# Patient Record
Sex: FEMALE | Race: WHITE | NOT HISPANIC OR LATINO | Employment: OTHER | URBAN - METROPOLITAN AREA
[De-identification: names, ages, dates, MRNs, and addresses within clinical notes are randomized per-mention and may not be internally consistent; named-entity substitution may affect disease eponyms.]

---

## 2017-01-18 ENCOUNTER — TELEPHONE (OUTPATIENT)
Dept: CARDIOLOGY | Facility: MEDICAL CENTER | Age: 82
End: 2017-01-18

## 2017-01-18 NOTE — TELEPHONE ENCOUNTER
----- Message from Kori Cabrales sent at 1/18/2017 11:40 AM PST -----  Regarding: patient wants a echo ordered  LA/Soo        Patient said Dr. Hoyos wanted her to have an echo but she ran into problems with her insurance. She said she no longer has that insurance and would like Dr. Hoyos to arrange for another echo. She can be reached at [phone]

## 2017-01-18 NOTE — TELEPHONE ENCOUNTER
Called patient. Her insurance has changed and she doesn't know where she should have her echocardiogram done. Advised patient to contact her insurance company to find out which facilities are in-network for her.    FE TAY

## 2017-01-23 RX ORDER — LORAZEPAM 1 MG/1
TABLET ORAL
Qty: 60 TAB | Refills: 0 | Status: SHIPPED | OUTPATIENT
Start: 2017-01-23 | End: 2017-03-20

## 2017-01-23 RX ORDER — ATORVASTATIN CALCIUM 40 MG/1
TABLET, FILM COATED ORAL
Qty: 90 TAB | Refills: 1 | Status: SHIPPED | OUTPATIENT
Start: 2017-01-23 | End: 2017-01-30 | Stop reason: SDUPTHER

## 2017-01-23 NOTE — TELEPHONE ENCOUNTER
Was the patient seen in the last year in this department? Yes     Does patient have an active prescription for medications requested? Yes     Received Request Via: Patient    Last Visit: 11/14/16  Last Labs: 9/12/16

## 2017-01-24 NOTE — TELEPHONE ENCOUNTER
Was the patient seen in the last year in this department? Yes     Does patient have an active prescription for medications requested? Yes     Received Request Via: Pharmacy    Last Visit: 11/14/16  Last Labs: 9/12/16

## 2017-01-30 ENCOUNTER — OFFICE VISIT (OUTPATIENT)
Dept: URGENT CARE | Facility: PHYSICIAN GROUP | Age: 82
End: 2017-01-30
Payer: MEDICARE

## 2017-01-30 ENCOUNTER — TELEPHONE (OUTPATIENT)
Dept: CARDIOLOGY | Facility: MEDICAL CENTER | Age: 82
End: 2017-01-30

## 2017-01-30 VITALS
RESPIRATION RATE: 12 BRPM | HEIGHT: 62 IN | DIASTOLIC BLOOD PRESSURE: 70 MMHG | HEART RATE: 70 BPM | TEMPERATURE: 99.4 F | SYSTOLIC BLOOD PRESSURE: 130 MMHG | OXYGEN SATURATION: 97 % | WEIGHT: 115 LBS | BODY MASS INDEX: 21.16 KG/M2

## 2017-01-30 DIAGNOSIS — I10 ESSENTIAL HYPERTENSION: ICD-10-CM

## 2017-01-30 DIAGNOSIS — M79.18 MUSCULOSKELETAL PAIN: ICD-10-CM

## 2017-01-30 DIAGNOSIS — I25.119 CORONARY ARTERY DISEASE INVOLVING NATIVE CORONARY ARTERY OF NATIVE HEART WITH ANGINA PECTORIS (HCC): ICD-10-CM

## 2017-01-30 DIAGNOSIS — R07.9 CHEST PAIN, UNSPECIFIED TYPE: ICD-10-CM

## 2017-01-30 LAB — EKG 4674: NORMAL

## 2017-01-30 PROCEDURE — G8598 ASA/ANTIPLAT THER USED: HCPCS | Performed by: FAMILY MEDICINE

## 2017-01-30 PROCEDURE — G8482 FLU IMMUNIZE ORDER/ADMIN: HCPCS | Performed by: FAMILY MEDICINE

## 2017-01-30 PROCEDURE — 1036F TOBACCO NON-USER: CPT | Performed by: FAMILY MEDICINE

## 2017-01-30 PROCEDURE — 99213 OFFICE O/P EST LOW 20 MIN: CPT | Mod: 25 | Performed by: FAMILY MEDICINE

## 2017-01-30 PROCEDURE — G8432 DEP SCR NOT DOC, RNG: HCPCS | Performed by: FAMILY MEDICINE

## 2017-01-30 PROCEDURE — G8419 CALC BMI OUT NRM PARAM NOF/U: HCPCS | Performed by: FAMILY MEDICINE

## 2017-01-30 PROCEDURE — 93000 ELECTROCARDIOGRAM COMPLETE: CPT | Performed by: FAMILY MEDICINE

## 2017-01-30 PROCEDURE — 4040F PNEUMOC VAC/ADMIN/RCVD: CPT | Performed by: FAMILY MEDICINE

## 2017-01-30 PROCEDURE — 1101F PT FALLS ASSESS-DOCD LE1/YR: CPT | Mod: 8P | Performed by: FAMILY MEDICINE

## 2017-01-30 RX ORDER — DIPHENHYDRAMINE HCL 25 MG
25 TABLET ORAL EVERY 6 HOURS PRN
COMMUNITY
End: 2019-12-19

## 2017-01-30 ASSESSMENT — ENCOUNTER SYMPTOMS
VOMITING: 0
COUGH: 0
DIAPHORESIS: 0
SHORTNESS OF BREATH: 0
FEVER: 0
NAUSEA: 0
EXERTIONAL CHEST PRESSURE: 0

## 2017-01-30 NOTE — TELEPHONE ENCOUNTER
Received call from patient. She states that for the last 3-4 days, she has been having intermittent left arm pain and pain under her left breast. She states that the left arm pain is more severe than the pain under her left breast, and the pain sometimes radiates to her right arm.     Advised patient to go to ER for evaluation. Patient verbalized understanding.    FE TAY

## 2017-01-30 NOTE — MR AVS SNAPSHOT
"        Dayana Lechuga   2017 4:20 PM   Office Visit   MRN: [id number]    Department:  [facility]   Dept Phone:  [phone]    Description:  Female : 1929   Provider:  Bob Carrasco M.D.           Reason for Visit     Chest Pain 3-4 days CP,        Allergies as of 2017     Allergen Noted Reactions    Pcn [Penicillins] 2016   Rash    About 70 years    Codeine 2016   Vomiting      You were diagnosed with     Musculoskeletal pain   [281199]       Chest pain, unspecified type   [8769216]         Vital Signs     Blood Pressure Pulse Temperature Respirations Height Weight    130/70 mmHg 70 37.4 °C (99.4 °F) 12 1.575 m (5' 2.01\") 52.164 kg (115 lb)    Body Mass Index Oxygen Saturation Smoking Status             21.03 kg/m2 97% Never Smoker          Basic Information     Date Of Birth Sex Race Ethnicity Preferred Language    1929 Female White Non- English      Your appointments     2017  2:00 PM   Established Patient with Boy Stanford M.D.   [facility] (--)    [address] [place] [id number] [phone]           You will be receiving a confirmation call a few days before your appointment from our automated call confirmation system.            May 18, 2017  2:00 PM   FOLLOW UP with Chanelle Hoyos M.D.   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 (CMS-HCC) I25.119 Medium  8/10/2016 - Present    Hypertension I10 Medium  9/10/2016 - Present    Chest pain R07.9   9/10/2016 - Present    Back pain M54.9 High  9/10/2016 - Present      Health Maintenance        Date Due Completion Dates    IMM ZOSTER VACCINE 2/18/1989 ---    MAMMOGRAM 10/23/2015 10/23/2014 (Prv Comp), 8/20/2014 (Prv Comp)    Override on 10/23/2014: Previously completed    Override on 8/20/2014: Previously completed    IMM PNEUMOCOCCAL 65+ (ADULT) LOW/MEDIUM RISK SERIES (2 of 2 - PPSV23) 6/1/2017 6/1/2016    COLONOSCOPY 2/23/2018 2/23/2008 (Prv Comp), 11/20/2007 (Prv Comp)    Override on 2/23/2008: Previously completed    Override on 11/20/2007: Previously completed    BONE DENSITY 11/20/2019 11/20/2014 (Postponed)    Override on 11/20/2014: Postponed    IMM DTaP/Tdap/Td Vaccine (2 - Td) 6/26/2026 6/26/2016            Results     POCT EKG      Component    EKG                        Current Immunizations     13-VALENT PCV PREVNAR 6/1/2016    Dtap Vaccine 6/26/2016    Influenza TIV (IM) 9/19/2016      Below and/or attached are the medications your provider expects you to take. Review all of your home medications and newly ordered medications with your provider and/or pharmacist. Follow medication instructions as directed by your provider and/or pharmacist. Please keep your medication list with you and share with your provider. Update the information when medications are discontinued, doses are changed, or new medications (including over-the-counter products) are added; and carry medication information at all times in the event of emergency situations     Allergies:  PCN - Rash     CODEINE - Vomiting               Medications  Valid as of: January 30, 2017 -  6:01 PM    Generic Name Brand Name Tablet Size Instructions for use    Albuterol Sulfate (Aero Soln) albuterol 108 (90 BASE) MCG/ACT Inhale 2 Puffs by mouth every 6 hours as  needed for Shortness of Breath.        Alendronate Sodium (Tab) FOSAMAX 35 MG Take 1 tablet by mouth  every 7 days        AmLODIPine Besylate (Tab) NORVASC 5 MG Take 1 Tab by mouth every day.        Aspirin (Chew Tab) ASA 81 MG Take 1 Tab by mouth every day.        Atorvastatin Calcium (Tab) LIPITOR 40 MG Take 1 tablet by mouth at  bedtime        Azithromycin (Tab) ZITHROMAX 250 MG As dir        Beclomethasone Dipropionate (Aero Soln) QVAR 80 MCG/ACT Use 1 puff twice daily        Biotin (Tab) Biotin 10 MG Take 10 mg by mouth every day.        Calcium Carbonate   Take 600 mg by mouth every day.        Carvedilol (Tab) COREG 12.5 MG Take 1 Tab by mouth 2 times a day, with meals.        Cholecalciferol (Tab) vitamin D 2000 UNIT Take 2,000 Units by mouth every day.        Clopidogrel Bisulfate (Tab) PLAVIX 75 MG Take 1 Tab by mouth every day.        DiphenhydrAMINE HCl (Tab) BENADRYL 25 MG Take 25 mg by mouth every 6 hours as needed for Sleep.        Fluticasone Propionate (Suspension) FLONASE 50 MCG/ACT Use 1 spray in each nostril every day        Lisinopril (Tab) PRINIVIL 20 MG Take 1 tablet by mouth  every day        LORazepam (Tab) ATIVAN 1 MG TAKE 2 TABLETS BY MOUTH AT BEDTIME.         Multiple Vitamins-Minerals (Tab) CENTRUM SILVER ADULT 50+  Take 1 Tab by mouth every day.        Nitroglycerin (SL Tab) NITROSTAT 0.4 MG Place 0.4 mg under tongue every 5 minutes as needed for Chest Pain.        Omega-3 Fatty Acids (Cap) Fish Oil 1200 MG Take 1,200 mg by mouth every day.        Omeprazole (CAPSULE DELAYED RELEASE) PRILOSEC 20 MG Take 20 mg by mouth every day.        Potassium Chloride Alicia CR (Tab CR) Kdur 20 MEQ Take 1 tablet by mouth two  times daily        Vitamin E   Take  by mouth.        Zinc (Cap) Zinc 50 MG Take 50 mg by mouth every day.        .                 Medicines prescribed today were sent to:     [id number] - [place], [place] - [address]    [address] [place] [address]     Phone: [phone] Fax: [phone]    Open 24 Hours?: No    [facility] - [place], [place] - [address]    [address] [facility] [address]    Phone: [phone] Fax: [phone]    Open 24 Hours?: No    [id number] - [place], [place] - [address]    [address] [place] [address]    Phone: [phone] Fax: [phone]    Open 24 Hours?: No    [facility] [place] [facility] - [place], [place] - [address]    [address] [facility] [address]    Phone: [phone] Fax: [phone]    Open 24 Hours?: No      Medication refill instructions:       If your prescription bottle indicates you have medication refills left, it is not necessary to call your provider’s office. Please contact your pharmacy and they will refill your medication.    If your prescription bottle indicates you do not have any refills left, you may request refills at any time through one of the following ways: The online [employer] system (except Urgent Care), by calling your provider’s office, or by asking your pharmacy to contact your provider’s office with a refill request. Medication refills are processed only during regular business hours and may not be available until the next business day. Your provider may request additional information or to have a follow-up visit with you prior to refilling your medication.   *Please Note: Medication refills are assigned a new Rx number when refilled electronically. Your pharmacy may indicate that no refills were authorized even though a new prescription for the same medication is available at the pharmacy. Please request the medicine by name with the pharmacy before contacting your provider for a refill.        Instructions    Chest Pain, Nonspecific  It is often hard to give a specific diagnosis for the cause of chest pain. There is always a chance that your pain could be related to something  serious, like a heart attack or a blood clot in the lungs. You need to follow up with your caregiver for further evaluation. More lab tests or other studies such as X-rays, electrocardiography, stress testing, or cardiac imaging may be needed to find the cause of your pain.  Most of the time, nonspecific chest pain improves within 2 to 3 days with rest and mild pain medicine. For the next few days, avoid physical exertion or activities that bring on pain. Do not smoke. Avoid drinking alcohol. Call your caregiver for routine follow-up as advised.   SEEK IMMEDIATE MEDICAL CARE IF:  · You develop increased chest pain or pain that radiates to the arm, neck, jaw, back, or abdomen.   · You develop shortness of breath, increased coughing, or you start coughing up blood.   · You have severe back or abdominal pain, nausea, or vomiting.   · You develop severe weakness, fainting, fever, or chills.   Document Released: 12/18/2006 Document Revised: 03/11/2013 Document Reviewed: 06/06/2008  [employer]® Patient Information ©2013 [employer].          [email or web address] Access Code: Activation code not generated  Current [email or web address] Status: Active

## 2017-01-31 RX ORDER — FLUTICASONE PROPIONATE 50 MCG
1 SPRAY, SUSPENSION (ML) NASAL
Qty: 32 G | Refills: 2 | Status: SHIPPED | OUTPATIENT
Start: 2017-01-31 | End: 2017-11-13 | Stop reason: SDUPTHER

## 2017-01-31 RX ORDER — CLOPIDOGREL BISULFATE 75 MG/1
75 TABLET ORAL DAILY
Qty: 90 TAB | Refills: 1 | Status: SHIPPED | OUTPATIENT
Start: 2017-01-31 | End: 2017-08-01

## 2017-01-31 RX ORDER — ATORVASTATIN CALCIUM 40 MG/1
TABLET, FILM COATED ORAL
Qty: 90 TAB | Refills: 1 | Status: SHIPPED | OUTPATIENT
Start: 2017-01-31 | End: 2017-07-12 | Stop reason: SDUPTHER

## 2017-01-31 RX ORDER — ALENDRONATE SODIUM 35 MG/1
TABLET ORAL
Qty: 12 TAB | Refills: 3 | Status: SHIPPED | OUTPATIENT
Start: 2017-01-31 | End: 2017-11-13 | Stop reason: SDUPTHER

## 2017-01-31 RX ORDER — AMLODIPINE BESYLATE 5 MG/1
5 TABLET ORAL DAILY
Qty: 90 TAB | Refills: 1 | Status: SHIPPED | OUTPATIENT
Start: 2017-01-31 | End: 2017-07-12 | Stop reason: SDUPTHER

## 2017-01-31 RX ORDER — ALBUTEROL SULFATE 90 UG/1
2 AEROSOL, METERED RESPIRATORY (INHALATION) EVERY 6 HOURS PRN
Qty: 8.5 G | Refills: 5 | Status: SHIPPED | OUTPATIENT
Start: 2017-01-31 | End: 2017-02-02 | Stop reason: SDUPTHER

## 2017-01-31 RX ORDER — NITROGLYCERIN 0.4 MG/1
TABLET SUBLINGUAL
Qty: 25 TAB | Refills: 2 | Status: SHIPPED | OUTPATIENT
Start: 2017-01-31 | End: 2017-02-02 | Stop reason: SDUPTHER

## 2017-01-31 RX ORDER — POTASSIUM CHLORIDE 20 MEQ/1
20 TABLET, EXTENDED RELEASE ORAL 2 TIMES DAILY
Qty: 180 TAB | Refills: 1 | Status: SHIPPED | OUTPATIENT
Start: 2017-01-31 | End: 2017-07-14 | Stop reason: SDUPTHER

## 2017-01-31 RX ORDER — CARVEDILOL 12.5 MG/1
12.5 TABLET ORAL 2 TIMES DAILY WITH MEALS
Qty: 180 TAB | Refills: 1 | Status: SHIPPED | OUTPATIENT
Start: 2017-01-31 | End: 2017-07-14 | Stop reason: SDUPTHER

## 2017-01-31 RX ORDER — LISINOPRIL 20 MG/1
TABLET ORAL
Qty: 90 TAB | Refills: 1 | Status: SHIPPED | OUTPATIENT
Start: 2017-01-31 | End: 2017-07-12 | Stop reason: SDUPTHER

## 2017-01-31 NOTE — TELEPHONE ENCOUNTER
Was the patient seen in the last year in this department? Yes     Does patient have an active prescription for medications requested? No     Received Request Via: Patient   Last seen 11/14/16  Last labs  9/12/16

## 2017-01-31 NOTE — PROGRESS NOTES
"Subjective:      Dayana Lechuga is a 87 y.o. female who presents with Chest Pain            Chest Pain   This is a new problem. The current episode started in the past 7 days. The onset quality is gradual. The problem occurs intermittently. The problem has been waxing and waning. The pain is present in the lateral region. The pain is moderate. The quality of the pain is described as dull. The pain does not radiate. Pertinent negatives include no cough, diaphoresis, exertional chest pressure, fever, nausea, shortness of breath or vomiting.       Review of Systems   Constitutional: Negative for fever and diaphoresis.   Respiratory: Negative for cough and shortness of breath.    Cardiovascular: Positive for chest pain.   Gastrointestinal: Negative for nausea and vomiting.     Allergies   Allergen Reactions   • Pcn [Penicillins] Rash     About 70 years   • Codeine Vomiting         Objective:     /70 mmHg  Pulse 70  Temp(Src) 37.4 °C (99.4 °F)  Resp 12  Ht 1.575 m (5' 2.01\")  Wt 52.164 kg (115 lb)  BMI 21.03 kg/m2  SpO2 97%     Physical Exam   Constitutional: She is oriented to person, place, and time. She appears well-developed and well-nourished. No distress.   HENT:   Head: Normocephalic and atraumatic.   Eyes: Conjunctivae and EOM are normal. Pupils are equal, round, and reactive to light.   Cardiovascular: Normal rate and regular rhythm.    No murmur heard.  Pulmonary/Chest: Effort normal and breath sounds normal. No respiratory distress.   Abdominal: Soft. She exhibits no distension. There is no tenderness.   Musculoskeletal:        Left shoulder: She exhibits tenderness and spasm. She exhibits normal range of motion, no bony tenderness and normal strength.        Thoracic back: She exhibits pain and spasm.        Left upper arm: She exhibits tenderness. She exhibits no bony tenderness.   Neurological: She is alert and oriented to person, place, and time. She has normal reflexes. No sensory " deficit.   Skin: Skin is warm and dry.   Psychiatric: She has a normal mood and affect.               Assessment/Plan:     1. Musculoskeletal pain  Use over-the-counter pain reliever, such as acetaminophen (Tylenol), ibuprofen (Advil, Motrin) or naproxen (Aleve) as needed; follow package directions for dosing. EKG with no worsening from previous on my read. Differential diagnosis, natural history, supportive care, and indications for immediate follow-up discussed.   - POCT EKG    2. Chest pain, unspecified type  AS above. Differential diagnosis, natural history, supportive care, and indications for immediate follow-up discussed.

## 2017-01-31 NOTE — TELEPHONE ENCOUNTER
Was the patient seen in the last year in this department? Yes     Does patient have an active prescription for medications requested? No     Received Request Via: Patient     Last seen  11/14/16  Last labs 9/11/16    Patient switching pharmacy

## 2017-01-31 NOTE — PATIENT INSTRUCTIONS
Chest Pain, Nonspecific  It is often hard to give a specific diagnosis for the cause of chest pain. There is always a chance that your pain could be related to something serious, like a heart attack or a blood clot in the lungs. You need to follow up with your caregiver for further evaluation. More lab tests or other studies such as X-rays, electrocardiography, stress testing, or cardiac imaging may be needed to find the cause of your pain.  Most of the time, nonspecific chest pain improves within 2 to 3 days with rest and mild pain medicine. For the next few days, avoid physical exertion or activities that bring on pain. Do not smoke. Avoid drinking alcohol. Call your caregiver for routine follow-up as advised.   SEEK IMMEDIATE MEDICAL CARE IF:  · You develop increased chest pain or pain that radiates to the arm, neck, jaw, back, or abdomen.   · You develop shortness of breath, increased coughing, or you start coughing up blood.   · You have severe back or abdominal pain, nausea, or vomiting.   · You develop severe weakness, fainting, fever, or chills.   Document Released: 12/18/2006 Document Revised: 03/11/2013 Document Reviewed: 06/06/2008  [employer]® Patient Information ©2013 [employer].

## 2017-02-02 RX ORDER — NITROGLYCERIN 0.4 MG/1
TABLET SUBLINGUAL
Qty: 25 TAB | Refills: 2 | Status: SHIPPED | OUTPATIENT
Start: 2017-02-02 | End: 2017-11-13 | Stop reason: SDUPTHER

## 2017-02-02 NOTE — TELEPHONE ENCOUNTER
Was the patient seen in the last year in this department? Yes     Does patient have an active prescription for medications requested? No     Received Request Via: Pharmacy    Went to the wrong pharmacy, unable to transfer it need a new script for her mail order, Thanks

## 2017-02-03 RX ORDER — ALBUTEROL SULFATE 90 UG/1
2 AEROSOL, METERED RESPIRATORY (INHALATION) EVERY 6 HOURS PRN
Qty: 20.1 G | Refills: 0 | Status: SHIPPED | OUTPATIENT
Start: 2017-02-03 | End: 2017-11-13 | Stop reason: SDUPTHER

## 2017-02-07 ENCOUNTER — TELEPHONE (OUTPATIENT)
Dept: CARDIOLOGY | Facility: MEDICAL CENTER | Age: 82
End: 2017-02-07

## 2017-02-07 NOTE — TELEPHONE ENCOUNTER
Called patient and advised her of Dr. Hoyos's echocardiogram interpretation.     Patient did want to mention that she started doing arm exercises recently, and that has helped relieve her arm pain.    FE TAY      ==================================================================================  RE: Request for echocardiogram results  Received: Today       KAVON Velazquez R.N.       Phone Number: [phone]                     Normal & reassuring echo- great news   F/u as planned            Previous Messages       ----- Message -----      From: Soo Duke R.N.      Sent: 2/7/2017  11:36 AM        To: Chanelle Hoyos M.D.   Subject: Request for echocardiogram results               Hi Dr. Hoyos,     Could you review Dayana Lechuga's echocardiogram from 1/31/2017 (in media)? Thank you!     FE TAY     ----- Message -----      From: Laila Henao      Sent: 2/7/2017   9:53 AM        To: Soo Duke R.N.   Subject: results of 1/31 echo                             LA/soo Guan calling for results of Echo done 1/31 at [facility] in [place].  Please call pt to willem, [phone].

## 2017-02-13 ENCOUNTER — OFFICE VISIT (OUTPATIENT)
Dept: MEDICAL GROUP | Facility: CLINIC | Age: 82
End: 2017-02-13
Payer: MEDICARE

## 2017-02-13 VITALS
BODY MASS INDEX: 21.35 KG/M2 | WEIGHT: 116 LBS | OXYGEN SATURATION: 93 % | RESPIRATION RATE: 14 BRPM | SYSTOLIC BLOOD PRESSURE: 160 MMHG | HEART RATE: 74 BPM | TEMPERATURE: 98.7 F | DIASTOLIC BLOOD PRESSURE: 82 MMHG | HEIGHT: 62 IN

## 2017-02-13 DIAGNOSIS — I25.119 CORONARY ARTERY DISEASE INVOLVING NATIVE CORONARY ARTERY OF NATIVE HEART WITH ANGINA PECTORIS (HCC): ICD-10-CM

## 2017-02-13 DIAGNOSIS — F41.9 ANXIETY: ICD-10-CM

## 2017-02-13 DIAGNOSIS — I10 ESSENTIAL HYPERTENSION: ICD-10-CM

## 2017-02-13 PROCEDURE — G8482 FLU IMMUNIZE ORDER/ADMIN: HCPCS | Performed by: FAMILY MEDICINE

## 2017-02-13 PROCEDURE — G8419 CALC BMI OUT NRM PARAM NOF/U: HCPCS | Performed by: FAMILY MEDICINE

## 2017-02-13 PROCEDURE — G8598 ASA/ANTIPLAT THER USED: HCPCS | Performed by: FAMILY MEDICINE

## 2017-02-13 PROCEDURE — 4040F PNEUMOC VAC/ADMIN/RCVD: CPT | Performed by: FAMILY MEDICINE

## 2017-02-13 PROCEDURE — 1101F PT FALLS ASSESS-DOCD LE1/YR: CPT | Performed by: FAMILY MEDICINE

## 2017-02-13 PROCEDURE — 1036F TOBACCO NON-USER: CPT | Performed by: FAMILY MEDICINE

## 2017-02-13 PROCEDURE — G8432 DEP SCR NOT DOC, RNG: HCPCS | Performed by: FAMILY MEDICINE

## 2017-02-13 PROCEDURE — 99214 OFFICE O/P EST MOD 30 MIN: CPT | Performed by: FAMILY MEDICINE

## 2017-02-13 RX ORDER — LORAZEPAM 1 MG/1
2 TABLET ORAL
Qty: 60 TAB | Refills: 0 | Status: SHIPPED | OUTPATIENT
Start: 2017-02-13 | End: 2017-03-20

## 2017-02-13 ASSESSMENT — ENCOUNTER SYMPTOMS
CARDIOVASCULAR NEGATIVE: 1
NECK PAIN: 0
MYALGIAS: 0
NERVOUS/ANXIOUS: 1
EYES NEGATIVE: 1
CONSTITUTIONAL NEGATIVE: 1
RESPIRATORY NEGATIVE: 1
FEVER: 0
CHILLS: 0
MUSCULOSKELETAL NEGATIVE: 1
HEADACHES: 0
CONSTIPATION: 0
HEMOPTYSIS: 0
DIZZINESS: 0
GASTROINTESTINAL NEGATIVE: 1
COUGH: 0
PALPITATIONS: 0
NEUROLOGICAL NEGATIVE: 1

## 2017-02-13 NOTE — PROGRESS NOTES
Subjective:      Dayana Lechuga is a 87 y.o. female who presents with Medication Refill            HPI Comments: 1. Coronary artery disease involving native coronary artery of native heart with angina pectoris (CMS-HCC)  Stable with no recent cp  Had echo and was normal last month  - lorazepam (ATIVAN) 1 MG Tab; Take 2 Tabs by mouth at bedtime as needed for Anxiety.  Dispense: 60 Tab; Refill: 0  - COMP METABOLIC PANEL; Future  - VITAMIN D,25 HYDROXY; Future  - LIPID PROFILE; Future  - CBC WITHOUT DIFFERENTIAL; Future    2. Essential hypertension  Currently treated for HTN, taking meds with no CP or sob, monitors bp at home periodically. controlled      - lorazepam (ATIVAN) 1 MG Tab; Take 2 Tabs by mouth at bedtime as needed for Anxiety.  Dispense: 60 Tab; Refill: 0  - COMP METABOLIC PANEL; Future  - VITAMIN D,25 HYDROXY; Future  - LIPID PROFILE; Future  - CBC WITHOUT DIFFERENTIAL; Future    3. Anxiety  Stable    - lorazepam (ATIVAN) 1 MG Tab; Take 2 Tabs by mouth at bedtime as needed for Anxiety.  Dispense: 60 Tab; Refill: 0  - COMP METABOLIC PANEL; Future  - VITAMIN D,25 HYDROXY; Future  - LIPID PROFILE; Future  - CBC WITHOUT DIFFERENTIAL; Future    Past Medical History:    Allergy                                                       Asthma                                                        Hypertension                                                  Anxiety                                                       Osteoporosis                                                  CAD (coronary artery disease)                                 Hyperlipidemia                                              Past Surgical History:    APPENDECTOMY                                                   ABDOMINAL HYSTERECTOMY TOTAL                                   HERNIA REPAIR                                                  TONSILLECTOMY                                                  CARDIAC CATH                                                    Smoking Status: Never Smoker                      Smokeless Status: Never Used                        Alcohol Use: No              History reviewed.  No pertinent family history.      Current outpatient prescriptions: •  lorazepam (ATIVAN) 1 MG Tab, Take 2 Tabs by mouth at bedtime as needed for Anxiety., Disp: 60 Tab, Rfl: 0•  albuterol 108 (90 BASE) MCG/ACT Aero Soln inhalation aerosol, Inhale 2 Puffs by mouth every 6 hours as needed for Shortness of Breath., Disp: 20.1 g, Rfl: 0•  nitroglycerin (NITROSTAT) 0.4 MG SL Tab, Place 1 tab under tongue every 5 min as needed for chest pain max 3 doses, Disp: 25 Tab, Rfl: 2•  amlodipine (NORVASC) 5 MG Tab, Take 1 Tab by mouth every day., Disp: 90 Tab, Rfl: 1•  carvedilol (COREG) 12.5 MG Tab, Take 1 Tab by mouth 2 times a day, with meals., Disp: 180 Tab, Rfl: 1•  fluticasone (FLONASE) 50 MCG/ACT nasal spray, Spray 1 Spray in nose every day. EACH NOSTRIL, Disp: 32 g, Rfl: 2•  alendronate (FOSAMAX) 35 MG tablet, Take 1 tablet by mouth  every 7 days, Disp: 12 Tab, Rfl: 3•  potassium chloride SA (KDUR) 20 MEQ Tab CR, Take 1 Tab by mouth 2 times a day., Disp: 180 Tab, Rfl: 1•  beclomethasone (QVAR) 80 MCG/ACT inhaler, Use 1 puff twice daily, Disp: 17.4 g, Rfl: 3•  atorvastatin (LIPITOR) 40 MG Tab, 1 tab by mouth ever bed time, Disp: 90 Tab, Rfl: 1•  lisinopril (PRINIVIL) 20 MG Tab, Take 1 tablet by mouth  every day, Disp: 90 Tab, Rfl: 1•  clopidogrel (PLAVIX) 75 MG Tab, Take 1 Tab by mouth every day., Disp: 90 Tab, Rfl: 1•  diphenhydrAMINE (BENADRYL) 25 MG Tab, Take 25 mg by mouth every 6 hours as needed for Sleep., Disp: , Rfl: •  lorazepam (ATIVAN) 1 MG Tab, TAKE 2 TABLETS BY MOUTH AT BEDTIME. , Disp: 60 Tab, Rfl: 0•  VITAMIN E PO, Take  by mouth., Disp: , Rfl: •  aspirin (ASA) 81 MG Chew Tab chewable tablet, Take 1 Tab by mouth every day., Disp: 100 Tab, Rfl: 11•  Cholecalciferol (VITAMIN D) 2000 UNIT Tab, Take 2,000 Units by mouth every day., Disp: ,  Rfl: •  Biotin 10 MG Tab, Take 10 mg by mouth every day., Disp: , Rfl: •  Zinc 50 MG Cap, Take 50 mg by mouth every day., Disp: , Rfl: •  Multiple Vitamins-Minerals (CENTRUM SILVER ADULT 50+) Tab, Take 1 Tab by mouth every day., Disp: , Rfl: •  Omega-3 Fatty Acids (FISH OIL) 1200 MG Cap, Take 1,200 mg by mouth every day., Disp: , Rfl: •  Calcium Carbonate (CALCIUM 600 PO), Take 600 mg by mouth every day., Disp: , Rfl: •  omeprazole (PRILOSEC) 20 MG delayed-release capsule, Take 20 mg by mouth every day., Disp: , Rfl: •  azithromycin (ZITHROMAX) 250 MG Tab, As dir (Patient not taking: Reported on 2/13/2017), Disp: 6 Tab, Rfl: 0    Assessment/plan: diagnoses listed as above in problem list. Patient will continue with current medications/diet/lifestyle modifications    Patient will continue with current medication regimen, advised on taking meds every day, f/u in 3 mo.            Review of Systems   Constitutional: Negative.  Negative for fever and chills.        Past Medical History:    Allergy                                                       Asthma                                                        Hypertension                                                  Anxiety                                                       Osteoporosis                                                  CAD (coronary artery disease)                                 Hyperlipidemia                                              Past Surgical History:    APPENDECTOMY                                                   ABDOMINAL HYSTERECTOMY TOTAL                                   HERNIA REPAIR                                                  TONSILLECTOMY                                                  CARDIAC CATH                                                   Smoking Status: Never Smoker                      Smokeless Status: Never Used                        Alcohol Use: No              History reviewed.  No pertinent family  "history.     HENT: Negative.    Eyes: Negative.    Respiratory: Negative.  Negative for cough and hemoptysis.    Cardiovascular: Negative.  Negative for chest pain and palpitations.   Gastrointestinal: Negative.  Negative for constipation.   Genitourinary: Negative.  Negative for dysuria and urgency.   Musculoskeletal: Negative.  Negative for myalgias and neck pain.   Skin: Negative.  Negative for rash.   Neurological: Negative.  Negative for dizziness and headaches.   Endo/Heme/Allergies: Negative.    Psychiatric/Behavioral: Negative for suicidal ideas. The patient is nervous/anxious.           Objective:     /82 mmHg  Pulse 74  Temp(Src) 37.1 °C (98.7 °F)  Resp 14  Ht 1.562 m (5' 1.5\")  Wt 52.617 kg (116 lb)  BMI 21.57 kg/m2  SpO2 93%     Physical Exam   Constitutional: She is oriented to person, place, and time. No distress.   HENT:   Head: Normocephalic and atraumatic.   Right Ear: External ear normal.   Left Ear: External ear normal.   Nose: Nose normal.   Mouth/Throat: Oropharynx is clear and moist. No oropharyngeal exudate.   Eyes: Pupils are equal, round, and reactive to light. Right eye exhibits no discharge. Left eye exhibits no discharge. No scleral icterus.   Neck: Normal range of motion. Neck supple. No JVD present. No tracheal deviation present. No thyromegaly present.   Cardiovascular: Normal rate, regular rhythm, normal heart sounds and intact distal pulses.  Exam reveals no gallop and no friction rub.    No murmur heard.  Pulmonary/Chest: Effort normal and breath sounds normal. No stridor. No respiratory distress. She has no wheezes. She has no rales. She exhibits no tenderness.   Abdominal: Soft. She exhibits no distension. There is no tenderness.   Lymphadenopathy:     She has no cervical adenopathy.   Neurological: She is alert and oriented to person, place, and time.   Skin: Skin is warm and dry. She is not diaphoretic.   Psychiatric: Judgment normal.   Nursing note and vitals " reviewed.              Assessment/Plan:     1. Coronary artery disease involving native coronary artery of native heart with angina pectoris (CMS-HCC)    - lorazepam (ATIVAN) 1 MG Tab; Take 2 Tabs by mouth at bedtime as needed for Anxiety.  Dispense: 60 Tab; Refill: 0  - COMP METABOLIC PANEL; Future  - VITAMIN D,25 HYDROXY; Future  - LIPID PROFILE; Future  - CBC WITHOUT DIFFERENTIAL; Future    2. Essential hypertension    - lorazepam (ATIVAN) 1 MG Tab; Take 2 Tabs by mouth at bedtime as needed for Anxiety.  Dispense: 60 Tab; Refill: 0  - COMP METABOLIC PANEL; Future  - VITAMIN D,25 HYDROXY; Future  - LIPID PROFILE; Future  - CBC WITHOUT DIFFERENTIAL; Future    3. Anxiety    - lorazepam (ATIVAN) 1 MG Tab; Take 2 Tabs by mouth at bedtime as needed for Anxiety.  Dispense: 60 Tab; Refill: 0  - COMP METABOLIC PANEL; Future  - VITAMIN D,25 HYDROXY; Future  - LIPID PROFILE; Future  - CBC WITHOUT DIFFERENTIAL; Future

## 2017-02-13 NOTE — MR AVS SNAPSHOT
"        Dayana Lechuga   2017 2:00 PM   Office Visit   MRN: [id number]    [facility] Phone[facility]  [phone]    Description:  Female : 1929   Provider:  Boy Stanford M.D.           Reason for Visit     Medication Refill lorazepam       Allergies as of 2017     Allergen Noted Reactions    Pcn [Penicillins] 2016   Rash    About 70 years    Codeine 2016   Vomiting      You were diagnosed with     Coronary artery disease involving native coronary artery of native heart with angina pectoris (CMS-HCC)   [3673243]       Essential hypertension   [5695228]       Anxiety   [615544]         Vital Signs     Blood Pressure Pulse Temperature Respirations Height Weight    160/82 mmHg 74 37.1 °C (98.7 °F) 14 1.562 m (5' 1.5\") 52.617 kg (116 lb)    Body Mass Index Oxygen Saturation Smoking Status             21.57 kg/m2 93% Never Smoker          Basic Information     Date Of Birth Sex Race Ethnicity Preferred Language    1929 Female White Non- English      Your appointments     May 18, 2017  2:00 PM   FOLLOW UP with Chanelle Hoyos M.D.   [facility]    [address] [place] [id number] [phone]              Problem List              ICD-10-CM Priority Class Noted - Resolved    ASTHMA  Low  10/2/2009 - Present    HTN (hypertension) I10 Medium  10/2/2009 - Present    GERD (gastroesophageal reflux disease) (Chronic) K21.9 Low  10/31/2009 - Present    Dyslipidemia (Chronic) E78.5 Medium  10/31/2009 - Present    Osteopenia (Chronic) M85.80 Low  10/31/2009 - Present    Anxiety F41.9 Low  2016 - Present    Hypokalemia E87.6 Medium  2016 - Present    Coronary artery disease involving native coronary artery of native heart with angina pectoris (CMS-HCC) I25.119 Medium  8/10/2016 - Present    Hypertension I10 Medium  9/10/2016 - Present    Chest pain R07.9   9/10/2016 - Present    Back pain " M54.9 High  9/10/2016 - Present      Health Maintenance        Date Due Completion Dates    IMM ZOSTER VACCINE 2/18/1989 ---    MAMMOGRAM 10/23/2015 10/23/2014 (Prv Comp), 8/20/2014 (Prv Comp)    Override on 10/23/2014: Previously completed    Override on 8/20/2014: Previously completed    IMM PNEUMOCOCCAL 65+ (ADULT) LOW/MEDIUM RISK SERIES (2 of 2 - PPSV23) 6/1/2017 6/1/2016    COLONOSCOPY 2/23/2018 2/23/2008 (Prv Comp), 11/20/2007 (Prv Comp)    Override on 2/23/2008: Previously completed    Override on 11/20/2007: Previously completed    BONE DENSITY 11/20/2019 11/20/2014 (Postponed)    Override on 11/20/2014: Postponed    IMM DTaP/Tdap/Td Vaccine (2 - Td) 6/26/2026 6/26/2016            Current Immunizations     13-VALENT PCV PREVNAR 6/1/2016    Dtap Vaccine 6/26/2016    Influenza TIV (IM) 9/19/2016      Below and/or attached are the medications your provider expects you to take. Review all of your home medications and newly ordered medications with your provider and/or pharmacist. Follow medication instructions as directed by your provider and/or pharmacist. Please keep your medication list with you and share with your provider. Update the information when medications are discontinued, doses are changed, or new medications (including over-the-counter products) are added; and carry medication information at all times in the event of emergency situations     Allergies:  PCN - Rash     CODEINE - Vomiting               Medications  Valid as of: February 13, 2017 -  2:44 PM    Generic Name Brand Name Tablet Size Instructions for use    Albuterol Sulfate (Aero Soln) albuterol 108 (90 BASE) MCG/ACT Inhale 2 Puffs by mouth every 6 hours as needed for Shortness of Breath.        Alendronate Sodium (Tab) FOSAMAX 35 MG Take 1 tablet by mouth  every 7 days        AmLODIPine Besylate (Tab) NORVASC 5 MG Take 1 Tab by mouth every day.        Aspirin (Chew Tab) ASA 81 MG Take 1 Tab by mouth every day.        Atorvastatin Calcium  (Tab) LIPITOR 40 MG 1 tab by mouth ever bed time        Azithromycin (Tab) ZITHROMAX 250 MG As dir        Beclomethasone Dipropionate (Aero Soln) QVAR 80 MCG/ACT Use 1 puff twice daily        Biotin (Tab) Biotin 10 MG Take 10 mg by mouth every day.        Calcium Carbonate   Take 600 mg by mouth every day.        Carvedilol (Tab) COREG 12.5 MG Take 1 Tab by mouth 2 times a day, with meals.        Cholecalciferol (Tab) vitamin D 2000 UNIT Take 2,000 Units by mouth every day.        Clopidogrel Bisulfate (Tab) PLAVIX 75 MG Take 1 Tab by mouth every day.        DiphenhydrAMINE HCl (Tab) BENADRYL 25 MG Take 25 mg by mouth every 6 hours as needed for Sleep.        Fluticasone Propionate (Suspension) FLONASE 50 MCG/ACT Spray 1 Spray in nose every day. EACH NOSTRIL        Lisinopril (Tab) PRINIVIL 20 MG Take 1 tablet by mouth  every day        LORazepam (Tab) ATIVAN 1 MG TAKE 2 TABLETS BY MOUTH AT BEDTIME.         LORazepam (Tab) ATIVAN 1 MG Take 2 Tabs by mouth at bedtime as needed for Anxiety.        Multiple Vitamins-Minerals (Tab) CENTRUM SILVER ADULT 50+  Take 1 Tab by mouth every day.        Nitroglycerin (SL Tab) NITROSTAT 0.4 MG Place 1 tab under tongue every 5 min as needed for chest pain max 3 doses        Omega-3 Fatty Acids (Cap) Fish Oil 1200 MG Take 1,200 mg by mouth every day.        Omeprazole (CAPSULE DELAYED RELEASE) PRILOSEC 20 MG Take 20 mg by mouth every day.        Potassium Chloride Alicia CR (Tab CR) Kdur 20 MEQ Take 1 Tab by mouth 2 times a day.        Vitamin E   Take  by mouth.        Zinc (Cap) Zinc 50 MG Take 50 mg by mouth every day.        .                 Medicines prescribed today were sent to:     [id number] - [place] - [address]    [address] [address]    Phone: [phone] Fax: [phone]    Open 24 Hours?: No    [id number] - [place], [place] - [address] [place]    [address] [facility]  [place] [address]    Phone: [phone] Fax: [phone]    Open 24 Hours?: No      Medication refill instructions:       If your prescription bottle indicates you have medication refills left, it is not necessary to call your provider’s office. Please contact your pharmacy and they will refill your medication.    If your prescription bottle indicates you do not have any refills left, you may request refills at any time through one of the following ways: The online [employer] system (except Urgent Care), by calling your provider’s office, or by asking your pharmacy to contact your provider’s office with a refill request. Medication refills are processed only during regular business hours and may not be available until the next business day. Your provider may request additional information or to have a follow-up visit with you prior to refilling your medication.   *Please Note: Medication refills are assigned a new Rx number when refilled electronically. Your pharmacy may indicate that no refills were authorized even though a new prescription for the same medication is available at the pharmacy. Please request the medicine by name with the pharmacy before contacting your provider for a refill.        Your To Do List     Future Labs/Procedures Complete By Expires    CBC WITHOUT DIFFERENTIAL  As directed 8/16/2017    COMP METABOLIC PANEL  As directed 2/13/2018    LIPID PROFILE  As directed 2/13/2018    VITAMIN D,25 HYDROXY  As directed 2/13/2018         [employer] Access Code: Activation code not generated  Current [employer] Status: Active

## 2017-02-28 RX ORDER — LORAZEPAM 1 MG/1
TABLET ORAL
Qty: 60 TAB | Refills: 0 | Status: SHIPPED | OUTPATIENT
Start: 2017-02-28 | End: 2017-05-08 | Stop reason: SDUPTHER

## 2017-02-28 NOTE — TELEPHONE ENCOUNTER
Was the patient seen in the last year in this department? Yes     Does patient have an active prescription for medications requested? No     Received Request Via: Pharmacy   Last seen 2/13/17  Last labs  9/12/16

## 2017-03-20 ENCOUNTER — OFFICE VISIT (OUTPATIENT)
Dept: URGENT CARE | Facility: PHYSICIAN GROUP | Age: 82
End: 2017-03-20
Payer: MEDICARE

## 2017-03-20 ENCOUNTER — APPOINTMENT (OUTPATIENT)
Dept: RADIOLOGY | Facility: IMAGING CENTER | Age: 82
End: 2017-03-20
Attending: FAMILY MEDICINE
Payer: MEDICARE

## 2017-03-20 VITALS
SYSTOLIC BLOOD PRESSURE: 158 MMHG | BODY MASS INDEX: 21.75 KG/M2 | OXYGEN SATURATION: 96 % | TEMPERATURE: 98.4 F | DIASTOLIC BLOOD PRESSURE: 76 MMHG | HEART RATE: 67 BPM | WEIGHT: 117 LBS | RESPIRATION RATE: 20 BRPM

## 2017-03-20 DIAGNOSIS — M79.672 LEFT FOOT PAIN: ICD-10-CM

## 2017-03-20 DIAGNOSIS — J04.0 ACUTE LARYNGITIS: ICD-10-CM

## 2017-03-20 PROCEDURE — 73630 X-RAY EXAM OF FOOT: CPT | Mod: TC,LT | Performed by: FAMILY MEDICINE

## 2017-03-20 PROCEDURE — 1101F PT FALLS ASSESS-DOCD LE1/YR: CPT | Performed by: FAMILY MEDICINE

## 2017-03-20 PROCEDURE — G8432 DEP SCR NOT DOC, RNG: HCPCS | Performed by: FAMILY MEDICINE

## 2017-03-20 PROCEDURE — G8598 ASA/ANTIPLAT THER USED: HCPCS | Performed by: FAMILY MEDICINE

## 2017-03-20 PROCEDURE — G8419 CALC BMI OUT NRM PARAM NOF/U: HCPCS | Performed by: FAMILY MEDICINE

## 2017-03-20 PROCEDURE — 99214 OFFICE O/P EST MOD 30 MIN: CPT | Performed by: FAMILY MEDICINE

## 2017-03-20 PROCEDURE — G8482 FLU IMMUNIZE ORDER/ADMIN: HCPCS | Performed by: FAMILY MEDICINE

## 2017-03-20 PROCEDURE — 1036F TOBACCO NON-USER: CPT | Performed by: FAMILY MEDICINE

## 2017-03-20 PROCEDURE — 4040F PNEUMOC VAC/ADMIN/RCVD: CPT | Performed by: FAMILY MEDICINE

## 2017-03-20 RX ORDER — PREDNISONE 20 MG/1
TABLET ORAL
Qty: 5 TAB | Refills: 0 | Status: SHIPPED | OUTPATIENT
Start: 2017-03-20 | End: 2017-11-13

## 2017-03-20 NOTE — PROGRESS NOTES
Chief Complaint:    Chief Complaint   Patient presents with   • Foot Swelling     L foot, sore throat, x2 weeks       History of Present Illness:    Here for 2 problems.    1. Has pain in left mid-foot region x 2 weeks. One day went to walk her dog and this may have caused it. No injury or trauma during the walk. It will occl swell in the area. Uses Ibuprofen prn with questionable help.    2. For 2 weeks, has laryngitis. No fever. Always has some nasal symptoms, uses allergy medication for this which helps. No change in nasal symptoms recently. Some cough.      Review of Systems:    Constitutional: Negative for fever, chills, and diaphoresis.   Eyes: Negative for change in vision, photophobia, pain, redness, and discharge.  ENT: See HPI.  Respiratory: See HPI.  Cardiovascular: Negative for chest pain, palpitations, orthopnea, claudication, leg swelling, and PND.   Gastrointestinal: Negative for abdominal pain, nausea, vomiting, diarrhea, constipation, blood in stool, and melena.   Genitourinary: Negative for dysuria, urinary urgency, urinary frequency, hematuria, and flank pain.   Musculoskeletal: See HPI.  Skin: Negative for rash and itching.   Neurological: Negative for dizziness, tingling, tremors, sensory change, speech change, focal weakness, seizures, loss of consciousness, and headaches.   Endo: Negative for polydipsia.   Heme: Does not bruise/bleed easily.   Psychiatric/Behavioral: Anxiety, uses Lorazepam prn.    Past Medical History:    Past Medical History   Diagnosis Date   • Allergy    • ASTHMA    • Hypertension    • Anxiety    • OSTEOPOROSIS    • CAD (coronary artery disease)    • Hyperlipidemia        Past Surgical History:    Past Surgical History   Procedure Laterality Date   • Appendectomy     • Abdominal hysterectomy total     • Hernia repair     • Tonsillectomy     • Cardiac cath         Social History:    Social History     Social History   • Marital Status:      Spouse Name: N/A   •  Number of Children: N/A   • Years of Education: N/A     Occupational History   • Not on file.     Social History Main Topics   • Smoking status: Never Smoker    • Smokeless tobacco: Never Used   • Alcohol Use: No   • Drug Use: No   • Sexual Activity: Not on file     Other Topics Concern   • Not on file     Social History Narrative       Family History:    History reviewed. No pertinent family history.    Medications:    Current Outpatient Prescriptions on File Prior to Visit   Medication Sig Dispense Refill   • lorazepam (ATIVAN) 1 MG Tab TAKE 2 TABLETS BY MOUTH AT BEDTIME.  60 Tab 0   • albuterol 108 (90 BASE) MCG/ACT Aero Soln inhalation aerosol Inhale 2 Puffs by mouth every 6 hours as needed for Shortness of Breath. 20.1 g 0   • nitroglycerin (NITROSTAT) 0.4 MG SL Tab Place 1 tab under tongue every 5 min as needed for chest pain max 3 doses 25 Tab 2   • amlodipine (NORVASC) 5 MG Tab Take 1 Tab by mouth every day. 90 Tab 1   • carvedilol (COREG) 12.5 MG Tab Take 1 Tab by mouth 2 times a day, with meals. 180 Tab 1   • fluticasone (FLONASE) 50 MCG/ACT nasal spray Spray 1 Spray in nose every day. EACH NOSTRIL 32 g 2   • alendronate (FOSAMAX) 35 MG tablet Take 1 tablet by mouth  every 7 days 12 Tab 3   • potassium chloride SA (KDUR) 20 MEQ Tab CR Take 1 Tab by mouth 2 times a day. 180 Tab 1   • beclomethasone (QVAR) 80 MCG/ACT inhaler Use 1 puff twice daily 17.4 g 3   • atorvastatin (LIPITOR) 40 MG Tab 1 tab by mouth ever bed time 90 Tab 1   • lisinopril (PRINIVIL) 20 MG Tab Take 1 tablet by mouth  every day 90 Tab 1   • clopidogrel (PLAVIX) 75 MG Tab Take 1 Tab by mouth every day. 90 Tab 1   • diphenhydrAMINE (BENADRYL) 25 MG Tab Take 25 mg by mouth every 6 hours as needed for Sleep.     • VITAMIN E PO Take  by mouth.     • aspirin (ASA) 81 MG Chew Tab chewable tablet Take 1 Tab by mouth every day. 100 Tab 11   • Cholecalciferol (VITAMIN D) 2000 UNIT Tab Take 2,000 Units by mouth every day.     • Biotin 10 MG Tab  Take 10 mg by mouth every day.     • Zinc 50 MG Cap Take 50 mg by mouth every day.     • Multiple Vitamins-Minerals (CENTRUM SILVER ADULT 50+) Tab Take 1 Tab by mouth every day.     • Omega-3 Fatty Acids (FISH OIL) 1200 MG Cap Take 1,200 mg by mouth every day.     • Calcium Carbonate (CALCIUM 600 PO) Take 600 mg by mouth every day.     • omeprazole (PRILOSEC) 20 MG delayed-release capsule Take 20 mg by mouth every day.       No current facility-administered medications on file prior to visit.       Allergies:    Allergies   Allergen Reactions   • Pcn [Penicillins] Rash     About 70 years   • Codeine Vomiting         Vitals:    Filed Vitals:    03/20/17 1351   BP: 158/76   Pulse: 67   Temp: 36.9 °C (98.4 °F)   Resp: 20   Weight: 53.071 kg (117 lb)   SpO2: 96%       Physical Exam:    Constitutional: Vital signs reviewed. Appears well-developed and well-nourished. No acute distress.   Eyes: Sclera white, conjunctivae clear.   ENT: External ears normal. External auditory canals normal without discharge. TMs translucent and non-bulging. Hearing normal. Nasal mucosa pink. Lips/teeth are normal. Oral mucosa pink and moist. Posterior pharynx: WNL.  Neck: Neck supple.   Cardiovascular: Regular rate and rhythm. No murmur. Peripheral pulses 2+.   Pulmonary/Chest: Respirations non-labored. Clear to auscultation bilaterally.  Lymph: Cervical nodes without tenderness or enlargement.  Musculoskeletal: Left foot: minimal soft tissue swelling mid-foot dorsum without tenderness to palpation. Normal gait. Normal range of motion. No muscular atrophy or weakness.  Neurological: Alert and oriented to person, place, and time. Muscle tone normal. Coordination normal. Light touch and sensation normal.   Skin: No rashes or lesions. Warm, dry, normal turgor.  Psychiatric: Normal mood and affect. Behavior is normal. Judgment and thought content normal.     Diagnostics:     DX-FOOT-COMPLETE 3+ (Order #[id number]) on 3/20/17       Narrative         3/20/2017 2:05 PM    HISTORY/REASON FOR EXAM:  Atraumatic Pain/Swelling/Deformity  Left foot pain    TECHNIQUE/EXAM DESCRIPTION AND NUMBER OF VIEWS:  3 views of the LEFT foot.    COMPARISON:  October 31, 2009    FINDINGS:  The alignment is grossly normal.  There is no evidence of displaced fracture or dislocation.  There is no focal soft tissue swelling. There is mild degenerative change.    Previously noted fracture through the base of the fifth metatarsal has healed.         Impression        Mild degenerative change.     Rad report reviewed with her and copy of report to her.      Assessment / Plan:    1. Left foot pain  - DX-FOOT-COMPLETE 3+ LEFT; Future  - predniSONE (DELTASONE) 20 MG Tab; 1 TAB ONCE A DAY X 5 DAYS. TAKE WITH FOOD.  Dispense: 5 Tab; Refill: 0    2. Acute laryngitis  - predniSONE (DELTASONE) 20 MG Tab; 1 TAB ONCE A DAY X 5 DAYS. TAKE WITH FOOD.  Dispense: 5 Tab; Refill: 0      Discussed with her DDX and management options.    Rec'd relative rest.    Likely MSK inflammation in left foot causing symptoms and inflammation of vocal cords causing her symptoms.    Agreeable to medication prescribed for anti-inflammatory effect. Has been rx'd Prednisone 20 mg x 5 days twice in past without problem.    Follow-up with PCP or urgent care if getting worse or not better with above.

## 2017-03-20 NOTE — MR AVS SNAPSHOT
Dayana Lechuga   3/20/2017 1:45 PM   Office Visit   MRN: [id number]    Department:  [facility]   Dept Phone:  [phone]    Description:  Female : 1929   Provider:  Trey Begum M.D.           Reason for Visit     Foot Swelling L foot, sore throat, x2 weeks      Allergies as of 3/20/2017     Allergen Noted Reactions    Pcn [Penicillins] 2016   Rash    About 70 years    Codeine 2016   Vomiting      You were diagnosed with     Left foot pain   [048093]       Acute laryngitis   [2484110]         Vital Signs     Blood Pressure Pulse Temperature Respirations Weight Oxygen Saturation    158/76 mmHg 67 36.9 °C (98.4 °F) 20 53.071 kg (117 lb) 96%    Smoking Status                   Never Smoker            Basic Information     Date Of Birth Sex Race Ethnicity Preferred Language    1929 Female White Non- English      Your appointments     May 08, 2017  2:00 PM   Established Patient with Boy Stanford M.D.   [facility] (--)    [address] [place] [id number] [phone]           You will be receiving a confirmation call a few days before your appointment from our automated call confirmation system.            May 18, 2017  2:00 PM   FOLLOW UP with Chanelle Hoyos M.D.   [facility] (--)    [address] [address] [phone]              Problem List              ICD-10-CM Priority Class Noted - Resolved    ASTHMA  Low  10/2/2009 - Present    HTN (hypertension) I10 Medium  10/2/2009 - Present    GERD (gastroesophageal reflux disease) (Chronic) K21.9 Low  10/31/2009 - Present    Dyslipidemia (Chronic) E78.5 Medium  10/31/2009 - Present    Osteopenia (Chronic) M85.80 Low  10/31/2009 - Present    Anxiety F41.9 Low  2016 - Present    Hypokalemia E87.6 Medium  2016 - Present    Coronary artery disease involving native coronary artery of native heart  with angina pectoris (CMS-[facility]) I25.119 Medium  8/10/2016 - Present    Hypertension I10 Medium  9/10/2016 - Present    Chest pain R07.9   9/10/2016 - Present    Back pain M54.9 High  9/10/2016 - Present      Health Maintenance        Date Due Completion Dates    IMM ZOSTER VACCINE 2/18/1989 ---    MAMMOGRAM 10/23/2015 10/23/2014 (Prv Comp), 8/20/2014 (Prv Comp)    Override on 10/23/2014: Previously completed    Override on 8/20/2014: Previously completed    IMM PNEUMOCOCCAL 65+ (ADULT) LOW/MEDIUM RISK SERIES (2 of 2 - PPSV23) 6/1/2017 6/1/2016    COLONOSCOPY 2/23/2018 2/23/2008 (Prv Comp), 11/20/2007 (Prv Comp)    Override on 2/23/2008: Previously completed    Override on 11/20/2007: Previously completed    BONE DENSITY 11/20/2019 11/20/2014 (Postponed)    Override on 11/20/2014: Postponed    IMM DTaP/Tdap/Td Vaccine (2 - Td) 6/26/2026 6/26/2016            Current Immunizations     13-VALENT PCV PREVNAR 6/1/2016    Dtap Vaccine 6/26/2016    Influenza TIV (IM) 9/19/2016      Below and/or attached are the medications your provider expects you to take. Review all of your home medications and newly ordered medications with your provider and/or pharmacist. Follow medication instructions as directed by your provider and/or pharmacist. Please keep your medication list with you and share with your provider. Update the information when medications are discontinued, doses are changed, or new medications (including over-the-counter products) are added; and carry medication information at all times in the event of emergency situations     Allergies:  PCN - Rash     CODEINE - Vomiting               Medications  Valid as of: March 20, 2017 -  3:39 PM    Generic Name Brand Name Tablet Size Instructions for use    Albuterol Sulfate (Aero Soln) albuterol 108 (90 BASE) MCG/ACT Inhale 2 Puffs by mouth every 6 hours as needed for Shortness of Breath.        Alendronate Sodium (Tab) FOSAMAX 35 MG Take 1 tablet by mouth  every 7 days       AmLODIPine Besylate (Tab) NORVASC 5 MG Take 1 Tab by mouth every day.        Aspirin (Chew Tab) ASA 81 MG Take 1 Tab by mouth every day.        Atorvastatin Calcium (Tab) LIPITOR 40 MG 1 tab by mouth ever bed time        Beclomethasone Dipropionate (Aero Soln) QVAR 80 MCG/ACT Use 1 puff twice daily        Biotin (Tab) Biotin 10 MG Take 10 mg by mouth every day.        Calcium Carbonate   Take 600 mg by mouth every day.        Carvedilol (Tab) COREG 12.5 MG Take 1 Tab by mouth 2 times a day, with meals.        Cholecalciferol (Tab) vitamin D 2000 UNIT Take 2,000 Units by mouth every day.        Clopidogrel Bisulfate (Tab) PLAVIX 75 MG Take 1 Tab by mouth every day.        DiphenhydrAMINE HCl (Tab) BENADRYL 25 MG Take 25 mg by mouth every 6 hours as needed for Sleep.        Fluticasone Propionate (Suspension) FLONASE 50 MCG/ACT Spray 1 Spray in nose every day. EACH NOSTRIL        Lisinopril (Tab) PRINIVIL 20 MG Take 1 tablet by mouth  every day        LORazepam (Tab) ATIVAN 1 MG TAKE 2 TABLETS BY MOUTH AT BEDTIME.         Multiple Vitamins-Minerals (Tab) CENTRUM SILVER ADULT 50+  Take 1 Tab by mouth every day.        Nitroglycerin (SL Tab) NITROSTAT 0.4 MG Place 1 tab under tongue every 5 min as needed for chest pain max 3 doses        Omega-3 Fatty Acids (Cap) Fish Oil 1200 MG Take 1,200 mg by mouth every day.        Omeprazole (CAPSULE DELAYED RELEASE) PRILOSEC 20 MG Take 20 mg by mouth every day.        Potassium Chloride Alicia CR (Tab CR) Kdur 20 MEQ Take 1 Tab by mouth 2 times a day.        PredniSONE (Tab) DELTASONE 20 MG 1 TAB ONCE A DAY X 5 DAYS. TAKE WITH FOOD.        Vitamin E   Take  by mouth.        Zinc (Cap) Zinc 50 MG Take 50 mg by mouth every day.        .                 Medicines prescribed today were sent to:     [id number] - [place] - [address] [address]    Phone: [phone] Fax: [phone]    Open 24 Hours?: No    [id number] - [place], [place] - [address]    [address] [facility] [place] [id number]    Phone: [phone] Fax: [phone]    Open 24 Hours?: No      Medication refill instructions:       If your prescription bottle indicates you have medication refills left, it is not necessary to call your provider’s office. Please contact your pharmacy and they will refill your medication.    If your prescription bottle indicates you do not have any refills left, you may request refills at any time through one of the following ways: The online Simplify system (except Urgent Care), by calling your provider’s office, or by asking your pharmacy to contact your provider’s office with a refill request. Medication refills are processed only during regular business hours and may not be available until the next business day. Your provider may request additional information or to have a follow-up visit with you prior to refilling your medication.   *Please Note: Medication refills are assigned a new Rx number when refilled electronically. Your pharmacy may indicate that no refills were authorized even though a new prescription for the same medication is available at the pharmacy. Please request the medicine by name with the pharmacy before contacting your provider for a refill.        Your To Do List     Future Labs/Procedures Complete By Expires    DX-FOOT-COMPLETE 3+ LEFT  As directed 3/27/2017         Simplify Access Code: Activation code not generated  Current Simplify Status: Active

## 2017-03-28 DIAGNOSIS — M79.672 LEFT FOOT PAIN: ICD-10-CM

## 2017-03-29 NOTE — PROGRESS NOTES
Kinza Begum M.D.                     PT called says foot not better.  Can you please put referral into [facility] on [address]. Thank you

## 2017-04-24 ENCOUNTER — PATIENT OUTREACH (OUTPATIENT)
Dept: HEALTH INFORMATION MANAGEMENT | Facility: OTHER | Age: 82
End: 2017-04-24

## 2017-04-24 NOTE — PROGRESS NOTES
Outbound call to patient for medication reconciliation.  Updated allergy and medication lists.      Patient demonstrates adherence to medication schedule and understanding of indications for medications.  She uses a weekly pill box to remember her medications daily.  She does have trouble remembering to take alendronate once weekly unless she includes it in the weekly pill box. She does not take it alone or on an empty stomach. Advised patient not to lie down for 30 minutes after taking this medication.    Meds that meet Beer's criteria for potentially inappropriate use in patients over 65 include:  Lorazepam, diphenhydramine, and omeprazole.  Patient uses diphenhydramine daily for allergies, but is willing to change to allegra.  Patient understands this is a more efficient medication for seasonal allergies and causes less potential for side effects, however jhe denies any side effects from any of her medications.  Patient has tried to d/c omeprazole in the past but experienced s/sx of GERD.  Recommend to try to cut back to 1 capsule daily and treat breakthrough symptoms with Pepcid or Zantac.  Patient has tried to d/c lorazepam at HS, but cannot sleep.  She is amenable to try to slowly taper off.  States she will discuss with Dr. Stanford at next office visit.        Has appropriate medication regimen for current disease states.  She does not monitor BP at home regularly.  Patient was encouraged to begin regularly monitoring BP and to keep a log to bring to PCP office visits.    Patient feels satisfied with medication regimen.      Patient can afford medications.    Patient is current with vaccines.    Provided patient with [facility] contact info for any future questions or concerns regarding medications.    Plan:  Patient to discuss cutting back on omeprazole and lorazepam at next PCP office visit.

## 2017-04-26 ENCOUNTER — HOSPITAL ENCOUNTER (OUTPATIENT)
Dept: LAB | Facility: MEDICAL CENTER | Age: 82
End: 2017-04-26
Attending: FAMILY MEDICINE
Payer: MEDICARE

## 2017-04-26 DIAGNOSIS — I10 ESSENTIAL HYPERTENSION: ICD-10-CM

## 2017-04-26 DIAGNOSIS — F41.9 ANXIETY: ICD-10-CM

## 2017-04-26 DIAGNOSIS — I25.119 CORONARY ARTERY DISEASE INVOLVING NATIVE CORONARY ARTERY OF NATIVE HEART WITH ANGINA PECTORIS (HCC): ICD-10-CM

## 2017-04-26 LAB
25(OH)D3 SERPL-MCNC: 36 NG/ML (ref 30–100)
ALBUMIN SERPL BCP-MCNC: 4.1 G/DL (ref 3.2–4.9)
ALBUMIN/GLOB SERPL: 1.5 G/DL
ALP SERPL-CCNC: 49 U/L (ref 30–99)
ALT SERPL-CCNC: 13 U/L (ref 2–50)
ANION GAP SERPL CALC-SCNC: 10 MMOL/L (ref 0–11.9)
AST SERPL-CCNC: 18 U/L (ref 12–45)
BILIRUB SERPL-MCNC: 0.5 MG/DL (ref 0.1–1.5)
BUN SERPL-MCNC: 16 MG/DL (ref 8–22)
CALCIUM SERPL-MCNC: 10.1 MG/DL (ref 8.5–10.5)
CHLORIDE SERPL-SCNC: 107 MMOL/L (ref 96–112)
CHOLEST SERPL-MCNC: 148 MG/DL (ref 100–199)
CO2 SERPL-SCNC: 22 MMOL/L (ref 20–33)
CREAT SERPL-MCNC: 0.69 MG/DL (ref 0.5–1.4)
ERYTHROCYTE [DISTWIDTH] IN BLOOD BY AUTOMATED COUNT: 45.1 FL (ref 35.9–50)
GFR SERPL CREATININE-BSD FRML MDRD: >60 ML/MIN/1.73 M 2
GLOBULIN SER CALC-MCNC: 2.8 G/DL (ref 1.9–3.5)
GLUCOSE SERPL-MCNC: 106 MG/DL (ref 65–99)
HCT VFR BLD AUTO: 41.1 % (ref 37–47)
HDLC SERPL-MCNC: 62 MG/DL
HGB BLD-MCNC: 13.7 G/DL (ref 12–16)
LDLC SERPL CALC-MCNC: 69 MG/DL
MCH RBC QN AUTO: 32.2 PG (ref 27–33)
MCHC RBC AUTO-ENTMCNC: 33.3 G/DL (ref 33.6–35)
MCV RBC AUTO: 96.5 FL (ref 81.4–97.8)
PLATELET # BLD AUTO: 229 K/UL (ref 164–446)
PMV BLD AUTO: 10.4 FL (ref 9–12.9)
POTASSIUM SERPL-SCNC: 3.9 MMOL/L (ref 3.6–5.5)
PROT SERPL-MCNC: 6.9 G/DL (ref 6–8.2)
RBC # BLD AUTO: 4.26 M/UL (ref 4.2–5.4)
SODIUM SERPL-SCNC: 139 MMOL/L (ref 135–145)
TRIGL SERPL-MCNC: 86 MG/DL (ref 0–149)
WBC # BLD AUTO: 5.8 K/UL (ref 4.8–10.8)

## 2017-04-26 PROCEDURE — 82306 VITAMIN D 25 HYDROXY: CPT

## 2017-04-26 PROCEDURE — 80053 COMPREHEN METABOLIC PANEL: CPT

## 2017-04-26 PROCEDURE — 36415 COLL VENOUS BLD VENIPUNCTURE: CPT

## 2017-04-26 PROCEDURE — 80061 LIPID PANEL: CPT

## 2017-04-26 PROCEDURE — 85027 COMPLETE CBC AUTOMATED: CPT

## 2017-04-27 ENCOUNTER — TELEPHONE (OUTPATIENT)
Dept: MEDICAL GROUP | Facility: PHYSICIAN GROUP | Age: 82
End: 2017-04-27

## 2017-05-08 ENCOUNTER — OFFICE VISIT (OUTPATIENT)
Dept: MEDICAL GROUP | Facility: CLINIC | Age: 82
End: 2017-05-08
Payer: MEDICARE

## 2017-05-08 VITALS
TEMPERATURE: 99.3 F | HEART RATE: 64 BPM | OXYGEN SATURATION: 95 % | DIASTOLIC BLOOD PRESSURE: 84 MMHG | RESPIRATION RATE: 12 BRPM | WEIGHT: 117 LBS | HEIGHT: 61 IN | SYSTOLIC BLOOD PRESSURE: 138 MMHG | BODY MASS INDEX: 22.09 KG/M2

## 2017-05-08 DIAGNOSIS — I25.119 CORONARY ARTERY DISEASE INVOLVING NATIVE CORONARY ARTERY OF NATIVE HEART WITH ANGINA PECTORIS (HCC): ICD-10-CM

## 2017-05-08 DIAGNOSIS — I10 ESSENTIAL HYPERTENSION: ICD-10-CM

## 2017-05-08 DIAGNOSIS — F41.9 ANXIETY: ICD-10-CM

## 2017-05-08 PROCEDURE — 99214 OFFICE O/P EST MOD 30 MIN: CPT | Performed by: FAMILY MEDICINE

## 2017-05-08 PROCEDURE — 1036F TOBACCO NON-USER: CPT | Performed by: FAMILY MEDICINE

## 2017-05-08 PROCEDURE — G8420 CALC BMI NORM PARAMETERS: HCPCS | Performed by: FAMILY MEDICINE

## 2017-05-08 PROCEDURE — 1101F PT FALLS ASSESS-DOCD LE1/YR: CPT | Performed by: FAMILY MEDICINE

## 2017-05-08 PROCEDURE — G8432 DEP SCR NOT DOC, RNG: HCPCS | Performed by: FAMILY MEDICINE

## 2017-05-08 PROCEDURE — G8598 ASA/ANTIPLAT THER USED: HCPCS | Performed by: FAMILY MEDICINE

## 2017-05-08 PROCEDURE — 4040F PNEUMOC VAC/ADMIN/RCVD: CPT | Performed by: FAMILY MEDICINE

## 2017-05-08 RX ORDER — LORAZEPAM 1 MG/1
1 TABLET ORAL 2 TIMES DAILY PRN
Qty: 60 TAB | Refills: 0 | Status: SHIPPED | OUTPATIENT
Start: 2017-05-08 | End: 2017-06-10 | Stop reason: SDUPTHER

## 2017-05-08 ASSESSMENT — ENCOUNTER SYMPTOMS
CHILLS: 0
COUGH: 0
FEVER: 0
CONSTIPATION: 0
CONSTITUTIONAL NEGATIVE: 1
NEUROLOGICAL NEGATIVE: 1
EYES NEGATIVE: 1
DIZZINESS: 0
MYALGIAS: 0
CARDIOVASCULAR NEGATIVE: 1
HEMOPTYSIS: 0
RESPIRATORY NEGATIVE: 1
GASTROINTESTINAL NEGATIVE: 1
PALPITATIONS: 0
MUSCULOSKELETAL NEGATIVE: 1
NERVOUS/ANXIOUS: 1
NECK PAIN: 0
HEADACHES: 0

## 2017-05-08 ASSESSMENT — PAIN SCALES - GENERAL: PAINLEVEL: NO PAIN

## 2017-05-08 NOTE — PROGRESS NOTES
Subjective:      Dayana Lechuga is a 88 y.o. female who presents with Medication Refill            HPI Comments: 1. Anxiety  Needs new uds  - lorazepam (ATIVAN) 1 MG Tab; Take 1 Tab by mouth 2 times a day as needed for Anxiety.  Dispense: 60 Tab; Refill: 0    2. Essential hypertension  Currently treated for HTN, taking meds with no CP or sob, monitors bp at home periodically. controlled        3. Coronary artery disease involving native coronary artery of native heart with angina pectoris (CMS-HCC)  Stable on meds    Past Medical History:    Allergy                                                       Asthma                                                        Hypertension                                                  Anxiety                                                       Osteoporosis                                                  CAD (coronary artery disease)                                 Hyperlipidemia                                              Past Surgical History:    APPENDECTOMY                                                   ABDOMINAL HYSTERECTOMY TOTAL                                   HERNIA REPAIR                                                  TONSILLECTOMY                                                  CARDIAC CATH                                                   Smoking Status: Never Smoker                      Smokeless Status: Never Used                        Alcohol Use: No              History reviewed.  No pertinent family history.      Current outpatient prescriptions: •  lorazepam (ATIVAN) 1 MG Tab, Take 1 Tab by mouth 2 times a day as needed for Anxiety., Disp: 60 Tab, Rfl: 0•  albuterol 108 (90 BASE) MCG/ACT Aero Soln inhalation aerosol, Inhale 2 Puffs by mouth every 6 hours as needed for Shortness of Breath., Disp: 20.1 g, Rfl: 0•  nitroglycerin (NITROSTAT) 0.4 MG SL Tab, Place 1 tab under tongue every 5 min as needed for chest pain max 3 doses, Disp: 25 Tab, Rfl:  2•  amlodipine (NORVASC) 5 MG Tab, Take 1 Tab by mouth every day., Disp: 90 Tab, Rfl: 1•  carvedilol (COREG) 12.5 MG Tab, Take 1 Tab by mouth 2 times a day, with meals., Disp: 180 Tab, Rfl: 1•  fluticasone (FLONASE) 50 MCG/ACT nasal spray, Spray 1 Spray in nose every day. EACH NOSTRIL, Disp: 32 g, Rfl: 2•  alendronate (FOSAMAX) 35 MG tablet, Take 1 tablet by mouth  every 7 days, Disp: 12 Tab, Rfl: 3•  potassium chloride SA (KDUR) 20 MEQ Tab CR, Take 1 Tab by mouth 2 times a day., Disp: 180 Tab, Rfl: 1•  beclomethasone (QVAR) 80 MCG/ACT inhaler, Use 1 puff twice daily, Disp: 17.4 g, Rfl: 3•  atorvastatin (LIPITOR) 40 MG Tab, 1 tab by mouth ever bed time, Disp: 90 Tab, Rfl: 1•  lisinopril (PRINIVIL) 20 MG Tab, Take 1 tablet by mouth  every day, Disp: 90 Tab, Rfl: 1•  clopidogrel (PLAVIX) 75 MG Tab, Take 1 Tab by mouth every day., Disp: 90 Tab, Rfl: 1•  diphenhydrAMINE (BENADRYL) 25 MG Tab, Take 25 mg by mouth every 6 hours as needed for Sleep., Disp: , Rfl: •  VITAMIN E PO, Take  by mouth., Disp: , Rfl: •  aspirin (ASA) 81 MG Chew Tab chewable tablet, Take 1 Tab by mouth every day., Disp: 100 Tab, Rfl: 11•  Cholecalciferol (VITAMIN D) 2000 UNIT Tab, Take 2,000 Units by mouth every day., Disp: , Rfl: •  Biotin 10 MG Tab, Take 10 mg by mouth every day., Disp: , Rfl: •  Zinc 50 MG Cap, Take 50 mg by mouth every day., Disp: , Rfl: •  Multiple Vitamins-Minerals (CENTRUM SILVER ADULT 50+) Tab, Take 1 Tab by mouth every day., Disp: , Rfl: •  Omega-3 Fatty Acids (FISH OIL) 1200 MG Cap, Take 1,200 mg by mouth every day., Disp: , Rfl: •  Calcium Carbonate (CALCIUM 600 PO), Take 600 mg by mouth every day., Disp: , Rfl: •  omeprazole (PRILOSEC) 20 MG delayed-release capsule, Take 20 mg by mouth every day., Disp: , Rfl: •  predniSONE (DELTASONE) 20 MG Tab, 1 TAB ONCE A DAY X 5 DAYS. TAKE WITH FOOD. (Patient not taking: Reported on 5/8/2017), Disp: 5 Tab, Rfl: 0          Review of Systems   Constitutional: Negative.  Negative for  "fever and chills.        Past Medical History:    Allergy                                                       Asthma                                                        Hypertension                                                  Anxiety                                                       Osteoporosis                                                  CAD (coronary artery disease)                                 Hyperlipidemia                                              Past Surgical History:    APPENDECTOMY                                                   ABDOMINAL HYSTERECTOMY TOTAL                                   HERNIA REPAIR                                                  TONSILLECTOMY                                                  CARDIAC CATH                                                   Smoking Status: Never Smoker                      Smokeless Status: Never Used                        Alcohol Use: No              History reviewed.  No pertinent family history.     HENT: Negative.    Eyes: Negative.    Respiratory: Negative.  Negative for cough and hemoptysis.    Cardiovascular: Negative.  Negative for chest pain and palpitations.   Gastrointestinal: Negative.  Negative for constipation.   Genitourinary: Negative.  Negative for dysuria and urgency.   Musculoskeletal: Negative.  Negative for myalgias and neck pain.   Skin: Negative.  Negative for rash.   Neurological: Negative.  Negative for dizziness and headaches.   Endo/Heme/Allergies: Negative.    Psychiatric/Behavioral: Negative for suicidal ideas. The patient is nervous/anxious.           Objective:     /84 mmHg  Pulse 64  Temp(Src) 37.4 °C (99.3 °F)  Resp 12  Ht 1.549 m (5' 0.98\")  Wt 53.071 kg (117 lb)  BMI 22.12 kg/m2  SpO2 95%     Physical Exam   Constitutional: She is oriented to person, place, and time. No distress.   HENT:   Head: Normocephalic and atraumatic.   Right Ear: External ear normal.   Left Ear: External " ear normal.   Nose: Nose normal.   Mouth/Throat: Oropharynx is clear and moist. No oropharyngeal exudate.   Eyes: Pupils are equal, round, and reactive to light. Right eye exhibits no discharge. Left eye exhibits no discharge. No scleral icterus.   Neck: Normal range of motion. Neck supple. No JVD present. No tracheal deviation present. No thyromegaly present.   Cardiovascular: Normal rate, regular rhythm, normal heart sounds and intact distal pulses.  Exam reveals no gallop and no friction rub.    No murmur heard.  Pulmonary/Chest: Effort normal and breath sounds normal. No stridor. No respiratory distress. She has no wheezes. She has no rales. She exhibits no tenderness.   Abdominal: Soft. She exhibits no distension. There is no tenderness.   Lymphadenopathy:     She has no cervical adenopathy.   Neurological: She is alert and oriented to person, place, and time.   Skin: Skin is warm and dry. She is not diaphoretic.   Psychiatric: Judgment normal.   Nursing note and vitals reviewed.              Assessment/Plan:     1. Anxiety    - lorazepam (ATIVAN) 1 MG Tab; Take 1 Tab by mouth 2 times a day as needed for Anxiety.  Dispense: 60 Tab; Refill: 0    2. Essential hypertension      3. Coronary artery disease involving native coronary artery of native heart with angina pectoris (CMS-HCC)

## 2017-05-08 NOTE — MR AVS SNAPSHOT
"        Dayana Lechuga   2017 2:00 PM   Office Visit   MRN: [id number]    [facility] Phone:  [phone]    Description:  Female : 1929   Provider:  Boy Stanford M.D.           Reason for Visit     Medication Refill lorazepam      Allergies as of 2017     Allergen Noted Reactions    Pcn [Penicillins] 2016   Rash    About 70 years    Codeine 2016   Vomiting      You were diagnosed with     Anxiety   [984686]       Essential hypertension   [4148544]       Coronary artery disease involving native coronary artery of native heart with angina pectoris (CMS-[facility])   [7694005]         Vital Signs     Blood Pressure Pulse Temperature Respirations Height Weight    138/84 mmHg 64 37.4 °C (99.3 °F) 12 1.549 m (5' 0.98\") 53.071 kg (117 lb)    Body Mass Index Oxygen Saturation Smoking Status             22.12 kg/m2 95% Never Smoker          Basic Information     Date Of Birth Sex Race Ethnicity Preferred Language    1929 Female White Non- English      Your appointments     May 18, 2017  2:00 PM   FOLLOW UP with Chanelle Hoyos M.D.   [facility] (--)    [address] [address] [phone]            Aug 07, 2017  2:00 PM   Established Patient with Boy Stanford M.D.   [facility] (--)    [address] [id number] [phone]           You will be receiving a confirmation call a few days before your appointment from our automated call confirmation system.              Problem List              ICD-10-CM Priority Class Noted - Resolved    ASTHMA  Low  10/2/2009 - Present    HTN (hypertension) I10 Medium  10/2/2009 - Present    GERD (gastroesophageal reflux disease) (Chronic) K21.9 Low  10/31/2009 - Present    Dyslipidemia (Chronic) E78.5 Medium  10/31/2009 - Present    Osteopenia (Chronic) M85.80 Low  10/31/2009 - Present    Anxiety F41.9 " Low  6/22/2016 - Present    Hypokalemia E87.6 Medium  7/31/2016 - Present    Coronary artery disease involving native coronary artery of native heart with angina pectoris (CMS-HCC) I25.119 Medium  8/10/2016 - Present    Hypertension I10 Medium  9/10/2016 - Present    Chest pain R07.9   9/10/2016 - Present    Back pain M54.9 High  9/10/2016 - Present      Health Maintenance        Date Due Completion Dates    IMM ZOSTER VACCINE 2/18/1989 ---    MAMMOGRAM 10/23/2015 10/23/2014 (Prv Comp), 8/20/2014 (Prv Comp)    Override on 10/23/2014: Previously completed    Override on 8/20/2014: Previously completed    IMM PNEUMOCOCCAL 65+ (ADULT) LOW/MEDIUM RISK SERIES (2 of 2 - PPSV23) 6/1/2017 6/1/2016    COLONOSCOPY 2/23/2018 2/23/2008 (Prv Comp), 11/20/2007 (Prv Comp)    Override on 2/23/2008: Previously completed    Override on 11/20/2007: Previously completed    BONE DENSITY 11/20/2019 11/20/2014 (Postponed)    Override on 11/20/2014: Postponed    IMM DTaP/Tdap/Td Vaccine (2 - Td) 6/26/2026 6/26/2016            Current Immunizations     13-VALENT PCV PREVNAR 6/1/2016    Dtap Vaccine 6/26/2016    Influenza TIV (IM) 9/19/2016      Below and/or attached are the medications your provider expects you to take. Review all of your home medications and newly ordered medications with your provider and/or pharmacist. Follow medication instructions as directed by your provider and/or pharmacist. Please keep your medication list with you and share with your provider. Update the information when medications are discontinued, doses are changed, or new medications (including over-the-counter products) are added; and carry medication information at all times in the event of emergency situations     Allergies:  PCN - Rash     CODEINE - Vomiting               Medications  Valid as of: May 08, 2017 -  2:32 PM    Generic Name Brand Name Tablet Size Instructions for use    Albuterol Sulfate (Aero Soln) albuterol 108 (90 BASE) MCG/ACT Inhale 2  Puffs by mouth every 6 hours as needed for Shortness of Breath.        Alendronate Sodium (Tab) FOSAMAX 35 MG Take 1 tablet by mouth  every 7 days        AmLODIPine Besylate (Tab) NORVASC 5 MG Take 1 Tab by mouth every day.        Aspirin (Chew Tab) ASA 81 MG Take 1 Tab by mouth every day.        Atorvastatin Calcium (Tab) LIPITOR 40 MG 1 tab by mouth ever bed time        Beclomethasone Dipropionate (Aero Soln) QVAR 80 MCG/ACT Use 1 puff twice daily        Biotin (Tab) Biotin 10 MG Take 10 mg by mouth every day.        Calcium Carbonate   Take 600 mg by mouth every day.        Carvedilol (Tab) COREG 12.5 MG Take 1 Tab by mouth 2 times a day, with meals.        Cholecalciferol (Tab) vitamin D 2000 UNIT Take 2,000 Units by mouth every day.        Clopidogrel Bisulfate (Tab) PLAVIX 75 MG Take 1 Tab by mouth every day.        DiphenhydrAMINE HCl (Tab) BENADRYL 25 MG Take 25 mg by mouth every 6 hours as needed for Sleep.        Fluticasone Propionate (Suspension) FLONASE 50 MCG/ACT Spray 1 Spray in nose every day. EACH NOSTRIL        Lisinopril (Tab) PRINIVIL 20 MG Take 1 tablet by mouth  every day        LORazepam (Tab) ATIVAN 1 MG Take 1 Tab by mouth 2 times a day as needed for Anxiety.        Multiple Vitamins-Minerals (Tab) CENTRUM SILVER ADULT 50+  Take 1 Tab by mouth every day.        Nitroglycerin (SL Tab) NITROSTAT 0.4 MG Place 1 tab under tongue every 5 min as needed for chest pain max 3 doses        Omega-3 Fatty Acids (Cap) Fish Oil 1200 MG Take 1,200 mg by mouth every day.        Omeprazole (CAPSULE DELAYED RELEASE) PRILOSEC 20 MG Take 20 mg by mouth every day.        Potassium Chloride Alicia CR (Tab CR) Kdur 20 MEQ Take 1 Tab by mouth 2 times a day.        PredniSONE (Tab) DELTASONE 20 MG 1 TAB ONCE A DAY X 5 DAYS. TAKE WITH FOOD.        Vitamin E   Take  by mouth.        Zinc (Cap) Zinc 50 MG Take 50 mg by mouth every day.        .                 Medicines prescribed today were sent to:     [id number] -  [place], [place] - [address] [address]    [address] [place] [address]    Phone: [phone] Fax: [phone]    Open 24 Hours?: No    [facility] [place] [facility] - [place], [place] - [address] [facility]    [address] [facility] [place] [id number]    Phone: [phone] Fax: [phone]    Open 24 Hours?: No      Medication refill instructions:       If your prescription bottle indicates you have medication refills left, it is not necessary to call your provider’s office. Please contact your pharmacy and they will refill your medication.    If your prescription bottle indicates you do not have any refills left, you may request refills at any time through one of the following ways: The online [employer] system (except Urgent Care), by calling your provider’s office, or by asking your pharmacy to contact your provider’s office with a refill request. Medication refills are processed only during regular business hours and may not be available until the next business day. Your provider may request additional information or to have a follow-up visit with you prior to refilling your medication.   *Please Note: Medication refills are assigned a new Rx number when refilled electronically. Your pharmacy may indicate that no refills were authorized even though a new prescription for the same medication is available at the pharmacy. Please request the medicine by name with the pharmacy before contacting your provider for a refill.           [employer] Access Code: Activation code not generated  Current [employer] Status: Active

## 2017-05-18 ENCOUNTER — OFFICE VISIT (OUTPATIENT)
Dept: CARDIOLOGY | Facility: PHYSICIAN GROUP | Age: 82
End: 2017-05-18
Payer: MEDICARE

## 2017-05-18 VITALS
SYSTOLIC BLOOD PRESSURE: 124 MMHG | WEIGHT: 114 LBS | OXYGEN SATURATION: 91 % | HEIGHT: 63 IN | HEART RATE: 81 BPM | BODY MASS INDEX: 20.2 KG/M2 | DIASTOLIC BLOOD PRESSURE: 78 MMHG

## 2017-05-18 DIAGNOSIS — I25.119 CORONARY ARTERY DISEASE INVOLVING NATIVE CORONARY ARTERY OF NATIVE HEART WITH ANGINA PECTORIS (HCC): ICD-10-CM

## 2017-05-18 DIAGNOSIS — I10 ESSENTIAL HYPERTENSION: ICD-10-CM

## 2017-05-18 PROCEDURE — 99214 OFFICE O/P EST MOD 30 MIN: CPT | Performed by: INTERNAL MEDICINE

## 2017-05-18 PROCEDURE — G8598 ASA/ANTIPLAT THER USED: HCPCS | Performed by: INTERNAL MEDICINE

## 2017-05-18 PROCEDURE — G8420 CALC BMI NORM PARAMETERS: HCPCS | Performed by: INTERNAL MEDICINE

## 2017-05-18 PROCEDURE — G8432 DEP SCR NOT DOC, RNG: HCPCS | Performed by: INTERNAL MEDICINE

## 2017-05-18 PROCEDURE — 4040F PNEUMOC VAC/ADMIN/RCVD: CPT | Performed by: INTERNAL MEDICINE

## 2017-05-18 PROCEDURE — 1036F TOBACCO NON-USER: CPT | Performed by: INTERNAL MEDICINE

## 2017-05-18 PROCEDURE — 1101F PT FALLS ASSESS-DOCD LE1/YR: CPT | Performed by: INTERNAL MEDICINE

## 2017-05-18 ASSESSMENT — ENCOUNTER SYMPTOMS
DIZZINESS: 0
WEIGHT LOSS: 0
PALPITATIONS: 0
NERVOUS/ANXIOUS: 1
INSOMNIA: 0
SHORTNESS OF BREATH: 0
EYES NEGATIVE: 1
DEPRESSION: 0
COUGH: 0
LOSS OF CONSCIOUSNESS: 0

## 2017-05-18 NOTE — PROGRESS NOTES
Subjective:   Dayana Lechuga is a 88 y.o. female who presents today in follow-up in regards to her coronary disease with stenting of the distal right coronary artery 2016    In with her friend  Denies problems, compliant with medications. Much less anxious    Past Medical History   Diagnosis Date   • Allergy    • ASTHMA    • Hypertension    • Anxiety    • OSTEOPOROSIS    • CAD (coronary artery disease)    • Hyperlipidemia      Past Surgical History   Procedure Laterality Date   • Appendectomy     • Abdominal hysterectomy total     • Hernia repair     • Tonsillectomy     • Cardiac cath       History reviewed. No pertinent family history.  History   Smoking status   • Never Smoker    Smokeless tobacco   • Never Used     Allergies   Allergen Reactions   • Pcn [Penicillins] Rash     About 70 years   • Codeine Vomiting     Outpatient Encounter Prescriptions as of 5/18/2017   Medication Sig Dispense Refill   • lorazepam (ATIVAN) 1 MG Tab Take 1 Tab by mouth 2 times a day as needed for Anxiety. 60 Tab 0   • amlodipine (NORVASC) 5 MG Tab Take 1 Tab by mouth every day. 90 Tab 1   • carvedilol (COREG) 12.5 MG Tab Take 1 Tab by mouth 2 times a day, with meals. 180 Tab 1   • fluticasone (FLONASE) 50 MCG/ACT nasal spray Spray 1 Spray in nose every day. EACH NOSTRIL 32 g 2   • alendronate (FOSAMAX) 35 MG tablet Take 1 tablet by mouth  every 7 days 12 Tab 3   • potassium chloride SA (KDUR) 20 MEQ Tab CR Take 1 Tab by mouth 2 times a day. 180 Tab 1   • beclomethasone (QVAR) 80 MCG/ACT inhaler Use 1 puff twice daily 17.4 g 3   • atorvastatin (LIPITOR) 40 MG Tab 1 tab by mouth ever bed time 90 Tab 1   • lisinopril (PRINIVIL) 20 MG Tab Take 1 tablet by mouth  every day 90 Tab 1   • clopidogrel (PLAVIX) 75 MG Tab Take 1 Tab by mouth every day. 90 Tab 1   • diphenhydrAMINE (BENADRYL) 25 MG Tab Take 25 mg by mouth every 6 hours as needed for Sleep.     • VITAMIN E PO Take  by mouth.     • aspirin (ASA) 81 MG Chew Tab chewable tablet  "Take 1 Tab by mouth every day. 100 Tab 11   • Cholecalciferol (VITAMIN D) 2000 UNIT Tab Take 2,000 Units by mouth every day.     • Biotin 10 MG Tab Take 10 mg by mouth every day.     • Zinc 50 MG Cap Take 50 mg by mouth every day.     • Multiple Vitamins-Minerals (CENTRUM SILVER ADULT 50+) Tab Take 1 Tab by mouth every day.     • Omega-3 Fatty Acids (FISH OIL) 1200 MG Cap Take 1,200 mg by mouth every day.     • Calcium Carbonate (CALCIUM 600 PO) Take 600 mg by mouth every day.     • omeprazole (PRILOSEC) 20 MG delayed-release capsule Take 20 mg by mouth every day.     • predniSONE (DELTASONE) 20 MG Tab 1 TAB ONCE A DAY X 5 DAYS. TAKE WITH FOOD. (Patient not taking: Reported on 5/8/2017) 5 Tab 0   • albuterol 108 (90 BASE) MCG/ACT Aero Soln inhalation aerosol Inhale 2 Puffs by mouth every 6 hours as needed for Shortness of Breath. 20.1 g 0   • nitroglycerin (NITROSTAT) 0.4 MG SL Tab Place 1 tab under tongue every 5 min as needed for chest pain max 3 doses 25 Tab 2     No facility-administered encounter medications on file as of 5/18/2017.     Review of Systems   Constitutional: Negative for weight loss and malaise/fatigue.   Eyes: Negative.    Respiratory: Negative for cough and shortness of breath.    Cardiovascular: Negative for chest pain, palpitations and leg swelling.   Neurological: Negative for dizziness and loss of consciousness.   Psychiatric/Behavioral: Negative for depression. The patient is nervous/anxious. The patient does not have insomnia.    All other systems reviewed and are negative.       Objective:   /78 mmHg  Pulse 81  Ht 1.6 m (5' 3\")  Wt 51.71 kg (114 lb)  BMI 20.20 kg/m2  SpO2 91%    Physical Exam   Constitutional: She is oriented to person, place, and time.   Much younger than stated age, healthy appearing, slender, wearing a wig   Eyes: EOM are normal. Pupils are equal, round, and reactive to light. No scleral icterus.   Neck: No JVD present. No thyromegaly present. "   Cardiovascular: Normal rate, regular rhythm and intact distal pulses.    No murmur heard.  Pulmonary/Chest: Breath sounds normal. She exhibits no tenderness.   Musculoskeletal: She exhibits no edema.   Neurological: She is alert and oriented to person, place, and time.   Skin: Skin is warm and dry. No rash noted.   Bruising on her left arm right finger, normal movement, no crepitus or tenderness   Psychiatric: She has a normal mood and affect. Her behavior is normal.       Assessment:     1. Coronary artery disease involving native coronary artery of native heart with angina pectoris (CMS-HCC)     2. Essential hypertension         Medical Decision Making:  Today's Assessment / Status / Plan:     Coronary disease. We went over pathophysiology. I reviewed her recent blood work including a normal complete metabolic panel without renal dysfunction and a normal complete blood count without anemia. We reviewed her echocardiogram from this winter with an ejection fraction of 65% and out without valvular heart disease. Her blood pressure was very high that day    She will stop her Plavix in August.  Her blood pressure we discussed. It was much better today. She will continue to take her medications    We will see her back in the year, I will make a phone call in to her in August to make sure she is doing well

## 2017-05-18 NOTE — MR AVS SNAPSHOT
"        Dayana Lechuga   2017 2:00 PM   Office Visit   MRN: [id number]    Department:  [facility]   Dept Phone:  [phone]    Description:  Female : 1929   Provider:  Chanelle Hoyos M.D.           Reason for Visit     Follow-Up           Allergies as of 2017     Allergen Noted Reactions    Pcn [Penicillins] 2016   Rash    About 70 years    Codeine 2016   Vomiting      You were diagnosed with     Coronary artery disease involving native coronary artery of native heart with angina pectoris (CMS-HCC)   [7780171]       Essential hypertension   [6715992]         Vital Signs     Blood Pressure Pulse Height Weight Body Mass Index Oxygen Saturation    124/78 mmHg 81 1.6 m (5' 3\") 51.71 kg (114 lb) 20.20 kg/m2 91%    Smoking Status                   Never Smoker            Basic Information     Date Of Birth Sex Race Ethnicity Preferred Language    1929 Female White Non- English      Your appointments     Aug 07, 2017  2:00 PM   Established Patient with Boy Stanford M.D.   [facility] (--)    [address] [place] [id number] [phone]           You will be receiving a confirmation call a few days before your appointment from our automated call confirmation system.              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 pectoris (CMS-HCC) I25.119 Medium  8/10/2016 - Present    Hypertension I10 Medium  9/10/2016 - Present    Back pain M54.9 High  9/10/2016 - Present      Health " Maintenance        Date Due Completion Dates    IMM ZOSTER VACCINE 2/18/1989 ---    MAMMOGRAM 10/23/2015 10/23/2014 (Prv Comp), 8/20/2014 (Prv Comp)    Override on 10/23/2014: Previously completed    Override on 8/20/2014: Previously completed    IMM PNEUMOCOCCAL 65+ (ADULT) LOW/MEDIUM RISK SERIES (2 of 2 - PPSV23) 6/1/2017 6/1/2016    COLONOSCOPY 2/23/2018 2/23/2008 (Prv Comp), 11/20/2007 (Prv Comp)    Override on 2/23/2008: Previously completed    Override on 11/20/2007: Previously completed    BONE DENSITY 11/20/2019 11/20/2014 (Postponed)    Override on 11/20/2014: Postponed    IMM DTaP/Tdap/Td Vaccine (2 - Td) 6/26/2026 6/26/2016            Current Immunizations     13-VALENT PCV PREVNAR 6/1/2016    Dtap Vaccine 6/26/2016    Influenza TIV (IM) 9/19/2016      Below and/or attached are the medications your provider expects you to take. Review all of your home medications and newly ordered medications with your provider and/or pharmacist. Follow medication instructions as directed by your provider and/or pharmacist. Please keep your medication list with you and share with your provider. Update the information when medications are discontinued, doses are changed, or new medications (including over-the-counter products) are added; and carry medication information at all times in the event of emergency situations     Allergies:  PCN - Rash     CODEINE - Vomiting               Medications  Valid as of: May 19, 2017 -  9:00 AM    Generic Name Brand Name Tablet Size Instructions for use    Albuterol Sulfate (Aero Soln) albuterol 108 (90 BASE) MCG/ACT Inhale 2 Puffs by mouth every 6 hours as needed for Shortness of Breath.        Alendronate Sodium (Tab) FOSAMAX 35 MG Take 1 tablet by mouth  every 7 days        AmLODIPine Besylate (Tab) NORVASC 5 MG Take 1 Tab by mouth every day.        Aspirin (Chew Tab) ASA 81 MG Take 1 Tab by mouth every day.        Atorvastatin Calcium (Tab) LIPITOR 40 MG 1 tab by mouth ever bed time         Beclomethasone Dipropionate (Aero Soln) QVAR 80 MCG/ACT Use 1 puff twice daily        Biotin (Tab) Biotin 10 MG Take 10 mg by mouth every day.        Calcium Carbonate   Take 600 mg by mouth every day.        Carvedilol (Tab) COREG 12.5 MG Take 1 Tab by mouth 2 times a day, with meals.        Cholecalciferol (Tab) vitamin D 2000 UNIT Take 2,000 Units by mouth every day.        Clopidogrel Bisulfate (Tab) PLAVIX 75 MG Take 1 Tab by mouth every day.        DiphenhydrAMINE HCl (Tab) BENADRYL 25 MG Take 25 mg by mouth every 6 hours as needed for Sleep.        Fluticasone Propionate (Suspension) FLONASE 50 MCG/ACT Spray 1 Spray in nose every day. EACH NOSTRIL        Lisinopril (Tab) PRINIVIL 20 MG Take 1 tablet by mouth  every day        LORazepam (Tab) ATIVAN 1 MG Take 1 Tab by mouth 2 times a day as needed for Anxiety.        Multiple Vitamins-Minerals (Tab) CENTRUM SILVER ADULT 50+  Take 1 Tab by mouth every day.        Nitroglycerin (SL Tab) NITROSTAT 0.4 MG Place 1 tab under tongue every 5 min as needed for chest pain max 3 doses        Omega-3 Fatty Acids (Cap) Fish Oil 1200 MG Take 1,200 mg by mouth every day.        Omeprazole (CAPSULE DELAYED RELEASE) PRILOSEC 20 MG Take 20 mg by mouth every day.        Potassium Chloride Alicia CR (Tab CR) Kdur 20 MEQ Take 1 Tab by mouth 2 times a day.        PredniSONE (Tab) DELTASONE 20 MG 1 TAB ONCE A DAY X 5 DAYS. TAKE WITH FOOD.        Vitamin E   Take  by mouth.        Zinc (Cap) Zinc 50 MG Take 50 mg by mouth every day.        .                 Medicines prescribed today were sent to:     [id number] - [place] - [address]    [address] [address]    Phone: [phone] Fax: [phone]    Open 24 Hours?: No    [facility] [place] [facility] - [place], [place] - [address] [facility] [address] [facility]    [address] [facility] [place] [id number]    Phone: [phone] Fax: [phone]    Open 24 Hours?: No         Medication refill instructions:       If your prescription bottle indicates you have medication refills left, it is not necessary to call your provider’s office. Please contact your pharmacy and they will refill your medication.    If your prescription bottle indicates you do not have any refills left, you may request refills at any time through one of the following ways: The online [employer] system (except Urgent Care), by calling your provider’s office, or by asking your pharmacy to contact your provider’s office with a refill request. Medication refills are processed only during regular business hours and may not be available until the next business day. Your provider may request additional information or to have a follow-up visit with you prior to refilling your medication.   *Please Note: Medication refills are assigned a new Rx number when refilled electronically. Your pharmacy may indicate that no refills were authorized even though a new prescription for the same medication is available at the pharmacy. Please request the medicine by name with the pharmacy before contacting your provider for a refill.           [employer] Access Code: Activation code not generated  Current [employer] Status: Active

## 2017-06-12 RX ORDER — LORAZEPAM 1 MG/1
TABLET ORAL
Qty: 60 TAB | Refills: 0 | Status: SHIPPED | OUTPATIENT
Start: 2017-06-12 | End: 2017-07-12 | Stop reason: SDUPTHER

## 2017-07-12 RX ORDER — LORAZEPAM 1 MG/1
TABLET ORAL
Qty: 60 TAB | Refills: 0 | Status: SHIPPED | OUTPATIENT
Start: 2017-07-12 | End: 2017-08-07 | Stop reason: SDUPTHER

## 2017-07-12 RX ORDER — ATORVASTATIN CALCIUM 40 MG/1
TABLET, FILM COATED ORAL
Qty: 90 TAB | Refills: 1 | Status: SHIPPED | OUTPATIENT
Start: 2017-07-12 | End: 2018-01-05 | Stop reason: SDUPTHER

## 2017-07-12 RX ORDER — AMLODIPINE BESYLATE 5 MG/1
TABLET ORAL
Qty: 90 TAB | Refills: 1 | Status: SHIPPED | OUTPATIENT
Start: 2017-07-12 | End: 2018-01-05 | Stop reason: SDUPTHER

## 2017-07-13 RX ORDER — LISINOPRIL 20 MG/1
TABLET ORAL
Qty: 90 TAB | Refills: 1 | Status: SHIPPED | OUTPATIENT
Start: 2017-07-13 | End: 2018-01-05 | Stop reason: SDUPTHER

## 2017-07-14 DIAGNOSIS — I10 ESSENTIAL HYPERTENSION: ICD-10-CM

## 2017-07-14 DIAGNOSIS — I25.119 CORONARY ARTERY DISEASE INVOLVING NATIVE CORONARY ARTERY OF NATIVE HEART WITH ANGINA PECTORIS (HCC): ICD-10-CM

## 2017-07-14 NOTE — TELEPHONE ENCOUNTER
Was the patient seen in the last year in this department? Yes     Does patient have an active prescription for medications requested? No     Received Request Via: Pharmacy   Last seen 5/8/17  Last labs 4/26/17

## 2017-07-16 RX ORDER — POTASSIUM CHLORIDE 20 MEQ/1
TABLET, EXTENDED RELEASE ORAL
Qty: 180 TAB | Refills: 1 | Status: SHIPPED | OUTPATIENT
Start: 2017-07-16 | End: 2018-04-27 | Stop reason: SDUPTHER

## 2017-07-16 RX ORDER — CARVEDILOL 12.5 MG/1
12.5 TABLET ORAL 2 TIMES DAILY WITH MEALS
Qty: 180 TAB | Refills: 1 | Status: SHIPPED | OUTPATIENT
Start: 2017-07-16 | End: 2018-01-05 | Stop reason: SDUPTHER

## 2017-08-01 ENCOUNTER — TELEPHONE (OUTPATIENT)
Dept: CARDIOLOGY | Facility: MEDICAL CENTER | Age: 82
End: 2017-08-01

## 2017-08-01 NOTE — TELEPHONE ENCOUNTER
Called patient and advised her to stop Plavix per Dr. Chanelle Hoyos. Patient verbalized understanding and will discontinue Plavix.    FE TAY

## 2017-08-01 NOTE — TELEPHONE ENCOUNTER
----- Message from Chanelle Hoyos M.D. sent at 5/18/2017  3:21 PM PDT -----  Regarding: ft  Continue call the patient and reminded her to stop her Plavix

## 2017-08-07 ENCOUNTER — OFFICE VISIT (OUTPATIENT)
Dept: MEDICAL GROUP | Facility: CLINIC | Age: 82
End: 2017-08-07
Payer: MEDICARE

## 2017-08-07 VITALS
SYSTOLIC BLOOD PRESSURE: 130 MMHG | HEART RATE: 76 BPM | TEMPERATURE: 99 F | DIASTOLIC BLOOD PRESSURE: 50 MMHG | OXYGEN SATURATION: 93 % | HEIGHT: 63 IN | RESPIRATION RATE: 16 BRPM | BODY MASS INDEX: 20.41 KG/M2 | WEIGHT: 115.2 LBS

## 2017-08-07 DIAGNOSIS — I25.119 CORONARY ARTERY DISEASE INVOLVING NATIVE CORONARY ARTERY OF NATIVE HEART WITH ANGINA PECTORIS (HCC): ICD-10-CM

## 2017-08-07 DIAGNOSIS — F41.9 ANXIETY: ICD-10-CM

## 2017-08-07 DIAGNOSIS — I10 ESSENTIAL HYPERTENSION: ICD-10-CM

## 2017-08-07 PROCEDURE — 93000 ELECTROCARDIOGRAM COMPLETE: CPT | Performed by: FAMILY MEDICINE

## 2017-08-07 PROCEDURE — 99214 OFFICE O/P EST MOD 30 MIN: CPT | Mod: 25 | Performed by: FAMILY MEDICINE

## 2017-08-07 RX ORDER — CITALOPRAM HYDROBROMIDE 10 MG/1
10 TABLET ORAL DAILY
Qty: 30 TAB | Refills: 3 | Status: SHIPPED | OUTPATIENT
Start: 2017-08-07 | End: 2017-11-13

## 2017-08-07 RX ORDER — CLOPIDOGREL BISULFATE 75 MG/1
75 TABLET ORAL DAILY
Qty: 30 TAB | Refills: 3 | Status: SHIPPED | OUTPATIENT
Start: 2017-08-07 | End: 2017-09-21 | Stop reason: SDUPTHER

## 2017-08-07 RX ORDER — LORAZEPAM 1 MG/1
TABLET ORAL
Qty: 60 TAB | Refills: 0 | Status: CANCELLED | OUTPATIENT
Start: 2017-08-07

## 2017-08-07 RX ORDER — LORAZEPAM 1 MG/1
TABLET ORAL
Qty: 60 TAB | Refills: 0 | Status: SHIPPED | OUTPATIENT
Start: 2017-08-07 | End: 2017-09-13 | Stop reason: SDUPTHER

## 2017-08-07 ASSESSMENT — ENCOUNTER SYMPTOMS
CONSTIPATION: 0
MYALGIAS: 0
BACK PAIN: 0
CLAUDICATION: 0
MUSCULOSKELETAL NEGATIVE: 1
FEVER: 0
HEMOPTYSIS: 0
NECK PAIN: 0
PALPITATIONS: 0
GASTROINTESTINAL NEGATIVE: 1
RESPIRATORY NEGATIVE: 1
DIZZINESS: 0
NERVOUS/ANXIOUS: 1
COUGH: 0
CONSTITUTIONAL NEGATIVE: 1
EYES NEGATIVE: 1
ABDOMINAL PAIN: 0
NEUROLOGICAL NEGATIVE: 1
HEADACHES: 0
CHILLS: 0

## 2017-08-07 NOTE — PROGRESS NOTES
Subjective:      Dayana Lechuga is a 88 y.o. female who presents with Follow-Up            HPI Comments: Patient has history of cad with stent last year  Since stent she has had to take ntg 3 times  Most recent was last month  One episode of burning cp with no radiation that resolved with ntg and no sweating resolved in 3 minutes  No cp on exertion    1. Anxiety    - lorazepam (ATIVAN) 1 MG Tab; TAKE 1 TABLET BY MOUTH TWICE DAILY AS NEEDED FOR ANXIETY.  Dispense: 60 Tab; Refill: 0    2. Coronary artery disease involving native coronary artery of native heart with angina pectoris (CMS-HCC)    - EKG - Clinic Performed    3. Essential hypertension    - EKG - Clinic Performed    Past Medical History:    Allergy                                                       Asthma                                                        Hypertension                                                  Anxiety                                                       Osteoporosis                                                  CAD (coronary artery disease)                                 Hyperlipidemia                                              Past Surgical History:    APPENDECTOMY                                                   ABDOMINAL HYSTERECTOMY TOTAL                                   HERNIA REPAIR                                                  TONSILLECTOMY                                                  CARDIAC CATH                                                   Smoking Status: Never Smoker                      Smokeless Status: Never Used                        Alcohol Use: No              No family history on file.      Current outpatient prescriptions: •  lorazepam (ATIVAN) 1 MG Tab, TAKE 1 TABLET BY MOUTH TWICE DAILY AS NEEDED FOR ANXIETY., Disp: 60 Tab, Rfl: 0•  potassium chloride SA (KDUR) 20 MEQ Tab CR, TAKE 1 TABLET BY MOUTH TWICE DAILY, Disp: 180 Tab, Rfl: 1•  carvedilol (COREG) 12.5 MG Tab, Take 1 Tab by  mouth 2 times a day, with meals., Disp: 180 Tab, Rfl: 1•  lisinopril (PRINIVIL) 20 MG Tab, TAKE 1 TABLET BY MOUTH ONE TIME A DAY, Disp: 90 Tab, Rfl: 1•  amlodipine (NORVASC) 5 MG Tab, TAKE 1 TABLET BY MOUTH ONE TIME A DAY, Disp: 90 Tab, Rfl: 1•  atorvastatin (LIPITOR) 40 MG Tab, TAKE 1 TABLET BY MOUTH ATBEDTIME, Disp: 90 Tab, Rfl: 1•  predniSONE (DELTASONE) 20 MG Tab, 1 TAB ONCE A DAY X 5 DAYS. TAKE WITH FOOD. (Patient not taking: Reported on 5/8/2017), Disp: 5 Tab, Rfl: 0•  albuterol 108 (90 BASE) MCG/ACT Aero Soln inhalation aerosol, Inhale 2 Puffs by mouth every 6 hours as needed for Shortness of Breath., Disp: 20.1 g, Rfl: 0•  nitroglycerin (NITROSTAT) 0.4 MG SL Tab, Place 1 tab under tongue every 5 min as needed for chest pain max 3 doses, Disp: 25 Tab, Rfl: 2•  fluticasone (FLONASE) 50 MCG/ACT nasal spray, Spray 1 Spray in nose every day. EACH NOSTRIL, Disp: 32 g, Rfl: 2•  alendronate (FOSAMAX) 35 MG tablet, Take 1 tablet by mouth  every 7 days, Disp: 12 Tab, Rfl: 3•  beclomethasone (QVAR) 80 MCG/ACT inhaler, Use 1 puff twice daily, Disp: 17.4 g, Rfl: 3•  diphenhydrAMINE (BENADRYL) 25 MG Tab, Take 25 mg by mouth every 6 hours as needed for Sleep., Disp: , Rfl: •  VITAMIN E PO, Take  by mouth., Disp: , Rfl: •  aspirin (ASA) 81 MG Chew Tab chewable tablet, Take 1 Tab by mouth every day., Disp: 100 Tab, Rfl: 11•  Cholecalciferol (VITAMIN D) 2000 UNIT Tab, Take 2,000 Units by mouth every day., Disp: , Rfl: •  Biotin 10 MG Tab, Take 10 mg by mouth every day., Disp: , Rfl: •  Zinc 50 MG Cap, Take 50 mg by mouth every day., Disp: , Rfl: •  Multiple Vitamins-Minerals (CENTRUM SILVER ADULT 50+) Tab, Take 1 Tab by mouth every day., Disp: , Rfl: •  Omega-3 Fatty Acids (FISH OIL) 1200 MG Cap, Take 1,200 mg by mouth every day., Disp: , Rfl: •  Calcium Carbonate (CALCIUM 600 PO), Take 600 mg by mouth every day., Disp: , Rfl: •  omeprazole (PRILOSEC) 20 MG delayed-release capsule, Take 20 mg by mouth every day., Disp: , Rfl:          Chest Pain   This is a new problem. The current episode started more than 1 month ago. The onset quality is sudden. The problem occurs rarely. The problem has been resolved. The pain is present in the substernal region. The pain is mild. The quality of the pain is described as burning. The pain does not radiate. Pertinent negatives include no abdominal pain, back pain, claudication, cough, dizziness, fever, headaches, hemoptysis or palpitations. The pain is aggravated by nothing. She has tried nitroglycerin for the symptoms. The treatment provided significant relief.       Review of Systems   Constitutional: Negative.  Negative for fever and chills.        Past Medical History:    Allergy                                                       Asthma                                                        Hypertension                                                  Anxiety                                                       Osteoporosis                                                  CAD (coronary artery disease)                                 Hyperlipidemia                                              Past Surgical History:    APPENDECTOMY                                                   ABDOMINAL HYSTERECTOMY TOTAL                                   HERNIA REPAIR                                                  TONSILLECTOMY                                                  CARDIAC CATH                                                   Smoking Status: Never Smoker                      Smokeless Status: Never Used                        Alcohol Use: No              No family history on file.     HENT: Negative.    Eyes: Negative.    Respiratory: Negative.  Negative for cough and hemoptysis.    Cardiovascular: Positive for chest pain. Negative for palpitations and claudication.   Gastrointestinal: Negative.  Negative for abdominal pain and constipation.   Genitourinary: Negative.  Negative for dysuria and  "urgency.   Musculoskeletal: Negative.  Negative for myalgias, back pain and neck pain.   Skin: Negative.  Negative for rash.   Neurological: Negative.  Negative for dizziness and headaches.   Endo/Heme/Allergies: Negative.    Psychiatric/Behavioral: Negative for suicidal ideas. The patient is nervous/anxious.           Objective:     /50 mmHg  Pulse 76  Temp(Src) 37.2 °C (99 °F)  Resp 16  Ht 1.6 m (5' 2.99\")  Wt 52.254 kg (115 lb 3.2 oz)  BMI 20.41 kg/m2  SpO2 93%     Physical Exam   Constitutional: She is oriented to person, place, and time. No distress.   HENT:   Head: Normocephalic and atraumatic.   Right Ear: External ear normal.   Left Ear: External ear normal.   Nose: Nose normal.   Mouth/Throat: Oropharynx is clear and moist. No oropharyngeal exudate.   Eyes: Pupils are equal, round, and reactive to light. Right eye exhibits no discharge. Left eye exhibits no discharge. No scleral icterus.   Neck: Normal range of motion. Neck supple. No JVD present. No tracheal deviation present. No thyromegaly present.   Cardiovascular: Normal rate, regular rhythm, normal heart sounds and intact distal pulses.  Exam reveals no gallop and no friction rub.    No murmur heard.  Pulmonary/Chest: Effort normal and breath sounds normal. No stridor. No respiratory distress. She has no wheezes. She has no rales. She exhibits no tenderness.   Abdominal: Soft. She exhibits no distension. There is no tenderness.   Lymphadenopathy:     She has no cervical adenopathy.   Neurological: She is alert and oriented to person, place, and time.   Skin: Skin is warm and dry. She is not diaphoretic.   Psychiatric: Judgment normal.   Nursing note and vitals reviewed.              Assessment/Plan:     1. Anxiety    - lorazepam (ATIVAN) 1 MG Tab; TAKE 1 TABLET BY MOUTH TWICE DAILY AS NEEDED FOR ANXIETY.  Dispense: 60 Tab; Refill: 0    2. Coronary artery disease involving native coronary artery of native heart with angina pectoris " (CMS-[facility])    - EKG - Clinic Performed    3. Essential hypertension    - EKG - Clinic Performed      ekg with no new findings atypical cp may be from anxiety or cad, will have her see cardiology and also try a new dose of celexa

## 2017-08-07 NOTE — MR AVS SNAPSHOT
"        Dayana Lechuga   2017 2:00 PM   Office Visit   MRN: [id number]    [facility] Phone:  [phone]    Description:  Female : 1929   Provider:  Boy Stanford M.D.           Reason for Visit     Follow-Up           Allergies as of 2017     Allergen Noted Reactions    Pcn [Penicillins] 2016   Rash    About 70 years    Codeine 2016   Vomiting      You were diagnosed with     Anxiety   [638526]       Coronary artery disease involving native coronary artery of native heart with angina pectoris (CMS-HCC)   [3227792]       Essential hypertension   [7407388]         Vital Signs     Blood Pressure Pulse Temperature Respirations Height Weight    130/50 mmHg 76 37.2 °C (99 °F) 16 1.6 m (5' 2.99\") 52.254 kg (115 lb 3.2 oz)    Body Mass Index Oxygen Saturation Smoking Status             20.41 kg/m2 93% Never Smoker          Basic Information     Date Of Birth Sex Race Ethnicity Preferred Language    1929 Female White Non- English      Your appointments     2017  1:15 PM   Established Patient with Boy Stanford M.D.   [facility] (--)    [address] [place] [address] [id number] [phone]           You will be receiving a confirmation call a few days before your appointment from our automated call confirmation system.              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 pectoris (CMS-HCC) I25.119 Medium  8/10/2016 - Present    Hypertension I10 Medium  9/10/2016 - " Present    Back pain M54.9 High  9/10/2016 - Present      Health Maintenance        Date Due Completion Dates    IMM ZOSTER VACCINE 2/18/1989 ---    MAMMOGRAM 10/23/2015 10/23/2014 (Prv Comp), 8/20/2014 (Prv Comp)    Override on 10/23/2014: Previously completed    Override on 8/20/2014: Previously completed    IMM PNEUMOCOCCAL 65+ (ADULT) LOW/MEDIUM RISK SERIES (2 of 2 - PPSV23) 6/21/2017 6/21/2016, 6/1/2016    IMM INFLUENZA (1) 9/1/2017 9/19/2016    COLONOSCOPY 2/23/2018 2/23/2008 (Prv Comp), 11/20/2007 (Prv Comp)    Override on 2/23/2008: Previously completed    Override on 11/20/2007: Previously completed    BONE DENSITY 11/20/2019 11/20/2014 (Postponed)    Override on 11/20/2014: Postponed    IMM DTaP/Tdap/Td Vaccine (2 - Td) 6/26/2026 6/26/2016            Current Immunizations     13-VALENT PCV PREVNAR 6/21/2016, 6/1/2016    Dtap Vaccine 6/26/2016    Influenza TIV (IM) 9/19/2016      Below and/or attached are the medications your provider expects you to take. Review all of your home medications and newly ordered medications with your provider and/or pharmacist. Follow medication instructions as directed by your provider and/or pharmacist. Please keep your medication list with you and share with your provider. Update the information when medications are discontinued, doses are changed, or new medications (including over-the-counter products) are added; and carry medication information at all times in the event of emergency situations     Allergies:  PCN - Rash     CODEINE - Vomiting               Medications  Valid as of: August 07, 2017 -  3:09 PM    Generic Name Brand Name Tablet Size Instructions for use    Albuterol Sulfate (Aero Soln) albuterol 108 (90 BASE) MCG/ACT Inhale 2 Puffs by mouth every 6 hours as needed for Shortness of Breath.        Alendronate Sodium (Tab) FOSAMAX 35 MG Take 1 tablet by mouth  every 7 days        AmLODIPine Besylate (Tab) NORVASC 5 MG TAKE 1 TABLET BY MOUTH ONE TIME A DAY           Aspirin (Chew Tab) ASA 81 MG Take 1 Tab by mouth every day.        Atorvastatin Calcium (Tab) LIPITOR 40 MG TAKE 1 TABLET BY MOUTH ATBEDTIME        Beclomethasone Dipropionate (Aero Soln) QVAR 80 MCG/ACT Use 1 puff twice daily        Biotin (Tab) Biotin 10 MG Take 10 mg by mouth every day.        Calcium Carbonate   Take 600 mg by mouth every day.        Carvedilol (Tab) COREG 12.5 MG Take 1 Tab by mouth 2 times a day, with meals.        Cholecalciferol (Tab) vitamin D 2000 UNIT Take 2,000 Units by mouth every day.        Citalopram Hydrobromide (Tab) CELEXA 10 MG Take 1 Tab by mouth every day.        Clopidogrel Bisulfate (Tab) PLAVIX 75 MG Take 1 Tab by mouth every day.        DiphenhydrAMINE HCl (Tab) BENADRYL 25 MG Take 25 mg by mouth every 6 hours as needed for Sleep.        Fluticasone Propionate (Suspension) FLONASE 50 MCG/ACT Spray 1 Spray in nose every day. EACH NOSTRIL        Lisinopril (Tab) PRINIVIL 20 MG TAKE 1 TABLET BY MOUTH ONE TIME A DAY        LORazepam (Tab) ATIVAN 1 MG TAKE 1 TABLET BY MOUTH TWICE DAILY AS NEEDED FOR ANXIETY.        Multiple Vitamins-Minerals (Tab) CENTRUM SILVER ADULT 50+  Take 1 Tab by mouth every day.        Nitroglycerin (SL Tab) NITROSTAT 0.4 MG Place 1 tab under tongue every 5 min as needed for chest pain max 3 doses        Omega-3 Fatty Acids (Cap) Fish Oil 1200 MG Take 1,200 mg by mouth every day.        Omeprazole (CAPSULE DELAYED RELEASE) PRILOSEC 20 MG Take 20 mg by mouth every day.        Potassium Chloride Alicia CR (Tab CR) Kdur 20 MEQ TAKE 1 TABLET BY MOUTH TWICE DAILY        PredniSONE (Tab) DELTASONE 20 MG 1 TAB ONCE A DAY X 5 DAYS. TAKE WITH FOOD.        Vitamin E   Take  by mouth.        Zinc (Cap) Zinc 50 MG Take 50 mg by mouth every day.        .                 Medicines prescribed today were sent to:     [id number] - [place], [place] - [address] [place] [address]    Phone: [phone] Fax: [phone]    [facility]  Hours?: No    [facility] [place] [id number] - [place], [place] - [address] [facility]    [address] [facility] [place] [id number]    Phone: [phone] Fax: [phone]    Open 24 Hours?: No      Medication refill instructions:       If your prescription bottle indicates you have medication refills left, it is not necessary to call your provider’s office. Please contact your pharmacy and they will refill your medication.    If your prescription bottle indicates you do not have any refills left, you may request refills at any time through one of the following ways: The online [employer] system (except Urgent Care), by calling your provider’s office, or by asking your pharmacy to contact your provider’s office with a refill request. Medication refills are processed only during regular business hours and may not be available until the next business day. Your provider may request additional information or to have a follow-up visit with you prior to refilling your medication.   *Please Note: Medication refills are assigned a new Rx number when refilled electronically. Your pharmacy may indicate that no refills were authorized even though a new prescription for the same medication is available at the pharmacy. Please request the medicine by name with the pharmacy before contacting your provider for a refill.        Referral     A referral request has been sent to our patient care coordination department. Please allow 3-5 business days for us to process this request and contact you either by phone or mail. If you do not hear from us by the 5th business day, please call us at [phone].           [employer] Access Code: Activation code not generated  Current [employer] Status: Active

## 2017-08-14 ENCOUNTER — TELEPHONE (OUTPATIENT)
Dept: MEDICAL GROUP | Facility: CLINIC | Age: 82
End: 2017-08-14

## 2017-08-14 NOTE — TELEPHONE ENCOUNTER
It could be from the medicine or from something else. It is up to her if she wishes to continue on the medicine

## 2017-08-14 NOTE — TELEPHONE ENCOUNTER
1. Caller Name: belgica                                         Call Back Number: [phone] (home)         Patient approves a detailed voicemail message: N\A    pt states she started taking the new anxiety medication you prescribed and states on the 4 th day she was doing ok, but in day 5 she almost passed out, she stopped taking the medication. she wants to know what she should do?

## 2017-08-15 NOTE — TELEPHONE ENCOUNTER
Patient notified - she is unsure whether she wants to stop it.  She will call back and make an appt if she decides to discuss this with you.

## 2017-09-13 DIAGNOSIS — F41.9 ANXIETY: ICD-10-CM

## 2017-09-13 NOTE — TELEPHONE ENCOUNTER
Was the patient seen in the last year in this department? Yes     Does patient have an active prescription for medications requested? No     Received Request Via: Patient   .   Pt needs med fax to [email or web address]

## 2017-09-14 RX ORDER — LORAZEPAM 1 MG/1
TABLET ORAL
Qty: 60 TAB | Refills: 0 | Status: SHIPPED
Start: 2017-09-14 | End: 2017-10-09 | Stop reason: SDUPTHER

## 2017-09-21 DIAGNOSIS — I10 ESSENTIAL HYPERTENSION: ICD-10-CM

## 2017-09-21 DIAGNOSIS — I25.119 CORONARY ARTERY DISEASE INVOLVING NATIVE CORONARY ARTERY OF NATIVE HEART WITH ANGINA PECTORIS (HCC): ICD-10-CM

## 2017-09-21 RX ORDER — CLOPIDOGREL BISULFATE 75 MG/1
TABLET ORAL
Qty: 90 TAB | Refills: 1 | Status: SHIPPED | OUTPATIENT
Start: 2017-09-21 | End: 2017-09-22

## 2017-09-22 ENCOUNTER — OFFICE VISIT (OUTPATIENT)
Dept: CARDIOLOGY | Facility: PHYSICIAN GROUP | Age: 82
End: 2017-09-22
Payer: MEDICARE

## 2017-09-22 VITALS
BODY MASS INDEX: 20.38 KG/M2 | HEIGHT: 63 IN | WEIGHT: 115 LBS | SYSTOLIC BLOOD PRESSURE: 114 MMHG | HEART RATE: 67 BPM | DIASTOLIC BLOOD PRESSURE: 60 MMHG | OXYGEN SATURATION: 96 %

## 2017-09-22 DIAGNOSIS — I10 ESSENTIAL HYPERTENSION: ICD-10-CM

## 2017-09-22 DIAGNOSIS — I25.119 CORONARY ARTERY DISEASE INVOLVING NATIVE CORONARY ARTERY OF NATIVE HEART WITH ANGINA PECTORIS (HCC): ICD-10-CM

## 2017-09-22 DIAGNOSIS — F41.9 ANXIETY: ICD-10-CM

## 2017-09-22 PROCEDURE — 99213 OFFICE O/P EST LOW 20 MIN: CPT | Performed by: INTERNAL MEDICINE

## 2017-09-22 ASSESSMENT — ENCOUNTER SYMPTOMS
EYES NEGATIVE: 1
DIZZINESS: 0
NERVOUS/ANXIOUS: 1
SHORTNESS OF BREATH: 0
COUGH: 0
INSOMNIA: 0
WEIGHT LOSS: 0
DEPRESSION: 0
LOSS OF CONSCIOUSNESS: 0
PALPITATIONS: 0

## 2017-09-22 NOTE — PROGRESS NOTES
Subjective:   Dayana Lechuga is a 88 y.o. female who presents today in follow-up in regards to her coronary disease with stenting of the distal right coronary artery 2016    In with her friend as usual  Very worried  Women die of heart attacks she says  Wont go off plavix    Past Medical History:   Diagnosis Date   • Allergy    • Anxiety    • ASTHMA    • CAD (coronary artery disease)    • Hyperlipidemia    • Hypertension    • OSTEOPOROSIS      Past Surgical History:   Procedure Laterality Date   • ABDOMINAL HYSTERECTOMY TOTAL     • APPENDECTOMY     • CARDIAC CATH     • HERNIA REPAIR     • TONSILLECTOMY       Family History   Problem Relation Age of Onset   • Heart Disease Neg Hx    • Heart Failure Neg Hx    • Hyperlipidemia Neg Hx      History   Smoking Status   • Never Smoker   Smokeless Tobacco   • Never Used     Allergies   Allergen Reactions   • Pcn [Penicillins] Rash     About 70 years   • Codeine Vomiting     Outpatient Encounter Prescriptions as of 9/22/2017   Medication Sig Dispense Refill   • clopidogrel (PLAVIX) 75 MG Tab TAKE 1 TABLET BY MOUTH ONE TIME A DAY 90 Tab 1   • lorazepam (ATIVAN) 1 MG Tab TAKE 1 TABLET BY MOUTH TWICE DAILY AS NEEDED FOR ANXIETY. 60 Tab 0   • citalopram (CELEXA) 10 MG tablet Take 1 Tab by mouth every day. 30 Tab 3   • potassium chloride SA (KDUR) 20 MEQ Tab CR TAKE 1 TABLET BY MOUTH TWICE DAILY 180 Tab 1   • carvedilol (COREG) 12.5 MG Tab Take 1 Tab by mouth 2 times a day, with meals. 180 Tab 1   • lisinopril (PRINIVIL) 20 MG Tab TAKE 1 TABLET BY MOUTH ONE TIME A DAY 90 Tab 1   • amlodipine (NORVASC) 5 MG Tab TAKE 1 TABLET BY MOUTH ONE TIME A DAY 90 Tab 1   • atorvastatin (LIPITOR) 40 MG Tab TAKE 1 TABLET BY MOUTH ATBEDTIME 90 Tab 1   • fluticasone (FLONASE) 50 MCG/ACT nasal spray Spray 1 Spray in nose every day. EACH NOSTRIL 32 g 2   • alendronate (FOSAMAX) 35 MG tablet Take 1 tablet by mouth  every 7 days 12 Tab 3   • beclomethasone (QVAR) 80 MCG/ACT inhaler Use 1 puff  "twice daily 17.4 g 3   • diphenhydrAMINE (BENADRYL) 25 MG Tab Take 25 mg by mouth every 6 hours as needed for Sleep.     • VITAMIN E PO Take  by mouth.     • aspirin (ASA) 81 MG Chew Tab chewable tablet Take 1 Tab by mouth every day. 100 Tab 11   • Cholecalciferol (VITAMIN D) 2000 UNIT Tab Take 2,000 Units by mouth every day.     • Biotin 10 MG Tab Take 10 mg by mouth every day.     • Zinc 50 MG Cap Take 50 mg by mouth every day.     • Multiple Vitamins-Minerals (CENTRUM SILVER ADULT 50+) Tab Take 1 Tab by mouth every day.     • Omega-3 Fatty Acids (FISH OIL) 1200 MG Cap Take 1,200 mg by mouth every day.     • Calcium Carbonate (CALCIUM 600 PO) Take 600 mg by mouth every day.     • omeprazole (PRILOSEC) 20 MG delayed-release capsule Take 20 mg by mouth every day.     • predniSONE (DELTASONE) 20 MG Tab 1 TAB ONCE A DAY X 5 DAYS. TAKE WITH FOOD. (Patient not taking: Reported on 5/8/2017) 5 Tab 0   • albuterol 108 (90 BASE) MCG/ACT Aero Soln inhalation aerosol Inhale 2 Puffs by mouth every 6 hours as needed for Shortness of Breath. 20.1 g 0   • nitroglycerin (NITROSTAT) 0.4 MG SL Tab Place 1 tab under tongue every 5 min as needed for chest pain max 3 doses 25 Tab 2     No facility-administered encounter medications on file as of 9/22/2017.      Review of Systems   Constitutional: Negative for malaise/fatigue and weight loss.   Eyes: Negative.    Respiratory: Negative for cough and shortness of breath.    Cardiovascular: Negative for chest pain, palpitations and leg swelling.   Neurological: Negative for dizziness and loss of consciousness.   Psychiatric/Behavioral: Negative for depression. The patient is nervous/anxious. The patient does not have insomnia.    All other systems reviewed and are negative.       Objective:   /60   Pulse 67   Ht 1.6 m (5' 3\")   Wt 52.2 kg (115 lb)   SpO2 96%   BMI 20.37 kg/m²     Physical Exam   Constitutional: She is oriented to person, place, and time.   Much younger than " stated age, healthy appearing, slender, wearing a wig   Eyes: EOM are normal. Pupils are equal, round, and reactive to light. No scleral icterus.   Neck: No JVD present. No thyromegaly present.   Cardiovascular: Normal rate, regular rhythm and intact distal pulses.    No murmur heard.  Pulmonary/Chest: Breath sounds normal. She exhibits no tenderness.   Musculoskeletal: She exhibits no edema.   Neurological: She is alert and oriented to person, place, and time.   Skin: Skin is warm and dry. No rash noted.   Bruising on her left arm right finger, normal movement, no crepitus or tenderness   Psychiatric:   Pressured speech       Assessment:     1. Coronary artery disease involving native coronary artery of native heart with angina pectoris (CMS-HCC)     2. Anxiety     3. Essential hypertension         Medical Decision Making:  Today's Assessment / Status / Plan:     Coronary disease. We again went over pathophysiology.  We reviewed her echocardiogram from this past winter with an ejection fraction of 65% and out without valvular heart disease. Her blood pressure was very high that day    Spoke about dual antiplatelets  Spoke about women & heart disease  Suggested she see another heart doctor for a second opinion    HTN, per PCP    We will see her back in a year

## 2017-10-09 DIAGNOSIS — F41.9 ANXIETY: ICD-10-CM

## 2017-10-09 RX ORDER — LORAZEPAM 1 MG/1
TABLET ORAL
Qty: 60 TAB | Refills: 0 | Status: SHIPPED | OUTPATIENT
Start: 2017-10-09 | End: 2017-11-13 | Stop reason: SDUPTHER

## 2017-10-09 NOTE — TELEPHONE ENCOUNTER
Was the patient seen in the last year in this department? Yes     Does patient have an active prescription for medications requested? Yes     Received Request Via: Patient     Last Visit: 8/7/17  Last Labs: UDS 5/8/17

## 2017-10-13 ENCOUNTER — TELEPHONE (OUTPATIENT)
Dept: CARDIOLOGY | Facility: MEDICAL CENTER | Age: 82
End: 2017-10-13

## 2017-10-13 NOTE — TELEPHONE ENCOUNTER
1517 Pt reports that she already got the flu shot. She says that is feeling under the weather and she thinks she might have the flu b/c she has body aches and some lightheadedness. Pt  was encouraged to check with urgent care or PCP to make sure she is OK. Pt is agreeable with plan.   Juan Daniel GLORIA RN     Pt called. No answer, left VM for call back.  Juan Daniel GLORIA RN     ----- Message from Laila Henao sent at 10/13/2017 12:52 PM PDT -----  Regarding: low b/p   Contact: [phone]  LA/juan daniel    Pt calling to report low b/p, last evening 122/43.   Pt is also asking if she can get a flu shot based on the low b/p.    Pt will be available after 3pm .

## 2017-10-16 ENCOUNTER — OFFICE VISIT (OUTPATIENT)
Dept: URGENT CARE | Facility: PHYSICIAN GROUP | Age: 82
End: 2017-10-16
Payer: MEDICARE

## 2017-10-16 VITALS
OXYGEN SATURATION: 96 % | SYSTOLIC BLOOD PRESSURE: 118 MMHG | WEIGHT: 118 LBS | BODY MASS INDEX: 20.91 KG/M2 | HEIGHT: 63 IN | DIASTOLIC BLOOD PRESSURE: 78 MMHG | TEMPERATURE: 98.3 F | HEART RATE: 67 BPM | RESPIRATION RATE: 16 BRPM

## 2017-10-16 DIAGNOSIS — R51.9 NONINTRACTABLE HEADACHE, UNSPECIFIED CHRONICITY PATTERN, UNSPECIFIED HEADACHE TYPE: ICD-10-CM

## 2017-10-16 DIAGNOSIS — R68.89 DOES NOT FEEL RIGHT: ICD-10-CM

## 2017-10-16 DIAGNOSIS — R42 LIGHT HEADED: ICD-10-CM

## 2017-10-16 LAB
APPEARANCE UR: CLEAR
BILIRUB UR STRIP-MCNC: NORMAL MG/DL
COLOR UR AUTO: YELLOW
GLUCOSE UR STRIP.AUTO-MCNC: NORMAL MG/DL
KETONES UR STRIP.AUTO-MCNC: NORMAL MG/DL
LEUKOCYTE ESTERASE UR QL STRIP.AUTO: NORMAL
NITRITE UR QL STRIP.AUTO: NORMAL
PH UR STRIP.AUTO: 8 [PH] (ref 5–8)
PROT UR QL STRIP: NORMAL MG/DL
RBC UR QL AUTO: NORMAL
SP GR UR STRIP.AUTO: 1
UROBILINOGEN UR STRIP-MCNC: NORMAL MG/DL

## 2017-10-16 PROCEDURE — 81002 URINALYSIS NONAUTO W/O SCOPE: CPT | Performed by: PHYSICIAN ASSISTANT

## 2017-10-16 PROCEDURE — 99214 OFFICE O/P EST MOD 30 MIN: CPT | Performed by: PHYSICIAN ASSISTANT

## 2017-10-16 ASSESSMENT — PAIN SCALES - GENERAL: PAINLEVEL: 6=MODERATE PAIN

## 2017-10-16 NOTE — PROGRESS NOTES
Chief Complaint   Patient presents with   • Headache     just does not feel good       HISTORY OF PRESENT ILLNESS: Patient is a 88 y.o. female who presents today for the following:    Patient comes in for evaluation of not feeling right for the last couple weeks. She complained of a very mild headache, stating not really having any pain just discomfort, and feeling lightheaded intermittently. She complains of diffuse muscle aches without overt fever. She states that she just does not feel right. Patient checked her blood pressure the other night when she did not feel very well and it was 120/43. Patient is concerned that low blood pressure may be causing all of her symptoms. Patient sees cardiology and has an appointment with her primary care provider in approximately one month. Patient denies chest pain, soreness of breath, orthopnea, PND, leg swelling, nausea, vomiting, diarrhea, syncopal episodes, and urinary symptoms.    Patient Active Problem List    Diagnosis Date Noted   • Back pain 09/10/2016     Priority: High   • Hypertension 09/10/2016     Priority: Medium   • Coronary artery disease involving native coronary artery of native heart with angina pectoris (CMS-[facility]) 08/10/2016     Priority: Medium   • Dyslipidemia 10/31/2009     Priority: Medium   • HTN (hypertension) 10/02/2009     Priority: Medium   • Anxiety 06/22/2016     Priority: Low   • GERD (gastroesophageal reflux disease) 10/31/2009     Priority: Low   • Osteopenia 10/31/2009     Priority: Low   • ASTHMA 10/02/2009     Priority: Low       Allergies:Pcn [penicillins] and Codeine    Current Outpatient Prescriptions Ordered in [facility]   Medication Sig Dispense Refill   • lorazepam (ATIVAN) 1 MG Tab TAKE 1 TABLET BY MOUTH TWICE DAILY AS NEEDED FOR ANXIETY. 60 Tab 0   • potassium chloride SA (KDUR) 20 MEQ Tab CR TAKE 1 TABLET BY MOUTH TWICE DAILY 180 Tab 1   • carvedilol (COREG) 12.5 MG Tab Take 1 Tab by mouth 2 times a day, with meals. 180 Tab 1   •  lisinopril (PRINIVIL) 20 MG Tab TAKE 1 TABLET BY MOUTH ONE TIME A DAY 90 Tab 1   • amlodipine (NORVASC) 5 MG Tab TAKE 1 TABLET BY MOUTH ONE TIME A DAY 90 Tab 1   • atorvastatin (LIPITOR) 40 MG Tab TAKE 1 TABLET BY MOUTH ATBEDTIME 90 Tab 1   • fluticasone (FLONASE) 50 MCG/ACT nasal spray Spray 1 Spray in nose every day. EACH NOSTRIL 32 g 2   • alendronate (FOSAMAX) 35 MG tablet Take 1 tablet by mouth  every 7 days 12 Tab 3   • beclomethasone (QVAR) 80 MCG/ACT inhaler Use 1 puff twice daily 17.4 g 3   • diphenhydrAMINE (BENADRYL) 25 MG Tab Take 25 mg by mouth every 6 hours as needed for Sleep.     • VITAMIN E PO Take  by mouth.     • aspirin (ASA) 81 MG Chew Tab chewable tablet Take 1 Tab by mouth every day. 100 Tab 11   • Cholecalciferol (VITAMIN D) 2000 UNIT Tab Take 2,000 Units by mouth every day.     • Biotin 10 MG Tab Take 10 mg by mouth every day.     • Zinc 50 MG Cap Take 50 mg by mouth every day.     • Multiple Vitamins-Minerals (CENTRUM SILVER ADULT 50+) Tab Take 1 Tab by mouth every day.     • Omega-3 Fatty Acids (FISH OIL) 1200 MG Cap Take 1,200 mg by mouth every day.     • Calcium Carbonate (CALCIUM 600 PO) Take 600 mg by mouth every day.     • omeprazole (PRILOSEC) 20 MG delayed-release capsule Take 20 mg by mouth every day.     • citalopram (CELEXA) 10 MG tablet Take 1 Tab by mouth every day. 30 Tab 3   • predniSONE (DELTASONE) 20 MG Tab 1 TAB ONCE A DAY X 5 DAYS. TAKE WITH FOOD. (Patient not taking: Reported on 5/8/2017) 5 Tab 0   • albuterol 108 (90 BASE) MCG/ACT Aero Soln inhalation aerosol Inhale 2 Puffs by mouth every 6 hours as needed for Shortness of Breath. 20.1 g 0   • nitroglycerin (NITROSTAT) 0.4 MG SL Tab Place 1 tab under tongue every 5 min as needed for chest pain max 3 doses 25 Tab 2     No current Epic-ordered facility-administered medications on file.        Past Medical History:   Diagnosis Date   • Allergy    • Anxiety    • ASTHMA    • CAD (coronary artery disease)    • Hyperlipidemia   "  • Hypertension    • OSTEOPOROSIS        Social History   Substance Use Topics   • Smoking status: Never Smoker   • Smokeless tobacco: Never Used   • Alcohol use No       Family Status   Relation Status   • Neg Hx      Family History   Problem Relation Age of Onset   • Heart Disease Neg Hx    • Heart Failure Neg Hx    • Hyperlipidemia Neg Hx        ROS:    Review of Systems   Constitutional: Negative for fever, chills, weight loss and malaise/fatigue.   HENT: Negative for ear pain, nosebleeds, congestion, sore throat and neck pain.    Eyes: Negative for blurred vision.   Respiratory: Negative for cough, sputum production, shortness of breath and wheezing.    Cardiovascular: Negative for chest pain, palpitations, orthopnea and leg swelling.   Gastrointestinal: Negative for heartburn, nausea, vomiting and abdominal pain.   Genitourinary: Negative for dysuria, urgency and frequency.       Exam:  Blood pressure 118/78, pulse 67, temperature 36.8 °C (98.3 °F), resp. rate 16, height 1.6 m (5' 3\"), weight 53.5 kg (118 lb), SpO2 96 %.  General: Well developed, well nourished female who appears to be much younger than her stated age. No distress.  HEENT: Conjunctiva clear, lids without ptosis, PERRL/EOMI. Ears normal shape and contour, canals are clear bilaterally, tympanic membranes are benign. Nasal mucosa benign. Oropharynx is without erythema, edema or exudates. Reasonable dentition.  Pulmonary: Clear to ausculation and percussion.  Normal effort. No rales, ronchi, or wheezing.   Cardiovascular: Regular rate and rhythm without murmur. No edema.   Neurologic: Grossly nonfocal.  Lymph: No cervical lymphadenopathy noted.  Skin: Warm, dry, good turgor. No rashes in visible areas.   Psych: Normal mood. Alert and oriented x3. Judgment and insight is normal.    UA: Trace leukocytes, otherwise negative    Patient's chart was reviewed for past cardiac history, labs, medications.    Assessment/Plan:  Discussed differential " diagnosis with the patient including but not limited to viral infection, medication reaction/low blood pressure, versus other pathology. Advised increasing fluid intake. May need to consider changing blood pressure medications. Follow-up with primary care provider 11/13 as scheduled. Discussed ER precautions and past cardiac history.  1. Nonintractable headache, unspecified chronicity pattern, unspecified headache type     2. Light headed     3. Does not feel right

## 2017-11-13 ENCOUNTER — OFFICE VISIT (OUTPATIENT)
Dept: MEDICAL GROUP | Facility: CLINIC | Age: 82
End: 2017-11-13
Payer: MEDICARE

## 2017-11-13 VITALS
HEART RATE: 64 BPM | HEIGHT: 63 IN | SYSTOLIC BLOOD PRESSURE: 140 MMHG | OXYGEN SATURATION: 94 % | WEIGHT: 118 LBS | BODY MASS INDEX: 20.91 KG/M2 | DIASTOLIC BLOOD PRESSURE: 68 MMHG | TEMPERATURE: 99 F | RESPIRATION RATE: 16 BRPM

## 2017-11-13 DIAGNOSIS — M81.0 OSTEOPOROSIS WITHOUT CURRENT PATHOLOGICAL FRACTURE, UNSPECIFIED OSTEOPOROSIS TYPE: ICD-10-CM

## 2017-11-13 DIAGNOSIS — F41.9 ANXIETY: ICD-10-CM

## 2017-11-13 DIAGNOSIS — F13.20 BENZODIAZEPINE DEPENDENCE (HCC): ICD-10-CM

## 2017-11-13 PROCEDURE — 99214 OFFICE O/P EST MOD 30 MIN: CPT | Performed by: INTERNAL MEDICINE

## 2017-11-13 RX ORDER — NITROGLYCERIN 0.4 MG/1
TABLET SUBLINGUAL
Qty: 25 TAB | Refills: 1 | Status: SHIPPED | OUTPATIENT
Start: 2017-11-13 | End: 2017-11-13 | Stop reason: SDUPTHER

## 2017-11-13 RX ORDER — FLUTICASONE PROPIONATE 50 MCG
1 SPRAY, SUSPENSION (ML) NASAL
Qty: 32 G | Refills: 1 | Status: SHIPPED | OUTPATIENT
Start: 2017-11-13 | End: 2017-11-13 | Stop reason: SDUPTHER

## 2017-11-13 RX ORDER — ALENDRONATE SODIUM 35 MG/1
TABLET ORAL
Qty: 12 TAB | Refills: 0 | Status: SHIPPED | OUTPATIENT
Start: 2017-11-13 | End: 2018-01-09 | Stop reason: SDUPTHER

## 2017-11-13 RX ORDER — ALENDRONATE SODIUM 35 MG/1
TABLET ORAL
Qty: 12 TAB | Refills: 0 | Status: SHIPPED | OUTPATIENT
Start: 2017-11-13 | End: 2017-11-13 | Stop reason: SDUPTHER

## 2017-11-13 RX ORDER — ALBUTEROL SULFATE 90 UG/1
2 AEROSOL, METERED RESPIRATORY (INHALATION) EVERY 6 HOURS PRN
Qty: 20.1 G | Refills: 0 | Status: SHIPPED | OUTPATIENT
Start: 2017-11-13 | End: 2020-06-30 | Stop reason: SDUPTHER

## 2017-11-13 RX ORDER — FLUTICASONE PROPIONATE 50 MCG
1 SPRAY, SUSPENSION (ML) NASAL
Qty: 32 G | Refills: 1 | Status: SHIPPED | OUTPATIENT
Start: 2017-11-13 | End: 2018-11-01 | Stop reason: SDUPTHER

## 2017-11-13 RX ORDER — ALBUTEROL SULFATE 90 UG/1
2 AEROSOL, METERED RESPIRATORY (INHALATION) EVERY 6 HOURS PRN
Qty: 20.1 G | Refills: 0 | Status: SHIPPED | OUTPATIENT
Start: 2017-11-13 | End: 2017-11-13 | Stop reason: SDUPTHER

## 2017-11-13 RX ORDER — NITROGLYCERIN 0.4 MG/1
TABLET SUBLINGUAL
Qty: 25 TAB | Refills: 1 | Status: SHIPPED | OUTPATIENT
Start: 2017-11-13 | End: 2019-04-10 | Stop reason: SDUPTHER

## 2017-11-13 ASSESSMENT — PAIN SCALES - GENERAL: PAINLEVEL: 6=MODERATE PAIN

## 2017-11-13 NOTE — ASSESSMENT & PLAN NOTE
"Patient states she was started on citalopram at her last visit.  States she took it at the 10 mg dose for 4 days then felt very dizzy.  She called the office to ask for advice and was told it may or may not be the medication and she could choose whether to take it or not.  States she took 1/2 tablet for several weeks and felt fine, but then stopped taking it.  States she \"does not need any medications like that at this point.\"  "

## 2017-11-13 NOTE — TELEPHONE ENCOUNTER
Was the patient seen in the last year in this department? No     Does patient have an active prescription for medications requested? No     Received Request Via: Pharmacy        Pt was seen by dr crawford  On 11/13/17

## 2017-11-13 NOTE — ASSESSMENT & PLAN NOTE
"Currently takes lorazepam 1 mg BID.  Patient is very insistent on getting this prescription filled today.  States that she usually gets three prescriptions at a time and is in for her 3 month fill.  She tells me she is going to see a new provider in [place] in 2 days to establish care, but she wants to be sure before her visit that this provider will give her lorazepam.  She asks me to assure her of this, but I explained every provider is different and there is no way to know this.  States \"I am not addicted,\" but notes that when she does not take the medication she cannot sleep.  States she only uses it for sleep despite being prescribed it twice daily.  States that a previous provider tried to take her off of the medication \"cold turkey\" and she \"almost .\"   States she called her son at that time and he gave her some of his lorazepam to take.  When I let her know that I can only provide her a maximum of one month supply of lorazepam given I will not be able to see her in follow up, she became very upset and refused the script, saying there was no way she would be able to get back to the office in one month due to her ride situation.  She requested that we forward the request on to her PCP, Dr. Stanford, so that he would prescribe her the full 3 months, despite her plans to change PCP on Wednesday of this week.  "

## 2017-11-13 NOTE — PROGRESS NOTES
"Subjective:   Dayana Lechuga is a 88 y.o. female here today for lorazepam refill    Benzodiazepine dependence (CMS-[facility])  Currently takes lorazepam 1 mg BID.  Patient is very insistent on getting this prescription filled today.  States that she usually gets three prescriptions at a time and is in for her 3 month fill.  She tells me she is going to see a new provider in [place] in 2 days to establish care, but she wants to be sure before her visit that this provider will give her lorazepam.  She asks me to assure her of this, but I explained every provider is different and there is no way to know this.  States \"I am not addicted,\" but notes that when she does not take the medication she cannot sleep.  States she only uses it for sleep despite being prescribed it twice daily.  States that a previous provider tried to take her off of the medication \"cold turkey\" and she \"almost .\"   States she called her son at that time and he gave her some of his lorazepam to take.  When I let her know that I can only provide her a maximum of one month supply of lorazepam given I will not be able to see her in follow up, she became very upset and refused the script, saying there was no way she would be able to get back to the office in one month due to her ride situation.  She requested that we forward the request on to her PCP, Dr. Stanford, so that he would prescribe her the full 3 months, despite her plans to change PCP on Wednesday of this week.    Anxiety  Patient states she was started on citalopram at her last visit.  States she took it at the 10 mg dose for 4 days then felt very dizzy.  She called the office to ask for advice and was told it may or may not be the medication and she could choose whether to take it or not.  States she took 1/2 tablet for several weeks and felt fine, but then stopped taking it.  States she \"does not need any medications like that at this point.\"       Current medicines (including changes " today)  Current Outpatient Prescriptions   Medication Sig Dispense Refill   • fluticasone (FLONASE) 50 MCG/ACT nasal spray Spray 1 Spray in nose every day. EACH NOSTRIL 32 g 1   • albuterol 108 (90 Base) MCG/ACT Aero Soln inhalation aerosol Inhale 2 Puffs by mouth every 6 hours as needed for Shortness of Breath. 20.1 g 0   • alendronate (FOSAMAX) 35 MG tablet Take 1 tablet by mouth  every 7 days 12 Tab 0   • beclomethasone (QVAR) 80 MCG/ACT inhaler Use 1 puff twice daily 17.4 g 3   • nitroglycerin (NITROSTAT) 0.4 MG SL Tab Place 1 tab under tongue every 5 min as needed for chest pain max 3 doses 25 Tab 1   • lorazepam (ATIVAN) 1 MG Tab TAKE 1 TABLET BY MOUTH TWICE DAILY AS NEEDED FOR ANXIETY. 60 Tab 0   • potassium chloride SA (KDUR) 20 MEQ Tab CR TAKE 1 TABLET BY MOUTH TWICE DAILY 180 Tab 1   • carvedilol (COREG) 12.5 MG Tab Take 1 Tab by mouth 2 times a day, with meals. 180 Tab 1   • lisinopril (PRINIVIL) 20 MG Tab TAKE 1 TABLET BY MOUTH ONE TIME A DAY 90 Tab 1   • amlodipine (NORVASC) 5 MG Tab TAKE 1 TABLET BY MOUTH ONE TIME A DAY 90 Tab 1   • atorvastatin (LIPITOR) 40 MG Tab TAKE 1 TABLET BY MOUTH ATBEDTIME 90 Tab 1   • VITAMIN E PO Take  by mouth.     • aspirin (ASA) 81 MG Chew Tab chewable tablet Take 1 Tab by mouth every day. 100 Tab 11   • Cholecalciferol (VITAMIN D) 2000 UNIT Tab Take 2,000 Units by mouth every day.     • Biotin 10 MG Tab Take 10 mg by mouth every day.     • Zinc 50 MG Cap Take 50 mg by mouth every day.     • Multiple Vitamins-Minerals (CENTRUM SILVER ADULT 50+) Tab Take 1 Tab by mouth every day.     • Omega-3 Fatty Acids (FISH OIL) 1200 MG Cap Take 1,200 mg by mouth every day.     • Calcium Carbonate (CALCIUM 600 PO) Take 600 mg by mouth every day.     • omeprazole (PRILOSEC) 20 MG delayed-release capsule Take 20 mg by mouth every day.     • diphenhydrAMINE (BENADRYL) 25 MG Tab Take 25 mg by mouth every 6 hours as needed for Sleep.       No current facility-administered medications for  "this visit.      She  has a past medical history of Allergy; Anxiety; ASTHMA; CAD (coronary artery disease); Hyperlipidemia; Hypertension; and OSTEOPOROSIS.    ROS   As above in HPI     Objective:     Blood pressure 140/68, pulse 64, temperature 37.2 °C (99 °F), resp. rate 16, height 1.6 m (5' 2.99\"), weight 53.5 kg (118 lb), SpO2 94 %, not currently breastfeeding. Body mass index is 20.91 kg/m².   Physical Exam:  Constitutional: Alert, no distress.  Skin: Warm, dry, good turgor, no rashes in visible areas.  Eye: Equal, round and reactive, conjunctiva clear, lids normal.  Psych: Alert and oriented x3, normal affect and mood, becomes impatient and irritated when discussing lorazepam.      Assessment and Plan:   The following treatment plan was discussed    1. Anxiety  Discussed alternative treatments for anxiety that are safer in elderly patients, non-habit forming, without the side effects of lorazepam.  Patient is not interested in alternatives at this time.  Discussed that I can only provide her with a one month supply of lorazepam because I will be unable to see her for follow up.  She refused this, would like the 3 months from Dr. Stanford.  Request forwarded.      3. Benzodiazepine dependence (CMS-[facility])  At this point, patient exhibits dependent behaviors.  We had a long discussion about the adverse side effects of long term benzodiazepine use including increased risk of dementia, falls, respiratory depression.  We discussed the process of weaning off of this medication and not stopping all at once.  We discussed that many safe alternatives exist.  She is not interested in this currently.  She is planning to re-establish with a new PCP in 2 days.  No prescriptions for benzos were given today from me.      Followup: Return if symptoms worsen or fail to improve.         "

## 2017-11-14 RX ORDER — LORAZEPAM 1 MG/1
TABLET ORAL
Qty: 60 TAB | Refills: 0 | Status: SHIPPED | OUTPATIENT
Start: 2017-11-14 | End: 2017-12-12 | Stop reason: SDUPTHER

## 2017-11-15 ENCOUNTER — OFFICE VISIT (OUTPATIENT)
Dept: MEDICAL GROUP | Facility: PHYSICIAN GROUP | Age: 82
End: 2017-11-15
Payer: MEDICARE

## 2017-11-15 VITALS
HEART RATE: 80 BPM | OXYGEN SATURATION: 98 % | BODY MASS INDEX: 20.73 KG/M2 | RESPIRATION RATE: 16 BRPM | WEIGHT: 117 LBS | TEMPERATURE: 97.7 F | HEIGHT: 63 IN | SYSTOLIC BLOOD PRESSURE: 118 MMHG | DIASTOLIC BLOOD PRESSURE: 70 MMHG

## 2017-11-15 DIAGNOSIS — I10 ESSENTIAL HYPERTENSION: ICD-10-CM

## 2017-11-15 DIAGNOSIS — I25.119 CORONARY ARTERY DISEASE INVOLVING NATIVE CORONARY ARTERY OF NATIVE HEART WITH ANGINA PECTORIS (HCC): ICD-10-CM

## 2017-11-15 DIAGNOSIS — E78.5 DYSLIPIDEMIA: Chronic | ICD-10-CM

## 2017-11-15 DIAGNOSIS — Z12.31 VISIT FOR SCREENING MAMMOGRAM: ICD-10-CM

## 2017-11-15 DIAGNOSIS — M79.604 RIGHT LEG PAIN: ICD-10-CM

## 2017-11-15 DIAGNOSIS — F41.9 ANXIETY: ICD-10-CM

## 2017-11-15 PROCEDURE — 99214 OFFICE O/P EST MOD 30 MIN: CPT | Performed by: NURSE PRACTITIONER

## 2017-11-15 RX ORDER — GUAIFENESIN 600 MG/1
600 TABLET, EXTENDED RELEASE ORAL EVERY MORNING
COMMUNITY

## 2017-11-15 RX ORDER — MULTIVIT WITH MINERALS/LUTEIN
1000 TABLET ORAL EVERY MORNING
COMMUNITY

## 2017-11-15 ASSESSMENT — PATIENT HEALTH QUESTIONNAIRE - PHQ9: CLINICAL INTERPRETATION OF PHQ2 SCORE: 0

## 2017-11-15 NOTE — ASSESSMENT & PLAN NOTE
Patient reports chronic right leg pain that she has had off and on for several months. She did undergo right knee surgery several years ago for a torn ligament. Pain is not in her groin but more so on the lateral aspect of her right leg from the greater trochanter down to the IT band. She would like to try physical therapy, this has been ordered. If symptoms do not improve with physical therapy and over-the-counter analgesics, she is to follow up, she may need imaging and referral to orthopedics.

## 2017-11-15 NOTE — ASSESSMENT & PLAN NOTE
This is a chronic condition, controlled with atorvastatin 40 mg daily. Her last lipid profile is as follows:  Component      Latest Ref Rng & Units 4/26/2017           8:52 AM   Cholesterol,Tot      100 - 199 mg/dL 148   Triglycerides      0 - 149 mg/dL 86   HDL      >=40 mg/dL 62   LDL      <100 mg/dL 69   She does not need refills on this medication at this time, but will call when needed. She tolerates it well with no significant bothersome side effects. She understands that she is to continue with healthy diet, regular physical activity and continued efforts towards maintaining her healthy weight. She is due for labs in April, these have been ordered.

## 2017-11-15 NOTE — PROGRESS NOTES
Chief Complaint   Patient presents with   • Anxiety     est care         This is a 88 y.o.female patient that presents today with the following:Establish care with a PCP, discuss acute and chronic conditions    Anxiety  This is a chronic condition, stable at this time. She has been on lorazepam, 1 mg twice a day for several years. She states she takes this mostly for insomnia but continues to take it during the day as well. Her last PCP did have dalton discussion on the continued use of benzodiazepines, had the same dalton discussion with her today that benzodiazepines are not intended to be used on a daily basis, only as needed. She was tried on citalopram, she did not tolerate this medication due to side effects and stopped taking. Patient is willing to slowly wean down on this medication to where she only takes it sporadically as needed for breakthrough anxiety. She states she would like to do this after she has upcoming cataract surgery. I was agreeable to this, she does not need refills at this time. I did discuss with her the risks of ongoing benzodiazepine use and that she will need to continue Coumadin at least every 3 months for controlled substance management. Patient was agreeable to this plan.    Coronary artery disease involving native coronary artery of native heart with angina pectoris  Patient has history of coronary artery disease, she suffered MI in July 2016 and has been on Plavix which was recently discontinued by her cardiologist. She reports using her nitroglycerin tablets a total of 5 times since then. She does continue to follow with cardiology, and fact recently saw provider. Today she denies any chest pain, shortness of breath, headache, visual changes, peripheral edema and near syncope.    Dyslipidemia  This is a chronic condition, controlled with atorvastatin 40 mg daily. Her last lipid profile is as follows:  Component      Latest Ref Rng & Units 4/26/2017           8:52 AM    Cholesterol,Tot      100 - 199 mg/dL 148   Triglycerides      0 - 149 mg/dL 86   HDL      >=40 mg/dL 62   LDL      <100 mg/dL 69   She does not need refills on this medication at this time, but will call when needed. She tolerates it well with no significant bothersome side effects. She understands that she is to continue with healthy diet, regular physical activity and continued efforts towards maintaining her healthy weight. She is due for labs in April, these have been ordered.    HTN (hypertension)  This is a chronic condition, stable and well-controlled on current medications including amlodipine and lisinopril. Her blood pressure today is 118/70 and she denies symptoms of hypertension. She will be due for annual fasting labs in April, these have been ordered. She does not need refills on her meds at this time, but can call as needed.    Right leg pain  Patient reports chronic right leg pain that she has had off and on for several months. She did undergo right knee surgery several years ago for a torn ligament. Pain is not in her groin but more so on the lateral aspect of her right leg from the greater trochanter down to the IT band. She would like to try physical therapy, this has been ordered. If symptoms do not improve with physical therapy and over-the-counter analgesics, she is to follow up, she may need imaging and referral to orthopedics.      Office Visit on 10/16/2017   Component Date Value   • POC Color 10/16/2017 yellow    • POC Appearance 10/16/2017 clear    • POC Leukocyte Esterase 10/16/2017 trace    • POC Nitrites 10/16/2017 neg    • POC Urobiligen 10/16/2017 neg    • POC Protein 10/16/2017 neg    • POC Urine PH 10/16/2017 8.0    • POC Blood 10/16/2017 neg    • POC Specific Gravity 10/16/2017 1.005    • POC Ketones 10/16/2017 neg    • POC Biliruben 10/16/2017 neg    • POC Glucose 10/16/2017 neg          clinical course has been stable    Past Medical History:   Diagnosis Date   • Allergy    •  Anxiety    • ASTHMA    • CAD (coronary artery disease)    • Hyperlipidemia    • Hypertension    • OSTEOPOROSIS        Past Surgical History:   Procedure Laterality Date   • ABDOMINAL HYSTERECTOMY TOTAL     • APPENDECTOMY     • CARDIAC CATH     • HERNIA REPAIR     • TONSILLECTOMY         Family History   Problem Relation Age of Onset   • Heart Disease Neg Hx    • Heart Failure Neg Hx    • Hyperlipidemia Neg Hx        Pcn [penicillins] and Codeine    Current Outpatient Prescriptions Ordered in [facility]   Medication Sig Dispense Refill   • guaifenesin LA (MUCINEX) 600 MG TABLET SR 12 HR Take 600 mg by mouth every 12 hours.     • ascorbic acid (ASCORBIC ACID) 500 MG Tab Take 500 mg by mouth every day.     • lorazepam (ATIVAN) 1 MG Tab TAKE 1 TABLET BY MOUTH TWICE DAILY AS NEEDED FOR ANXIETY. 60 Tab 0   • fluticasone (FLONASE) 50 MCG/ACT nasal spray Spray 1 Spray in nose every day. EACH NOSTRIL 32 g 1   • albuterol 108 (90 Base) MCG/ACT Aero Soln inhalation aerosol Inhale 2 Puffs by mouth every 6 hours as needed for Shortness of Breath. 20.1 g 0   • alendronate (FOSAMAX) 35 MG tablet Take 1 tablet by mouth  every 7 days 12 Tab 0   • beclomethasone (QVAR) 80 MCG/ACT inhaler Use 1 puff twice daily 17.4 g 3   • nitroglycerin (NITROSTAT) 0.4 MG SL Tab Place 1 tab under tongue every 5 min as needed for chest pain max 3 doses 25 Tab 1   • potassium chloride SA (KDUR) 20 MEQ Tab CR TAKE 1 TABLET BY MOUTH TWICE DAILY 180 Tab 1   • carvedilol (COREG) 12.5 MG Tab Take 1 Tab by mouth 2 times a day, with meals. 180 Tab 1   • lisinopril (PRINIVIL) 20 MG Tab TAKE 1 TABLET BY MOUTH ONE TIME A DAY 90 Tab 1   • amlodipine (NORVASC) 5 MG Tab TAKE 1 TABLET BY MOUTH ONE TIME A DAY 90 Tab 1   • atorvastatin (LIPITOR) 40 MG Tab TAKE 1 TABLET BY MOUTH ATBEDTIME 90 Tab 1   • VITAMIN E PO Take  by mouth.     • aspirin (ASA) 81 MG Chew Tab chewable tablet Take 1 Tab by mouth every day. 100 Tab 11   • Cholecalciferol (VITAMIN D) 2000 UNIT Tab Take  "2,000 Units by mouth every day.     • Biotin 10 MG Tab Take 10 mg by mouth every day.     • Zinc 50 MG Cap Take 50 mg by mouth every day.     • Multiple Vitamins-Minerals (CENTRUM SILVER ADULT 50+) Tab Take 1 Tab by mouth every day.     • Omega-3 Fatty Acids (FISH OIL) 1200 MG Cap Take 1,200 mg by mouth every day.     • Calcium Carbonate (CALCIUM 600 PO) Take 600 mg by mouth every day.     • omeprazole (PRILOSEC) 20 MG delayed-release capsule Take 20 mg by mouth every day.     • diphenhydrAMINE (BENADRYL) 25 MG Tab Take 25 mg by mouth every 6 hours as needed for Sleep.       No current Epic-ordered facility-administered medications on file.        Constitutional ROS: No unexpected change in weight, No weakness, No unexplained fevers, sweats, or chills  Pulmonary ROS: No chronic cough, sputum, or hemoptysis, No shortness of breath, No recent change in breathing  Cardiovascular ROS: Positive per history of present illness  Gastrointestinal ROS: No abdominal pain, No nausea, vomiting, diarrhea, or constipation, no blood in stool  Musculoskeletal/Extremities ROS: Positive for right leg pain, per history of present illness  Neurologic ROS: Normal development, No seizures, No weakness  Psychiatric ROS: Positive for anxiety and insomnia, per history of present illness    Physical exam:  /70   Pulse 80   Temp 36.5 °C (97.7 °F)   Resp 16   Ht 1.6 m (5' 3\")   Wt 53.1 kg (117 lb)   SpO2 98%   BMI 20.73 kg/m²   General Appearance: Elderly female appearing much younger than stated age, alert, no distress, well-nourished, well-groomed  Skin: Skin color, texture, turgor normal. No rashes or lesions.  Lungs: negative findings: normal respiratory rate and rhythm, lungs clear to auscultation  Heart: negative. RRR without murmur, gallop, or rubs.  No ectopy.  Abdomen: Abdomen soft, non-tender. BS normal. No masses,  No organomegaly  Extremities: positive findings: Tenderness with palpation to right IT band just distal to " greater trochanter  Musculoskeletal: negative findings: ROM of all joints is normal, no evidence of joint instability, strength normal, no deformities present  Neurologic: intact, oriented, mood appropriate, judgment intact. Cranial nerves II through XII grossly intact    Medical decision making/discussion: Patient follow-up with me in 3 months for ongoing control substance management. We had long dalton discussion and patient is agreeable to slowly weaning down on the lorazepam. She will be due for routine fasting labs sometime in April, these have been ordered. She has been referred to physical therapy for chronic right leg pain. She has already had flu shot this season, this has been documented. She was encouraged to continue care per her specialist.      .Dayana was seen today for anxiety.    Diagnoses and all orders for this visit:    Right leg pain  -     REFERRAL TO PHYSICAL THERAPY Reason for Therapy: Eval/Treat/Report    Essential hypertension  -     COMP METABOLIC PANEL; Future  -     LIPID PROFILE; Future    Dyslipidemia  -     COMP METABOLIC PANEL; Future  -     LIPID PROFILE; Future    Coronary artery disease involving native coronary artery of native heart with angina pectoris (CMS-HCC)    Anxiety    Visit for screening mammogram  -     MA-SCREEN MAMMO W/CAD-BILAT; Future          Please note that this dictation was created using voice recognition software. I have made every reasonable attempt to correct obvious errors, but I expect that there are errors of grammar and possibly content that I did not discover before finalizing the note.

## 2017-11-15 NOTE — ASSESSMENT & PLAN NOTE
This is a chronic condition, stable and well-controlled on current medications including amlodipine and lisinopril. Her blood pressure today is 118/70 and she denies symptoms of hypertension. She will be due for annual fasting labs in April, these have been ordered. She does not need refills on her meds at this time, but can call as needed.

## 2017-11-15 NOTE — ASSESSMENT & PLAN NOTE
Patient has history of coronary artery disease, she suffered MI in July 2016 and has been on Plavix which was recently discontinued by her cardiologist. She reports using her nitroglycerin tablets a total of 5 times since then. She does continue to follow with cardiology, and fact recently saw provider. Today she denies any chest pain, shortness of breath, headache, visual changes, peripheral edema and near syncope.

## 2017-11-15 NOTE — ASSESSMENT & PLAN NOTE
This is a chronic condition, stable at this time. She has been on lorazepam, 1 mg twice a day for several years. She states she takes this mostly for insomnia but continues to take it during the day as well. Her last PCP did have dalton discussion on the continued use of benzodiazepines, had the same dalton discussion with her today that benzodiazepines are not intended to be used on a daily basis, only as needed. She was tried on citalopram, she did not tolerate this medication due to side effects and stopped taking. Patient is willing to slowly wean down on this medication to where she only takes it sporadically as needed for breakthrough anxiety. She states she would like to do this after she has upcoming cataract surgery. I was agreeable to this, she does not need refills at this time. I did discuss with her the risks of ongoing benzodiazepine use and that she will need to continue Coumadin at least every 3 months for controlled substance management. Patient was agreeable to this plan.

## 2017-11-29 ENCOUNTER — TELEPHONE (OUTPATIENT)
Dept: CARDIOLOGY | Facility: MEDICAL CENTER | Age: 82
End: 2017-11-29

## 2017-11-29 NOTE — TELEPHONE ENCOUNTER
S/w pt, pt reports that she was upset last night because of the show she was watching and started to have sharp pains on her head that she gets once in a while then she took her BP and the reading, 175/108, pt reports she took two doses of her Lorazepam which she normally does at night and take extra dose of Aspirin and she felt better, this morning her reported BP is 156/70, Advise pt that most likely that her BP spike up last night could be because she was upset, advise pt to keep a log of her BP next few days, instructed pt to take it 2hrs after waking up and after taking her morning meds and report it back to us to see if we need to adjust her meds, pt verbalizes understanding.

## 2017-11-29 NOTE — TELEPHONE ENCOUNTER
----- Message from Emilee Parisi sent at 11/29/2017  2:18 PM PST -----  Regarding: Episode of high blood pressure  LA/Rin    Patient had an episode of high blood pressure last night and wants a call back at [phone].

## 2017-12-06 ENCOUNTER — HOSPITAL ENCOUNTER (OUTPATIENT)
Dept: RADIOLOGY | Facility: MEDICAL CENTER | Age: 82
End: 2017-12-06

## 2017-12-21 ENCOUNTER — PATIENT OUTREACH (OUTPATIENT)
Dept: HEALTH INFORMATION MANAGEMENT | Facility: OTHER | Age: 82
End: 2017-12-21

## 2017-12-21 NOTE — LETTER
December 21, 2017        Dayana Lechuga  [address]  [place] [address]        Dear Dayana:    Your Care Coordinator feels you would benefit from a medication review with a pharmacist. I am sorry I have been unable to reach you via phone. As a clinical pharmacist with [facility], I am working with your health care providers to ensure optimal medication therapy. This service is available to you at no cost.  By participating in this review, I will:     • Review your medications, vitamins and other over-the-counter items.    • Assure there are no harmful interactions with your medications.  • Lower your out-of-pocket prescription costs, if possible.   • Communicate medication concerns between your healthcare specialists.  • Provide you with wellness, healthy lifestyle, and disease prevention strategies.  • Refer you to a nurse or  if needed.  • Consider remote health monitoring if you have high blood pressure.  • Answer any questions that you may have about your medications.    Please contact me at [phone] to discuss your medications. I am available Monday through Friday from 8am to 5pm. I look forward to hearing from you.     Sincerely,        Diana Corona, PharmD    Electronically Signed

## 2017-12-22 NOTE — PROGRESS NOTES
Outbound call to patient for med rec. Left  for patient to call [phone].  1st attempt to reach patient.  Letter sent to patient via [email or web address].

## 2018-01-05 DIAGNOSIS — I25.119 CORONARY ARTERY DISEASE INVOLVING NATIVE CORONARY ARTERY OF NATIVE HEART WITH ANGINA PECTORIS (HCC): ICD-10-CM

## 2018-01-05 DIAGNOSIS — I10 ESSENTIAL HYPERTENSION: ICD-10-CM

## 2018-01-05 RX ORDER — ATORVASTATIN CALCIUM 40 MG/1
TABLET, FILM COATED ORAL
Qty: 90 TAB | Refills: 1 | Status: SHIPPED | OUTPATIENT
Start: 2018-01-05 | End: 2018-07-06 | Stop reason: SDUPTHER

## 2018-01-05 RX ORDER — AMLODIPINE BESYLATE 5 MG/1
TABLET ORAL
Qty: 90 TAB | Refills: 1 | Status: SHIPPED | OUTPATIENT
Start: 2018-01-05 | End: 2018-07-06 | Stop reason: SDUPTHER

## 2018-01-05 RX ORDER — CARVEDILOL 12.5 MG/1
12.5 TABLET ORAL 2 TIMES DAILY WITH MEALS
Qty: 180 TAB | Refills: 0 | Status: SHIPPED | OUTPATIENT
Start: 2018-01-05 | End: 2018-01-08 | Stop reason: SDUPTHER

## 2018-01-05 RX ORDER — LISINOPRIL 20 MG/1
TABLET ORAL
Qty: 90 TAB | Refills: 0 | Status: SHIPPED | OUTPATIENT
Start: 2018-01-05 | End: 2018-01-18 | Stop reason: SDUPTHER

## 2018-01-06 NOTE — TELEPHONE ENCOUNTER
Was the patient seen in the last year in this department? Yes     Does patient have an active prescription for medications requested? No     Received Request Via: Pharmacy      Pt met protocol?: Yes    OV  11/17    BP Readings from Last 1 Encounters:   11/15/17 118/70

## 2018-01-06 NOTE — TELEPHONE ENCOUNTER
Pt has had OV within the 12 month protocol and lipid panel is current. 6 month supply sent to pharmacy.   Lab Results   Component Value Date/Time    CHOLSTRLTOT 148 04/26/2017 08:52 AM    LDL 69 04/26/2017 08:52 AM    HDL 62 04/26/2017 08:52 AM    TRIGLYCERIDE 86 04/26/2017 08:52 AM       Lab Results   Component Value Date/Time    SODIUM 139 04/26/2017 08:52 AM    POTASSIUM 3.9 04/26/2017 08:52 AM    CHLORIDE 107 04/26/2017 08:52 AM    CO2 22 04/26/2017 08:52 AM    GLUCOSE 106 (H) 04/26/2017 08:52 AM    BUN 16 04/26/2017 08:52 AM    CREATININE 0.69 04/26/2017 08:52 AM     Lab Results   Component Value Date/Time    ALKPHOSPHAT 49 04/26/2017 08:52 AM    ASTSGOT 18 04/26/2017 08:52 AM    ALTSGPT 13 04/26/2017 08:52 AM    TBILIRUBIN 0.5 04/26/2017 08:52 AM

## 2018-01-08 DIAGNOSIS — I10 ESSENTIAL HYPERTENSION: ICD-10-CM

## 2018-01-08 DIAGNOSIS — I25.119 CORONARY ARTERY DISEASE INVOLVING NATIVE CORONARY ARTERY OF NATIVE HEART WITH ANGINA PECTORIS (HCC): ICD-10-CM

## 2018-01-08 NOTE — TELEPHONE ENCOUNTER
*CHANGE IN PHARMACY*  Was the patient seen in the last year in this department? Yes     Does patient have an active prescription for medications requested? Yes     Received Request Via: Pharmacy      Pt met protocol?: Yes    LAST OV 11/15/2017    BP Readings from Last 1 Encounters:   11/15/17 118/70     Lab Results   Component Value Date/Time    CHOLSTRLTOT 148 04/26/2017 08:52 AM    LDL 69 04/26/2017 08:52 AM    HDL 62 04/26/2017 08:52 AM    TRIGLYCERIDE 86 04/26/2017 08:52 AM       Lab Results   Component Value Date/Time    SODIUM 139 04/26/2017 08:52 AM    POTASSIUM 3.9 04/26/2017 08:52 AM    CHLORIDE 107 04/26/2017 08:52 AM    CO2 22 04/26/2017 08:52 AM    GLUCOSE 106 (H) 04/26/2017 08:52 AM    BUN 16 04/26/2017 08:52 AM    CREATININE 0.69 04/26/2017 08:52 AM     Lab Results   Component Value Date/Time    ALKPHOSPHAT 49 04/26/2017 08:52 AM    ASTSGOT 18 04/26/2017 08:52 AM    ALTSGPT 13 04/26/2017 08:52 AM    TBILIRUBIN 0.5 04/26/2017 08:52 AM

## 2018-01-09 RX ORDER — CARVEDILOL 12.5 MG/1
12.5 TABLET ORAL 2 TIMES DAILY WITH MEALS
Qty: 180 TAB | Refills: 0 | Status: SHIPPED | OUTPATIENT
Start: 2018-01-09 | End: 2018-07-06 | Stop reason: SDUPTHER

## 2018-01-11 ENCOUNTER — TELEPHONE (OUTPATIENT)
Dept: MEDICAL GROUP | Facility: PHYSICIAN GROUP | Age: 83
End: 2018-01-11

## 2018-01-12 NOTE — TELEPHONE ENCOUNTER
Received a PA request for Proventil HFA inhaler.  She has not tried anything else for her asthma/ SOB.  Insurance will not improve until she tries and fails another inhaler.

## 2018-01-18 DIAGNOSIS — F41.9 ANXIETY: ICD-10-CM

## 2018-01-18 RX ORDER — LORAZEPAM 1 MG/1
1 TABLET ORAL EVERY 12 HOURS PRN
Qty: 30 TAB | Refills: 0 | Status: SHIPPED
Start: 2018-01-18 | End: 2018-02-17

## 2018-01-18 RX ORDER — LISINOPRIL 20 MG/1
TABLET ORAL
Qty: 90 TAB | Refills: 0 | Status: SHIPPED | OUTPATIENT
Start: 2018-01-18 | End: 2018-07-06 | Stop reason: SDUPTHER

## 2018-01-18 NOTE — TELEPHONE ENCOUNTER
Ashleigh- Controlled med attached top request. Please refill as you see fit.  -Has upcoming appt w/ PCP. Will send 3 months of fills of lisinopril to pharmacy.

## 2018-01-18 NOTE — TELEPHONE ENCOUNTER
Was the patient seen in the last year in this department? Yes     Does patient have an active prescription for medications requested? No     Received Request Via: Pharmacy      Pt met protocol?: Yes, last ov 11/15/17. Last labs 4/17  Spoke with pharmacy, needs all RX's sent to mail order pharmacy,  Ativan is only RX filled at [facility].  They stated they have been requesting Ativan refill since 1/12/17

## 2018-02-06 ENCOUNTER — HOSPITAL ENCOUNTER (OUTPATIENT)
Dept: RADIOLOGY | Facility: MEDICAL CENTER | Age: 83
End: 2018-02-06
Attending: NURSE PRACTITIONER
Payer: MEDICARE

## 2018-02-06 DIAGNOSIS — Z12.31 ENCOUNTER FOR SCREENING MAMMOGRAM FOR MALIGNANT NEOPLASM OF BREAST: ICD-10-CM

## 2018-02-06 PROCEDURE — 77067 SCR MAMMO BI INCL CAD: CPT

## 2018-02-13 ENCOUNTER — APPOINTMENT (OUTPATIENT)
Dept: RADIOLOGY | Facility: IMAGING CENTER | Age: 83
End: 2018-02-13
Attending: PHYSICIAN ASSISTANT
Payer: MEDICARE

## 2018-02-13 ENCOUNTER — OFFICE VISIT (OUTPATIENT)
Dept: URGENT CARE | Facility: PHYSICIAN GROUP | Age: 83
End: 2018-02-13
Payer: MEDICARE

## 2018-02-13 VITALS
RESPIRATION RATE: 16 BRPM | HEIGHT: 63 IN | OXYGEN SATURATION: 90 % | TEMPERATURE: 99 F | WEIGHT: 113.6 LBS | DIASTOLIC BLOOD PRESSURE: 72 MMHG | HEART RATE: 76 BPM | SYSTOLIC BLOOD PRESSURE: 120 MMHG | BODY MASS INDEX: 20.13 KG/M2

## 2018-02-13 DIAGNOSIS — M54.50 THORACOLUMBAR BACK PAIN: Primary | ICD-10-CM

## 2018-02-13 DIAGNOSIS — R06.02 SOB (SHORTNESS OF BREATH): ICD-10-CM

## 2018-02-13 DIAGNOSIS — M54.6 THORACOLUMBAR BACK PAIN: Primary | ICD-10-CM

## 2018-02-13 DIAGNOSIS — J45.21 MILD INTERMITTENT ASTHMA WITH EXACERBATION: ICD-10-CM

## 2018-02-13 PROCEDURE — 71046 X-RAY EXAM CHEST 2 VIEWS: CPT | Mod: TC,FY | Performed by: PHYSICIAN ASSISTANT

## 2018-02-13 RX ORDER — CYCLOBENZAPRINE HCL 5 MG
5-10 TABLET ORAL 3 TIMES DAILY PRN
Qty: 30 TAB | Refills: 0 | Status: SHIPPED | OUTPATIENT
Start: 2018-02-13 | End: 2018-12-12

## 2018-02-13 RX ORDER — IPRATROPIUM BROMIDE AND ALBUTEROL SULFATE 2.5; .5 MG/3ML; MG/3ML
3 SOLUTION RESPIRATORY (INHALATION) ONCE
Status: COMPLETED | OUTPATIENT
Start: 2018-02-13 | End: 2018-02-13

## 2018-02-13 RX ADMIN — IPRATROPIUM BROMIDE AND ALBUTEROL SULFATE 3 ML: 2.5; .5 SOLUTION RESPIRATORY (INHALATION) at 16:48

## 2018-02-13 ASSESSMENT — PAIN SCALES - GENERAL: PAINLEVEL: 4=SLIGHT-MODERATE PAIN

## 2018-02-14 ENCOUNTER — TELEPHONE (OUTPATIENT)
Dept: MEDICAL GROUP | Facility: PHYSICIAN GROUP | Age: 83
End: 2018-02-14

## 2018-02-14 NOTE — TELEPHONE ENCOUNTER
----- Message from Dayana Lechuga sent at 2/14/2018  5:00 AM PST -----  Regarding: Mammogram results  Dayana  I have reviewed the results of you recent mammogram. It is normal, there is no evidence of malignancy. And while there is no evidence of malignancy, your breasts were found to be dense. When the breasts are dense, sometimes small masses can be missed. There is a test called a Sonocine, which can be done in such cases. However, it is often not covered by insurances. I believe out of pocket cost is around $125. If you would like to have this done, I would encourage you to call your insurance company to see if it would be covered. Otherwise, we will repeat your mammogram in one year.    Sincerely,  DINO Reyes

## 2018-02-14 NOTE — TELEPHONE ENCOUNTER
I sent the below info to patient via a [employer] staff message a week ago--pt still has not read message. Please call pt with message. Thank you.

## 2018-02-14 NOTE — PROGRESS NOTES
Subjective:      PT is a 88 y.o. female who presents with Back Pain (upper back on an off for several weeks)            HPI  PT notes upper thoracic back pain, on and off, x 3-4 weeks. She notes she has been sitting at her computer a lot recently and thinks her chair is causing poor posture. She also notes mild SOB dues to asthma exacerbation and is concerned for her lungs. Pt has not taken any Rx medications for this condition. Pt states the pain is a 3-4/10, aching in nature and worse at night. Pt denies CP,  NVD, paresthesias, headaches, dizziness, change in vision, hives, or other joint pain. The pt's medication list, problem list, and allergies have been evaluated and reviewed during today's visit.    PMH:  Past Medical History:   Diagnosis Date   • Allergy    • Anxiety    • ASTHMA    • CAD (coronary artery disease)    • Hyperlipidemia    • Hypertension    • OSTEOPOROSIS        PSH:  Past Surgical History:   Procedure Laterality Date   • ABDOMINAL HYSTERECTOMY TOTAL     • APPENDECTOMY     • CARDIAC CATH     • HERNIA REPAIR     • TONSILLECTOMY         Fam Hx:  the patient's family history is not pertinent to their current complaint    Family Status   Relation Status   • Neg Hx        Soc HX:  Social History     Social History   • Marital status:      Spouse name: N/A   • Number of children: N/A   • Years of education: N/A     Occupational History   • Not on file.     Social History Main Topics   • Smoking status: Never Smoker   • Smokeless tobacco: Never Used   • Alcohol use No   • Drug use: No   • Sexual activity: Not on file     Other Topics Concern   • Not on file     Social History Narrative   • No narrative on file         Medications:    Current Outpatient Prescriptions:   •  [START ON 2/18/2018] LORazepam (ATIVAN) 1 MG Tab, Take 1 Tab by mouth every 12 hours as needed for Anxiety for up to 30 days. TAKE 1 TABLET BY MOUTH 2 TIMES A DAY AS NEEDED FOR ANXIETY., Disp: 60 Tab, Rfl: 0  •  lisinopril  (PRINIVIL) 20 MG Tab, TAKE 1 TABLET BY MOUTH ONE TIME A DAY, Disp: 90 Tab, Rfl: 0  •  carvedilol (COREG) 12.5 MG Tab, Take 1 Tab by mouth 2 times a day, with meals., Disp: 180 Tab, Rfl: 0  •  amlodipine (NORVASC) 5 MG Tab, TAKE 1 TABLET BY MOUTH ONE TIME A DAY, Disp: 90 Tab, Rfl: 1  •  atorvastatin (LIPITOR) 40 MG Tab, TAKE 1 TABLET BY MOUTH ATBEDTIME, Disp: 90 Tab, Rfl: 1  •  ascorbic acid (ASCORBIC ACID) 500 MG Tab, Take 500 mg by mouth every day., Disp: , Rfl:   •  fluticasone (FLONASE) 50 MCG/ACT nasal spray, Spray 1 Spray in nose every day. EACH NOSTRIL, Disp: 32 g, Rfl: 1  •  beclomethasone (QVAR) 80 MCG/ACT inhaler, Use 1 puff twice daily, Disp: 17.4 g, Rfl: 3  •  potassium chloride SA (KDUR) 20 MEQ Tab CR, TAKE 1 TABLET BY MOUTH TWICE DAILY, Disp: 180 Tab, Rfl: 1  •  VITAMIN E PO, Take  by mouth., Disp: , Rfl:   •  aspirin (ASA) 81 MG Chew Tab chewable tablet, Take 1 Tab by mouth every day., Disp: 100 Tab, Rfl: 11  •  Cholecalciferol (VITAMIN D) 2000 UNIT Tab, Take 2,000 Units by mouth every day., Disp: , Rfl:   •  Biotin 10 MG Tab, Take 10 mg by mouth every day., Disp: , Rfl:   •  Zinc 50 MG Cap, Take 50 mg by mouth every day., Disp: , Rfl:   •  Multiple Vitamins-Minerals (CENTRUM SILVER ADULT 50+) Tab, Take 1 Tab by mouth every day., Disp: , Rfl:   •  Omega-3 Fatty Acids (FISH OIL) 1200 MG Cap, Take 1,200 mg by mouth every day., Disp: , Rfl:   •  Calcium Carbonate (CALCIUM 600 PO), Take 600 mg by mouth every day., Disp: , Rfl:   •  omeprazole (PRILOSEC) 20 MG delayed-release capsule, Take 20 mg by mouth every day., Disp: , Rfl:   •  lorazepam (ATIVAN) 1 MG Tab, Take 1 Tab by mouth every 12 hours as needed for Anxiety for up to 30 days. TAKE 1 TABLET BY MOUTH 2 TIMES A DAY AS NEEDED FOR ANXIETY., Disp: 30 Tab, Rfl: 0  •  alendronate (FOSAMAX) 35 MG tablet, TAKE 1 TABLET BY MOUTH EVERY 7 DAYS, Disp: 12 Tab, Rfl: 3  •  guaifenesin LA (MUCINEX) 600 MG TABLET SR 12 HR, Take 600 mg by mouth every 12 hours.,  "Disp: , Rfl:   •  albuterol 108 (90 Base) MCG/ACT Aero Soln inhalation aerosol, Inhale 2 Puffs by mouth every 6 hours as needed for Shortness of Breath., Disp: 20.1 g, Rfl: 0  •  nitroglycerin (NITROSTAT) 0.4 MG SL Tab, Place 1 tab under tongue every 5 min as needed for chest pain max 3 doses, Disp: 25 Tab, Rfl: 1  •  diphenhydrAMINE (BENADRYL) 25 MG Tab, Take 25 mg by mouth every 6 hours as needed for Sleep., Disp: , Rfl:     Current Facility-Administered Medications:   •  ipratropium-albuterol (DUONEB) nebulizer solution 3 mL, 3 mL, Nebulization, Once, Stephen Koo P.A.-C.      Allergies:  Pcn [penicillins] and Codeine    ROS  Constitutional: Negative for fever, chills and malaise/fatigue.   HENT: Negative for congestion and sore throat.    Eyes: Negative for blurred vision, double vision and photophobia.   Respiratory: Negative for cough and +shortness of breath.    Cardiovascular: Negative for chest pain and palpitations.   Gastrointestinal: Negative for heartburn, nausea, vomiting, abdominal pain, diarrhea and constipation.   Genitourinary: Negative for dysuria and flank pain.   Musculoskeletal: POS for thoracic back pain and myalgias.   Skin: Negative for itching and rash.   Neurological: Negative for dizziness, tingling and headaches.   Endo/Heme/Allergies: Does not bruise/bleed easily.   Psychiatric/Behavioral: Negative for depression. The patient is not nervous/anxious.           Objective:     /72   Pulse 76   Temp 37.2 °C (99 °F)   Resp 16   Ht 1.6 m (5' 3\")   Wt 51.5 kg (113 lb 9.6 oz)   SpO2 90%   BMI 20.12 kg/m²      Physical Exam   Musculoskeletal:        Thoracic back: She exhibits decreased range of motion, tenderness, pain and spasm. She exhibits no bony tenderness, no swelling, no edema, no deformity, no laceration and normal pulse.        Back:          Constitutional: PT is oriented to person, place, and time. PT appears well-developed and well-nourished. No distress.   HENT: "   Head: Normocephalic and atraumatic.   Mouth/Throat: Oropharynx is clear and moist. No oropharyngeal exudate.   Eyes: Conjunctivae normal and EOM are normal. Pupils are equal, round, and reactive to light.   Neck: Normal range of motion. Neck supple. No thyromegaly present.   Cardiovascular: Normal rate, regular rhythm, normal heart sounds and intact distal pulses.  Exam reveals no gallop and no friction rub.    No murmur heard.  Pulmonary/Chest: Effort normal and breath sounds normal. No respiratory distress. PT has no wheezes. PT has no rales. Pt exhibits no tenderness.   Abdominal: Soft. Bowel sounds are normal. PT exhibits no distension and no mass. There is no tenderness. There is no rebound and no guarding.   Neurological: PT is alert and oriented to person, place, and time. PT has normal reflexes. No cranial nerve deficit.   Skin: Skin is warm and dry. No rash noted. PT is not diaphoretic. No erythema.       Psychiatric: PT has a normal mood and affect. PT behavior is normal. Judgment and thought content normal.     RADS:  Narrative       2/13/2018 4:25 PM    HISTORY/REASON FOR EXAM:  Shortness of Breath      TECHNIQUE/EXAM DESCRIPTION AND NUMBER OF VIEWS:  Two views of the chest.    COMPARISON:  9/10/2016    FINDINGS:  Cardiac mediastinal contour is unchanged.  Lungs show hyperinflation.  No focal consolidation.  No pleural fluid collection or pneumothorax.  Atherosclerotic change of thoracic aorta.  Degenerative change of thoracic spine.   Impression       1.  Hyperinflation suggesting COPD.  2.  No pneumonia or pneumothorax.   Reading Provider Reading Date   Rk Hancock M.D. Feb 13, 2018   Signing Provider Signing Date Signing Time   Rk Hancock M.D. Feb 13, 2018  5:05 PM       Assessment/Plan:     1. Thoracolumbar back pain      2. SOB (shortness of breath)    - DX-CHEST-2 VIEWS; Future  - ipratropium-albuterol (DUONEB) nebulizer solution 3 mL; 3 mL by Nebulization route Once.    3. Mild  intermittent asthma with exacerbation    - ipratropium-albuterol (DUONEB) nebulizer solution 3 mL; 3 mL by Nebulization route Once.    OTC tylenol or ibuprofen for pain   Flexeril called into pharmacy  Rest, fluids encouraged.  AVS with medical info given.  Pt was in full understanding and agreement with the plan.  Follow-up as needed if symptoms worsen or fail to improve.

## 2018-02-14 NOTE — PATIENT INSTRUCTIONS
RICE for Routine Care of Injuries  The routine care of many injuries includes rest, ice, compression, and elevation (RICE). The RICE strategy is often recommended for injuries to soft tissues, such as a muscle strain, ligament injuries, bruises, and overuse injuries. It can also be used for some bony injuries. Using the RICE strategy can help to relieve pain, lessen swelling, and enable your body to heal.  Rest  Rest is required to allow your body to heal. This usually involves reducing your normal activities and avoiding use of the injured part of your body. Generally, you can return to your normal activities when you are comfortable and have been given permission by your health care provider.  Ice  Icing your injury helps to keep the swelling down, and it lessens pain. Do not apply ice directly to your skin.  · Put ice in a plastic bag.  · Place a towel between your skin and the bag.  · Leave the ice on for 20 minutes, 2-3 times a day.  Do this for as long as you are directed by your health care provider.  Compression  Compression means putting pressure on the injured area. Compression helps to keep swelling down, gives support, and helps with discomfort. Compression may be done with an elastic bandage. If an elastic bandage has been applied, follow these general tips:  · Remove and reapply the bandage every 3-4 hours or as directed by your health care provider.  · Make sure the bandage is not wrapped too tightly, because this can cut off circulation. If part of your body beyond the bandage becomes blue, numb, cold, swollen, or more painful, your bandage is most likely too tight. If this occurs, remove your bandage and reapply it more loosely.  · See your health care provider if the bandage seems to be making your problems worse rather than better.  Elevation  Elevation means keeping the injured area raised. This helps to lessen swelling and decrease pain. If possible, your injured area should be elevated at or  above the level of your heart or the center of your chest.  WHEN SHOULD I SEEK MEDICAL CARE?  You should seek medical care if:  · Your pain and swelling continue.  · Your symptoms are getting worse rather than improving.  These symptoms may indicate that further evaluation or further X-rays are needed. Sometimes, X-rays may not show a small broken bone (fracture) until a number of days later. Make a follow-up appointment with your health care provider.  WHEN SHOULD I SEEK IMMEDIATE MEDICAL CARE?  You should seek immediate medical care if:  · You have sudden severe pain at or below the area of your injury.  · You have redness or increased swelling around your injury.  · You have tingling or numbness at or below the area of your injury that does not improve after you remove the elastic bandage.     This information is not intended to replace advice given to you by your health care provider. Make sure you discuss any questions you have with your health care provider.     Document Released: 04/01/2002 Document Revised: 12/23/2014 Document Reviewed: 11/25/2015  [employer] Interactive Patient Education ©2016 [employer]

## 2018-02-15 NOTE — TELEPHONE ENCOUNTER
Called [phone] Spoke with Dayana and she states that she would like to discuss this with PCP in person during her next appointment which is scheduled for 2/27/18. Thank you.

## 2018-02-27 ENCOUNTER — OFFICE VISIT (OUTPATIENT)
Dept: MEDICAL GROUP | Facility: PHYSICIAN GROUP | Age: 83
End: 2018-02-27
Payer: MEDICARE

## 2018-02-27 VITALS
WEIGHT: 116 LBS | HEART RATE: 70 BPM | SYSTOLIC BLOOD PRESSURE: 118 MMHG | DIASTOLIC BLOOD PRESSURE: 62 MMHG | HEIGHT: 63 IN | OXYGEN SATURATION: 95 % | RESPIRATION RATE: 16 BRPM | TEMPERATURE: 97.7 F | BODY MASS INDEX: 20.55 KG/M2

## 2018-02-27 DIAGNOSIS — F41.9 ANXIETY: ICD-10-CM

## 2018-02-27 DIAGNOSIS — F13.20 BENZODIAZEPINE DEPENDENCE (HCC): ICD-10-CM

## 2018-02-27 DIAGNOSIS — Z98.42 S/P BILATERAL CATARACT EXTRACTION: ICD-10-CM

## 2018-02-27 DIAGNOSIS — Z98.41 S/P BILATERAL CATARACT EXTRACTION: ICD-10-CM

## 2018-02-27 DIAGNOSIS — I25.119 CORONARY ARTERY DISEASE INVOLVING NATIVE CORONARY ARTERY OF NATIVE HEART WITH ANGINA PECTORIS (HCC): ICD-10-CM

## 2018-02-27 PROBLEM — H25.093 AGE-RELATED INCIPIENT CATARACT OF BOTH EYES: Status: ACTIVE | Noted: 2018-02-27

## 2018-02-27 PROCEDURE — 99214 OFFICE O/P EST MOD 30 MIN: CPT | Performed by: NURSE PRACTITIONER

## 2018-02-27 RX ORDER — LORAZEPAM 1 MG/1
1 TABLET ORAL EVERY 12 HOURS PRN
Qty: 60 TAB | Refills: 2 | Status: SHIPPED
Start: 2018-03-07 | End: 2018-05-29 | Stop reason: SDUPTHER

## 2018-02-27 NOTE — ASSESSMENT & PLAN NOTE
This is a chronic condition, stable. Patient does suffer MI in July 2016 and has been on Plavix however this was discontinued by her cardiologist. She does deny any recent chest pain, shortness of breath, headache, visual changes, peripheral edema or near-syncope.

## 2018-02-27 NOTE — PROGRESS NOTES
Chief Complaint   Patient presents with   • Anxiety     refill lorazepam         This is a 89 y.o.female patient that presents today with the following: Follow-up visit, medication refills    Anxiety  This is a chronic condition, stable and controlled with twice daily lorazepam. She also uses medications for insomnia. She is on controlled substances agreement for ongoing daily benzodiazepine use. She does understand the risks associated with daily use of benzodiazepines. She reports that she has used this for several years and this seems to be the only thing that works for her. She has been tried on citalopram and other SSRIs but did not tolerate the side effects and stopped taking medication. She does understand that she must follow-up at least every 3 months for ongoing refills and submit urine for drug testing annually.    S/P bilateral cataract extraction  Patient is status post extraction of bilateral cataracts and is doing very well. She states that her vision has significantly improved as she does have upcoming appointment with optometrist to have her vision rechecked. She tolerated procedures very well.    Coronary artery disease involving native coronary artery of native heart with angina pectoris  This is a chronic condition, stable. Patient does suffer MI in July 2016 and has been on Plavix however this was discontinued by her cardiologist. She does deny any recent chest pain, shortness of breath, headache, visual changes, peripheral edema or near-syncope.      No visits with results within 1 Month(s) from this visit.   Latest known visit with results is:   Office Visit on 10/16/2017   Component Date Value   • POC Color 10/16/2017 yellow    • POC Appearance 10/16/2017 clear    • POC Leukocyte Esterase 10/16/2017 trace    • POC Nitrites 10/16/2017 neg    • POC Urobiligen 10/16/2017 neg    • POC Protein 10/16/2017 neg    • POC Urine PH 10/16/2017 8.0    • POC Blood 10/16/2017 neg    • POC Specific Gravity  10/16/2017 1.005    • POC Ketones 10/16/2017 neg    • POC Biliruben 10/16/2017 neg    • POC Glucose 10/16/2017 neg          clinical course has been stable    Past Medical History:   Diagnosis Date   • Allergy    • Anxiety    • ASTHMA    • CAD (coronary artery disease)    • Hyperlipidemia    • Hypertension    • OSTEOPOROSIS        Past Surgical History:   Procedure Laterality Date   • ABDOMINAL HYSTERECTOMY TOTAL     • APPENDECTOMY     • CARDIAC CATH     • HERNIA REPAIR     • TONSILLECTOMY         Family History   Problem Relation Age of Onset   • Heart Disease Neg Hx    • Heart Failure Neg Hx    • Hyperlipidemia Neg Hx        Pcn [penicillins] and Codeine    Current Outpatient Prescriptions Ordered in [facility]   Medication Sig Dispense Refill   • [START ON 3/7/2018] LORazepam (ATIVAN) 1 MG Tab Take 1 Tab by mouth every 12 hours as needed for Anxiety for up to 90 days. TAKE 1 TABLET BY MOUTH 2 TIMES A DAY AS NEEDED FOR ANXIETY. 60 Tab 2   • cyclobenzaprine (FLEXERIL) 5 MG tablet Take 1-2 Tabs by mouth 3 times a day as needed. 30 Tab 0   • lisinopril (PRINIVIL) 20 MG Tab TAKE 1 TABLET BY MOUTH ONE TIME A DAY 90 Tab 0   • alendronate (FOSAMAX) 35 MG tablet TAKE 1 TABLET BY MOUTH EVERY 7 DAYS 12 Tab 3   • carvedilol (COREG) 12.5 MG Tab Take 1 Tab by mouth 2 times a day, with meals. 180 Tab 0   • amlodipine (NORVASC) 5 MG Tab TAKE 1 TABLET BY MOUTH ONE TIME A DAY 90 Tab 1   • atorvastatin (LIPITOR) 40 MG Tab TAKE 1 TABLET BY MOUTH ATBEDTIME 90 Tab 1   • guaifenesin LA (MUCINEX) 600 MG TABLET SR 12 HR Take 600 mg by mouth every 12 hours.     • ascorbic acid (ASCORBIC ACID) 500 MG Tab Take 500 mg by mouth every day.     • fluticasone (FLONASE) 50 MCG/ACT nasal spray Spray 1 Spray in nose every day. EACH NOSTRIL 32 g 1   • albuterol 108 (90 Base) MCG/ACT Aero Soln inhalation aerosol Inhale 2 Puffs by mouth every 6 hours as needed for Shortness of Breath. 20.1 g 0   • beclomethasone (QVAR) 80 MCG/ACT inhaler Use 1 puff twice  "daily 17.4 g 3   • nitroglycerin (NITROSTAT) 0.4 MG SL Tab Place 1 tab under tongue every 5 min as needed for chest pain max 3 doses 25 Tab 1   • potassium chloride SA (KDUR) 20 MEQ Tab CR TAKE 1 TABLET BY MOUTH TWICE DAILY 180 Tab 1   • diphenhydrAMINE (BENADRYL) 25 MG Tab Take 25 mg by mouth every 6 hours as needed for Sleep.     • VITAMIN E PO Take  by mouth.     • aspirin (ASA) 81 MG Chew Tab chewable tablet Take 1 Tab by mouth every day. 100 Tab 11   • Cholecalciferol (VITAMIN D) 2000 UNIT Tab Take 2,000 Units by mouth every day.     • Biotin 10 MG Tab Take 10 mg by mouth every day.     • Multiple Vitamins-Minerals (CENTRUM SILVER ADULT 50+) Tab Take 1 Tab by mouth every day.     • Omega-3 Fatty Acids (FISH OIL) 1200 MG Cap Take 1,200 mg by mouth every day.     • Calcium Carbonate (CALCIUM 600 PO) Take 600 mg by mouth every day.     • omeprazole (PRILOSEC) 20 MG delayed-release capsule Take 20 mg by mouth every day.     • Zinc 50 MG Cap Take 50 mg by mouth every day.       No current [facility]-ordered facility-administered medications on file.        Constitutional ROS: No unexpected change in weight, No weakness, No unexplained fevers, sweats, or chills. Positive for insomnia, per history of present illness  Eyes ROS: Positive per history of present illness  Pulmonary ROS: No chronic cough, sputum, or hemoptysis, No shortness of breath, No recent change in breathing  Cardiovascular ROS: Positive for coronary artery disease, per history of present illness  Gastrointestinal ROS: No abdominal pain, No nausea, vomiting, diarrhea, or constipation  Musculoskeletal/Extremities ROS: No clubbing, No peripheral edema, No pain, redness or swelling on the joints  Neurologic ROS: Normal development, No seizures, No weakness  Psychiatric ROS: Positive for anxiety, per history of present illness    Physical exam:  /62   Pulse 70   Temp 36.5 °C (97.7 °F)   Resp 16   Ht 1.6 m (5' 3\")   Wt 52.6 kg (116 lb)   SpO2 95%   " BMI 20.55 kg/m²   General Appearance: Elderly female appearing younger than stated age, alert, no distress, well-nourished, well-groomed  Skin: Skin color, texture, turgor normal. No rashes or lesions.  Eyes: conjunctivae/corneas clear. PERRL, EOM's intact. Fundi benign  Lungs: negative findings: normal respiratory rate and rhythm, lungs clear to auscultation  Heart: negative. RRR without murmur, gallop, or rubs.  No ectopy.  Abdomen: Abdomen soft, non-tender. BS normal. No masses,  No organomegaly  Musculoskeletal: negative findings: no evidence of joint instability, strength normal, no deformities present  Neurologic: intact, oriented, mood appropriate, judgment intact. Cranial nerves II-12 grossly intact    Medical decision making/discussion: Patient is to follow-up with me in early June for ongoing medication refills. She is to take the lorazepam exactly as prescribed. She is to have labs before she follows up with me in 3 months. She is to continue care per her specialists including cardiology.    Dayana was seen today for anxiety.    Diagnoses and all orders for this visit:    Anxiety  -     LORazepam (ATIVAN) 1 MG Tab; Take 1 Tab by mouth every 12 hours as needed for Anxiety for up to 90 days. TAKE 1 TABLET BY MOUTH 2 TIMES A DAY AS NEEDED FOR ANXIETY.    Benzodiazepine dependence (CMS-[facility] [place])    S/P bilateral cataract extraction    Coronary artery disease involving native coronary artery of native heart with angina pectoris (CMS-[facility] [place])          Please note that this dictation was created using voice recognition software. I have made every reasonable attempt to correct obvious errors, but I expect that there are errors of grammar and possibly content that I did not discover before finalizing the note.

## 2018-02-27 NOTE — ASSESSMENT & PLAN NOTE
This is a chronic condition, stable and controlled with twice daily lorazepam. She also uses medications for insomnia. She is on controlled substances agreement for ongoing daily benzodiazepine use. She does understand the risks associated with daily use of benzodiazepines. She reports that she has used this for several years and this seems to be the only thing that works for her. She has been tried on citalopram and other SSRIs but did not tolerate the side effects and stopped taking medication. She does understand that she must follow-up at least every 3 months for ongoing refills and submit urine for drug testing annually.

## 2018-02-27 NOTE — ASSESSMENT & PLAN NOTE
Patient is status post extraction of bilateral cataracts and is doing very well. She states that her vision has significantly improved as she does have upcoming appointment with optometrist to have her vision rechecked. She tolerated procedures very well.

## 2018-03-13 ENCOUNTER — HOSPITAL ENCOUNTER (OUTPATIENT)
Dept: LAB | Facility: MEDICAL CENTER | Age: 83
End: 2018-03-13
Attending: NURSE PRACTITIONER
Payer: MEDICARE

## 2018-03-13 ENCOUNTER — OFFICE VISIT (OUTPATIENT)
Dept: URGENT CARE | Facility: PHYSICIAN GROUP | Age: 83
End: 2018-03-13
Payer: MEDICARE

## 2018-03-13 VITALS
SYSTOLIC BLOOD PRESSURE: 142 MMHG | HEART RATE: 64 BPM | RESPIRATION RATE: 16 BRPM | HEIGHT: 63 IN | TEMPERATURE: 98.7 F | DIASTOLIC BLOOD PRESSURE: 60 MMHG | OXYGEN SATURATION: 95 % | BODY MASS INDEX: 20.55 KG/M2 | WEIGHT: 116 LBS

## 2018-03-13 DIAGNOSIS — E78.5 DYSLIPIDEMIA: Chronic | ICD-10-CM

## 2018-03-13 DIAGNOSIS — I10 ESSENTIAL HYPERTENSION: ICD-10-CM

## 2018-03-13 DIAGNOSIS — L82.1 SEBORRHEIC KERATOSIS: ICD-10-CM

## 2018-03-13 PROCEDURE — 99213 OFFICE O/P EST LOW 20 MIN: CPT | Performed by: EMERGENCY MEDICINE

## 2018-03-13 ASSESSMENT — ENCOUNTER SYMPTOMS
FEVER: 0
BACK PAIN: 0

## 2018-03-14 NOTE — PROGRESS NOTES
Subjective:      Dayana Lechuga is a 89 y.o. female who presents with Nevus (Pt states she has a suspicious mole on her back)            Rash   The problem is unchanged. The affected locations include the back. Pertinent negatives include no fever.   Patient states that she now just has a dark irregular mole on her right scapular area; no change in size, no itching or pain.    Review of Systems   Constitutional: Negative for fever.   Cardiovascular: Negative for chest pain.   Musculoskeletal: Negative for back pain.   Skin: Negative for itching.     PMH:  has a past medical history of Allergy; Anxiety; ASTHMA; CAD (coronary artery disease); Hyperlipidemia; Hypertension; and OSTEOPOROSIS.  MEDS:   Current Outpatient Prescriptions:   •  cyclobenzaprine (FLEXERIL) 5 MG tablet, Take 1-2 Tabs by mouth 3 times a day as needed., Disp: 30 Tab, Rfl: 0  •  lisinopril (PRINIVIL) 20 MG Tab, TAKE 1 TABLET BY MOUTH ONE TIME A DAY, Disp: 90 Tab, Rfl: 0  •  alendronate (FOSAMAX) 35 MG tablet, TAKE 1 TABLET BY MOUTH EVERY 7 DAYS, Disp: 12 Tab, Rfl: 3  •  carvedilol (COREG) 12.5 MG Tab, Take 1 Tab by mouth 2 times a day, with meals., Disp: 180 Tab, Rfl: 0  •  amlodipine (NORVASC) 5 MG Tab, TAKE 1 TABLET BY MOUTH ONE TIME A DAY, Disp: 90 Tab, Rfl: 1  •  guaifenesin LA (MUCINEX) 600 MG TABLET SR 12 HR, Take 600 mg by mouth every 12 hours., Disp: , Rfl:   •  ascorbic acid (ASCORBIC ACID) 500 MG Tab, Take 500 mg by mouth every day., Disp: , Rfl:   •  fluticasone (FLONASE) 50 MCG/ACT nasal spray, Spray 1 Spray in nose every day. EACH NOSTRIL, Disp: 32 g, Rfl: 1  •  albuterol 108 (90 Base) MCG/ACT Aero Soln inhalation aerosol, Inhale 2 Puffs by mouth every 6 hours as needed for Shortness of Breath., Disp: 20.1 g, Rfl: 0  •  beclomethasone (QVAR) 80 MCG/ACT inhaler, Use 1 puff twice daily, Disp: 17.4 g, Rfl: 3  •  nitroglycerin (NITROSTAT) 0.4 MG SL Tab, Place 1 tab under tongue every 5 min as needed for chest pain max 3 doses, Disp: 25  "Tab, Rfl: 1  •  potassium chloride SA (KDUR) 20 MEQ Tab CR, TAKE 1 TABLET BY MOUTH TWICE DAILY, Disp: 180 Tab, Rfl: 1  •  diphenhydrAMINE (BENADRYL) 25 MG Tab, Take 25 mg by mouth every 6 hours as needed for Sleep., Disp: , Rfl:   •  VITAMIN E PO, Take  by mouth., Disp: , Rfl:   •  aspirin (ASA) 81 MG Chew Tab chewable tablet, Take 1 Tab by mouth every day., Disp: 100 Tab, Rfl: 11  •  Cholecalciferol (VITAMIN D) 2000 UNIT Tab, Take 2,000 Units by mouth every day., Disp: , Rfl:   •  Biotin 10 MG Tab, Take 10 mg by mouth every day., Disp: , Rfl:   •  Zinc 50 MG Cap, Take 50 mg by mouth every day., Disp: , Rfl:   •  Multiple Vitamins-Minerals (CENTRUM SILVER ADULT 50+) Tab, Take 1 Tab by mouth every day., Disp: , Rfl:   •  Omega-3 Fatty Acids (FISH OIL) 1200 MG Cap, Take 1,200 mg by mouth every day., Disp: , Rfl:   •  Calcium Carbonate (CALCIUM 600 PO), Take 600 mg by mouth every day., Disp: , Rfl:   •  omeprazole (PRILOSEC) 20 MG delayed-release capsule, Take 20 mg by mouth every day., Disp: , Rfl:   •  LORazepam (ATIVAN) 1 MG Tab, Take 1 Tab by mouth every 12 hours as needed for Anxiety for up to 90 days. TAKE 1 TABLET BY MOUTH 2 TIMES A DAY AS NEEDED FOR ANXIETY., Disp: 60 Tab, Rfl: 2  •  atorvastatin (LIPITOR) 40 MG Tab, TAKE 1 TABLET BY MOUTH ATBEDTIME, Disp: 90 Tab, Rfl: 1  ALLERGIES:   Allergies   Allergen Reactions   • Pcn [Penicillins] Rash     About 70 years   • Codeine Vomiting     SURGHX:   Past Surgical History:   Procedure Laterality Date   • ABDOMINAL HYSTERECTOMY TOTAL     • APPENDECTOMY     • CARDIAC CATH     • HERNIA REPAIR     • TONSILLECTOMY       SOCHX:  reports that she has never smoked. She has never used smokeless tobacco. She reports that she does not drink alcohol or use drugs.  FH: family history is not on file.       Objective:     /60   Pulse 64   Temp 37.1 °C (98.7 °F)   Resp 16   Ht 1.6 m (5' 3\")   Wt 52.6 kg (116 lb)   SpO2 95%   BMI 20.55 kg/m²      Physical Exam "   Constitutional: She is oriented to person, place, and time. She appears well-developed and well-nourished. She is cooperative. She does not have a sickly appearance.   Pulmonary/Chest: Effort normal. She exhibits no tenderness.   Musculoskeletal:        Right shoulder: She exhibits normal range of motion and no tenderness.        Thoracic back: She exhibits no tenderness and no bony tenderness.   Neurological: She is alert and oriented to person, place, and time.   Skin: Skin is warm and dry. Lesion noted.        Psychiatric: She has a normal mood and affect.               Assessment/Plan:     1. Seborrheic keratosis  Advised PCP follow up.  - REFERRAL TO DERMATOLOGY

## 2018-03-20 ENCOUNTER — HOSPITAL ENCOUNTER (OUTPATIENT)
Dept: LAB | Facility: MEDICAL CENTER | Age: 83
End: 2018-03-20
Attending: NURSE PRACTITIONER
Payer: MEDICARE

## 2018-03-20 DIAGNOSIS — I10 ESSENTIAL HYPERTENSION: ICD-10-CM

## 2018-03-20 DIAGNOSIS — E78.5 DYSLIPIDEMIA: Chronic | ICD-10-CM

## 2018-03-20 LAB
ALBUMIN SERPL BCP-MCNC: 4.2 G/DL (ref 3.2–4.9)
ALBUMIN/GLOB SERPL: 1.6 G/DL
ALP SERPL-CCNC: 48 U/L (ref 30–99)
ALT SERPL-CCNC: 15 U/L (ref 2–50)
ANION GAP SERPL CALC-SCNC: 6 MMOL/L (ref 0–11.9)
AST SERPL-CCNC: 19 U/L (ref 12–45)
BILIRUB SERPL-MCNC: 0.6 MG/DL (ref 0.1–1.5)
BUN SERPL-MCNC: 15 MG/DL (ref 8–22)
CALCIUM SERPL-MCNC: 11 MG/DL (ref 8.5–10.5)
CHLORIDE SERPL-SCNC: 107 MMOL/L (ref 96–112)
CHOLEST SERPL-MCNC: 142 MG/DL (ref 100–199)
CO2 SERPL-SCNC: 28 MMOL/L (ref 20–33)
CREAT SERPL-MCNC: 0.73 MG/DL (ref 0.5–1.4)
GLOBULIN SER CALC-MCNC: 2.7 G/DL (ref 1.9–3.5)
GLUCOSE SERPL-MCNC: 110 MG/DL (ref 65–99)
HDLC SERPL-MCNC: 61 MG/DL
LDLC SERPL CALC-MCNC: 61 MG/DL
POTASSIUM SERPL-SCNC: 3.8 MMOL/L (ref 3.6–5.5)
PROT SERPL-MCNC: 6.9 G/DL (ref 6–8.2)
SODIUM SERPL-SCNC: 141 MMOL/L (ref 135–145)
TRIGL SERPL-MCNC: 101 MG/DL (ref 0–149)

## 2018-03-20 PROCEDURE — 36415 COLL VENOUS BLD VENIPUNCTURE: CPT

## 2018-03-20 PROCEDURE — 80053 COMPREHEN METABOLIC PANEL: CPT

## 2018-03-20 PROCEDURE — 80061 LIPID PANEL: CPT

## 2018-03-26 DIAGNOSIS — E83.52 HYPERCALCEMIA: ICD-10-CM

## 2018-03-27 ENCOUNTER — TELEPHONE (OUTPATIENT)
Dept: MEDICAL GROUP | Facility: PHYSICIAN GROUP | Age: 83
End: 2018-03-27

## 2018-03-27 NOTE — TELEPHONE ENCOUNTER
Informed patient of provider notes. Patient is currently taking 600 mg of calcium-how much would you like her to decrease to..Please advise..

## 2018-03-27 NOTE — TELEPHONE ENCOUNTER
----- Message from MARIANNE Emanuel sent at 3/26/2018 10:49 AM PDT -----  Please call pt today and let her know that I would like her to repeat calcium level, it was elevated. How much calcium is she taking? Will likely need to decrease the amount she takes. I have ordered this lab for her.

## 2018-04-05 ENCOUNTER — HOSPITAL ENCOUNTER (OUTPATIENT)
Dept: LAB | Facility: MEDICAL CENTER | Age: 83
End: 2018-04-05
Attending: NURSE PRACTITIONER
Payer: MEDICARE

## 2018-04-05 DIAGNOSIS — E83.52 HYPERCALCEMIA: ICD-10-CM

## 2018-04-05 LAB — CALCIUM SERPL-MCNC: 9.9 MG/DL (ref 8.5–10.5)

## 2018-04-05 PROCEDURE — 82310 ASSAY OF CALCIUM: CPT

## 2018-04-05 PROCEDURE — 36415 COLL VENOUS BLD VENIPUNCTURE: CPT

## 2018-04-06 ENCOUNTER — TELEPHONE (OUTPATIENT)
Dept: MEDICAL GROUP | Facility: PHYSICIAN GROUP | Age: 83
End: 2018-04-06

## 2018-04-06 NOTE — TELEPHONE ENCOUNTER
----- Message from MARIANNE Emanuel sent at 4/6/2018  8:26 AM PDT -----  Please let pt know that her calcium level is now within normal limits.

## 2018-04-27 RX ORDER — POTASSIUM CHLORIDE 20 MEQ/1
TABLET, EXTENDED RELEASE ORAL
Qty: 180 TAB | Refills: 1 | Status: SHIPPED | OUTPATIENT
Start: 2018-04-27 | End: 2018-09-07 | Stop reason: SDUPTHER

## 2018-04-27 NOTE — TELEPHONE ENCOUNTER
Refill X 6 months, sent to pharmacy.Pt. Seen in the last 6 months per protocol.   Lab Results   Component Value Date/Time    SODIUM 141 03/20/2018 09:36 AM    POTASSIUM 3.8 03/20/2018 09:36 AM    CHLORIDE 107 03/20/2018 09:36 AM    CO2 28 03/20/2018 09:36 AM    GLUCOSE 110 (H) 03/20/2018 09:36 AM    BUN 15 03/20/2018 09:36 AM    CREATININE 0.73 03/20/2018 09:36 AM

## 2018-05-29 ENCOUNTER — OFFICE VISIT (OUTPATIENT)
Dept: MEDICAL GROUP | Facility: PHYSICIAN GROUP | Age: 83
End: 2018-05-29
Payer: MEDICARE

## 2018-05-29 VITALS
SYSTOLIC BLOOD PRESSURE: 128 MMHG | HEART RATE: 68 BPM | TEMPERATURE: 98.9 F | DIASTOLIC BLOOD PRESSURE: 78 MMHG | BODY MASS INDEX: 20.91 KG/M2 | RESPIRATION RATE: 16 BRPM | HEIGHT: 63 IN | WEIGHT: 118 LBS | OXYGEN SATURATION: 98 %

## 2018-05-29 DIAGNOSIS — M85.80 OSTEOPENIA, UNSPECIFIED LOCATION: Chronic | ICD-10-CM

## 2018-05-29 DIAGNOSIS — M79.604 RIGHT LEG PAIN: ICD-10-CM

## 2018-05-29 DIAGNOSIS — Z78.0 POSTMENOPAUSAL ESTROGEN DEFICIENCY: ICD-10-CM

## 2018-05-29 DIAGNOSIS — I25.119 CORONARY ARTERY DISEASE INVOLVING NATIVE CORONARY ARTERY OF NATIVE HEART WITH ANGINA PECTORIS (HCC): ICD-10-CM

## 2018-05-29 DIAGNOSIS — F41.9 ANXIETY: ICD-10-CM

## 2018-05-29 DIAGNOSIS — F13.20 BENZODIAZEPINE DEPENDENCE (HCC): ICD-10-CM

## 2018-05-29 PROCEDURE — 99214 OFFICE O/P EST MOD 30 MIN: CPT | Performed by: NURSE PRACTITIONER

## 2018-05-29 RX ORDER — LORAZEPAM 1 MG/1
1 TABLET ORAL EVERY 12 HOURS PRN
Qty: 60 TAB | Refills: 2 | Status: SHIPPED
Start: 2018-06-04 | End: 2018-08-29 | Stop reason: SDUPTHER

## 2018-05-29 NOTE — PROGRESS NOTES
Chief Complaint   Patient presents with   • Anxiety     refill lorazepam         This is a 89 y.o.female patient that presents today with the following: Medication refills, other acute and chronic conditions    Anxiety  This is a chronic condition, stable and fairly well controlled with lorazepam, 1 mg twice daily.  She is on a controlled substance agreement her second dose is used for insomnia, this is up-to-date as well as her UDS.  She has been on SSRI in the past, but does not tolerate them and she states that she will not go back on this medications, thus the controlled substance agreement and she is seen every 3 months for refills.  The [facility] has been reviewed and she is taking medications appropriately.  These medications were refilled today.    Benzodiazepine dependence (CMS-HCC) (HCC)  Patient currently on Lorazepam 1 mg twice daily, one for anxiety and 1 for insomnia.  She is on a controlled substance agreement.  She states that the only medication she tolerates, she is not willing to go back on SSRI as she does not tolerate them.    Osteopenia  Patient has been on alendronate for osteopenia, she does not remember ever having a DEXA scan.  She forgot to take her pill a couple weeks ago and was looking through the package insert to see when she can make up the dose that she missed.  She started reading about the side effects and decided that she does not want to take this, but is concerned that she still needs to take it due to her condition.  I discussed with her the risks, benefits and side effects of medication.  I discussed with her that the benefit of the medication is going to far outweigh the risks of side effects.  However we do need a DEXA scan as we do not have one on file so we can see the level of bone loss.  This has been ordered.    Right leg pain  This is a chronic condition, she has had pain in her right leg off and on for several months to a year.  She has undergone right knee surgery  for torn ligament.  She  has now had pain in her left leg a couple of days ago after a long walk.  She has not had pain in the left leg since.  There is no edema to either leg, there is no swelling in either knee.  I discussed with her that we will continue to monitor this, she is to follow-up with me if the pain continues or if it worsens.    Coronary artery disease involving native coronary artery of native heart with angina pectoris  This is a chronic condition, stable.  She is followed by cardiology and is due to have follow-up appointment sometime in September.  She does have nitroglycerin on hand for angina and states that she has had to use this a couple times over the past couple of months.  I did encourage her to try and move her appointment up with cardiology for more frequent episodes of angina, patient agrees and will make appointment.  I did give her very strict ER precautions.      No visits with results within 1 Month(s) from this visit.   Latest known visit with results is:   Hospital Outpatient Visit on 04/05/2018   Component Date Value   • Calcium 04/05/2018 9.9          clinical course has been stable    Past Medical History:   Diagnosis Date   • Allergy    • Anxiety    • ASTHMA    • CAD (coronary artery disease)    • Hyperlipidemia    • Hypertension    • OSTEOPOROSIS        Past Surgical History:   Procedure Laterality Date   • ABDOMINAL HYSTERECTOMY TOTAL     • APPENDECTOMY     • CARDIAC CATH     • HERNIA REPAIR     • TONSILLECTOMY         Family History   Problem Relation Age of Onset   • Heart Disease Neg Hx    • Heart Failure Neg Hx    • Hyperlipidemia Neg Hx        Pcn [penicillins] and Codeine    Current Outpatient Prescriptions Ordered in [facility]   Medication Sig Dispense Refill   • Psyllium (METAMUCIL) 28.3 % Powder Take  by mouth.     • [START ON 6/4/2018] LORazepam (ATIVAN) 1 MG Tab Take 1 Tab by mouth every 12 hours as needed for Anxiety for up to 90 days. TAKE 1 TABLET BY MOUTH 2 TIMES A  DAY AS NEEDED FOR ANXIETY. 60 Tab 2   • potassium chloride SA (KDUR) 20 MEQ Tab CR TAKE 1 TABLET BY MOUTH TWICE DAILY 180 Tab 1   • lisinopril (PRINIVIL) 20 MG Tab TAKE 1 TABLET BY MOUTH ONE TIME A DAY 90 Tab 0   • carvedilol (COREG) 12.5 MG Tab Take 1 Tab by mouth 2 times a day, with meals. 180 Tab 0   • amlodipine (NORVASC) 5 MG Tab TAKE 1 TABLET BY MOUTH ONE TIME A DAY 90 Tab 1   • atorvastatin (LIPITOR) 40 MG Tab TAKE 1 TABLET BY MOUTH ATBEDTIME 90 Tab 1   • guaifenesin LA (MUCINEX) 600 MG TABLET SR 12 HR Take 600 mg by mouth every 12 hours.     • ascorbic acid (ASCORBIC ACID) 500 MG Tab Take 500 mg by mouth every day.     • fluticasone (FLONASE) 50 MCG/ACT nasal spray Spray 1 Spray in nose every day. EACH NOSTRIL 32 g 1   • albuterol 108 (90 Base) MCG/ACT Aero Soln inhalation aerosol Inhale 2 Puffs by mouth every 6 hours as needed for Shortness of Breath. 20.1 g 0   • beclomethasone (QVAR) 80 MCG/ACT inhaler Use 1 puff twice daily 17.4 g 3   • nitroglycerin (NITROSTAT) 0.4 MG SL Tab Place 1 tab under tongue every 5 min as needed for chest pain max 3 doses 25 Tab 1   • diphenhydrAMINE (BENADRYL) 25 MG Tab Take 25 mg by mouth every 6 hours as needed for Sleep.     • VITAMIN E PO Take  by mouth.     • aspirin (ASA) 81 MG Chew Tab chewable tablet Take 1 Tab by mouth every day. 100 Tab 11   • Cholecalciferol (VITAMIN D) 2000 UNIT Tab Take 2,000 Units by mouth every day.     • Biotin 10 MG Tab Take 10 mg by mouth every day.     • Zinc 50 MG Cap Take 50 mg by mouth every day.     • Multiple Vitamins-Minerals (CENTRUM SILVER ADULT 50+) Tab Take 1 Tab by mouth every day.     • Omega-3 Fatty Acids (FISH OIL) 1200 MG Cap Take 1,200 mg by mouth every day.     • omeprazole (PRILOSEC) 20 MG delayed-release capsule Take 20 mg by mouth every day.     • cyclobenzaprine (FLEXERIL) 5 MG tablet Take 1-2 Tabs by mouth 3 times a day as needed. 30 Tab 0   • alendronate (FOSAMAX) 35 MG tablet TAKE 1 TABLET BY MOUTH EVERY 7 DAYS 12 Tab  "3   • Calcium Carbonate (CALCIUM 600 PO) Take 600 mg by mouth every day.       No current [facility]-ordered facility-administered medications on file.        Constitutional ROS: No unexpected change in weight, No weakness, No unexplained fevers, sweats, or chills.  Positive for insomnia  Pulmonary ROS: No chronic cough, sputum, or hemoptysis, No shortness of breath, No recent change in breathing  Cardiovascular ROS: Positive per HPI  Gastrointestinal ROS: No abdominal pain, No nausea, vomiting, diarrhea, or constipation  Musculoskeletal/Extremities ROS: Positive per HPI  Neurologic ROS: Normal development, No seizures, No weakness  Psych ROS: Positive for anxiety    Physical exam:  /78   Pulse 68   Temp 37.2 °C (98.9 °F)   Resp 16   Ht 1.6 m (5' 3\")   Wt 53.5 kg (118 lb)   SpO2 98%   BMI 20.90 kg/m²   General Appearance: Elderly female, alert, no distress, well-nourished, well-groomed  Skin: Skin color, texture, turgor normal. No rashes or lesions.  Lungs: negative findings: normal respiratory rate and rhythm, lungs clear to auscultation  Heart: negative. RRR without murmur, gallop, or rubs.  No ectopy.  Abdomen: Abdomen soft, non-tender. BS normal. No masses,  No organomegaly  Musculoskeletal: positive findings: Limited range of motion to right knee  Neurologic: intact    Medical decision making/discussion: Patient is to make an appointment with her cardiologist for more frequent episodes of angina.  DEXA scan has been ordered.  We will continue to monitor pain in her left leg and she is to follow-up with me if she notices more frequent episodes of pain or if the pain worsens.  I would like her to follow-up with me in 3 months, sooner if needed.    Dayana was seen today for anxiety.    Diagnoses and all orders for this visit:    Coronary artery disease involving native coronary artery of native heart with angina pectoris (HCC)    Anxiety  -     LORazepam (ATIVAN) 1 MG Tab; Take 1 Tab by mouth every 12 " hours as needed for Anxiety for up to 90 days. TAKE 1 TABLET BY MOUTH 2 TIMES A DAY AS NEEDED FOR ANXIETY.    Benzodiazepine dependence (HCC)    Right leg pain    Osteopenia, unspecified location    Postmenopausal estrogen deficiency  -     DS-BONE DENSITY STUDY (DEXA); Future          Please note that this dictation was created using voice recognition software. I have made every reasonable attempt to correct obvious errors, but I expect that there are errors of grammar and possibly content that I did not discover before finalizing the note.

## 2018-05-29 NOTE — PATIENT INSTRUCTIONS
Make an appt with cardiologist for more frequent episodes of chest pain     DEXA scan    Will continue to monitor pain in L leg    Follow up with me in 3 months sooner if needed

## 2018-05-30 NOTE — ASSESSMENT & PLAN NOTE
This is a chronic condition, stable.  She is followed by cardiology and is due to have follow-up appointment sometime in September.  She does have nitroglycerin on hand for angina and states that she has had to use this a couple times over the past couple of months.  I did encourage her to try and move her appointment up with cardiology for more frequent episodes of angina, patient agrees and will make appointment.  I did give her very strict ER precautions.

## 2018-05-30 NOTE — ASSESSMENT & PLAN NOTE
This is a chronic condition, she has had pain in her right leg off and on for several months to a year.  She has undergone right knee surgery for torn ligament.  She  has now had pain in her left leg a couple of days ago after a long walk.  She has not had pain in the left leg since.  There is no edema to either leg, there is no swelling in either knee.  I discussed with her that we will continue to monitor this, she is to follow-up with me if the pain continues or if it worsens.

## 2018-05-30 NOTE — ASSESSMENT & PLAN NOTE
Patient has been on alendronate for osteopenia, she does not remember ever having a DEXA scan.  She forgot to take her pill a couple weeks ago and was looking through the package insert to see when she can make up the dose that she missed.  She started reading about the side effects and decided that she does not want to take this, but is concerned that she still needs to take it due to her condition.  I discussed with her the risks, benefits and side effects of medication.  I discussed with her that the benefit of the medication is going to far outweigh the risks of side effects.  However we do need a DEXA scan as we do not have one on file so we can see the level of bone loss.  This has been ordered.

## 2018-05-30 NOTE — ASSESSMENT & PLAN NOTE
This is a chronic condition, stable and fairly well controlled with lorazepam, 1 mg twice daily.  She is on a controlled substance agreement her second dose is used for insomnia, this is up-to-date as well as her UDS.  She has been on SSRI in the past, but does not tolerate them and she states that she will not go back on this medications, thus the controlled substance agreement and she is seen every 3 months for refills.  The [facility] has been reviewed and she is taking medications appropriately.  These medications were refilled today.

## 2018-05-30 NOTE — ASSESSMENT & PLAN NOTE
Patient currently on Lorazepam 1 mg twice daily, one for anxiety and 1 for insomnia.  She is on a controlled substance agreement.  She states that the only medication she tolerates, she is not willing to go back on SSRI as she does not tolerate them.

## 2018-06-13 ENCOUNTER — HOSPITAL ENCOUNTER (OUTPATIENT)
Dept: RADIOLOGY | Facility: MEDICAL CENTER | Age: 83
End: 2018-06-13
Attending: NURSE PRACTITIONER
Payer: MEDICARE

## 2018-06-13 DIAGNOSIS — Z78.0 POSTMENOPAUSAL ESTROGEN DEFICIENCY: ICD-10-CM

## 2018-06-13 PROCEDURE — 77080 DXA BONE DENSITY AXIAL: CPT

## 2018-06-21 ENCOUNTER — TELEPHONE (OUTPATIENT)
Dept: MEDICAL GROUP | Facility: PHYSICIAN GROUP | Age: 83
End: 2018-06-21

## 2018-06-21 NOTE — TELEPHONE ENCOUNTER
Patient notified and agrees. She stated that she was previously taking alendronate. She would like to know if she should start taking it again. Ok to leave a message.

## 2018-06-21 NOTE — TELEPHONE ENCOUNTER
----- Message from Dayana Lechuga sent at 6/21/2018  5:00 AM PDT -----  Regarding: Results  Dayana,  Bone scan show osteopenia, which is an early decrease in bone density (thinning of bones). Does not show osteoporosis. Treatment for this is weight bearing exercise (walking) and calcium and vitamin D supplements. You can probably take a low dose calcium daily along with your regular vitamin D supplement.   Ashleigh

## 2018-06-21 NOTE — TELEPHONE ENCOUNTER
I sent the below info to patient via a [employer] message a week ago--pt still has not read message. Please call pt with message. Thank you.

## 2018-06-25 NOTE — TELEPHONE ENCOUNTER
I would not hurt to start taking the alendronate again--if she wants to, but taking vitamin D and calcium would be ok too. How long ago did she take this and for how long?

## 2018-06-26 ENCOUNTER — HOSPITAL ENCOUNTER (OUTPATIENT)
Facility: MEDICAL CENTER | Age: 83
End: 2018-06-26
Attending: DERMATOLOGY
Payer: MEDICARE

## 2018-06-26 ENCOUNTER — OFFICE VISIT (OUTPATIENT)
Dept: DERMATOLOGY | Facility: IMAGING CENTER | Age: 83
End: 2018-06-26
Payer: MEDICARE

## 2018-06-26 VITALS — HEIGHT: 63 IN | BODY MASS INDEX: 20.38 KG/M2 | WEIGHT: 115 LBS | TEMPERATURE: 99 F

## 2018-06-26 DIAGNOSIS — L82.1 SEBORRHEIC KERATOSES: ICD-10-CM

## 2018-06-26 DIAGNOSIS — D48.5 NEOPLASM OF UNCERTAIN BEHAVIOR OF SKIN: ICD-10-CM

## 2018-06-26 DIAGNOSIS — L82.0 SEBORRHEIC KERATOSES, INFLAMED: ICD-10-CM

## 2018-06-26 DIAGNOSIS — L57.0 ACTINIC KERATOSES: ICD-10-CM

## 2018-06-26 DIAGNOSIS — R20.8 SKIN PAIN: ICD-10-CM

## 2018-06-26 PROCEDURE — 11100 PR BIOPSY OF SKIN LESION: CPT | Mod: 59 | Performed by: DERMATOLOGY

## 2018-06-26 PROCEDURE — 17110 DESTRUCTION B9 LES UP TO 14: CPT | Performed by: DERMATOLOGY

## 2018-06-26 PROCEDURE — 99203 OFFICE O/P NEW LOW 30 MIN: CPT | Mod: 25 | Performed by: DERMATOLOGY

## 2018-06-26 PROCEDURE — 88305 TISSUE EXAM BY PATHOLOGIST: CPT

## 2018-06-26 PROCEDURE — 17000 DESTRUCT PREMALG LESION: CPT | Mod: 59 | Performed by: DERMATOLOGY

## 2018-06-26 ASSESSMENT — ENCOUNTER SYMPTOMS
FEVER: 0
CHILLS: 0

## 2018-06-26 NOTE — PROGRESS NOTES
Dermatology New Patient Visit    Chief Complaint   Patient presents with   • Establish Care       Subjective:     HPI:   Dayana Lechuga is a 89 y.o. female presenting for    Patient here to have lesion on back checked.  Time present: one year  Painful lesion: No  Itching lesion: No  Enlarging lesion: No  Anything make it better or worse? No  Unable to see her back, would like everywhere examined    Dark, itchy spots on the central chest and left leg  Are painful, picks at them often  Have been present for >1 year, growing  No treatments    Rough spot on the forehead  Present for 4+ months  Slightly itchy  No treatments    History of skin cancer: No  History of precancers/actinic keratoses: No  History of biopsies:No  History of blistering/severe sunburns:Yes, Details: in 20's  Family history of skin cancer:Yes, Details: Son, unknown type on leg.  Family history of atypical moles:No        Past Medical History:   Diagnosis Date   • Allergy    • Anxiety    • ASTHMA    • CAD (coronary artery disease)    • Hyperlipidemia    • Hypertension    • OSTEOPOROSIS        Current Outpatient Prescriptions on File Prior to Visit   Medication Sig Dispense Refill   • Psyllium (METAMUCIL) 28.3 % Powder Take  by mouth.     • LORazepam (ATIVAN) 1 MG Tab Take 1 Tab by mouth every 12 hours as needed for Anxiety for up to 90 days. TAKE 1 TABLET BY MOUTH 2 TIMES A DAY AS NEEDED FOR ANXIETY. 60 Tab 2   • potassium chloride SA (KDUR) 20 MEQ Tab CR TAKE 1 TABLET BY MOUTH TWICE DAILY 180 Tab 1   • cyclobenzaprine (FLEXERIL) 5 MG tablet Take 1-2 Tabs by mouth 3 times a day as needed. 30 Tab 0   • lisinopril (PRINIVIL) 20 MG Tab TAKE 1 TABLET BY MOUTH ONE TIME A DAY 90 Tab 0   • alendronate (FOSAMAX) 35 MG tablet TAKE 1 TABLET BY MOUTH EVERY 7 DAYS 12 Tab 3   • carvedilol (COREG) 12.5 MG Tab Take 1 Tab by mouth 2 times a day, with meals. 180 Tab 0   • amlodipine (NORVASC) 5 MG Tab TAKE 1 TABLET BY MOUTH ONE TIME A DAY 90 Tab 1   •  atorvastatin (LIPITOR) 40 MG Tab TAKE 1 TABLET BY MOUTH ATBEDTIME 90 Tab 1   • guaifenesin LA (MUCINEX) 600 MG TABLET SR 12 HR Take 600 mg by mouth every 12 hours.     • ascorbic acid (ASCORBIC ACID) 500 MG Tab Take 500 mg by mouth every day.     • fluticasone (FLONASE) 50 MCG/ACT nasal spray Spray 1 Spray in nose every day. EACH NOSTRIL 32 g 1   • albuterol 108 (90 Base) MCG/ACT Aero Soln inhalation aerosol Inhale 2 Puffs by mouth every 6 hours as needed for Shortness of Breath. 20.1 g 0   • beclomethasone (QVAR) 80 MCG/ACT inhaler Use 1 puff twice daily 17.4 g 3   • nitroglycerin (NITROSTAT) 0.4 MG SL Tab Place 1 tab under tongue every 5 min as needed for chest pain max 3 doses 25 Tab 1   • diphenhydrAMINE (BENADRYL) 25 MG Tab Take 25 mg by mouth every 6 hours as needed for Sleep.     • VITAMIN E PO Take  by mouth.     • aspirin (ASA) 81 MG Chew Tab chewable tablet Take 1 Tab by mouth every day. 100 Tab 11   • Cholecalciferol (VITAMIN D) 2000 UNIT Tab Take 2,000 Units by mouth every day.     • Biotin 10 MG Tab Take 10 mg by mouth every day.     • Zinc 50 MG Cap Take 50 mg by mouth every day.     • Multiple Vitamins-Minerals (CENTRUM SILVER ADULT 50+) Tab Take 1 Tab by mouth every day.     • Omega-3 Fatty Acids (FISH OIL) 1200 MG Cap Take 1,200 mg by mouth every day.     • Calcium Carbonate (CALCIUM 600 PO) Take 600 mg by mouth every day.     • omeprazole (PRILOSEC) 20 MG delayed-release capsule Take 20 mg by mouth every day.       No current facility-administered medications on file prior to visit.        Allergies   Allergen Reactions   • Pcn [Penicillins] Rash     About 70 years   • Codeine Vomiting       Family History   Problem Relation Age of Onset   • Heart Disease Neg Hx    • Heart Failure Neg Hx    • Hyperlipidemia Neg Hx        Social History     Social History   • Marital status:      Spouse name: N/A   • Number of children: N/A   • Years of education: N/A     Occupational History   • Not on  "file.     Social History Main Topics   • Smoking status: Never Smoker   • Smokeless tobacco: Never Used   • Alcohol use No   • Drug use: No   • Sexual activity: Not on file     Other Topics Concern   • Not on file     Social History Narrative   • No narrative on file       Review of Systems   Constitutional: Negative for chills and fever.   Skin: Positive for itching. Negative for rash.   All other systems reviewed and are negative.       Objective:     A full mucocutaneous exam was completed including: scalp, hair, ears, face, eyelids, conjunctiva, lips, gums/tongue/oropharynx, neck, chest breasts, abdomen, back, left and right upper extremities (including hands/digits and fingernails), left and right lower extremities (including feet/toes, toenails), buttocks, excluding external genitalia (patient refusal) with the following pertinent findings listed below. Remaining above-listed examined areas within normal limits / negative for rashes or lesions.    Temperature 37.2 °C (99 °F), height 1.6 m (5' 3\"), weight 52.2 kg (115 lb).    Physical Exam   Constitutional: She is oriented to person, place, and time and well-developed, well-nourished, and in no distress.   HENT:   Head: Normocephalic and atraumatic.       Right Ear: External ear normal.   Left Ear: External ear normal.   Nose: Nose normal.   Mouth/Throat: Oropharynx is clear and moist.   Eyes: Conjunctivae and lids are normal.   Neck: Normal range of motion. Neck supple.   Cardiovascular: Intact distal pulses.    Pulmonary/Chest: Effort normal.   Neurological: She is alert and oriented to person, place, and time.   Skin: Skin is warm and dry.        Psychiatric: Mood and affect normal.   Vitals reviewed.      DATA: none applicable to review    Assessment and Plan:     1. Neoplasm of uncertain behavior of skin  Procedure Note   Procedure: Biopsy by shave technique  Location: as noted above  Size: as noted in exam  Preoperative diagnosis:thrombosed angioma vs " pigmented lesion, r/o atypia  Risks, benefits and alternatives of procedure discussed and written informed consent obtained. Time out completed. Area of biopsy prepped with alcohol. Anesthesia with 1% lidocaine with epinephrine administered with 30 gauge needle. Shave biopsy of the site performed. Hemostasis achieved with pressure and aluminum chloride. Vaseline applied to wound with bandage. Patient tolerated procedure well and there were no complications. The specimen was sent to the pathology lab by the staff. Wound care was discussed.  - PATHOLOGY SPECIMEN; Future    2. Actinic keratosis  CRYOTHERAPY:  Risks (including, but not limited to: hypo or hyperpigmentation, redness, blister, blood blister, recurrence, need for further treatment, infection, scar) and benefits of cryotherapy discussed. Patient verbally agreed to proceed with treatment. 2 cryotherapy freeze thaw cycles of 10 seconds were applied to 1 lesion on the forehead with cryac. Patient tolerated procedure well. Aftercare instructions given.    3. Seborrheic keratoses  - Benign-appearing nature of lesions discussed. Advised to return to clinic for any new or concerning changes.    4. Seborrheic keratoses, inflamed, +Skin pain  CRYOTHERAPY:  Risks (including, but not limited to: hypo or hyperpigmentation, redness, blister, blood blister, recurrence, need for further treatment, infection, scar) and benefits of cryotherapy discussed. Patient verbally agreed to proceed with treatment. 2 cryotherapy freeze thaw cycles of 10 seconds were applied to 4 lesions on the leg, abdomen with cryac. Patient tolerated procedure well. Aftercare instructions given.    Followup: Return in about 1 year (around 6/26/2019).    Jael Beckford M.D.

## 2018-06-27 NOTE — TELEPHONE ENCOUNTER
Called [phone] (home)  Spoke with patient and she stopped the alendronate about 5 to 6 weeks ago. Patient thinks she started the alendronate after her heart attack around July 2016. Patient states that she is taking 600 mg of calcium-is that dose ok. Please advise.

## 2018-06-28 NOTE — TELEPHONE ENCOUNTER
Calcium dose is ok. Yes, would recommend restarting it, as we usually have a person stay on if for about 5-6 years. Will plan on rechecking the bone scan in a couple of years.

## 2018-07-02 ENCOUNTER — TELEPHONE (OUTPATIENT)
Dept: DERMATOLOGY | Facility: IMAGING CENTER | Age: 83
End: 2018-07-02

## 2018-07-02 NOTE — LETTER
"July 2, 2018         Dayana Oakley  [address] [facility] [address]        Dear Dayana:      Below are the results from your recent visit: Your pathology results are benign     Resulted Orders   PATHOLOGY SPECIMEN    Narrative    [facility] PATHOLOGY [facility], [facility].              [address] [place] [address] [address] [place] [address]              Phone [phone]          Fax [phone]  Lebron Harmon M.D.     Sabina Mullen M.D.     Vidhya Santiago M.D.              KAVON Young M.D.  Patient:    DAYANA OAKLEY          Specimen    [id number]                                           #:      Copies to:                        SURGICAL PATHOLOGY CONSULTATION                      FINAL DIAGNOSIS:    A. Left upper arm shave:         Intradermal nevus, transected at base.         Negative for atypia and malignancy.                                          Diagnosis performed by:                                      SABINA ROJAS MD  ------------------------------------------------------------------------  --  CODES:  2003x1      PREOPERATIVE DIAGNOSIS:  Neoplasm of uncertain behavior of skin (D48.5). Differential diagnosis:  Thrombosed cherry angioma versus pigmented lesion/rule out atypia.    SPECIMEN(S)  A. Left upper arm shave:    GROSS DESCRIPTION:  A.  Received in formalin labeled with two patient identifiers and  designated \"Dayana Oakley, left upper arm (per requisition)\" is a  0.3 x 0.3 x 0.1 cm shave biopsy with a 0.2 x 0.2 cm macule that abuts  the nearest skin edge. Bisected. TS 1/[id number] /    MICROSCOPIC DESCRIPTION:  Microscopic examination was performed.  Please see diagnosis.        Report electronically signed by:  SABINA ROJAS MD  Diagnosis performed at: [facility]  Pathology  Department, [address] [place] [address]  [id number]    Printed 00/00/0000 [id number] MELVIN OAKLEYTH   Page 1 of 1 MRN#:[id number]         If you " have any questions or concerns, please don't hesitate to call.        Sincerely,      Jael Beckford M.D.    Electronically Signed

## 2018-07-02 NOTE — TELEPHONE ENCOUNTER
----- Message from Jael Beckford M.D. sent at 7/2/2018  7:02 AM PDT -----  [employer]man [employer],     Can one of you notify this patient via letter of results please?     Thank you!

## 2018-07-06 DIAGNOSIS — I10 ESSENTIAL HYPERTENSION: ICD-10-CM

## 2018-07-06 DIAGNOSIS — I25.119 CORONARY ARTERY DISEASE INVOLVING NATIVE CORONARY ARTERY OF NATIVE HEART WITH ANGINA PECTORIS (HCC): ICD-10-CM

## 2018-07-09 RX ORDER — LISINOPRIL 20 MG/1
TABLET ORAL
Qty: 90 TAB | Refills: 1 | Status: SHIPPED | OUTPATIENT
Start: 2018-07-09 | End: 2019-01-03 | Stop reason: SDUPTHER

## 2018-07-09 RX ORDER — CARVEDILOL 12.5 MG/1
TABLET ORAL
Qty: 180 TAB | Refills: 1 | Status: SHIPPED | OUTPATIENT
Start: 2018-07-09 | End: 2019-01-03 | Stop reason: SDUPTHER

## 2018-07-09 RX ORDER — ATORVASTATIN CALCIUM 40 MG/1
TABLET, FILM COATED ORAL
Qty: 90 TAB | Refills: 1 | Status: SHIPPED | OUTPATIENT
Start: 2018-07-09 | End: 2018-12-12 | Stop reason: SDUPTHER

## 2018-07-09 RX ORDER — AMLODIPINE BESYLATE 5 MG/1
TABLET ORAL
Qty: 90 TAB | Refills: 1 | Status: SHIPPED | OUTPATIENT
Start: 2018-07-09 | End: 2019-01-03 | Stop reason: SDUPTHER

## 2018-07-09 NOTE — TELEPHONE ENCOUNTER
Pt has had OV within the 12 month protocol and lipid panel is current. 6 month supply sent to pharmacy.   Lab Results   Component Value Date/Time    CHOLSTRLTOT 142 03/20/2018 09:36 AM    LDL 61 03/20/2018 09:36 AM    HDL 61 03/20/2018 09:36 AM    TRIGLYCERIDE 101 03/20/2018 09:36 AM       Lab Results   Component Value Date/Time    SODIUM 141 03/20/2018 09:36 AM    POTASSIUM 3.8 03/20/2018 09:36 AM    CHLORIDE 107 03/20/2018 09:36 AM    CO2 28 03/20/2018 09:36 AM    GLUCOSE 110 (H) 03/20/2018 09:36 AM    BUN 15 03/20/2018 09:36 AM    CREATININE 0.73 03/20/2018 09:36 AM     Lab Results   Component Value Date/Time    ALKPHOSPHAT 48 03/20/2018 09:36 AM    ASTSGOT 19 03/20/2018 09:36 AM    ALTSGPT 15 03/20/2018 09:36 AM    TBILIRUBIN 0.6 03/20/2018 09:36 AM

## 2018-07-10 ENCOUNTER — TELEPHONE (OUTPATIENT)
Dept: MEDICAL GROUP | Facility: MEDICAL CENTER | Age: 83
End: 2018-07-10

## 2018-07-11 NOTE — TELEPHONE ENCOUNTER
Was the patient seen in the last year in this department? Yes     Does patient have an active prescription for medications requested? No     Received Request Via: Pharmacy      Pt met protocol?: Yes pt last ov 5/18

## 2018-07-12 ENCOUNTER — TELEPHONE (OUTPATIENT)
Dept: MEDICAL GROUP | Facility: MEDICAL CENTER | Age: 83
End: 2018-07-12

## 2018-07-13 NOTE — TELEPHONE ENCOUNTER
[facility] mail order pharmacy is asking to get an approval from the Pt primary provider regarding qvar 80. This medication is no longer available and has been switched to Qvar 80 redihaler. There phone number is [phone]

## 2018-07-18 NOTE — TELEPHONE ENCOUNTER
Was the patient seen in the last year in this department? Yes     Does patient have an active prescription for medications requested? Yes     Received Request Via: Pharmacy      Pt met protocol?: Yes, ov 5/18  Rx just sent 7/18  Pharmacy requesting QVAR Redihaler 80

## 2018-07-26 ENCOUNTER — TELEPHONE (OUTPATIENT)
Dept: CARDIOLOGY | Facility: MEDICAL CENTER | Age: 83
End: 2018-07-26

## 2018-07-26 NOTE — TELEPHONE ENCOUNTER
----- Message from Kori Cabrales sent at 7/26/2018  2:47 PM PDT -----  Regarding: high blood pressure  LA/Rin      Patient said her wrist bp cuff was showing an error, when she tried the upper arm cuff it's in the 150s/160s. Please call to advise. Ph. #[phone].    =========================================================    Called pt, pt reports she had problems getting a BP reading from her wrist BP machine so she tried her arm cuff and the reading from this 163/92, I've advise her to go to our clinic over at [facility] and have them check her BP and to make sure her BP machine is accurate, she refused to do this, she continued to take Lisinopril, Amlodipine and Carvedilol.     To Dr Hoyos

## 2018-07-27 ENCOUNTER — HOSPITAL ENCOUNTER (EMERGENCY)
Facility: MEDICAL CENTER | Age: 83
End: 2018-07-27
Attending: EMERGENCY MEDICINE
Payer: MEDICARE

## 2018-07-27 VITALS
HEART RATE: 67 BPM | TEMPERATURE: 97.7 F | WEIGHT: 120 LBS | BODY MASS INDEX: 21.26 KG/M2 | RESPIRATION RATE: 18 BRPM | OXYGEN SATURATION: 93 %

## 2018-07-27 DIAGNOSIS — F41.9 ANXIETY: ICD-10-CM

## 2018-07-27 DIAGNOSIS — I10 ESSENTIAL HYPERTENSION: ICD-10-CM

## 2018-07-27 PROCEDURE — 99284 EMERGENCY DEPT VISIT MOD MDM: CPT

## 2018-07-27 ASSESSMENT — LIFESTYLE VARIABLES: DO YOU DRINK ALCOHOL: NO

## 2018-07-27 NOTE — TELEPHONE ENCOUNTER
Called pt, pt reports her BP is back to normal now, with reported BP-137/80, she will continue to monitor her BP and will call our office back if it is consistently SBP >140.

## 2018-07-28 NOTE — DISCHARGE INSTRUCTIONS
Repeatedly checking her blood pressure will have a tendency to cause it to increase because of worrying about it.  We would recommend that you check your blood pressure once daily, at the same time every day.  If you are noticing a consistent increase, please talk to her primary care doctor.  It would also be a good idea to Dr. primary care doctor about whether he can minimize the number of medications or taking, and whether there are safer and more appropriate medication for sleep and anxiety than the Ativan you are taking.  Ativan can sometimes wear off in terms of its effects, and instead lead to increasing anxiety and increasing blood pressure in between doses.      hypertension  Hypertension is another name for high blood pressure. High blood pressure forces your heart to work harder to pump blood. A blood pressure reading has two numbers, which includes a higher number over a lower number (example: 110/72).  Follow these instructions at home:  · Have your blood pressure rechecked by your doctor.  · Only take medicine as told by your doctor. Follow the directions carefully. The medicine does not work as well if you skip doses. Skipping doses also puts you at risk for problems.  · Do not smoke.  · Monitor your blood pressure at home as told by your doctor.  Contact a doctor if:  · You think you are having a reaction to the medicine you are taking.  · You have repeat headaches or feel dizzy.  · You have puffiness (swelling) in your ankles.  · You have trouble with your vision.  Get help right away if:  · You get a very bad headache and are confused.  · You feel weak, numb, or faint.  · You get chest or belly (abdominal) pain.  · You throw up (vomit).  · You cannot breathe very well.  This information is not intended to replace advice given to you by your health care provider. Make sure you discuss any questions you have with your health care provider.  Document Released: 06/05/2009 Document Revised: 05/25/2017  Document Reviewed: 10/10/2014  charity: [employer] Interactive Patient Education © 2017 [employer]

## 2018-07-28 NOTE — ED TRIAGE NOTES
Pt brought in from home by EMS for HTN.  Pt states she took her BP earlier and it was high, she then took NTG to lower her blood pressure, which worked but then her pressure got high again.  Pt denies symptoms.

## 2018-07-28 NOTE — ED PROVIDER NOTES
"ED Provider Note    Scribed for Logan Valdovinos M.D. by Logan Valdovinos. 7/27/2018  10:27 PM    CHIEF COMPLAINT  Chief Complaint   Patient presents with   • Hypertension     Noticed high BP at home, took NTG to lower BP and it worked but then went back up       HPI  Dayana Lechuga is an Extremely anxious 89 y.o. female  With a well-documented history of anxiety and hypertensionwho presents to the Emergency Room  Because her blood pressure was running as high as 170s-180s systolic earlier the day.  She said that she checks her blood pressure multiple times per day, but is not sure why she does this.  She is obviously anxious, and seems somewhat lonely.  She tells me along part of her story about how her children live in [place], and she tries to call them once a day, but often has trouble getting a hold of them.  She says that she does have friends, and goes out for coffee and things like that with.  She says that today, the friend called to ask her if she wanted to go out for coffee, and she told them \"let me check my blood pressure first.\"  She is not sure why that was a priority.  She says was 137 systolic at the time.  She continued to check it throughout the late afternoon and evening after she got home, and it continued to increase, despite reported compliance with her medications, though she suspects she may have skipped a couple of doses yesterday.  As her blood pressure continued to climb, she became continually more anxious, and so presented to the emergency department.  She has never had chest pain or shortness of breath, she has had no recent illness including fevers or chills or nausea or vomiting.   She says she took a nitroglycerin at home around 5 PM, and her blood pressure went down, and then continued to climb again.  At the bedside, her blood pressure is in the 150s systolic with no further interventions beyond her usual medications.    REVIEW OF SYSTEMS  See HPI for further " details.    PAST MEDICAL HISTORY   has a past medical history of Allergy; Anxiety; ASTHMA; CAD (coronary artery disease); Hyperlipidemia; Hypertension; and OSTEOPOROSIS.    SOCIAL HISTORY  Social History     Social History Main Topics   • Smoking status: Never Smoker   • Smokeless tobacco: Never Used   • Alcohol use No   • Drug use: No   • Sexual activity: Not on file       SURGICAL HISTORY   has a past surgical history that includes appendectomy; abdominal hysterectomy total; hernia repair; tonsillectomy; and cardiac cath.    CURRENT MEDICATIONS  Home Medications     Reviewed by Nile Mahmood R.N. (Registered Nurse) on 07/27/18 at 2219  Med List Status: Partial   Medication Last Dose Status   albuterol 108 (90 Base) MCG/ACT Aero Soln inhalation aerosol 5/29/2018 Active   alendronate (FOSAMAX) 35 MG tablet Not Taking Active   amLODIPine (NORVASC) 5 MG Tab  Active   ascorbic acid (ASCORBIC ACID) 500 MG Tab 5/29/2018 Active   aspirin (ASA) 81 MG Chew Tab chewable tablet 5/29/2018 Active   atorvastatin (LIPITOR) 40 MG Tab  Active   beclomethasone (QVAR) 80 MCG/ACT inhaler  Active   beclomethasone HFA (QVAR REDIHALER) 80 MCG/ACT inhaler  Active   Biotin 10 MG Tab 5/29/2018 Active   Calcium Carbonate (CALCIUM 600 PO) 3/13/2018 Active   carvedilol (COREG) 12.5 MG Tab  Active   Cholecalciferol (VITAMIN D) 2000 UNIT Tab 5/29/2018 Active   cyclobenzaprine (FLEXERIL) 5 MG tablet 3/13/2018 Active   diphenhydrAMINE (BENADRYL) 25 MG Tab 5/29/2018 Active   fluticasone (FLONASE) 50 MCG/ACT nasal spray 5/29/2018 Active   guaifenesin LA (MUCINEX) 600 MG TABLET SR 12 HR 5/29/2018 Active   lisinopril (PRINIVIL) 20 MG Tab  Active   LORazepam (ATIVAN) 1 MG Tab  Active   Multiple Vitamins-Minerals (CENTRUM SILVER ADULT 50+) Tab 5/29/2018 Active   nitroglycerin (NITROSTAT) 0.4 MG SL Tab 5/29/2018 Active   Omega-3 Fatty Acids (FISH OIL) 1200 MG Cap 5/29/2018 Active   omeprazole (PRILOSEC) 20 MG delayed-release capsule 5/29/2018 Active    potassium chloride SA (KDUR) 20 MEQ Tab CR 5/29/2018 Active   Psyllium (METAMUCIL) 28.3 % Powder 5/29/2018 Active   VITAMIN E PO 5/29/2018 Active   Zinc 50 MG Cap 5/29/2018 Active                ALLERGIES  Allergies   Allergen Reactions   • Pcn [Penicillins] Rash     About 70 years   • Codeine Vomiting       PHYSICAL EXAM  VITAL SIGNS: Pulse 67   Temp 36.5 °C (97.7 °F)   Resp 18   Wt 54.4 kg (120 lb)   SpO2 93%   BMI 21.26 kg/m²   Pulse ox interpretation: I interpret this pulse ox as normal.  Constitutional: Alert in no apparent distress.   Very anxious.  HENT: No signs of trauma, Bilateral external ears normal, Nose normal.   Eyes: Pupils are equal and reactive, Conjunctiva normal, Non-icteric.   Neck: Normal range of motion, No tenderness, Supple, No stridor.    Cardiovascular: Normal peripheral perfusion  Thorax & Lungs: Unlabored respirations, equal chest expansion, no accessory muscle use  Abdomen: Non-distended  Skin:  No erythema, No rash.   Back: Normal alignment and ROM  Extremities: No gross deformity  Musculoskeletal: Good range of motion in all major joints.   Neurologic: Alert, Normal motor function, No focal deficits noted.   Psychiatric: Affect   Very anxious, Judgment normal, Mood normal.      COURSE & MEDICAL DECISION MAKING  The patient's VS, Nurses notes reviewed. (See chart for details)    10:27 PM Patient seen and examined at bedside.   She has asymptomatic and very mild hypertension.  We had at least a 20 minute conversation, in the presence of her son, regarding how checking her blood pressure for no particular reason, multiple times per day, even hourly, according to her son, can actually cause hypertension.  Anxiety.  Her son believes that this happens to her frequently.  He is somewhat frustrated by her difficult to treat anxiety, and asks whether there are appropriate interventions.  This patient is sick and chronic benzodiazepines, and I explained that it can also stop working, and  in the rebound effect mechanism can also contribute to hypertension and worsening anxiety between doses.  The son also expresses concern about the many medications the patient takes.  I agreed that this is likely to lead to medication under dosing or overdosing times.   I explained that anxiety may be better treated with SSRIs or similar medications.  The son has excellent insight into the patient's anxiety as a major contributor to her hypertension and her presentation.  He says that this is not the first time that her anxiety has triggered worsening hypertension and went to Hospital evaluation.  I do not think this patient needs evaluation beyond history and physical.  I recommended that they follow up with the patient's primary care doctor to discuss minimizing her medications, and finding alternative treatments for her anxiety but do not involve benzodiazepines.       The patient will return for new or worsening symptoms and is stable at the time of discharge.    The patient is referred to a primary physician for blood pressure management, diabetic screening, and for all other preventative health concerns.      DISPOSITION:  Patient will be discharged home in stable condition.    FOLLOW UP:  No follow-up provider specified.    OUTPATIENT MEDICATIONS:  Discharge Medication List as of 7/27/2018 10:49 PM            FINAL IMPRESSION  1. Essential hypertension    2. Anxiety

## 2018-08-04 ENCOUNTER — OFFICE VISIT (OUTPATIENT)
Dept: URGENT CARE | Facility: PHYSICIAN GROUP | Age: 83
End: 2018-08-04
Payer: MEDICARE

## 2018-08-04 VITALS
DIASTOLIC BLOOD PRESSURE: 80 MMHG | TEMPERATURE: 98.6 F | RESPIRATION RATE: 16 BRPM | BODY MASS INDEX: 20.34 KG/M2 | OXYGEN SATURATION: 98 % | SYSTOLIC BLOOD PRESSURE: 140 MMHG | HEART RATE: 72 BPM | HEIGHT: 63 IN | WEIGHT: 114.8 LBS

## 2018-08-04 DIAGNOSIS — I10 ESSENTIAL HYPERTENSION: ICD-10-CM

## 2018-08-04 PROCEDURE — 99213 OFFICE O/P EST LOW 20 MIN: CPT | Performed by: FAMILY MEDICINE

## 2018-08-04 ASSESSMENT — ENCOUNTER SYMPTOMS
CHILLS: 0
WEIGHT LOSS: 0
EYE DISCHARGE: 0
SHORTNESS OF BREATH: 0
SENSORY CHANGE: 0
COUGH: 0
EYE REDNESS: 0
PALPITATIONS: 0
HEADACHES: 0
DIARRHEA: 0
FEVER: 0
FOCAL WEAKNESS: 0
NAUSEA: 0
SPUTUM PRODUCTION: 0
SORE THROAT: 0
ABDOMINAL PAIN: 0
NECK PAIN: 0
ORTHOPNEA: 0

## 2018-08-04 NOTE — PROGRESS NOTES
Subjective:      Dayana Lechuga is a 89 y.o. female who presents with Hypertension (has appt with her primary next week) and Headache            Subjective:    Dayana Lechuga is a 89 y.o. female who presents for evaluation of elevated blood pressures. She has hx of HTN and due to labile HTN she was in hospital last weak. She is on carvedilol, lisinpril and amlodapine. She Cardiac symptoms: chest pain, chest pressure/discomfort, dyspnea, palpitations, irregular heart beats, near-syncope, syncope, fatigue. Cardiovascular risk factors: dyslipidemia, hypertension and anxiety.      Past Medical History:  No date: Allergy  No date: Anxiety  No date: ASTHMA  No date: CAD (coronary artery disease)  No date: Hyperlipidemia  No date: Hypertension  No date: OSTEOPOROSIS  Patient Active Problem List    Back pain         Priority: High (1)         Date Noted: 09/10/2016      Hypertension         Priority: Medium (2)         Date Noted: 09/10/2016      Coronary artery disease involving native coronary artery of native heart with angina pectoris (HCC)         Priority: Medium (2)         Date Noted: 08/10/2016      Dyslipidemia         Priority: Medium (2)         Date Noted: 10/31/2009      HTN (hypertension)         Priority: Medium (2)         Date Noted: 10/02/2009      Anxiety         Priority: Low (3)         Date Noted: 06/22/2016      GERD (gastroesophageal reflux disease)         Priority: Low (3)         Date Noted: 10/31/2009      Osteopenia         Priority: Low (3)         Date Noted: 10/31/2009      ASTHMA         Priority: Low (3)         Date Noted: 10/02/2009      S/P bilateral cataract extraction         Date Noted: 02/27/2018      Right leg pain         Date Noted: 11/15/2017      Benzodiazepine dependence (HCC)         Date Noted: 11/13/2017      Past Surgical History:  No date: ABDOMINAL HYSTERECTOMY TOTAL  No date: APPENDECTOMY  No date: CARDIAC CATH  No date: HERNIA REPAIR  No date:  TONSILLECTOMY  Review of patient's family history indicates:  Problem: Heart Disease      Relation: Neg Hx       Age of Onset: (Not Specified)   Problem: Heart Failure      Relation: Neg Hx       Age of Onset: (Not Specified)   Problem: Hyperlipidemia      Relation: Neg Hx       Age of Onset: (Not Specified)     Social History    Marital status:             Spouse name:                       Years of education:                 Number of children:               Social History Main Topics    Smoking status: Never Smoker                                                                Smokeless tobacco: Never Used                        Alcohol use: No              Drug use: No              Current Outpatient Prescriptions:  beclomethasone (QVAR) 80 MCG/ACT inhaler, Use 1 puff twice daily, Disp: 3 Inhaler, Rfl: 0  carvedilol (COREG) 12.5 MG Tab, TAKE 1 TABLET BY MOUTH TWICE A DAY WITH MEALS, Disp: 180 Tab, Rfl: 1  lisinopril (PRINIVIL) 20 MG Tab, TAKE 1 TABLET BY MOUTH ONE TIME A DAY, Disp: 90 Tab, Rfl: 1  amLODIPine (NORVASC) 5 MG Tab, TAKE 1 TABLET BY MOUTH ONE TIME A DAY, Disp: 90 Tab, Rfl: 1  atorvastatin (LIPITOR) 40 MG Tab, TAKE 1 TABLET BY MOUTH ATBEDTIME, Disp: 90 Tab, Rfl: 1  LORazepam (ATIVAN) 1 MG Tab, Take 1 Tab by mouth every 12 hours as needed for Anxiety for up to 90 days. TAKE 1 TABLET BY MOUTH 2 TIMES A DAY AS NEEDED FOR ANXIETY., Disp: 60 Tab, Rfl: 2  potassium chloride SA (KDUR) 20 MEQ Tab CR, TAKE 1 TABLET BY MOUTH TWICE DAILY, Disp: 180 Tab, Rfl: 1  cyclobenzaprine (FLEXERIL) 5 MG tablet, Take 1-2 Tabs by mouth 3 times a day as needed., Disp: 30 Tab, Rfl: 0  ascorbic acid (ASCORBIC ACID) 500 MG Tab, Take 500 mg by mouth every day., Disp: , Rfl:   fluticasone (FLONASE) 50 MCG/ACT nasal spray, Spray 1 Spray in nose every day. EACH NOSTRIL, Disp: 32 g, Rfl: 1  nitroglycerin (NITROSTAT) 0.4 MG SL Tab, Place 1 tab under tongue every 5 min as needed for chest pain max 3 doses, Disp: 25 Tab, Rfl:  1  aspirin (ASA) 81 MG Chew Tab chewable tablet, Take 1 Tab by mouth every day., Disp: 100 Tab, Rfl: 11  Cholecalciferol (VITAMIN D) 2000 UNIT Tab, Take 2,000 Units by mouth every day., Disp: , Rfl:   Biotin 10 MG Tab, Take 10 mg by mouth every day., Disp: , Rfl:   Omega-3 Fatty Acids (FISH OIL) 1200 MG Cap, Take 1,200 mg by mouth every day., Disp: , Rfl:   omeprazole (PRILOSEC) 20 MG delayed-release capsule, Take 20 mg by mouth every day., Disp: , Rfl:   beclomethasone HFA (QVAR REDIHALER) 80 MCG/ACT inhaler, Inhale 1 Puff by mouth 2 times a day., Disp: 1 Inhaler, Rfl: 3  Psyllium (METAMUCIL) 28.3 % Powder, Take  by mouth., Disp: , Rfl:   alendronate (FOSAMAX) 35 MG tablet, TAKE 1 TABLET BY MOUTH EVERY 7 DAYS, Disp: 12 Tab, Rfl: 3  guaifenesin LA (MUCINEX) 600 MG TABLET SR 12 HR, Take 600 mg by mouth every 12 hours., Disp: , Rfl:   albuterol 108 (90 Base) MCG/ACT Aero Soln inhalation aerosol, Inhale 2 Puffs by mouth every 6 hours as needed for Shortness of Breath., Disp: 20.1 g, Rfl: 0  diphenhydrAMINE (BENADRYL) 25 MG Tab, Take 25 mg by mouth every 6 hours as needed for Sleep., Disp: , Rfl:   VITAMIN E PO, Take  by mouth., Disp: , Rfl:   Zinc 50 MG Cap, Take 50 mg by mouth every day., Disp: , Rfl:   Multiple Vitamins-Minerals (CENTRUM SILVER ADULT 50+) Tab, Take 1 Tab by mouth every day., Disp: , Rfl:   Calcium Carbonate (CALCIUM 600 PO), Take 600 mg by mouth every day., Disp: , Rfl:     No current facility-administered medications for this visit.      -- Pcn (Penicillins) -- Rash    --  About 70 years   -- Codeine -- Vomiting                  Review of Systems   Constitutional: Negative for chills, fever and weight loss.   HENT: Negative for congestion, ear pain and sore throat.    Eyes: Negative for discharge and redness.   Respiratory: Negative for cough, sputum production and shortness of breath.    Cardiovascular: Negative for chest pain, palpitations and orthopnea.   Gastrointestinal: Negative for abdominal  "pain, diarrhea and nausea.   Musculoskeletal: Negative for neck pain.   Skin: Negative for itching and rash.   Neurological: Negative for sensory change, focal weakness and headaches.          Objective:     /80   Pulse 72   Temp 37 °C (98.6 °F)   Resp 16   Ht 1.6 m (5' 3\")   Wt 52.1 kg (114 lb 12.8 oz)   SpO2 98%   BMI 20.34 kg/m²      Physical Exam   Constitutional: She is oriented to person, place, and time. She appears well-developed and well-nourished.   HENT:   Head: Normocephalic and atraumatic.   Cardiovascular: Normal rate, regular rhythm and normal heart sounds.    No murmur heard.  Pulmonary/Chest: Effort normal and breath sounds normal. No respiratory distress. She has no wheezes.   Neurological: She is alert and oriented to person, place, and time. She displays normal reflexes.   Skin: Skin is warm and dry.   Psychiatric: She has a normal mood and affect.               Assessment/Plan:     Assessment:    Hypertension       Plan:    Medication: no change  Dietary sodium restriction  Regular aerobic exercise  F/u with PCP     "

## 2018-08-07 ENCOUNTER — OFFICE VISIT (OUTPATIENT)
Dept: MEDICAL GROUP | Facility: PHYSICIAN GROUP | Age: 83
End: 2018-08-07
Payer: MEDICARE

## 2018-08-07 VITALS
BODY MASS INDEX: 20.91 KG/M2 | HEART RATE: 62 BPM | TEMPERATURE: 98.4 F | DIASTOLIC BLOOD PRESSURE: 64 MMHG | WEIGHT: 118 LBS | OXYGEN SATURATION: 98 % | SYSTOLIC BLOOD PRESSURE: 110 MMHG | RESPIRATION RATE: 16 BRPM | HEIGHT: 63 IN

## 2018-08-07 DIAGNOSIS — I10 ESSENTIAL HYPERTENSION: ICD-10-CM

## 2018-08-07 DIAGNOSIS — F13.20 BENZODIAZEPINE DEPENDENCE (HCC): ICD-10-CM

## 2018-08-07 DIAGNOSIS — F41.9 ANXIETY: ICD-10-CM

## 2018-08-07 DIAGNOSIS — M85.80 OSTEOPENIA, UNSPECIFIED LOCATION: Chronic | ICD-10-CM

## 2018-08-07 PROCEDURE — 99214 OFFICE O/P EST MOD 30 MIN: CPT | Performed by: NURSE PRACTITIONER

## 2018-08-07 NOTE — PROGRESS NOTES
Chief Complaint   Patient presents with   • Hypertension     [facility] ER, referral to Cardiology         This is a 89 y.o.female patient that presents today with the following: Follow-up ER visit, discuss other acute and chronic conditions    Osteopenia  This is a chronic condition, stable.  She has been alendronate in the past for which she believes was osteoporosis.  Her most recent DEXA scan does show osteopenia and she was advised to restart the alendronate.  However patient has great anxiety surrounding all of her medications as of late as she has been reading the drug information inserts.  Patient was reassured discussing that the benefits far outweigh the risks but it was ultimately up to her whether or not she takes the medication.  She was also very concerned about whether or not she should be on calcium as recent labs did show a mild increase of her calcium level, but upon repeating it it was well within normal limits.  I did discuss with her that for osteopenia is important that she has adequate calcium and vitamin D supplementation.  She will take a very low dose calcium supplement and we will recheck this lab before she follows up with me.    HTN (hypertension)  This is a chronic condition, stable and very well controlled on current medications including carvedilol, lisinopril and amlodipine.  Blood pressure today is 110/64.  However, she has had great anxiety surrounding her blood pressure as she often takes it multiple times per day and states that it continues to go up every time she takes it.  She recently presented to the emergency room because her blood pressure is running in the 170s-180s over the 90s-100s.  I had a long discussion with her about anxiety surrounding her hypertension and that she only needs to take her blood pressure no more than once per day and needs to decrease this down to maybe 3 times per week only Monday, Wednesday and Saturday.  Although hesitant, she is  is agreeable to  decreasing the amount of times she checks her blood pressure.  She will be due for labs before she follows up with me in 3 months.  However, she does have an appointment with me at the end of the month she would like to keep for follow-up.    Benzodiazepine dependence (CMS-HCC) (HCC)  This is a chronic condition, stable.  She takes lorazepam 1 mg twice daily, 1 for anxiety and one for insomnia.  She is on a controlled substance agreement and states that she tries to decrease the use of this, but she is quite adamant and not willing to go back on any SSRI at this time for better control of her anxiety because she states she does not tolerate them.    Anxiety  Positive this is a chronic condition suboptimally controlled, Currently with lorazepam 1 mg twice daily as needed.  She is on a controlled substance agreement for this as she adamantly declines any SSRIs to control her anxiety stating she does not tolerate them and states only Lorazepam controls her anxiety.  We have had multiple discussions regarding this, discussed with her that her anxiety at this time is not controlled well.      No visits with results within 1 Month(s) from this visit.   Latest known visit with results is:   Hospital Outpatient Visit on 04/05/2018   Component Date Value   • Calcium 04/05/2018 9.9          clinical course has been stable    Past Medical History:   Diagnosis Date   • Allergy    • Anxiety    • ASTHMA    • CAD (coronary artery disease)    • Hyperlipidemia    • Hypertension    • OSTEOPOROSIS        Past Surgical History:   Procedure Laterality Date   • ABDOMINAL HYSTERECTOMY TOTAL     • APPENDECTOMY     • CARDIAC CATH     • HERNIA REPAIR     • TONSILLECTOMY         Family History   Problem Relation Age of Onset   • Heart Disease Neg Hx    • Heart Failure Neg Hx    • Hyperlipidemia Neg Hx        Pcn [penicillins] and Codeine    Current Outpatient Prescriptions Ordered in [facility]   Medication Sig Dispense Refill   • beclomethasone  HFA (QVAR REDIHALER) 80 MCG/ACT inhaler Inhale 1 Puff by mouth 2 times a day. 1 Inhaler 3   • carvedilol (COREG) 12.5 MG Tab TAKE 1 TABLET BY MOUTH TWICE A DAY WITH MEALS 180 Tab 1   • lisinopril (PRINIVIL) 20 MG Tab TAKE 1 TABLET BY MOUTH ONE TIME A DAY 90 Tab 1   • amLODIPine (NORVASC) 5 MG Tab TAKE 1 TABLET BY MOUTH ONE TIME A DAY 90 Tab 1   • atorvastatin (LIPITOR) 40 MG Tab TAKE 1 TABLET BY MOUTH ATBEDTIME 90 Tab 1   • Psyllium (METAMUCIL) 28.3 % Powder Take  by mouth.     • LORazepam (ATIVAN) 1 MG Tab Take 1 Tab by mouth every 12 hours as needed for Anxiety for up to 90 days. TAKE 1 TABLET BY MOUTH 2 TIMES A DAY AS NEEDED FOR ANXIETY. 60 Tab 2   • potassium chloride SA (KDUR) 20 MEQ Tab CR TAKE 1 TABLET BY MOUTH TWICE DAILY 180 Tab 1   • cyclobenzaprine (FLEXERIL) 5 MG tablet Take 1-2 Tabs by mouth 3 times a day as needed. 30 Tab 0   • guaifenesin LA (MUCINEX) 600 MG TABLET SR 12 HR Take 600 mg by mouth every 12 hours.     • ascorbic acid (ASCORBIC ACID) 500 MG Tab Take 500 mg by mouth every day.     • fluticasone (FLONASE) 50 MCG/ACT nasal spray Spray 1 Spray in nose every day. EACH NOSTRIL 32 g 1   • nitroglycerin (NITROSTAT) 0.4 MG SL Tab Place 1 tab under tongue every 5 min as needed for chest pain max 3 doses 25 Tab 1   • VITAMIN E PO Take  by mouth.     • aspirin (ASA) 81 MG Chew Tab chewable tablet Take 1 Tab by mouth every day. 100 Tab 11   • Cholecalciferol (VITAMIN D) 2000 UNIT Tab Take 2,000 Units by mouth every day.     • Biotin 10 MG Tab Take 10 mg by mouth every day.     • Zinc 50 MG Cap Take 50 mg by mouth every day.     • Multiple Vitamins-Minerals (CENTRUM SILVER ADULT 50+) Tab Take 1 Tab by mouth every day.     • Omega-3 Fatty Acids (FISH OIL) 1200 MG Cap Take 1,200 mg by mouth every day.     • omeprazole (PRILOSEC) 20 MG delayed-release capsule Take 20 mg by mouth every day.     • beclomethasone (QVAR) 80 MCG/ACT inhaler Use 1 puff twice daily 3 Inhaler 0   • alendronate (FOSAMAX) 35 MG  "tablet TAKE 1 TABLET BY MOUTH EVERY 7 DAYS 12 Tab 3   • albuterol 108 (90 Base) MCG/ACT Aero Soln inhalation aerosol Inhale 2 Puffs by mouth every 6 hours as needed for Shortness of Breath. 20.1 g 0   • diphenhydrAMINE (BENADRYL) 25 MG Tab Take 25 mg by mouth every 6 hours as needed for Sleep.     • Calcium Carbonate (CALCIUM 600 PO) Take 600 mg by mouth every day.       No current [facility]-ordered facility-administered medications on file.        Constitutional ROS: No unexpected change in weight, No weakness, No unexplained fevers, sweats, or chills  Pulmonary ROS: No chronic cough, sputum, or hemoptysis, No shortness of breath, No recent change in breathing  Cardiovascular ROS: No chest pain, No edema, No palpitations, Positive for hypertension   Gastrointestinal ROS: No abdominal pain, No nausea, vomiting, diarrhea, or constipation  Musculoskeletal/Extremities ROS: No clubbing, No peripheral edema, No pain, redness or swelling on the joints, Positive for osteopenia  Neurologic ROS: Normal development, No seizures, No weakness  Psychiatric ROS: Positive for anxiety    Physical exam:  /64   Pulse 62   Temp 36.9 °C (98.4 °F)   Resp 16   Ht 1.6 m (5' 3\")   Wt 53.5 kg (118 lb)   SpO2 98%   BMI 20.90 kg/m²   General Appearance: alert, no distress, elderly female, well-nourished, well-groomed  Skin: Skin color, texture, turgor normal. No rashes or lesions.  Lungs: negative findings: normal respiratory rate and rhythm, lungs clear to auscultation  Heart: negative. RRR without murmur, gallop, or rubs.  No ectopy.  Abdomen: Abdomen soft, non-tender. BS normal. No masses,  No organomegaly  Musculoskeletal: negative findings: no evidence of joint instability, no evidence of muscle atrophy, no deformities present  Neurologic: intact, CN II through XII grossly intact    Medical decision making/discussion: Patient was advised not to take her blood pressure multiple times per day but only once per day, only 3 days per " week including Monday, Wednesday and Saturday.  She was advised to eat healthy including adequate amounts of fiber from fruits and vegetables.  Did discuss with her it is okay to take MiraLAX on a daily basis to keep herself regular.  With regards to concerns on calcium supplementation, advised her to just be on the lowest over-the-counter dose possible, this can even come from her multivitamin.  She has an appointment with me at the end of the month, she is to keep that for follow-up.    Dayana was seen today for hypertension.    Diagnoses and all orders for this visit:    Essential hypertension    Benzodiazepine dependence (HCC)    Osteopenia, unspecified location    Anxiety          Please note that this dictation was created using voice recognition software. I have made every reasonable attempt to correct obvious errors, but I expect that there are errors of grammar and possibly content that I did not discover before finalizing the note.

## 2018-08-07 NOTE — PATIENT INSTRUCTIONS
Take BP maybe 3 times daily, Monday, Wednesday and Saturday    Was advised to stay well hydrated, eat adequate amounts of fiber--fruits AND veggies    Ok to take Miralax, 1 capful daily to keep regular    Take calcium just the multiple vitamin or a lowest dose by itself

## 2018-08-08 NOTE — ASSESSMENT & PLAN NOTE
Positive this is a chronic condition suboptimally controlled, Currently with lorazepam 1 mg twice daily as needed.  She is on a controlled substance agreement for this as she adamantly declines any SSRIs to control her anxiety stating she does not tolerate them and states only Lorazepam controls her anxiety.  We have had multiple discussions regarding this, discussed with her that her anxiety at this time is not controlled well.

## 2018-08-08 NOTE — ASSESSMENT & PLAN NOTE
This is a chronic condition, stable.  She takes lorazepam 1 mg twice daily, 1 for anxiety and one for insomnia.  She is on a controlled substance agreement and states that she tries to decrease the use of this, but she is quite adamant and not willing to go back on any SSRI at this time for better control of her anxiety because she states she does not tolerate them.

## 2018-08-08 NOTE — ASSESSMENT & PLAN NOTE
This is a chronic condition, stable.  She has been alendronate in the past for which she believes was osteoporosis.  Her most recent DEXA scan does show osteopenia and she was advised to restart the alendronate.  However patient has great anxiety surrounding all of her medications as of late as she has been reading the drug information inserts.  Patient was reassured discussing that the benefits far outweigh the risks but it was ultimately up to her whether or not she takes the medication.  She was also very concerned about whether or not she should be on calcium as recent labs did show a mild increase of her calcium level, but upon repeating it it was well within normal limits.  I did discuss with her that for osteopenia is important that she has adequate calcium and vitamin D supplementation.  She will take a very low dose calcium supplement and we will recheck this lab before she follows up with me.

## 2018-08-08 NOTE — ASSESSMENT & PLAN NOTE
This is a chronic condition, stable and very well controlled on current medications including carvedilol, lisinopril and amlodipine.  Blood pressure today is 110/64.  However, she has had great anxiety surrounding her blood pressure as she often takes it multiple times per day and states that it continues to go up every time she takes it.  She recently presented to the emergency room because her blood pressure is running in the 170s-180s over the 90s-100s.  I had a long discussion with her about anxiety surrounding her hypertension and that she only needs to take her blood pressure no more than once per day and needs to decrease this down to maybe 3 times per week only Monday, Wednesday and Saturday.  Although hesitant, she is  is agreeable to decreasing the amount of times she checks her blood pressure.  She will be due for labs before she follows up with me in 3 months.  However, she does have an appointment with me at the end of the month she would like to keep for follow-up.

## 2018-08-29 ENCOUNTER — OFFICE VISIT (OUTPATIENT)
Dept: MEDICAL GROUP | Facility: PHYSICIAN GROUP | Age: 83
End: 2018-08-29
Payer: MEDICARE

## 2018-08-29 VITALS
OXYGEN SATURATION: 96 % | HEART RATE: 62 BPM | WEIGHT: 116 LBS | BODY MASS INDEX: 20.55 KG/M2 | SYSTOLIC BLOOD PRESSURE: 120 MMHG | RESPIRATION RATE: 16 BRPM | DIASTOLIC BLOOD PRESSURE: 70 MMHG | HEIGHT: 63 IN | TEMPERATURE: 98.4 F

## 2018-08-29 DIAGNOSIS — I25.119 CORONARY ARTERY DISEASE INVOLVING NATIVE CORONARY ARTERY OF NATIVE HEART WITH ANGINA PECTORIS (HCC): ICD-10-CM

## 2018-08-29 DIAGNOSIS — K59.01 SLOW TRANSIT CONSTIPATION: ICD-10-CM

## 2018-08-29 DIAGNOSIS — F41.9 ANXIETY: ICD-10-CM

## 2018-08-29 DIAGNOSIS — I10 ESSENTIAL HYPERTENSION: ICD-10-CM

## 2018-08-29 PROCEDURE — 99214 OFFICE O/P EST MOD 30 MIN: CPT | Performed by: NURSE PRACTITIONER

## 2018-08-29 RX ORDER — LORAZEPAM 1 MG/1
1 TABLET ORAL EVERY 12 HOURS PRN
Qty: 60 TAB | Refills: 2 | Status: SHIPPED
Start: 2018-09-04 | End: 2018-12-06 | Stop reason: SDUPTHER

## 2018-08-29 NOTE — PROGRESS NOTES
Chief Complaint   Patient presents with   • Anxiety     med refill         This is a 89 y.o.female patient that presents today with the following: follow up    Anxiety  This is chronic, stable and controlled with Lorazepam 1 mg twice daily.  She is on a controlled substance agreement as she states that this is the only thing that works for her and adamantly declines any other SSRIs to control her symptoms.  She is due for refill, this was refilled for her.  The [facility] was reviewed and she is taking it appropriately.    Coronary artery disease involving native coronary artery of native heart with angina pectoris  This is a chronic condition, stable.  She is followed by cardiology and is due to have follow-up appointment sometime in September.  She does have nitroglycerin on hand for angina, but has not had to use it recently.  She was encouraged to make an appointment with her cardiologist sometime in September    HTN (hypertension)  This is a chronic condition, stable and well controlled on current medications including carvedilol, lisinopril and amlodipine.  Blood pressure today is well within normal limits and she denies symptoms of hypertension.  She was recently seen in early August for concerns regarding her blood pressure.  She has been taking her blood pressure 3-4 times per week and has been within normal limits.  I advised her to take this only once to twice per week now.    Slow transit constipation  Patient continues to struggle with constipation.  She was advised to use MiraLAX at her last visit, she states that if she uses it every day it causes diarrhea.  Advised to take only every other day and to make sure she is getting adequate amounts of fluids.      No visits with results within 1 Month(s) from this visit.   Latest known visit with results is:   Hospital Outpatient Visit on 04/05/2018   Component Date Value   • Calcium 04/05/2018 9.9          clinical course has been stable    Past Medical  History:   Diagnosis Date   • Allergy    • Anxiety    • ASTHMA    • CAD (coronary artery disease)    • Hyperlipidemia    • Hypertension    • OSTEOPOROSIS        Past Surgical History:   Procedure Laterality Date   • ABDOMINAL HYSTERECTOMY TOTAL     • APPENDECTOMY     • CARDIAC CATH     • HERNIA REPAIR     • TONSILLECTOMY         Family History   Problem Relation Age of Onset   • Heart Disease Neg Hx    • Heart Failure Neg Hx    • Hyperlipidemia Neg Hx        Pcn [penicillins] and Codeine    Current Outpatient Prescriptions Ordered in [facility]   Medication Sig Dispense Refill   • [START ON 9/4/2018] LORazepam (ATIVAN) 1 MG Tab Take 1 Tab by mouth every 12 hours as needed for Anxiety for up to 90 days. TAKE 1 TABLET BY MOUTH 2 TIMES A DAY AS NEEDED FOR ANXIETY. 60 Tab 2   • beclomethasone HFA (QVAR REDIHALER) 80 MCG/ACT inhaler Inhale 1 Puff by mouth 2 times a day. 1 Inhaler 3   • carvedilol (COREG) 12.5 MG Tab TAKE 1 TABLET BY MOUTH TWICE A DAY WITH MEALS 180 Tab 1   • lisinopril (PRINIVIL) 20 MG Tab TAKE 1 TABLET BY MOUTH ONE TIME A DAY 90 Tab 1   • amLODIPine (NORVASC) 5 MG Tab TAKE 1 TABLET BY MOUTH ONE TIME A DAY 90 Tab 1   • atorvastatin (LIPITOR) 40 MG Tab TAKE 1 TABLET BY MOUTH ATBEDTIME 90 Tab 1   • Psyllium (METAMUCIL) 28.3 % Powder Take  by mouth.     • potassium chloride SA (KDUR) 20 MEQ Tab CR TAKE 1 TABLET BY MOUTH TWICE DAILY 180 Tab 1   • alendronate (FOSAMAX) 35 MG tablet TAKE 1 TABLET BY MOUTH EVERY 7 DAYS 12 Tab 3   • guaifenesin LA (MUCINEX) 600 MG TABLET SR 12 HR Take 600 mg by mouth every 12 hours.     • ascorbic acid (ASCORBIC ACID) 500 MG Tab Take 500 mg by mouth every day.     • fluticasone (FLONASE) 50 MCG/ACT nasal spray Spray 1 Spray in nose every day. EACH NOSTRIL 32 g 1   • albuterol 108 (90 Base) MCG/ACT Aero Soln inhalation aerosol Inhale 2 Puffs by mouth every 6 hours as needed for Shortness of Breath. 20.1 g 0   • nitroglycerin (NITROSTAT) 0.4 MG SL Tab Place 1 tab under tongue every 5  "min as needed for chest pain max 3 doses 25 Tab 1   • diphenhydrAMINE (BENADRYL) 25 MG Tab Take 25 mg by mouth every 6 hours as needed for Sleep.     • VITAMIN E PO Take  by mouth.     • aspirin (ASA) 81 MG Chew Tab chewable tablet Take 1 Tab by mouth every day. 100 Tab 11   • Cholecalciferol (VITAMIN D) 2000 UNIT Tab Take 2,000 Units by mouth every day.     • Biotin 10 MG Tab Take 10 mg by mouth every day.     • Zinc 50 MG Cap Take 50 mg by mouth every day.     • Multiple Vitamins-Minerals (CENTRUM SILVER ADULT 50+) Tab Take 1 Tab by mouth every day.     • Omega-3 Fatty Acids (FISH OIL) 1200 MG Cap Take 1,200 mg by mouth every day.     • Calcium Carbonate (CALCIUM 600 PO) Take 600 mg by mouth every day.     • omeprazole (PRILOSEC) 20 MG delayed-release capsule Take 20 mg by mouth every day.     • beclomethasone (QVAR) 80 MCG/ACT inhaler Use 1 puff twice daily 3 Inhaler 0   • cyclobenzaprine (FLEXERIL) 5 MG tablet Take 1-2 Tabs by mouth 3 times a day as needed. 30 Tab 0     No current Epic-ordered facility-administered medications on file.        Constitutional ROS: No unexpected change in weight, No weakness, No unexplained fevers, sweats, or chills  Pulmonary ROS: No chronic cough, sputum, or hemoptysis, No shortness of breath, No recent change in breathing  Cardiovascular ROS: No chest pain, No edema, No palpitations, Positive for hypertension   Gastrointestinal ROS: No abdominal pain, No nausea, vomiting, diarrhea. Positive for constipation   Musculoskeletal/Extremities ROS: No clubbing, No peripheral edema, No pain, redness or swelling on the joints  Neurologic ROS: Normal development, No seizures, No weakness  Psychiatric ROS: positive per HPI    Physical exam:  /70   Pulse 62   Temp 36.9 °C (98.4 °F)   Resp 16   Ht 1.6 m (5' 3\")   Wt 52.6 kg (116 lb)   SpO2 96%   BMI 20.55 kg/m²   General Appearance: elderly female, alert, no distress, well nourished, well groomed  Skin: Skin color, texture, " turgor normal. No rashes or lesions.  Lungs: negative findings: normal respiratory rate and rhythm, lungs clear to auscultation  Heart: negative. RRR without murmur, gallop, or rubs.  No ectopy.  Abdomen: Abdomen soft, non-tender. BS normal. No masses,  No organomegaly  Musculoskeletal: negative findings: no evidence of joint instability, strength normal, no deformities present  Neurologic: intact    Medical decision making/discussion: lorazepam refilled. She is to have annual cardiology exam. She is to aim for 48-64 ounces of fluids per day. She can take the Miralax as needed. Will have her follow up in 3 months, sooner if needed.    Dayana was seen today for anxiety.    Diagnoses and all orders for this visit:    Essential hypertension    Slow transit constipation    Anxiety  -     LORazepam (ATIVAN) 1 MG Tab; Take 1 Tab by mouth every 12 hours as needed for Anxiety for up to 90 days. TAKE 1 TABLET BY MOUTH 2 TIMES A DAY AS NEEDED FOR ANXIETY.    Coronary artery disease involving native coronary artery of native heart with angina pectoris (HCC)          Please note that this dictation was created using voice recognition software. I have made every reasonable attempt to correct obvious errors, but I expect that there are errors of grammar and possibly content that I did not discover before finalizing the note.

## 2018-08-30 NOTE — ASSESSMENT & PLAN NOTE
This is a chronic condition, stable.  She is followed by cardiology and is due to have follow-up appointment sometime in September.  She does have nitroglycerin on hand for angina, but has not had to use it recently.  She was encouraged to make an appointment with her cardiologist sometime in September

## 2018-08-30 NOTE — ASSESSMENT & PLAN NOTE
Patient continues to struggle with constipation.  She was advised to use MiraLAX at her last visit, she states that if she uses it every day it causes diarrhea.  Advised to take only every other day and to make sure she is getting adequate amounts of fluids.

## 2018-08-30 NOTE — ASSESSMENT & PLAN NOTE
This is chronic, stable and controlled with Lorazepam 1 mg twice daily.  She is on a controlled substance agreement as she states that this is the only thing that works for her and adamantly declines any other SSRIs to control her symptoms.  She is due for refill, this was refilled for her.  The [facility] was reviewed and she is taking it appropriately.

## 2018-08-30 NOTE — ASSESSMENT & PLAN NOTE
This is a chronic condition, stable and well controlled on current medications including carvedilol, lisinopril and amlodipine.  Blood pressure today is well within normal limits and she denies symptoms of hypertension.  She was recently seen in early August for concerns regarding her blood pressure.  She has been taking her blood pressure 3-4 times per week and has been within normal limits.  I advised her to take this only once to twice per week now.

## 2018-09-10 RX ORDER — POTASSIUM CHLORIDE 20 MEQ/1
TABLET, EXTENDED RELEASE ORAL
Qty: 180 TAB | Refills: 1 | Status: SHIPPED | OUTPATIENT
Start: 2018-09-10 | End: 2019-01-08 | Stop reason: SDUPTHER

## 2018-09-10 NOTE — TELEPHONE ENCOUNTER
Was the patient seen in the last year in this department? Yes    Does patient have an active prescription for medications requested? No     Received Request Via: Pharmacy      Pt met protocol?: Yes    OV 8/18

## 2018-10-05 ENCOUNTER — TELEPHONE (OUTPATIENT)
Dept: HEALTH INFORMATION MANAGEMENT | Facility: OTHER | Age: 83
End: 2018-10-05

## 2018-10-05 NOTE — TELEPHONE ENCOUNTER
Dear Ashleigh,    Patient called to see if she is able to wear compression stockings. She was concerned by package label that said not to use if she has PAD. She reports pain in her right leg, but I do not see PAD in her chart and wanted to confirm with you.    Thank you,  Alan Morales, PharmD [phone]

## 2018-10-11 NOTE — TELEPHONE ENCOUNTER
If she sees a vascular specialist, would be good idea for her to call him/her to find out if ok to wear the stockings.

## 2018-10-22 ENCOUNTER — PATIENT OUTREACH (OUTPATIENT)
Dept: HEALTH INFORMATION MANAGEMENT | Facility: OTHER | Age: 83
End: 2018-10-22

## 2018-10-22 NOTE — PROGRESS NOTES
Received VM from patient stating she has relocated to [facility] in [place], [place]. She would like to reschedule upcoming cardiology and PCP visits to [place] offices. Outbound call to scheduling. Soonest available appt with Dr. Jones is 1/30 @ 8am with Dr. Jones at [address] [place] [address] [address]. Next available with PCP at [facility] is with Ned Lee on 11/7/18 at 11:20am. Patient was given all pertinent info regarding appointments. She also has an active MyChart with access to all appointment info. Patient expressed understanding and appreciates the coordination of care.

## 2018-11-01 RX ORDER — FLUTICASONE PROPIONATE 50 MCG
SPRAY, SUSPENSION (ML) NASAL
Qty: 32 G | Refills: 0 | Status: SHIPPED | OUTPATIENT
Start: 2018-11-01 | End: 2019-01-08 | Stop reason: SDUPTHER

## 2018-11-06 ENCOUNTER — TELEPHONE (OUTPATIENT)
Dept: MEDICAL GROUP | Facility: PHYSICIAN GROUP | Age: 83
End: 2018-11-06

## 2018-11-07 ENCOUNTER — OFFICE VISIT (OUTPATIENT)
Dept: MEDICAL GROUP | Facility: PHYSICIAN GROUP | Age: 83
End: 2018-11-07
Payer: MEDICARE

## 2018-11-07 VITALS
RESPIRATION RATE: 16 BRPM | SYSTOLIC BLOOD PRESSURE: 112 MMHG | WEIGHT: 115 LBS | BODY MASS INDEX: 20.38 KG/M2 | TEMPERATURE: 98.1 F | HEART RATE: 70 BPM | OXYGEN SATURATION: 95 % | DIASTOLIC BLOOD PRESSURE: 66 MMHG | HEIGHT: 63 IN

## 2018-11-07 DIAGNOSIS — I73.9 CLAUDICATION OF RIGHT LOWER EXTREMITY (HCC): ICD-10-CM

## 2018-11-07 DIAGNOSIS — K21.9 GASTROESOPHAGEAL REFLUX DISEASE, ESOPHAGITIS PRESENCE NOT SPECIFIED: Chronic | ICD-10-CM

## 2018-11-07 DIAGNOSIS — F13.20 BENZODIAZEPINE DEPENDENCE (HCC): ICD-10-CM

## 2018-11-07 DIAGNOSIS — M79.604 RIGHT LEG PAIN: ICD-10-CM

## 2018-11-07 PROCEDURE — 99214 OFFICE O/P EST MOD 30 MIN: CPT | Performed by: NURSE PRACTITIONER

## 2018-11-07 ASSESSMENT — PATIENT HEALTH QUESTIONNAIRE - PHQ9: CLINICAL INTERPRETATION OF PHQ2 SCORE: 0

## 2018-11-07 NOTE — ASSESSMENT & PLAN NOTE
Chronic in nature.  This started 10 months to 1 year ago. Noticed aching in her leg, lots of pressure. States that it starts in the calf and then moves up the leg into the hip. Pain more on the top of the arch. States that she has numbness and feels like her feet are lways cold. Numbness does go away.  States that symptoms worsen with exercise states that it is at first a sharp pain and then becomes a dull ache.  Patient does report tenderness along her calf states that she has not noticed swelling or redness. States that she does take ibuprofen for back pain. States that this does improve the back pain.  Patient states that she has not noticed any change in the color of her calf with exercise but states that symptoms always start when she is walking.

## 2018-11-08 NOTE — ASSESSMENT & PLAN NOTE
Chronic in nature.  Patient states that she was started on Ativan after a single episode of panic.  Patient states that she has been on this medication twice daily times 6 years states that she currently takes it mostly to help her sleep as she has difficulty falling asleep without the medication.  Patient is tearful during office visit today.  Discussed with patient possibility of switching to a daily medication, weaning down on her current dose of Ativan patient is amenable to attempting this.

## 2018-11-08 NOTE — ASSESSMENT & PLAN NOTE
Chronic in nature.  Stable per patient.  She is currently taking omeprazole 20mg. No early satiety, unintentional weight loss, choking, persistent burning pain in chest or upper abdomen.

## 2018-11-08 NOTE — PROGRESS NOTES
Chief Complaint   Patient presents with   • Leg Pain     x 10 Monhs; R        HISTORY OF THE PRESENT ILLNESS: This is a 89 y.o. female new patient to our practice. This pleasant patient is here today to discuss right leg pain.    Right leg pain  Chronic in nature.  This started 10 months to 1 year ago. Noticed aching in her leg, lots of pressure. States that it starts in the calf and then moves up the leg into the hip. Pain more on the top of the arch. States that she has numbness and feels like her feet are lways cold. Numbness does go away.  States that symptoms worsen with exercise states that it is at first a sharp pain and then becomes a dull ache.  Patient does report tenderness along her calf states that she has not noticed swelling or redness. States that she does take ibuprofen for back pain. States that this does improve the back pain.  Patient states that she has not noticed any change in the color of her calf with exercise but states that symptoms always start when she is walking.    GERD (gastroesophageal reflux disease)  Chronic in nature.  Stable per patient.  She is currently taking omeprazole 20mg. No early satiety, unintentional weight loss, choking, persistent burning pain in chest or upper abdomen.      Benzodiazepine dependence (CMS-HCC) (HCC)  Chronic in nature.  Patient states that she was started on Ativan after a single episode of panic.  Patient states that she has been on this medication twice daily times 6 years states that she currently takes it mostly to help her sleep as she has difficulty falling asleep without the medication.  Patient is tearful during office visit today.  Discussed with patient possibility of switching to a daily medication, weaning down on her current dose of Ativan patient is amenable to attempting this.      Past Medical History:   Diagnosis Date   • Allergy    • Anxiety    • ASTHMA    • CAD (coronary artery disease)    • Hyperlipidemia    • Hypertension    •  OSTEOPOROSIS        Past Surgical History:   Procedure Laterality Date   • ABDOMINAL HYSTERECTOMY TOTAL     • APPENDECTOMY     • CARDIAC CATH     • HERNIA REPAIR     • TONSILLECTOMY         Family Status   Relation Status   • Neg Hx (Not Specified)     Family History   Problem Relation Age of Onset   • Heart Disease Neg Hx    • Heart Failure Neg Hx    • Hyperlipidemia Neg Hx        Social History   Substance Use Topics   • Smoking status: Never Smoker   • Smokeless tobacco: Never Used   • Alcohol use No       Allergies: Pcn [penicillins] and Codeine    Current Outpatient Prescriptions Ordered in [facility]   Medication Sig Dispense Refill   • fluticasone (FLONASE) 50 MCG/ACT nasal spray USE 1 SPRAY IN EACH NOSTRIL DAILY 32 g 0   • potassium chloride SA (KDUR) 20 MEQ Tab CR TAKE 1 TABLET BY MOUTH TWICE DAILY 180 Tab 1   • LORazepam (ATIVAN) 1 MG Tab Take 1 Tab by mouth every 12 hours as needed for Anxiety for up to 90 days. TAKE 1 TABLET BY MOUTH 2 TIMES A DAY AS NEEDED FOR ANXIETY. 60 Tab 2   • beclomethasone (QVAR) 80 MCG/ACT inhaler Use 1 puff twice daily 3 Inhaler 0   • carvedilol (COREG) 12.5 MG Tab TAKE 1 TABLET BY MOUTH TWICE A DAY WITH MEALS 180 Tab 1   • lisinopril (PRINIVIL) 20 MG Tab TAKE 1 TABLET BY MOUTH ONE TIME A DAY 90 Tab 1   • amLODIPine (NORVASC) 5 MG Tab TAKE 1 TABLET BY MOUTH ONE TIME A DAY 90 Tab 1   • atorvastatin (LIPITOR) 40 MG Tab TAKE 1 TABLET BY MOUTH ATBEDTIME 90 Tab 1   • Psyllium (METAMUCIL) 28.3 % Powder Take  by mouth.     • guaifenesin LA (MUCINEX) 600 MG TABLET SR 12 HR Take 600 mg by mouth every 12 hours.     • ascorbic acid (ASCORBIC ACID) 500 MG Tab Take 500 mg by mouth every day.     • nitroglycerin (NITROSTAT) 0.4 MG SL Tab Place 1 tab under tongue every 5 min as needed for chest pain max 3 doses 25 Tab 1   • VITAMIN E PO Take  by mouth.     • aspirin (ASA) 81 MG Chew Tab chewable tablet Take 1 Tab by mouth every day. 100 Tab 11   • Cholecalciferol (VITAMIN D) 2000 UNIT Tab Take  2,000 Units by mouth every day.     • Biotin 10 MG Tab Take 10 mg by mouth every day.     • Zinc 50 MG Cap Take 50 mg by mouth every day.     • Multiple Vitamins-Minerals (CENTRUM SILVER ADULT 50+) Tab Take 1 Tab by mouth every day.     • Omega-3 Fatty Acids (FISH OIL) 1200 MG Cap Take 1,200 mg by mouth every day.     • omeprazole (PRILOSEC) 20 MG delayed-release capsule Take 20 mg by mouth every day.     • beclomethasone HFA (QVAR REDIHALER) 80 MCG/ACT inhaler Inhale 1 Puff by mouth 2 times a day. 1 Inhaler 3   • cyclobenzaprine (FLEXERIL) 5 MG tablet Take 1-2 Tabs by mouth 3 times a day as needed. (Patient not taking: Reported on 11/7/2018) 30 Tab 0   • alendronate (FOSAMAX) 35 MG tablet TAKE 1 TABLET BY MOUTH EVERY 7 DAYS (Patient not taking: Reported on 11/7/2018) 12 Tab 3   • albuterol 108 (90 Base) MCG/ACT Aero Soln inhalation aerosol Inhale 2 Puffs by mouth every 6 hours as needed for Shortness of Breath. 20.1 g 0   • diphenhydrAMINE (BENADRYL) 25 MG Tab Take 25 mg by mouth every 6 hours as needed for Sleep.     • Calcium Carbonate (CALCIUM 600 PO) Take 600 mg by mouth every day.       No current [facility]-ordered facility-administered medications on file.        Review of Systems   Constitutional:  Negative for fever, chills, weight loss and malaise/fatigue.   HENT:  Negative for ear pain, nosebleeds, congestion, sore throat and neck pain.    Eyes:  Negative for blurred vision.   Respiratory:  Negative for cough, sputum production, shortness of breath and wheezing.    Cardiovascular:  Negative for chest pain, palpitations, orthopnea and leg swelling.   Gastrointestinal:  Negative for heartburn, nausea, vomiting and abdominal pain.   Genitourinary:  Negative for dysuria, urgency and frequency.   Musculoskeletal:  Positive for myalgias, back pain and joint pain.   Skin:  Negative for rash and itching.   Neurological:  Negative for dizziness, tingling, tremors, sensory change, focal weakness and headaches.  "  Endo/Heme/Allergies:  Does not bruise/bleed easily.   Psychiatric/Behavioral:  Negative for depression, anxiety, or memory loss.     All other systems reviewed and are negative except as in HPI.    Exam: Blood pressure 112/66, pulse 70, temperature 36.7 °C (98.1 °F), temperature source Temporal, resp. rate 16, height 1.6 m (5' 3\"), weight 52.2 kg (115 lb), SpO2 95 %, not currently breastfeeding.  General:  Normal appearing. No distress.  Pulmonary:  Clear to ausculation.  Normal effort. No rales, ronchi, or wheezing.  Cardiovascular:  Regular rate and rhythm without murmur. Carotid and radial pulses are intact and equal bilaterally.  Neurologic:  Grossly nonfocal  Skin:  Warm and dry.  No obvious lesions.  Musculoskeletal:  Normal gait. No extremity cyanosis, clubbing, or edema.  Tenderness to palpation of right calf no redness, swelling, sensation is intact.  Psych:  Normal mood and affect. Alert and oriented x3. Judgment and insight is normal.    PLAN:    1. Right leg pain  2. Claudication of right lower extremity (HCC)  Concern for blood clot versus arterial insufficiency versus musculoskeletal pain  - US-ARTERY LOWER UNILAT W/LAURENT (COMBO) EXTREMITY RIGHT; Future  - US-EXTREMITY VENOUS LOWER UNILAT RIGHT; Future    3. Gastroesophageal reflux disease, esophagitis presence not specified  Continue current medication    4. Benzodiazepine dependence (HCC)  She will follow-up in 1 month to discuss weaning, refills.    Follow-up in 1 month for long appointment. Patient is encouraged to be seen in the emergency room for chest pain, palpitations, shortness of breath, dizziness, severe abdominal pain or other concerning symptoms.    Please note that this dictation was created using voice recognition software. I have made every reasonable attempt to correct obvious errors, but I expect that there are errors of grammar and possibly content that I did not discover before finalizing the note.      Assessment/Plan  1. Right " leg pain  US-ARTERY LOWER UNILAT W/LAURENT (COMBO) EXTREMITY RIGHT    US-EXTREMITY VENOUS LOWER UNILAT RIGHT   2. Claudication of right lower extremity (HCC)  US-ARTERY LOWER UNILAT W/LAURENT (COMBO) EXTREMITY RIGHT    US-EXTREMITY VENOUS LOWER UNILAT RIGHT   3. Gastroesophageal reflux disease, esophagitis presence not specified     4. Benzodiazepine dependence (HCC)           I have placed the below orders and discussed them with an approved delegating provider. The MA is performing the below orders under the direction of Dr. Collier.

## 2018-11-28 ENCOUNTER — HOSPITAL ENCOUNTER (OUTPATIENT)
Dept: RADIOLOGY | Facility: MEDICAL CENTER | Age: 83
End: 2018-11-28
Attending: NURSE PRACTITIONER
Payer: MEDICARE

## 2018-11-28 DIAGNOSIS — M79.604 RIGHT LEG PAIN: ICD-10-CM

## 2018-11-28 DIAGNOSIS — I73.9 CLAUDICATION OF RIGHT LOWER EXTREMITY (HCC): ICD-10-CM

## 2018-11-28 DIAGNOSIS — I70.201 POPLITEAL ARTERY STENOSIS, RIGHT (HCC): ICD-10-CM

## 2018-11-28 PROCEDURE — 93971 EXTREMITY STUDY: CPT | Mod: RT

## 2018-11-28 PROCEDURE — 93922 UPR/L XTREMITY ART 2 LEVELS: CPT | Mod: RT

## 2018-12-03 ENCOUNTER — TELEPHONE (OUTPATIENT)
Dept: MEDICAL GROUP | Facility: PHYSICIAN GROUP | Age: 83
End: 2018-12-03

## 2018-12-03 NOTE — TELEPHONE ENCOUNTER
ESTABLISHED PATIENT PRE-VISIT PLANNING     Patient was NOT contacted to complete PVP.     Note: Patient will not be contacted if there is no indication to call.     1.  Reviewed notes from the last few office visits within the medical group: Yes    2.  If any orders were placed at last visit or intended to be done for this visit (i.e. 6 mos follow-up), do we have Results/Consult Notes?        •  Labs - Labs were not ordered at last office visit.   Note: If patient appointment is for lab review and patient did not complete labs, check with provider if OK to reschedule patient until labs completed.       •  Imaging - Imaging ordered, completed and results are in chart.       •  Referrals - Referral ordered, patient has NOT been seen.    3. Is this appointment scheduled as a Hospital Follow-Up? No    4.  Immunizations were updated in [employer] using Web[employer]?: Yes       •  Web Iz Recommendations: HEPATITIS B    5.  Patient is due for the following Health Maintenance Topics:   Health Maintenance Due   Topic Date Due   • IMM HEP B VACCINE (1 of 3 - Risk 3-dose series) 02/18/1948   • Annual Wellness Visit  06/21/2017         6.  MDX printed for Provider? NO    7.  Patient was NOT informed to arrive 15 min prior to their scheduled appointment and bring in their medication bottles.

## 2018-12-06 ENCOUNTER — OFFICE VISIT (OUTPATIENT)
Dept: MEDICAL GROUP | Facility: PHYSICIAN GROUP | Age: 83
End: 2018-12-06
Payer: MEDICARE

## 2018-12-06 VITALS
WEIGHT: 115 LBS | BODY MASS INDEX: 20.38 KG/M2 | OXYGEN SATURATION: 95 % | HEIGHT: 63 IN | RESPIRATION RATE: 16 BRPM | DIASTOLIC BLOOD PRESSURE: 72 MMHG | TEMPERATURE: 98.2 F | SYSTOLIC BLOOD PRESSURE: 148 MMHG | HEART RATE: 70 BPM

## 2018-12-06 DIAGNOSIS — R07.89 OTHER CHEST PAIN: ICD-10-CM

## 2018-12-06 DIAGNOSIS — F41.9 ANXIETY: ICD-10-CM

## 2018-12-06 DIAGNOSIS — R42 VERTIGO: ICD-10-CM

## 2018-12-06 PROCEDURE — 99214 OFFICE O/P EST MOD 30 MIN: CPT | Performed by: NURSE PRACTITIONER

## 2018-12-06 RX ORDER — LORAZEPAM 1 MG/1
1 TABLET ORAL EVERY 12 HOURS PRN
Qty: 60 TAB | Refills: 2 | Status: SHIPPED | OUTPATIENT
Start: 2018-12-06 | End: 2019-02-28

## 2018-12-06 RX ORDER — INFLUENZA A VIRUS A/MICHIGAN/45/2015 X-275 (H1N1) ANTIGEN (FORMALDEHYDE INACTIVATED), INFLUENZA A VIRUS A/SINGAPORE/INFIMH-16-0019/2016 IVR-186 (H3N2) ANTIGEN (FORMALDEHYDE INACTIVATED), AND INFLUENZA B VIRUS B/MARYLAND/15/2016 BX-69A (A B/COLORADO/6/2017-LIKE VIRUS) ANTIGEN (FORMALDEHYDE INACTIVATED) 60; 60; 60 UG/.5ML; UG/.5ML; UG/.5ML
0.5 INJECTION, SUSPENSION INTRAMUSCULAR ONCE
COMMUNITY
Start: 2018-09-28 | End: 2019-08-13

## 2018-12-06 NOTE — Clinical Note
I see that Dayana has an appointment with you in January. When I saw her today she reported chest discomfort with exercise, EKG was overall stable. I wanted to see if she could be seen sooner? Or if you could provide some recommendations. She was hesitant to agree to further testing without cardiology input.  Thank you,  Ned Lee, SOHEILA

## 2018-12-06 NOTE — PROGRESS NOTES
Chief Complaint   Patient presents with   • Results     US    • Medication Refill     Lorazepam        HISTORY OF THE PRESENT ILLNESS: This is a 89 y.o. female established patient who presents today for follow up on ultrasound results and discuss anxiety.    Patient is here to follow up on vascular ultrasound results. Patient has a history of right leg pain that is chronic in nature. Patient states she did not receive a call yet to go over results with vascular. However, she did receive a call to schedule an appointment with vascular surgery. Venous ultrasound showed no signs of blood clots. Arterial ultrasound showed 70% stenosis in the right popliteal. On exam, patient reports having persisting right leg pain particularly with exercise. She also reports her bilateral feet getting cold which is not a new symptom.  States that since having her ultrasound she has been walking more frequently.    She also reports having intermittent chest pain lately which occurs with exertion such as when walking. She has been walking more frequently, as recommended by the ultrasound technician and her family. She reports having some shortness of breath but mentions having history of asthma. She denies particularly having any asthma-like symptoms with the chest pain. Her chest pain symptoms start after ~5 minutes of walking. She usually walks quickly. She uses her albuterol as needed. She has not tried using the albuterol during the chest pain episodes yet. Patient states the chest pain resolves on its own after about 5-10 minutes. She denies any radiation of pain down her arms or to her jaw. She currently does not have any chest pain on exam.    She reports having intermittent brief episodes of vertigo.  Describes these episodes as spinning.  States that they happen when she goes from lying to sitting or sitting to standing quickly.  States that they will sometimes happen when she turns her head.  States that they should last less  than a minute. she denies blacking out during these episodes. She does not report any other symptoms at this time. No complaints of recent fevers or chills. Patient notes having recent stresses lately including recently moving here from [place].  Denies ringing in her ears.    Patient has a history of MI. She has been evaluated by Dr. Hoyos (cardiology) in the past but has not had a cardiology evaluation in last ~ 17 months.     Anxiety is chronic in nature.  States that it has become more severe recently.  She currently takes Lorazepam.  Twice daily states she takes it mainly for sleep. she would like to wait before weaning off of this medication.  She is tearful during her exam.  Denies side effects on medication.  She is feeling stable on this medication at this time.        Past Medical History:   Diagnosis Date   • Allergy    • Anxiety    • ASTHMA    • CAD (coronary artery disease)    • Hyperlipidemia    • Hypertension    • OSTEOPOROSIS        Past Surgical History:   Procedure Laterality Date   • ABDOMINAL HYSTERECTOMY TOTAL     • APPENDECTOMY     • CARDIAC CATH     • HERNIA REPAIR     • TONSILLECTOMY         Family Status   Relation Status   • Neg Hx (Not Specified)     Family History   Problem Relation Age of Onset   • Heart Disease Neg Hx    • Heart Failure Neg Hx    • Hyperlipidemia Neg Hx        Social History   Substance Use Topics   • Smoking status: Never Smoker   • Smokeless tobacco: Never Used   • Alcohol use No       Allergies: Pcn [penicillins] and Codeine    Current Outpatient Prescriptions Ordered in [facility]   Medication Sig Dispense Refill   • LORazepam (ATIVAN) 1 MG Tab Take 1 Tab by mouth every 12 hours as needed for Anxiety for up to 90 days. TAKE 1 TABLET BY MOUTH 2 TIMES A DAY AS NEEDED FOR ANXIETY. 60 Tab 2   • FLUZONE HIGH-DOSE 0.5 ML Suspension Prefilled Syringe injection 0.5 mL by Injection route Once.     • fluticasone (FLONASE) 50 MCG/ACT nasal spray USE 1 SPRAY IN EACH NOSTRIL DAILY  32 g 0   • potassium chloride SA (KDUR) 20 MEQ Tab CR TAKE 1 TABLET BY MOUTH TWICE DAILY 180 Tab 1   • beclomethasone HFA (QVAR REDIHALER) 80 MCG/ACT inhaler Inhale 1 Puff by mouth 2 times a day. 1 Inhaler 3   • beclomethasone (QVAR) 80 MCG/ACT inhaler Use 1 puff twice daily 3 Inhaler 0   • carvedilol (COREG) 12.5 MG Tab TAKE 1 TABLET BY MOUTH TWICE A DAY WITH MEALS 180 Tab 1   • lisinopril (PRINIVIL) 20 MG Tab TAKE 1 TABLET BY MOUTH ONE TIME A DAY 90 Tab 1   • amLODIPine (NORVASC) 5 MG Tab TAKE 1 TABLET BY MOUTH ONE TIME A DAY 90 Tab 1   • atorvastatin (LIPITOR) 40 MG Tab TAKE 1 TABLET BY MOUTH ATBEDTIME 90 Tab 1   • Psyllium (METAMUCIL) 28.3 % Powder Take  by mouth.     • cyclobenzaprine (FLEXERIL) 5 MG tablet Take 1-2 Tabs by mouth 3 times a day as needed. (Patient not taking: Reported on 11/7/2018) 30 Tab 0   • alendronate (FOSAMAX) 35 MG tablet TAKE 1 TABLET BY MOUTH EVERY 7 DAYS (Patient not taking: Reported on 11/7/2018) 12 Tab 3   • guaifenesin LA (MUCINEX) 600 MG TABLET SR 12 HR Take 600 mg by mouth every 12 hours.     • ascorbic acid (ASCORBIC ACID) 500 MG Tab Take 500 mg by mouth every day.     • albuterol 108 (90 Base) MCG/ACT Aero Soln inhalation aerosol Inhale 2 Puffs by mouth every 6 hours as needed for Shortness of Breath. 20.1 g 0   • nitroglycerin (NITROSTAT) 0.4 MG SL Tab Place 1 tab under tongue every 5 min as needed for chest pain max 3 doses 25 Tab 1   • diphenhydrAMINE (BENADRYL) 25 MG Tab Take 25 mg by mouth every 6 hours as needed for Sleep.     • VITAMIN E PO Take  by mouth.     • aspirin (ASA) 81 MG Chew Tab chewable tablet Take 1 Tab by mouth every day. 100 Tab 11   • Cholecalciferol (VITAMIN D) 2000 UNIT Tab Take 2,000 Units by mouth every day.     • Biotin 10 MG Tab Take 10 mg by mouth every day.     • Zinc 50 MG Cap Take 50 mg by mouth every day.     • Multiple Vitamins-Minerals (CENTRUM SILVER ADULT 50+) Tab Take 1 Tab by mouth every day.     • Omega-3 Fatty Acids (FISH OIL) 1200 MG  "Cap Take 1,200 mg by mouth every day.     • Calcium Carbonate (CALCIUM 600 PO) Take 600 mg by mouth every day.     • omeprazole (PRILOSEC) 20 MG delayed-release capsule Take 20 mg by mouth every day.       No current [facility]-ordered facility-administered medications on file.        Review of Systems   Constitutional: Negative for fever, chills, weight loss and malaise/fatigue.   HENT: Negative for jaw pain, ear pain, nosebleeds, congestion, sore throat and neck pain.    Respiratory: Shortness of breath. Negative for cough, sputum production, and wheezing.    Cardiovascular: Chest pain (with exertion). Negative for palpitations, orthopnea and leg swelling.   Gastrointestinal: Negative for nausea, vomiting and abdominal pain.   Musculoskeletal: Right leg pain (chronic), coldness to bilateral feet (chronic). Negative for arm pain, myalgias, back pain and joint pain.     Neurologic: Vertigo. Denies blacking out during vertigo episodes.  Psychiatric/Behavioral: Anxiety. Negative for memory loss.     All other systems reviewed and are negative except as in HPI.    Exam: Blood pressure 148/72, pulse 70, temperature 36.8 °C (98.2 °F), temperature source Temporal, resp. rate 16, height 1.6 m (5' 3\"), weight 52.2 kg (115 lb), SpO2 95 %, not currently breastfeeding.  General:  Normal appearing. No distress.  Pulmonary:  Clear to ausculation.  Normal effort. No rales, ronchi, or wheezing.  Cardiovascular:  Regular rate and rhythm without murmur. Carotid and radial pulses are intact and equal bilaterally.  Neurologic:  Grossly nonfocal  Lymph: No cervical, supraclavicular or axillary lymph nodes are palpable  Skin:  Warm and dry.  No obvious lesions.  Musculoskeletal:  Normal gait. No extremity cyanosis, clubbing, or edema.  Psych:  Normal mood and affect. Alert and oriented x3. Judgment and insight is normal.    EKG Interpretation   Interpreted by me   Rhythm: normal sinus   Rate: normal   Conduction: normal   ST Segments: no " "acute change   T Waves: no acute change   Q Waves: Positive, consistent with previous EKG in 2017  Clinical Impression: Left axis deviation is noted, no acute changes, but abnormal EKG    PLAN:    1. Anxiety  - Refilling lorazepam. Patient would like to wait on weaning off of lorazepam at this time.  Patient understands this prescription is a controlled substance which is potentially habit-forming and its use is regulated by the [employer]. We also discussed the new \"black box\" warning regarding the lethal combination of opioids and benzodiazepines. Refills are subject to terms of a controlled substance agreement and patient has an updated one on file. Any refill requires an office visit. This medicine can cause nausea, sedation, confusion and accidental overdose.  - LORazepam (ATIVAN) 1 MG Tab; Take 1 Tab by mouth every 12 hours as needed for Anxiety for up to 90 days. TAKE 1 TABLET BY MOUTH 2 TIMES A DAY AS NEEDED FOR ANXIETY.  Dispense: 60 Tab; Refill: 2    2. Other chest pain  - Last EKG in September 2017 was stable. Getting repeat EKG today to check for any changes.  - EKG in clinic today showed no acute changes compared to prior EKG.   - Advised follow up with Dr. Jones (cardiology).  - Patient states she usually walks quickly and experiences the chest pain symptoms after ~ 5 minutes of walking. Recommended walking more slowly and bringing her inhaler for her to use if symptoms arise.  Strict ER precautions given regarding chest pain.   - EKG - Clinic Performed     3. Vertigo  Symptoms are consistent with BPPV.  Counseled patient regarding physical therapy to resolve vertigo  - REFERRAL TO PHYSICAL THERAPY Reason for Therapy: Eval/Treat/Report     Follow up in 3 months. Patient is encouraged to be seen in the emergency room for chest pain, palpitations, shortness of breath, dizziness, severe abdominal pain or other concerning symptoms.     Please note that this dictation was created using voice recognition software. " I have made every reasonable attempt to correct obvious errors, but I expect that there are errors of grammar and possibly content that I did not discover before finalizing the note.      Assessment/Plan  1. Anxiety  LORazepam (ATIVAN) 1 MG Tab   2. Other chest pain  EKG - Clinic Performed   3. Vertigo  REFERRAL TO PHYSICAL THERAPY Reason for Therapy: Eval/Treat/Report         I have placed the below orders and discussed them with an approved delegating provider. The MA is performing the below orders under the direction of Dr. Collier.       Bob PETTIT (Scribe), am scribing for, and in the presence of, SOHEILA Mills    Electronically signed by: Bob Iniguez ([place]), 12/6/2018    Ned PETTIT APRN personally performed the services described in this documentation, as scribed by Bob Iniguez in my presence, and it is both accurate and complete.

## 2018-12-11 ENCOUNTER — TELEPHONE (OUTPATIENT)
Dept: MEDICAL GROUP | Facility: PHYSICIAN GROUP | Age: 83
End: 2018-12-11

## 2018-12-11 NOTE — TELEPHONE ENCOUNTER
VOICEMAIL  1. Caller Name: Naila Lechuga                      Call Back Number: [phone]    2. Message: Naila called in regards to PT. Pt had visit with Dr. Tian, Dr. Tian reccommended  Pt see Cardiologist ASAP    Please advise     3. Patient approves office to leave a detailed voicemail/MyChart message: N\A

## 2018-12-12 ENCOUNTER — OFFICE VISIT (OUTPATIENT)
Dept: CARDIOLOGY | Facility: MEDICAL CENTER | Age: 83
End: 2018-12-12
Payer: MEDICARE

## 2018-12-12 VITALS
HEIGHT: 63 IN | OXYGEN SATURATION: 93 % | HEART RATE: 71 BPM | DIASTOLIC BLOOD PRESSURE: 78 MMHG | BODY MASS INDEX: 20.62 KG/M2 | WEIGHT: 116.4 LBS | SYSTOLIC BLOOD PRESSURE: 136 MMHG

## 2018-12-12 DIAGNOSIS — I10 ESSENTIAL HYPERTENSION: ICD-10-CM

## 2018-12-12 DIAGNOSIS — I73.9 CLAUDICATION OF RIGHT LOWER EXTREMITY (HCC): ICD-10-CM

## 2018-12-12 DIAGNOSIS — I25.119 CORONARY ARTERY DISEASE INVOLVING NATIVE CORONARY ARTERY OF NATIVE HEART WITH ANGINA PECTORIS (HCC): ICD-10-CM

## 2018-12-12 DIAGNOSIS — E78.5 DYSLIPIDEMIA: Chronic | ICD-10-CM

## 2018-12-12 DIAGNOSIS — I70.201 POPLITEAL ARTERY STENOSIS, RIGHT (HCC): ICD-10-CM

## 2018-12-12 PROBLEM — M79.604 RIGHT LEG PAIN: Status: RESOLVED | Noted: 2017-11-15 | Resolved: 2018-12-12

## 2018-12-12 PROCEDURE — 99214 OFFICE O/P EST MOD 30 MIN: CPT | Performed by: NURSE PRACTITIONER

## 2018-12-12 RX ORDER — ATORVASTATIN CALCIUM 80 MG/1
80 TABLET, FILM COATED ORAL EVERY EVENING
Qty: 90 TAB | Refills: 3 | Status: SHIPPED | OUTPATIENT
Start: 2018-12-12 | End: 2019-05-24 | Stop reason: SDUPTHER

## 2018-12-12 ASSESSMENT — ENCOUNTER SYMPTOMS
CLAUDICATION: 1
PALPITATIONS: 0
PND: 0
DIZZINESS: 0
ORTHOPNEA: 0
FEVER: 0
NERVOUS/ANXIOUS: 0
COUGH: 0
ABDOMINAL PAIN: 0
MYALGIAS: 0
SHORTNESS OF BREATH: 1

## 2018-12-12 NOTE — TELEPHONE ENCOUNTER
Please call Dr. Jones's office and see if there is any way to have patient seen sooner, let them know that vascular recommended patient follow-up with cardiology urgently. You can mention that I had routed her chart to Dr. Jones.

## 2018-12-12 NOTE — PROGRESS NOTES
Subjective:   Dayana Lechuga is a 87 y.o. female who presents today for annual follow up and recurrence of chest tightness with exertion.    She is a going to be a patient of Dr. Jones in our office.     Hx of HTN, CAD with JT to RCA (residual LAD 70%), HLD, PAD (managed by Lary AMBROCIO), and anxiety.    She is now living in a senior living center in [place]. She moved from [place], [place]. Her daughter is present and helps bring her to [facility] and keep her medications up to date.    Her BP is very controlled at home.    She is now having exertional chest tightness that radiates across her chest. It can happen at rest as well. No radiation to her arms or neck. She had no diaphoresis or sweating with these events.    She is now getting breathless with exertion as well. Her legs are painful now with walking but improve when she walks longer.    She has had no episodes of palpitations, dizziness/lightheadedness, orthopnea, or peripheral edema.    Past Medical History:   Diagnosis Date   • Allergy    • Anxiety    • ASTHMA    • CAD (coronary artery disease)     JT to RCA; 70% stenosis in LAD   • Hyperlipidemia    • Hypertension    • OSTEOPOROSIS    • PVD (peripheral vascular disease) (HCC)     70% PAD-followed by Lary AMBROCIO     Past Surgical History:   Procedure Laterality Date   • ABDOMINAL HYSTERECTOMY TOTAL     • APPENDECTOMY     • CARDIAC CATH     • HERNIA REPAIR     • TONSILLECTOMY       Family History   Problem Relation Age of Onset   • Heart Disease Neg Hx    • Heart Failure Neg Hx    • Hyperlipidemia Neg Hx      History   Smoking Status   • Never Smoker   Smokeless Tobacco   • Never Used     Allergies   Allergen Reactions   • Pcn [Penicillins] Rash     About 70 years   • Codeine Vomiting     Outpatient Encounter Prescriptions as of 12/12/2018   Medication Sig Dispense Refill   • atorvastatin (LIPITOR) 80 MG tablet Take 1 Tab by mouth every evening. 90 Tab 3   • LORazepam (ATIVAN) 1 MG Tab Take 1 Tab by mouth every 12  hours as needed for Anxiety for up to 90 days. TAKE 1 TABLET BY MOUTH 2 TIMES A DAY AS NEEDED FOR ANXIETY. 60 Tab 2   • fluticasone (FLONASE) 50 MCG/ACT nasal spray USE 1 SPRAY IN EACH NOSTRIL DAILY 32 g 0   • potassium chloride SA (KDUR) 20 MEQ Tab CR TAKE 1 TABLET BY MOUTH TWICE DAILY 180 Tab 1   • beclomethasone (QVAR) 80 MCG/ACT inhaler Use 1 puff twice daily 3 Inhaler 0   • carvedilol (COREG) 12.5 MG Tab TAKE 1 TABLET BY MOUTH TWICE A DAY WITH MEALS 180 Tab 1   • lisinopril (PRINIVIL) 20 MG Tab TAKE 1 TABLET BY MOUTH ONE TIME A DAY 90 Tab 1   • amLODIPine (NORVASC) 5 MG Tab TAKE 1 TABLET BY MOUTH ONE TIME A DAY 90 Tab 1   • guaifenesin LA (MUCINEX) 600 MG TABLET SR 12 HR Take 600 mg by mouth every 12 hours.     • ascorbic acid (ASCORBIC ACID) 500 MG Tab Take 500 mg by mouth every day.     • albuterol 108 (90 Base) MCG/ACT Aero Soln inhalation aerosol Inhale 2 Puffs by mouth every 6 hours as needed for Shortness of Breath. 20.1 g 0   • nitroglycerin (NITROSTAT) 0.4 MG SL Tab Place 1 tab under tongue every 5 min as needed for chest pain max 3 doses 25 Tab 1   • diphenhydrAMINE (BENADRYL) 25 MG Tab Take 25 mg by mouth every 6 hours as needed for Sleep.     • VITAMIN E PO Take  by mouth.     • aspirin (ASA) 81 MG Chew Tab chewable tablet Take 1 Tab by mouth every day. 100 Tab 11   • Cholecalciferol (VITAMIN D) 2000 UNIT Tab Take 2,000 Units by mouth every day.     • Biotin 10 MG Tab Take 10 mg by mouth every day.     • Multiple Vitamins-Minerals (CENTRUM SILVER ADULT 50+) Tab Take 1 Tab by mouth every day.     • Omega-3 Fatty Acids (FISH OIL) 1200 MG Cap Take 1,200 mg by mouth every day.     • omeprazole (PRILOSEC) 20 MG delayed-release capsule Take 20 mg by mouth every day.     • FLUZONE HIGH-DOSE 0.5 ML Suspension Prefilled Syringe injection 0.5 mL by Injection route Once.     • [DISCONTINUED] beclomethasone HFA (QVAR REDIHALER) 80 MCG/ACT inhaler Inhale 1 Puff by mouth 2 times a day. 1 Inhaler 3   •  "[DISCONTINUED] atorvastatin (LIPITOR) 40 MG Tab TAKE 1 TABLET BY MOUTH ATBEDTIME 90 Tab 1   • [DISCONTINUED] Psyllium (METAMUCIL) 28.3 % Powder Take  by mouth.     • [DISCONTINUED] cyclobenzaprine (FLEXERIL) 5 MG tablet Take 1-2 Tabs by mouth 3 times a day as needed. (Patient not taking: Reported on 11/7/2018) 30 Tab 0   • [DISCONTINUED] alendronate (FOSAMAX) 35 MG tablet TAKE 1 TABLET BY MOUTH EVERY 7 DAYS (Patient not taking: Reported on 11/7/2018) 12 Tab 3   • [DISCONTINUED] Zinc 50 MG Cap Take 50 mg by mouth every day.     • [DISCONTINUED] Calcium Carbonate (CALCIUM 600 PO) Take 600 mg by mouth every day.       No facility-administered encounter medications on file as of 12/12/2018.      Review of Systems   Constitutional: Negative for fever and malaise/fatigue.   Respiratory: Positive for shortness of breath. Negative for cough.    Cardiovascular: Positive for chest pain and claudication. Negative for palpitations, orthopnea, leg swelling and PND.   Gastrointestinal: Negative for abdominal pain.   Musculoskeletal: Negative for myalgias.   Neurological: Negative for dizziness.   Psychiatric/Behavioral: The patient is not nervous/anxious.    All other systems reviewed and are negative.       Objective:   /78 (BP Location: Right arm, Patient Position: Sitting, BP Cuff Size: Adult)   Pulse 71   Ht 1.6 m (5' 3\")   Wt 52.8 kg (116 lb 6.5 oz)   SpO2 93%   BMI 20.62 kg/m²     Physical Exam   Constitutional: She is oriented to person, place, and time. She appears well-developed and well-nourished. No distress.   HENT:   Head: Normocephalic and atraumatic.   Eyes: EOM are normal.   Neck: Normal range of motion. No JVD present.   Cardiovascular: Normal rate, regular rhythm, normal heart sounds and intact distal pulses.    No murmur heard.  Pulses:       Dorsalis pedis pulses are 1+ on the right side.   Pulmonary/Chest: Effort normal and breath sounds normal. No respiratory distress.   Abdominal: Soft. Bowel " sounds are normal.   Musculoskeletal: Normal range of motion. She exhibits no edema.   Neurological: She is alert and oriented to person, place, and time.   Skin: Skin is warm and dry.   Psychiatric: Her mood appears anxious.   Nursing note and vitals reviewed.    Lab Results   Component Value Date/Time    CHOLSTRLTOT 142 03/20/2018 09:36 AM    LDL 61 03/20/2018 09:36 AM    HDL 61 03/20/2018 09:36 AM    TRIGLYCERIDE 101 03/20/2018 09:36 AM       Lab Results   Component Value Date/Time    SODIUM 141 03/20/2018 09:36 AM    POTASSIUM 3.8 03/20/2018 09:36 AM    CHLORIDE 107 03/20/2018 09:36 AM    CO2 28 03/20/2018 09:36 AM    GLUCOSE 110 (H) 03/20/2018 09:36 AM    BUN 15 03/20/2018 09:36 AM    CREATININE 0.73 03/20/2018 09:36 AM     Lab Results   Component Value Date/Time    ALKPHOSPHAT 48 03/20/2018 09:36 AM    ASTSGOT 19 03/20/2018 09:36 AM    ALTSGPT 15 03/20/2018 09:36 AM    TBILIRUBIN 0.6 03/20/2018 09:36 AM       Lab Results   Component Value Date/Time    WBC 5.8 04/26/2017 08:52 AM    RBC 4.26 04/26/2017 08:52 AM    HEMOGLOBIN 13.7 04/26/2017 08:52 AM    HEMATOCRIT 41.1 04/26/2017 08:52 AM    MCV 96.5 04/26/2017 08:52 AM    MCH 32.2 04/26/2017 08:52 AM    MCHC 33.3 (L) 04/26/2017 08:52 AM    MPV 10.4 04/26/2017 08:52 AM      Lab Results   Component Value Date/Time    BNPBTYPENAT 193 (H) 09/10/2016 04:00 PM      8/2016 ANGIOGRAM POSTOPERATIVE DIAGNOSES:  1.  A 95% distal right coronary artery focal stenosis.  2.  70% mid LAD stenosis and a markedly tortuous vessel.  3.  LVEF 65-70%.  4.  Successful PCI with drug-eluting stent placed in the distal right coronary   artery.    Assessment:     1. Coronary artery disease involving native coronary artery of native heart with angina pectoris (HCC)  NM-CARDIAC PET    CBC WITHOUT DIFFERENTIAL   2. Dyslipidemia  NM-CARDIAC PET    LIPID PANEL    COMP METABOLIC PANEL    CBC WITHOUT DIFFERENTIAL   3. Essential hypertension  NM-CARDIAC PET    CBC WITHOUT DIFFERENTIAL   4.  Claudication of right lower extremity (HCC)     5. Popliteal artery stenosis, right (HCC)         Medical Decision Making:  Today's Assessment / Status / Plan:     1. CAD with JT to RCA in '16  -now with angina and MONZON  -recommend cardiac PET for review with residual LAD disease  -she wants an angiogram if warranted if stress test +  -discussed nitro education, she has taken a few of these already in the last few months  -cont asa, statin, bb    2. HTN  -great control at home  -cont coreg, lisinopril, and amlodipine    3. HLD  -LDL goal <70 with CAD  -statin, increase lipitor to 80 mg QPM  -repeat lipid and cmp in 3 months    FU in clinic in 3 months with RO or SC with labs, cardiac PET soon-call with results    Patient verbalizes understanding and agrees with the plan of care.     Collaborating MD: Sameer AMBROCIO    Physical Exam   Constitutional: She is oriented to person, place, and time. She appears well-developed and well-nourished. No distress.   HENT:   Head: Normocephalic and atraumatic.   Eyes: EOM are normal.   Neck: Normal range of motion. No JVD present.   Cardiovascular: Normal rate, regular rhythm, normal heart sounds and intact distal pulses.    No murmur heard.  Pulses:       Dorsalis pedis pulses are 1+ on the right side.   Pulmonary/Chest: Effort normal and breath sounds normal. No respiratory distress.   Abdominal: Soft. Bowel sounds are normal.   Musculoskeletal: Normal range of motion. She exhibits no edema.   Neurological: She is alert and oriented to person, place, and time.   Skin: Skin is warm and dry.   Psychiatric: Her mood appears anxious.   Nursing note and vitals reviewed.

## 2018-12-12 NOTE — TELEPHONE ENCOUNTER
Phone Number Called: [phone]    Message: Called and spoke to Renae. They were able to help me and get patient into appt 12/12/18. Patient will be seeing Benita Melvin @12:45. Called and spoke to patient. Let her know as well. We will be keeping the 01/30/19 appt. LM     Left Message for patient to call back: no

## 2018-12-19 ENCOUNTER — HOSPITAL ENCOUNTER (OUTPATIENT)
Dept: RADIOLOGY | Facility: MEDICAL CENTER | Age: 83
End: 2018-12-19
Attending: NURSE PRACTITIONER
Payer: MEDICARE

## 2018-12-19 DIAGNOSIS — I25.119 CORONARY ARTERY DISEASE INVOLVING NATIVE CORONARY ARTERY OF NATIVE HEART WITH ANGINA PECTORIS (HCC): ICD-10-CM

## 2018-12-19 DIAGNOSIS — E78.5 DYSLIPIDEMIA: Chronic | ICD-10-CM

## 2018-12-19 DIAGNOSIS — I10 ESSENTIAL HYPERTENSION: ICD-10-CM

## 2018-12-19 PROCEDURE — 78492 MYOCRD IMG PET MLT RST&STRS: CPT | Mod: 26 | Performed by: INTERNAL MEDICINE

## 2018-12-19 PROCEDURE — 700111 HCHG RX REV CODE 636 W/ 250 OVERRIDE (IP)

## 2018-12-19 PROCEDURE — 93018 CV STRESS TEST I&R ONLY: CPT | Performed by: INTERNAL MEDICINE

## 2018-12-20 NOTE — PROCEDURES
REFERRING PHYSICIAN:      AGE:  89     GENDER:  Female    HEIGHT:  63 inches    WEIGHT:  160 pounds     BMI:      INDICATIONS:  CAD and hypertension.    MEDICATIONS:      PROCEDURE:  The patient reviewed and signed the acknowledgement for testing   form.  The patient was in a fasting state and was properly prepared for   testing.  An intravenous line was inserted and a flush of normal saline   followed to insure line patency.    A transmission scan was acquired for attenuation correction using the internal   Germanium sources.  The patient was then administered 20.1 mCi of   Rubidium-82.  Approximately 90 seconds after the infusion, resting imagine   were obtained with ECG-gating.  Following the resting series, the patient   administered 29.8 mg of dipyridamole over four minutes.  The blood pressure,   heart rate and ECG were monitored and recorded.  After the dipyridamole   infusion was completed, another transmission scan for attenuation correction   was obtained.  The patient was then administered 20.1 mCi of Rubidium-82.    Approximately 90 seconds after the infusion, Peak stress images were obtained   with ECG-gating.    CLINICAL RESPONSE:  Resting blood pressure was 139/62 with a heart rate of 70.    Immediately post-dipyridamole infusion the blood pressure was 113/93 with a   heart rate of 76.  After a recovery period the blood pressure was 108/69 with   a heart rate of 80.    The patient experienced chest tightness and leg heaviness during testing.    Aminophylline 100 mg was administered following the scan.    ELECTROCARDIOGRAPHIC FINDINGS:  Show normal sinus rhythm with nonspecific ST   segment changes and no ischemic changes at peak stress.    SCINTOGRAPHIC FINDINGS:  Show normal homogeneous tracer uptake at rest and   stress.    GATED WALL MOTION FINDINGS:  Show a normal LV systolic function of 71% with a   stress EF of 77%.    CONCLUSIONS AND IMPRESSIONS:  Normal stress test.        ____________________________________     MD RILEY ANDUJAR / TERRANCE    DD:  12/19/2018 15:39:06  DT:  12/19/2018 16:17:43    D#:  [id number]  Job#:  [id number]

## 2018-12-21 ENCOUNTER — TELEPHONE (OUTPATIENT)
Dept: CARDIOLOGY | Facility: MEDICAL CENTER | Age: 83
End: 2018-12-21

## 2018-12-21 NOTE — TELEPHONE ENCOUNTER
Status:  Final result   Visible to patient:  No (Not Released)   Notes recorded by MARIANNE Wiclox on 12/19/2018 at 4:28 PM PST  Normal PET scan. Reassure patient that her chest tightness is most likely non-cardiac in nature and to follow clinically. We can start her on a daily nitrate to see if this helps. If wanted, start imdur 30 mg QPM for chest tightness and follow up with BP daily with this change. SC     Attempted to call pt regarding above. No answer, LVM to call back.

## 2018-12-21 NOTE — TELEPHONE ENCOUNTER
Pt called back, spoke w/pt regarding PET scan results and SC recommendations. Per pt she has not had chest tightness in a few weeks and it seems to have resolved. Pt is declining starting any new medications as she has not had the chest tightness. Educated and advised pt to call back if the chest tightness does come back and we would re-evaluate her treatment plan. Pt verbalized understand.

## 2018-12-24 RX ORDER — ISOSORBIDE MONONITRATE 30 MG/1
30 TABLET, EXTENDED RELEASE ORAL EVERY EVENING
Qty: 30 TAB | Refills: 5 | Status: SHIPPED | OUTPATIENT
Start: 2018-12-24 | End: 2018-12-27 | Stop reason: SDUPTHER

## 2018-12-24 NOTE — TELEPHONE ENCOUNTER
Pt called back and after thinking about it over the weekend she would like to try Imdur as recommended by APRN. Discussed medication in detail including action, possible side effects, and monitoring blood pressure for any changes. Pt agrees with plan and will call if any concerns.     imdur 30mg QPM sent to preferred pharmacy

## 2018-12-27 DIAGNOSIS — I10 ESSENTIAL HYPERTENSION: Primary | ICD-10-CM

## 2018-12-27 RX ORDER — ISOSORBIDE MONONITRATE 30 MG/1
30 TABLET, EXTENDED RELEASE ORAL EVERY EVENING
Qty: 90 TAB | Refills: 1 | Status: SHIPPED | OUTPATIENT
Start: 2018-12-27 | End: 2019-05-23 | Stop reason: SDUPTHER

## 2018-12-28 ENCOUNTER — HOSPITAL ENCOUNTER (OUTPATIENT)
Dept: RADIOLOGY | Facility: MEDICAL CENTER | Age: 83
End: 2018-12-28
Attending: SURGERY
Payer: MEDICARE

## 2018-12-28 DIAGNOSIS — I65.23 BILATERAL CAROTID ARTERY STENOSIS: ICD-10-CM

## 2018-12-28 PROCEDURE — 93880 EXTRACRANIAL BILAT STUDY: CPT

## 2018-12-28 PROCEDURE — 93880 EXTRACRANIAL BILAT STUDY: CPT | Mod: 26 | Performed by: SURGERY

## 2018-12-31 ENCOUNTER — HOSPITAL ENCOUNTER (OUTPATIENT)
Dept: LAB | Facility: MEDICAL CENTER | Age: 83
End: 2018-12-31
Attending: NURSE PRACTITIONER
Payer: MEDICARE

## 2018-12-31 DIAGNOSIS — I10 ESSENTIAL HYPERTENSION: ICD-10-CM

## 2018-12-31 DIAGNOSIS — E78.5 DYSLIPIDEMIA: Chronic | ICD-10-CM

## 2018-12-31 DIAGNOSIS — I25.119 CORONARY ARTERY DISEASE INVOLVING NATIVE CORONARY ARTERY OF NATIVE HEART WITH ANGINA PECTORIS (HCC): ICD-10-CM

## 2018-12-31 LAB
ALBUMIN SERPL BCP-MCNC: 4.4 G/DL (ref 3.2–4.9)
ALBUMIN/GLOB SERPL: 1.6 G/DL
ALP SERPL-CCNC: 48 U/L (ref 30–99)
ALT SERPL-CCNC: 18 U/L (ref 2–50)
ANION GAP SERPL CALC-SCNC: 7 MMOL/L (ref 0–11.9)
AST SERPL-CCNC: 21 U/L (ref 12–45)
BILIRUB SERPL-MCNC: 0.6 MG/DL (ref 0.1–1.5)
BUN SERPL-MCNC: 13 MG/DL (ref 8–22)
CALCIUM SERPL-MCNC: 10.4 MG/DL (ref 8.5–10.5)
CHLORIDE SERPL-SCNC: 106 MMOL/L (ref 96–112)
CHOLEST SERPL-MCNC: 142 MG/DL (ref 100–199)
CO2 SERPL-SCNC: 25 MMOL/L (ref 20–33)
CREAT SERPL-MCNC: 0.72 MG/DL (ref 0.5–1.4)
ERYTHROCYTE [DISTWIDTH] IN BLOOD BY AUTOMATED COUNT: 46.5 FL (ref 35.9–50)
FASTING STATUS PATIENT QL REPORTED: NORMAL
GLOBULIN SER CALC-MCNC: 2.7 G/DL (ref 1.9–3.5)
GLUCOSE SERPL-MCNC: 105 MG/DL (ref 65–99)
HCT VFR BLD AUTO: 44.1 % (ref 37–47)
HDLC SERPL-MCNC: 63 MG/DL
HGB BLD-MCNC: 14.1 G/DL (ref 12–16)
LDLC SERPL CALC-MCNC: 62 MG/DL
MCH RBC QN AUTO: 32.4 PG (ref 27–33)
MCHC RBC AUTO-ENTMCNC: 32 G/DL (ref 33.6–35)
MCV RBC AUTO: 101.4 FL (ref 81.4–97.8)
PLATELET # BLD AUTO: 196 K/UL (ref 164–446)
PMV BLD AUTO: 10.7 FL (ref 9–12.9)
POTASSIUM SERPL-SCNC: 4.5 MMOL/L (ref 3.6–5.5)
PROT SERPL-MCNC: 7.1 G/DL (ref 6–8.2)
RBC # BLD AUTO: 4.35 M/UL (ref 4.2–5.4)
SODIUM SERPL-SCNC: 138 MMOL/L (ref 135–145)
TRIGL SERPL-MCNC: 87 MG/DL (ref 0–149)
WBC # BLD AUTO: 7 K/UL (ref 4.8–10.8)

## 2018-12-31 PROCEDURE — 85027 COMPLETE CBC AUTOMATED: CPT

## 2018-12-31 PROCEDURE — 36415 COLL VENOUS BLD VENIPUNCTURE: CPT

## 2018-12-31 PROCEDURE — 80053 COMPREHEN METABOLIC PANEL: CPT

## 2018-12-31 PROCEDURE — 80061 LIPID PANEL: CPT

## 2019-01-03 DIAGNOSIS — I10 ESSENTIAL HYPERTENSION: ICD-10-CM

## 2019-01-03 DIAGNOSIS — I25.119 CORONARY ARTERY DISEASE INVOLVING NATIVE CORONARY ARTERY OF NATIVE HEART WITH ANGINA PECTORIS (HCC): ICD-10-CM

## 2019-01-07 RX ORDER — LISINOPRIL 20 MG/1
TABLET ORAL
Qty: 90 TAB | Refills: 0 | Status: SHIPPED | OUTPATIENT
Start: 2019-01-07 | End: 2019-01-08 | Stop reason: SDUPTHER

## 2019-01-07 RX ORDER — AMLODIPINE BESYLATE 5 MG/1
TABLET ORAL
Qty: 90 TAB | Refills: 0 | Status: SHIPPED | OUTPATIENT
Start: 2019-01-07 | End: 2019-04-09 | Stop reason: SDUPTHER

## 2019-01-07 RX ORDER — AMLODIPINE BESYLATE 5 MG/1
TABLET ORAL
Qty: 90 TAB | Refills: 0 | Status: SHIPPED | OUTPATIENT
Start: 2019-01-07 | End: 2019-01-08 | Stop reason: SDUPTHER

## 2019-01-07 RX ORDER — CARVEDILOL 12.5 MG/1
TABLET ORAL
Qty: 1890 TAB | Refills: 0 | Status: SHIPPED | OUTPATIENT
Start: 2019-01-07 | End: 2019-01-08 | Stop reason: SDUPTHER

## 2019-01-08 DIAGNOSIS — I25.119 CORONARY ARTERY DISEASE INVOLVING NATIVE CORONARY ARTERY OF NATIVE HEART WITH ANGINA PECTORIS (HCC): ICD-10-CM

## 2019-01-08 DIAGNOSIS — I10 ESSENTIAL HYPERTENSION: ICD-10-CM

## 2019-01-08 RX ORDER — POTASSIUM CHLORIDE 20 MEQ/1
TABLET, EXTENDED RELEASE ORAL
Qty: 180 TAB | Refills: 0 | Status: SHIPPED | OUTPATIENT
Start: 2019-01-08 | End: 2019-08-05 | Stop reason: SDUPTHER

## 2019-01-08 RX ORDER — AMLODIPINE BESYLATE 5 MG/1
TABLET ORAL
Qty: 90 TAB | Refills: 0 | Status: SHIPPED | OUTPATIENT
Start: 2019-01-08 | End: 2019-02-28

## 2019-01-08 RX ORDER — LISINOPRIL 20 MG/1
TABLET ORAL
Qty: 90 TAB | Refills: 0 | Status: SHIPPED | OUTPATIENT
Start: 2019-01-08 | End: 2019-04-09 | Stop reason: SDUPTHER

## 2019-01-08 RX ORDER — FLUTICASONE PROPIONATE 50 MCG
SPRAY, SUSPENSION (ML) NASAL
Qty: 32 G | Refills: 0 | Status: SHIPPED | OUTPATIENT
Start: 2019-01-08 | End: 2019-04-08 | Stop reason: SDUPTHER

## 2019-01-08 RX ORDER — CARVEDILOL 12.5 MG/1
TABLET ORAL
Qty: 90 TAB | Refills: 0 | Status: SHIPPED | OUTPATIENT
Start: 2019-01-08 | End: 2019-04-09 | Stop reason: SDUPTHER

## 2019-01-08 NOTE — TELEPHONE ENCOUNTER
VOICEMAIL  1. Caller Name: Dayana Lechuga                        Call Back Number: [phone] (home)       2. Message: Called and left patient a message. Let her know her rx have been refilled. LM     3. Patient approves office to leave a detailed voicemail/MyChart message: N\A

## 2019-01-08 NOTE — TELEPHONE ENCOUNTER
Was the patient seen in the last year in this department? Yes    Does patient have an active prescription for medications requested? No     Received Request Via: Patient       Patient was upset saying [facility] denied Rx. Let her know we have not received a refill but I would send one now. LM

## 2019-01-19 ENCOUNTER — OFFICE VISIT (OUTPATIENT)
Dept: URGENT CARE | Facility: CLINIC | Age: 84
End: 2019-01-19
Payer: MEDICARE

## 2019-01-19 VITALS
HEIGHT: 63 IN | TEMPERATURE: 98.5 F | OXYGEN SATURATION: 96 % | HEART RATE: 68 BPM | WEIGHT: 114 LBS | DIASTOLIC BLOOD PRESSURE: 82 MMHG | RESPIRATION RATE: 16 BRPM | SYSTOLIC BLOOD PRESSURE: 148 MMHG | BODY MASS INDEX: 20.2 KG/M2
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
## 2019-01-19 PROCEDURE — 99213 OFFICE O/P EST LOW 20 MIN: CPT | Performed by: PHYSICIAN ASSISTANT

## 2019-01-19 ASSESSMENT — ENCOUNTER SYMPTOMS
FOCAL WEAKNESS: 1
FALLS: 1
SENSORY CHANGE: 0
TINGLING: 0
CONSTITUTIONAL NEGATIVE: 1
NUMBNESS: 0

## 2019-01-19 NOTE — PROGRESS NOTES
"Subjective:      Dayana Lechuga is a 89 y.o. female who presents with Fall (x this am, fell this am flat on chest, brusing on Rt. lower arm)            Fall   The accident occurred 1 to 3 hours ago (r lower arm bruising; no head inj/LOC: no chest/rib pn or sob; +r arm pain/bruising). The fall occurred while walking. She fell from a height of 1 to 2 ft. The point of impact was the right wrist. The pain is present in the right lower arm. The pain is moderate. The symptoms are aggravated by flexion, pressure on injury and use of injured limb. Pertinent negatives include no numbness or tingling. She has tried nothing for the symptoms. The treatment provided no relief.       Review of Systems   Constitutional: Negative.    Musculoskeletal: Positive for falls and joint pain.   Skin: Negative.    Neurological: Positive for focal weakness. Negative for tingling, sensory change and numbness.          Objective:     /82 (BP Location: Left arm, Patient Position: Sitting, BP Cuff Size: Large adult)   Pulse 68   Temp 36.9 °C (98.5 °F) (Temporal)   Resp 16   Ht 1.6 m (5' 3\")   Wt 51.7 kg (114 lb)   SpO2 96%   BMI 20.19 kg/m²      Physical Exam   Constitutional: She is oriented to person, place, and time. She appears well-developed and well-nourished. No distress.   Genitourinary: Guaiac stool:     Musculoskeletal: She exhibits tenderness (skin area only r forearm, no joint pn/bony tend; few superfic ecchym; ; mild L patellar tend but nl rom/nv). She exhibits no edema.   Neurological: She is alert and oriented to person, place, and time. No sensory deficit. She exhibits normal muscle tone. Coordination normal.   Skin: Skin is warm and dry. Capillary refill takes less than 2 seconds. No erythema.   Psychiatric: She has a normal mood and affect. Her behavior is normal.   Nursing note and vitals reviewed.    Active Ambulatory Problems     Diagnosis Date Noted   • ASTHMA 10/02/2009   • HTN (hypertension) 10/02/2009 "   • GERD (gastroesophageal reflux disease) 10/31/2009   • Dyslipidemia 10/31/2009   • Osteopenia 10/31/2009   • Anxiety 06/22/2016   • Coronary artery disease involving native coronary artery of native heart with angina pectoris ([facility]) 08/10/2016   • Back pain 09/10/2016   • Benzodiazepine dependence ([facility]) 11/13/2017   • S/P bilateral cataract extraction 02/27/2018   • Slow transit constipation 08/29/2018   • Claudication of right lower extremity ([facility]) 11/07/2018   • Popliteal artery stenosis, right ([facility]) 11/28/2018     Resolved Ambulatory Problems     Diagnosis Date Noted   • Chest pain, rule out acute myocardial infarction 07/30/2016   • Elevated troponin 07/31/2016   • NSTEMI (non-ST elevated myocardial infarction) ([facility]) 07/31/2016   • Hypokalemia 07/31/2016   • Hypertension 09/10/2016   • Chest pain 09/10/2016   • Right leg pain 11/15/2017     Past Medical History:   Diagnosis Date   • Allergy    • Anxiety    • ASTHMA    • CAD (coronary artery disease)    • Hyperlipidemia    • Hypertension    • OSTEOPOROSIS    • PVD (peripheral vascular disease) ([facility])      Current Outpatient Prescriptions on File Prior to Visit   Medication Sig Dispense Refill   • amLODIPine (NORVASC) 5 MG Tab TAKE 1 TABLET BY MOUTH ONE TIME A DAY 90 Tab 0   • lisinopril (PRINIVIL) 20 MG Tab TAKE 1 TABLET BY MOUTH ONE TIME A DAY 90 Tab 0   • carvedilol (COREG) 12.5 MG Tab TAKE 1 TABLET BY MOUTH TWICE A DAY WITH MEALS 90 Tab 0   • potassium chloride SA (KDUR) 20 MEQ Tab CR TAKE 1 TABLET BY MOUTH TWICE DAILY 180 Tab 0   • fluticasone (FLONASE) 50 MCG/ACT nasal spray USE 1 SPRAY IN EACH NOSTRIL DAILY 32 g 0   • amLODIPine (NORVASC) 5 MG Tab TAKE 1 TABLET BY MOUTH ONE TIME A DAY 90 Tab 0   • isosorbide mononitrate SR (IMDUR) 30 MG TABLET SR 24 HR Take 1 Tab by mouth every evening. 90 Tab 1   • atorvastatin (LIPITOR) 80 MG tablet Take 1 Tab by mouth every evening. 90 Tab 3   • FLUZONE HIGH-DOSE 0.5 ML Suspension Prefilled Syringe injection 0.5 mL by  Injection route Once.     • LORazepam (ATIVAN) 1 MG Tab Take 1 Tab by mouth every 12 hours as needed for Anxiety for up to 90 days. TAKE 1 TABLET BY MOUTH 2 TIMES A DAY AS NEEDED FOR ANXIETY. 60 Tab 2   • beclomethasone (QVAR) 80 MCG/ACT inhaler Use 1 puff twice daily 3 Inhaler 0   • guaifenesin LA (MUCINEX) 600 MG TABLET SR 12 HR Take 600 mg by mouth every 12 hours.     • ascorbic acid (ASCORBIC ACID) 500 MG Tab Take 500 mg by mouth every day.     • albuterol 108 (90 Base) MCG/ACT Aero Soln inhalation aerosol Inhale 2 Puffs by mouth every 6 hours as needed for Shortness of Breath. 20.1 g 0   • nitroglycerin (NITROSTAT) 0.4 MG SL Tab Place 1 tab under tongue every 5 min as needed for chest pain max 3 doses 25 Tab 1   • diphenhydrAMINE (BENADRYL) 25 MG Tab Take 25 mg by mouth every 6 hours as needed for Sleep.     • VITAMIN E PO Take  by mouth.     • aspirin (ASA) 81 MG Chew Tab chewable tablet Take 1 Tab by mouth every day. 100 Tab 11   • Cholecalciferol (VITAMIN D) 2000 UNIT Tab Take 2,000 Units by mouth every day.     • Biotin 10 MG Tab Take 10 mg by mouth every day.     • Multiple Vitamins-Minerals (CENTRUM SILVER ADULT 50+) Tab Take 1 Tab by mouth every day.     • Omega-3 Fatty Acids (FISH OIL) 1200 MG Cap Take 1,200 mg by mouth every day.     • omeprazole (PRILOSEC) 20 MG delayed-release capsule Take 20 mg by mouth every day.       No current facility-administered medications on file prior to visit.      Social History     Social History   • Marital status:      Spouse name: N/A   • Number of children: N/A   • Years of education: N/A     Occupational History   • Not on file.     Social History Main Topics   • Smoking status: Never Smoker   • Smokeless tobacco: Never Used   • Alcohol use No   • Drug use: No   • Sexual activity: Not Currently     Other Topics Concern   • Not on file     Social History Narrative   • No narrative on file     Family History   Problem Relation Age of Onset   • Heart Disease  Neg Hx    • Heart Failure Neg Hx    • Hyperlipidemia Neg Hx      Pcn [penicillins] and Codeine         Xr=  (read/interpret. By me. Rw)      Assessment/Plan:     ·  fall; r arm inj/contus;       · Reassured no 'blood clot' risk; no bony inj; rest

## 2019-02-19 ENCOUNTER — OFFICE VISIT (OUTPATIENT)
Dept: CARDIOLOGY | Facility: MEDICAL CENTER | Age: 84
End: 2019-02-19
Payer: MEDICARE

## 2019-02-19 VITALS
WEIGHT: 116.2 LBS | HEIGHT: 63 IN | SYSTOLIC BLOOD PRESSURE: 140 MMHG | HEART RATE: 69 BPM | OXYGEN SATURATION: 94 % | BODY MASS INDEX: 20.59 KG/M2 | DIASTOLIC BLOOD PRESSURE: 80 MMHG

## 2019-02-19 DIAGNOSIS — I25.119 CORONARY ARTERY DISEASE INVOLVING NATIVE CORONARY ARTERY OF NATIVE HEART WITH ANGINA PECTORIS (HCC): ICD-10-CM

## 2019-02-19 DIAGNOSIS — Z98.42 S/P BILATERAL CATARACT EXTRACTION: ICD-10-CM

## 2019-02-19 DIAGNOSIS — I10 ESSENTIAL HYPERTENSION: ICD-10-CM

## 2019-02-19 DIAGNOSIS — I70.201 POPLITEAL ARTERY STENOSIS, RIGHT (HCC): ICD-10-CM

## 2019-02-19 DIAGNOSIS — Z98.41 S/P BILATERAL CATARACT EXTRACTION: ICD-10-CM

## 2019-02-19 DIAGNOSIS — E78.5 DYSLIPIDEMIA: Chronic | ICD-10-CM

## 2019-02-19 PROCEDURE — 99214 OFFICE O/P EST MOD 30 MIN: CPT | Performed by: INTERNAL MEDICINE

## 2019-02-19 ASSESSMENT — ENCOUNTER SYMPTOMS
FEVER: 0
NEUROLOGICAL NEGATIVE: 1
RESPIRATORY NEGATIVE: 1
CLAUDICATION: 0
COUGH: 0
EYES NEGATIVE: 1
LOSS OF CONSCIOUSNESS: 0
SPUTUM PRODUCTION: 0
DIZZINESS: 0
CONSTITUTIONAL NEGATIVE: 1
HEMOPTYSIS: 0
MUSCULOSKELETAL NEGATIVE: 1
CHILLS: 0
PALPITATIONS: 0
SORE THROAT: 0
WHEEZING: 0
BRUISES/BLEEDS EASILY: 0
WEAKNESS: 0
ORTHOPNEA: 0
SHORTNESS OF BREATH: 0
GASTROINTESTINAL NEGATIVE: 1
PND: 0
STRIDOR: 0
CARDIOVASCULAR NEGATIVE: 1

## 2019-02-19 NOTE — LETTER
[facility]   [address], [address]  [place] [address]  Phone: [phone]  Fax: [phone]              Dayana Lechuga  2/18/1929    Encounter Date: 2/19/2019    Jeffery Jones M.D.          PROGRESS NOTE:  Chief Complaint   Patient presents with   • Coronary Artery Disease       Subjective:   Dayana Lechuga is a 89 y.o. female who presents today as a follow-up for her CAD status post stent in 2016 as well as her hypertension and hyperlipidemia.  She was seen by vascular who diagnosed her with a occluded popliteal artery.  She gets cramps in her legs occasionally both at night and with exertion.  It does not sound like claudication but more leg cramping.  She takes aspirin on a daily basis.  She was recently started on Imdur for chest pressure with exertion but is now gone.  Her blood pressure is better controlled but she does not follow-up.  Her most recent LDL was 62 on 80 of atorvastatin.  She was recently also sent for a cardiac PET study which showed no inducible ischemia however it showed an ejection fraction of 71%.    Past Medical History:   Diagnosis Date   • Allergy    • Anxiety    • ASTHMA    • CAD (coronary artery disease)     JT to RCA; 70% stenosis in LAD   • Hyperlipidemia    • Hypertension    • OSTEOPOROSIS    • PVD (peripheral vascular disease) ([facility])     70% PAD-followed by [facility]OCIO     Past Surgical History:   Procedure Laterality Date   • ABDOMINAL HYSTERECTOMY TOTAL     • APPENDECTOMY     • CARDIAC CATH     • HERNIA REPAIR     • TONSILLECTOMY       Family History   Problem Relation Age of Onset   • Heart Disease Neg Hx    • Heart Failure Neg Hx    • Hyperlipidemia Neg Hx      Social History     Social History   • Marital status:      Spouse name: N/A   • Number of children: N/A   • Years of education: N/A     Occupational History   • Not on file.     Social History Main Topics   • Smoking status: Never Smoker   •  Smokeless tobacco: Never Used   • Alcohol use No   • Drug use: No   • Sexual activity: Not Currently     Other Topics Concern   • Not on file     Social History Narrative   • No narrative on file     Allergies   Allergen Reactions   • Pcn [Penicillins] Rash     About 70 years   • Codeine Vomiting     Outpatient Encounter Prescriptions as of 2/19/2019   Medication Sig Dispense Refill   • amLODIPine (NORVASC) 5 MG Tab TAKE 1 TABLET BY MOUTH ONE TIME A DAY 90 Tab 0   • lisinopril (PRINIVIL) 20 MG Tab TAKE 1 TABLET BY MOUTH ONE TIME A DAY 90 Tab 0   • carvedilol (COREG) 12.5 MG Tab TAKE 1 TABLET BY MOUTH TWICE A DAY WITH MEALS 90 Tab 0   • potassium chloride SA (KDUR) 20 MEQ Tab CR TAKE 1 TABLET BY MOUTH TWICE DAILY 180 Tab 0   • fluticasone (FLONASE) 50 MCG/ACT nasal spray USE 1 SPRAY IN EACH NOSTRIL DAILY 32 g 0   • amLODIPine (NORVASC) 5 MG Tab TAKE 1 TABLET BY MOUTH ONE TIME A DAY 90 Tab 0   • isosorbide mononitrate SR (IMDUR) 30 MG TABLET SR 24 HR Take 1 Tab by mouth every evening. 90 Tab 1   • atorvastatin (LIPITOR) 80 MG tablet Take 1 Tab by mouth every evening. 90 Tab 3   • LORazepam (ATIVAN) 1 MG Tab Take 1 Tab by mouth every 12 hours as needed for Anxiety for up to 90 days. TAKE 1 TABLET BY MOUTH 2 TIMES A DAY AS NEEDED FOR ANXIETY. 60 Tab 2   • beclomethasone (QVAR) 80 MCG/ACT inhaler Use 1 puff twice daily 3 Inhaler 0   • guaifenesin LA (MUCINEX) 600 MG TABLET SR 12 HR Take 600 mg by mouth every 12 hours.     • ascorbic acid (ASCORBIC ACID) 500 MG Tab Take 500 mg by mouth every day.     • diphenhydrAMINE (BENADRYL) 25 MG Tab Take 25 mg by mouth every 6 hours as needed for Sleep.     • VITAMIN E PO Take  by mouth.     • aspirin (ASA) 81 MG Chew Tab chewable tablet Take 1 Tab by mouth every day. 100 Tab 11   • Cholecalciferol (VITAMIN D) 2000 UNIT Tab Take 2,000 Units by mouth every day.     • Biotin 10 MG Tab Take 10 mg by mouth every day.     • Multiple Vitamins-Minerals (CENTRUM SILVER ADULT 50+) Tab Take 1  "Tab by mouth every day.     • Omega-3 Fatty Acids (FISH OIL) 1200 MG Cap Take 1,200 mg by mouth every day.     • omeprazole (PRILOSEC) 20 MG delayed-release capsule Take 20 mg by mouth every day.     • FLUZONE HIGH-DOSE 0.5 ML Suspension Prefilled Syringe injection 0.5 mL by Injection route Once.     • albuterol 108 (90 Base) MCG/ACT Aero Soln inhalation aerosol Inhale 2 Puffs by mouth every 6 hours as needed for Shortness of Breath. 20.1 g 0   • nitroglycerin (NITROSTAT) 0.4 MG SL Tab Place 1 tab under tongue every 5 min as needed for chest pain max 3 doses 25 Tab 1     No facility-administered encounter medications on file as of 2/19/2019.      Review of Systems   Constitutional: Negative.  Negative for chills, fever and malaise/fatigue.   HENT: Negative.  Negative for sore throat.    Eyes: Negative.    Respiratory: Negative.  Negative for cough, hemoptysis, sputum production, shortness of breath, wheezing and stridor.    Cardiovascular: Negative.  Negative for chest pain, palpitations, orthopnea, claudication, leg swelling and PND.   Gastrointestinal: Negative.    Genitourinary: Negative.    Musculoskeletal: Negative.    Skin: Negative.    Neurological: Negative.  Negative for dizziness, loss of consciousness and weakness.   Endo/Heme/Allergies: Negative.  Does not bruise/bleed easily.   All other systems reviewed and are negative.       Objective:   /80 (BP Location: Left arm, Patient Position: Sitting, BP Cuff Size: Adult)   Pulse 69   Ht 1.6 m (5' 3\")   Wt 52.7 kg (116 lb 3.2 oz)   SpO2 94%   BMI 20.58 kg/m²      Physical Exam   Constitutional: She appears well-developed and well-nourished. No distress.   HENT:   Head: Normocephalic and atraumatic.   Right Ear: External ear normal.   Left Ear: External ear normal.   Nose: Nose normal.   Mouth/Throat: No oropharyngeal exudate.   Eyes: Pupils are equal, round, and reactive to light. Conjunctivae and EOM are normal. Right eye exhibits no discharge. " Left eye exhibits no discharge. No scleral icterus.   Neck: Neck supple. No JVD present.   Cardiovascular: Normal rate, regular rhythm and intact distal pulses.  Exam reveals no gallop and no friction rub.    No murmur heard.  Pulmonary/Chest: Effort normal. No stridor. No respiratory distress. She has no wheezes. She has no rales. She exhibits no tenderness.   Abdominal: Soft. She exhibits no distension. There is no guarding.   Musculoskeletal: Normal range of motion. She exhibits no edema, tenderness or deformity.   Neurological: She is alert. She has normal reflexes. She displays normal reflexes. No cranial nerve deficit. She exhibits normal muscle tone. Coordination normal.   Skin: Skin is warm and dry. No rash noted. She is not diaphoretic. No erythema. No pallor.   Psychiatric: She has a normal mood and affect. Her behavior is normal. Judgment and thought content normal.   Nursing note and vitals reviewed.    Cardiac PET: 2/19/2018  ELECTROCARDIOGRAPHIC FINDINGS:  Show normal sinus rhythm with nonspecific ST   segment changes and no ischemic changes at peak stress.   SCINTOGRAPHIC FINDINGS:  Show normal homogeneous tracer uptake at rest and   stress.   GATED WALL MOTION FINDINGS:  Show a normal LV systolic function of 71% with a   stress EF of 77%.   CONCLUSIONS AND IMPRESSIONS:  Normal stress test.    8/2016 ANGIOGRAM POSTOPERATIVE DIAGNOSES:  1.  A 95% distal right coronary artery focal stenosis.  2.  70% mid LAD stenosis and a markedly tortuous vessel.  3.  LVEF 65-70%.  4.  Successful PCI with drug-eluting stent placed in the distal right coronary   artery.    Lab Results   Component Value Date/Time    CHOLSTRLTOT 142 12/31/2018 10:08 AM    LDL 62 12/31/2018 10:08 AM    HDL 63 12/31/2018 10:08 AM    TRIGLYCERIDE 87 12/31/2018 10:08 AM       Lab Results   Component Value Date/Time    SODIUM 138 12/31/2018 10:08 AM    POTASSIUM 4.5 12/31/2018 10:08 AM    CHLORIDE 106 12/31/2018 10:08 AM    CO2 25 12/31/2018  10:08 AM    GLUCOSE 105 (H) 12/31/2018 10:08 AM    BUN 13 12/31/2018 10:08 AM    CREATININE 0.72 12/31/2018 10:08 AM     Lab Results   Component Value Date/Time    ALKPHOSPHAT 48 12/31/2018 10:08 AM    ASTSGOT 21 12/31/2018 10:08 AM    ALTSGPT 18 12/31/2018 10:08 AM    TBILIRUBIN 0.6 12/31/2018 10:08 AM      Assessment:     1. Coronary artery disease involving native coronary artery of native heart with angina pectoris (HCC)     2. Dyslipidemia     3. Essential hypertension     4. Popliteal artery stenosis, right (HCC)     5. S/P bilateral cataract extraction         Medical Decision Making:  Today's Assessment / Status / Plan:     89-year-old female with hypertension hyperlipidemia CAD and claudication with peripheral vascular disease.  At this point I think with her M door her chest pain is gone.  I am not concerned about heart failure given her normal ejection fraction noted on both her angiogram as well as the repeat PET scan.  I think her shortness of breath and bronchitis is probably residual from her most recent cold.  She will stay on atorvastatin for hyperlipidemia.  I have asked her to start magnesium for her cramping.  We can see her back in 6 months.    Thank for you allowing me to take part in your patient's care, please call should you have any questions or would like to discuss this patient.      Ned Lee, A.P.R.N.  [address]  [place] [id number]  VIA In Basket

## 2019-02-20 NOTE — PROGRESS NOTES
Chief Complaint   Patient presents with   • Coronary Artery Disease       Subjective:   Dayana Lechuga is a 89 y.o. female who presents today as a follow-up for her CAD status post stent in 2016 as well as her hypertension and hyperlipidemia.  She was seen by vascular who diagnosed her with a occluded popliteal artery.  She gets cramps in her legs occasionally both at night and with exertion.  It does not sound like claudication but more leg cramping.  She takes aspirin on a daily basis.  She was recently started on Imdur for chest pressure with exertion but is now gone.  Her blood pressure is better controlled but she does not follow-up.  Her most recent LDL was 62 on 80 of atorvastatin.  She was recently also sent for a cardiac PET study which showed no inducible ischemia however it showed an ejection fraction of 71%.    Past Medical History:   Diagnosis Date   • Allergy    • Anxiety    • ASTHMA    • CAD (coronary artery disease)     JT to RCA; 70% stenosis in LAD   • Hyperlipidemia    • Hypertension    • OSTEOPOROSIS    • PVD (peripheral vascular disease) ([facility])     70% PAD-followed by Lary AMBROCIO     Past Surgical History:   Procedure Laterality Date   • ABDOMINAL HYSTERECTOMY TOTAL     • APPENDECTOMY     • CARDIAC CATH     • HERNIA REPAIR     • TONSILLECTOMY       Family History   Problem Relation Age of Onset   • Heart Disease Neg Hx    • Heart Failure Neg Hx    • Hyperlipidemia Neg Hx      Social History     Social History   • Marital status:      Spouse name: N/A   • Number of children: N/A   • Years of education: N/A     Occupational History   • Not on file.     Social History Main Topics   • Smoking status: Never Smoker   • Smokeless tobacco: Never Used   • Alcohol use No   • Drug use: No   • Sexual activity: Not Currently     Other Topics Concern   • Not on file     Social History Narrative   • No narrative on file     Allergies   Allergen Reactions   • Pcn [Penicillins] Rash     About 70 years    • Codeine Vomiting     Outpatient Encounter Prescriptions as of 2/19/2019   Medication Sig Dispense Refill   • amLODIPine (NORVASC) 5 MG Tab TAKE 1 TABLET BY MOUTH ONE TIME A DAY 90 Tab 0   • lisinopril (PRINIVIL) 20 MG Tab TAKE 1 TABLET BY MOUTH ONE TIME A DAY 90 Tab 0   • carvedilol (COREG) 12.5 MG Tab TAKE 1 TABLET BY MOUTH TWICE A DAY WITH MEALS 90 Tab 0   • potassium chloride SA (KDUR) 20 MEQ Tab CR TAKE 1 TABLET BY MOUTH TWICE DAILY 180 Tab 0   • fluticasone (FLONASE) 50 MCG/ACT nasal spray USE 1 SPRAY IN EACH NOSTRIL DAILY 32 g 0   • amLODIPine (NORVASC) 5 MG Tab TAKE 1 TABLET BY MOUTH ONE TIME A DAY 90 Tab 0   • isosorbide mononitrate SR (IMDUR) 30 MG TABLET SR 24 HR Take 1 Tab by mouth every evening. 90 Tab 1   • atorvastatin (LIPITOR) 80 MG tablet Take 1 Tab by mouth every evening. 90 Tab 3   • LORazepam (ATIVAN) 1 MG Tab Take 1 Tab by mouth every 12 hours as needed for Anxiety for up to 90 days. TAKE 1 TABLET BY MOUTH 2 TIMES A DAY AS NEEDED FOR ANXIETY. 60 Tab 2   • beclomethasone (QVAR) 80 MCG/ACT inhaler Use 1 puff twice daily 3 Inhaler 0   • guaifenesin LA (MUCINEX) 600 MG TABLET SR 12 HR Take 600 mg by mouth every 12 hours.     • ascorbic acid (ASCORBIC ACID) 500 MG Tab Take 500 mg by mouth every day.     • diphenhydrAMINE (BENADRYL) 25 MG Tab Take 25 mg by mouth every 6 hours as needed for Sleep.     • VITAMIN E PO Take  by mouth.     • aspirin (ASA) 81 MG Chew Tab chewable tablet Take 1 Tab by mouth every day. 100 Tab 11   • Cholecalciferol (VITAMIN D) 2000 UNIT Tab Take 2,000 Units by mouth every day.     • Biotin 10 MG Tab Take 10 mg by mouth every day.     • Multiple Vitamins-Minerals (CENTRUM SILVER ADULT 50+) Tab Take 1 Tab by mouth every day.     • Omega-3 Fatty Acids (FISH OIL) 1200 MG Cap Take 1,200 mg by mouth every day.     • omeprazole (PRILOSEC) 20 MG delayed-release capsule Take 20 mg by mouth every day.     • FLUZONE HIGH-DOSE 0.5 ML Suspension Prefilled Syringe injection 0.5 mL by  "Injection route Once.     • albuterol 108 (90 Base) MCG/ACT Aero Soln inhalation aerosol Inhale 2 Puffs by mouth every 6 hours as needed for Shortness of Breath. 20.1 g 0   • nitroglycerin (NITROSTAT) 0.4 MG SL Tab Place 1 tab under tongue every 5 min as needed for chest pain max 3 doses 25 Tab 1     No facility-administered encounter medications on file as of 2/19/2019.      Review of Systems   Constitutional: Negative.  Negative for chills, fever and malaise/fatigue.   HENT: Negative.  Negative for sore throat.    Eyes: Negative.    Respiratory: Negative.  Negative for cough, hemoptysis, sputum production, shortness of breath, wheezing and stridor.    Cardiovascular: Negative.  Negative for chest pain, palpitations, orthopnea, claudication, leg swelling and PND.   Gastrointestinal: Negative.    Genitourinary: Negative.    Musculoskeletal: Negative.    Skin: Negative.    Neurological: Negative.  Negative for dizziness, loss of consciousness and weakness.   Endo/Heme/Allergies: Negative.  Does not bruise/bleed easily.   All other systems reviewed and are negative.       Objective:   /80 (BP Location: Left arm, Patient Position: Sitting, BP Cuff Size: Adult)   Pulse 69   Ht 1.6 m (5' 3\")   Wt 52.7 kg (116 lb 3.2 oz)   SpO2 94%   BMI 20.58 kg/m²     Physical Exam   Constitutional: She appears well-developed and well-nourished. No distress.   HENT:   Head: Normocephalic and atraumatic.   Right Ear: External ear normal.   Left Ear: External ear normal.   Nose: Nose normal.   Mouth/Throat: No oropharyngeal exudate.   Eyes: Pupils are equal, round, and reactive to light. Conjunctivae and EOM are normal. Right eye exhibits no discharge. Left eye exhibits no discharge. No scleral icterus.   Neck: Neck supple. No JVD present.   Cardiovascular: Normal rate, regular rhythm and intact distal pulses.  Exam reveals no gallop and no friction rub.    No murmur heard.  Pulmonary/Chest: Effort normal. No stridor. No " respiratory distress. She has no wheezes. She has no rales. She exhibits no tenderness.   Abdominal: Soft. She exhibits no distension. There is no guarding.   Musculoskeletal: Normal range of motion. She exhibits no edema, tenderness or deformity.   Neurological: She is alert. She has normal reflexes. She displays normal reflexes. No cranial nerve deficit. She exhibits normal muscle tone. Coordination normal.   Skin: Skin is warm and dry. No rash noted. She is not diaphoretic. No erythema. No pallor.   Psychiatric: She has a normal mood and affect. Her behavior is normal. Judgment and thought content normal.   Nursing note and vitals reviewed.    Cardiac PET: 2/19/2018  ELECTROCARDIOGRAPHIC FINDINGS:  Show normal sinus rhythm with nonspecific ST   segment changes and no ischemic changes at peak stress.   SCINTOGRAPHIC FINDINGS:  Show normal homogeneous tracer uptake at rest and   stress.   GATED WALL MOTION FINDINGS:  Show a normal LV systolic function of 71% with a   stress EF of 77%.   CONCLUSIONS AND IMPRESSIONS:  Normal stress test.    8/2016 ANGIOGRAM POSTOPERATIVE DIAGNOSES:  1.  A 95% distal right coronary artery focal stenosis.  2.  70% mid LAD stenosis and a markedly tortuous vessel.  3.  LVEF 65-70%.  4.  Successful PCI with drug-eluting stent placed in the distal right coronary   artery.    Lab Results   Component Value Date/Time    CHOLSTRLTOT 142 12/31/2018 10:08 AM    LDL 62 12/31/2018 10:08 AM    HDL 63 12/31/2018 10:08 AM    TRIGLYCERIDE 87 12/31/2018 10:08 AM       Lab Results   Component Value Date/Time    SODIUM 138 12/31/2018 10:08 AM    POTASSIUM 4.5 12/31/2018 10:08 AM    CHLORIDE 106 12/31/2018 10:08 AM    CO2 25 12/31/2018 10:08 AM    GLUCOSE 105 (H) 12/31/2018 10:08 AM    BUN 13 12/31/2018 10:08 AM    CREATININE 0.72 12/31/2018 10:08 AM     Lab Results   Component Value Date/Time    ALKPHOSPHAT 48 12/31/2018 10:08 AM    ASTSGOT 21 12/31/2018 10:08 AM    ALTSGPT 18 12/31/2018 10:08 AM     TBILIRUBIN 0.6 12/31/2018 10:08 AM      Assessment:     1. Coronary artery disease involving native coronary artery of native heart with angina pectoris (HCC)     2. Dyslipidemia     3. Essential hypertension     4. Popliteal artery stenosis, right (HCC)     5. S/P bilateral cataract extraction         Medical Decision Making:  Today's Assessment / Status / Plan:     89-year-old female with hypertension hyperlipidemia CAD and claudication with peripheral vascular disease.  At this point I think with her M door her chest pain is gone.  I am not concerned about heart failure given her normal ejection fraction noted on both her angiogram as well as the repeat PET scan.  I think her shortness of breath and bronchitis is probably residual from her most recent cold.  She will stay on atorvastatin for hyperlipidemia.  I have asked her to start magnesium for her cramping.  We can see her back in 6 months.    Thank for you allowing me to take part in your patient's care, please call should you have any questions or would like to discuss this patient.

## 2019-02-28 ENCOUNTER — OFFICE VISIT (OUTPATIENT)
Dept: MEDICAL GROUP | Facility: PHYSICIAN GROUP | Age: 84
End: 2019-02-28
Payer: MEDICARE

## 2019-02-28 VITALS
HEART RATE: 71 BPM | HEIGHT: 63 IN | OXYGEN SATURATION: 96 % | DIASTOLIC BLOOD PRESSURE: 76 MMHG | SYSTOLIC BLOOD PRESSURE: 116 MMHG | BODY MASS INDEX: 20.73 KG/M2 | RESPIRATION RATE: 16 BRPM | WEIGHT: 117 LBS | TEMPERATURE: 99.3 F

## 2019-02-28 DIAGNOSIS — K21.9 GASTROESOPHAGEAL REFLUX DISEASE, ESOPHAGITIS PRESENCE NOT SPECIFIED: Chronic | ICD-10-CM

## 2019-02-28 DIAGNOSIS — I73.9 CLAUDICATION OF RIGHT LOWER EXTREMITY (HCC): ICD-10-CM

## 2019-02-28 DIAGNOSIS — G89.29 CHRONIC THORACIC BACK PAIN, UNSPECIFIED BACK PAIN LATERALITY: ICD-10-CM

## 2019-02-28 DIAGNOSIS — M54.6 CHRONIC THORACIC BACK PAIN, UNSPECIFIED BACK PAIN LATERALITY: ICD-10-CM

## 2019-02-28 DIAGNOSIS — F13.20 BENZODIAZEPINE DEPENDENCE (HCC): ICD-10-CM

## 2019-02-28 DIAGNOSIS — R42 VERTIGO: ICD-10-CM

## 2019-02-28 DIAGNOSIS — I25.119 CORONARY ARTERY DISEASE INVOLVING NATIVE CORONARY ARTERY OF NATIVE HEART WITH ANGINA PECTORIS (HCC): ICD-10-CM

## 2019-02-28 DIAGNOSIS — F41.9 ANXIETY: ICD-10-CM

## 2019-02-28 DIAGNOSIS — I10 ESSENTIAL HYPERTENSION: ICD-10-CM

## 2019-02-28 PROCEDURE — 99214 OFFICE O/P EST MOD 30 MIN: CPT | Performed by: NURSE PRACTITIONER

## 2019-02-28 RX ORDER — LORAZEPAM 0.5 MG/1
TABLET ORAL
Qty: 90 TAB | Refills: 2 | Status: SHIPPED | OUTPATIENT
Start: 2019-03-05 | End: 2019-05-30 | Stop reason: SDUPTHER

## 2019-02-28 ASSESSMENT — PATIENT HEALTH QUESTIONNAIRE - PHQ9: CLINICAL INTERPRETATION OF PHQ2 SCORE: 0

## 2019-02-28 NOTE — PROGRESS NOTES
"Chief Complaint   Patient presents with   • Anxiety     medication    • Referral Needed     PT on [address]        HISTORY OF THE PRESENT ILLNESS: This is a 89 y.o. female established patient who presents today for follow up.    Patient was previously referred to PT for vascular issues and chronic right leg pain but did not go due to not liking the location. She would like referral to PT on [address]. She reports having bilateral ankle swelling, left greater than right.    Patient has a history of anxiety which is chronic in nature. She is currently taking Lorazepam 1 mg BID. She takes one dose at 4 PM. She takes the other dose before bed at 9 PM which also helps her sleep. She also takes Carvedilol at the same time as the Lorazepam. She is willing to start weaning off of lorazepam as long as she knows what to do if she has any problems.    She recently went to a podiatrist to get an ingrown toenail removed. No issues regarding this. She mentions having an intermittent cough that feels like a \"tickle\" over the last several weeks. She has had watery eyes and runny nose often. They are wondering about medication she can take for this. No recent fevers or chills. At last visit, she reported having intermittent vertigo. States the vertigo has resolved. She thinks it was due to the elevation change when coming here from [place].    Patient states since last visit, her atorvastatin dose was doubled and Indur was added to her medications by her cardiologist.    HTN (hypertension)  Chronic in nature.  Stable.  Managed by cardiology.    Coronary artery disease involving native coronary artery of native heart with angina pectoris  Chronic in nature.  Stable.  Managed by cardiology.      Past Medical History:   Diagnosis Date   • Allergy    • Anxiety    • ASTHMA    • CAD (coronary artery disease)     JT to RCA; 70% stenosis in LAD   • Hyperlipidemia    • Hypertension    • OSTEOPOROSIS    • PVD (peripheral vascular disease) (HCC)  "    70% PAD-followed by Lary AMBROCIO       Past Surgical History:   Procedure Laterality Date   • ABDOMINAL HYSTERECTOMY TOTAL     • APPENDECTOMY     • CARDIAC CATH     • HERNIA REPAIR     • TONSILLECTOMY         Family Status   Relation Status   • Neg Hx (Not Specified)     Family History   Problem Relation Age of Onset   • Heart Disease Neg Hx    • Heart Failure Neg Hx    • Hyperlipidemia Neg Hx        Social History   Substance Use Topics   • Smoking status: Never Smoker   • Smokeless tobacco: Never Used   • Alcohol use No       Allergies: Pcn [penicillins] and Codeine    Current Outpatient Prescriptions Ordered in [facility]   Medication Sig Dispense Refill   • [START ON 3/5/2019] LORazepam (ATIVAN) 0.5 MG Tab Take 0.5 mg orally in the afternoon. Take 1 mg orally every night. 90 Tab 2   • lisinopril (PRINIVIL) 20 MG Tab TAKE 1 TABLET BY MOUTH ONE TIME A DAY 90 Tab 0   • carvedilol (COREG) 12.5 MG Tab TAKE 1 TABLET BY MOUTH TWICE A DAY WITH MEALS 90 Tab 0   • potassium chloride SA (KDUR) 20 MEQ Tab CR TAKE 1 TABLET BY MOUTH TWICE DAILY 180 Tab 0   • fluticasone (FLONASE) 50 MCG/ACT nasal spray USE 1 SPRAY IN EACH NOSTRIL DAILY 32 g 0   • amLODIPine (NORVASC) 5 MG Tab TAKE 1 TABLET BY MOUTH ONE TIME A DAY 90 Tab 0   • isosorbide mononitrate SR (IMDUR) 30 MG TABLET SR 24 HR Take 1 Tab by mouth every evening. 90 Tab 1   • atorvastatin (LIPITOR) 80 MG tablet Take 1 Tab by mouth every evening. 90 Tab 3   • FLUZONE HIGH-DOSE 0.5 ML Suspension Prefilled Syringe injection 0.5 mL by Injection route Once.     • beclomethasone (QVAR) 80 MCG/ACT inhaler Use 1 puff twice daily 3 Inhaler 0   • guaifenesin LA (MUCINEX) 600 MG TABLET SR 12 HR Take 600 mg by mouth every 12 hours.     • ascorbic acid (ASCORBIC ACID) 500 MG Tab Take 500 mg by mouth every day.     • diphenhydrAMINE (BENADRYL) 25 MG Tab Take 25 mg by mouth every 6 hours as needed for Sleep.     • VITAMIN E PO Take  by mouth.     • aspirin (ASA) 81 MG Chew Tab chewable tablet  "Take 1 Tab by mouth every day. 100 Tab 11   • Cholecalciferol (VITAMIN D) 2000 UNIT Tab Take 2,000 Units by mouth every day.     • Biotin 10 MG Tab Take 10 mg by mouth every day.     • Multiple Vitamins-Minerals (CENTRUM SILVER ADULT 50+) Tab Take 1 Tab by mouth every day.     • Omega-3 Fatty Acids (FISH OIL) 1200 MG Cap Take 1,200 mg by mouth every day.     • omeprazole (PRILOSEC) 20 MG delayed-release capsule Take 20 mg by mouth every day.     • albuterol 108 (90 Base) MCG/ACT Aero Soln inhalation aerosol Inhale 2 Puffs by mouth every 6 hours as needed for Shortness of Breath. (Patient not taking: Reported on 2/28/2019) 20.1 g 0   • nitroglycerin (NITROSTAT) 0.4 MG SL Tab Place 1 tab under tongue every 5 min as needed for chest pain max 3 doses 25 Tab 1     No current Epic-ordered facility-administered medications on file.        Review of Systems   Constitutional: Negative for fever, chills, weight loss and malaise/fatigue.   HENT: Runny nose. Negative for ear pain, nosebleeds, congestion, sore throat and neck pain.    Eyes: Watery eyes. Negative for blurred vision.   Respiratory: Cough. Negative for sputum production, shortness of breath and wheezing.   Cardiovascular: Negative for chest pain, palpitations, orthopnea and leg swelling.   Musculoskeletal: Bilateral ankle swelling (left greater than right), chronic right leg pain, chronic back pain.  Psychiatric/Behavioral: Chronic anxiety. Negative for depression or memory loss.     All other systems reviewed and are negative except as in HPI.    Exam: Blood pressure 116/76, pulse 71, temperature 37.4 °C (99.3 °F), temperature source Temporal, resp. rate 16, height 1.6 m (5' 3\"), weight 53.1 kg (117 lb), SpO2 96 %, not currently breastfeeding.  General:  Normal appearing. No distress.  Pulmonary:  Clear to ausculation.  Normal effort. No rales, ronchi, or wheezing.  Cardiovascular:  Regular rate and rhythm without murmur. Carotid and radial pulses are intact and " equal bilaterally.  Neurologic:  Grossly nonfocal  Skin:  Warm and dry.  No obvious lesions.  Musculoskeletal:  Normal gait. No extremity cyanosis, clubbing, or edema.  Psych:  Normal mood and affect. Alert and oriented x3. Judgment and insight is normal.    PLAN:    1. Claudication of right lower extremity (HCC)  - Patient requests for new PT referral to [facility].  - REFERRAL TO PHYSICAL THERAPY Reason for Therapy: Eval/Treat/Report    2. Essential hypertension  - Continue current plan of care and medication.    3. Gastroesophageal reflux disease, esophagitis presence not specified  - Continue current plan of care and medication.    4. Benzodiazepine dependence (HCC)  Continue plan to wean medication.    5. Chronic thoracic back pain, unspecified back pain laterality  - See 1 above.  - REFERRAL TO PHYSICAL THERAPY Reason for Therapy: Eval/Treat/Report    6. Vertigo  - Patient states the vertigo has completely resolved    7. Anxiety  - Patient agrees to start weaning off of Ativan. Will prescribe 0.5 mg Ativan. Instructed patient to take 0.5 mg Ativan at the earlier time, 4 PM, and to take her 1 mg tab at night, 9 PM  - Advised her to contact us if she has any problems or concerns regarding this.  - LORazepam (ATIVAN) 0.5 MG Tab; Take 0.5 mg orally in the afternoon. Take 1 mg orally every night.  Dispense: 90 Tab; Refill: 2      Allergic Rhinitis  - Patient reported having allergy symptoms including watery eyes and runny nose. Advised avoiding Benadryl due to its anticholinergic effects. Recommended Claritin, Zyrtec, or Allegra.  Discussed that these have less likelihood of anticholinergic effects also discussed possibility of over-the-counter nasal spray.    Patient will follow up in 3 months. Patient is encouraged to be seen in the emergency room for chest pain, palpitations, shortness of breath, dizziness, severe abdominal pain or other concerning symptoms.     Please note that this dictation was created  using voice recognition software. I have made every reasonable attempt to correct obvious errors, but I expect that there are errors of grammar and possibly content that I did not discover before finalizing the note.      Assessment/Plan  1. Claudication of right lower extremity (HCC)  REFERRAL TO PHYSICAL THERAPY Reason for Therapy: Eval/Treat/Report   2. Essential hypertension     3. Gastroesophageal reflux disease, esophagitis presence not specified     4. Benzodiazepine dependence (HCC)     5. Chronic thoracic back pain, unspecified back pain laterality  REFERRAL TO PHYSICAL THERAPY Reason for Therapy: Eval/Treat/Report   6. Vertigo     7. Anxiety  LORazepam (ATIVAN) 0.5 MG Tab         I have placed the below orders and discussed them with an approved delegating provider. The MA is performing the below orders under the direction of Dr. Collier.       Bob PETTIT (Scribe), am scribing for, and in the presence of, SOHEILA Mills    Electronically signed by: Bob Iniguez ([place]), 2/28/2019    Ned PETTIT APRN personally performed the services described in this documentation, as scribed by Bob Iniguez in my presence, and it is both accurate and complete.

## 2019-03-11 ENCOUNTER — PHYSICAL THERAPY (OUTPATIENT)
Dept: PHYSICAL THERAPY | Facility: REHABILITATION | Age: 84
End: 2019-03-11
Attending: NURSE PRACTITIONER
Payer: MEDICARE

## 2019-03-11 DIAGNOSIS — I73.9 CLAUDICATION OF RIGHT LOWER EXTREMITY (HCC): ICD-10-CM

## 2019-03-11 DIAGNOSIS — G89.29 CHRONIC THORACIC BACK PAIN, UNSPECIFIED BACK PAIN LATERALITY: ICD-10-CM

## 2019-03-11 DIAGNOSIS — M54.6 CHRONIC THORACIC BACK PAIN, UNSPECIFIED BACK PAIN LATERALITY: ICD-10-CM

## 2019-03-11 PROCEDURE — 97535 SELF CARE MNGMENT TRAINING: CPT

## 2019-03-11 PROCEDURE — 97162 PT EVAL MOD COMPLEX 30 MIN: CPT

## 2019-03-11 PROCEDURE — 97110 THERAPEUTIC EXERCISES: CPT

## 2019-03-11 ASSESSMENT — ACTIVITIES OF DAILY LIVING (ADL): AMBULATION_WITHOUT_ASSISTIVE_DEVICE: INDEPENDENT

## 2019-03-11 NOTE — OP THERAPY EVALUATION
Outpatient Physical Therapy  INITIAL EVALUATION    [facility] [place] [address]  [place] [address]  Phone:  [phone]    Date of Evaluation: 03/11/2019    Patient: Dayana Lechuga  YOB: 1929  MRN: [id number]     Referring Provider: MARIANNE Vallejo  [address]  [place] [address]   Referring Diagnosis Chronic thoracic back pain, unspecified back pain laterality [M54.6, G89.29];Claudication of right lower extremity (HCC) [I73.9]     Time Calculation  Start time: 1000  Stop time: 1100 Time Calculation (min): 60 minutes     Physical Therapy Occurrence Codes    Date of onset of impairment:  2/28/19   Date physical therapy care plan established or reviewed:  3/11/19   Date physical therapy treatment started:  3/11/19          Chief Complaint: No chief complaint on file.    Visit Diagnoses     ICD-10-CM   1. Chronic thoracic back pain, unspecified back pain laterality M54.6    G89.29   2. Claudication of right lower extremity (HCC) I73.9         Subjective:   History of Present Illness:     Mechanism of injury:  Chief complaint of swelling, pain, and tightness in bilateral legs that limits walking endurance.  Also has complaint of lumbar pain that is made worse after standing or sitting too long. Has history cardiac surgery on July/Aug. 2016, stent. Today leg symptoms are low.        Past Medical History:   Diagnosis Date   • Allergy    • Anxiety    • ASTHMA    • CAD (coronary artery disease)     JT to RCA; 70% stenosis in LAD   • Hyperlipidemia    • Hypertension    • OSTEOPOROSIS    • PVD (peripheral vascular disease) ([facility])     70% PAD-followed by Lary AMBROCIO     Past Surgical History:   Procedure Laterality Date   • ABDOMINAL HYSTERECTOMY TOTAL     • APPENDECTOMY     • CARDIAC CATH     • HERNIA REPAIR     • TONSILLECTOMY       Social History   Substance Use Topics   • Smoking status: Never Smoker   • Smokeless tobacco: Never Used   •  Alcohol use No     Family and Occupational History     Social History   • Marital status:      Spouse name: N/A   • Number of children: N/A   • Years of education: N/A       Objective     Tenderness     Right Ankle/Foot   Tenderness in the dorsum foot.     Active Range of Motion   Left Ankle/Foot   Normal active range of motion    Right Ankle/Foot   Normal active range of motion    Passive Range of Motion   Left Ankle/Foot  Normal passive range of motion    Right Ankle/Foot  Normal passive range of motion    Strength:      Left Ankle/Foot   Dorsiflexion: 4-  Plantar flexion: 4  Inversion: 4  Eversion: 3+  Great toe flexion: 4-    Right Ankle/Foot   Dorsiflexion: 4-  Plantar flexion: 4  Inversion: 4  Eversion: 3+  Great toe flexion: 4-  Ambulation     Ambulation: Level Surfaces   Ambulation without assistive device: independent    Additional Level Surfaces Ambulation Details  Normal gait over a short distance, 50 ft x 2         Therapeutic Exercises (CPT 10282):     1. Plantarflexion , green    2. Inversion, orange    3. Eversion , orange      Time-based treatments/modalities:  Therapeutic exercise minutes (CPT 86521): 20 minutes  Functional training, self care minutes (CPT 96807): 10 minutes       Assessment, Response and Plan:   Impairments: impaired physical strength    Other Impairments:  Decreased endurance for walking   Assessment details:  Patient is referred to PT for limitation in gait endurance due to pain, swelling, and tightness in both legs. She has referring diagnosis of vascular claudication. In today's session she had normal color and temperature in both feet and legs, and no swelling was present. She had normal foot/ankle mobility but had diminished strength in all planes of motion, the strength deficit was present in both feet.   Barriers to therapy:  Age  Prognosis: fair    Goals:   Short Term Goals:   Patient will be independent in home exercise program and self care strategies   Able to  walk for 5 minutes with 0-2/10 pain in legs  Perform lumbar eval and set goals accordingly   Short term goal time span:  1-2 weeks      Long Term Goals:    Patient will be independent for long term home exercise program, along with any progressions or regressions from previous, and all self care strategies   Able to walk 15 minutes with 0-2/10 pain in legs  Long term goal time span:  2-4 weeks    Plan:   Planned therapy interventions:  E Stim Unattended (CPT 74388), Functional Training, Self Care (CPT 08488), Hot or Cold Pack Therapy (CPT 71909), Manual Therapy (CPT 32335), Neuromuscular Re-education (CPT 15122), Self Care ADL Training (CPT 30922), Therapeutic Activities (CPT 97283) and Therapeutic Exercise (CPT 84535)  Frequency:  2x week  Duration in weeks:  6  Discussed with:  Patient      Functional Limitations and Severity Modifiers  Jeff Brandt Low Back Pain and Disability Score: 12.5   Current:     Goal:       Referring provider co-signature:  I have reviewed this plan of care and my co-signature certifies the need for services.  Certification Dates:   From 3/11/19     To 4/11/18    Physician Signature: ________________________________ Date: ______________

## 2019-03-14 ENCOUNTER — PHYSICAL THERAPY (OUTPATIENT)
Dept: PHYSICAL THERAPY | Facility: REHABILITATION | Age: 84
End: 2019-03-14
Attending: NURSE PRACTITIONER
Payer: MEDICARE

## 2019-03-14 DIAGNOSIS — G89.29 CHRONIC THORACIC BACK PAIN, UNSPECIFIED BACK PAIN LATERALITY: ICD-10-CM

## 2019-03-14 DIAGNOSIS — M54.6 CHRONIC THORACIC BACK PAIN, UNSPECIFIED BACK PAIN LATERALITY: ICD-10-CM

## 2019-03-14 DIAGNOSIS — I73.9 CLAUDICATION OF RIGHT LOWER EXTREMITY (HCC): ICD-10-CM

## 2019-03-14 PROCEDURE — 97535 SELF CARE MNGMENT TRAINING: CPT

## 2019-03-14 PROCEDURE — 97110 THERAPEUTIC EXERCISES: CPT

## 2019-03-14 NOTE — OP THERAPY DAILY TREATMENT
Outpatient Physical Therapy  DAILY TREATMENT     [facility] Outpatient Physical Therapy [address], [address]  [place] [address]  Phone:  [phone]    Date: 03/14/2019    Patient: Dayana Lechuga  YOB: 1929  MRN: [id number]     Time Calculation  Start time: 1000  Stop time: 1030 Time Calculation (min): 30 minutes     Chief Complaint: No chief complaint on file.    Visit #: 2    SUBJECTIVE:  I am not having very much swelling in my legs or tightness/cramping. Intermittent throbbing front of right leg. AM pain at dorsum of foot.     OBJECTIVE:  Current objective measures:           Therapeutic Exercises (CPT 67346):     1. Plantarflexion     2. Eversion     3. Inversion     4. Knee sway on ball    5. Knee to chest stretch    6. Sciatic floss     7. Ball knee to chest      Time-based treatments/modalities:  Therapeutic exercise minutes (CPT 97364): 20 minutes  Functional training, self care minutes (CPT 45865): 10 minutes         ASSESSMENT:   Response to treatment: No swelling of legs was present today. patient was performing ankle eversion incorrectly in HEP, was doing hip ext rotation. She was educated in new exercises today for lumbar mobility. Tolerated tx well.     PLAN/RECOMMENDATIONS:   Plan for treatment: therapy treatment to continue next visit.  Planned interventions for next visit: continue with current treatment.

## 2019-03-21 ENCOUNTER — PHYSICAL THERAPY (OUTPATIENT)
Dept: PHYSICAL THERAPY | Facility: REHABILITATION | Age: 84
End: 2019-03-21
Attending: NURSE PRACTITIONER
Payer: MEDICARE

## 2019-03-21 DIAGNOSIS — M54.6 CHRONIC THORACIC BACK PAIN, UNSPECIFIED BACK PAIN LATERALITY: ICD-10-CM

## 2019-03-21 DIAGNOSIS — I73.9 CLAUDICATION OF RIGHT LOWER EXTREMITY (HCC): ICD-10-CM

## 2019-03-21 DIAGNOSIS — G89.29 CHRONIC THORACIC BACK PAIN, UNSPECIFIED BACK PAIN LATERALITY: ICD-10-CM

## 2019-03-21 PROCEDURE — 97110 THERAPEUTIC EXERCISES: CPT

## 2019-03-26 ENCOUNTER — PHYSICAL THERAPY (OUTPATIENT)
Dept: PHYSICAL THERAPY | Facility: REHABILITATION | Age: 84
End: 2019-03-26
Attending: NURSE PRACTITIONER
Payer: MEDICARE

## 2019-03-26 DIAGNOSIS — M54.6 CHRONIC THORACIC BACK PAIN, UNSPECIFIED BACK PAIN LATERALITY: ICD-10-CM

## 2019-03-26 DIAGNOSIS — G89.29 CHRONIC THORACIC BACK PAIN, UNSPECIFIED BACK PAIN LATERALITY: ICD-10-CM

## 2019-03-26 DIAGNOSIS — I73.9 CLAUDICATION OF RIGHT LOWER EXTREMITY (HCC): ICD-10-CM

## 2019-03-26 PROCEDURE — 97110 THERAPEUTIC EXERCISES: CPT

## 2019-03-26 PROCEDURE — 97535 SELF CARE MNGMENT TRAINING: CPT

## 2019-03-26 NOTE — OP THERAPY DAILY TREATMENT
Outpatient Physical Therapy  DAILY TREATMENT     [facility] Outpatient Physical Therapy [address] [facility], [address]  [place] [address]  Phone:  [phone]    Date: 03/26/2019    Patient: Dayana Lechuga  YOB: 1929  MRN: [id number]     Time Calculation  Start time: 0200  Stop time: 0230 Time Calculation (min): 30 minutes     Chief Complaint: No chief complaint on file.    Visit #: 4    SUBJECTIVE:  Having some R. Sided low back pain, made worse with sitting. Having some R. Sided dorsal foot pain. Observing decrease in ankle swelling at end of day since starting PT.    OBJECTIVE:  Current objective measures:           Therapeutic Exercises (CPT 09130):     1. Swiss ball rolls (ball knee to chest), 1x20    2. Swiss ball knee sways, 1x8 ea    3. Knee to chest stretch, 2x30 sec ea    4. Hk lying fig 4 stretch (buttock stretch), 2x30 sec, // R tighter than L.    6. Sciatic floss , 1x10 ea    8. Plantarflexion, ankle circles, 1x20    9. Eversion , 1x20    10. Inversion , 1x20, orange band      Time-based treatments/modalities:  Therapeutic exercise minutes (CPT 74303): 20 minutes  Functional training, self care minutes (CPT 76191): 10 minutes         ASSESSMENT:   Response to treatment: Patient had not been doing lumbar stretches at home correctly, the review was helpful for her. In future session we can assess if they are effective in reducing R. Lumbar pain.     PLAN/RECOMMENDATIONS:   Plan for treatment: therapy treatment to continue next visit.  Planned interventions for next visit: continue with current treatment.

## 2019-03-28 ENCOUNTER — PHYSICAL THERAPY (OUTPATIENT)
Dept: PHYSICAL THERAPY | Facility: REHABILITATION | Age: 84
End: 2019-03-28
Attending: NURSE PRACTITIONER
Payer: MEDICARE

## 2019-03-28 DIAGNOSIS — M54.6 CHRONIC THORACIC BACK PAIN, UNSPECIFIED BACK PAIN LATERALITY: ICD-10-CM

## 2019-03-28 DIAGNOSIS — I73.9 CLAUDICATION OF RIGHT LOWER EXTREMITY (HCC): ICD-10-CM

## 2019-03-28 DIAGNOSIS — G89.29 CHRONIC THORACIC BACK PAIN, UNSPECIFIED BACK PAIN LATERALITY: ICD-10-CM

## 2019-03-28 PROCEDURE — 97140 MANUAL THERAPY 1/> REGIONS: CPT

## 2019-03-28 PROCEDURE — 97110 THERAPEUTIC EXERCISES: CPT

## 2019-03-28 NOTE — OP THERAPY DAILY TREATMENT
Outpatient Physical Therapy  DAILY TREATMENT     [facility] [address], [address]  [place] [address]  Phone:  [phone]    Date: 03/28/2019    Patient: Dayana Lechuga  YOB: 1929  MRN: [id number]     Time Calculation  Start time: 1000  Stop time: 1030 Time Calculation (min): 30 minutes     Chief Complaint: Back Problem    Visit #: 5    SUBJECTIVE:  Both L and R low back hurting, but L>R today. Has pain in legs at night/early morning.    OBJECTIVE:  Current objective measures:           Therapeutic Exercises (CPT 48453):     1. Neutral spine, relaxing LS paraspinals, Practice ag wall    2. * Standing eric abd band pull, Pink, 2x10, 3 sec hold, add'l practice reps    3. Knee to chest stretch, 2x30 sec ea    4. Hk lying fig 4 stretch (buttock stretch), 2x30 sec, // R tighter than L.    6. Sciatic floss , 1x10 ea, Not done 3/28/19    8. Plantarflexion, ankle circles, 1x20    9. Eversion , 1x20    10. Inversion , 1x20, orange band    Therapeutic Treatments and Modalities:     1. Manual Therapy (CPT 04837), 1. STM L adductors, anterior hip., // Abolition of pain with PROM hip flx., 2. Gentle LLE hip IR/ER in neutral., // Less pain walking.    Time-based treatments/modalities:  Manual therapy minutes (CPT 77889): 10 minutes  Therapeutic exercise minutes (CPT 69731): 20 minutes         ASSESSMENT:   Response to treatment: Responded well to hip mobility interventions and abdominal stability training with decreased back pain. Ankle mobility deficits noted on RLE.     PLAN/RECOMMENDATIONS:   Plan for treatment: therapy treatment to continue next visit.  Planned interventions for next visit: continue with current treatment.

## 2019-04-02 ENCOUNTER — PHYSICAL THERAPY (OUTPATIENT)
Dept: PHYSICAL THERAPY | Facility: REHABILITATION | Age: 84
End: 2019-04-02
Attending: NURSE PRACTITIONER
Payer: MEDICARE

## 2019-04-02 DIAGNOSIS — I73.9 CLAUDICATION OF RIGHT LOWER EXTREMITY (HCC): ICD-10-CM

## 2019-04-02 DIAGNOSIS — M54.6 CHRONIC THORACIC BACK PAIN, UNSPECIFIED BACK PAIN LATERALITY: ICD-10-CM

## 2019-04-02 DIAGNOSIS — G89.29 CHRONIC THORACIC BACK PAIN, UNSPECIFIED BACK PAIN LATERALITY: ICD-10-CM

## 2019-04-02 PROCEDURE — 97110 THERAPEUTIC EXERCISES: CPT

## 2019-04-02 NOTE — OP THERAPY DAILY TREATMENT
Outpatient Physical Therapy  DAILY TREATMENT     [facility] Outpatient Physical Therapy [address], [address]  [place] [address]  Phone:  [phone]    Date: 04/02/2019    Patient: Dayana Lechuga  YOB: 1929  MRN: [id number]     Time Calculation  Start time: 1030  Stop time: 1100 Time Calculation (min): 30 minutes     Chief Complaint: No chief complaint on file.    Visit #: 6    SUBJECTIVE:  Usually has increased soreness in her low back and upper buttocks the 2nd day after doing exercises.     OBJECTIVE:  Current objective measures:           Therapeutic Exercises (CPT 74656):     1. Neutral spine, relaxing LS paraspinals, Practice ag wall    2. Surinamese ball rolls, sagittal, 1x15, HEP    3. Swiss ball rolls, side-side, 1x10, HEP    4. Hk lying fig 4 stretch (buttock stretch), 2x30 sec, // Cues to adjust foot positon and pressure with hand.    6. Sciatic floss , 1x10 ea, Not done 3/28/19    8. Plantarflexion, ankle circles, OKC, 1x20    9. Eversion, supie OKC and seated, Orange, 1x20    10. Inversion , 1x20, orange band    12. * Standing eric abd band pull, Pink, 2x10, 3 sec hold, add'l practice reps      Time-based treatments/modalities:  Therapeutic exercise minutes (CPT 19893): 30 minutes         ASSESSMENT:   Response to treatment: Focused on review of HEP today. Required cuing and practice, but demonstrated good technique by end of session.    PLAN/RECOMMENDATIONS:   Plan for treatment: therapy treatment to continue next visit.  Planned interventions for next visit: continue with current treatment.

## 2019-04-04 ENCOUNTER — PHYSICAL THERAPY (OUTPATIENT)
Dept: PHYSICAL THERAPY | Facility: REHABILITATION | Age: 84
End: 2019-04-04
Attending: NURSE PRACTITIONER
Payer: MEDICARE

## 2019-04-04 DIAGNOSIS — M54.6 CHRONIC THORACIC BACK PAIN, UNSPECIFIED BACK PAIN LATERALITY: ICD-10-CM

## 2019-04-04 DIAGNOSIS — I73.9 CLAUDICATION OF RIGHT LOWER EXTREMITY (HCC): ICD-10-CM

## 2019-04-04 DIAGNOSIS — G89.29 CHRONIC THORACIC BACK PAIN, UNSPECIFIED BACK PAIN LATERALITY: ICD-10-CM

## 2019-04-04 PROCEDURE — 97535 SELF CARE MNGMENT TRAINING: CPT

## 2019-04-04 PROCEDURE — 97110 THERAPEUTIC EXERCISES: CPT

## 2019-04-04 NOTE — OP THERAPY DISCHARGE SUMMARY
Outpatient Physical Therapy  DISCHARGE SUMMARY NOTE      [facility]  [address], [address]  [place] [address]  Phone:  [phone]    Date of Visit: 04/04/2019    Patient: Dayana Lechuga  YOB: 1929  MRN: [id number]     Referring Provider: MARIANNE Vallejo  [address]  [place] [address]   Referring Diagnosis Pain in thoracic spine [M54.6];Other chronic pain [G89.29];Peripheral vascular disease, unspecified [I73.9]     Physical Therapy Occurrence Codes    Date of onset of impairment:  2/28/19   Date physical therapy care plan established or reviewed:  3/11/19   Date physical therapy treatment started:  3/11/19          Functional Limitations and Severity Modifiers      Goal:     Discharge:         Your patient is being discharged from Physical Therapy with the following comments:   · Goals met    Comments:  Referred to PT for lumbar pain and cramping/edema of lower extremity. She has history of vascular claudication.      Leg swelling has been very low and cramping has reduced but still present. Low back pain fit best with lower lumbar OA, stretches have helped reduce low back pain.     Limitations Remaining:      Recommendations:  D/C to home program and return to referring provider if symptoms return/worsen.     Luc Wren, PT    Date: 4/4/2019

## 2019-04-04 NOTE — OP THERAPY DAILY TREATMENT
Outpatient Physical Therapy  DAILY TREATMENT     [facility] Outpatient Physical Therapy [address] [facility] [place] [address]  [place] [address]  Phone:  [phone]    Date: 04/04/2019    Patient: Dayana Lechuga  YOB: 1929  MRN: [id number]     Time Calculation  Start time: 1000  Stop time: 1030 Time Calculation (min): 30 minutes     Chief Complaint: No chief complaint on file.    Visit #: 7    SUBJECTIVE:  I awoke with pain in foot R. Foot that made it difficult to put weight through leg. The pain was at big toe and top of foot. It improved as day went on and moved more.     OBJECTIVE:  Current objective measures:           Therapeutic Exercises (CPT 63042):     1. Swiss ball rolls (ball knee to chest), 1x20    2. Swiss ball knee sways, 1x8 ea    3. Knee to chest stretch, 2x30 sec ea    4. Hk lying fig 4 stretch (buttock stretch), 2x30 sec, // R tighter than L.    6. Sciatic floss , 1x10 ea    8. Plantarflexion, ankle circles, 1x20    9. Eversion , 1x20    10. Inversion , 1x20, orange band    Therapeutic Treatments and Modalities:     1. Functional Training, Self Care (CPT 58869), Foot, mobility exercises to be done at home    2. Functional Training, Self Care (CPT 11786), lumbar, stretches for lower lumbar facet OA    Time-based treatments/modalities:  Therapeutic exercise minutes (CPT 01498): 20 minutes  Functional training, self care minutes (CPT 40273): 10 minutes         ASSESSMENT:   Response to treatment: Patient had complaints of dorsal foot pain and first MTP pain. She has moderate bunion and hallux valgus deformity, and a rigid mid, medium arched mid foot. Assigned her stretches for improving foot mobility.   Overall her primary referring complaint of leg tension and swelling seems to be reducing. She never came to the clinic with any significant leg edema.     PLAN/RECOMMENDATIONS:   D/C from PT

## 2019-04-08 RX ORDER — FLUTICASONE PROPIONATE 50 MCG
SPRAY, SUSPENSION (ML) NASAL
Qty: 32 G | Refills: 0 | Status: SHIPPED
Start: 2019-04-08 | End: 2019-12-19

## 2019-04-09 DIAGNOSIS — I10 ESSENTIAL HYPERTENSION: ICD-10-CM

## 2019-04-09 DIAGNOSIS — I25.119 CORONARY ARTERY DISEASE INVOLVING NATIVE CORONARY ARTERY OF NATIVE HEART WITH ANGINA PECTORIS (HCC): ICD-10-CM

## 2019-04-09 RX ORDER — AMLODIPINE BESYLATE 5 MG/1
TABLET ORAL
Qty: 90 TAB | Refills: 0 | Status: SHIPPED | OUTPATIENT
Start: 2019-04-09 | End: 2019-06-27 | Stop reason: SDUPTHER

## 2019-04-09 RX ORDER — CARVEDILOL 12.5 MG/1
TABLET ORAL
Qty: 90 TAB | Refills: 0 | Status: SHIPPED | OUTPATIENT
Start: 2019-04-09 | End: 2019-04-11 | Stop reason: SDUPTHER

## 2019-04-09 RX ORDER — LISINOPRIL 20 MG/1
TABLET ORAL
Qty: 90 TAB | Refills: 0 | Status: SHIPPED | OUTPATIENT
Start: 2019-04-09 | End: 2019-06-14 | Stop reason: SDUPTHER

## 2019-04-10 RX ORDER — NITROGLYCERIN 0.4 MG/1
TABLET SUBLINGUAL
Qty: 100 TAB | Refills: 0 | Status: SHIPPED | OUTPATIENT
Start: 2019-04-10 | End: 2020-03-04 | Stop reason: SDUPTHER

## 2019-04-11 DIAGNOSIS — I25.119 CORONARY ARTERY DISEASE INVOLVING NATIVE CORONARY ARTERY OF NATIVE HEART WITH ANGINA PECTORIS (HCC): ICD-10-CM

## 2019-04-11 DIAGNOSIS — I10 ESSENTIAL HYPERTENSION: ICD-10-CM

## 2019-04-11 RX ORDER — CARVEDILOL 12.5 MG/1
TABLET ORAL
Qty: 180 TAB | Refills: 0 | Status: SHIPPED | OUTPATIENT
Start: 2019-04-11 | End: 2019-06-27 | Stop reason: SDUPTHER

## 2019-04-11 NOTE — TELEPHONE ENCOUNTER
Was the patient seen in the last year in this department? Yes    Does patient have an active prescription for medications requested? No     Received Request Via: Patient     Patient needs 180 for insurance. 3 months.

## 2019-05-15 ENCOUNTER — OFFICE VISIT (OUTPATIENT)
Dept: URGENT CARE | Facility: CLINIC | Age: 84
End: 2019-05-15
Payer: MEDICARE

## 2019-05-15 VITALS
DIASTOLIC BLOOD PRESSURE: 68 MMHG | BODY MASS INDEX: 19.46 KG/M2 | TEMPERATURE: 98.5 F | HEART RATE: 80 BPM | RESPIRATION RATE: 18 BRPM | SYSTOLIC BLOOD PRESSURE: 142 MMHG | HEIGHT: 64 IN | WEIGHT: 114 LBS | OXYGEN SATURATION: 96 %

## 2019-05-15 DIAGNOSIS — J06.9 UPPER RESPIRATORY TRACT INFECTION, UNSPECIFIED TYPE: ICD-10-CM

## 2019-05-15 DIAGNOSIS — J45.909 MILD ASTHMA WITHOUT COMPLICATION, UNSPECIFIED WHETHER PERSISTENT: ICD-10-CM

## 2019-05-15 PROCEDURE — 99214 OFFICE O/P EST MOD 30 MIN: CPT | Performed by: PHYSICIAN ASSISTANT

## 2019-05-15 RX ORDER — BENZONATATE 100 MG/1
200 CAPSULE ORAL 3 TIMES DAILY PRN
Qty: 30 CAP | Refills: 0 | Status: SHIPPED | OUTPATIENT
Start: 2019-05-15 | End: 2019-05-15 | Stop reason: SDUPTHER

## 2019-05-15 RX ORDER — BENZONATATE 100 MG/1
200 CAPSULE ORAL 3 TIMES DAILY PRN
Qty: 30 CAP | Refills: 0 | Status: SHIPPED | OUTPATIENT
Start: 2019-05-15 | End: 2019-09-05

## 2019-05-15 ASSESSMENT — ENCOUNTER SYMPTOMS
HEADACHES: 0
MYALGIAS: 0
SHORTNESS OF BREATH: 0
TINGLING: 0
EYE REDNESS: 0
EYE DISCHARGE: 0
FEVER: 0
COUGH: 1
DIARRHEA: 0
WHEEZING: 1
DIZZINESS: 0
VOMITING: 0
CHILLS: 0
SORE THROAT: 1
RHINORRHEA: 1

## 2019-05-15 NOTE — PROGRESS NOTES
Subjective:      Dayana Lechuga is a 89 y.o. female who presents with Cough (x6days, cough, sore throat)            Patient is a 89-year-old female who presents to urgent care with dry cough with minimal mucus production for the last 6 days.  During this time she is developed a scratchy throat with slight voice hoarseness.  She does report a history of asthma however she continues with Qvar and has really needed to utilize her rescue inhaler.  She is mainly concerned as the cough appears to be keeping her up at nighttime.  Patient does take a daily allergy medication.  She denies any fevers, chills or body aches.      Cough   This is a new problem. The current episode started in the past 7 days. The problem has been waxing and waning. The problem occurs every few minutes. The cough is non-productive (Rare sputum production. ). Associated symptoms include nasal congestion, postnasal drip, rhinorrhea, a sore throat and wheezing. Pertinent negatives include no chest pain, chills, ear pain, eye redness, fever, headaches, myalgias, rash or shortness of breath. Nothing aggravates the symptoms. She has tried OTC cough suppressant for the symptoms. The treatment provided mild relief. Her past medical history is significant for asthma and bronchitis. There is no history of pneumonia.       Review of Systems   Constitutional: Positive for malaise/fatigue. Negative for chills and fever.   HENT: Positive for congestion, postnasal drip, rhinorrhea and sore throat. Negative for ear discharge and ear pain.    Eyes: Negative for discharge and redness.   Respiratory: Positive for cough and wheezing. Negative for shortness of breath.    Cardiovascular: Negative for chest pain and leg swelling.   Gastrointestinal: Negative for diarrhea and vomiting.   Genitourinary: Negative for dysuria.   Musculoskeletal: Negative for myalgias.   Skin: Negative for itching and rash.   Neurological: Negative for dizziness, tingling and headaches.  "         Objective:     /68 (BP Location: Left arm, Patient Position: Sitting, BP Cuff Size: Small adult)   Pulse 80   Temp 36.9 °C (98.5 °F) (Temporal)   Resp 18   Ht 1.613 m (5' 3.5\")   Wt 51.7 kg (114 lb)   LMP  (LMP Unknown)   SpO2 96%   BMI 19.88 kg/m²    PMH:  has a past medical history of Allergy; Anxiety; ASTHMA; CAD (coronary artery disease); Hyperlipidemia; Hypertension; OSTEOPOROSIS; and PVD (peripheral vascular disease) ([facility]).  MEDS:Reveiwed.      ALLERGIES:   Allergies   Allergen Reactions   • Pcn [Penicillins] Rash     About 70 years   • Codeine Vomiting     SURGHX:   Past Surgical History:   Procedure Laterality Date   • ABDOMINAL HYSTERECTOMY TOTAL     • APPENDECTOMY     • HERNIA REPAIR     • TONSILLECTOMY     • ZZZ CARDIAC CATH       SOCHX:  reports that she has never smoked. She has never used smokeless tobacco. She reports that she does not drink alcohol or use drugs.  FH: Family history was reviewed, no pertinent findings to report    Physical Exam   Constitutional: She is oriented to person, place, and time. She appears well-developed and well-nourished.   HENT:   Head: Normocephalic and atraumatic.   Mouth/Throat: No oropharyngeal exudate.   Ears- Canals clear- TM- with clear fluid effusions bilaterally.   Pos. PND, with slight erythema- without tonsillar edema or exudate.   Mild discharge noted bilaterally- to nares.      Eyes: Pupils are equal, round, and reactive to light. EOM are normal.   Neck: Normal range of motion. Neck supple.   Cardiovascular: Normal rate and regular rhythm.    No murmur heard.  Pulmonary/Chest: Effort normal and breath sounds normal. No respiratory distress. She has no wheezes. She exhibits no tenderness.   Musculoskeletal: Normal range of motion. She exhibits no tenderness.   Lymphadenopathy:     She has no cervical adenopathy.   Neurological: She is alert and oriented to person, place, and time.   Skin: Skin is warm. No rash noted.   Psychiatric: She " has a normal mood and affect. Her behavior is normal.   Vitals reviewed.              Assessment/Plan:     1. Upper respiratory tract infection, unspecified type  - benzonatate (TESSALON) 100 MG Cap; Take 2 Caps by mouth 3 times a day as needed for Cough.  Dispense: 30 Cap; Refill: 0    2. Mild asthma without complication, unspecified whether persistent    Patient is to continue with her Qvar at this time and utilize rescue inhaler if needed for chest tightness or wheezing.  It was explained to the pt. Today that due to the viral nature of the pt's illness, we will treat symptomatically today. Encouraged OTC supportive meds PRN. Humidification, increase fluids, avoid night time dairy.   Patient given precautionary s/sx that mandate immediate follow up and evaluation in the ED. Advised of risks of not doing so.    DDX, Supportive care, and indications for immediate follow-up discussed with patient.    Instructed to return to clinic or nearest emergency department if we are not available for any change in condition, further concerns, or worsening of symptoms.    The patient demonstrated a good understanding and agreed with the treatment plan.  Please note that this dictation was created using voice recognition software. I have made every reasonable attempt to correct obvious errors, but I expect that there are errors of grammar and possibly content that I did not discover before finalizing the note.

## 2019-05-20 ENCOUNTER — OFFICE VISIT (OUTPATIENT)
Dept: URGENT CARE | Facility: CLINIC | Age: 84
End: 2019-05-20
Payer: MEDICARE

## 2019-05-20 VITALS
SYSTOLIC BLOOD PRESSURE: 138 MMHG | HEART RATE: 77 BPM | DIASTOLIC BLOOD PRESSURE: 72 MMHG | TEMPERATURE: 99.1 F | WEIGHT: 114 LBS | BODY MASS INDEX: 19.46 KG/M2 | OXYGEN SATURATION: 94 % | RESPIRATION RATE: 15 BRPM | HEIGHT: 64 IN

## 2019-05-20 DIAGNOSIS — J40 BRONCHITIS: ICD-10-CM

## 2019-05-20 DIAGNOSIS — J45.901 MILD ASTHMA WITH ACUTE EXACERBATION, UNSPECIFIED WHETHER PERSISTENT: ICD-10-CM

## 2019-05-20 DIAGNOSIS — H00.015 HORDEOLUM EXTERNUM OF LEFT LOWER EYELID: ICD-10-CM

## 2019-05-20 PROCEDURE — 94640 AIRWAY INHALATION TREATMENT: CPT | Performed by: PHYSICIAN ASSISTANT

## 2019-05-20 PROCEDURE — 99214 OFFICE O/P EST MOD 30 MIN: CPT | Mod: 25 | Performed by: PHYSICIAN ASSISTANT

## 2019-05-20 RX ORDER — DOXYCYCLINE HYCLATE 100 MG
100 TABLET ORAL 2 TIMES DAILY
Qty: 10 TAB | Refills: 0 | Status: SHIPPED | OUTPATIENT
Start: 2019-05-20 | End: 2019-05-25

## 2019-05-20 RX ORDER — ERYTHROMYCIN 5 MG/G
OINTMENT OPHTHALMIC
Qty: 1 TUBE | Refills: 0 | Status: SHIPPED | OUTPATIENT
Start: 2019-05-20 | End: 2019-09-11

## 2019-05-20 RX ORDER — METHYLPREDNISOLONE 4 MG/1
TABLET ORAL
Qty: 1 KIT | Refills: 0 | Status: SHIPPED | OUTPATIENT
Start: 2019-05-20 | End: 2019-08-13

## 2019-05-20 RX ORDER — ALBUTEROL SULFATE 2.5 MG/3ML
2.5 SOLUTION RESPIRATORY (INHALATION) ONCE
Status: COMPLETED | OUTPATIENT
Start: 2019-05-20 | End: 2019-05-20

## 2019-05-20 RX ADMIN — ALBUTEROL SULFATE 2.5 MG: 2.5 SOLUTION RESPIRATORY (INHALATION) at 09:10

## 2019-05-20 ASSESSMENT — ENCOUNTER SYMPTOMS
MYALGIAS: 0
COUGH: 1
VOMITING: 0
RHINORRHEA: 1
SPUTUM PRODUCTION: 1
EYE DISCHARGE: 0
HEADACHES: 1
DIZZINESS: 0
EYE REDNESS: 0
DIARRHEA: 0
SHORTNESS OF BREATH: 0
SORE THROAT: 1
FEVER: 0
WHEEZING: 1
CHILLS: 0

## 2019-05-20 NOTE — PROGRESS NOTES
Subjective:      Dayana Lechuga is a 89 y.o. female who presents with Cough (watery eyes, now feeling lower in chest, seen last week not feeling better, sinus pain and pressure on face and ears)            Patient is a 89-year-old female who presents to urgent care with continued cough, sinus pain and pressure and intermittent wheezing for almost 2 weeks.  Patient was previously evaluated by myself 5 days ago of which during that time patient had mainly congestion with a dry cough.  At that time she was really using her inhaler however does report she is needed to use it more the last few days.  She is with her son today who also provides history.  Furthermore patient denies any significant fatigue, fevers or body aches.      Cough   This is a new problem. The current episode started 1 to 4 weeks ago. The problem has been gradually worsening. The cough is non-productive (Rare sputum production). Associated symptoms include headaches, nasal congestion, postnasal drip, rhinorrhea, a sore throat and wheezing. Pertinent negatives include no chest pain, chills, ear pain, eye redness, fever, myalgias, rash or shortness of breath. The symptoms are aggravated by lying down. She has tried OTC cough suppressant and a beta-agonist inhaler for the symptoms. The treatment provided mild relief. Her past medical history is significant for asthma, bronchitis and environmental allergies.       Review of Systems   Constitutional: Negative for chills, fever and malaise/fatigue.   HENT: Positive for congestion, postnasal drip, rhinorrhea and sore throat. Negative for ear discharge and ear pain.    Eyes: Negative for discharge and redness.   Respiratory: Positive for cough, sputum production and wheezing. Negative for shortness of breath.    Cardiovascular: Negative for chest pain and leg swelling.   Gastrointestinal: Negative for diarrhea and vomiting.   Genitourinary: Negative for dysuria.   Musculoskeletal: Negative for myalgias.  "  Skin: Negative for itching and rash.   Neurological: Positive for headaches. Negative for dizziness.   Endo/Heme/Allergies: Positive for environmental allergies.          Objective:     /72 (BP Location: Left arm, Patient Position: Sitting, BP Cuff Size: Adult)   Pulse 77   Temp 37.3 °C (99.1 °F) (Temporal)   Resp 15   Ht 1.613 m (5' 3.5\")   Wt 51.7 kg (114 lb)   LMP  (LMP Unknown)   SpO2 94%   BMI 19.88 kg/m²    PMH:  has a past medical history of Allergy; Anxiety; ASTHMA; CAD (coronary artery disease); Hyperlipidemia; Hypertension; OSTEOPOROSIS; and PVD (peripheral vascular disease) (formerly [facility]).  MEDS:   Current Outpatient Prescriptions:   •  erythromycin 5 MG/GM Ointment, Apply 1/2 inch strip to left lower eyelid QID for 7 days., Disp: 1 Tube, Rfl: 0  •  MethylPREDNISolone (MEDROL DOSEPAK) 4 MG Tablet Therapy Pack, UAD, Disp: 1 Kit, Rfl: 0  •  doxycycline (VIBRAMYCIN) 100 MG Tab, Take 1 Tab by mouth 2 times a day for 5 days., Disp: 10 Tab, Rfl: 0  •  benzonatate (TESSALON) 100 MG Cap, Take 2 Caps by mouth 3 times a day as needed for Cough., Disp: 30 Cap, Rfl: 0  •  carvedilol (COREG) 12.5 MG Tab, TAKE 1 TABLET BY MOUTH TWICE A DAY WITH MEALS, Disp: 180 Tab, Rfl: 0  •  nitroglycerin (NITROSTAT) 0.4 MG SL Tab, Place 1 tab under tongue every 5 min as needed for chest pain max 3 doses, Disp: 100 Tab, Rfl: 0  •  amLODIPine (NORVASC) 5 MG Tab, TAKE 1 TABLET BY MOUTH ONE TIME A DAY, Disp: 90 Tab, Rfl: 0  •  lisinopril (PRINIVIL) 20 MG Tab, TAKE 1 TABLET BY MOUTH ONE TIME A DAY, Disp: 90 Tab, Rfl: 0  •  potassium chloride SA (KDUR) 20 MEQ Tab CR, TAKE 1 TABLET BY MOUTH TWICE DAILY, Disp: 180 Tab, Rfl: 0  •  isosorbide mononitrate SR (IMDUR) 30 MG TABLET SR 24 HR, Take 1 Tab by mouth every evening., Disp: 90 Tab, Rfl: 1  •  atorvastatin (LIPITOR) 80 MG tablet, Take 1 Tab by mouth every evening., Disp: 90 Tab, Rfl: 3  •  FLUZONE HIGH-DOSE 0.5 ML Suspension Prefilled Syringe injection, 0.5 mL by Injection route " Once., Disp: , Rfl:   •  beclomethasone (QVAR) 80 MCG/ACT inhaler, Use 1 puff twice daily, Disp: 3 Inhaler, Rfl: 0  •  guaifenesin LA (MUCINEX) 600 MG TABLET SR 12 HR, Take 600 mg by mouth every 12 hours., Disp: , Rfl:   •  ascorbic acid (ASCORBIC ACID) 500 MG Tab, Take 500 mg by mouth every day., Disp: , Rfl:   •  diphenhydrAMINE (BENADRYL) 25 MG Tab, Take 25 mg by mouth every 6 hours as needed for Sleep., Disp: , Rfl:   •  VITAMIN E PO, Take  by mouth., Disp: , Rfl:   •  aspirin (ASA) 81 MG Chew Tab chewable tablet, Take 1 Tab by mouth every day., Disp: 100 Tab, Rfl: 11  •  Cholecalciferol (VITAMIN D) 2000 UNIT Tab, Take 2,000 Units by mouth every day., Disp: , Rfl:   •  Biotin 10 MG Tab, Take 10 mg by mouth every day., Disp: , Rfl:   •  Multiple Vitamins-Minerals (CENTRUM SILVER ADULT 50+) Tab, Take 1 Tab by mouth every day., Disp: , Rfl:   •  Omega-3 Fatty Acids (FISH OIL) 1200 MG Cap, Take 1,200 mg by mouth every day., Disp: , Rfl:   •  omeprazole (PRILOSEC) 20 MG delayed-release capsule, Take 20 mg by mouth every day., Disp: , Rfl:   •  fluticasone (FLONASE) 50 MCG/ACT nasal spray, USE 1 SPRAY IN EACH NOSTRIL DAILY (Patient not taking: Reported on 5/20/2019), Disp: 32 g, Rfl: 0  •  albuterol 108 (90 Base) MCG/ACT Aero Soln inhalation aerosol, Inhale 2 Puffs by mouth every 6 hours as needed for Shortness of Breath. (Patient not taking: Reported on 2/28/2019), Disp: 20.1 g, Rfl: 0  ALLERGIES:   Allergies   Allergen Reactions   • Pcn [Penicillins] Rash     About 70 years   • Codeine Vomiting     SURGHX:   Past Surgical History:   Procedure Laterality Date   • ABDOMINAL HYSTERECTOMY TOTAL     • APPENDECTOMY     • HERNIA REPAIR     • TONSILLECTOMY     • ZZZ CARDIAC CATH       SOCHX:  reports that she has never smoked. She has never used smokeless tobacco. She reports that she does not drink alcohol or use drugs.  FH: Family history was reviewed, no pertinent findings to report    Physical Exam   Constitutional: She  is oriented to person, place, and time. She appears well-developed and well-nourished.   HENT:   Head: Normocephalic and atraumatic.   Mouth/Throat: No oropharyngeal exudate.   Ears- Canals clear- TM- with clear fluid effusions bilaterally.   Pos. PND, with slight erythema- without tonsillar edema or exudate.   Mild discharge noted bilaterally- to nares. Bilateral maxillary sinus tenderness with percussion.      Eyes: Pupils are equal, round, and reactive to light. EOM are normal. Left eye exhibits hordeolum.       Neck: Normal range of motion. Neck supple.   Cardiovascular: Normal rate and regular rhythm.    No murmur heard.  Pulmonary/Chest: Effort normal. No respiratory distress. She has wheezes.   Without egophony.   Musculoskeletal: Normal range of motion. She exhibits no tenderness.   Lymphadenopathy:     She has no cervical adenopathy.   Neurological: She is alert and oriented to person, place, and time.   Skin: Skin is warm. No rash noted.   Psychiatric: She has a normal mood and affect. Her behavior is normal.   Vitals reviewed.              Assessment/Plan:     1. Bronchitis  - albuterol (PROVENTIL) 2.5mg/3ml nebulizer solution 2.5 mg; 3 mL by Nebulization route Once.  - MethylPREDNISolone (MEDROL DOSEPAK) 4 MG Tablet Therapy Pack; UAD  Dispense: 1 Kit; Refill: 0  - doxycycline (VIBRAMYCIN) 100 MG Tab; Take 1 Tab by mouth 2 times a day for 5 days.  Dispense: 10 Tab; Refill: 0    2. Mild asthma with acute exacerbation, unspecified whether persistent  - albuterol (PROVENTIL) 2.5mg/3ml nebulizer solution 2.5 mg; 3 mL by Nebulization route Once.  - MethylPREDNISolone (MEDROL DOSEPAK) 4 MG Tablet Therapy Pack; UAD  Dispense: 1 Kit; Refill: 0    3. Hordeolum externum of left lower eyelid  - erythromycin 5 MG/GM Ointment; Apply 1/2 inch strip to left lower eyelid QID for 7 days.  Dispense: 1 Tube; Refill: 0    POX- rechecked after breathing TX- 96% on RA.   Improved breathing per patient.   Improved air movement.    Continued use of warm hot compresses and will write for erythromycin ointment.  Due to duration of symptoms, sinus tenderness, and failure of OTC therapies- ABX was written to tx. For bacterial etiology for sinusitis.  Also will start the patient on a steroid secondary to exacerbation of asthma.  Continue to encourage patient to utilize rescue inhaler if needed.  Continue OTC supportive therapies- Flonase, OTC allergy meds, avoid night time dairy. Increase fluids. Humidification.   Patient given precautionary s/sx that mandate immediate follow up and evaluation in the ED. Advised of risks of not doing so.    DDX, Supportive care, and indications for immediate follow-up discussed with patient.    Instructed to return to clinic or nearest emergency department if we are not available for any change in condition, further concerns, or worsening of symptoms.    The patient demonstrated a good understanding and agreed with the treatment plan    Please note that this dictation was created using voice recognition software. I have made every reasonable attempt to correct obvious errors, but I expect that there are errors of grammar and possibly content that I did not discover before finalizing the note.

## 2019-05-23 DIAGNOSIS — I10 ESSENTIAL HYPERTENSION: ICD-10-CM

## 2019-05-24 DIAGNOSIS — E78.5 HYPERLIPIDEMIA, UNSPECIFIED HYPERLIPIDEMIA TYPE: Primary | ICD-10-CM

## 2019-05-24 RX ORDER — ISOSORBIDE MONONITRATE 30 MG/1
30 TABLET, EXTENDED RELEASE ORAL EVERY EVENING
Qty: 90 TAB | Refills: 3 | Status: SHIPPED
Start: 2019-05-24 | End: 2019-12-19

## 2019-05-28 RX ORDER — ATORVASTATIN CALCIUM 80 MG/1
80 TABLET, FILM COATED ORAL EVERY EVENING
Qty: 100 TAB | Refills: 2 | Status: SHIPPED
Start: 2019-05-28 | End: 2019-12-19

## 2019-05-30 ENCOUNTER — OFFICE VISIT (OUTPATIENT)
Dept: MEDICAL GROUP | Facility: PHYSICIAN GROUP | Age: 84
End: 2019-05-30
Payer: MEDICARE

## 2019-05-30 VITALS
BODY MASS INDEX: 19.97 KG/M2 | WEIGHT: 117 LBS | HEART RATE: 65 BPM | HEIGHT: 64 IN | SYSTOLIC BLOOD PRESSURE: 126 MMHG | TEMPERATURE: 98.4 F | DIASTOLIC BLOOD PRESSURE: 66 MMHG | RESPIRATION RATE: 16 BRPM | OXYGEN SATURATION: 96 %

## 2019-05-30 DIAGNOSIS — F13.20 BENZODIAZEPINE DEPENDENCE (HCC): ICD-10-CM

## 2019-05-30 DIAGNOSIS — I25.119 CORONARY ARTERY DISEASE INVOLVING NATIVE CORONARY ARTERY OF NATIVE HEART WITH ANGINA PECTORIS (HCC): ICD-10-CM

## 2019-05-30 DIAGNOSIS — F41.9 ANXIETY: ICD-10-CM

## 2019-05-30 DIAGNOSIS — I10 ESSENTIAL HYPERTENSION: ICD-10-CM

## 2019-05-30 DIAGNOSIS — I70.201 POPLITEAL ARTERY STENOSIS, RIGHT (HCC): ICD-10-CM

## 2019-05-30 DIAGNOSIS — I73.9 CLAUDICATION OF RIGHT LOWER EXTREMITY (HCC): ICD-10-CM

## 2019-05-30 DIAGNOSIS — J45.21 MILD INTERMITTENT ASTHMA WITH ACUTE EXACERBATION: ICD-10-CM

## 2019-05-30 DIAGNOSIS — E78.5 DYSLIPIDEMIA: Chronic | ICD-10-CM

## 2019-05-30 PROCEDURE — 8041 PR SCP AHA: Performed by: NURSE PRACTITIONER

## 2019-05-30 PROCEDURE — 99214 OFFICE O/P EST MOD 30 MIN: CPT | Mod: 25 | Performed by: NURSE PRACTITIONER

## 2019-05-30 PROCEDURE — 69210 REMOVE IMPACTED EAR WAX UNI: CPT | Performed by: NURSE PRACTITIONER

## 2019-05-30 RX ORDER — MONTELUKAST SODIUM 10 MG/1
10 TABLET ORAL DAILY
Qty: 90 TAB | Refills: 0 | Status: SHIPPED | OUTPATIENT
Start: 2019-05-30 | End: 2019-05-30 | Stop reason: CLARIF

## 2019-05-30 RX ORDER — LORAZEPAM 0.5 MG/1
TABLET ORAL
Qty: 90 TAB | Refills: 2 | Status: SHIPPED | OUTPATIENT
Start: 2019-05-30 | End: 2019-08-29 | Stop reason: SDUPTHER

## 2019-05-30 RX ORDER — LORAZEPAM 0.5 MG/1
0.5 TABLET ORAL DAILY
Refills: 1 | COMMUNITY
Start: 2019-05-09 | End: 2019-09-05

## 2019-05-30 RX ORDER — MONTELUKAST SODIUM 10 MG/1
10 TABLET ORAL DAILY
Qty: 90 TAB | Refills: 0 | Status: SHIPPED | OUTPATIENT
Start: 2019-05-30 | End: 2019-05-31 | Stop reason: SDUPTHER

## 2019-05-30 NOTE — PROGRESS NOTES
Subjective:     Dayana Lechuga is a 89 y.o. female here today for a cough, medication refill, and for Annual Health Assessment.    Anxiety  Benzodiazepine dependence (HCC)  Chronic. Patient has anxiety which is chronic in nature. She would like refill of her Ativan. She is currently in the process of slowly weaning off of the Ativan and would like to stay on her current dose for a little longer. No reports of medication side effects or new associated symptoms regarding anxiety.     Mild intermittent asthma with acute exacerbation  This is a new problem to me. Patient states she has been having bronchitis symptoms. States she was seen at urgent care on 5/15 for a dry cough. She was treated symptomatically and was prescribed Tessalon and advised to continue using her Qvar inhaler BID. States the tessalon did not help. She went back to urgent care on 5/20 and was prescribed doxycycline and medrol dosepak which did not seem to help. States she was wheezing that day and was treated with an albuterol nebulizer which helped with her breathing. She reports possible decreased appetite. No reports of any active fevers. Patient mentions she has been taking Mucinex, generic of Claritin, and fluticasone with minimal improvement. She uses her rescue inhaler as needed.    Essential hypertension  Coronary artery disease involving native coronary artery of native heart with angina pectoris (HCC)  Chronic. Patient states her blood pressure has been low. She recently got a home blood pressure reading of 88/41. States her blood pressure was 109/55 after she replaced the batteries. She is currently on lisinopril, coreg, amlopdine, and Imdur. These conditions are being managed by cardiology. She states she has not had any associated symptoms including no lightheadedness. She mentions she thinks she should be drinking water more.     Dyslipidemia  Chronic. Patient is currently on atorvastatin. No reports of medication side effects or  active associated symptoms regarding dyslipidemia.     Popliteal artery stenosis, right (HCC)  Claudication of right lower extremity (HCC)  Chronic and stable at this time. Not worsening. No reports of active associated symptoms regarding this condition. She is followed by vascular.    Health Maintenance Summary                IMM HEP B VACCINE Overdue 2/18/1948     Annual Wellness Visit Overdue 6/21/2017      Done 6/20/2016 Visit Dx: Medicare annual wellness visit, subsequent     Patient has more history with this topic...    MAMMOGRAM Overdue 2/6/2019      Done 2/6/2018 MA-MAMMO SCREENING BILAT W/TOMOSYNTHESIS W/CAD     Patient has more history with this topic...    IMM ZOSTER VACCINES Postponed 12/19/2019 Originally 2/18/1979. Patient Refused    BONE DENSITY Next Due 6/13/2023      Done 6/13/2018 DS-BONE DENSITY STUDY (DEXA)     Patient has more history with this topic...    IMM DTaP/Tdap/Td Vaccine Next Due 6/26/2026      Done 6/26/2016 Imm Admin: Dtap Vaccine     Patient has more history with this topic...           Annual Health Assessment Questions:    1.  Are you currently engaging in any exercise or physical activity? No    2.  How would you describe your mood or emotional well-being today? good    3.  Have you had any falls in the last year? Yes    4.  Have you noticed any problems with your balance or had difficulty walking? No    5.  In the last six months have you experienced any leakage of urine? Yes    6. DPA/Advanced Directive: Patient does not have an Advanced Directive.  A packet and workshop information was given on Advanced Directives.    Current medicines (including changes today)  Current Outpatient Prescriptions   Medication Sig Dispense Refill   • LORazepam (ATIVAN) 0.5 MG Tab Take 0.5 mg orally in the afternoon. Take 1 mg orally every night. 90 Tab 2   • montelukast (SINGULAIR) 10 MG Tab Take 1 Tab by mouth every day. 90 Tab 0   • atorvastatin (LIPITOR) 80 MG tablet Take 1 Tab by mouth every  evening. 100 Tab 2   • isosorbide mononitrate SR (IMDUR) 30 MG TABLET SR 24 HR Take 1 Tab by mouth every evening. 90 Tab 3   • erythromycin 5 MG/GM Ointment Apply 1/2 inch strip to left lower eyelid QID for 7 days. 1 Tube 0   • benzonatate (TESSALON) 100 MG Cap Take 2 Caps by mouth 3 times a day as needed for Cough. 30 Cap 0   • carvedilol (COREG) 12.5 MG Tab TAKE 1 TABLET BY MOUTH TWICE A DAY WITH MEALS 180 Tab 0   • amLODIPine (NORVASC) 5 MG Tab TAKE 1 TABLET BY MOUTH ONE TIME A DAY 90 Tab 0   • lisinopril (PRINIVIL) 20 MG Tab TAKE 1 TABLET BY MOUTH ONE TIME A DAY 90 Tab 0   • fluticasone (FLONASE) 50 MCG/ACT nasal spray USE 1 SPRAY IN EACH NOSTRIL DAILY 32 g 0   • potassium chloride SA (KDUR) 20 MEQ Tab CR TAKE 1 TABLET BY MOUTH TWICE DAILY 180 Tab 0   • FLUZONE HIGH-DOSE 0.5 ML Suspension Prefilled Syringe injection 0.5 mL by Injection route Once.     • beclomethasone (QVAR) 80 MCG/ACT inhaler Use 1 puff twice daily 3 Inhaler 0   • guaifenesin LA (MUCINEX) 600 MG TABLET SR 12 HR Take 600 mg by mouth every 12 hours.     • ascorbic acid (ASCORBIC ACID) 500 MG Tab Take 500 mg by mouth every day.     • albuterol 108 (90 Base) MCG/ACT Aero Soln inhalation aerosol Inhale 2 Puffs by mouth every 6 hours as needed for Shortness of Breath. 20.1 g 0   • diphenhydrAMINE (BENADRYL) 25 MG Tab Take 25 mg by mouth every 6 hours as needed for Sleep.     • VITAMIN E PO Take  by mouth.     • aspirin (ASA) 81 MG Chew Tab chewable tablet Take 1 Tab by mouth every day. 100 Tab 11   • Cholecalciferol (VITAMIN D) 2000 UNIT Tab Take 2,000 Units by mouth every day.     • Biotin 10 MG Tab Take 10 mg by mouth every day.     • Multiple Vitamins-Minerals (CENTRUM SILVER ADULT 50+) Tab Take 1 Tab by mouth every day.     • Omega-3 Fatty Acids (FISH OIL) 1200 MG Cap Take 1,200 mg by mouth every day.     • omeprazole (PRILOSEC) 20 MG delayed-release capsule Take 20 mg by mouth every day.     • LORazepam (ATIVAN) 0.5 MG Tab Take 0.5 mg by mouth  "every day.  1   • MethylPREDNISolone (MEDROL DOSEPAK) 4 MG Tablet Therapy Pack UAD (Patient not taking: Reported on 5/30/2019) 1 Kit 0   • nitroglycerin (NITROSTAT) 0.4 MG SL Tab Place 1 tab under tongue every 5 min as needed for chest pain max 3 doses 100 Tab 0     No current facility-administered medications for this visit.        She  has a past medical history of Allergy; Anxiety; ASTHMA; CAD (coronary artery disease); Hyperlipidemia; Hypertension; OSTEOPOROSIS; and PVD (peripheral vascular disease) (formerly [facility]).    Pcn [penicillins] and Codeine    She  reports that she has never smoked. She has never used smokeless tobacco. She reports that she does not drink alcohol or use drugs.  Counseling given: Not Answered      ROS  Positive for: chronic anxiety, cough, sneezing, bronchitis symptoms, possible decreased appetite.  Negative for: chest pain, shortness of breath, abdominal pain, lightheadedness, active fevers.  As above, all other systems negative.      Objective:     Physical Exam:  /66 (BP Location: Left arm, Patient Position: Sitting, BP Cuff Size: Adult)   Pulse 65   Temp 36.9 °C (98.4 °F) (Temporal)   Resp 16   Ht 1.613 m (5' 3.5\")   Wt 53.1 kg (117 lb)   SpO2 96%  Body mass index is 20.4 kg/m².   Constitutional: Alert, no distress.  HENT: Normocephalic. Bilateral TMs are clear post cerumen removal. Posterior oropharynx is erythematous.  Neck: Mildly swollen lymph node mid-throat with some tenderness to palpation  Skin: Warm, dry, good turgor, no rashes in visible areas.  Eye: Equal, round and reactive, conjunctiva clear, lids normal.  ENMT: Lips without lesions, good dentition, oropharynx clear.  Neck: Trachea midline, no masses, no thyromegaly. No cervical or supraclavicular lymphadenopathy.  Respiratory: Unlabored respiratory effort, lungs clear to auscultation, no wheezes, no rhonchi.  Cardiovascular: Normal S1, S2, no murmur, no edema.  Abdomen: Soft, non-tender, no masses, no " hepatosplenomegaly.  Psych: Alert and oriented x3, normal affect and mood.    Procedure: Cerumen Removal  Risks and benefits of procedure discussed with patient.  Cerumen removed with  lavage   Patient tolerated the procedure well  Pt educated about proper care of ear canal. Q-tip cleaning discouraged, use of debrox and warm water lavage discussed.  Post lavage curette performed by provider, Post procedure exam with clear canal and normal TM.        Assessment and Plan:     1. Anxiety  5. Benzodiazepine dependence (HCC)  - Patient is currently in the process of weaning off of ativan. She would like to stay on her current dose for a little longer. Refilling ativan.  was checked today, last fill 5/2. Reviewed the risks and benefits as well as potential side effects of ativan with patient.   - LORazepam (ATIVAN) 0.5 MG Tab; Take 0.5 mg orally in the afternoon. Take 1 mg orally every night.  Dispense: 90 Tab; Refill: 2    2. Mild intermittent asthma with acute exacerbation  - Patient states the medications she was prescribed at urgent care, including doxycycline and tessalon have not helped. She continues to have frequent cough and sneezing. Informed her she possibly has allergies. Advised trying another allergy medication such as Zyrtec or Allegra. Also recommended trying over-the-counter robitussin. Advised that she can use her rescue inhaler when she gets the coughing episodes. Prescribing singulair. Reviewed the risks and benefits as well as potential side effects of medications with patient.   - montelukast (SINGULAIR) 10 MG Tab; Take 1 Tab by mouth every day.  Dispense: 90 Tab; Refill: 0    3. Essential hypertension  6. Coronary artery disease involving native coronary artery of native heart with angina pectoris (HCC)  - Patient states she has been getting low blood pressure readings at home. Her blood pressure in clinic today was 126/66. Will continue to monitor. Continue current plan of care and medications.  Her medications for this are being managed by cardiology.    4. Dyslipidemia  - Stable. Continue current plan of care and medications.     7. Popliteal artery stenosis, right (HCC)  8. Claudication of right lower extremity (HCC)  - Stable. Continue current plan of care and medications. Continue following up with vascular.     Cerumen removal procedure was performed:  Procedure: Cerumen Removal  Risks and benefits of procedure discussed with patient.  Cerumen removed with  lavage   Patient tolerated the procedure well  Pt educated about proper care of ear canal. Q-tip cleaning discouraged, use of debrox and warm water lavage discussed.  Post lavage curette performed by provider, Post procedure exam with clear canal and normal TM.    Discussion today about general wellness and lifestyle habits:    · Engage in regular physical activity and social activities.  · Prevent falls and reduce trip hazards; using ambulatory aides, hearing and vision testing if appropriate.  · Steps to improve urinary incontinence.  · Advanced care planning.    Patient will follow up in 3 months. Patient is encouraged to be seen in the emergency room for chest pain, palpitations, shortness of breath, dizziness, severe abdominal pain or other concerning symptoms.     Follow-Up: Return in about 3 months (around 8/30/2019).         PLEASE NOTE: This dictation was created using voice recognition software. I have made every reasonable attempt to correct obvious errors, but I expect that there are errors of grammar and possibly content that I did not discover before finalizing the note.      IBob (Macarena), am scribing for, and in the presence of, SOHEILA Mills    Electronically signed by: Bob Iniguez (Macarena), 5/30/2019    INed APRN personally performed the services described in this documentation, as scribed by Bob Iniguez in my presence, and it is both accurate and  complete.

## 2019-05-31 DIAGNOSIS — J45.21 MILD INTERMITTENT ASTHMA WITH ACUTE EXACERBATION: ICD-10-CM

## 2019-05-31 NOTE — TELEPHONE ENCOUNTER
Was the patient seen in the last year in this department? Yes    Does patient have an active prescription for medications requested? No     Received Request Via: Pharmacy         [facility] wants 100. LM

## 2019-06-03 RX ORDER — MONTELUKAST SODIUM 10 MG/1
10 TABLET ORAL DAILY
Qty: 90 TAB | Refills: 0 | Status: SHIPPED | OUTPATIENT
Start: 2019-06-03 | End: 2019-08-12 | Stop reason: SDUPTHER

## 2019-06-10 DIAGNOSIS — J45.21 MILD INTERMITTENT ASTHMA WITH ACUTE EXACERBATION: ICD-10-CM

## 2019-06-10 RX ORDER — MONTELUKAST SODIUM 10 MG/1
10 TABLET ORAL DAILY
Qty: 90 TAB | Refills: 0 | OUTPATIENT
Start: 2019-06-10

## 2019-06-10 NOTE — TELEPHONE ENCOUNTER
Was the patient seen in the last year in this department? Yes    Does patient have an active prescription for medications requested? No     Received Request Via: Pharmacy     [facility] WANTS 100 DAY SUPPLY. Vinh

## 2019-06-13 ENCOUNTER — TELEPHONE (OUTPATIENT)
Dept: MEDICAL GROUP | Facility: PHYSICIAN GROUP | Age: 84
End: 2019-06-13

## 2019-06-13 NOTE — TELEPHONE ENCOUNTER
Received a call from , regarding this patient prescription order for Montelukast, [facility] was calling about changing the amount from 90 to 100. If you could please give them a call back at [phone].     Thank you

## 2019-06-13 NOTE — TELEPHONE ENCOUNTER
Contact patient and find out if medication is effective.  If medication has been effective we can refill her 100-day supply.

## 2019-06-13 NOTE — TELEPHONE ENCOUNTER
VOICEMAIL  1. Caller Name: Dayana Lechuga                        Call Back Number: [phone] (home)       2. Message: Called and left patient a message if rx is helping/ LM     3. Patient approves office to leave a detailed voicemail/MyChart message: N\A

## 2019-06-13 NOTE — TELEPHONE ENCOUNTER
Phone Number Called: [phone] (home)       Call outcome: spoke to patient regarding message below    Message: She is still coughing and not any better. Ned advised patient stop rx for a week and then follow up. Patient agreed and will call back.

## 2019-06-17 RX ORDER — LISINOPRIL 20 MG/1
TABLET ORAL
Qty: 90 TAB | Refills: 0 | Status: SHIPPED | OUTPATIENT
Start: 2019-06-17 | End: 2019-07-01 | Stop reason: SDUPTHER

## 2019-06-20 ENCOUNTER — OFFICE VISIT (OUTPATIENT)
Dept: URGENT CARE | Facility: CLINIC | Age: 84
End: 2019-06-20
Payer: MEDICARE

## 2019-06-20 ENCOUNTER — HOSPITAL ENCOUNTER (OUTPATIENT)
Facility: MEDICAL CENTER | Age: 84
End: 2019-06-20
Attending: PHYSICIAN ASSISTANT
Payer: MEDICARE

## 2019-06-20 VITALS
WEIGHT: 116.6 LBS | DIASTOLIC BLOOD PRESSURE: 84 MMHG | SYSTOLIC BLOOD PRESSURE: 136 MMHG | HEIGHT: 63 IN | RESPIRATION RATE: 14 BRPM | OXYGEN SATURATION: 93 % | TEMPERATURE: 99.4 F | HEART RATE: 70 BPM | BODY MASS INDEX: 20.66 KG/M2

## 2019-06-20 DIAGNOSIS — N39.0 URINARY TRACT INFECTION WITHOUT HEMATURIA, SITE UNSPECIFIED: ICD-10-CM

## 2019-06-20 PROCEDURE — 87186 SC STD MICRODIL/AGAR DIL: CPT

## 2019-06-20 PROCEDURE — 87077 CULTURE AEROBIC IDENTIFY: CPT

## 2019-06-20 PROCEDURE — 87086 URINE CULTURE/COLONY COUNT: CPT

## 2019-06-20 PROCEDURE — 99214 OFFICE O/P EST MOD 30 MIN: CPT | Performed by: PHYSICIAN ASSISTANT

## 2019-06-20 RX ORDER — CIPROFLOXACIN 250 MG/1
250 TABLET, FILM COATED ORAL 2 TIMES DAILY
Qty: 10 TAB | Refills: 0 | Status: SHIPPED | OUTPATIENT
Start: 2019-06-20 | End: 2019-06-25

## 2019-06-20 ASSESSMENT — ENCOUNTER SYMPTOMS
EYE REDNESS: 0
DIARRHEA: 0
VOMITING: 0
EYE DISCHARGE: 0
FLANK PAIN: 0
FEVER: 0
FALLS: 0

## 2019-06-20 NOTE — PROGRESS NOTES
"Subjective:      Dayana Lechuga is a 89 y.o. female who presents with UTI (possible uti, x 1 week )            Dysuria    This is a new problem. The current episode started in the past 7 days. The problem occurs intermittently. The problem has been gradually worsening. The quality of the pain is described as burning. The pain is at a severity of 1/10. The patient is experiencing no pain. There has been no fever. She is not sexually active. There is no history of pyelonephritis. Associated symptoms include frequency, hesitancy and urgency. Pertinent negatives include no discharge, flank pain, hematuria or vomiting. Associated symptoms comments: Notable suprapubic pressure. . She has tried increased fluids for the symptoms. The treatment provided mild relief.       Review of Systems   Constitutional: Positive for malaise/fatigue. Negative for fever.   Eyes: Negative for discharge and redness.   Gastrointestinal: Negative for diarrhea and vomiting.   Genitourinary: Positive for dysuria, frequency, hesitancy and urgency. Negative for flank pain and hematuria.   Musculoskeletal: Negative for falls.   All other systems reviewed and are negative.         Objective:     /84 (BP Location: Left arm, Patient Position: Sitting, BP Cuff Size: Small adult)   Pulse 70   Temp 37.4 °C (99.4 °F) (Temporal)   Resp 14   Ht 1.6 m (5' 3\")   Wt 52.9 kg (116 lb 9.6 oz)   LMP  (LMP Unknown)   SpO2 93%   BMI 20.65 kg/m²    PMH:  has a past medical history of Allergy; Anxiety; ASTHMA; CAD (coronary artery disease); Hyperlipidemia; Hypertension; OSTEOPOROSIS; and PVD (peripheral vascular disease) ([facility]).  MEDS:   Current Outpatient Prescriptions:   •  Biotin w/ Vitamins C & E (HAIR/SKIN/NAILS PO), Take  by mouth., Disp: , Rfl:   •  ciprofloxacin (CIPRO) 250 MG Tab, Take 1 Tab by mouth 2 times a day for 5 days., Disp: 10 Tab, Rfl: 0  •  beclomethasone (QVAR) 80 MCG/ACT inhaler, Use 1 puff twice daily, Disp: 3 Inhaler, Rfl: 0  • "  lisinopril (PRINIVIL) 20 MG Tab, TAKE ONE TABLET BY MOUTH ONE TIME DAILY , Disp: 90 Tab, Rfl: 0  •  montelukast (SINGULAIR) 10 MG Tab, Take 1 Tab by mouth every day., Disp: 90 Tab, Rfl: 0  •  LORazepam (ATIVAN) 0.5 MG Tab, Take 0.5 mg orally in the afternoon. Take 1 mg orally every night., Disp: 90 Tab, Rfl: 2  •  atorvastatin (LIPITOR) 80 MG tablet, Take 1 Tab by mouth every evening., Disp: 100 Tab, Rfl: 2  •  isosorbide mononitrate SR (IMDUR) 30 MG TABLET SR 24 HR, Take 1 Tab by mouth every evening., Disp: 90 Tab, Rfl: 3  •  benzonatate (TESSALON) 100 MG Cap, Take 2 Caps by mouth 3 times a day as needed for Cough., Disp: 30 Cap, Rfl: 0  •  carvedilol (COREG) 12.5 MG Tab, TAKE 1 TABLET BY MOUTH TWICE A DAY WITH MEALS, Disp: 180 Tab, Rfl: 0  •  amLODIPine (NORVASC) 5 MG Tab, TAKE 1 TABLET BY MOUTH ONE TIME A DAY, Disp: 90 Tab, Rfl: 0  •  fluticasone (FLONASE) 50 MCG/ACT nasal spray, USE 1 SPRAY IN EACH NOSTRIL DAILY, Disp: 32 g, Rfl: 0  •  potassium chloride SA (KDUR) 20 MEQ Tab CR, TAKE 1 TABLET BY MOUTH TWICE DAILY, Disp: 180 Tab, Rfl: 0  •  guaifenesin LA (MUCINEX) 600 MG TABLET SR 12 HR, Take 600 mg by mouth every 12 hours., Disp: , Rfl:   •  ascorbic acid (ASCORBIC ACID) 500 MG Tab, Take 500 mg by mouth every day., Disp: , Rfl:   •  albuterol 108 (90 Base) MCG/ACT Aero Soln inhalation aerosol, Inhale 2 Puffs by mouth every 6 hours as needed for Shortness of Breath., Disp: 20.1 g, Rfl: 0  •  VITAMIN E PO, Take  by mouth., Disp: , Rfl:   •  aspirin (ASA) 81 MG Chew Tab chewable tablet, Take 1 Tab by mouth every day., Disp: 100 Tab, Rfl: 11  •  Cholecalciferol (VITAMIN D) 2000 UNIT Tab, Take 2,000 Units by mouth every day., Disp: , Rfl:   •  Biotin 10 MG Tab, Take 10 mg by mouth every day., Disp: , Rfl:   •  Multiple Vitamins-Minerals (CENTRUM SILVER ADULT 50+) Tab, Take 1 Tab by mouth every day., Disp: , Rfl:   •  omeprazole (PRILOSEC) 20 MG delayed-release capsule, Take 20 mg by mouth every day., Disp: , Rfl:    •  LORazepam (ATIVAN) 0.5 MG Tab, Take 0.5 mg by mouth every day., Disp: , Rfl: 1  •  erythromycin 5 MG/GM Ointment, Apply 1/2 inch strip to left lower eyelid QID for 7 days. (Patient not taking: Reported on 6/20/2019), Disp: 1 Tube, Rfl: 0  •  MethylPREDNISolone (MEDROL DOSEPAK) 4 MG Tablet Therapy Pack, UAD (Patient not taking: Reported on 5/30/2019), Disp: 1 Kit, Rfl: 0  •  nitroglycerin (NITROSTAT) 0.4 MG SL Tab, Place 1 tab under tongue every 5 min as needed for chest pain max 3 doses, Disp: 100 Tab, Rfl: 0  •  FLUZONE HIGH-DOSE 0.5 ML Suspension Prefilled Syringe injection, 0.5 mL by Injection route Once., Disp: , Rfl:   •  diphenhydrAMINE (BENADRYL) 25 MG Tab, Take 25 mg by mouth every 6 hours as needed for Sleep., Disp: , Rfl:   •  Omega-3 Fatty Acids (FISH OIL) 1200 MG Cap, Take 1,200 mg by mouth every day., Disp: , Rfl:   ALLERGIES:   Allergies   Allergen Reactions   • Bactrim [Sulfamethoxazole W-Trimethoprim]      Rash   • Pcn [Penicillins] Rash     About 70 years   • Codeine Vomiting     SURGHX:   Past Surgical History:   Procedure Laterality Date   • ABDOMINAL HYSTERECTOMY TOTAL     • APPENDECTOMY     • HERNIA REPAIR     • TONSILLECTOMY     • ZZZ CARDIAC CATH       SOCHX:  reports that she has never smoked. She has never used smokeless tobacco. She reports that she does not drink alcohol or use drugs.  FH: Family history was reviewed, no pertinent findings to report    Physical Exam   Constitutional: She is oriented to person, place, and time. She appears well-developed.   HENT:   Head: Normocephalic and atraumatic.   Eyes: Pupils are equal, round, and reactive to light. EOM are normal.   Neck: Normal range of motion. Neck supple.   Cardiovascular: Normal rate and regular rhythm.    No murmur heard.  Pulmonary/Chest: Effort normal and breath sounds normal. No respiratory distress.   Abdominal: Soft. Bowel sounds are normal. She exhibits no distension.   Minimal suprapubic tenderness. Negative CVAT.     Musculoskeletal: Normal range of motion. She exhibits no edema.   Neurological: She is alert and oriented to person, place, and time.   Skin: Skin is warm and dry.   Psychiatric: She has a normal mood and affect. Her behavior is normal.   Vitals reviewed.            Moderate LE, moderate blood.   Assessment/Plan:     1. Urinary tract infection without hematuria, site unspecified  - ciprofloxacin (CIPRO) 250 MG Tab; Take 1 Tab by mouth 2 times a day for 5 days.  Dispense: 10 Tab; Refill: 0  - Urine Culture; Future    Pt. Was given ABX therapy today and will change therapy if culture indicates this is necessary. ER precautions given- worsening symptoms, back pain, abd. Pain, or fevers.  Discussed options of antibiotics of which Macrobid was also offered of which the patient declined.  We will place patient on Cipro of which blackbox warning was further discussed with the patient and will follow up with this patient if antibiotic changes needed.  Pt. Is to increase fluids, and take the complete duration of the therapy.   Pt. Understands and agrees with the plan.   F/U with PCP in 3-4 days as needed.

## 2019-06-21 ENCOUNTER — TELEPHONE (OUTPATIENT)
Dept: URGENT CARE | Facility: CLINIC | Age: 84
End: 2019-06-21

## 2019-06-21 DIAGNOSIS — N30.00 ACUTE CYSTITIS WITHOUT HEMATURIA: ICD-10-CM

## 2019-06-21 RX ORDER — NITROFURANTOIN 25; 75 MG/1; MG/1
100 CAPSULE ORAL 2 TIMES DAILY
Qty: 10 CAP | Refills: 0 | Status: SHIPPED | OUTPATIENT
Start: 2019-06-21 | End: 2019-06-26

## 2019-06-21 NOTE — TELEPHONE ENCOUNTER
Patient called back and has been notified of Enzo's message. Verbalized understanding and she did  the Macrobid from pharmacy

## 2019-06-21 NOTE — TELEPHONE ENCOUNTER
Patient called stating that after taking the cipro she is feeling queezy and has a headache. She is wondering if that is normal, please advise

## 2019-06-23 LAB
BACTERIA UR CULT: ABNORMAL
BACTERIA UR CULT: ABNORMAL
SIGNIFICANT IND 70042: ABNORMAL
SITE SITE: ABNORMAL
SOURCE SOURCE: ABNORMAL

## 2019-06-25 ENCOUNTER — TELEPHONE (OUTPATIENT)
Dept: MEDICAL GROUP | Facility: PHYSICIAN GROUP | Age: 84
End: 2019-06-25

## 2019-06-25 NOTE — TELEPHONE ENCOUNTER
VOICEMAIL  1. Caller Name: Dayana Lechuga                        Call Back Number: [phone] (home)       2. Message: Patient said she was seen in  06/21/2019. Patient is still having pain and frequency. Patient didn't know to do more antibiotics or come in for an apt.Called and left patient a message to call back to make an apt. LM     3. Patient approves office to leave a detailed voicemail/MyChart message: N\A

## 2019-06-27 ENCOUNTER — APPOINTMENT (OUTPATIENT)
Dept: MEDICAL GROUP | Facility: PHYSICIAN GROUP | Age: 84
End: 2019-06-27
Payer: MEDICARE

## 2019-06-27 ENCOUNTER — OFFICE VISIT (OUTPATIENT)
Dept: MEDICAL GROUP | Facility: PHYSICIAN GROUP | Age: 84
End: 2019-06-27
Payer: MEDICARE

## 2019-06-27 ENCOUNTER — HOSPITAL ENCOUNTER (OUTPATIENT)
Facility: MEDICAL CENTER | Age: 84
End: 2019-06-27
Attending: NURSE PRACTITIONER
Payer: MEDICARE

## 2019-06-27 VITALS
SYSTOLIC BLOOD PRESSURE: 130 MMHG | BODY MASS INDEX: 20.2 KG/M2 | OXYGEN SATURATION: 92 % | HEART RATE: 78 BPM | DIASTOLIC BLOOD PRESSURE: 70 MMHG | HEIGHT: 63 IN | WEIGHT: 114 LBS | TEMPERATURE: 99.5 F | RESPIRATION RATE: 14 BRPM

## 2019-06-27 DIAGNOSIS — I25.119 CORONARY ARTERY DISEASE INVOLVING NATIVE CORONARY ARTERY OF NATIVE HEART WITH ANGINA PECTORIS (HCC): ICD-10-CM

## 2019-06-27 DIAGNOSIS — R19.7 DIARRHEA, UNSPECIFIED TYPE: ICD-10-CM

## 2019-06-27 DIAGNOSIS — M54.9 ACUTE UPPER BACK PAIN: ICD-10-CM

## 2019-06-27 DIAGNOSIS — M62.830 BACK MUSCLE SPASM: ICD-10-CM

## 2019-06-27 DIAGNOSIS — I10 ESSENTIAL HYPERTENSION: ICD-10-CM

## 2019-06-27 DIAGNOSIS — R30.0 DYSURIA: ICD-10-CM

## 2019-06-27 LAB
APPEARANCE UR: CLEAR
BILIRUB UR STRIP-MCNC: NORMAL MG/DL
COLOR UR AUTO: YELLOW
GLUCOSE UR STRIP.AUTO-MCNC: NORMAL MG/DL
KETONES UR STRIP.AUTO-MCNC: NORMAL MG/DL
LEUKOCYTE ESTERASE UR QL STRIP.AUTO: NORMAL
NITRITE UR QL STRIP.AUTO: NORMAL
PH UR STRIP.AUTO: 6 [PH] (ref 5–8)
PROT UR QL STRIP: NORMAL MG/DL
RBC UR QL AUTO: NORMAL
SP GR UR STRIP.AUTO: 1.02
UROBILINOGEN UR STRIP-MCNC: 0.2 MG/DL

## 2019-06-27 PROCEDURE — 99214 OFFICE O/P EST MOD 30 MIN: CPT | Performed by: NURSE PRACTITIONER

## 2019-06-27 PROCEDURE — 99000 SPECIMEN HANDLING OFFICE-LAB: CPT | Performed by: NURSE PRACTITIONER

## 2019-06-27 PROCEDURE — 87086 URINE CULTURE/COLONY COUNT: CPT

## 2019-06-27 PROCEDURE — 81002 URINALYSIS NONAUTO W/O SCOPE: CPT | Performed by: NURSE PRACTITIONER

## 2019-06-27 RX ORDER — BECLOMETHASONE DIPROPIONATE HFA 80 UG/1
AEROSOL, METERED RESPIRATORY (INHALATION)
COMMUNITY
Start: 2019-06-19 | End: 2019-08-13

## 2019-06-27 RX ORDER — CARVEDILOL 12.5 MG/1
TABLET ORAL
Qty: 180 TAB | Refills: 0 | Status: SHIPPED | OUTPATIENT
Start: 2019-06-27 | End: 2019-09-17 | Stop reason: SDUPTHER

## 2019-06-27 RX ORDER — AMLODIPINE BESYLATE 5 MG/1
TABLET ORAL
Qty: 90 TAB | Refills: 0 | Status: SHIPPED | OUTPATIENT
Start: 2019-06-27 | End: 2019-09-17 | Stop reason: SDUPTHER

## 2019-06-28 NOTE — ASSESSMENT & PLAN NOTE
New issue.  She reports the diarrhea started today.  She denies any mucus appearance, blood, or foul smell.  She reports some abdominal cramping, pain.  She reports that she has taken two doses of imodium today and it has helped to stop her diarrhea.  She reports she is staying hydrated.

## 2019-06-28 NOTE — ASSESSMENT & PLAN NOTE
New issue.  She reports left lower back pain that starts around her left shoulder blade.  She reports that she has applied icy hot to the area which has helped with the pain.  She reports she took 2-500mg tylenol, which did not help with her pain.  She is inquiring about what other OTC pain relief she can take.

## 2019-06-28 NOTE — ASSESSMENT & PLAN NOTE
"Acute.  She reports she went to urgent care on 6/20/2019 for frequency, burning with urination.  She reports that she was started on ciprofloxacin, but it \"didn't work\" so she was given macrobid.  She reports that she completed the ciprofloxacin dose before she started on the macrobid, and she has five more pills of the macrobid left.  She reports she has started to have diarrhea today.  She reports that she no longer has any urinary burning, but she reports that she is still urinating frequently.  She denies any hematuria or foul odor.  "

## 2019-06-28 NOTE — PROGRESS NOTES
"Chief Complaint   Patient presents with   • Diarrhea     x 1 day    • Urinary Frequency       HISTORY OF PRESENT ILLNESS: Patient is a 89 y.o. female established patient who presents today to follow up on UTI, diarrhea.    Dysuria  Acute.  She reports she went to urgent care on 6/20/2019 for frequency, burning with urination.  She reports that she was started on ciprofloxacin, but it \"didn't work\" so she was given macrobid.  She reports that she completed the ciprofloxacin dose before she started on the macrobid, and she has five more pills of the macrobid left.  She reports she has started to have diarrhea today.  She reports that she no longer has any urinary burning, but she reports that she is still urinating frequently.  She denies any hematuria or foul odor.    Diarrhea  New issue.  She reports the diarrhea started today.  She denies any mucus appearance, blood, or foul smell.  She reports some abdominal cramping, pain.  She reports that she has taken two doses of imodium today and it has helped to stop her diarrhea.  She reports she is staying hydrated.    Back pain  New issue.  She reports left lower back pain that starts around her left shoulder blade.  She reports that she has applied icy hot to the area which has helped with the pain.  She reports she took 2-500mg tylenol, which did not help with her pain.  She is inquiring about what other OTC pain relief she can take.       Patient Active Problem List    Diagnosis Date Noted   • Popliteal artery stenosis, right ([facility]) 11/28/2018     Priority: Medium   • Claudication of right lower extremity ([facility]) 11/07/2018     Priority: Medium   • Coronary artery disease involving native coronary artery of native heart with angina pectoris ([facility]) 08/10/2016     Priority: Medium   • Dyslipidemia 10/31/2009     Priority: Medium   • HTN (hypertension) 10/02/2009     Priority: Medium   • Slow transit constipation 08/29/2018     Priority: Low   • S/P bilateral cataract " extraction 02/27/2018     Priority: Low   • Benzodiazepine dependence ([facility]) 11/13/2017     Priority: Low   • Back pain 09/10/2016     Priority: Low   • Anxiety 06/22/2016     Priority: Low   • GERD (gastroesophageal reflux disease) 10/31/2009     Priority: Low   • Osteopenia 10/31/2009     Priority: Low   • Mild intermittent asthma with acute exacerbation 10/02/2009     Priority: Low   • Dysuria 06/27/2019   • Diarrhea 06/27/2019       Allergies:Bactrim [sulfamethoxazole w-trimethoprim]; Pcn [penicillins]; and Codeine    Current Outpatient Prescriptions   Medication Sig Dispense Refill   • amLODIPine (NORVASC) 5 MG Tab TAKE ONE TABLET BY MOUTH ONE TIME DAILY  90 Tab 0   • beclomethasone (QVAR) 80 MCG/ACT inhaler Use 1 puff twice daily 3 Inhaler 0   • LORazepam (ATIVAN) 0.5 MG Tab Take 0.5 mg by mouth every day.  1   • atorvastatin (LIPITOR) 80 MG tablet Take 1 Tab by mouth every evening. 100 Tab 2   • carvedilol (COREG) 12.5 MG Tab TAKE 1 TABLET BY MOUTH TWICE A DAY WITH MEALS 180 Tab 0   • QVAR REDIHALER 80 MCG/ACT inhaler      • Biotin w/ Vitamins C & E (HAIR/SKIN/NAILS PO) Take  by mouth.     • lisinopril (PRINIVIL) 20 MG Tab TAKE ONE TABLET BY MOUTH ONE TIME DAILY  90 Tab 0   • montelukast (SINGULAIR) 10 MG Tab Take 1 Tab by mouth every day. (Patient not taking: Reported on 6/27/2019) 90 Tab 0   • LORazepam (ATIVAN) 0.5 MG Tab Take 0.5 mg orally in the afternoon. Take 1 mg orally every night. 90 Tab 2   • isosorbide mononitrate SR (IMDUR) 30 MG TABLET SR 24 HR Take 1 Tab by mouth every evening. 90 Tab 3   • erythromycin 5 MG/GM Ointment Apply 1/2 inch strip to left lower eyelid QID for 7 days. (Patient not taking: Reported on 6/20/2019) 1 Tube 0   • MethylPREDNISolone (MEDROL DOSEPAK) 4 MG Tablet Therapy Pack UAD (Patient not taking: Reported on 5/30/2019) 1 Kit 0   • benzonatate (TESSALON) 100 MG Cap Take 2 Caps by mouth 3 times a day as needed for Cough. (Patient not taking: Reported on 6/27/2019) 30 Cap 0   •  nitroglycerin (NITROSTAT) 0.4 MG SL Tab Place 1 tab under tongue every 5 min as needed for chest pain max 3 doses 100 Tab 0   • fluticasone (FLONASE) 50 MCG/ACT nasal spray USE 1 SPRAY IN EACH NOSTRIL DAILY 32 g 0   • potassium chloride SA (KDUR) 20 MEQ Tab CR TAKE 1 TABLET BY MOUTH TWICE DAILY 180 Tab 0   • FLUZONE HIGH-DOSE 0.5 ML Suspension Prefilled Syringe injection 0.5 mL by Injection route Once.     • guaifenesin LA (MUCINEX) 600 MG TABLET SR 12 HR Take 600 mg by mouth every 12 hours.     • ascorbic acid (ASCORBIC ACID) 500 MG Tab Take 500 mg by mouth every day.     • albuterol 108 (90 Base) MCG/ACT Aero Soln inhalation aerosol Inhale 2 Puffs by mouth every 6 hours as needed for Shortness of Breath. 20.1 g 0   • diphenhydrAMINE (BENADRYL) 25 MG Tab Take 25 mg by mouth every 6 hours as needed for Sleep.     • VITAMIN E PO Take  by mouth.     • aspirin (ASA) 81 MG Chew Tab chewable tablet Take 1 Tab by mouth every day. 100 Tab 11   • Cholecalciferol (VITAMIN D) 2000 UNIT Tab Take 2,000 Units by mouth every day.     • Biotin 10 MG Tab Take 10 mg by mouth every day.     • Multiple Vitamins-Minerals (CENTRUM SILVER ADULT 50+) Tab Take 1 Tab by mouth every day.     • Omega-3 Fatty Acids (FISH OIL) 1200 MG Cap Take 1,200 mg by mouth every day.     • omeprazole (PRILOSEC) 20 MG delayed-release capsule Take 20 mg by mouth every day.       No current facility-administered medications for this visit.        Social History   Substance Use Topics   • Smoking status: Never Smoker   • Smokeless tobacco: Never Used   • Alcohol use No       Family Status   Relation Status   • Neg Hx (Not Specified)     Family History   Problem Relation Age of Onset   • Heart Disease Neg Hx    • Heart Failure Neg Hx    • Hyperlipidemia Neg Hx        Review of Systems:   Constitutional:  Negative for fever, chills, weight loss and malaise/fatigue.   Respiratory:  Negative for cough, sputum production, shortness of breath and wheezing.   "  Cardiovascular:  Negative for chest pain, palpitations, orthopnea and leg swelling.   Gastrointestinal:  Positive for abdominal cramping, pain, diarrhea.   Negative for heartburn, nausea, vomiting, black tarry stools.  Genitourinary:  Positive for frequency.  Negative for dysuria, urgency and hematuria.   Musculoskeletal:  Positive for left shoulder and left lower back pain.  Negative for myalgias and joint pain.   Skin:  Negative for rash and itching.   Neurological:  Negative for dizziness, tingling, tremors, sensory change, focal weakness and headaches.   Psychiatric/Behavioral:  Negative for depression, suicidal ideas and memory loss.  The patient is not nervous/anxious and does not have insomnia.    All other systems reviewed and are negative except as in HPI.    Exam:  /70 (BP Location: Left arm, Patient Position: Sitting, BP Cuff Size: Adult)   Pulse 78   Temp 37.5 °C (99.5 °F) (Temporal)   Resp 14   Ht 1.6 m (5' 3\")   Wt 51.7 kg (114 lb)   SpO2 92%   General:  Normal appearing. No distress.  Pulmonary:  Clear to ausculation.  Normal effort. No rales, ronchi, or wheezing.  Cardiovascular:  Regular rate and rhythm without murmur. Carotid and radial pulses are intact and equal bilaterally.  Abdomen:  Soft, nontender, nondistended. Normal bowel sounds. Liver and spleen are not palpable  Neurologic:  Grossly nonfocal  Lymph:  No cervical, supraclavicular or axillary lymph nodes are palpable  Skin:  Warm and dry.  No obvious lesions.  Musculoskeletal:  Tenderness to mid lower back near spinal process.  Normal gait. No extremity cyanosis, clubbing, or edema.  Psych:  Normal mood and affect. Alert and oriented x3. Judgment and insight is normal.      PLAN:    1. Dysuria  Continue with macrobid until dose is completed  Will call her with urine culture results  Continue with conservative treatment, including staying hydrated, using the restroom as needed, and washing hands  - URINE CULTURE  - POCT " Urinalysis    2. Acute upper back pain  Muscle spasm of upper back  Continue with icy hot application as needed  Discussed to use OTC ibuprofen 200-400mg every 6 hours as needed for pain, inflammation    3. Diarrhea, unspecified type  Continue with imodium as needed  Does not appear to be infectious    Patient to follow up as needed.  Patient is encouraged to be seen in the emergency room for chest pain, palpitations, shortness of breath, dizziness, severe abdominal pain or other concerning symptoms.    Please note that this dictation was created using voice recognition software. I have made every reasonable attempt to correct obvious errors, but I expect that there are errors of grammar and possibly content that I did not discover before finalizing the note.    Assessment/Plan:  1. Dysuria  URINE CULTURE    POCT Urinalysis   2. Acute low back pain without sciatica, unspecified back pain laterality     3. Diarrhea, unspecified type     4. Back muscle spasm            I have placed the below orders and discussed them with an approved delegating provider. The MA is performing the below orders under the direction of Dr. Eddy.

## 2019-06-30 LAB
BACTERIA UR CULT: NORMAL
SIGNIFICANT IND 70042: NORMAL
SITE SITE: NORMAL
SOURCE SOURCE: NORMAL

## 2019-07-01 RX ORDER — LISINOPRIL 20 MG/1
TABLET ORAL
Qty: 100 TAB | Refills: 0 | Status: SHIPPED | OUTPATIENT
Start: 2019-07-01 | End: 2019-09-17 | Stop reason: SDUPTHER

## 2019-07-01 NOTE — TELEPHONE ENCOUNTER
Was the patient seen in the last year in this department? Yes    Does patient have an active prescription for medications requested? No     Received Request Via: Pharmacy     [facility] wants 100 day supply . WALDEMAR

## 2019-07-05 ENCOUNTER — TELEPHONE (OUTPATIENT)
Dept: MEDICAL GROUP | Facility: PHYSICIAN GROUP | Age: 84
End: 2019-07-05

## 2019-07-05 NOTE — TELEPHONE ENCOUNTER
Ned is out today and Monday. I can see that the urine culture didn't grow any bacteria, so there is no evidence of an infection at this time. As to the twitches, I don't have a good answer. She has an appointment with Ned on 8/29/19. If she can't wait till then she can make an earlier appointment.

## 2019-07-05 NOTE — TELEPHONE ENCOUNTER
Phone Number Called: [phone] (home)     Call outcome: spoke to patient regarding message below    Message: Spoke with patient. She states she can wait till then to talk about the twitching. No other questions at this time.

## 2019-07-05 NOTE — TELEPHONE ENCOUNTER
VOICEMAIL  1. Caller Name: Dayana Lechuga                        Call Back Number: [phone] (home)       2. Message: Patient left an upset VM.Patient said she is still having twitches in her pelvic area. Patient has not heard about her urine culture. Please Advise. LM     3. Patient approves office to leave a detailed voicemail/MyChart message: N\A

## 2019-07-09 ENCOUNTER — TELEPHONE (OUTPATIENT)
Dept: MEDICAL GROUP | Facility: PHYSICIAN GROUP | Age: 84
End: 2019-07-09

## 2019-07-09 NOTE — TELEPHONE ENCOUNTER
Phone Number Called: [phone] (home)       Call outcome: left message for patient to call back regarding message below    Message: Does patient use mychart?   Ned released results to her and patient called asking about results. If patient is not active we should de active her account. WALDEMAR

## 2019-08-05 RX ORDER — POTASSIUM CHLORIDE 20 MEQ/1
TABLET, EXTENDED RELEASE ORAL
Qty: 90 TAB | Refills: 0 | Status: SHIPPED | OUTPATIENT
Start: 2019-08-05 | End: 2019-08-06 | Stop reason: SDUPTHER

## 2019-08-06 NOTE — TELEPHONE ENCOUNTER
Was the patient seen in the last year in this department? Yes    Does patient have an active prescription for medications requested? No     Received Request Via: Pharmacy     [facility] WANTS 100 DAY SUPPLY

## 2019-08-07 RX ORDER — POTASSIUM CHLORIDE 20 MEQ/1
TABLET, EXTENDED RELEASE ORAL
Qty: 200 TAB | Refills: 0 | Status: SHIPPED | OUTPATIENT
Start: 2019-08-07 | End: 2019-11-14 | Stop reason: SDUPTHER

## 2019-08-09 ENCOUNTER — HOSPITAL ENCOUNTER (EMERGENCY)
Facility: MEDICAL CENTER | Age: 84
End: 2019-08-09
Attending: EMERGENCY MEDICINE
Payer: MEDICARE

## 2019-08-09 ENCOUNTER — APPOINTMENT (OUTPATIENT)
Dept: RADIOLOGY | Facility: MEDICAL CENTER | Age: 84
End: 2019-08-09
Attending: EMERGENCY MEDICINE
Payer: MEDICARE

## 2019-08-09 VITALS
DIASTOLIC BLOOD PRESSURE: 87 MMHG | SYSTOLIC BLOOD PRESSURE: 161 MMHG | WEIGHT: 117.5 LBS | TEMPERATURE: 98.6 F | OXYGEN SATURATION: 95 % | HEART RATE: 62 BPM | HEIGHT: 62 IN | RESPIRATION RATE: 18 BRPM | BODY MASS INDEX: 21.62 KG/M2

## 2019-08-09 DIAGNOSIS — M79.605 PAIN OF LEFT LOWER EXTREMITY: ICD-10-CM

## 2019-08-09 LAB
ALBUMIN SERPL BCP-MCNC: 4.2 G/DL (ref 3.2–4.9)
ALBUMIN/GLOB SERPL: 1.8 G/DL
ALP SERPL-CCNC: 49 U/L (ref 30–99)
ALT SERPL-CCNC: 18 U/L (ref 2–50)
ANION GAP SERPL CALC-SCNC: 7 MMOL/L (ref 0–11.9)
AST SERPL-CCNC: 18 U/L (ref 12–45)
BASOPHILS # BLD AUTO: 1.4 % (ref 0–1.8)
BASOPHILS # BLD: 0.08 K/UL (ref 0–0.12)
BILIRUB SERPL-MCNC: 0.7 MG/DL (ref 0.1–1.5)
BUN SERPL-MCNC: 10 MG/DL (ref 8–22)
CALCIUM SERPL-MCNC: 9.6 MG/DL (ref 8.5–10.5)
CHLORIDE SERPL-SCNC: 105 MMOL/L (ref 96–112)
CO2 SERPL-SCNC: 23 MMOL/L (ref 20–33)
CREAT SERPL-MCNC: 0.57 MG/DL (ref 0.5–1.4)
EOSINOPHIL # BLD AUTO: 0.14 K/UL (ref 0–0.51)
EOSINOPHIL NFR BLD: 2.4 % (ref 0–6.9)
ERYTHROCYTE [DISTWIDTH] IN BLOOD BY AUTOMATED COUNT: 45.1 FL (ref 35.9–50)
GLOBULIN SER CALC-MCNC: 2.4 G/DL (ref 1.9–3.5)
GLUCOSE SERPL-MCNC: 102 MG/DL (ref 65–99)
HCT VFR BLD AUTO: 35.8 % (ref 37–47)
HGB BLD-MCNC: 12.2 G/DL (ref 12–16)
IMM GRANULOCYTES # BLD AUTO: 0.01 K/UL (ref 0–0.11)
IMM GRANULOCYTES NFR BLD AUTO: 0.2 % (ref 0–0.9)
LYMPHOCYTES # BLD AUTO: 1.66 K/UL (ref 1–4.8)
LYMPHOCYTES NFR BLD: 28.5 % (ref 22–41)
MCH RBC QN AUTO: 32.6 PG (ref 27–33)
MCHC RBC AUTO-ENTMCNC: 34.1 G/DL (ref 33.6–35)
MCV RBC AUTO: 95.7 FL (ref 81.4–97.8)
MONOCYTES # BLD AUTO: 0.69 K/UL (ref 0–0.85)
MONOCYTES NFR BLD AUTO: 11.8 % (ref 0–13.4)
NEUTROPHILS # BLD AUTO: 3.25 K/UL (ref 2–7.15)
NEUTROPHILS NFR BLD: 55.7 % (ref 44–72)
NRBC # BLD AUTO: 0 K/UL
NRBC BLD-RTO: 0 /100 WBC
PLATELET # BLD AUTO: 206 K/UL (ref 164–446)
PMV BLD AUTO: 9.5 FL (ref 9–12.9)
POTASSIUM SERPL-SCNC: 3.8 MMOL/L (ref 3.6–5.5)
PROT SERPL-MCNC: 6.6 G/DL (ref 6–8.2)
RBC # BLD AUTO: 3.74 M/UL (ref 4.2–5.4)
SODIUM SERPL-SCNC: 135 MMOL/L (ref 135–145)
WBC # BLD AUTO: 5.8 K/UL (ref 4.8–10.8)

## 2019-08-09 PROCEDURE — 80053 COMPREHEN METABOLIC PANEL: CPT

## 2019-08-09 PROCEDURE — 72170 X-RAY EXAM OF PELVIS: CPT

## 2019-08-09 PROCEDURE — 700102 HCHG RX REV CODE 250 W/ 637 OVERRIDE(OP): Performed by: EMERGENCY MEDICINE

## 2019-08-09 PROCEDURE — 93971 EXTREMITY STUDY: CPT | Mod: LT

## 2019-08-09 PROCEDURE — A9270 NON-COVERED ITEM OR SERVICE: HCPCS | Performed by: EMERGENCY MEDICINE

## 2019-08-09 PROCEDURE — 85025 COMPLETE CBC W/AUTO DIFF WBC: CPT

## 2019-08-09 PROCEDURE — 99285 EMERGENCY DEPT VISIT HI MDM: CPT

## 2019-08-09 PROCEDURE — 36415 COLL VENOUS BLD VENIPUNCTURE: CPT

## 2019-08-09 PROCEDURE — 73700 CT LOWER EXTREMITY W/O DYE: CPT | Mod: LT

## 2019-08-09 PROCEDURE — 72192 CT PELVIS W/O DYE: CPT

## 2019-08-09 RX ORDER — GABAPENTIN 100 MG/1
100 CAPSULE ORAL 3 TIMES DAILY PRN
Qty: 20 CAP | Refills: 0 | Status: SHIPPED | OUTPATIENT
Start: 2019-08-09 | End: 2019-08-13

## 2019-08-09 RX ORDER — HYDROCODONE BITARTRATE AND ACETAMINOPHEN 5; 325 MG/1; MG/1
1 TABLET ORAL ONCE
Status: COMPLETED | OUTPATIENT
Start: 2019-08-09 | End: 2019-08-09

## 2019-08-09 RX ORDER — GABAPENTIN 100 MG/1
200 CAPSULE ORAL ONCE
Status: COMPLETED | OUTPATIENT
Start: 2019-08-09 | End: 2019-08-09

## 2019-08-09 RX ADMIN — GABAPENTIN 200 MG: 100 CAPSULE ORAL at 11:09

## 2019-08-09 RX ADMIN — HYDROCODONE BITARTRATE AND ACETAMINOPHEN 1 TABLET: 5; 325 TABLET ORAL at 08:19

## 2019-08-09 NOTE — ED TRIAGE NOTES
Pt ambulatory to triage, pt c/o left buttock, lt hip pain all the way down her leg, denies any trauma . Pt well appearing . Provided urine cup in triage

## 2019-08-09 NOTE — ED NOTES
Patient given discharge instructions, follow up information, and prescription x 1, verbalized understanding, ambulatory out of ED w/steady gait, friend/family member providing transportation home.

## 2019-08-09 NOTE — ED PROVIDER NOTES
ED Provider Note    Chief Complaint:   Left leg pain.    HPI:  Dayana Lechuga is a 89 y.o. female who presents with chief complaint of left leg pain.  She reports a few weeks of left leg and hip discomfort, though her pain seemed to worsen 2 days ago.  The pain seems to originate in the left hip or left sacral area.  She denies midline low back pain.  She reports the pain as an uncomfortable sensation, and she feels as though she has to continually move the leg to stay comfortable.  She denies associated swelling, no rashes, no lesions.  Right leg is unaffected.  She has taken some over-the-counter pain medications without improvement.  She is unable to identify any alleviating factors.    She denies associated shortness of breath, she has no chest pain, no night sweats, no unintentional weight loss.    Review of Systems:  See HPI for pertinent positives and negatives. All other systems negative.    Past Medical History:   has a past medical history of Allergy, Anxiety, ASTHMA, CAD (coronary artery disease), Hyperlipidemia, Hypertension, OSTEOPOROSIS, and PVD (peripheral vascular disease) ([facility]).    Social History:  Social History     Tobacco Use   • Smoking status: Never Smoker   • Smokeless tobacco: Never Used   Substance and Sexual Activity   • Alcohol use: No     Alcohol/week: 0.0 oz   • Drug use: No   • Sexual activity: Not Currently       Surgical History:   has a past surgical history that includes appendectomy; abdominal hysterectomy total; hernia repair; tonsillectomy; and zzz cardiac cath.    Current Medications:  Home Medications     Reviewed by Saravanan Batista R.N. (Registered Nurse) on 08/09/19 at 0733  Med List Status: Partial   Medication Last Dose Status   albuterol 108 (90 Base) MCG/ACT Aero Soln inhalation aerosol  Active   amLODIPine (NORVASC) 5 MG Tab  Active   ascorbic acid (ASCORBIC ACID) 500 MG Tab  Active   aspirin (ASA) 81 MG Chew Tab chewable tablet  Active   atorvastatin (LIPITOR) 80  "MG tablet  Active   beclomethasone (QVAR) 80 MCG/ACT inhaler  Active   benzonatate (TESSALON) 100 MG Cap  Active   Biotin 10 MG Tab  Active   Biotin w/ Vitamins C & E (HAIR/SKIN/NAILS PO)  Active   carvedilol (COREG) 12.5 MG Tab  Active   Cholecalciferol (VITAMIN D) 2000 UNIT Tab  Active   diphenhydrAMINE (BENADRYL) 25 MG Tab  Active   erythromycin 5 MG/GM Ointment  Active   fluticasone (FLONASE) 50 MCG/ACT nasal spray  Active   FLUZONE HIGH-DOSE 0.5 ML Suspension Prefilled Syringe injection  Active   guaifenesin LA (MUCINEX) 600 MG TABLET SR 12 HR  Active   isosorbide mononitrate SR (IMDUR) 30 MG TABLET SR 24 HR  Active   lisinopril (PRINIVIL) 20 MG Tab  Active   LORazepam (ATIVAN) 0.5 MG Tab  Active   MethylPREDNISolone (MEDROL DOSEPAK) 4 MG Tablet Therapy Pack  Active   montelukast (SINGULAIR) 10 MG Tab  Active   Multiple Vitamins-Minerals (CENTRUM SILVER ADULT 50+) Tab  Active   nitroglycerin (NITROSTAT) 0.4 MG SL Tab  Active   Omega-3 Fatty Acids (FISH OIL) 1200 MG Cap  Active   omeprazole (PRILOSEC) 20 MG delayed-release capsule  Active   potassium chloride SA (KDUR) 20 MEQ Tab CR  Active   QVAR REDIHALER 80 MCG/ACT inhaler  Active   VITAMIN E PO  Active                Allergies:  Allergies   Allergen Reactions   • Bactrim [Sulfamethoxazole W-Trimethoprim]      Rash   • Pcn [Penicillins] Rash     About 70 years   • Codeine Vomiting       Physical Exam:  Vital Signs: BP (!) 161/87   Pulse 62   Temp 37 °C (98.6 °F) (Temporal)   Resp 18   Ht 1.575 m (5' 2\")   Wt 53.3 kg (117 lb 8.1 oz)   LMP  (LMP Unknown)   SpO2 95%   BMI 21.49 kg/m²   Constitutional: Alert, no acute distress  HENT: Moist mucus membranes, normal posterior pharynx, no intraoral lesions  Eyes: Pupils equal and reactive, normal conjunctiva  Neck: Supple, normal range of motion, no stridor  Cardiovascular: Extremities are warm and well perfused, no murmur appreciated, normal cardiac auscultation  Pulmonary: No respiratory distress, normal " work of breathing, no accessory muscule usage, breath sounds clear and equal bilaterally  Abdomen: Soft, non-distended, non-tender to palpation, no peritoneal signs  Skin: Warm, dry, no rashes or lesions  Musculoskeletal: Left lower extremity is warm and well perfused, 2+ DP pulse, painless range of motion of the left hip without overlying warmth, no overlying vesicular lesions, no posterior calf or posterior thigh tenderness to palpation  Neurologic: Alert, oriented, normal speech, normal motor function  Psychiatric: Normal and appropriate mood and affect    Medical records reviewed for continuity of care.  No recent visits for similar symptoms.    Labs:  Labs Reviewed   CBC WITH DIFFERENTIAL - Abnormal; Notable for the following components:       Result Value    RBC 3.74 (*)     Hematocrit 35.8 (*)     All other components within normal limits   COMP METABOLIC PANEL - Abnormal; Notable for the following components:    Glucose 102 (*)     All other components within normal limits   ESTIMATED GFR       Radiology:  CT-EXTREMITY, LOWER W/O LEFT   Final Result      1.  There is no fracture involving the left femur from the level above the hip through the knee.   2.  There is atherosclerosis.      CT-PELVIS W/O   Final Result      1.  There is no acute fracture of the pelvis or proximal femurs.   2.  There is degenerative disc disease and arthropathy in the lower lumbar spine with bilateral areas of neural foraminal narrowing but no canal stenosis.   3.  There is mild degenerative change in both hips.      DX-PELVIS-1 OR 2 VIEWS   Final Result      1.  There is no acute fracture or malalignment.   2.  The bones are osteopenic.      US-EXTREMITY VENOUS LOWER UNILAT LEFT   Final Result           ED Medications Administered:  Medications   HYDROcodone-acetaminophen (NORCO) 5-325 MG per tablet 1 Tab (1 Tab Oral Given 8/9/19 4319)   gabapentin (NEURONTIN) capsule 200 mg (200 mg Oral Given 8/9/19 1109)       Differential  diagnosis:  Pathologic bony lesion, sciatica, muscular skeletal pain, DVT, shingles prodrome    MDM:  History and physical exam as documented above.  Patient presents with left leg pain.  She has no swelling, soft compartments throughout the entire lower extremity.  She has no evidence of vascular compromise.  She is easily able to range the hip, knee, and ankle, no evidence of septic joint.  No evidence of a zoster rash.  She has no abdominal pain, no back pain, no thoracic pain, no symptoms concerning for aortic injury.    Ultrasound of the left lower extremity demonstrates no evidence of DVT.  CT of the left hip and left femur are negative for evidence of pathologic destruction, negative for fracture, no visible hip effusion or knee effusion.  CT of the pelvis demonstrates no acute fracture.  Mild degenerative changes in both hips are noted.    She has a normal white blood count, she is afebrile, again less concerning for infectious etiology.  CMP is entirely within normal limits.  Potassium is normal.    Pain was initially treated with hydrocodone without significant improvement.  She was then given a dose of gabapentin which seemed as though it may be improving her symptoms, however it is been given recently and she is unable to tell for certain.  At this time, I do not believe she requires further emergent diagnostics nor treatment.    She is discharged home with a prescription for gabapentin.  She will follow-up with her primary care physician on Monday for complete recheck. Return precautions were discussed with the patient, and provided in written form with the patient's discharge instructions.     Discharge Medication List as of 8/9/2019 12:31 PM      START taking these medications    Details   gabapentin (NEURONTIN) 100 MG Cap Take 1 Cap by mouth 3 times a day as needed (leg pain)., Disp-20 Cap, R-0, Print Rx Paper           Blood pressure today is greater than 120/80, patient is instructed to follow up  with primary care provider for blood pressure recheck.    Disposition:  Discharge home in stable condition    Final Impression:  1. Pain of left lower extremity        Electronically signed by: Almaz Lr, 8/9/2019 4:54 PM

## 2019-08-12 ENCOUNTER — OFFICE VISIT (OUTPATIENT)
Dept: MEDICAL GROUP | Facility: PHYSICIAN GROUP | Age: 84
End: 2019-08-12
Payer: MEDICARE

## 2019-08-12 VITALS
TEMPERATURE: 98.1 F | BODY MASS INDEX: 21.26 KG/M2 | HEIGHT: 63 IN | RESPIRATION RATE: 14 BRPM | DIASTOLIC BLOOD PRESSURE: 58 MMHG | SYSTOLIC BLOOD PRESSURE: 124 MMHG | WEIGHT: 120 LBS | HEART RATE: 63 BPM | OXYGEN SATURATION: 94 %

## 2019-08-12 DIAGNOSIS — M54.42 ACUTE LEFT-SIDED LOW BACK PAIN WITH LEFT-SIDED SCIATICA: ICD-10-CM

## 2019-08-12 DIAGNOSIS — J45.21 MILD INTERMITTENT ASTHMA WITH ACUTE EXACERBATION: ICD-10-CM

## 2019-08-12 PROCEDURE — 99214 OFFICE O/P EST MOD 30 MIN: CPT | Performed by: NURSE PRACTITIONER

## 2019-08-12 RX ORDER — MONTELUKAST SODIUM 10 MG/1
10 TABLET ORAL DAILY
Qty: 90 TAB | Refills: 3 | Status: SHIPPED
Start: 2019-08-12 | End: 2019-12-19

## 2019-08-12 RX ORDER — BACLOFEN 10 MG/1
10 TABLET ORAL 3 TIMES DAILY PRN
Qty: 30 TAB | Refills: 0 | Status: SHIPPED | OUTPATIENT
Start: 2019-08-12 | End: 2019-09-05

## 2019-08-12 RX ORDER — BACLOFEN 10 MG/1
10 TABLET ORAL 3 TIMES DAILY PRN
Qty: 30 TAB | Refills: 0 | Status: SHIPPED | OUTPATIENT
Start: 2019-08-12 | End: 2019-08-12 | Stop reason: SDUPTHER

## 2019-08-12 ASSESSMENT — PAIN SCALES - GENERAL: PAINLEVEL: 10=SEVERE PAIN

## 2019-08-12 NOTE — PROGRESS NOTES
"Chief Complaint   Patient presents with   • Hospital Follow-up     L Leg pain; Still having pain       HISTORY OF THE PRESENT ILLNESS: This is a 89 y.o. female established patient who presents today for hospital follow up and follow up on asthma.    Acute left-sided low back pain with left-sided sciatica  Family states patient has been having left leg issues for a couple of months. She had worsened aching leg pain several days ago, prompting her to go to the ED on 8/9/19. Patient states she did yoga instructed on the TV a couple of days prior and thought she did well. She otherwise denies any recent injuries that would have caused the left leg pain. She had multiple imaging tests in the ED including xray, DVT study, and CT which were all overall unrevealing. CT pelvis showed impression: \"1.  There is no acute fracture of the pelvis or proximal femurs. 2.  There is degenerative disc disease and arthropathy in the lower lumbar spine with bilateral areas of neural foraminal narrowing but no canal stenosis. 3.  There is mild degenerative change in both hips.\" She was discharged with a prescription for gabapentin which patient states did not help at all with her pain. States she does not like the way gabapentin makes her feel. States she tried ibuprofen at one point which only helped once. She otherwise continues to have left leg pain and left sided low back pain. States she has not slept for 4 nights secondary to the pain. The pain is exacerbated when sitting down and when laying down. She reports having left ankle swelling. Patient mentions that she was evaluated by Dr. Barth (vascular) a year ago and was informed that her evaluation was overall within acceptable limits at the time. Family is wondering if massages and CBD oil would help with patient's symptoms.     Mild intermittent asthma with acute exacerbation  Chronic. Patient notes she has been using Singulair which has helped with her cough. No reports of " medication side effects. She is asking for refill of the Singulair.       Past Medical History:   Diagnosis Date   • Allergy    • Anxiety    • ASTHMA    • CAD (coronary artery disease)     JT to RCA; 70% stenosis in LAD   • Hyperlipidemia    • Hypertension    • OSTEOPOROSIS    • PVD (peripheral vascular disease) ([facility])     70% PAD-followed by [facility]       Past Surgical History:   Procedure Laterality Date   • ABDOMINAL HYSTERECTOMY TOTAL     • APPENDECTOMY     • HERNIA REPAIR     • TONSILLECTOMY     • ZZZ CARDIAC CATH         Family Status   Relation Name Status   • Neg Hx  (Not Specified)     Family History   Problem Relation Age of Onset   • Heart Disease Neg Hx    • Heart Failure Neg Hx    • Hyperlipidemia Neg Hx        Social History     Tobacco Use   • Smoking status: Never Smoker   • Smokeless tobacco: Never Used   Substance Use Topics   • Alcohol use: No     Alcohol/week: 0.0 oz   • Drug use: No       Allergies: Bactrim [sulfamethoxazole w-trimethoprim]; Pcn [penicillins]; and Codeine    Current Outpatient Medications Ordered in Epic   Medication Sig Dispense Refill   • montelukast (SINGULAIR) 10 MG Tab Take 1 Tab by mouth every day. 90 Tab 3   • gabapentin (NEURONTIN) 100 MG Cap Take 1 Cap by mouth 3 times a day as needed (leg pain). 20 Cap 0   • potassium chloride SA (KDUR) 20 MEQ Tab CR TAKE ONE TABLET BY MOUTH TWICE DAILY 200 Tab 0   • lisinopril (PRINIVIL) 20 MG Tab TAKE ONE TABLET BY MOUTH ONE TIME DAILY 100 Tab 0   • carvedilol (COREG) 12.5 MG Tab TAKE 1 TABLET BY MOUTH TWICE A DAY WITH MEALS 180 Tab 0   • amLODIPine (NORVASC) 5 MG Tab TAKE ONE TABLET BY MOUTH ONE TIME DAILY  90 Tab 0   • Biotin w/ Vitamins C & E (HAIR/SKIN/NAILS PO) Take  by mouth.     • beclomethasone (QVAR) 80 MCG/ACT inhaler Use 1 puff twice daily 3 Inhaler 0   • LORazepam (ATIVAN) 0.5 MG Tab Take 0.5 mg by mouth every day.  1   • atorvastatin (LIPITOR) 80 MG tablet Take 1 Tab by mouth every evening. 100 Tab 2   • erythromycin 5  MG/GM Ointment Apply 1/2 inch strip to left lower eyelid QID for 7 days. 1 Tube 0   • nitroglycerin (NITROSTAT) 0.4 MG SL Tab Place 1 tab under tongue every 5 min as needed for chest pain max 3 doses 100 Tab 0   • fluticasone (FLONASE) 50 MCG/ACT nasal spray USE 1 SPRAY IN EACH NOSTRIL DAILY 32 g 0   • ascorbic acid (ASCORBIC ACID) 500 MG Tab Take 500 mg by mouth every day.     • albuterol 108 (90 Base) MCG/ACT Aero Soln inhalation aerosol Inhale 2 Puffs by mouth every 6 hours as needed for Shortness of Breath. 20.1 g 0   • diphenhydrAMINE (BENADRYL) 25 MG Tab Take 25 mg by mouth every 6 hours as needed for Sleep.     • VITAMIN E PO Take  by mouth.     • aspirin (ASA) 81 MG Chew Tab chewable tablet Take 1 Tab by mouth every day. 100 Tab 11   • Cholecalciferol (VITAMIN D) 2000 UNIT Tab Take 2,000 Units by mouth every day.     • Multiple Vitamins-Minerals (CENTRUM SILVER ADULT 50+) Tab Take 1 Tab by mouth every day.     • Omega-3 Fatty Acids (FISH OIL) 1200 MG Cap Take 1,200 mg by mouth every day.     • omeprazole (PRILOSEC) 20 MG delayed-release capsule Take 20 mg by mouth every day.     • baclofen (LIORESAL) 10 MG Tab Take 1 Tab by mouth 3 times a day as needed. 30 Tab 0   • QVAR REDIHALER 80 MCG/ACT inhaler      • isosorbide mononitrate SR (IMDUR) 30 MG TABLET SR 24 HR Take 1 Tab by mouth every evening. (Patient not taking: Reported on 8/12/2019) 90 Tab 3   • MethylPREDNISolone (MEDROL DOSEPAK) 4 MG Tablet Therapy Pack UAD (Patient not taking: Reported on 5/30/2019) 1 Kit 0   • benzonatate (TESSALON) 100 MG Cap Take 2 Caps by mouth 3 times a day as needed for Cough. (Patient not taking: Reported on 6/27/2019) 30 Cap 0   • FLUZONE HIGH-DOSE 0.5 ML Suspension Prefilled Syringe injection 0.5 mL by Injection route Once.     • guaifenesin LA (MUCINEX) 600 MG TABLET SR 12 HR Take 600 mg by mouth every 12 hours.     • Biotin 10 MG Tab Take 10 mg by mouth every day.       No current Epic-ordered facility-administered  "medications on file.        Review of Systems   Constitutional: Negative for fever, chills, weight loss and malaise/fatigue.    Respiratory: Cough (improved with Singulair). Negative for shortness of breath and wheezing.    Cardiovascular: Negative for chest pain, palpitations, orthopnea  Musculoskeletal: Left leg pain, left low back pain, left ankle swelling.   All other systems reviewed and are negative except as in HPI.    Exam: /58 (BP Location: Left arm, Patient Position: Sitting, BP Cuff Size: Adult)   Pulse 63   Temp 36.7 °C (98.1 °F) (Temporal)   Resp 14   Ht 1.6 m (5' 3\")   Wt 54.4 kg (120 lb)   SpO2 94%   General:  Normal appearing. No distress.  Pulmonary:  Clear to ausculation.  Normal effort. No rales, ronchi, or wheezing.  Cardiovascular:  Regular rate and rhythm without murmur. Carotid and radial pulses are intact and equal bilaterally.  Neurologic:  Grossly nonfocal  Skin:  Warm and dry.  No obvious lesions.  Musculoskeletal:  Normal gait. No extremity cyanosis, clubbing, or edema. Tenderness on left paraspinous muscle down into left sacral area, tenderness with pressure on S1.  Psych:  Normal mood and affect. Alert and oriented x3. Judgment and insight is normal.    PLAN:    1. Mild intermittent asthma with acute exacerbation  - Patient reports improvement of her cough with Singulair. Singulair refill is provided.   - montelukast (SINGULAIR) 10 MG Tab; Take 1 Tab by mouth every day.  Dispense: 90 Tab; Refill: 3    2. Acute left-sided low back pain with left-sided sciatica  - Patient continues to have left sided low back pain and left leg pain. She agrees to try a muscle relaxer. Prescribed baclofen. Supportive care instructions and return precautions given.   - She agrees to be referred to physiatry.   - REFERRAL TO PHYSIATRY (PMR)   - baclofen (LIORESAL) 10 MG Tab; Take 1 Tab by mouth 3 times a day as needed    Patient will follow up in two weeks. However, the appointment can be moved " to a month if she is feeling better. She is encouraged to be seen in the emergency room for chest pain, palpitations, shortness of breath, dizziness, severe abdominal pain or other concerning symptoms.     Please note that this dictation was created using voice recognition software. I have made every reasonable attempt to correct obvious errors, but I expect that there are errors of grammar and possibly content that I did not discover before finalizing the note.      Assessment/Plan  1. Mild intermittent asthma with acute exacerbation  montelukast (SINGULAIR) 10 MG Tab   2. Acute left-sided low back pain with left-sided sciatica  REFERRAL TO PHYSIATRY (PMR)    DISCONTINUED: baclofen (LIORESAL) 10 MG Tab         I have placed the below orders and discussed them with an approved delegating provider. The MA is performing the below orders under the direction of Dr. Collier.       Bob PETTIT (Scribe), am scribing for, and in the presence of, SOHEILA Mills    Electronically signed by: Bob Iniguez (Alexisibe), 8/12/2019    Ned PETTIT APRN personally performed the services described in this documentation, as scribed by Bob Iniguez in my presence, and it is both accurate and complete.

## 2019-08-13 ENCOUNTER — TELEPHONE (OUTPATIENT)
Dept: MEDICAL GROUP | Facility: PHYSICIAN GROUP | Age: 84
End: 2019-08-13

## 2019-08-13 ENCOUNTER — OFFICE VISIT (OUTPATIENT)
Dept: MEDICAL GROUP | Facility: PHYSICIAN GROUP | Age: 84
End: 2019-08-13
Payer: MEDICARE

## 2019-08-13 VITALS
HEIGHT: 63 IN | WEIGHT: 119.05 LBS | DIASTOLIC BLOOD PRESSURE: 60 MMHG | TEMPERATURE: 98.4 F | OXYGEN SATURATION: 97 % | BODY MASS INDEX: 21.09 KG/M2 | HEART RATE: 58 BPM | SYSTOLIC BLOOD PRESSURE: 156 MMHG

## 2019-08-13 DIAGNOSIS — M25.552 LEFT HIP PAIN: ICD-10-CM

## 2019-08-13 PROCEDURE — 99214 OFFICE O/P EST MOD 30 MIN: CPT | Performed by: FAMILY MEDICINE

## 2019-08-13 RX ORDER — NAPROXEN 500 MG/1
500 TABLET ORAL 2 TIMES DAILY WITH MEALS
Qty: 60 TAB | Refills: 0 | Status: SHIPPED | OUTPATIENT
Start: 2019-08-13 | End: 2019-08-29

## 2019-08-13 RX ORDER — GABAPENTIN 100 MG/1
200 CAPSULE ORAL 3 TIMES DAILY
Qty: 180 CAP | Refills: 0 | Status: SHIPPED | OUTPATIENT
Start: 2019-08-13 | End: 2019-08-29

## 2019-08-13 NOTE — TELEPHONE ENCOUNTER
1. Caller Name: Naila Caregiver                      Call Back Number: [phone]      Patient approves a detailed voicemail message: yes    Naila called and left 2 VM. Patient is not feeling any better after taking rx from apt. Naila said pt called crying and in a lot of pain. Patient has not been able to sleep at all. Naila wants to know what the next step will be, ER, UC or what do they do. Made patient an apt today to come in and get pain medication. Please Advise. Lm

## 2019-08-13 NOTE — PROGRESS NOTES
"Subjective:      Dayana Lechuga is a 89 y.o. female who presents with Pain (leg)    HPI:    Dayana Lechuga is an established patient with [facility].She is accompanied by her family member. She presents today to discuss continuing left lower back/hip pain. Her left sided lower back pain with left sided sciatica has been ongoing since 6 days ago. She describes the pain initially as \"waves\" going up and down the leg.  As of this morning, it is no longer radiating throughout her leg and has localized into her left hip only. She notes the pain is constant and a 10/10. No exacerbation from walking, sitting down, or moving. Denies any numbness/tingling.  Patient denies having a chronic history of lower back pain. She denies any recent trauma or increased activity.    She went to the ED on 8/9/2019, two days after the pain began. They evaluated her for a DVT via ultrasound which was negative. An xray of her pelvis showed minimal degenerative spurring in the iliac crest and greater trochanters and osteopenia. Further imaging included a CT lower extremity which was negative for fracture; there was also a CT of the pelvis which showed DDD and arthropathy in the lower lumbar spine with mild neural foraminal narrowing without canal stenosis and mild degenerative changes in both hips, without any fracture. While in the ED, patient stated she did not have much alleviation from one dosage of Norco but some improvement from Gabapentin.    She was discharged on Gabapentin 100 mg 1 tablet TID, and she then increased it yesterday to 2 tablets TID without much improvement.  She notes it does make her feel slightly tired. She adds that she took Ibuprofen 800 mg since her pain started which provided alleviation. She saw her PCP MARIANNE [facility] yesterday who prescribed her Baclofen. Patient tried this last night, and it did not alleviate her pain. She was also referred to physiatry for " "further evaluation and treatment.  She presents today to discuss further options to treat her pain. She initially stated she had difficulty sleeping, but she notes that last night she was able to sleep after taking more of the Gabapentin.       Past medical history, past surgical history, family history reviewed-no changes    Social history reviewed-no changes    Allergies reviewed-no changes    Medications reviewed-no changes      ROS:  As per the HPI as shown above, the rest are negative.       Objective:     /60 (BP Location: Left arm, Patient Position: Sitting, BP Cuff Size: Adult)   Pulse (!) 58   Temp 36.9 °C (98.4 °F) (Temporal)   Ht 1.6 m (5' 3\")   Wt 54 kg (119 lb 0.8 oz)   LMP  (LMP Unknown)   SpO2 97%   BMI 21.09 kg/m²     Physical Exam    Examined alert, awake, oriented, not in distress    Neck-supple, no lymphadenopathy, no thyromegaly  Lungs-clear to auscultation, no rales, no wheezes  Heart-regular rate and rhythm, no murmur  Back- no lumbosacral spine tenderness or lumbosacral muscle spasms.  SI joint: nontender. Slightly tender over the left trochanter bursa. Full range of motion of the left hip joint.   Extremities-Strong pedal and femoral pulse left lower extremity. No skin changes along the leg. No swelling, clubbing, cyanosis  Neuro-5/5 motor strength of the lower extremities,reflexes normal, DTRs 2+, intact sensation to light touch    Reviewed ER records and last visit notes with her PCP.     Assessment/Plan:     1. Left hip pain  I reviewed her imaging studies with the patient.  This could be due to arthritis in the lumbar spine and the hips and could also be contributed by left trochanteric bursitis.  First we discussed giving her mild narcotic such as tramadol.  She takes lorazepam so I told her she cannot take the Lorazepam when on tramadol because of black box warning and potential interaction.  I also made her aware that we have to follow protocol of having her sign informed " consent and controlled substance treatment agreement when on opiate.  Patient stated that her pain was improved from taking Ibuprofen 800 mg and so she said she would rather just take NSAIDs than being on narcotics. I will prescribe Naproxen 500 mg BID with meals. She is already taking Omeprazole QD for GERD which will provide protection from gastritis and ulcers; she may increase to BID if needed. We discussed that she can increase her Gabapentin dosage to 300 mg 3 times a day, but she would like to stay on 100 mg 2 tablets TID. Refills provided.  I told her she can continue taking her baclofen.  She however stated that she does not think she needs a baclofen since she does not feel any muscle spasm or stiffness. I also advised warm compresses 15 minutes at a time TID. Patient and family member verbalize understanding and agreement to this plan of care.  Hopefully she can get in with physiatry soon.  Advised to message her PCP if there is no improvement with the above regimen.  - naproxen (NAPROSYN) 500 MG Tab; Take 1 Tab by mouth 2 times a day, with meals.  Dispense: 60 Tab; Refill: 0  - gabapentin (NEURONTIN) 100 MG Cap; Take 2 Caps by mouth 3 times a day.  Dispense: 180 Cap; Refill: 0         Que PETTIT (Scribmallorie), am scribing for, and in the presence of, Ruthy Collier MD     Electronically signed by: Que Callahan (Scribe), 8/13/2019    Ruthy PETTIT MD personally performed the services described in this documentation, as scribed by Que Callahan in my presence, and it is both accurate and complete.

## 2019-08-16 ENCOUNTER — TELEPHONE (OUTPATIENT)
Dept: MEDICAL GROUP | Facility: PHYSICIAN GROUP | Age: 84
End: 2019-08-16

## 2019-08-16 NOTE — TELEPHONE ENCOUNTER
Phone Number Called: [phone] (home)     Call outcome: Spoke to patients Daughter In Law Naila     Message: Spoke to Naila and let her know that I spoke to Dr. Eddy verbally and the provider said she is fine however she may feel sleepy. Naila states she read on the bottle that patient is ok to take 300mg and is no longer concerned

## 2019-08-16 NOTE — TELEPHONE ENCOUNTER
1. Caller Name: Naila, Daughter in law                    Call Back Number: [phone]      Patient approves a detailed voicemail message: yes    Naila called concerned that patient took 1 Naproxen 500mg and 3 Gabapentin 100mg this morning. Patient told daughter in law that Dr. Collier said this was fine during her last appointment on 8/13 however Naila is not sure if that is true.     Routing to on site provider, PCP out of office. Please advise

## 2019-08-21 ENCOUNTER — OFFICE VISIT (OUTPATIENT)
Dept: URGENT CARE | Facility: CLINIC | Age: 84
End: 2019-08-21
Payer: MEDICARE

## 2019-08-21 VITALS
BODY MASS INDEX: 20.98 KG/M2 | HEART RATE: 66 BPM | SYSTOLIC BLOOD PRESSURE: 114 MMHG | HEIGHT: 63 IN | TEMPERATURE: 98.7 F | OXYGEN SATURATION: 96 % | WEIGHT: 118.4 LBS | RESPIRATION RATE: 16 BRPM | DIASTOLIC BLOOD PRESSURE: 82 MMHG

## 2019-08-21 DIAGNOSIS — M25.472 PAIN AND SWELLING OF LEFT ANKLE: ICD-10-CM

## 2019-08-21 DIAGNOSIS — M25.572 PAIN AND SWELLING OF LEFT ANKLE: ICD-10-CM

## 2019-08-21 DIAGNOSIS — M79.605 PAIN OF LEFT LOWER EXTREMITY: ICD-10-CM

## 2019-08-21 PROCEDURE — 99214 OFFICE O/P EST MOD 30 MIN: CPT | Performed by: PHYSICIAN ASSISTANT

## 2019-08-23 ASSESSMENT — ENCOUNTER SYMPTOMS
SHORTNESS OF BREATH: 0
JOINT SWELLING: 0
TINGLING: 0
PALPITATIONS: 0
EDEMA: 1
SENSORY CHANGE: 0
WHEEZING: 0
FALLS: 0
BACK PAIN: 1

## 2019-08-23 NOTE — PROGRESS NOTES
"Subjective:      Dayana Lechuga is a 89 y.o. female who presents with Ankle Swelling (x 4-5 days, lt. ankle swelling, no injury)            Edema   This is a new problem. The current episode started 1 to 4 weeks ago. The problem occurs intermittently. The problem has been waxing and waning. Pertinent negatives include no chest pain or joint swelling. Nothing aggravates the symptoms. She has tried nothing for the symptoms.     Patient is a 89-year-old female who presents to urgent care with her daughter-in-law \"pain\" who also provides history.  Patient is here this morning for worsening left ankle pain this is been present off and on for the last several weeks however worse the last few days.  Patient has had extensive history of left-sided sciatic pain along with lower back and hip pain.  She recently had evaluation on 8-9 in the emergency room when she had a negative ultrasound to rule out DVT, had an x-ray of her pelvis revealing spurring of the iliac crest with osteopenia.  A CT of the extremity and CT of the pelvis both reveal DDD without canal stenosis.  She has been trialed on several medications to assist with her discomfort of which ultimately patient has upcoming appointment with physiatry.  Patient denies any new pain, redness or skin changes.  They mainly inquire how to help with the leg edema.        Review of Systems   Respiratory: Negative for shortness of breath and wheezing.    Cardiovascular: Positive for leg swelling. Negative for chest pain and palpitations.   Musculoskeletal: Positive for back pain and joint pain. Negative for falls and joint swelling.   Neurological: Negative for tingling and sensory change.   All other systems reviewed and are negative.         Objective:     /82 (BP Location: Left arm, Patient Position: Sitting, BP Cuff Size: Adult)   Pulse 66   Temp 37.1 °C (98.7 °F) (Temporal)   Resp 16   Ht 1.6 m (5' 3\")   Wt 53.7 kg (118 lb 6.4 oz)   LMP  (LMP Unknown)   " SpO2 96%   BMI 20.97 kg/m²      Physical Exam   Constitutional: She is oriented to person, place, and time. She appears well-developed and well-nourished. No distress.   HENT:   Head: Normocephalic and atraumatic.   Mouth/Throat: No oropharyngeal exudate.   Eyes: Pupils are equal, round, and reactive to light. Conjunctivae and EOM are normal.   Neck: Normal range of motion. Neck supple. No tracheal deviation present.   Cardiovascular: Normal rate.   No murmur heard.  Pulmonary/Chest: Effort normal. No respiratory distress.   Musculoskeletal: She exhibits edema.   1+ pitting edema to the left lower extremity up to mid calf.  Without increased warmth, calf tenderness.  Without bony tenderness to the ankle or the leg.   Neurological: She is alert and oriented to person, place, and time. Coordination normal.   Skin: Skin is warm. No rash noted.   Psychiatric: She has a normal mood and affect. Her behavior is normal. Judgment and thought content normal.   Vitals reviewed.              Assessment/Plan:     1. Pain and swelling of left ankle  2. Pain of left lower extremity    At this time strongly encourage patient to keep upcoming appointment with physiatry as patient will need further investigation into etiology of back and leg pain.  Specifically today they are concerned regarding the left lower leg edema-recent ultrasound reveals negative DVT which they also report that leg swelling is intermittent and typically worse in the afternoon.  Patient inquired regarding diuretic today of which I do not feel is currently appropriate due to patient's history of hypotension.  Strongly encourage avoidance of sodium intake or additional sodium.  Finally encourage patient to utilize compression stockings of which she has several pairs at home.  Patient and her daughter-in-law both agree and understand the plan.  Worrisome signs and symptoms were discussed with the patient and her daughter-in-law today of which would require  emergent follow-up.

## 2019-08-26 ENCOUNTER — TELEPHONE (OUTPATIENT)
Dept: MEDICAL GROUP | Facility: PHYSICIAN GROUP | Age: 84
End: 2019-08-26

## 2019-08-26 NOTE — TELEPHONE ENCOUNTER
VOICEMAIL  1. Caller Name: Dayana Lechuga                        Call Back Number: [phone] (home)     2. Message: Patient called saying she believes her prescriptions for gabapentin and naproxen only work well when taken together. Patient says she is still having pain while sleeping specifically from midnight to 8am.    Please advise

## 2019-08-26 NOTE — TELEPHONE ENCOUNTER
Phone Number Called: [phone] (home)     Call outcome: left message for patient to call back regarding message below    Message: LVM for patient to call back to schedule appt

## 2019-08-29 ENCOUNTER — OFFICE VISIT (OUTPATIENT)
Dept: MEDICAL GROUP | Facility: PHYSICIAN GROUP | Age: 84
End: 2019-08-29
Payer: MEDICARE

## 2019-08-29 VITALS
HEIGHT: 63 IN | BODY MASS INDEX: 20.91 KG/M2 | SYSTOLIC BLOOD PRESSURE: 132 MMHG | TEMPERATURE: 98 F | WEIGHT: 118 LBS | HEART RATE: 75 BPM | DIASTOLIC BLOOD PRESSURE: 60 MMHG | RESPIRATION RATE: 16 BRPM | OXYGEN SATURATION: 95 %

## 2019-08-29 DIAGNOSIS — I10 ESSENTIAL HYPERTENSION: ICD-10-CM

## 2019-08-29 DIAGNOSIS — F41.9 ANXIETY: ICD-10-CM

## 2019-08-29 DIAGNOSIS — M54.41 ACUTE BILATERAL LOW BACK PAIN WITH BILATERAL SCIATICA: ICD-10-CM

## 2019-08-29 DIAGNOSIS — M54.42 ACUTE BILATERAL LOW BACK PAIN WITH BILATERAL SCIATICA: ICD-10-CM

## 2019-08-29 PROCEDURE — 99214 OFFICE O/P EST MOD 30 MIN: CPT | Performed by: NURSE PRACTITIONER

## 2019-08-29 RX ORDER — METHYLPREDNISOLONE 4 MG/1
TABLET ORAL
Qty: 21 TAB | Refills: 0 | Status: SHIPPED | OUTPATIENT
Start: 2019-08-29 | End: 2019-09-05

## 2019-08-29 RX ORDER — DICLOFENAC SODIUM 75 MG/1
75 TABLET, DELAYED RELEASE ORAL 2 TIMES DAILY
Qty: 60 TAB | Refills: 0 | Status: SHIPPED | OUTPATIENT
Start: 2019-08-29 | End: 2019-12-19

## 2019-08-29 RX ORDER — LORAZEPAM 0.5 MG/1
TABLET ORAL
Qty: 90 TAB | Refills: 2 | Status: SHIPPED | OUTPATIENT
Start: 2019-08-29 | End: 2019-11-21 | Stop reason: SDUPTHER

## 2019-08-29 ASSESSMENT — PAIN SCALES - GENERAL: PAINLEVEL: 4=SLIGHT-MODERATE PAIN

## 2019-08-29 NOTE — Clinical Note
Patient with severe sciatica, lower back pain more pronounced on the left.  States that she is having nighttime waking related to pain.  Has an appointment with you on September 10 but is trying to get in sooner if available.

## 2019-08-29 NOTE — PROGRESS NOTES
Chief Complaint   Patient presents with   • Medication Management     Not Helping; STILL HAVING PAIN        HISTORY OF THE PRESENT ILLNESS: This is a 89 y.o. Female established patient who presents today for follow up.    Chronic. Stable. Patient continues to take lorazepam 0.5 mg for her anxiety. She is currently in the process of slowly weaning off of the lorazepam. No reports of medication side effects or new associated symptoms regarding anxiety.  plan at this time is to continue current dose related to severe unrelenting pain.  Patient states she has noticed some increase in anxiety with slow decrease of medication but states that this is tolerable at this time.    Chronic. Stable. /60 today. She is taking lisinopril, coreg, amlopdine, and Imdur. Denies medications side effects.    The patient complains of persistent left leg pain and left sided low back pain. She describes the pain as radiating from her coccyx and own her leg. Her pain lasts all night and she is only able to sleep from three hours a night on a good day. She was seen by Dr. Colleir on 8/13 and was prescribed naproxen and gabapentin. She reports the naproxen provides pain control, but the gabapentin does not help. She has also tried diclofenac without improvement. She is taking 300 mg gabapentin daily, but does not like it because it makes her feel slightly sleepy. She will see a physiatrist on 9/10/2019. She has taken prednisone in the past and does not remember any adverse side effects.  X-ray results do show foraminal narrowing as well as changes throughout her lower spine.    Past Medical History:   Diagnosis Date   • Allergy    • Anxiety    • ASTHMA    • CAD (coronary artery disease)     JT to RCA; 70% stenosis in LAD   • Hyperlipidemia    • Hypertension    • Lumbar back pain 9/10/2016   • OSTEOPOROSIS    • PVD (peripheral vascular disease) ([facility])     70% PAD-followed by [facility]OCIO       Past Surgical History:   Procedure Laterality Date    • ABDOMINAL HYSTERECTOMY TOTAL     • APPENDECTOMY     • HERNIA REPAIR     • TONSILLECTOMY     • ZZZ CARDIAC CATH         Family Status   Relation Name Status   • Neg Hx  (Not Specified)     Family History   Problem Relation Age of Onset   • Heart Disease Neg Hx    • Heart Failure Neg Hx    • Hyperlipidemia Neg Hx        Social History     Tobacco Use   • Smoking status: Never Smoker   • Smokeless tobacco: Never Used   Substance Use Topics   • Alcohol use: No     Alcohol/week: 0.0 oz   • Drug use: No       Allergies: Bactrim [sulfamethoxazole w-trimethoprim]; Pcn [penicillins]; and Codeine    Current Outpatient Medications Ordered in Epic   Medication Sig Dispense Refill   • diclofenac EC (VOLTAREN) 75 MG Tablet Delayed Response Take 1 Tab by mouth 2 times a day. 60 Tab 0   • LORazepam (ATIVAN) 0.5 MG Tab Take 0.5 mg orally in the afternoon. Take 1 mg orally every night. 90 Tab 2   • methylPREDNISolone (MEDROL DOSEPAK) 4 MG Tablet Therapy Pack As directed on the packaging label. 21 Tab 0   • montelukast (SINGULAIR) 10 MG Tab Take 1 Tab by mouth every day. 90 Tab 3   • baclofen (LIORESAL) 10 MG Tab Take 1 Tab by mouth 3 times a day as needed. 30 Tab 0   • potassium chloride SA (KDUR) 20 MEQ Tab CR TAKE ONE TABLET BY MOUTH TWICE DAILY 200 Tab 0   • lisinopril (PRINIVIL) 20 MG Tab TAKE ONE TABLET BY MOUTH ONE TIME DAILY 100 Tab 0   • carvedilol (COREG) 12.5 MG Tab TAKE 1 TABLET BY MOUTH TWICE A DAY WITH MEALS 180 Tab 0   • amLODIPine (NORVASC) 5 MG Tab TAKE ONE TABLET BY MOUTH ONE TIME DAILY  90 Tab 0   • Biotin w/ Vitamins C & E (HAIR/SKIN/NAILS PO) Take  by mouth.     • beclomethasone (QVAR) 80 MCG/ACT inhaler Use 1 puff twice daily 3 Inhaler 0   • LORazepam (ATIVAN) 0.5 MG Tab Take 0.5 mg by mouth every day.  1   • atorvastatin (LIPITOR) 80 MG tablet Take 1 Tab by mouth every evening. 100 Tab 2   • isosorbide mononitrate SR (IMDUR) 30 MG TABLET SR 24 HR Take 1 Tab by mouth every evening. 90 Tab 3   • erythromycin 5  MG/GM Ointment Apply 1/2 inch strip to left lower eyelid QID for 7 days. 1 Tube 0   • nitroglycerin (NITROSTAT) 0.4 MG SL Tab Place 1 tab under tongue every 5 min as needed for chest pain max 3 doses 100 Tab 0   • fluticasone (FLONASE) 50 MCG/ACT nasal spray USE 1 SPRAY IN EACH NOSTRIL DAILY 32 g 0   • ascorbic acid (ASCORBIC ACID) 500 MG Tab Take 500 mg by mouth every day.     • albuterol 108 (90 Base) MCG/ACT Aero Soln inhalation aerosol Inhale 2 Puffs by mouth every 6 hours as needed for Shortness of Breath. 20.1 g 0   • diphenhydrAMINE (BENADRYL) 25 MG Tab Take 25 mg by mouth every 6 hours as needed for Sleep.     • VITAMIN E PO Take  by mouth.     • aspirin (ASA) 81 MG Chew Tab chewable tablet Take 1 Tab by mouth every day. 100 Tab 11   • Cholecalciferol (VITAMIN D) 2000 UNIT Tab Take 2,000 Units by mouth every day.     • Multiple Vitamins-Minerals (CENTRUM SILVER ADULT 50+) Tab Take 1 Tab by mouth every day.     • omeprazole (PRILOSEC) 20 MG delayed-release capsule Take 20 mg by mouth every day.     • benzonatate (TESSALON) 100 MG Cap Take 2 Caps by mouth 3 times a day as needed for Cough. 30 Cap 0   • guaifenesin LA (MUCINEX) 600 MG TABLET SR 12 HR Take 600 mg by mouth every 12 hours.       No current [facility]-ordered facility-administered medications on file.        Review of Systems   Constitutional:  Negative for fever, chills, weight loss and malaise/fatigue.   HENT:  Negative for ear pain, nosebleeds, congestion, sore throat and neck pain.    Eyes:  Negative for blurred vision.   Respiratory:  Negative for cough, sputum production, shortness of breath and wheezing.    Cardiovascular:  Negative for chest pain, palpitations, orthopnea and leg swelling.   Gastrointestinal:  Negative for heartburn, nausea, vomiting and abdominal pain.   Genitourinary:  Negative for dysuria, urgency and frequency.   Musculoskeletal: Positive for back pain.  Negative for joint pain.   Skin:  Negative for rash and itching.  "  Neurological: Negative for dizziness, tingling, tremors, sensory change, focal weakness and headaches.   Endo/Heme/Allergies:  Does not bruise/bleed easily.   Psychiatric/Behavioral:  Negative for depression, anxiety, or memory loss.     All other systems reviewed and are negative except as in HPI.    Exam: /60 (BP Location: Left arm, Patient Position: Sitting, BP Cuff Size: Adult)   Pulse 75   Temp 36.7 °C (98 °F) (Temporal)   Resp 16   Ht 1.6 m (5' 3\")   Wt 53.5 kg (118 lb)   SpO2 95%   General:  Normal appearing. No distress.  HEENT: Normocephalic. Eyes conjunctiva clear lids without ptosis, pupils equal, ears normal shape and contour, canals are clear bilaterally, nasal mucosa benign, oropharynx is without erythema, edema or exudates.   Pulmonary:  Clear to ausculation.  Normal effort. No rales, ronchi, or wheezing.  Cardiovascular:  Regular rate and rhythm without murmur. Carotid and radial pulses are intact and equal bilaterally.  Neurologic:  Grossly nonfocal  Lymph: No cervical, supraclavicular or axillary lymph nodes are palpable  Skin:  Warm and dry.  No obvious lesions.  Musculoskeletal:  Normal gait. No extremity cyanosis, clubbing, or edema.  Psych:  Normal mood and affect. Alert and oriented x3. Judgment and insight is normal.    PLAN:    1. Anxiety  - Stable. I explained that I don't want to change her lorazepam dosage yet because I want to be able to determine whether or not changes in her pain and ability to sleep are associated with changes in her pain management.   - LORazepam (ATIVAN) 0.5 MG Tab; Take 0.5 mg orally in the afternoon. Take 1 mg orally every night.  Dispense: 90 Tab; Refill: 2    2. Essential hypertension  - Stable. Continue current anti hypertensive regimen and follow up with cardiology.    3. Acute bilateral low back pain with bilateral sciatica  - We discussed several pain management options. I recommended keeping follow-up with physiatry as they may be best able to " assist.  Discussed that I will also place neurosurgery referral at the request.  - We will stop her gabapentin and naproxen  - Prescribed diclofenac and Medrol Dosepak.  With Medrol Dosepak discussed with patient that this is not generally recommended treatment for lower back pain, discussed with patient risks, side effects of steroid medications.  Discussed anti-inflammatory properties with patient.  - I explained that I cannot prescribed CBD product and if she should chose to use them she should choose a reputable source.  - REFERRAL TO NEUROSURGERY  - diclofenac EC (VOLTAREN) 75 MG Tablet Delayed Response; Take 1 Tab by mouth 2 times a day.  Dispense: 60 Tab; Refill: 0  - methylPREDNISolone (MEDROL DOSEPAK) 4 MG Tablet Therapy Pack; As directed on the packaging label.  Dispense: 21 Tab; Refill: 0    Follow-up in 1 month or sooner as needed. Patient is encouraged to be seen in the emergency room for chest pain, palpitations, shortness of breath, dizziness, severe abdominal pain or other concerning symptoms.    Please note that this dictation was created using voice recognition software. I have made every reasonable attempt to correct obvious errors, but I expect that there are errors of grammar and possibly content that I did not discover before finalizing the note.      Assessment/Plan  1. Anxiety  LORazepam (ATIVAN) 0.5 MG Tab   2. Essential hypertension     3. Acute bilateral low back pain with bilateral sciatica  REFERRAL TO NEUROSURGERY    diclofenac EC (VOLTAREN) 75 MG Tablet Delayed Response    methylPREDNISolone (MEDROL DOSEPAK) 4 MG Tablet Therapy Pack         I have placed the below orders and discussed them with an approved delegating provider. The MA is performing the below orders under the direction of Dr. Collier.       James PETTIT (Alexisibmallorie), am scribing for, and in the presence of, SOHEILA Mills    Electronically signed by: James Hendricks ([place]arena), 8/29/2019    WILVER  SOHEILA Mills personally performed the services described in this documentation, as scribed by James Hendricks in my presence, and it is both accurate and complete.

## 2019-09-03 ENCOUNTER — TELEPHONE (OUTPATIENT)
Dept: MEDICAL GROUP | Facility: PHYSICIAN GROUP | Age: 84
End: 2019-09-03

## 2019-09-04 NOTE — TELEPHONE ENCOUNTER
I would not recommend taking high dose ibuprofen related to risk for gastrointestinal bleeding. What dose was she thinking?  
Phone Number Called: [phone] (home)       Call outcome: spoke to patient regarding message below    Message: Pt has been notified that per nikole, it is OK to take the dosage she was thinking on taking for IBU and to monitor her stools and stomach pain.   
Phone Number Called: [phone] (home)       Call outcome: spoke to patient regarding message below    Message: Pt said she is unsure on what dose she should take but she said she was thinking on taking 2 in the morning, 2 in the afternoon, and 2 at night because she is in a lot of pain.   
Phone Number Called: [phone] (home)       Call outcome: left message for patient to call back regarding message below    Message: Jamies message. LM     
That should be ok, I would monitor for stomach upset, blood in her stool.  
VOICEMAIL  1. Caller Name: Dayana Lechuga                        Call Back Number: [phone] (home)       2. Message: Patient called and said she is coming off the prednisone. Patient said she is starting to have a lot of pain again. Patient wants to know if she can take ibuprofen several times a day. Please Advise. LM      3. Patient approves office to leave a detailed voicemail/MyChart message: N\A    
English

## 2019-09-05 ENCOUNTER — OFFICE VISIT (OUTPATIENT)
Dept: CARDIOLOGY | Facility: MEDICAL CENTER | Age: 84
End: 2019-09-05
Payer: MEDICARE

## 2019-09-05 VITALS
HEART RATE: 74 BPM | HEIGHT: 63 IN | OXYGEN SATURATION: 100 % | DIASTOLIC BLOOD PRESSURE: 72 MMHG | WEIGHT: 115.52 LBS | SYSTOLIC BLOOD PRESSURE: 146 MMHG | BODY MASS INDEX: 20.47 KG/M2

## 2019-09-05 DIAGNOSIS — I70.201 POPLITEAL ARTERY STENOSIS, RIGHT (HCC): ICD-10-CM

## 2019-09-05 DIAGNOSIS — I73.9 CLAUDICATION OF RIGHT LOWER EXTREMITY (HCC): ICD-10-CM

## 2019-09-05 DIAGNOSIS — I25.119 CORONARY ARTERY DISEASE INVOLVING NATIVE CORONARY ARTERY OF NATIVE HEART WITH ANGINA PECTORIS (HCC): ICD-10-CM

## 2019-09-05 DIAGNOSIS — E78.5 DYSLIPIDEMIA: Chronic | ICD-10-CM

## 2019-09-05 DIAGNOSIS — I10 ESSENTIAL HYPERTENSION: ICD-10-CM

## 2019-09-05 PROCEDURE — 99214 OFFICE O/P EST MOD 30 MIN: CPT | Performed by: NURSE PRACTITIONER

## 2019-09-05 ASSESSMENT — ENCOUNTER SYMPTOMS
ABDOMINAL PAIN: 0
PND: 0
CLAUDICATION: 0
DIZZINESS: 0
COUGH: 0
PALPITATIONS: 0
SHORTNESS OF BREATH: 0
FEVER: 0
NERVOUS/ANXIOUS: 0
ORTHOPNEA: 0
BACK PAIN: 1
MYALGIAS: 0

## 2019-09-05 NOTE — PROGRESS NOTES
Subjective:   Dayana Lechuga is a 87 y.o. female who presents today for 6 month follow up and review cardiac medications.    She is a patient of Dr. Jones in our office.     Hx of HTN, CAD with JT to RCA (residual LAD 70%), HLD, PAD (managed by Lary AMBROCIO), and anxiety.    She is now living in a senior living center in [place]. Her son is present to help with her care.    She has no more chest pain while being on Imdur.     She is very upset and tearful that she continues to have back pain and no one will prescribe her pain medication. She is on Diclofenac and lorazepam currently which are ineffective.    She sees the pain management physician next week.    She describes her pain that start starts in her left lower back and radiates down her left leg.    Past Medical History:   Diagnosis Date   • Allergy    • Anxiety    • ASTHMA    • CAD (coronary artery disease)     JT to RCA; 70% stenosis in LAD   • Hyperlipidemia    • Hypertension    • Lumbar back pain 9/10/2016   • OSTEOPOROSIS    • PVD (peripheral vascular disease) (HCC)     70% PAD-followed by Lary AMBROCIO     Past Surgical History:   Procedure Laterality Date   • ABDOMINAL HYSTERECTOMY TOTAL     • APPENDECTOMY     • HERNIA REPAIR     • TONSILLECTOMY     • ZZZ CARDIAC CATH       Family History   Problem Relation Age of Onset   • Heart Disease Neg Hx    • Heart Failure Neg Hx    • Hyperlipidemia Neg Hx      Social History     Tobacco Use   Smoking Status Never Smoker   Smokeless Tobacco Never Used     Allergies   Allergen Reactions   • Bactrim [Sulfamethoxazole W-Trimethoprim]      Rash   • Pcn [Penicillins] Rash     About 70 years   • Codeine Vomiting     Outpatient Encounter Medications as of 9/5/2019   Medication Sig Dispense Refill   • montelukast (SINGULAIR) 10 MG Tab Take 1 Tab by mouth every day. 90 Tab 3   • potassium chloride SA (KDUR) 20 MEQ Tab CR TAKE ONE TABLET BY MOUTH TWICE DAILY 200 Tab 0   • lisinopril (PRINIVIL) 20 MG Tab TAKE ONE TABLET BY  MOUTH ONE TIME DAILY 100 Tab 0   • carvedilol (COREG) 12.5 MG Tab TAKE 1 TABLET BY MOUTH TWICE A DAY WITH MEALS 180 Tab 0   • amLODIPine (NORVASC) 5 MG Tab TAKE ONE TABLET BY MOUTH ONE TIME DAILY  90 Tab 0   • Biotin w/ Vitamins C & E (HAIR/SKIN/NAILS PO) Take  by mouth.     • beclomethasone (QVAR) 80 MCG/ACT inhaler Use 1 puff twice daily 3 Inhaler 0   • atorvastatin (LIPITOR) 80 MG tablet Take 1 Tab by mouth every evening. 100 Tab 2   • isosorbide mononitrate SR (IMDUR) 30 MG TABLET SR 24 HR Take 1 Tab by mouth every evening. 90 Tab 3   • nitroglycerin (NITROSTAT) 0.4 MG SL Tab Place 1 tab under tongue every 5 min as needed for chest pain max 3 doses 100 Tab 0   • fluticasone (FLONASE) 50 MCG/ACT nasal spray USE 1 SPRAY IN EACH NOSTRIL DAILY 32 g 0   • guaifenesin LA (MUCINEX) 600 MG TABLET SR 12 HR Take 600 mg by mouth every 12 hours.     • ascorbic acid (ASCORBIC ACID) 500 MG Tab Take 500 mg by mouth every day.     • VITAMIN E PO Take  by mouth.     • aspirin (ASA) 81 MG Chew Tab chewable tablet Take 1 Tab by mouth every day. 100 Tab 11   • Cholecalciferol (VITAMIN D) 2000 UNIT Tab Take 2,000 Units by mouth every day.     • Multiple Vitamins-Minerals (CENTRUM SILVER ADULT 50+) Tab Take 1 Tab by mouth every day.     • omeprazole (PRILOSEC) 20 MG delayed-release capsule Take 20 mg by mouth 2 times a day.     • diclofenac EC (VOLTAREN) 75 MG Tablet Delayed Response Take 1 Tab by mouth 2 times a day. 60 Tab 0   • LORazepam (ATIVAN) 0.5 MG Tab Take 0.5 mg orally in the afternoon. Take 1 mg orally every night. (Patient taking differently: Take 0.5 mg by mouth 3 times a day. Take 0.5 mg orally in the afternoon. Take 1 mg orally every night.) 90 Tab 2   • [DISCONTINUED] methylPREDNISolone (MEDROL DOSEPAK) 4 MG Tablet Therapy Pack As directed on the packaging label. (Patient not taking: Reported on 9/5/2019) 21 Tab 0   • [DISCONTINUED] baclofen (LIORESAL) 10 MG Tab Take 1 Tab by mouth 3 times a day as needed. 30 Tab 0  "  • [DISCONTINUED] LORazepam (ATIVAN) 0.5 MG Tab Take 0.5 mg by mouth every day.  1   • erythromycin 5 MG/GM Ointment Apply 1/2 inch strip to left lower eyelid QID for 7 days. 1 Tube 0   • [DISCONTINUED] benzonatate (TESSALON) 100 MG Cap Take 2 Caps by mouth 3 times a day as needed for Cough. 30 Cap 0   • albuterol 108 (90 Base) MCG/ACT Aero Soln inhalation aerosol Inhale 2 Puffs by mouth every 6 hours as needed for Shortness of Breath. 20.1 g 0   • diphenhydrAMINE (BENADRYL) 25 MG Tab Take 25 mg by mouth every 6 hours as needed for Sleep.       No facility-administered encounter medications on file as of 9/5/2019.      Review of Systems   Constitutional: Negative for fever and malaise/fatigue.   Respiratory: Negative for cough and shortness of breath.    Cardiovascular: Negative for chest pain, palpitations, orthopnea, claudication, leg swelling and PND.   Gastrointestinal: Negative for abdominal pain.   Musculoskeletal: Positive for back pain. Negative for myalgias.   Neurological: Negative for dizziness.   Psychiatric/Behavioral: The patient is not nervous/anxious.    All other systems reviewed and are negative.       Objective:   /72 (BP Location: Right arm, Patient Position: Sitting, BP Cuff Size: Adult)   Pulse 74   Ht 1.6 m (5' 3\")   Wt 52.4 kg (115 lb 8.3 oz)   LMP  (LMP Unknown)   SpO2 100%   BMI 20.46 kg/m²     Physical Exam   Constitutional: She is oriented to person, place, and time. She appears well-developed and well-nourished. No distress.   HENT:   Head: Normocephalic and atraumatic.   Eyes: EOM are normal.   Neck: Normal range of motion. No JVD present.   Cardiovascular: Normal rate, regular rhythm, normal heart sounds and intact distal pulses.   No murmur heard.  Pulses:       Dorsalis pedis pulses are 1+ on the right side.   Pulmonary/Chest: Effort normal and breath sounds normal. No respiratory distress.   Abdominal: Soft. Bowel sounds are normal.   Musculoskeletal: Normal range of " motion. She exhibits no edema.   Neurological: She is alert and oriented to person, place, and time.   Skin: Skin is warm and dry.   Psychiatric: Her mood appears anxious.   Nursing note and vitals reviewed.    Lab Results   Component Value Date/Time    CHOLSTRLTOT 142 12/31/2018 10:08 AM    LDL 62 12/31/2018 10:08 AM    HDL 63 12/31/2018 10:08 AM    TRIGLYCERIDE 87 12/31/2018 10:08 AM       Lab Results   Component Value Date/Time    SODIUM 135 08/09/2019 09:30 AM    POTASSIUM 3.8 08/09/2019 09:30 AM    CHLORIDE 105 08/09/2019 09:30 AM    CO2 23 08/09/2019 09:30 AM    GLUCOSE 102 (H) 08/09/2019 09:30 AM    BUN 10 08/09/2019 09:30 AM    CREATININE 0.57 08/09/2019 09:30 AM     Lab Results   Component Value Date/Time    ALKPHOSPHAT 49 08/09/2019 09:30 AM    ASTSGOT 18 08/09/2019 09:30 AM    ALTSGPT 18 08/09/2019 09:30 AM    TBILIRUBIN 0.7 08/09/2019 09:30 AM       Lab Results   Component Value Date/Time    WBC 5.8 08/09/2019 09:30 AM    RBC 3.74 (L) 08/09/2019 09:30 AM    HEMOGLOBIN 12.2 08/09/2019 09:30 AM    HEMATOCRIT 35.8 (L) 08/09/2019 09:30 AM    MCV 95.7 08/09/2019 09:30 AM    MCH 32.6 08/09/2019 09:30 AM    MCHC 34.1 08/09/2019 09:30 AM    MPV 9.5 08/09/2019 09:30 AM      Lab Results   Component Value Date/Time    BNPBTYPENAT 193 (H) 09/10/2016 04:00 PM      8/2016 ANGIOGRAM POSTOPERATIVE DIAGNOSES:  1.  A 95% distal right coronary artery focal stenosis.  2.  70% mid LAD stenosis and a markedly tortuous vessel.  3.  LVEF 65-70%.  4.  Successful PCI with drug-eluting stent placed in the distal right coronary   artery.    Assessment:     1. Claudication of right lower extremity ([facility])     2. Coronary artery disease involving native coronary artery of native heart with angina pectoris ([facility])     3. Dyslipidemia     4. Essential hypertension     5. Popliteal artery stenosis, right (HCC)         Medical Decision Making:  Today's Assessment / Status / Plan:     1. CAD with JT to RCA in '16 (residual LAD disease)  -no  angina or MONZON  -cont asa, statin, bb, imdur  -nitro SL PRN, hasn't been needing to take this    2. HTN  -great control   -cont coreg, lisinopril, and amlodipine    3. HLD  -LDL goal <70 with CAD  -statin  -repeat lipid and cmp next year    4. PAD  -managed by Lary  -previously wanting to do peripheral angiogram, phone # given to review this with him again  -cont asa, statin    5. Back pain  -recommend Urgent Care or wait until pain management apt    FU in clinic in 12 months with RO    Patient verbalizes understanding and agrees with the plan of care.     Collaborating MD: Endy AMBROCIO    Physical Exam   Constitutional: She is oriented to person, place, and time. She appears well-developed and well-nourished. No distress.   HENT:   Head: Normocephalic and atraumatic.   Eyes: EOM are normal.   Neck: Normal range of motion. No JVD present.   Cardiovascular: Normal rate, regular rhythm, normal heart sounds and intact distal pulses.   No murmur heard.  Pulses:       Dorsalis pedis pulses are 1+ on the right side.   Pulmonary/Chest: Effort normal and breath sounds normal. No respiratory distress.   Abdominal: Soft. Bowel sounds are normal.   Musculoskeletal: Normal range of motion. She exhibits no edema.   Neurological: She is alert and oriented to person, place, and time.   Skin: Skin is warm and dry.   Psychiatric: Her mood appears anxious.   Nursing note and vitals reviewed.

## 2019-09-05 NOTE — PATIENT INSTRUCTIONS
Follow up with pain management for pain control.    OK to use area in pain.    Recommend follow up with vascular surgeon Dr. Barth. Number given to son in appointment.    Continue same heart medications and follow up in one year in our office.

## 2019-09-10 ENCOUNTER — OFFICE VISIT (OUTPATIENT)
Dept: PHYSICAL MEDICINE AND REHAB | Facility: MEDICAL CENTER | Age: 84
End: 2019-09-10
Payer: MEDICARE

## 2019-09-10 ENCOUNTER — HOSPITAL ENCOUNTER (OUTPATIENT)
Dept: RADIOLOGY | Facility: MEDICAL CENTER | Age: 84
End: 2019-09-10
Attending: PHYSICAL MEDICINE & REHABILITATION
Payer: MEDICARE

## 2019-09-10 VITALS
HEART RATE: 79 BPM | TEMPERATURE: 97.8 F | SYSTOLIC BLOOD PRESSURE: 128 MMHG | HEIGHT: 63 IN | WEIGHT: 114.64 LBS | BODY MASS INDEX: 20.31 KG/M2 | DIASTOLIC BLOOD PRESSURE: 64 MMHG | OXYGEN SATURATION: 94 %

## 2019-09-10 DIAGNOSIS — R33.9 URINARY RETENTION: ICD-10-CM

## 2019-09-10 DIAGNOSIS — M47.816 LUMBAR SPONDYLOSIS: ICD-10-CM

## 2019-09-10 DIAGNOSIS — M51.36 DDD (DEGENERATIVE DISC DISEASE), LUMBAR: ICD-10-CM

## 2019-09-10 DIAGNOSIS — M54.50 LOW BACK PAIN WITH RADIATION: ICD-10-CM

## 2019-09-10 DIAGNOSIS — M25.552 LEFT HIP PAIN: ICD-10-CM

## 2019-09-10 PROCEDURE — 99205 OFFICE O/P NEW HI 60 MIN: CPT | Performed by: PHYSICAL MEDICINE & REHABILITATION

## 2019-09-10 PROCEDURE — 72100 X-RAY EXAM L-S SPINE 2/3 VWS: CPT

## 2019-09-10 RX ORDER — KETOROLAC TROMETHAMINE 30 MG/ML
30 INJECTION, SOLUTION INTRAMUSCULAR; INTRAVENOUS ONCE
Status: COMPLETED | OUTPATIENT
Start: 2019-09-10 | End: 2019-09-10

## 2019-09-10 RX ORDER — GABAPENTIN 100 MG/1
200 CAPSULE ORAL 3 TIMES DAILY
Qty: 180 CAP | Refills: 0 | Status: SHIPPED | OUTPATIENT
Start: 2019-09-10 | End: 2019-09-10 | Stop reason: SDUPTHER

## 2019-09-10 RX ORDER — GABAPENTIN 100 MG/1
200 CAPSULE ORAL 3 TIMES DAILY
Qty: 180 CAP | Refills: 0 | Status: CANCELLED | OUTPATIENT
Start: 2019-09-10

## 2019-09-10 RX ORDER — GABAPENTIN 100 MG/1
200 CAPSULE ORAL 3 TIMES DAILY
Qty: 180 CAP | Refills: 0 | Status: SHIPPED | OUTPATIENT
Start: 2019-09-10 | End: 2019-10-01 | Stop reason: SDUPTHER

## 2019-09-10 RX ADMIN — KETOROLAC TROMETHAMINE 30 MG: 30 INJECTION, SOLUTION INTRAMUSCULAR; INTRAVENOUS at 14:30

## 2019-09-10 ASSESSMENT — PATIENT HEALTH QUESTIONNAIRE - PHQ9
SUM OF ALL RESPONSES TO PHQ QUESTIONS 1-9: 10
5. POOR APPETITE OR OVEREATING: 1 - SEVERAL DAYS
CLINICAL INTERPRETATION OF PHQ2 SCORE: 1

## 2019-09-10 ASSESSMENT — PAIN SCALES - GENERAL: PAINLEVEL: 10=SEVERE PAIN

## 2019-09-10 NOTE — TELEPHONE ENCOUNTER
Patient request prescription to be send to [facility] in [facility].  I will contact mail order pharmacy to cancel the prescription.

## 2019-09-10 NOTE — PROGRESS NOTES
New patient note    Physiatry (physical medicine and  Rehabilitation), interventional spine and sports medicine    Date of Service:09/10/2019    Chief complaint:   Low back and left leg pain     HISTORY    HPI: Dayana Lechuga 89 y.o. female who presents today for evaluation of low back and left leg pain.    She reports that she woke up August 7, 2019 and had pain in the low back and left leg.  It sounds like no particular injury started the symptoms.      Recently, she started having bladder urgency and reports that this is new.    No weakness in her left leg.  No history of lumbar spine surgery.    No bowel changes.    She has tried naproxen with some help.  Gabapentin does not help enough.   She also reports that she was given steroids and this seemed to help somewhat.      Medical records review:  I reviewed the note from the referring provider Evi Pepe* dated 08/29/2019  Reviewed records from this visit.  She was given a medrol dose pack    Previous treatments:    Physical Therapy: No    Medications the patient is tried: NSAIDs, Narcotics and gabapentin    Previous interventions: none    Previous surgeries to relieve the above pain:  none      ROS:   Resp: asthma  ENT: asthma    Red Flags ROS:   Fever, Chills, Sweats: Denies  Involuntary Weight Loss: Denies  Bladder Incontinence: Denies  Bowel Incontinence: Denies  Saddle Anesthesia: Denies    All other systems reviewed and negative.       PMHx:   Past Medical History:   Diagnosis Date   • Allergy    • Anxiety    • ASTHMA    • CAD (coronary artery disease)     JT to RCA; 70% stenosis in LAD   • Hyperlipidemia    • Hypertension    • Lumbar back pain 9/10/2016   • OSTEOPOROSIS    • PVD (peripheral vascular disease) ([facility])     70% PAD-followed by Lary AMBROCIO       PSHx:   Past Surgical History:   Procedure Laterality Date   • ABDOMINAL HYSTERECTOMY TOTAL     • APPENDECTOMY     • HERNIA REPAIR     • TONSILLECTOMY     • ZZZ CARDIAC CATH          Family history   Family History   Problem Relation Age of Onset   • Heart Disease Neg Hx    • Heart Failure Neg Hx    • Hyperlipidemia Neg Hx          Medications:   Current Outpatient Medications   Medication   • gabapentin (NEURONTIN) 100 MG Cap   • diclofenac EC (VOLTAREN) 75 MG Tablet Delayed Response   • montelukast (SINGULAIR) 10 MG Tab   • potassium chloride SA (KDUR) 20 MEQ Tab CR   • lisinopril (PRINIVIL) 20 MG Tab   • carvedilol (COREG) 12.5 MG Tab   • amLODIPine (NORVASC) 5 MG Tab   • beclomethasone (QVAR) 80 MCG/ACT inhaler   • atorvastatin (LIPITOR) 80 MG tablet   • isosorbide mononitrate SR (IMDUR) 30 MG TABLET SR 24 HR   • nitroglycerin (NITROSTAT) 0.4 MG SL Tab   • fluticasone (FLONASE) 50 MCG/ACT nasal spray   • guaifenesin LA (MUCINEX) 600 MG TABLET SR 12 HR   • ascorbic acid (ASCORBIC ACID) 500 MG Tab   • albuterol 108 (90 Base) MCG/ACT Aero Soln inhalation aerosol   • diphenhydrAMINE (BENADRYL) 25 MG Tab   • VITAMIN E PO   • aspirin (ASA) 81 MG Chew Tab chewable tablet   • Cholecalciferol (VITAMIN D) 2000 UNIT Tab   • Multiple Vitamins-Minerals (CENTRUM SILVER ADULT 50+) Tab   • omeprazole (PRILOSEC) 20 MG delayed-release capsule   • nitrofurantoin monohyd macro (MACROBID) 100 MG Cap   • LORazepam (ATIVAN) 0.5 MG Tab   • Biotin w/ Vitamins C & E (HAIR/SKIN/NAILS PO)     No current facility-administered medications for this visit.        Allergies:   Allergies   Allergen Reactions   • Bactrim [Sulfamethoxazole W-Trimethoprim]      Rash   • Pcn [Penicillins] Rash     About 70 years   • Codeine Vomiting       Social Hx:   Social History     Socioeconomic History   • Marital status:      Spouse name: Not on file   • Number of children: Not on file   • Years of education: Not on file   • Highest education level: Not on file   Occupational History   • Not on file   Social Needs   • Financial resource strain: Not on file   • Food insecurity:     Worry: Not on file     Inability: Not on  "file   • Transportation needs:     Medical: Not on file     Non-medical: Not on file   Tobacco Use   • Smoking status: Never Smoker   • Smokeless tobacco: Never Used   Substance and Sexual Activity   • Alcohol use: No     Alcohol/week: 0.0 oz   • Drug use: No   • Sexual activity: Not Currently   Lifestyle   • Physical activity:     Days per week: Not on file     Minutes per session: Not on file   • Stress: Not on file   Relationships   • Social connections:     Talks on phone: Not on file     Gets together: Not on file     Attends Sabianism service: Not on file     Active member of club or organization: Not on file     Attends meetings of clubs or organizations: Not on file     Relationship status: Not on file   • Intimate partner violence:     Fear of current or ex partner: Not on file     Emotionally abused: Not on file     Physically abused: Not on file     Forced sexual activity: Not on file   Other Topics Concern   • Not on file   Social History Narrative   • Not on file         EXAMINATION     Physical Exam:   Vitals: /64 (BP Location: Left arm, Patient Position: Sitting, BP Cuff Size: Small adult)   Pulse 79   Temp 36.6 °C (97.8 °F) (Temporal)   Ht 1.6 m (5' 3\")   Wt 52 kg (114 lb 10.2 oz)   SpO2 94%     Constitutional:   Body Habitus: Body mass index is 20.31 kg/m².  Cooperation: Fully cooperates with exam  Appearance: Well-groomed, well-nourished, not disheveled, no acute distress    Eyes: No scleral icterus, no proptosis     ENT -no obvious auditory deficits, no obvious tongue lesions, tongue midline, no facial droop     Skin -no rashes or lesions noted     Respiratory-  breathing comfortable on room air, no audible wheezing    Cardiovascular- capillary refills less than 2 seconds. No lower extremity edema is noted.     Gastrointestinal - enlarged bladder, tender to palpation    Psychiatric- alert and oriented ×3. Normal affect.     Gait - normal gait, no use of ambulatory device, nonantalgic.  "     Musculoskeletal -   Cervical spine   Inspection: No deformities of the skin over the cervical spine. No rashes or lesions.    decreased A/P ROM in all directions, without  pain      Spurling’s sign: negative bilaterally    No signs of muscular atrophy in bilateral upper extremities     No tenderness to palpation of the cervical facets    Thoracic/Lumbar Spine/Sacral Spine/Hips   Inspection: No evidence of atrophy in bilateral lower extremities throughout     ROM: decreased AROM with flexion, extension, lateral flexion, and rotation bilaterally, with pain     Palpation:   No tenderness to palpation in midline at T1-T12 levels. No tenderness to palpation in the left and right of the midline T1-L5  palpation over SI joint: negative bilaterally    palpation over buttock: negative bilaterally    palpation in hip or over the greater trochanters: negative bilaterally      Lumbar spine Special tests  Neuro tension  Straight leg test negative bilaterally       HIP    Range of motion in the hips is within normal limits in\ internal and external rotation bilaterally    SI joint tests  Observation patient sits on one buttocks: Negative   ANDREW test negative bilaterally       Neuro       Key points for the international standards for neurological classification of spinal cord injury (ISNCSCI) to light touch.     Dermatome R L   C4 2 2   C5 2 2   C6 2 2   C7 2 2   C8 2 2   T1 2 2   T2 2 2   L2 2 2   L3 2 2   L4 2 2   L5 2 2   S1 2 2   S2 2 2           Motor Exam Upper Extremities   ? Myotome R L   Shoulder flexion C5 5 5   Elbow flexion C5 5 5   Wrist extension C6 5 5   Elbow extension C7 5 5   Finger flexion C8 5 5   Finger abduction T1 5 5         Motor Exam Lower Extremities    ? Myotome R L   Hip flexion L2 5 5   Knee extension L3 5 5   Ankle dorsiflexion L4 5 5   Toe extension L5 5 5   Ankle plantarflexion S1 5 5         Glasgow’s sign negative bilaterally   Babinski sign negative bilaterally   Clonus of the ankle  negative bilaterally     Reflexes  ?  R L   Biceps  2+ 2+   Brachioradialis  2+ 2+   Patella  2+ 2+   Achilles   2+ 2+       MEDICAL DECISION MAKING    Medical records review: see under HPI section.     DATA    Labs:   Lab Results   Component Value Date/Time    SODIUM 130 (L) 09/11/2019 06:40 PM    POTASSIUM 4.5 09/11/2019 06:40 PM    CHLORIDE 101 09/11/2019 06:40 PM    CO2 20 09/11/2019 06:40 PM    ANION 9.0 09/11/2019 06:40 PM    GLUCOSE 111 (H) 09/11/2019 06:40 PM    BUN 22 09/11/2019 06:40 PM    CREATININE 0.91 09/11/2019 06:40 PM    CALCIUM 9.7 09/11/2019 06:40 PM    ASTSGOT 18 08/09/2019 09:30 AM    ALTSGPT 18 08/09/2019 09:30 AM    TBILIRUBIN 0.7 08/09/2019 09:30 AM    ALBUMIN 4.2 08/09/2019 09:30 AM    TOTPROTEIN 6.6 08/09/2019 09:30 AM    GLOBULIN 2.4 08/09/2019 09:30 AM    AGRATIO 1.8 08/09/2019 09:30 AM       Lab Results   Component Value Date/Time    PROTHROMBTM 12.5 09/10/2016 04:00 PM    INR 0.91 09/10/2016 04:00 PM        Lab Results   Component Value Date/Time    WBC 5.8 08/09/2019 09:30 AM    RBC 3.74 (L) 08/09/2019 09:30 AM    HEMOGLOBIN 12.2 08/09/2019 09:30 AM    HEMATOCRIT 35.8 (L) 08/09/2019 09:30 AM    MCV 95.7 08/09/2019 09:30 AM    MCH 32.6 08/09/2019 09:30 AM    MCHC 34.1 08/09/2019 09:30 AM    MPV 9.5 08/09/2019 09:30 AM    NEUTSPOLYS 55.70 08/09/2019 09:30 AM    LYMPHOCYTES 28.50 08/09/2019 09:30 AM    MONOCYTES 11.80 08/09/2019 09:30 AM    EOSINOPHILS 2.40 08/09/2019 09:30 AM    BASOPHILS 1.40 08/09/2019 09:30 AM        No results found for: HBA1C     Imaging: I personally reviewed following images, these are my reads  CT pelvis 09/18/2019  There is note of DDD and facet arthropathy in the lumbar spine.  Bladder is distended.  No evidence of fracture in the pelvis or femurs    IMAGING radiology reads. I reviewed the following radiology reads   CT pelvis 09/18/2019  1.  There is no acute fracture of the pelvis or proximal femurs.  2.  There is degenerative disc disease and arthropathy in  the lower lumbar spine with bilateral areas of neural foraminal narrowing but no canal stenosis.  3.  There is mild degenerative change in both hips.                                    Results for orders placed during the hospital encounter of 08/09/19   DX-PELVIS-1 OR 2 VIEWS    Impression 1.  There is no acute fracture or malalignment.  2.  The bones are osteopenic.                                       Diagnosis   Visit Diagnoses     ICD-10-CM   1. Low back pain with radiation M54.40   2. Left hip pain M25.552   3. Urinary retention R33.9   4. DDD (degenerative disc disease), lumbar M51.36   5. Lumbar spondylosis M47.816           ASSESSMENT:  Dayana Lechuga 89 y.o. female with low back pain     Dayana was seen today for new patient.    Diagnoses and all orders for this visit:    Low back pain with radiation  -     DX-LUMBAR SPINE-2 OR 3 VIEWS; Future  -     REFERRAL TO PHYSICAL THERAPY Reason for Therapy: Eval/Treat/Report  -     ketorolac (TORADOL) injection 30 mg  -     MR-LUMBAR SPINE-W/O; Future  -     gabapentin (NEURONTIN) 100 MG Cap; Take 2 Caps by mouth 3 times a day for 30 days.    Left hip pain  -     Discontinue: gabapentin (NEURONTIN) 100 MG Cap; Take 2 Caps by mouth 3 times a day.    Urinary retention  -     MR-LUMBAR SPINE-W/O; Future    DDD (degenerative disc disease), lumbar    Lumbar spondylosis    1. Dicussed trial of gabapentin 100mg.  Side effects discussed.  Gradually titrate up as tolerated to 200mg po tid.  2. Toradol 30mg given today.  Discussed increasing fluid intake, risks and benefits of this medicatino, including acute kidney injury  3. Xray lumbar spine and MRI lumbar spine ordered.  4. Recommend follow-up with her PCP regarding bladder distension and recent UTI.      Follow-up: Return in about 4 weeks (around 10/8/2019).        Please note that this dictation was created using voice recognition software. I have made every reasonable attempt to correct obvious errors but  there may be errors of grammar and content that I may have overlooked prior to finalization of this note.      Torres Fall MD  Physical Medicine and Rehabilitation  Interventional Spine and Sports Physiatry  [facility]

## 2019-09-11 ENCOUNTER — OFFICE VISIT (OUTPATIENT)
Dept: MEDICAL GROUP | Facility: PHYSICIAN GROUP | Age: 84
End: 2019-09-11
Payer: MEDICARE

## 2019-09-11 ENCOUNTER — HOSPITAL ENCOUNTER (EMERGENCY)
Facility: MEDICAL CENTER | Age: 84
End: 2019-09-11
Attending: EMERGENCY MEDICINE
Payer: MEDICARE

## 2019-09-11 VITALS
HEIGHT: 63 IN | HEART RATE: 84 BPM | DIASTOLIC BLOOD PRESSURE: 60 MMHG | WEIGHT: 117.8 LBS | BODY MASS INDEX: 20.87 KG/M2 | TEMPERATURE: 98.9 F | SYSTOLIC BLOOD PRESSURE: 126 MMHG | RESPIRATION RATE: 16 BRPM | OXYGEN SATURATION: 95 %

## 2019-09-11 VITALS
HEIGHT: 63 IN | SYSTOLIC BLOOD PRESSURE: 123 MMHG | DIASTOLIC BLOOD PRESSURE: 75 MMHG | WEIGHT: 117 LBS | HEART RATE: 74 BPM | BODY MASS INDEX: 20.73 KG/M2 | TEMPERATURE: 98.2 F | OXYGEN SATURATION: 96 % | RESPIRATION RATE: 16 BRPM

## 2019-09-11 DIAGNOSIS — N39.0 ACUTE UTI: ICD-10-CM

## 2019-09-11 DIAGNOSIS — R35.0 URINARY FREQUENCY: ICD-10-CM

## 2019-09-11 DIAGNOSIS — Z23 NEED FOR VACCINATION: ICD-10-CM

## 2019-09-11 DIAGNOSIS — R33.9 URINARY RETENTION: Primary | ICD-10-CM

## 2019-09-11 LAB
ANION GAP SERPL CALC-SCNC: 9 MMOL/L (ref 0–11.9)
APPEARANCE UR: CLEAR
BACTERIA #/AREA URNS HPF: ABNORMAL /HPF
BILIRUB UR QL STRIP.AUTO: NEGATIVE
BUN SERPL-MCNC: 22 MG/DL (ref 8–22)
CALCIUM SERPL-MCNC: 9.7 MG/DL (ref 8.5–10.5)
CHLORIDE SERPL-SCNC: 101 MMOL/L (ref 96–112)
CO2 SERPL-SCNC: 20 MMOL/L (ref 20–33)
COLOR UR: YELLOW
CREAT SERPL-MCNC: 0.91 MG/DL (ref 0.5–1.4)
EPI CELLS #/AREA URNS HPF: NEGATIVE /HPF
GLUCOSE SERPL-MCNC: 111 MG/DL (ref 65–99)
GLUCOSE UR STRIP.AUTO-MCNC: NEGATIVE MG/DL
HYALINE CASTS #/AREA URNS LPF: ABNORMAL /LPF
KETONES UR STRIP.AUTO-MCNC: NEGATIVE MG/DL
LEUKOCYTE ESTERASE UR QL STRIP.AUTO: ABNORMAL
MICRO URNS: ABNORMAL
NITRITE UR QL STRIP.AUTO: POSITIVE
PH UR STRIP.AUTO: 5.5 [PH] (ref 5–8)
POTASSIUM SERPL-SCNC: 4.5 MMOL/L (ref 3.6–5.5)
PROT UR QL STRIP: NEGATIVE MG/DL
RBC # URNS HPF: ABNORMAL /HPF
RBC UR QL AUTO: NEGATIVE
SODIUM SERPL-SCNC: 130 MMOL/L (ref 135–145)
SP GR UR STRIP.AUTO: 1.01
UROBILINOGEN UR STRIP.AUTO-MCNC: 0.2 MG/DL
WBC #/AREA URNS HPF: ABNORMAL /HPF

## 2019-09-11 PROCEDURE — A9270 NON-COVERED ITEM OR SERVICE: HCPCS | Performed by: STUDENT IN AN ORGANIZED HEALTH CARE EDUCATION/TRAINING PROGRAM

## 2019-09-11 PROCEDURE — 87186 SC STD MICRODIL/AGAR DIL: CPT

## 2019-09-11 PROCEDURE — 99284 EMERGENCY DEPT VISIT MOD MDM: CPT

## 2019-09-11 PROCEDURE — G0008 ADMIN INFLUENZA VIRUS VAC: HCPCS | Performed by: FAMILY MEDICINE

## 2019-09-11 PROCEDURE — 87077 CULTURE AEROBIC IDENTIFY: CPT

## 2019-09-11 PROCEDURE — 80048 BASIC METABOLIC PNL TOTAL CA: CPT

## 2019-09-11 PROCEDURE — 90662 IIV NO PRSV INCREASED AG IM: CPT | Performed by: FAMILY MEDICINE

## 2019-09-11 PROCEDURE — 51798 US URINE CAPACITY MEASURE: CPT

## 2019-09-11 PROCEDURE — 99214 OFFICE O/P EST MOD 30 MIN: CPT | Mod: 25 | Performed by: FAMILY MEDICINE

## 2019-09-11 PROCEDURE — 700102 HCHG RX REV CODE 250 W/ 637 OVERRIDE(OP): Performed by: STUDENT IN AN ORGANIZED HEALTH CARE EDUCATION/TRAINING PROGRAM

## 2019-09-11 PROCEDURE — 87086 URINE CULTURE/COLONY COUNT: CPT

## 2019-09-11 PROCEDURE — 81001 URINALYSIS AUTO W/SCOPE: CPT

## 2019-09-11 RX ORDER — NITROFURANTOIN 25; 75 MG/1; MG/1
100 CAPSULE ORAL 2 TIMES DAILY
Qty: 10 CAP | Refills: 0 | Status: SHIPPED | OUTPATIENT
Start: 2019-09-11 | End: 2019-09-16

## 2019-09-11 RX ORDER — NITROFURANTOIN 25; 75 MG/1; MG/1
100 CAPSULE ORAL ONCE
Status: COMPLETED | OUTPATIENT
Start: 2019-09-11 | End: 2019-09-11

## 2019-09-11 RX ADMIN — NITROFURANTOIN MONOHYDRATE/MACROCRYSTALLINE 100 MG: 25; 75 CAPSULE ORAL at 19:14

## 2019-09-11 NOTE — LETTER
9/14/2019               Dayana Lechuga  [address]  [facility] [address]        Dear Dayana (MR#[id number])    This letter is sent in regards to your, recent visit to the [facility] Emergency Department on 9/11/2019.  During the visit, tests were performed to assist the physician in a medical diagnosis.  A review of those tests requires that we notify you of the following:    Your urine culture was POSITIVE for a bacteria called Escherichia coli. The antibiotic prescribed for you (nitrofurantoin) should be active to treat this bacteria. IT IS IMPORTANT THAT YOU CONTINUE TAKING YOUR ANTIBIOTIC UNTIL IT IS FINISHED.      Please feel free to contact me at the number below if you have any questions or concerns. Thank you for your cooperation in the matter.    Sincerely,  ED Culture Follow-Up Staff  Felicity Kelley, PharmD    [facility], Emergency Department  [address] [place] [id number] [phone](ED Culture Line)  [phone]

## 2019-09-11 NOTE — PROGRESS NOTES
Subjective:     CC: requesting urology referral    HPI:   Dayana is a pleasant patient of [email or web address] who presents today with request for urology referral.    Urinary retention  This is a chronic condition.  She reports that her physiatrist, Dr. Fall, recommended she see urology because she was having incomplete voiding which is leading to urinary frequency and nocturia.  However, since she last saw Dr. Fall they started gabapentin and she notes that she cannot void today. She has voided 3 times day, with maybe 1/2 cup of urine.  She voided at 12:30, 1:30, and 2:50 PM.  Think she got up once in the night to void.      Past Medical History:   Diagnosis Date   • Allergy    • Anxiety    • ASTHMA    • CAD (coronary artery disease)     JT to RCA; 70% stenosis in LAD   • Hyperlipidemia    • Hypertension    • Lumbar back pain 9/10/2016   • OSTEOPOROSIS    • PVD (peripheral vascular disease) (HCC)     70% PAD-followed by L[facility] AMBROCIO       Social History     Tobacco Use   • Smoking status: Never Smoker   • Smokeless tobacco: Never Used   Substance Use Topics   • Alcohol use: No     Alcohol/week: 0.0 oz   • Drug use: No       Current Outpatient Medications Ordered in Epic   Medication Sig Dispense Refill   • gabapentin (NEURONTIN) 100 MG Cap Take 2 Caps by mouth 3 times a day for 30 days. 180 Cap 0   • LORazepam (ATIVAN) 0.5 MG Tab Take 0.5 mg orally in the afternoon. Take 1 mg orally every night. 90 Tab 2   • montelukast (SINGULAIR) 10 MG Tab Take 1 Tab by mouth every day. 90 Tab 3   • potassium chloride SA (KDUR) 20 MEQ Tab CR TAKE ONE TABLET BY MOUTH TWICE DAILY 200 Tab 0   • lisinopril (PRINIVIL) 20 MG Tab TAKE ONE TABLET BY MOUTH ONE TIME DAILY 100 Tab 0   • carvedilol (COREG) 12.5 MG Tab TAKE 1 TABLET BY MOUTH TWICE A DAY WITH MEALS 180 Tab 0   • amLODIPine (NORVASC) 5 MG Tab TAKE ONE TABLET BY MOUTH ONE TIME DAILY  90 Tab 0   • Biotin w/ Vitamins C & E (HAIR/SKIN/NAILS PO) Take  by mouth.     • beclomethasone (QVAR) 80  "MCG/ACT inhaler Use 1 puff twice daily 3 Inhaler 0   • atorvastatin (LIPITOR) 80 MG tablet Take 1 Tab by mouth every evening. 100 Tab 2   • isosorbide mononitrate SR (IMDUR) 30 MG TABLET SR 24 HR Take 1 Tab by mouth every evening. 90 Tab 3   • nitroglycerin (NITROSTAT) 0.4 MG SL Tab Place 1 tab under tongue every 5 min as needed for chest pain max 3 doses 100 Tab 0   • fluticasone (FLONASE) 50 MCG/ACT nasal spray USE 1 SPRAY IN EACH NOSTRIL DAILY 32 g 0   • guaifenesin LA (MUCINEX) 600 MG TABLET SR 12 HR Take 600 mg by mouth every 12 hours.     • ascorbic acid (ASCORBIC ACID) 500 MG Tab Take 500 mg by mouth every day.     • albuterol 108 (90 Base) MCG/ACT Aero Soln inhalation aerosol Inhale 2 Puffs by mouth every 6 hours as needed for Shortness of Breath. 20.1 g 0   • diphenhydrAMINE (BENADRYL) 25 MG Tab Take 25 mg by mouth every 6 hours as needed for Sleep.     • VITAMIN E PO Take  by mouth.     • aspirin (ASA) 81 MG Chew Tab chewable tablet Take 1 Tab by mouth every day. 100 Tab 11   • Cholecalciferol (VITAMIN D) 2000 UNIT Tab Take 2,000 Units by mouth every day.     • omeprazole (PRILOSEC) 20 MG delayed-release capsule Take 20 mg by mouth 2 times a day.     • diclofenac EC (VOLTAREN) 75 MG Tablet Delayed Response Take 1 Tab by mouth 2 times a day. 60 Tab 0   • Multiple Vitamins-Minerals (CENTRUM SILVER ADULT 50+) Tab Take 1 Tab by mouth every day.       No current [facility]-ordered facility-administered medications on file.        Allergies:  Bactrim [sulfamethoxazole w-trimethoprim]; Pcn [penicillins]; and Codeine    Health Maintenance: Completed    ROS:  Gen: no fevers, + chills  Pulm: no sob  CV: no chest pain  GI: + nausea - sometimes, no vomiting      Objective:     Exam:  /60 (BP Location: Right arm, Patient Position: Sitting, BP Cuff Size: Adult)   Pulse 84   Temp 37.2 °C (98.9 °F) (Temporal)   Resp 16   Ht 1.6 m (5' 3\")   Wt 53.4 kg (117 lb 12.8 oz)   LMP  (LMP Unknown)   SpO2 95%   BMI 20.87 " kg/m²  Body mass index is 20.87 kg/m².    Gen: Alert and oriented, No apparent distress.  Neck: Neck is supple without lymphadenopathy.  Lungs: Normal effort, CTA bilaterally, no wheezes, rhonchi, or rales  CV: Regular rate and rhythm. No murmurs, rubs, or gallops.  Ext: No clubbing, cyanosis, edema.    Assessment & Plan:     89 y.o. female with the following -     1. Urinary retention  2. Urinary frequency  This is a chronic condition.  She reports that she saw her physiatrist, Dr. Fall, yesterday who recommended she see urology because her bladder was quite large on her recent CT pelvis indicating likely urinary retention which is leading to her symptoms of urinary frequency and nocturia.  She started taking gabapentin last night and reports that today she is only voided 3 times today with her last void at 2:50 PM.  With each void she states that she only gets about a half a cup of urine.  I spoke with her physiatry us to states that this is not a common side effect of her gabapentin and we both agree that she needs to see the emergency room as she likely needs English catheter for decompression and urgent urology follow-up.  - REFERRAL TO UROLOGY  - her daughter-in-law will drive her to the main ER    3. Need for vaccination  - INFLUENZA VACCINE, HIGH DOSE (65+ ONLY)    Of note she is going to have an MRI of her lumbar spine done soon.  She gets anxious before an MRI and was wondering if she could take an extra dose of her lorazepam.  I stated she could take 1 tablet of lorazepam 0.5 mg before the MRI.    Return if symptoms worsen or fail to improve.    Please note that this dictation was created using voice recognition software. I have made every reasonable attempt to correct obvious errors, but I expect that there are errors of grammar and possibly content that I did not discover before finalizing the note.

## 2019-09-11 NOTE — ASSESSMENT & PLAN NOTE
This is a chronic condition.  She reports that her physiatrist, Dr. Fall, recommended she see urology because she was having incomplete voiding which is leading to urinary frequency and nocturia.  However, since she last saw Dr. Fall they started gabapentin and she notes that she cannot void today. She has voided 3 times day, with maybe 1/2 cup of urine.  She voided at 12:30, 1:30, and 2:50 PM.  Think she got up once in the night to void.

## 2019-09-12 NOTE — ED TRIAGE NOTES
To triage with   Chief Complaint   Patient presents with   • Sent by MD   • Urinary Retention   • Painful Urination   Seen by PCP who consulted with urology for retained urine and was told to come to ED for inability to empty bladder. C/o discomfort and bladder pressure. States last time voided was yesterday. States prior to that she had been having oliguria and urinary frequency.,

## 2019-09-12 NOTE — ED PROVIDER NOTES
ED Provider Note    CHIEF COMPLAINT  Chief Complaint   Patient presents with   • Sent by MD   • Urinary Retention   • Painful Urination       HPI  Dayana Lechuga is a 89 y.o. female who presents with a 2-day history of not being able to urinate.  The patient states that for the last 2 days she is only been able to pee a little amounts at a time.  She went to see her PCP and physiatrist who did a CT scan and saw that her bladder was distended.  The patient denies any burning or urgency. The patient denies using any antihistamines or anything else that could cause urinary retention. She denies an constipation stating that her last bowel movement was earlier today. She was treated for a UTI about 5-6 weeks ago.     REVIEW OF SYSTEMS  Negative for fever, rash, chest pain, dyspnea, abdominal pain, nausea, vomiting, diarrhea, headache, focal weakness, focal numbness, focal tingling, back pain. All other systems are negative.     PAST MEDICAL HISTORY  Past Medical History:   Diagnosis Date   • Allergy    • Anxiety    • ASTHMA    • CAD (coronary artery disease)     JT to RCA; 70% stenosis in LAD   • Hyperlipidemia    • Hypertension    • Lumbar back pain 9/10/2016   • OSTEOPOROSIS    • PVD (peripheral vascular disease) ([facility])     70% PAD-followed by L[facility] AMBROCIO       FAMILY HISTORY  Family History   Problem Relation Age of Onset   • Heart Disease Neg Hx    • Heart Failure Neg Hx    • Hyperlipidemia Neg Hx        SOCIAL HISTORY  Social History     Tobacco Use   • Smoking status: Never Smoker   • Smokeless tobacco: Never Used   Substance Use Topics   • Alcohol use: No     Alcohol/week: 0.0 oz   • Drug use: No       SURGICAL HISTORY  Past Surgical History:   Procedure Laterality Date   • ABDOMINAL HYSTERECTOMY TOTAL     • APPENDECTOMY     • HERNIA REPAIR     • TONSILLECTOMY     • ZZZ CARDIAC CATH         CURRENT MEDICATIONS  I personally reviewed the medication list in the charting documentation.     ALLERGIES  Allergies  "  Allergen Reactions   • Bactrim [Sulfamethoxazole W-Trimethoprim]      Rash   • Pcn [Penicillins] Rash     About 70 years   • Codeine Vomiting       MEDICAL RECORD  I have reviewed patient's medical record and pertinent results are listed above.      PHYSICAL EXAM  VITAL SIGNS: /70   Pulse 83   Temp 36.8 °C (98.2 °F) (Temporal)   Resp 16   Ht 1.6 m (5' 3\")   Wt 53.1 kg (117 lb)   LMP  (LMP Unknown)   SpO2 95%   BMI 20.73 kg/m²    Constitutional: Well appearing patient in no acute distress.  Not toxic, nor ill in appearance.  HENT: Mucus membranes moist.    Eyes: No scleral icterus. Normal conjunctiva   Neck: Supple, comfortable, nonpainful range of motion.   Cardiovascular: Regular heart rate and rhythm.   Thorax & Lungs: Chest is nontender.  Lungs are clear to auscultation with good air movement bilaterally.  No wheeze, rhonchi, nor rales.   Abdomen: Soft, with no tenderness, rebound nor guarding.  No mass or pulsatile mass appreciated.  Skin: Warm, dry. No rash appreciated  Extremities/Musculoskeletal: No sign of trauma. No asymmetric calf tenderness, erythema or edema. Normal range of motion   Neurologic: Alert & oriented. No focal deficits observed.   Psychiatric: Normal affect appropriate for the clinical situation.    DIAGNOSTIC STUDIES / PROCEDURES  Bladder scan  UA  BMP    LABS/EKGs   Labs were significant for UA showed  WBC with many bacteria and positive nitrites and moderate leukocyte esterase.     RADIOLOGY  Bladder scan showed post-void residual 171mL.     COURSE & MEDICAL DECISION MAKING  I have reviewed any medical record information, laboratory studies and radiographic results as noted above.  Differential diagnoses includes: Urinary retention, UTI    Encounter Summary: This is a 89 y.o. female with urinary retention who is presenting to the ED with being only able to urinate a little bit at a time. The patient's bladder scan post-void was 171mL. However, the patient's UA was " positive for UTI with  WBC, positive nitrites and moderate leukocyte esterase. The patient will be discharged on Macrobid for 5 days as the patient's culture was sensitive to that last time. Discussed findings with the patient who agrees to the plan.     DISPOSITION: Discharge. The patient already has appointment set up with urology.     FINAL IMPRESSION  1. Acute UTI Active     This dictation was created using voice recognition software. The accuracy of the dictation is limited to the abilities of the software. I expect there may be some errors of grammar and possibly content. The nursing notes were reviewed and certain aspects of this information were incorporated into this note.    Electronically signed by: Joe Godinez, 9/11/2019 6:21 PM

## 2019-09-12 NOTE — ED NOTES
Pt discharged with one paper prescription to  at pharmacy. Pt verbalized understanding of discharge education and medication information. Pt ambulated to the lobby with steady gait accompanied by family member for transportation home.

## 2019-09-14 NOTE — ED NOTES
"ED Positive Culture Follow-up/Notification Note:   Date: 9/14    Patient seen in the ED on 9/11/2019 for dysuria and retention. Treated for UTI 5-6 wks prior to presentation  1. Acute UTI Active     Discharge Medication List as of 9/11/2019  7:18 PM      START taking these medications    Details   nitrofurantoin monohyd macro (MACROBID) 100 MG Cap Take 1 Cap by mouth 2 times a day for 5 days., Disp-10 Cap, R-0, Print Rx Paper           Allergies: Bactrim [sulfamethoxazole w-trimethoprim]; Pcn [penicillins]; and Codeine    Vitals:    09/11/19 1645 09/11/19 1658 09/11/19 1900   BP: 146/70  123/75   Pulse: 83  74   Resp: 16  16   Temp: 36.8 °C (98.2 °F)     TempSrc: Temporal     SpO2: 95%  96%   Weight:  53.1 kg (117 lb)    Height: 1.6 m (5' 3\")         Final cultures:   Results     Procedure Component Value Units Date/Time    URINE CULTURE(NEW) [[id number]]  (Abnormal)  (Susceptibility) Collected:  09/11/19 1811    Order Status:  Completed Specimen:  Urine Updated:  09/13/19 0640     Significant Indicator POS     Source UR     Site -     Culture Result -      Escherichia coli  >100,000 cfu/mL      Susceptibility     Escherichia coli (1)     Antibiotic Interpretation Microscan Method Status    Ampicillin Sensitive <=8 mcg/mL KANE Final    Ceftriaxone Sensitive <=1 mcg/mL KANE Final    Ceftazidime Sensitive <=1 mcg/mL KANE Final    Cefotaxime Sensitive <=2 mcg/mL KANE Final    Cefazolin Sensitive <=2 mcg/mL KANE Final    Ciprofloxacin Resistant >2 mcg/mL KANE Final    Ampicillin/sulbactam Sensitive <=8/4 mcg/mL KANE Final    Cefepime Sensitive <=2 mcg/mL KANE Final    Tobramycin Sensitive <=4 mcg/mL KANE Final    Cefotetan Sensitive <=16 mcg/mL KANE Final    Nitrofurantoin Sensitive <=32 mcg/mL KANE Final    Gentamicin Sensitive <=4 mcg/mL KANE Final    Levofloxacin Resistant >4 mcg/mL KANE Final    Pip/Tazobactam Sensitive <=16 mcg/mL KANE Final    Trimeth/Sulfa Sensitive <=2/38 mcg/mL KANE Final                   URINALYSIS CULTURE, " IF INDICATED [[id number]]  (Abnormal) Collected:  09/11/19 1811    Order Status:  Completed Specimen:  Urine, Clean Catch Updated:  09/11/19 1845     Color Yellow     Character Clear     Specific Gravity 1.012     Ph 5.5     Glucose Negative mg/dL      Ketones Negative mg/dL      Protein Negative mg/dL      Bilirubin Negative     Urobilinogen, Urine 0.2     Nitrite Positive     Leukocyte Esterase Moderate     Occult Blood Negative     Micro Urine Req Microscopic          Plan:   Appropriate antibiotic therapy prescribed. No changes required based upon culture result. Although patient's Scr appears elevated from baseline, leading to CLcr ~ 34 mL/min, UpToDate suggests short-term treatment for patients with CLcr 30-60 is safe and effective.     Sent letter to patient to notify of positive culture result and encourage compliance with prescribed antibiotics.    Felicity Kelley, PharmD

## 2019-09-17 DIAGNOSIS — I25.119 CORONARY ARTERY DISEASE INVOLVING NATIVE CORONARY ARTERY OF NATIVE HEART WITH ANGINA PECTORIS (HCC): ICD-10-CM

## 2019-09-17 DIAGNOSIS — I10 ESSENTIAL HYPERTENSION: ICD-10-CM

## 2019-09-18 ENCOUNTER — HOSPITAL ENCOUNTER (OUTPATIENT)
Dept: RADIOLOGY | Facility: MEDICAL CENTER | Age: 84
End: 2019-09-18
Attending: PHYSICAL MEDICINE & REHABILITATION
Payer: MEDICARE

## 2019-09-18 DIAGNOSIS — R33.9 URINARY RETENTION: ICD-10-CM

## 2019-09-18 DIAGNOSIS — M54.50 LOW BACK PAIN WITH RADIATION: ICD-10-CM

## 2019-09-18 PROCEDURE — 72148 MRI LUMBAR SPINE W/O DYE: CPT

## 2019-09-18 RX ORDER — AMLODIPINE BESYLATE 5 MG/1
TABLET ORAL
Qty: 90 TAB | Refills: 0 | Status: SHIPPED
Start: 2019-09-18 | End: 2019-12-19

## 2019-09-18 RX ORDER — LISINOPRIL 20 MG/1
TABLET ORAL
Qty: 100 TAB | Refills: 0 | Status: SHIPPED
Start: 2019-09-18 | End: 2019-12-19

## 2019-09-18 RX ORDER — CARVEDILOL 12.5 MG/1
TABLET ORAL
Qty: 180 TAB | Refills: 0 | Status: SHIPPED
Start: 2019-09-18 | End: 2019-12-19

## 2019-09-23 ENCOUNTER — OFFICE VISIT (OUTPATIENT)
Dept: MEDICAL GROUP | Facility: PHYSICIAN GROUP | Age: 84
End: 2019-09-23
Payer: MEDICARE

## 2019-09-23 VITALS
BODY MASS INDEX: 21.26 KG/M2 | HEIGHT: 63 IN | WEIGHT: 120 LBS | OXYGEN SATURATION: 94 % | DIASTOLIC BLOOD PRESSURE: 60 MMHG | HEART RATE: 74 BPM | RESPIRATION RATE: 14 BRPM | SYSTOLIC BLOOD PRESSURE: 126 MMHG | TEMPERATURE: 98 F

## 2019-09-23 DIAGNOSIS — I10 ESSENTIAL HYPERTENSION: ICD-10-CM

## 2019-09-23 DIAGNOSIS — K21.9 GASTROESOPHAGEAL REFLUX DISEASE, ESOPHAGITIS PRESENCE NOT SPECIFIED: Chronic | ICD-10-CM

## 2019-09-23 DIAGNOSIS — R33.9 URINARY RETENTION: ICD-10-CM

## 2019-09-23 DIAGNOSIS — M54.42 ACUTE BILATERAL LOW BACK PAIN WITH BILATERAL SCIATICA: ICD-10-CM

## 2019-09-23 DIAGNOSIS — M54.41 ACUTE BILATERAL LOW BACK PAIN WITH BILATERAL SCIATICA: ICD-10-CM

## 2019-09-23 DIAGNOSIS — I73.9 CLAUDICATION OF RIGHT LOWER EXTREMITY (HCC): ICD-10-CM

## 2019-09-23 DIAGNOSIS — M48.062 SPINAL STENOSIS OF LUMBAR REGION WITH NEUROGENIC CLAUDICATION: ICD-10-CM

## 2019-09-23 LAB
APPEARANCE UR: CLEAR
BILIRUB UR STRIP-MCNC: NORMAL MG/DL
COLOR UR AUTO: YELLOW
GLUCOSE UR STRIP.AUTO-MCNC: NORMAL MG/DL
KETONES UR STRIP.AUTO-MCNC: NORMAL MG/DL
LEUKOCYTE ESTERASE UR QL STRIP.AUTO: NORMAL
NITRITE UR QL STRIP.AUTO: NORMAL
PH UR STRIP.AUTO: 6.5 [PH] (ref 5–8)
PROT UR QL STRIP: NORMAL MG/DL
RBC UR QL AUTO: NORMAL
SP GR UR STRIP.AUTO: 1.01
UROBILINOGEN UR STRIP-MCNC: 0.2 MG/DL

## 2019-09-23 PROCEDURE — 81002 URINALYSIS NONAUTO W/O SCOPE: CPT | Performed by: NURSE PRACTITIONER

## 2019-09-23 PROCEDURE — 99214 OFFICE O/P EST MOD 30 MIN: CPT | Performed by: NURSE PRACTITIONER

## 2019-09-23 NOTE — Clinical Note
Patient has an appointment to see urology on Wednesday related to concern of urinary retention.  Patient had previously requested a referral to Dr. Castaneda and was asking if she should make that appointment or wait until after her follow-up with you.  Her pain is significantly better today.  I recommended following up to discuss with you, please let me know if you think she should also make an appointment with Dr. Castaneda.

## 2019-09-23 NOTE — PROGRESS NOTES
Chief Complaint   Patient presents with   • UTI     Finished rx; ANYTHING ELSE WITH BLADDER    • Results     CT    • Foot Swelling     L now its both;       HISTORY OF THE PRESENT ILLNESS: This is a 89 y.o. female established patient who presents today for follow up.    Urinary Retention  This is a new problem, noted after CT completed by physiatry. The patient states she recently had more scans performed to evaluate her urinary retention, MRI to look into back pain.  Continues to notice swelling in her feet.. She states she is able to urinate nearly every hour, but she has some difficulty doing so and has to push really hard.  She does have follow-up with urology on Wednesday.  States she is having aching in her lower legs related to swelling.  She does continue to take gabapentin at this time states this was recommended in the ER.    Back Pain  Continued issue.  Pain has overall improved. Patient continues to take Gabapentin for her symptoms. Patient had numerous questions regarding the scans that were performed in another office.  Recent MRI showed multiple abnormalities especially at L5/S1.  Abnormalities include canal stenosis.  Discussed that this could definitely explain pain.  Discussed that we do recommend continued follow-up with Dr. Fall.  Patient also mentions follow-up with neurosurgeon based on previously requested referral.  Patient denies sharp pain shooting down her leg today.  Does still have tenderness.  States that she has had 6-7 out of 10 pain at times.     Hypertension  Chronic in nature.  Stable.  Blood pressure today is within normal limits.  Patient denies chest pain, palpitations, dizziness, blurry vision.    Past Medical History:   Diagnosis Date   • Allergy    • Anxiety    • ASTHMA    • CAD (coronary artery disease)     JT to RCA; 70% stenosis in LAD   • Hyperlipidemia    • Hypertension    • Lumbar back pain 9/10/2016   • OSTEOPOROSIS    • PVD (peripheral vascular disease) ([facility])      70% PAD-followed by Lary AMBROCIO     Past Surgical History:   Procedure Laterality Date   • ABDOMINAL HYSTERECTOMY TOTAL     • APPENDECTOMY     • HERNIA REPAIR     • TONSILLECTOMY     • ZZZ CARDIAC CATH       Family Status   Relation Name Status   • Neg Hx  (Not Specified)     Family History   Problem Relation Age of Onset   • Heart Disease Neg Hx    • Heart Failure Neg Hx    • Hyperlipidemia Neg Hx      Social History     Tobacco Use   • Smoking status: Never Smoker   • Smokeless tobacco: Never Used   Substance Use Topics   • Alcohol use: No     Alcohol/week: 0.0 oz   • Drug use: No     Allergies: Bactrim [sulfamethoxazole w-trimethoprim]; Pcn [penicillins]; and Codeine    Current Outpatient Medications Ordered in Epic   Medication Sig Dispense Refill   • lisinopril (PRINIVIL) 20 MG Tab TAKE ONE TABLET BY MOUTH ONE TIME DAILY  100 Tab 0   • carvedilol (COREG) 12.5 MG Tab TAKE ONE TABLET BY MOUTH TWICE DAILY WITH MEALS 180 Tab 0   • amLODIPine (NORVASC) 5 MG Tab TAKE ONE TABLET BY MOUTH ONE TIME DAILY  90 Tab 0   • gabapentin (NEURONTIN) 100 MG Cap Take 2 Caps by mouth 3 times a day for 30 days. 180 Cap 0   • diclofenac EC (VOLTAREN) 75 MG Tablet Delayed Response Take 1 Tab by mouth 2 times a day. 60 Tab 0   • Biotin w/ Vitamins C & E (HAIR/SKIN/NAILS PO) Take  by mouth.     • beclomethasone (QVAR) 80 MCG/ACT inhaler Use 1 puff twice daily 3 Inhaler 0   • atorvastatin (LIPITOR) 80 MG tablet Take 1 Tab by mouth every evening. 100 Tab 2   • isosorbide mononitrate SR (IMDUR) 30 MG TABLET SR 24 HR Take 1 Tab by mouth every evening. 90 Tab 3   • fluticasone (FLONASE) 50 MCG/ACT nasal spray USE 1 SPRAY IN EACH NOSTRIL DAILY 32 g 0   • guaifenesin LA (MUCINEX) 600 MG TABLET SR 12 HR Take 600 mg by mouth every 12 hours.     • ascorbic acid (ASCORBIC ACID) 500 MG Tab Take 500 mg by mouth every day.     • albuterol 108 (90 Base) MCG/ACT Aero Soln inhalation aerosol Inhale 2 Puffs by mouth every 6 hours as needed for Shortness of  Breath. 20.1 g 0   • diphenhydrAMINE (BENADRYL) 25 MG Tab Take 25 mg by mouth every 6 hours as needed for Sleep.     • aspirin (ASA) 81 MG Chew Tab chewable tablet Take 1 Tab by mouth every day. 100 Tab 11   • Cholecalciferol (VITAMIN D) 2000 UNIT Tab Take 2,000 Units by mouth every day.     • LORazepam (ATIVAN) 0.5 MG Tab Take 0.5 mg orally in the afternoon. Take 1 mg orally every night. 90 Tab 2   • montelukast (SINGULAIR) 10 MG Tab Take 1 Tab by mouth every day. 90 Tab 3   • potassium chloride SA (KDUR) 20 MEQ Tab CR TAKE ONE TABLET BY MOUTH TWICE DAILY 200 Tab 0   • nitroglycerin (NITROSTAT) 0.4 MG SL Tab Place 1 tab under tongue every 5 min as needed for chest pain max 3 doses 100 Tab 0   • VITAMIN E PO Take  by mouth.     • Multiple Vitamins-Minerals (CENTRUM SILVER ADULT 50+) Tab Take 1 Tab by mouth every day.     • omeprazole (PRILOSEC) 20 MG delayed-release capsule Take 20 mg by mouth 2 times a day.       No current [facility]-ordered facility-administered medications on file.      Review of Systems   Constitutional:  Negative for fever, chills, weight loss and malaise/fatigue.   HENT:  Negative for ear pain, nosebleeds, congestion, sore throat and neck pain.    Eyes:  Negative for blurred vision.   Respiratory:  Negative for cough, sputum production, shortness of breath and wheezing.    Cardiovascular: Positive Leg Swelling. Negative for chest pain, palpitations, orthopnea.  Gastrointestinal:  Negative for heartburn, nausea, vomiting and abdominal pain.   Genitourinary: Positive for Urinary Retention. Negative for dysuria, urgency and frequency.   Musculoskeletal:  Negative for myalgias, back pain and joint pain.   Skin:  Negative for rash and itching.   Neurological:  Negative for dizziness, tingling, tremors, sensory change, focal weakness and headaches.   Endo/Heme/Allergies:  Does not bruise/bleed easily.   Psychiatric/Behavioral:  Negative for depression, anxiety, or memory loss.     All other systems  "reviewed and are negative except as in HPI.    Exam: /60 (BP Location: Left arm, Patient Position: Sitting, BP Cuff Size: Adult)   Pulse 74   Temp 36.7 °C (98 °F) (Temporal)   Resp 14   Ht 1.6 m (5' 3\")   Wt 54.4 kg (120 lb)   SpO2 94%    General:  Normal appearing. No distress.  Pulmonary:  Clear to ausculation.  Normal effort. No rales, ronchi, or wheezing.  Cardiovascular:  Regular rate and rhythm without murmur. Carotid and radial pulses are intact and equal bilaterally.  Positive bilateral pedal edema, no pitting is noted.  Compression stockings present.    Neurologic:  Grossly nonfocal.  Cranial nerves II through XII intact.  Skin:  Warm and dry.  No obvious lesions.  Musculoskeletal:  Normal gait. No extremity cyanosis, clubbing, or edema.  Tenderness to palpation of lower back at approximately L4-L5.   Psych:  Normal mood and affect. Alert and oriented x3. Judgment and insight is normal.    PLAN:    1. Urinary retention  Urinalysis is clear today no evidence of current urinary tract infection.  Patient will follow-up with urology on Wednesday, will request notes at that time.  - POCT Urinalysis    2. Essential hypertension  Continue current plan of care.    3. Gastroesophageal reflux disease, esophagitis presence not specified  Continue current medication at this time.    4. Claudication of right lower extremity (HCC)  Patient does have follow-up with Dr. Barth.  States that he did recommend stents.  Patient states that foot swelling has been worse, discussed that this could be related to multiple issues, will monitor closely.    5. Acute bilateral low back pain with bilateral sciatica  6. Spinal stenosis of lumbar region with neurogenic claudication  I updated the patient on diagnostic studies she had performed elsewhere which show degenerative changes of her spine, including but not limited to: foraminal stenosis, facet arthropathy, bulging discs, and anterolisthesis.  Patient is currently " following up with Dr. Fall in physiatry and is uncertain if she should make an appointment to see neurosurgery as she had patient previously requested a referral.  Patient is encouraged to keep follow-up with urology, Dr. Fall.    Follow-up in 1 month or sooner as needed. Patient is encouraged to be seen in the emergency room for chest pain, palpitations, shortness of breath, dizziness, severe abdominal pain or other concerning symptoms.    Please note that this dictation was created using voice recognition software. I have made every reasonable attempt to correct obvious errors, but I expect that there are errors of grammar and possibly content that I did not discover before finalizing the note.    Assessment/Plan  1. Urinary retention  POCT Urinalysis   2. Essential hypertension     3. Gastroesophageal reflux disease, esophagitis presence not specified     4. Claudication of right lower extremity (HCC)     5. Acute bilateral low back pain with bilateral sciatica     6. Spinal stenosis of lumbar region with neurogenic claudication         I have placed the below orders and discussed them with an approved delegating provider. The MA is performing the below orders under the direction of Dr. Collier.    Que PETTIT (Alexisibmallorie), am scribing for, and in the presence of, SOHEILA Mills    Electronically signed by: Que Perez (Macarena), 9/23/2019    Ned PETTIT APRN personally performed the services described in this documentation, as scribed by Que Perez in my presence, and it is both accurate and complete.

## 2019-09-25 ENCOUNTER — HOSPITAL ENCOUNTER (OUTPATIENT)
Dept: LAB | Facility: MEDICAL CENTER | Age: 84
End: 2019-09-25
Attending: PHYSICIAN ASSISTANT
Payer: MEDICARE

## 2019-09-25 PROCEDURE — 87086 URINE CULTURE/COLONY COUNT: CPT

## 2019-09-26 ENCOUNTER — TELEPHONE (OUTPATIENT)
Dept: MEDICAL GROUP | Facility: PHYSICIAN GROUP | Age: 84
End: 2019-09-26

## 2019-09-26 NOTE — TELEPHONE ENCOUNTER
----- Message from MARIANNE Vallejo sent at 9/26/2019  2:45 PM PDT -----  Regarding: call urology  Can we call urology and see if we can get their note from her visit yesterday, [facility] [place].    Thank you,    Ned Lee, SOHEILA

## 2019-09-26 NOTE — TELEPHONE ENCOUNTER
VOICEMAIL  1. Caller Name: [facility]                      Call Back Number: [phone]    2. Message: Ned ramirez  Notes from apt 09/25/2019. Lm     3. Patient approves office to leave a detailed voicemail/MyChart message: N\A

## 2019-09-28 LAB
BACTERIA UR CULT: NORMAL
SIGNIFICANT IND 70042: NORMAL
SITE SITE: NORMAL
SOURCE SOURCE: NORMAL

## 2019-09-30 ENCOUNTER — TELEPHONE (OUTPATIENT)
Dept: MEDICAL GROUP | Facility: PHYSICIAN GROUP | Age: 84
End: 2019-09-30

## 2019-09-30 NOTE — TELEPHONE ENCOUNTER
Phone Number Called: [phone]    Call outcome: spoke to patient regarding message below    Message: Spoke to Sofiya at [facility] and she is going to fax over the notes. LM

## 2019-10-01 ENCOUNTER — OFFICE VISIT (OUTPATIENT)
Dept: PHYSICAL MEDICINE AND REHAB | Facility: MEDICAL CENTER | Age: 84
End: 2019-10-01
Payer: MEDICARE

## 2019-10-01 VITALS
OXYGEN SATURATION: 93 % | TEMPERATURE: 98 F | HEIGHT: 63 IN | DIASTOLIC BLOOD PRESSURE: 62 MMHG | WEIGHT: 119.27 LBS | BODY MASS INDEX: 21.13 KG/M2 | SYSTOLIC BLOOD PRESSURE: 142 MMHG | HEART RATE: 87 BPM

## 2019-10-01 DIAGNOSIS — M43.16 SPONDYLOLISTHESIS OF LUMBAR REGION: ICD-10-CM

## 2019-10-01 DIAGNOSIS — M48.061 SPINAL STENOSIS OF LUMBAR REGION, UNSPECIFIED WHETHER NEUROGENIC CLAUDICATION PRESENT: ICD-10-CM

## 2019-10-01 DIAGNOSIS — M54.50 LOW BACK PAIN WITH RADIATION: ICD-10-CM

## 2019-10-01 DIAGNOSIS — M47.816 LUMBAR SPONDYLOSIS: ICD-10-CM

## 2019-10-01 DIAGNOSIS — M51.36 DDD (DEGENERATIVE DISC DISEASE), LUMBAR: ICD-10-CM

## 2019-10-01 DIAGNOSIS — M48.061 LUMBAR FORAMINAL STENOSIS: ICD-10-CM

## 2019-10-01 PROCEDURE — 99214 OFFICE O/P EST MOD 30 MIN: CPT | Performed by: PHYSICAL MEDICINE & REHABILITATION

## 2019-10-01 RX ORDER — GABAPENTIN 100 MG/1
200 CAPSULE ORAL 3 TIMES DAILY
Qty: 270 CAP | Refills: 0 | Status: SHIPPED | OUTPATIENT
Start: 2019-10-01 | End: 2019-11-05 | Stop reason: SDUPTHER

## 2019-10-01 ASSESSMENT — PATIENT HEALTH QUESTIONNAIRE - PHQ9
5. POOR APPETITE OR OVEREATING: 3 - NEARLY EVERY DAY
CLINICAL INTERPRETATION OF PHQ2 SCORE: 3
SUM OF ALL RESPONSES TO PHQ QUESTIONS 1-9: 9

## 2019-10-01 ASSESSMENT — PAIN SCALES - GENERAL: PAINLEVEL: 4=SLIGHT-MODERATE PAIN

## 2019-10-01 NOTE — TELEPHONE ENCOUNTER
Left message with Dayana regarding her urology visit.  Was calling to discuss swelling in her feet, plan going forward.  It appears she has an appointment with Dr. britton coming up for her back and Dr. britton did mention that she will address issues at that appointment.

## 2019-10-01 NOTE — PATIENT INSTRUCTIONS
1. Urology  2. Refill gabapentin 200mg three times a day, may increase to 300mg three times a day.  3. Call about scheduling physical therapy

## 2019-10-01 NOTE — TELEPHONE ENCOUNTER
Phone Number Called: [phone] (home)       Call outcome: spoke to patient regarding message below    Message: Patient was aware she has an apt today. Patient had no other questions at this time. GA TADEO

## 2019-10-01 NOTE — PROGRESS NOTES
Follow-up patient note    Physiatry (physical medicine and  Rehabilitation), interventional spine and sports medicine    Date of Service:10/01/2019    Chief complaint:   Low back and left leg pain     HISTORY    HPI: Dayana Lechuga 89 y.o. female who presents today for evaluation of low back and left leg pain.    She reports that she woke up August 7, 2019 and had pain in the low back and left leg.     Today, she is accompanied by her daughter-in-law, Naila.  Since the last visit, she has been taking gabapentin and has titrated to 200 mg, 3 times a day.  She reports that she has had improvement in her left leg pain.  Pins-and-needles in the left foot continue, but are much less disturbing.  Her pain is worse with sitting.      Her sleep has improved and she is sleeping about 6 to 7 hours a night.  Her pain is about 50 % better.  She rates it a 4 out of 10 now.    She does have difficulty and has been referred to urology.  She was treated for acute infection of September 11, 2019.    No bowel changes.      Medical records review:  I reviewed the note from the referring provider Evi Pepe* dated 08/29/2019  Reviewed records from this visit.  She was given a medrol dose pack    Previous treatments:    Physical Therapy: No    Medications the patient is tried: NSAIDs, Narcotics and gabapentin    Previous interventions: none    Previous surgeries to relieve the above pain:  none      ROS: Unchanged since September 10, 2019 with note of treatment for urinary tract infection September 11, 2019.  Resp: asthma  ENT: asthma    Red Flags ROS:   Fever, Chills, Sweats: Denies  Involuntary Weight Loss: Denies  Bladder Incontinence: Denies  Bowel Incontinence: Denies  Saddle Anesthesia: Denies    All other systems reviewed and negative.       PMHx:   Past Medical History:   Diagnosis Date   • Allergy    • Anxiety    • ASTHMA    • CAD (coronary artery disease)     JT to RCA; 70% stenosis in LAD   • Hyperlipidemia     • Hypertension    • Lumbar back pain 9/10/2016   • OSTEOPOROSIS    • PVD (peripheral vascular disease) (HCC)     70% PAD-followed by Lary AMBROCIO       PSHx:   Past Surgical History:   Procedure Laterality Date   • ABDOMINAL HYSTERECTOMY TOTAL     • APPENDECTOMY     • HERNIA REPAIR     • TONSILLECTOMY     • ZZZ CARDIAC CATH         Family history   Family History   Problem Relation Age of Onset   • Heart Disease Neg Hx    • Heart Failure Neg Hx    • Hyperlipidemia Neg Hx          Medications:   Current Outpatient Medications   Medication   • gabapentin (NEURONTIN) 100 MG Cap   • lisinopril (PRINIVIL) 20 MG Tab   • carvedilol (COREG) 12.5 MG Tab   • amLODIPine (NORVASC) 5 MG Tab   • diclofenac EC (VOLTAREN) 75 MG Tablet Delayed Response   • montelukast (SINGULAIR) 10 MG Tab   • potassium chloride SA (KDUR) 20 MEQ Tab CR   • Biotin w/ Vitamins C & E (HAIR/SKIN/NAILS PO)   • atorvastatin (LIPITOR) 80 MG tablet   • isosorbide mononitrate SR (IMDUR) 30 MG TABLET SR 24 HR   • nitroglycerin (NITROSTAT) 0.4 MG SL Tab   • fluticasone (FLONASE) 50 MCG/ACT nasal spray   • guaifenesin LA (MUCINEX) 600 MG TABLET SR 12 HR   • ascorbic acid (ASCORBIC ACID) 500 MG Tab   • albuterol 108 (90 Base) MCG/ACT Aero Soln inhalation aerosol   • diphenhydrAMINE (BENADRYL) 25 MG Tab   • VITAMIN E PO   • aspirin (ASA) 81 MG Chew Tab chewable tablet   • Cholecalciferol (VITAMIN D) 2000 UNIT Tab   • Multiple Vitamins-Minerals (CENTRUM SILVER ADULT 50+) Tab   • omeprazole (PRILOSEC) 20 MG delayed-release capsule   • beclomethasone (QVAR) 80 MCG/ACT inhaler     No current facility-administered medications for this visit.        Allergies:   Allergies   Allergen Reactions   • Bactrim [Sulfamethoxazole W-Trimethoprim]      Rash   • Pcn [Penicillins] Rash     About 70 years   • Codeine Vomiting       Social Hx:   Social History     Socioeconomic History   • Marital status:      Spouse name: Not on file   • Number of children: Not on file   •  "Years of education: Not on file   • Highest education level: Not on file   Occupational History   • Not on file   Social Needs   • Financial resource strain: Not on file   • Food insecurity:     Worry: Not on file     Inability: Not on file   • Transportation needs:     Medical: Not on file     Non-medical: Not on file   Tobacco Use   • Smoking status: Never Smoker   • Smokeless tobacco: Never Used   Substance and Sexual Activity   • Alcohol use: No     Alcohol/week: 0.0 oz   • Drug use: No   • Sexual activity: Not Currently   Lifestyle   • Physical activity:     Days per week: Not on file     Minutes per session: Not on file   • Stress: Not on file   Relationships   • Social connections:     Talks on phone: Not on file     Gets together: Not on file     Attends Yarsanism service: Not on file     Active member of club or organization: Not on file     Attends meetings of clubs or organizations: Not on file     Relationship status: Not on file   • Intimate partner violence:     Fear of current or ex partner: Not on file     Emotionally abused: Not on file     Physically abused: Not on file     Forced sexual activity: Not on file   Other Topics Concern   •  Service No   • Blood Transfusions Yes   • Caffeine Concern No   • Occupational Exposure No   • Hobby Hazards No   • Sleep Concern Yes   • Stress Concern Yes   • Weight Concern No   • Special Diet No   • Back Care No   • Exercise No   • Bike Helmet No   • Seat Belt Yes   • Self-Exams No   Social History Narrative   • Not on file         EXAMINATION     Physical Exam:   Vitals: /62 (BP Location: Left arm, Patient Position: Sitting, BP Cuff Size: Small adult)   Pulse 87   Temp 36.7 °C (98 °F) (Temporal)   Ht 1.6 m (5' 3\")   Wt 54.1 kg (119 lb 4.3 oz)   SpO2 93%     Constitutional:   Body Habitus: Body mass index is 21.13 kg/m².  Cooperation: Fully cooperates with exam  Appearance: Well-groomed, well-nourished, not disheveled, no acute " distress    Eyes: No scleral icterus, no proptosis     ENT -no obvious auditory deficits, no obvious tongue lesions, tongue midline, no facial droop     Skin -no rashes or lesions noted     Respiratory-  breathing comfortable on room air, no audible wheezing    Cardiovascular- capillary refills less than 2 seconds. Compression stocking on the left leg    Psychiatric- alert and oriented ×3. Normal affect.     Gait - Mildly antalgic without loss of balance     Musculoskeletal -     Thoracic/Lumbar Spine/Sacral Spine/Hips   Inspection: No evidence of atrophy in bilateral lower extremities throughout     Lumbar spine Special tests  Neuro tension  Straight leg test negative bilaterally         Neuro       Key points for the international standards for neurological classification of spinal cord injury (ISNCSCI) to light touch.     Dermatome R L   L2 2 2   L3 2 2   L4 2 2   L5 2 2   S1 2 2   S2 2 2       Motor Exam Lower Extremities    ? Myotome R L   Hip flexion L2 5 5   Knee extension L3 5 5   Ankle dorsiflexion L4 5 5   Toe extension L5 5 5   Ankle plantarflexion S1 5 5         Glasgow’s sign negative bilaterally   Babinski sign negative bilaterally   Clonus of the ankle negative bilaterally     Reflexes  ?  R L   Patella  2+ 2+   Achilles   2+ 2+       MEDICAL DECISION MAKING    Medical records review: see under HPI section.     DATA    Labs:   Lab Results   Component Value Date/Time    SODIUM 130 (L) 09/11/2019 06:40 PM    POTASSIUM 4.5 09/11/2019 06:40 PM    CHLORIDE 101 09/11/2019 06:40 PM    CO2 20 09/11/2019 06:40 PM    ANION 9.0 09/11/2019 06:40 PM    GLUCOSE 111 (H) 09/11/2019 06:40 PM    BUN 22 09/11/2019 06:40 PM    CREATININE 0.91 09/11/2019 06:40 PM    CALCIUM 9.7 09/11/2019 06:40 PM    ASTSGOT 18 08/09/2019 09:30 AM    ALTSGPT 18 08/09/2019 09:30 AM    TBILIRUBIN 0.7 08/09/2019 09:30 AM    ALBUMIN 4.2 08/09/2019 09:30 AM    TOTPROTEIN 6.6 08/09/2019 09:30 AM    GLOBULIN 2.4 08/09/2019 09:30 AM    AGRATIO  1.8 08/09/2019 09:30 AM       Lab Results   Component Value Date/Time    PROTHROMBTM 12.5 09/10/2016 04:00 PM    INR 0.91 09/10/2016 04:00 PM        Lab Results   Component Value Date/Time    WBC 5.8 08/09/2019 09:30 AM    RBC 3.74 (L) 08/09/2019 09:30 AM    HEMOGLOBIN 12.2 08/09/2019 09:30 AM    HEMATOCRIT 35.8 (L) 08/09/2019 09:30 AM    MCV 95.7 08/09/2019 09:30 AM    MCH 32.6 08/09/2019 09:30 AM    MCHC 34.1 08/09/2019 09:30 AM    MPV 9.5 08/09/2019 09:30 AM    NEUTSPOLYS 55.70 08/09/2019 09:30 AM    LYMPHOCYTES 28.50 08/09/2019 09:30 AM    MONOCYTES 11.80 08/09/2019 09:30 AM    EOSINOPHILS 2.40 08/09/2019 09:30 AM    BASOPHILS 1.40 08/09/2019 09:30 AM        No results found for: HBA1C     Imaging: I personally reviewed following images, these are my reads  MRI lumbar spine 09/18/2019  At L2-3, there is a mild disc bulge.  There is moderate ligamentum flavum hypertrophy.  There is no central canal stenosis at this level.  There is mild left foraminal narrowing.  At L3-4, there is a diffuse disc bulge with mild foraminal stenosis bilaterally there is ligamentum flavum hypertrophy and facet hypertrophy with mild central canal stenosis  At L4-5, there is left lateral listhesis and grade 2 anterolisthesis.  There is also severe facet arthropathy and omentum flavum hypertrophy.  There is moderate central stenosis at this level.  There is moderate right and mild left foraminal narrowing.  At L5-S1, there is a diffuse disc bulge and moderate facet hypertrophy.  There is mild foraminal stenosis on the right and severe foraminal loss on the left.    CT pelvis 08/09/2019  There is note of DDD and facet arthropathy in the lumbar spine.  Bladder is distended.  No evidence of fracture in the pelvis or femurs    IMAGING radiology reads. I reviewed the following radiology reads   MRI lumbar spine 09/19/2019         Severe L4/5 facet arthropathy and degenerative these results in right lateral listhesis and nearly grade 2  anterolisthesis    Moderate-severe central stenosis L4/5. Lesser stenoses at other levels as detailed above    Greatest foraminal stenosis is moderate-severe on the left at L5/S1         CT pelvis 09/18/2019  1.  There is no acute fracture of the pelvis or proximal femurs.  2.  There is degenerative disc disease and arthropathy in the lower lumbar spine with bilateral areas of neural foraminal narrowing but no canal stenosis.  3.  There is mild degenerative change in both hips.                                    Results for orders placed during the hospital encounter of 08/09/19   DX-PELVIS-1 OR 2 VIEWS    Impression 1.  There is no acute fracture or malalignment.  2.  The bones are osteopenic.                                       Diagnosis   Visit Diagnoses     ICD-10-CM   1. Low back pain with radiation M54.40   2. Spinal stenosis of lumbar region, unspecified whether neurogenic claudication present M48.061   3. Lumbar spondylosis M47.816   4. DDD (degenerative disc disease), lumbar M51.36   5. Spondylolisthesis of lumbar region M43.16   6. Lumbar foraminal stenosis M48.061           ASSESSMENT:  Dayana Lechuga 89 y.o. female with low back pain     Dayana was seen today for follow-up.    Diagnoses and all orders for this visit:    Low back pain with radiation  -     gabapentin (NEURONTIN) 100 MG Cap; Take 2 Caps by mouth 3 times a day for 30 days.    Spinal stenosis of lumbar region, unspecified whether neurogenic claudication present  -     gabapentin (NEURONTIN) 100 MG Cap; Take 2 Caps by mouth 3 times a day for 30 days.    Lumbar spondylosis    DDD (degenerative disc disease), lumbar    Spondylolisthesis of lumbar region    Lumbar foraminal stenosis    1.  Discussed improvement that she has had with gradual titration of gabapentin.  We discussed continuing to her note of 3 times a day but that she may gradually increase this to 300 mg 3 times a day.  2.  She will follow-up with urology regarding further  work-up.  3.  Encouraged her to schedule physical therapy.  4.  We discussed treatment options and that we can adjust our approach if symptoms should worsen again.  We agreed that she will follow-up with urology and we will take a conservative approach to managing her low back and left leg symptoms for now.  She is agreeable with this plan.      Follow-up: Return in about 4 weeks (around 10/29/2019).        Please note that this dictation was created using voice recognition software. I have made every reasonable attempt to correct obvious errors but there may be errors of grammar and content that I may have overlooked prior to finalization of this note.      Torres Fall MD  Physical Medicine and Rehabilitation  Interventional Spine and Sports Physiatry  [facility]

## 2019-10-07 ENCOUNTER — OFFICE VISIT (OUTPATIENT)
Dept: URGENT CARE | Facility: CLINIC | Age: 84
End: 2019-10-07
Payer: MEDICARE

## 2019-10-07 VITALS
TEMPERATURE: 99 F | HEIGHT: 63 IN | BODY MASS INDEX: 21.23 KG/M2 | HEART RATE: 78 BPM | WEIGHT: 119.8 LBS | OXYGEN SATURATION: 94 % | RESPIRATION RATE: 16 BRPM | SYSTOLIC BLOOD PRESSURE: 132 MMHG | DIASTOLIC BLOOD PRESSURE: 80 MMHG

## 2019-10-07 DIAGNOSIS — J30.2 SEASONAL ALLERGIC RHINITIS, UNSPECIFIED TRIGGER: ICD-10-CM

## 2019-10-07 DIAGNOSIS — R42 DIZZINESS: ICD-10-CM

## 2019-10-07 LAB
APPEARANCE UR: CLEAR
BILIRUB UR STRIP-MCNC: NORMAL MG/DL
COLOR UR AUTO: YELLOW
GLUCOSE UR STRIP.AUTO-MCNC: NORMAL MG/DL
KETONES UR STRIP.AUTO-MCNC: NORMAL MG/DL
LEUKOCYTE ESTERASE UR QL STRIP.AUTO: NORMAL
NITRITE UR QL STRIP.AUTO: NORMAL
PH UR STRIP.AUTO: 6 [PH] (ref 5–8)
PROT UR QL STRIP: NORMAL MG/DL
RBC UR QL AUTO: NORMAL
SP GR UR STRIP.AUTO: 1.01
UROBILINOGEN UR STRIP-MCNC: 0.2 MG/DL

## 2019-10-07 PROCEDURE — 81002 URINALYSIS NONAUTO W/O SCOPE: CPT | Performed by: NURSE PRACTITIONER

## 2019-10-07 PROCEDURE — 99213 OFFICE O/P EST LOW 20 MIN: CPT | Performed by: NURSE PRACTITIONER

## 2019-10-07 ASSESSMENT — ENCOUNTER SYMPTOMS
NAUSEA: 1
FEVER: 0
CHILLS: 0
VOMITING: 0
HEADACHES: 1
DIARRHEA: 0
EYE DISCHARGE: 1
DIZZINESS: 1
MYALGIAS: 0

## 2019-10-08 NOTE — PROGRESS NOTES
Subjective:      Dayana Lechuga is a 89 y.o. female who presents with Dizziness (x this am, dizziness, feeling better.   Took some gabapantin this am, not sure if was related. ) and Sinus Problem (x 1 wk, runny nose, Rt. ear pain off /on, headaches and watery eyes)            HPI New. 89 year old female with dizziness for a period of time today, now resolved. She had dizziness when she got up this morning. She also has some nasal congestion and watery eyes for the past week and associated ear discomfort in right ear. She denies fever, chills myalgia. She does report some nausea with the dizziness. Took 3 gabapentin this morning and wonders if this caused the dizziness. She does take claritin for her allergies.  Bactrim [sulfamethoxazole w-trimethoprim]; Pcn [penicillins]; and Codeine  Current Outpatient Medications on File Prior to Visit   Medication Sig Dispense Refill   • gabapentin (NEURONTIN) 100 MG Cap Take 2 Caps by mouth 3 times a day for 30 days. 270 Cap 0   • lisinopril (PRINIVIL) 20 MG Tab TAKE ONE TABLET BY MOUTH ONE TIME DAILY  100 Tab 0   • carvedilol (COREG) 12.5 MG Tab TAKE ONE TABLET BY MOUTH TWICE DAILY WITH MEALS 180 Tab 0   • amLODIPine (NORVASC) 5 MG Tab TAKE ONE TABLET BY MOUTH ONE TIME DAILY  90 Tab 0   • montelukast (SINGULAIR) 10 MG Tab Take 1 Tab by mouth every day. 90 Tab 3   • potassium chloride SA (KDUR) 20 MEQ Tab CR TAKE ONE TABLET BY MOUTH TWICE DAILY 200 Tab 0   • Biotin w/ Vitamins C & E (HAIR/SKIN/NAILS PO) Take  by mouth.     • beclomethasone (QVAR) 80 MCG/ACT inhaler Use 1 puff twice daily 3 Inhaler 0   • atorvastatin (LIPITOR) 80 MG tablet Take 1 Tab by mouth every evening. 100 Tab 2   • nitroglycerin (NITROSTAT) 0.4 MG SL Tab Place 1 tab under tongue every 5 min as needed for chest pain max 3 doses 100 Tab 0   • fluticasone (FLONASE) 50 MCG/ACT nasal spray USE 1 SPRAY IN EACH NOSTRIL DAILY 32 g 0   • guaifenesin LA (MUCINEX) 600 MG TABLET SR 12 HR Take 600 mg by mouth every  12 hours.     • ascorbic acid (ASCORBIC ACID) 500 MG Tab Take 500 mg by mouth every day.     • albuterol 108 (90 Base) MCG/ACT Aero Soln inhalation aerosol Inhale 2 Puffs by mouth every 6 hours as needed for Shortness of Breath. 20.1 g 0   • VITAMIN E PO Take  by mouth.     • aspirin (ASA) 81 MG Chew Tab chewable tablet Take 1 Tab by mouth every day. 100 Tab 11   • Cholecalciferol (VITAMIN D) 2000 UNIT Tab Take 2,000 Units by mouth every day.     • Multiple Vitamins-Minerals (CENTRUM SILVER ADULT 50+) Tab Take 1 Tab by mouth every day.     • omeprazole (PRILOSEC) 20 MG delayed-release capsule Take 20 mg by mouth 2 times a day.     • diclofenac EC (VOLTAREN) 75 MG Tablet Delayed Response Take 1 Tab by mouth 2 times a day. (Patient not taking: Reported on 10/7/2019) 60 Tab 0   • isosorbide mononitrate SR (IMDUR) 30 MG TABLET SR 24 HR Take 1 Tab by mouth every evening. 90 Tab 3   • diphenhydrAMINE (BENADRYL) 25 MG Tab Take 25 mg by mouth every 6 hours as needed for Sleep.       No current facility-administered medications on file prior to visit.      Social History     Socioeconomic History   • Marital status:      Spouse name: Not on file   • Number of children: Not on file   • Years of education: Not on file   • Highest education level: Not on file   Occupational History   • Not on file   Social Needs   • Financial resource strain: Not on file   • Food insecurity:     Worry: Not on file     Inability: Not on file   • Transportation needs:     Medical: Not on file     Non-medical: Not on file   Tobacco Use   • Smoking status: Never Smoker   • Smokeless tobacco: Never Used   Substance and Sexual Activity   • Alcohol use: No     Alcohol/week: 0.0 oz   • Drug use: No   • Sexual activity: Not Currently   Lifestyle   • Physical activity:     Days per week: Not on file     Minutes per session: Not on file   • Stress: Not on file   Relationships   • Social connections:     Talks on phone: Not on file     Gets  "together: Not on file     Attends Bahai service: Not on file     Active member of club or organization: Not on file     Attends meetings of clubs or organizations: Not on file     Relationship status: Not on file   • Intimate partner violence:     Fear of current or ex partner: Not on file     Emotionally abused: Not on file     Physically abused: Not on file     Forced sexual activity: Not on file   Other Topics Concern   •  Service No   • Blood Transfusions Yes   • Caffeine Concern No   • Occupational Exposure No   • Hobby Hazards No   • Sleep Concern Yes   • Stress Concern Yes   • Weight Concern No   • Special Diet No   • Back Care No   • Exercise No   • Bike Helmet No   • Seat Belt Yes   • Self-Exams No   Social History Narrative   • Not on file     Breast Cancer-related family history is not on file.      Review of Systems   Constitutional: Negative for chills and fever.   HENT: Positive for congestion and ear pain.    Eyes: Positive for discharge.   Gastrointestinal: Positive for nausea. Negative for diarrhea and vomiting.   Musculoskeletal: Negative for myalgias.   Neurological: Positive for dizziness and headaches.          Objective:     /80 (BP Location: Left arm, Patient Position: Sitting, BP Cuff Size: Adult)   Pulse 78   Temp 37.2 °C (99 °F) (Temporal)   Resp 16   Ht 1.6 m (5' 3\")   Wt 54.3 kg (119 lb 12.8 oz)   LMP  (LMP Unknown)   SpO2 94%   BMI 21.22 kg/m²      Physical Exam   Constitutional: She is oriented to person, place, and time. She appears well-developed and well-nourished. No distress.   HENT:   Head: Normocephalic.   Right Ear: External ear normal.   Left Ear: External ear normal.   Mouth/Throat: Oropharynx is clear and moist. No oropharyngeal exudate.   Eyes: Pupils are equal, round, and reactive to light. EOM are normal. Right eye exhibits no discharge. Left eye exhibits no discharge.   Neck: Normal range of motion. Neck supple.   Cardiovascular: Normal rate, " regular rhythm and normal heart sounds.   No murmur heard.  Pulmonary/Chest: Effort normal and breath sounds normal. No respiratory distress.   Musculoskeletal: Normal range of motion.   Neurological: She is alert and oriented to person, place, and time.   Skin: Skin is warm and dry.   Psychiatric: She has a normal mood and affect. Her behavior is normal. Thought content normal.   Nursing note and vitals reviewed.              Assessment/Plan:     1. Dizziness  POCT Urinalysis   2. Seasonal allergic rhinitis, unspecified trigger       Urine is clear.  Add flonase for her allergies.  Watch the dizziness.  Follow up with PCP.

## 2019-10-17 ENCOUNTER — PHYSICAL THERAPY (OUTPATIENT)
Dept: PHYSICAL THERAPY | Facility: REHABILITATION | Age: 84
End: 2019-10-17
Attending: PHYSICAL MEDICINE & REHABILITATION
Payer: MEDICARE

## 2019-10-17 DIAGNOSIS — M54.50 LOW BACK PAIN WITH RADIATION: ICD-10-CM

## 2019-10-17 PROCEDURE — 97110 THERAPEUTIC EXERCISES: CPT

## 2019-10-17 PROCEDURE — 97162 PT EVAL MOD COMPLEX 30 MIN: CPT

## 2019-10-17 SDOH — ECONOMIC STABILITY: GENERAL: QUALITY OF LIFE: GOOD

## 2019-10-17 ASSESSMENT — ENCOUNTER SYMPTOMS
EXACERBATED BY: STAIR CLIMBING
QUALITY: DULL ACHE
QUALITY: ACHING
ALLEVIATING FACTORS: PAIN MEDICATION
EXACERBATED BY: LIFTING
ALLEVIATING FACTORS: ACTIVITY
EXACERBATED BY: CARRYING
PAIN TIMING: IN THE EVENING
QUALITY: SHARP
QUALITY: STABBING
QUALITY: TIGHTNESS

## 2019-10-17 NOTE — OP THERAPY EVALUATION
Outpatient Physical Therapy  INITIAL EVALUATION    [facility] Physical Therapy [place] [address] [facility], [address]  [place] [address]  Phone:  [phone]    Date of Evaluation: 10/17/2019    Patient: Dayana Lechuga  YOB: 1929  MRN: [id number]     Referring Provider: Torres Fall M.D.  [address]  [address]  [place] [address]   Referring Diagnosis Low back pain with radiation [M54.40]     Time Calculation                   Chief Complaint: Back Problem    Visit Diagnoses     ICD-10-CM   1. Low back pain with radiation M54.40         Subjective:   History of Present Illness:     Date of onset:  8/7/2019    Mechanism of injury:  The patient is a 89 year old female who reports chronic low back pain and (L) LE pain. The patient awoke August 7th with pain to her back and leg and has experienced this until receiving pain medication from her physician. She gets up out of bed and walks before feeling some relief and then walks her dog 15-2- minutes. She has difficulty walking up her steps.  Quality of life:  Good  Sleep disturbance:  Not disrupted  Pain:     Pain scale: 0.    Pain scale at lowest: 0.    Pain scale at highest: 7.    Quality:  Aching, dull ache, stabbing, sharp and tightness    Pain timing:  In the evening    Relieving factors:  Activity and pain medication    Aggravating factors:  Lifting, carrying and stair climbing    Pain Comments::  Difficulty moving furniture bending at the waist and often going up the steps after she has walked awhile  Social Support:     Lives in:  Community-based residential facility    Lives with:  Alone  Patient Goals:     Patient goals for therapy:  Decreased pain and increased motion    Other patient goals:  To be able to get in the moning and stand whithout the pain       Past Medical History:   Diagnosis Date   • Allergy    • Anxiety    • ASTHMA    • CAD (coronary artery disease)     JT to RCA; 70% stenosis in LAD   • Hyperlipidemia    •  Hypertension    • Lumbar back pain 9/10/2016   • OSTEOPOROSIS    • PVD (peripheral vascular disease) (HCC)     70% PAD-followed by Lary AMBROCIO     Past Surgical History:   Procedure Laterality Date   • ABDOMINAL HYSTERECTOMY TOTAL     • APPENDECTOMY     • HERNIA REPAIR     • TONSILLECTOMY     • ZZZ CARDIAC CATH       Social History     Tobacco Use   • Smoking status: Never Smoker   • Smokeless tobacco: Never Used   Substance Use Topics   • Alcohol use: No     Alcohol/week: 0.0 oz     Family and Occupational History     Socioeconomic History   • Marital status:      Spouse name: Not on file   • Number of children: Not on file   • Years of education: Not on file   • Highest education level: Not on file   Occupational History   • Not on file       Objective     Postural Observations  Seated posture: good  Standing posture: good        Active Range of Motion     Lumbar   Flexion: decreased  Extension: within functional limits  Left lateral flexion: decreased  Right lateral flexion: within functional limits  Left rotation: within functional limits  Right rotation: within functional limits    Additional Active Range of Motion Details  Noted pain to the (L) hip and buttock area sidebending (L); also trunk flexion noted strain to the (L) LE    Strength:      Abdominals   Left: 4    Lower extremities   Normal left lower extremity strength  Normal right lower extremity strength    Additional Strength Details  Abdominal testing reaching fingers to knees in supine ; unable to hold <5 seconds    Tests       Lumbar spine (left)      Negative slump.     Left Hip   SLR: Positive.         Therapeutic Exercises (CPT 64867):     1. Knees to chest, 3 x30 sec    2. Pelvic tilts, 1x10 reps      Time-based treatments/modalities:          Assessment, Response and Plan:   Impairments: activity intolerance and pain with function    Assessment details:  The patient is a 89 year old female who presents with signs and symptoms of  radicaular symptoms to the (L) LE down into the foot. She has limited AROM in flexion and (L) sidebending due to increased pain levels. Special tests (+) with SLR's. Weakness to the abdominals 4/5 while testing supine with abdominal test. The patient would benefit from skilled Physical Therapy to work on strength ans conditioning, mobility and flexibility and functional movement patterns  Barriers to therapy:  Age  Goals:   Short Term Goals:   1) Indep with HEP  2) Able to walk her dog longer distances 50% of the time  3) Improvement in mobility by 25% in trunk sidebending and trunk flexion   Short term goal time span:  2-4 weeks      Long Term Goals:    1) Progressions and regressions with HEP  2) Able to go up and downt he steps with less pain by 1-2 grades on VAS  3) Able to get up in the morning and stand without pain     Long term goal time span:  4-6 weeks    Plan:   Therapy options:  Physical therapy treatment to continue  Planned therapy interventions:  E Stim Unattended (CPT 27259), Manual Therapy (CPT 56154), Neuromuscular Re-education (CPT 83157), Therapeutic Activities (CPT 42209), Therapeutic Exercise (CPT 86266) and Functional Training, Self Care (CPT 86783)  Frequency:  2x week  Duration in weeks:  6  Duration in visits:  12  Discussed with:  Patient      Functional Assessment Used  Jeff Brandt Low Back Pain and Disability Score: 29.17     Referring provider co-signature:  I have reviewed this plan of care and my co-signature certifies the need for services.  Certification Dates:   From 10/17/19   To 11/28/19    Physician Signature: ________________________________ Date: ______________

## 2019-10-22 ENCOUNTER — APPOINTMENT (OUTPATIENT)
Dept: PHYSICAL THERAPY | Facility: REHABILITATION | Age: 84
End: 2019-10-22
Attending: PHYSICAL MEDICINE & REHABILITATION
Payer: MEDICARE

## 2019-10-24 ENCOUNTER — PHYSICAL THERAPY (OUTPATIENT)
Dept: PHYSICAL THERAPY | Facility: REHABILITATION | Age: 84
End: 2019-10-24
Attending: PHYSICAL MEDICINE & REHABILITATION
Payer: MEDICARE

## 2019-10-24 DIAGNOSIS — M54.50 LOW BACK PAIN WITH RADIATION: ICD-10-CM

## 2019-10-24 PROCEDURE — 97110 THERAPEUTIC EXERCISES: CPT

## 2019-10-24 NOTE — OP THERAPY DAILY TREATMENT
Outpatient Physical Therapy  DAILY TREATMENT     [facility] Outpatient Physical Therapy [place] [id number] [facility], [address]  [place] [address]  Phone:  [phone]    Date: 10/24/2019    Patient: Dayana Lechuga  YOB: 1929  MRN: [id number]     Time Calculation  Start time: 0955  Stop time: 1030 Time Calculation (min): 35 minutes       Chief Complaint: No chief complaint on file.    Visit #: 2    SUBJECTIVE:  Reports she has been performing her exercises and has been able to get up and move much easier the past few days with less pain. No symptoms     OBJECTIVE:  Current objective measures:   AROM L/S:   Flex:  WFL   Ext: WFL   SB R  Fingertips to knee jt   SB L  Fingertips to knee jt * discomfort right low back  Rot WFL  SLR (L) 45 degrees, + neural tension, hams pull          Therapeutic Exercises (CPT 96479):     1. SKTC, 5x 15 sec. each    2. Post Pelvic tilts, 10x 2 sets, hold 5 sec.    3. L/S rotation, 10x, hooklying    4. Bridging, 10x, 5 sec. hold    5. Hip/knee flex--Ball rolls, 10x 2 sets    Passive hams and calf stretch left LE to tolerance.  HEP issued with all above exs except ham stretch.    Time-based treatments/modalities:  Therapeutic exercise minutes (CPT 19070): 30 minutes       Pain rating before treatment: 3  Pain rating after treatment: 3    ASSESSMENT:   Response to treatment: Patient able to demonstrate good knowledge of current and new added HEP.  Has difficulty w/valsalva when performing pelvic tilts or tightening abs and needs further practice w/breathing properly. Tight hams and calf on left.    PLAN/RECOMMENDATIONS:   Plan for treatment: therapy treatment to continue next visit.  Planned interventions for next visit: continue with current treatment.  Emphasize proper breathing technique with therapetic exs. Issue hamstring stretching.

## 2019-10-29 ENCOUNTER — PHYSICAL THERAPY (OUTPATIENT)
Dept: PHYSICAL THERAPY | Facility: REHABILITATION | Age: 84
End: 2019-10-29
Attending: PHYSICAL MEDICINE & REHABILITATION
Payer: MEDICARE

## 2019-10-29 DIAGNOSIS — M54.50 LOW BACK PAIN WITH RADIATION: ICD-10-CM

## 2019-10-29 PROCEDURE — 97110 THERAPEUTIC EXERCISES: CPT

## 2019-10-29 PROCEDURE — 97140 MANUAL THERAPY 1/> REGIONS: CPT

## 2019-10-29 NOTE — OP THERAPY DAILY TREATMENT
Outpatient Physical Therapy  DAILY TREATMENT     [facility] Outpatient Physical Therapy [place] [address] [facility], [address]  [place] [address]  Phone:  [phone]    Date: 10/29/2019    Patient: Dayana Lechuga  YOB: 1929  MRN: [id number]     Time Calculation  Start time: 1000  Stop time: 1035 Time Calculation (min): 35 minutes       Chief Complaint: Back Problem    Visit #: 3    SUBJECTIVE:  No real changes since beginning therapy.  Been taking gabapentin.  Had increased L leg pain last night that kept her up which is unusual.  No reason for this.      OBJECTIVE:      Therapeutic Exercises (CPT 07387):     1. Sahrmann B hip abd/ER x 20    2. SHANTEL x 10-  added to HEP with Paulo handout and education for extension principles.     3. Bridges 5 sec holds x 10    4. Multifidus press outs pink TB x 20    Therapeutic Treatments and Modalities:     1. Manual Therapy (CPT 24642), Central and L>R unilateral PAs T12-S1 gr 2-4, MFR to L>R paraspinals, gluts, and sacrum    Time-based treatments/modalities:  Manual therapy minutes (CPT 71006): 15 minutes  Therapeutic exercise minutes (CPT 91991): 20 minutes       Pain rating before treatment: 1  Pain rating after treatment: 0    ASSESSMENT:   Response to treatment: Hypertonicity of paraspinals noted L >R.  Decreased multifidus strength B contributes to pain.  Needs cues to slow exercise pace down and to perform with good technique.      PLAN/RECOMMENDATIONS:   Plan for treatment: therapy treatment to continue next visit.  Planned interventions for next visit: continue with current treatment. Progress lumbar stab.

## 2019-10-31 ENCOUNTER — PHYSICAL THERAPY (OUTPATIENT)
Dept: PHYSICAL THERAPY | Facility: REHABILITATION | Age: 84
End: 2019-10-31
Attending: PHYSICAL MEDICINE & REHABILITATION
Payer: MEDICARE

## 2019-10-31 DIAGNOSIS — M54.50 LOW BACK PAIN WITH RADIATION: ICD-10-CM

## 2019-10-31 PROCEDURE — 97140 MANUAL THERAPY 1/> REGIONS: CPT

## 2019-10-31 PROCEDURE — 97014 ELECTRIC STIMULATION THERAPY: CPT

## 2019-10-31 PROCEDURE — 97110 THERAPEUTIC EXERCISES: CPT

## 2019-10-31 NOTE — OP THERAPY DAILY TREATMENT
Outpatient Physical Therapy  DAILY TREATMENT     [facility] Physical Therapy [address] [facility], [address]  [place] [address]  Phone:  [phone]    Date: 10/31/2019    Patient: Dayana Lechuga  YOB: 1929  MRN: [id number]     Time Calculation  Start time: 1035  Stop time: 1125 Time Calculation (min): 50 minutes       Chief Complaint: Back Problem    Visit #: 4    SUBJECTIVE:  Had increased pain yesterday and today.  Midback hurt yesterday.  R sacrum area is the worse.  Played cards for an extended amount of time yesterday.      OBJECTIVE:          Therapeutic Exercises (CPT 48290):     1. Sahrmann B hip abd/ER x 20    2. Reviewed SHANTEL.  States they provide relief.  Reveiwed ext princinples and disc dynmanics for pain control.     3. R Figure 4 stretch and R hip flex/add 15 sec x 5 each    4. Multifidus press outs pink TB x 20// increased L thigh heaviness.  L ankle swelling noted.     Therapeutic Treatments and Modalities:     1. Manual Therapy (CPT 42220), Central and L>R unilateral PAs T12-S1 gr 2-4, MFR to L>R paraspinals, gluts, and sacrum.  MFR to R lateral sacrum.      2. E Stim Unattended (CPT 98744), IFC to L/S with MHP x 15'    Time-based treatments/modalities:  Manual therapy minutes (CPT 76138): 15 minutes  Therapeutic exercise minutes (CPT 04297): 15 minutes       Pain rating before treatment: 5  Pain rating after treatment: 3    ASSESSMENT:   Response to treatment: Hypertonicity on L paraspinals noted.  TTP along R lateral sacral border.  R SI hypermobility contributes to c/o.  Difficult to eliminate pain.      PLAN/RECOMMENDATIONS:   Plan for treatment: therapy treatment to continue next visit.  Planned interventions for next visit: continue with current treatment.

## 2019-11-05 ENCOUNTER — OFFICE VISIT (OUTPATIENT)
Dept: PHYSICAL MEDICINE AND REHAB | Facility: MEDICAL CENTER | Age: 84
End: 2019-11-05
Payer: MEDICARE

## 2019-11-05 ENCOUNTER — PHYSICAL THERAPY (OUTPATIENT)
Dept: PHYSICAL THERAPY | Facility: REHABILITATION | Age: 84
End: 2019-11-05
Attending: PHYSICAL MEDICINE & REHABILITATION
Payer: MEDICARE

## 2019-11-05 VITALS
WEIGHT: 119.71 LBS | TEMPERATURE: 98 F | DIASTOLIC BLOOD PRESSURE: 80 MMHG | HEIGHT: 63 IN | SYSTOLIC BLOOD PRESSURE: 122 MMHG | HEART RATE: 74 BPM | OXYGEN SATURATION: 92 % | BODY MASS INDEX: 21.21 KG/M2

## 2019-11-05 DIAGNOSIS — M48.061 SPINAL STENOSIS OF LUMBAR REGION, UNSPECIFIED WHETHER NEUROGENIC CLAUDICATION PRESENT: ICD-10-CM

## 2019-11-05 DIAGNOSIS — R33.9 URINARY RETENTION: ICD-10-CM

## 2019-11-05 DIAGNOSIS — M43.16 SPONDYLOLISTHESIS OF LUMBAR REGION: ICD-10-CM

## 2019-11-05 DIAGNOSIS — M54.50 LOW BACK PAIN WITH RADIATION: ICD-10-CM

## 2019-11-05 DIAGNOSIS — M51.36 DDD (DEGENERATIVE DISC DISEASE), LUMBAR: ICD-10-CM

## 2019-11-05 PROCEDURE — 97110 THERAPEUTIC EXERCISES: CPT

## 2019-11-05 PROCEDURE — 99214 OFFICE O/P EST MOD 30 MIN: CPT | Performed by: PHYSICAL MEDICINE & REHABILITATION

## 2019-11-05 RX ORDER — GABAPENTIN 100 MG/1
200 CAPSULE ORAL 3 TIMES DAILY
Qty: 180 CAP | Refills: 2 | Status: SHIPPED | OUTPATIENT
Start: 2019-11-05 | End: 2019-12-19

## 2019-11-05 RX ORDER — GABAPENTIN 100 MG/1
200 CAPSULE ORAL 3 TIMES DAILY
Qty: 180 CAP | Refills: 2 | Status: SHIPPED | OUTPATIENT
Start: 2019-11-05 | End: 2019-11-05 | Stop reason: SDUPTHER

## 2019-11-05 ASSESSMENT — PATIENT HEALTH QUESTIONNAIRE - PHQ9: CLINICAL INTERPRETATION OF PHQ2 SCORE: 0

## 2019-11-05 ASSESSMENT — PAIN SCALES - GENERAL: PAINLEVEL: 7=MODERATE-SEVERE PAIN

## 2019-11-05 NOTE — PROGRESS NOTES
Follow-up patient note    Physiatry (physical medicine and  Rehabilitation), interventional spine and sports medicine    Date of Service:11/5/2019    Chief complaint:   Low back and left leg pain     HISTORY    HPI: Dayana Lechuga 89 y.o. female who presents today for evaluation of low back and left leg pain.    She comes in today with her  daughter-in-law, Naila.    She states that she is feeling significantly better taking gabapentin 200mg po tid.  This helps manage her left leg pain.  Overall, she is about 80% better and pain is intermittent.    Physical therpy has helped, but sometimes the pain is worse after PT.  She is talking with her PT about this.  They are making adjustments.    She has seen Urology and is working on strategies including double void, frequent voids, drinking fluids during the day 64 oz and then not after 6PM.  No recent UTIs.    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  This was well-tolerated without worsening of pain.      Medical records review:  I reviewed the note from the referring provider Evi Pepe* dated 08/29/2019  Reviewed records from this visit.  She was given a medrol dose pack    Previous treatments:    Physical Therapy: No    Medications the patient is tried: NSAIDs, Narcotics and gabapentin    Previous interventions: none    Previous surgeries to relieve the above pain:  none      ROS: Unchanged since 10/01/2019  Resp: asthma      Red Flags ROS:   Fever, Chills, Sweats: Denies  Involuntary Weight Loss: Denies  Bladder Incontinence: Denies  Bowel Incontinence: Denies  Saddle Anesthesia: Denies    All other systems reviewed and negative.       PMHx:   Past Medical History:   Diagnosis Date   • Allergy    • Anxiety    • ASTHMA    • CAD (coronary artery disease)     JT to RCA; 70% stenosis in LAD   • Hyperlipidemia    • Hypertension    • Lumbar back pain 9/10/2016   • OSTEOPOROSIS    • PVD (peripheral vascular disease) (HCC)     70% PAD-followed by  Lary AMBROCIO       PSHx:   Past Surgical History:   Procedure Laterality Date   • ABDOMINAL HYSTERECTOMY TOTAL     • APPENDECTOMY     • HERNIA REPAIR     • TONSILLECTOMY     • ZZZ CARDIAC CATH         Family history   Family History   Problem Relation Age of Onset   • Heart Disease Neg Hx    • Heart Failure Neg Hx    • Hyperlipidemia Neg Hx          Medications:   Current Outpatient Medications   Medication   • gabapentin (NEURONTIN) 100 MG Cap   • lisinopril (PRINIVIL) 20 MG Tab   • carvedilol (COREG) 12.5 MG Tab   • amLODIPine (NORVASC) 5 MG Tab   • montelukast (SINGULAIR) 10 MG Tab   • potassium chloride SA (KDUR) 20 MEQ Tab CR   • Biotin w/ Vitamins C & E (HAIR/SKIN/NAILS PO)   • beclomethasone (QVAR) 80 MCG/ACT inhaler   • atorvastatin (LIPITOR) 80 MG tablet   • isosorbide mononitrate SR (IMDUR) 30 MG TABLET SR 24 HR   • nitroglycerin (NITROSTAT) 0.4 MG SL Tab   • fluticasone (FLONASE) 50 MCG/ACT nasal spray   • guaifenesin LA (MUCINEX) 600 MG TABLET SR 12 HR   • ascorbic acid (ASCORBIC ACID) 500 MG Tab   • albuterol 108 (90 Base) MCG/ACT Aero Soln inhalation aerosol   • diphenhydrAMINE (BENADRYL) 25 MG Tab   • VITAMIN E PO   • aspirin (ASA) 81 MG Chew Tab chewable tablet   • Cholecalciferol (VITAMIN D) 2000 UNIT Tab   • Multiple Vitamins-Minerals (CENTRUM SILVER ADULT 50+) Tab   • omeprazole (PRILOSEC) 20 MG delayed-release capsule   • diclofenac EC (VOLTAREN) 75 MG Tablet Delayed Response     No current facility-administered medications for this visit.        Allergies:   Allergies   Allergen Reactions   • Bactrim [Sulfamethoxazole W-Trimethoprim]      Rash   • Pcn [Penicillins] Rash     About 70 years   • Codeine Vomiting       Social Hx:   Social History     Socioeconomic History   • Marital status:      Spouse name: Not on file   • Number of children: Not on file   • Years of education: Not on file   • Highest education level: Not on file   Occupational History   • Not on file   Social Needs   •  "Financial resource strain: Not on file   • Food insecurity:     Worry: Not on file     Inability: Not on file   • Transportation needs:     Medical: Not on file     Non-medical: Not on file   Tobacco Use   • Smoking status: Never Smoker   • Smokeless tobacco: Never Used   Substance and Sexual Activity   • Alcohol use: No     Alcohol/week: 0.0 oz   • Drug use: No   • Sexual activity: Not Currently   Lifestyle   • Physical activity:     Days per week: Not on file     Minutes per session: Not on file   • Stress: Not on file   Relationships   • Social connections:     Talks on phone: Not on file     Gets together: Not on file     Attends Synagogue service: Not on file     Active member of club or organization: Not on file     Attends meetings of clubs or organizations: Not on file     Relationship status: Not on file   • Intimate partner violence:     Fear of current or ex partner: Not on file     Emotionally abused: Not on file     Physically abused: Not on file     Forced sexual activity: Not on file   Other Topics Concern   •  Service No   • Blood Transfusions Yes   • Caffeine Concern No   • Occupational Exposure No   • Hobby Hazards No   • Sleep Concern Yes   • Stress Concern Yes   • Weight Concern No   • Special Diet No   • Back Care No   • Exercise No   • Bike Helmet No   • Seat Belt Yes   • Self-Exams No   Social History Narrative   • Not on file         EXAMINATION     Physical Exam:   Vitals: /80 (BP Location: Left arm, Patient Position: Sitting, BP Cuff Size: Small adult)   Pulse 74   Temp 36.7 °C (98 °F) (Temporal)   Ht 1.6 m (5' 3\")   Wt 54.3 kg (119 lb 11.4 oz)   SpO2 92%     Constitutional:   Body Habitus: Body mass index is 21.21 kg/m².  Cooperation: Fully cooperates with exam  Appearance: Well-groomed, well-nourished, not disheveled, no acute distress    Eyes: No scleral icterus, no proptosis     ENT -no obvious auditory deficits, no obvious tongue lesions, tongue midline, no facial " droop     Skin -no rashes or lesions noted     Respiratory-  breathing comfortable on room air, no audible wheezing    Cardiovascular- capillary refills less than 2 seconds. Trace edema in the left lower extremity    Psychiatric- alert and oriented ×3. Normal affect.     Gait - Mildly antalgic without loss of balance     Musculoskeletal -     Thoracic/Lumbar Spine/Sacral Spine/Hips   Inspection: No evidence of atrophy in bilateral lower extremities throughout     Lumbar spine Special tests  Neuro tension  Seated straight leg test negative bilaterally         Neuro       Key points for the international standards for neurological classification of spinal cord injury (ISNCSCI) to light touch.     Dermatome R L   L2 2 2   L3 2 2   L4 2 2   L5 2 2   S1 2 2   S2 2 2       Motor Exam Lower Extremities    ? Myotome R L   Hip flexion L2 5 5   Knee extension L3 5 5   Ankle dorsiflexion L4 5 5   Toe extension L5 5 5   Ankle plantarflexion S1 5 5     Clonus of the ankle negative bilaterally     Reflexes  ?  R L   Patella  2+ 2+   Achilles   2+ 2+       MEDICAL DECISION MAKING    Medical records review: see under HPI section.     DATA    Labs:   Lab Results   Component Value Date/Time    SODIUM 130 (L) 09/11/2019 06:40 PM    POTASSIUM 4.5 09/11/2019 06:40 PM    CHLORIDE 101 09/11/2019 06:40 PM    CO2 20 09/11/2019 06:40 PM    ANION 9.0 09/11/2019 06:40 PM    GLUCOSE 111 (H) 09/11/2019 06:40 PM    BUN 22 09/11/2019 06:40 PM    CREATININE 0.91 09/11/2019 06:40 PM    CALCIUM 9.7 09/11/2019 06:40 PM    ASTSGOT 18 08/09/2019 09:30 AM    ALTSGPT 18 08/09/2019 09:30 AM    TBILIRUBIN 0.7 08/09/2019 09:30 AM    ALBUMIN 4.2 08/09/2019 09:30 AM    TOTPROTEIN 6.6 08/09/2019 09:30 AM    GLOBULIN 2.4 08/09/2019 09:30 AM    AGRATIO 1.8 08/09/2019 09:30 AM       Lab Results   Component Value Date/Time    PROTHROMBTM 12.5 09/10/2016 04:00 PM    INR 0.91 09/10/2016 04:00 PM        Lab Results   Component Value Date/Time    WBC 5.8 08/09/2019  09:30 AM    RBC 3.74 (L) 08/09/2019 09:30 AM    HEMOGLOBIN 12.2 08/09/2019 09:30 AM    HEMATOCRIT 35.8 (L) 08/09/2019 09:30 AM    MCV 95.7 08/09/2019 09:30 AM    MCH 32.6 08/09/2019 09:30 AM    MCHC 34.1 08/09/2019 09:30 AM    MPV 9.5 08/09/2019 09:30 AM    NEUTSPOLYS 55.70 08/09/2019 09:30 AM    LYMPHOCYTES 28.50 08/09/2019 09:30 AM    MONOCYTES 11.80 08/09/2019 09:30 AM    EOSINOPHILS 2.40 08/09/2019 09:30 AM    BASOPHILS 1.40 08/09/2019 09:30 AM        No results found for: HBA1C     Imaging: I personally reviewed following images, these are my reads  MRI lumbar spine 09/18/2019  At L2-3, there is a mild disc bulge.  There is moderate ligamentum flavum hypertrophy.  There is no central canal stenosis at this level.  There is mild left foraminal narrowing.  At L3-4, there is a diffuse disc bulge with mild foraminal stenosis bilaterally there is ligamentum flavum hypertrophy and facet hypertrophy with mild central canal stenosis  At L4-5, there is left lateral listhesis and grade 2 anterolisthesis.  There is also severe facet arthropathy and omentum flavum hypertrophy.  There is moderate central stenosis at this level.  There is moderate right and mild left foraminal narrowing.  At L5-S1, there is a diffuse disc bulge and moderate facet hypertrophy.  There is mild foraminal stenosis on the right and severe foraminal loss on the left.    CT pelvis 08/09/2019  There is note of DDD and facet arthropathy in the lumbar spine.  Bladder is distended.  No evidence of fracture in the pelvis or femurs    IMAGING radiology reads. I reviewed the following radiology reads   MRI lumbar spine 09/19/2019         Severe L4/5 facet arthropathy and degenerative these results in right lateral listhesis and nearly grade 2 anterolisthesis    Moderate-severe central stenosis L4/5. Lesser stenoses at other levels as detailed above    Greatest foraminal stenosis is moderate-severe on the left at L5/S1         CT pelvis 09/18/2019  1.   There is no acute fracture of the pelvis or proximal femurs.  2.  There is degenerative disc disease and arthropathy in the lower lumbar spine with bilateral areas of neural foraminal narrowing but no canal stenosis.  3.  There is mild degenerative change in both hips.                                    Results for orders placed during the hospital encounter of 08/09/19   DX-PELVIS-1 OR 2 VIEWS    Impression 1.  There is no acute fracture or malalignment.  2.  The bones are osteopenic.                                       Diagnosis   Visit Diagnoses     ICD-10-CM   1. Low back pain with radiation M54.40   2. Spinal stenosis of lumbar region, unspecified whether neurogenic claudication present M48.061   3. DDD (degenerative disc disease), lumbar M51.36   4. Urinary retention R33.9   5. Spondylolisthesis of lumbar region M43.16           ASSESSMENT:  Dayana Lechuga 89 y.o. female with low back pain     Dayana was seen today for follow-up.    Diagnoses and all orders for this visit:    Low back pain with radiation  -     Discontinue: gabapentin (NEURONTIN) 100 MG Cap; Take 2 Caps by mouth 3 times a day for 90 days.  -     gabapentin (NEURONTIN) 100 MG Cap; Take 2 Caps by mouth 3 times a day for 90 days.    Spinal stenosis of lumbar region, unspecified whether neurogenic claudication present  -     Discontinue: gabapentin (NEURONTIN) 100 MG Cap; Take 2 Caps by mouth 3 times a day for 90 days.  -     gabapentin (NEURONTIN) 100 MG Cap; Take 2 Caps by mouth 3 times a day for 90 days.    DDD (degenerative disc disease), lumbar    Urinary retention    Spondylolisthesis of lumbar region       1. Continue gabapentin 200mg po tid.  This seems to be adequate.  2. Conitnue physical therapy  3. Reviewed recommendations from Urology.  Encouarged her to follow-up with this and continue with these recommendations.  4. Plan to continue with current PT and medication program.  She will transition to home program when PT is  completed.    5. Reassess in 3 months.  We will consider weaning gabapentin at that time.    Follow-up: Return in about 3 months (around 2/5/2020), or if symptoms worsen or fail to improve.        Please note that this dictation was created using voice recognition software. I have made every reasonable attempt to correct obvious errors but there may be errors of grammar and content that I may have overlooked prior to finalization of this note.      Torres Fall MD  Physical Medicine and Rehabilitation  Interventional Spine and Sports Physiatry  [facility]

## 2019-11-07 ENCOUNTER — PHYSICAL THERAPY (OUTPATIENT)
Dept: PHYSICAL THERAPY | Facility: REHABILITATION | Age: 84
End: 2019-11-07
Attending: PHYSICAL MEDICINE & REHABILITATION
Payer: MEDICARE

## 2019-11-07 DIAGNOSIS — M54.50 LOW BACK PAIN WITH RADIATION: ICD-10-CM

## 2019-11-07 PROCEDURE — 97110 THERAPEUTIC EXERCISES: CPT

## 2019-11-07 NOTE — OP THERAPY DAILY TREATMENT
Outpatient Physical Therapy  DAILY TREATMENT     [facility] [address], [address]  [place] [address]  Phone:  [phone]    Date: 11/07/2019    Patient: Dayana Lechuga  YOB: 1929  MRN: [id number]     Time Calculation  Start time: 1000  Stop time: 1030 Time Calculation (min): 30 minutes       Chief Complaint: Back Problem    Visit #: 9    SUBJECTIVE:  Saw MD.  She increased gabapentin at night to increase coverage time.  Not sure if back feels any better.    OBJECTIVE:      Therapeutic Exercises (CPT 44426):     1. SKTC, 5x 10sec. each    2. Post Pelvic tilts x 20    3. L/S rotation with ball and in hooklying, x 15    4. Sahrmann B hip abd/ER x 15    5. Hip/knee flex--Ball rolls x 20    6. Standing B hip flex, ext, abd, minisquats x 10    Therapeutic Treatments and Modalities:     1. Manual Therapy (CPT 36178), Central and L>R unilateral PAs T12-S1 gr 2-4, MFR to L>R paraspinals, gluts, and sacrum    Time-based treatments/modalities:  Manual therapy minutes (CPT 46488): 5 minutes  Therapeutic exercise minutes (CPT 26424): 25 minutes        Pain rating before treatment: 0  Pain rating after treatment: 0    ASSESSMENT:   Response to treatment: Able to progress repetitions without increasing discomfort.  Needs cueing to perform standing exercises with appropriate speed and lumbar stability.  Tends to move to quickly with these due to glut weakness.     PLAN/RECOMMENDATIONS:   Plan for treatment: therapy treatment to continue next visit.  Planned interventions for next visit: continue with current treatment.

## 2019-11-12 ENCOUNTER — PHYSICAL THERAPY (OUTPATIENT)
Dept: PHYSICAL THERAPY | Facility: REHABILITATION | Age: 84
End: 2019-11-12
Attending: PHYSICAL MEDICINE & REHABILITATION
Payer: MEDICARE

## 2019-11-12 DIAGNOSIS — M54.50 LOW BACK PAIN WITH RADIATION: ICD-10-CM

## 2019-11-12 PROCEDURE — 97110 THERAPEUTIC EXERCISES: CPT

## 2019-11-12 NOTE — OP THERAPY DAILY TREATMENT
Outpatient Physical Therapy  DAILY TREATMENT     [facility] [address] [facility], [address]  [place] [address]  Phone:  [phone]    Date: 11/12/2019    Patient: Dayana Lechuga  YOB: 1929  MRN: [id number]     Time Calculation  Start time: 1045  Stop time: 1120 Time Calculation (min): 35 minutes       Chief Complaint: Back Problem    Visit #: 7    SUBJECTIVE:  Not sure if anything is improving.  Woke up late and didn't do exercise routine.  10 minutes late due to .      OBJECTIVE:          Therapeutic Exercises (CPT 35786):     1. B SLR x 20    2. Post Pelvic tilts x 20    3. L/S rotation with ball x 20    4. B LAQs x 20    5. Hip/knee flex--Ball rolls x 20    6. Standing B hip flex, ext, abd, minisquats x 10    Therapeutic Treatments and Modalities:     1. Manual Therapy (CPT 13554), Central and L>R unilateral PAs T12-S1 gr 2-4, MFR to L>R paraspinals, gluts, and sacrum    Time-based treatments/modalities:  Manual therapy minutes (CPT 74568): 10 minutes  Therapeutic exercise minutes (CPT 63728): 25 minutes       Pain rating before treatment: 0  Pain rating after treatment: 0    ASSESSMENT:   Response to treatment: B ant thighs fatigued with above requiring seated rests.      PLAN/RECOMMENDATIONS:   Plan for treatment: therapy treatment to continue next visit.  Planned interventions for next visit: continue with current treatment. Continue 1-2 more sessions.

## 2019-11-13 RX ORDER — BECLOMETHASONE DIPROPIONATE HFA 80 UG/1
AEROSOL, METERED RESPIRATORY (INHALATION)
Qty: 10.6 G | Refills: 0 | Status: SHIPPED | OUTPATIENT
Start: 2019-11-13 | End: 2020-01-10

## 2019-11-14 ENCOUNTER — PHYSICAL THERAPY (OUTPATIENT)
Dept: PHYSICAL THERAPY | Facility: REHABILITATION | Age: 84
End: 2019-11-14
Attending: PHYSICAL MEDICINE & REHABILITATION
Payer: MEDICARE

## 2019-11-14 DIAGNOSIS — M54.50 LOW BACK PAIN WITH RADIATION: ICD-10-CM

## 2019-11-14 PROCEDURE — 97110 THERAPEUTIC EXERCISES: CPT

## 2019-11-14 RX ORDER — POTASSIUM CHLORIDE 20 MEQ/1
TABLET, EXTENDED RELEASE ORAL
Qty: 200 TAB | Refills: 0 | Status: SHIPPED
Start: 2019-11-14 | End: 2019-12-19

## 2019-11-14 NOTE — OP THERAPY DAILY TREATMENT
Outpatient Physical Therapy  DAILY TREATMENT     [facility] [address], [address]  [place] [address]  Phone:  [phone]    Date: 11/14/2019    Patient: Dayana Lechuga  YOB: 1929  MRN: [id number]     Time Calculation  Start time: 1000  Stop time: 1030 Time Calculation (min): 30 minutes       Chief Complaint: No chief complaint on file.    Visit #: 8    SUBJECTIVE:  Most of the time back and leg pain are much improved but continues to have bouts of intense L leg pain.  Had a bout last night that kept up for 2 hours.  Overall, feels better and can walk dog and do stairs and daily routine with     OBJECTIVE:  Current objective measures: AROM L/S: full and pain free for flex, ext, B SB with some pull in B HS but no LBP or glut pain.    Jeff Brandt Low Back Pain and Disability Score: 25       Therapeutic Exercises (CPT 19349):     1. B SLR, hip abd x 20    2. Post Pelvic tilts with Kegal squeeze x 20    3. L/S rotation with ball x 20    4. Standing B hip flex, ext, abd, minisquats x 10    5. Hip/knee flex--Ball rolls x 20, Handout given for all. Discussed HEP expectations.       Time-based treatments/modalities:  Therapeutic exercise minutes (CPT 02727): 30 minutes       Pain rating before treatment: 0  Pain rating after treatment: 0    ASSESSMENT:   Response to treatment: Pt is satisfied with gains at this point.  All LTGs met but L leg pain intermittently persists.      PLAN/RECOMMENDATIONS:   Plan for treatment: discharge patient due to accomplished goals.

## 2019-11-19 ENCOUNTER — APPOINTMENT (OUTPATIENT)
Dept: PHYSICAL THERAPY | Facility: REHABILITATION | Age: 84
End: 2019-11-19
Attending: PHYSICAL MEDICINE & REHABILITATION
Payer: MEDICARE

## 2019-11-21 ENCOUNTER — OFFICE VISIT (OUTPATIENT)
Dept: MEDICAL GROUP | Facility: PHYSICIAN GROUP | Age: 84
End: 2019-11-21
Payer: MEDICARE

## 2019-11-21 VITALS
HEIGHT: 63 IN | DIASTOLIC BLOOD PRESSURE: 62 MMHG | WEIGHT: 120.8 LBS | SYSTOLIC BLOOD PRESSURE: 136 MMHG | RESPIRATION RATE: 16 BRPM | HEART RATE: 76 BPM | TEMPERATURE: 98.6 F | OXYGEN SATURATION: 91 % | BODY MASS INDEX: 21.4 KG/M2

## 2019-11-21 DIAGNOSIS — F41.9 ANXIETY: ICD-10-CM

## 2019-11-21 DIAGNOSIS — F13.20 BENZODIAZEPINE DEPENDENCE (HCC): ICD-10-CM

## 2019-11-21 PROCEDURE — 99214 OFFICE O/P EST MOD 30 MIN: CPT | Performed by: PHYSICIAN ASSISTANT

## 2019-11-21 RX ORDER — LORAZEPAM 0.5 MG/1
TABLET ORAL
Qty: 90 TAB | Refills: 2 | Status: SHIPPED | OUTPATIENT
Start: 2019-12-11 | End: 2019-12-19

## 2019-11-21 NOTE — PROGRESS NOTES
Chief Complaint   Patient presents with   • Anxiety     refill ativan        HISTORY OF PRESENT ILLNESS: Dayana Lechuga is an established 89 y.o. female here to discuss the evaluation and management of:    Patient is a pleasant 89-year-old female here today requesting refill for Ativan 0.5 mg 3 times daily.  She tells me that she takes 1 tablet every a.m. by mouth and 2 tablets by mouth prior to bedtime.  Patient's PCP manages medication.  Per chart review patient's PCP is in the process of slowly weaning patient off of Ativan.  Patient has been at current dosage since 3/5/2019.  Patient states she would like to continue current dosage until she is able to see her PCP to discuss the next steps.  Patient is with her daughter during today's appointment.  Daughter is inquiring why patient cannot stay on current dosage.  Discussed risk, lipids, and alternative treatment options with daughter and patient.  Patient denies alcohol use.  Denies illicit drug use.  States she takes medication compliantly.  States she has been on Ativan since 2006.  Daughter states patient was placed on medication in 2006 to help with anxiety and sleep deprivation.  Daughter states patient was living in a home in [place] by herself and was anxious about living alone.  She tells me last year patient moved into an independent living facility and is surrounded by people.  Patient tells me since starting Ativan she falls asleep easily and stays asleep.  Patient denies side or suicidal ideation.       Patient Active Problem List    Diagnosis Date Noted   • Popliteal artery stenosis, right ([facility]) 11/28/2018     Priority: Medium   • Claudication of right lower extremity ([facility]) 11/07/2018     Priority: Medium   • Coronary artery disease involving native coronary artery of native heart with angina pectoris ([facility]) 08/10/2016     Priority: Medium   • Dyslipidemia 10/31/2009     Priority: Medium   • HTN (hypertension) 10/02/2009     Priority: Medium   •  Acute bilateral low back pain with bilateral sciatica 08/29/2019     Priority: Low   • Dysuria 06/27/2019     Priority: Low   • Diarrhea 06/27/2019     Priority: Low   • Slow transit constipation 08/29/2018     Priority: Low   • S/P bilateral cataract extraction 02/27/2018     Priority: Low   • Benzodiazepine dependence (HCC) 11/13/2017     Priority: Low   • Anxiety 06/22/2016     Priority: Low   • GERD (gastroesophageal reflux disease) 10/31/2009     Priority: Low   • Osteopenia 10/31/2009     Priority: Low   • Mild intermittent asthma with acute exacerbation 10/02/2009     Priority: Low   • Spinal stenosis of lumbar region with neurogenic claudication 09/23/2019   • Urinary retention 09/11/2019       Allergies:Bactrim [sulfamethoxazole w-trimethoprim]; Pcn [penicillins]; and Codeine    Current Outpatient Medications   Medication Sig Dispense Refill   • [START ON 12/11/2019] LORazepam (ATIVAN) 0.5 MG Tab Take 0.5 mg orally in the afternoon. Take 1 mg orally every night. 90 Tab 2   • potassium chloride SA (KDUR) 20 MEQ Tab CR TAKE ONE TABLET BY MOUTH TWICE DAILY 200 Tab 0   • QVAR REDIHALER 80 MCG/ACT inhaler INHALE ONE PUFF BY MOUTH TWICE DAILY  10.6 g 0   • gabapentin (NEURONTIN) 100 MG Cap Take 2 Caps by mouth 3 times a day for 90 days. 180 Cap 2   • lisinopril (PRINIVIL) 20 MG Tab TAKE ONE TABLET BY MOUTH ONE TIME DAILY  100 Tab 0   • carvedilol (COREG) 12.5 MG Tab TAKE ONE TABLET BY MOUTH TWICE DAILY WITH MEALS 180 Tab 0   • amLODIPine (NORVASC) 5 MG Tab TAKE ONE TABLET BY MOUTH ONE TIME DAILY  90 Tab 0   • diclofenac EC (VOLTAREN) 75 MG Tablet Delayed Response Take 1 Tab by mouth 2 times a day. (Patient not taking: Reported on 10/7/2019) 60 Tab 0   • montelukast (SINGULAIR) 10 MG Tab Take 1 Tab by mouth every day. 90 Tab 3   • Biotin w/ Vitamins C & E (HAIR/SKIN/NAILS PO) Take  by mouth.     • atorvastatin (LIPITOR) 80 MG tablet Take 1 Tab by mouth every evening. 100 Tab 2   • isosorbide mononitrate SR (IMDUR)  30 MG TABLET SR 24 HR Take 1 Tab by mouth every evening. 90 Tab 3   • nitroglycerin (NITROSTAT) 0.4 MG SL Tab Place 1 tab under tongue every 5 min as needed for chest pain max 3 doses 100 Tab 0   • fluticasone (FLONASE) 50 MCG/ACT nasal spray USE 1 SPRAY IN EACH NOSTRIL DAILY 32 g 0   • guaifenesin LA (MUCINEX) 600 MG TABLET SR 12 HR Take 600 mg by mouth every 12 hours.     • ascorbic acid (ASCORBIC ACID) 500 MG Tab Take 500 mg by mouth every day.     • albuterol 108 (90 Base) MCG/ACT Aero Soln inhalation aerosol Inhale 2 Puffs by mouth every 6 hours as needed for Shortness of Breath. 20.1 g 0   • diphenhydrAMINE (BENADRYL) 25 MG Tab Take 25 mg by mouth every 6 hours as needed for Sleep.     • VITAMIN E PO Take  by mouth.     • aspirin (ASA) 81 MG Chew Tab chewable tablet Take 1 Tab by mouth every day. 100 Tab 11   • Cholecalciferol (VITAMIN D) 2000 UNIT Tab Take 2,000 Units by mouth every day.     • Multiple Vitamins-Minerals (CENTRUM SILVER ADULT 50+) Tab Take 1 Tab by mouth every day.     • omeprazole (PRILOSEC) 20 MG delayed-release capsule Take 20 mg by mouth 2 times a day.       No current facility-administered medications for this visit.        Social History     Tobacco Use   • Smoking status: Never Smoker   • Smokeless tobacco: Never Used   Substance Use Topics   • Alcohol use: No     Alcohol/week: 0.0 oz   • Drug use: No       Family Status   Relation Name Status   • Neg Hx  (Not Specified)     Family History   Problem Relation Age of Onset   • Heart Disease Neg Hx    • Heart Failure Neg Hx    • Hyperlipidemia Neg Hx        ROS:  Review of Systems   Constitutional: Negative for fever, chills, weight loss and malaise/fatigue.   HENT: Negative for ear pain, nosebleeds, congestion, sore throat and neck pain.    Eyes: Negative for blurred vision.   Respiratory: Negative for cough, sputum production, shortness of breath and wheezing.    Cardiovascular: Negative for chest pain, palpitations, orthopnea and leg  "swelling.   Gastrointestinal: Negative for heartburn, nausea, vomiting and abdominal pain.   Genitourinary: Negative for dysuria, urgency and frequency.   Musculoskeletal: Negative for myalgias, back pain and joint pain.   Skin: Negative for rash and itching.   Neurological: Negative for dizziness, tingling, tremors, sensory change, focal weakness and headaches.   Endo/Heme/Allergies: Does not bruise/bleed easily.   Psychiatric/Behavioral: Negative for depression, suicidal ideas and memory loss.  The patient is nervous/anxious and does have insomnia.    All other systems reviewed and are negative except as in HPI.    Exam: /62 (BP Location: Left arm, Patient Position: Sitting)   Pulse 76   Temp 37 °C (98.6 °F) (Temporal)   Resp 16   Ht 1.6 m (5' 3\")   Wt 54.8 kg (120 lb 12.8 oz)   SpO2 91%  Body mass index is 21.4 kg/m².  General: Normal appearing. No distress.  HEENT: Normocephalic. Eyes conjunctiva clear lids without ptosis, ears normal shape and contour.  Neck: Supple without JVD. Thyroid is not enlarged.  Pulmonary: Clear to ausculation.  Normal effort. No rales, ronchi, or wheezing.  Cardiovascular: Regular rate and rhythm without murmur.   Abdomen: Nondistended.  Neurologic: Grossly nonfocal.  Cranial nerves are normal.   Skin: Warm and dry.  No rashes or suspicious skin lesions.  Musculoskeletal: Normal gait. No extremity cyanosis, clubbing, or edema.  Psych: Normal mood and affect. Alert and oriented x3. Judgment and insight is normal.    Medical decision-making and discussion:  1. Anxiety  2. Benzodiazepine dependence (HCC)  Faridaxcchristiano reviewed and last refill was on 11/13/2019.  Patient's arm assay was contacted and patient has no more refills.  Patient is here today requesting refill.  Patient was provided a prescription with 2 refills.  First prescription was dated to be filled on 12/11/2019.  Advised patient to take prescription to pharmacy.    Discussed risks, benefits, and alternative " treatment options with patient.  Patient is in the process of weaning slowly off of Ativan.  She is working with her PCP.  Patient will follow-up on 2/20/2019 to discuss this in more detail with her PCP.    Advised patient to not drink alcohol, combine other sedating medications, or operate heavy machinery while taking prescribe medication.  Patient verbally consented that she take medication as prescribed.      - LORazepam (ATIVAN) 0.5 MG Tab; Take 0.5 mg orally in the afternoon. Take 1 mg orally every night.  Dispense: 90 Tab; Refill: 2      Please note that this dictation was created using voice recognition software. I have made every reasonable attempt to correct obvious errors, but I expect that there are errors of grammar and possibly content that I did not discover before finalizing the note.    Assessment/Plan:  1. Anxiety  LORazepam (ATIVAN) 0.5 MG Tab       No follow-ups on file.

## 2019-12-19 ENCOUNTER — APPOINTMENT (OUTPATIENT)
Dept: RADIOLOGY | Facility: MEDICAL CENTER | Age: 84
End: 2019-12-19
Attending: EMERGENCY MEDICINE
Payer: MEDICARE

## 2019-12-19 ENCOUNTER — APPOINTMENT (OUTPATIENT)
Dept: RADIOLOGY | Facility: MEDICAL CENTER | Age: 84
End: 2019-12-19
Payer: MEDICARE

## 2019-12-19 ENCOUNTER — HOSPITAL ENCOUNTER (OUTPATIENT)
Facility: MEDICAL CENTER | Age: 84
End: 2019-12-20
Attending: EMERGENCY MEDICINE | Admitting: INTERNAL MEDICINE
Payer: MEDICARE

## 2019-12-19 DIAGNOSIS — I25.118 CORONARY ARTERY DISEASE OF NATIVE ARTERY OF NATIVE HEART WITH STABLE ANGINA PECTORIS (HCC): ICD-10-CM

## 2019-12-19 DIAGNOSIS — R07.2 PRECORDIAL PAIN: ICD-10-CM

## 2019-12-19 LAB
ALBUMIN SERPL BCP-MCNC: 4.5 G/DL (ref 3.2–4.9)
ALBUMIN/GLOB SERPL: 1.5 G/DL
ALP SERPL-CCNC: 69 U/L (ref 30–99)
ALT SERPL-CCNC: 22 U/L (ref 2–50)
ANION GAP SERPL CALC-SCNC: 8 MMOL/L (ref 0–11.9)
AST SERPL-CCNC: 22 U/L (ref 12–45)
BASOPHILS # BLD AUTO: 1.4 % (ref 0–1.8)
BASOPHILS # BLD: 0.11 K/UL (ref 0–0.12)
BILIRUB SERPL-MCNC: 0.5 MG/DL (ref 0.1–1.5)
BUN SERPL-MCNC: 13 MG/DL (ref 8–22)
CALCIUM SERPL-MCNC: 10.4 MG/DL (ref 8.5–10.5)
CHLORIDE SERPL-SCNC: 105 MMOL/L (ref 96–112)
CO2 SERPL-SCNC: 23 MMOL/L (ref 20–33)
CREAT SERPL-MCNC: 0.61 MG/DL (ref 0.5–1.4)
EKG IMPRESSION: NORMAL
EKG IMPRESSION: NORMAL
EOSINOPHIL # BLD AUTO: 0.29 K/UL (ref 0–0.51)
EOSINOPHIL NFR BLD: 3.8 % (ref 0–6.9)
ERYTHROCYTE [DISTWIDTH] IN BLOOD BY AUTOMATED COUNT: 44.1 FL (ref 35.9–50)
GLOBULIN SER CALC-MCNC: 3.1 G/DL (ref 1.9–3.5)
GLUCOSE SERPL-MCNC: 112 MG/DL (ref 65–99)
HCT VFR BLD AUTO: 41.1 % (ref 37–47)
HGB BLD-MCNC: 13.7 G/DL (ref 12–16)
IMM GRANULOCYTES # BLD AUTO: 0.02 K/UL (ref 0–0.11)
IMM GRANULOCYTES NFR BLD AUTO: 0.3 % (ref 0–0.9)
LYMPHOCYTES # BLD AUTO: 1.83 K/UL (ref 1–4.8)
LYMPHOCYTES NFR BLD: 24.1 % (ref 22–41)
MCH RBC QN AUTO: 32.4 PG (ref 27–33)
MCHC RBC AUTO-ENTMCNC: 33.3 G/DL (ref 33.6–35)
MCV RBC AUTO: 97.2 FL (ref 81.4–97.8)
MONOCYTES # BLD AUTO: 0.77 K/UL (ref 0–0.85)
MONOCYTES NFR BLD AUTO: 10.1 % (ref 0–13.4)
NEUTROPHILS # BLD AUTO: 4.57 K/UL (ref 2–7.15)
NEUTROPHILS NFR BLD: 60.3 % (ref 44–72)
NRBC # BLD AUTO: 0 K/UL
NRBC BLD-RTO: 0 /100 WBC
PLATELET # BLD AUTO: 241 K/UL (ref 164–446)
PMV BLD AUTO: 9.6 FL (ref 9–12.9)
POTASSIUM SERPL-SCNC: 3.9 MMOL/L (ref 3.6–5.5)
PROT SERPL-MCNC: 7.6 G/DL (ref 6–8.2)
RBC # BLD AUTO: 4.23 M/UL (ref 4.2–5.4)
SODIUM SERPL-SCNC: 136 MMOL/L (ref 135–145)
TROPONIN T SERPL-MCNC: 11 NG/L (ref 6–19)
TROPONIN T SERPL-MCNC: 12 NG/L (ref 6–19)
WBC # BLD AUTO: 7.6 K/UL (ref 4.8–10.8)

## 2019-12-19 PROCEDURE — 700102 HCHG RX REV CODE 250 W/ 637 OVERRIDE(OP): Performed by: FAMILY MEDICINE

## 2019-12-19 PROCEDURE — 80053 COMPREHEN METABOLIC PANEL: CPT

## 2019-12-19 PROCEDURE — 36415 COLL VENOUS BLD VENIPUNCTURE: CPT

## 2019-12-19 PROCEDURE — A9270 NON-COVERED ITEM OR SERVICE: HCPCS | Performed by: INTERNAL MEDICINE

## 2019-12-19 PROCEDURE — 93005 ELECTROCARDIOGRAM TRACING: CPT

## 2019-12-19 PROCEDURE — 99220 PR INITIAL OBSERVATION CARE,LEVL III: CPT | Performed by: INTERNAL MEDICINE

## 2019-12-19 PROCEDURE — G0378 HOSPITAL OBSERVATION PER HR: HCPCS

## 2019-12-19 PROCEDURE — 700102 HCHG RX REV CODE 250 W/ 637 OVERRIDE(OP): Performed by: INTERNAL MEDICINE

## 2019-12-19 PROCEDURE — A9270 NON-COVERED ITEM OR SERVICE: HCPCS | Performed by: FAMILY MEDICINE

## 2019-12-19 PROCEDURE — 99285 EMERGENCY DEPT VISIT HI MDM: CPT

## 2019-12-19 PROCEDURE — 93005 ELECTROCARDIOGRAM TRACING: CPT | Performed by: EMERGENCY MEDICINE

## 2019-12-19 PROCEDURE — 85025 COMPLETE CBC W/AUTO DIFF WBC: CPT

## 2019-12-19 PROCEDURE — 93005 ELECTROCARDIOGRAM TRACING: CPT | Performed by: INTERNAL MEDICINE

## 2019-12-19 PROCEDURE — 84484 ASSAY OF TROPONIN QUANT: CPT

## 2019-12-19 PROCEDURE — 71045 X-RAY EXAM CHEST 1 VIEW: CPT

## 2019-12-19 RX ORDER — AMOXICILLIN 250 MG
2 CAPSULE ORAL 2 TIMES DAILY
Status: DISCONTINUED | OUTPATIENT
Start: 2019-12-19 | End: 2019-12-20 | Stop reason: HOSPADM

## 2019-12-19 RX ORDER — LISINOPRIL 20 MG/1
20 TABLET ORAL
Status: DISCONTINUED | OUTPATIENT
Start: 2019-12-20 | End: 2019-12-20 | Stop reason: HOSPADM

## 2019-12-19 RX ORDER — ATORVASTATIN CALCIUM 80 MG/1
80 TABLET, FILM COATED ORAL EVERY EVENING
Status: DISCONTINUED | OUTPATIENT
Start: 2019-12-19 | End: 2019-12-20

## 2019-12-19 RX ORDER — GABAPENTIN 100 MG/1
200 CAPSULE ORAL 3 TIMES DAILY
COMMUNITY
End: 2020-02-20 | Stop reason: SDUPTHER

## 2019-12-19 RX ORDER — ASPIRIN 81 MG/1
81 TABLET, CHEWABLE ORAL DAILY
Status: DISCONTINUED | OUTPATIENT
Start: 2019-12-20 | End: 2019-12-19

## 2019-12-19 RX ORDER — ACETAMINOPHEN 325 MG/1
650 TABLET ORAL EVERY 6 HOURS PRN
Status: DISCONTINUED | OUTPATIENT
Start: 2019-12-19 | End: 2019-12-20 | Stop reason: HOSPADM

## 2019-12-19 RX ORDER — POLYETHYLENE GLYCOL 3350 17 G/17G
1 POWDER, FOR SOLUTION ORAL
Status: DISCONTINUED | OUTPATIENT
Start: 2019-12-19 | End: 2019-12-20 | Stop reason: HOSPADM

## 2019-12-19 RX ORDER — BISACODYL 10 MG
10 SUPPOSITORY, RECTAL RECTAL
Status: DISCONTINUED | OUTPATIENT
Start: 2019-12-19 | End: 2019-12-20 | Stop reason: HOSPADM

## 2019-12-19 RX ORDER — HYDRALAZINE HYDROCHLORIDE 20 MG/ML
10 INJECTION INTRAMUSCULAR; INTRAVENOUS EVERY 4 HOURS PRN
Status: DISCONTINUED | OUTPATIENT
Start: 2019-12-19 | End: 2019-12-20 | Stop reason: HOSPADM

## 2019-12-19 RX ORDER — LORAZEPAM 0.5 MG/1
.5-1 TABLET ORAL 2 TIMES DAILY
COMMUNITY
End: 2020-02-20 | Stop reason: SDUPTHER

## 2019-12-19 RX ORDER — LORAZEPAM 0.5 MG/1
0.5 TABLET ORAL NIGHTLY
Status: DISCONTINUED | OUTPATIENT
Start: 2019-12-19 | End: 2019-12-19

## 2019-12-19 RX ORDER — MONTELUKAST SODIUM 10 MG/1
10 TABLET ORAL DAILY
COMMUNITY
End: 2020-06-30

## 2019-12-19 RX ORDER — AMLODIPINE BESYLATE 5 MG/1
5 TABLET ORAL
Status: DISCONTINUED | OUTPATIENT
Start: 2019-12-20 | End: 2019-12-20 | Stop reason: HOSPADM

## 2019-12-19 RX ORDER — LISINOPRIL 20 MG/1
20 TABLET ORAL DAILY
COMMUNITY
End: 2020-01-10

## 2019-12-19 RX ORDER — LORAZEPAM 1 MG/1
1 TABLET ORAL NIGHTLY
Status: DISCONTINUED | OUTPATIENT
Start: 2019-12-19 | End: 2019-12-20 | Stop reason: HOSPADM

## 2019-12-19 RX ORDER — ALBUTEROL SULFATE 90 UG/1
2 AEROSOL, METERED RESPIRATORY (INHALATION) EVERY 6 HOURS PRN
Status: DISCONTINUED | OUTPATIENT
Start: 2019-12-19 | End: 2019-12-20 | Stop reason: HOSPADM

## 2019-12-19 RX ORDER — ASPIRIN 325 MG
325 TABLET ORAL DAILY
Status: DISCONTINUED | OUTPATIENT
Start: 2019-12-20 | End: 2019-12-20

## 2019-12-19 RX ORDER — GABAPENTIN 100 MG/1
200 CAPSULE ORAL 3 TIMES DAILY
Status: DISCONTINUED | OUTPATIENT
Start: 2019-12-19 | End: 2019-12-20 | Stop reason: HOSPADM

## 2019-12-19 RX ORDER — CARVEDILOL 12.5 MG/1
12.5 TABLET ORAL 2 TIMES DAILY WITH MEALS
Status: DISCONTINUED | OUTPATIENT
Start: 2019-12-19 | End: 2019-12-20 | Stop reason: HOSPADM

## 2019-12-19 RX ORDER — ASPIRIN 81 MG/1
324 TABLET, CHEWABLE ORAL DAILY
Status: DISCONTINUED | OUTPATIENT
Start: 2019-12-20 | End: 2019-12-20

## 2019-12-19 RX ORDER — REGADENOSON 0.08 MG/ML
0.4 INJECTION, SOLUTION INTRAVENOUS
Status: COMPLETED | OUTPATIENT
Start: 2019-12-19 | End: 2019-12-20

## 2019-12-19 RX ORDER — ATORVASTATIN CALCIUM 80 MG/1
80 TABLET, FILM COATED ORAL NIGHTLY
COMMUNITY
End: 2020-04-13

## 2019-12-19 RX ORDER — AMINOPHYLLINE 25 MG/ML
100 INJECTION, SOLUTION INTRAVENOUS
Status: DISCONTINUED | OUTPATIENT
Start: 2019-12-19 | End: 2019-12-20 | Stop reason: HOSPADM

## 2019-12-19 RX ORDER — ISOSORBIDE MONONITRATE 30 MG/1
30 TABLET, EXTENDED RELEASE ORAL EVERY EVENING
COMMUNITY
End: 2020-04-13

## 2019-12-19 RX ORDER — M-VIT,TX,IRON,MINS/CALC/FOLIC 27MG-0.4MG
1 TABLET ORAL DAILY
COMMUNITY
End: 2020-09-11

## 2019-12-19 RX ORDER — CARVEDILOL 12.5 MG/1
12.5 TABLET ORAL 2 TIMES DAILY WITH MEALS
COMMUNITY
End: 2020-01-10

## 2019-12-19 RX ORDER — NITROGLYCERIN 0.4 MG/1
0.4 TABLET SUBLINGUAL
Status: DISCONTINUED | OUTPATIENT
Start: 2019-12-19 | End: 2019-12-20 | Stop reason: HOSPADM

## 2019-12-19 RX ORDER — POTASSIUM CHLORIDE 20 MEQ/1
20 TABLET, EXTENDED RELEASE ORAL 2 TIMES DAILY
COMMUNITY
End: 2020-02-03

## 2019-12-19 RX ORDER — FLUTICASONE PROPIONATE 50 MCG
1 SPRAY, SUSPENSION (ML) NASAL DAILY
COMMUNITY
End: 2020-01-10

## 2019-12-19 RX ORDER — ISOSORBIDE MONONITRATE 30 MG/1
30 TABLET, EXTENDED RELEASE ORAL EVERY EVENING
Status: DISCONTINUED | OUTPATIENT
Start: 2019-12-19 | End: 2019-12-20 | Stop reason: HOSPADM

## 2019-12-19 RX ORDER — LORAZEPAM 0.5 MG/1
0.5 TABLET ORAL
Status: DISCONTINUED | OUTPATIENT
Start: 2019-12-20 | End: 2019-12-20 | Stop reason: HOSPADM

## 2019-12-19 RX ORDER — OMEPRAZOLE 20 MG/1
20 CAPSULE, DELAYED RELEASE ORAL 2 TIMES DAILY
Status: DISCONTINUED | OUTPATIENT
Start: 2019-12-19 | End: 2019-12-20 | Stop reason: HOSPADM

## 2019-12-19 RX ORDER — ASPIRIN 300 MG/1
300 SUPPOSITORY RECTAL DAILY
Status: DISCONTINUED | OUTPATIENT
Start: 2019-12-20 | End: 2019-12-20

## 2019-12-19 RX ORDER — AMLODIPINE BESYLATE 5 MG/1
5 TABLET ORAL DAILY
COMMUNITY
End: 2020-01-10

## 2019-12-19 RX ADMIN — ATORVASTATIN CALCIUM 80 MG: 80 TABLET, FILM COATED ORAL at 20:43

## 2019-12-19 RX ADMIN — ISOSORBIDE MONONITRATE 30 MG: 30 TABLET, EXTENDED RELEASE ORAL at 20:43

## 2019-12-19 RX ADMIN — GABAPENTIN 200 MG: 100 CAPSULE ORAL at 20:43

## 2019-12-19 RX ADMIN — CARVEDILOL 12.5 MG: 12.5 TABLET, FILM COATED ORAL at 20:43

## 2019-12-19 RX ADMIN — LORAZEPAM 1 MG: 1 TABLET ORAL at 21:55

## 2019-12-19 RX ADMIN — OMEPRAZOLE 20 MG: 20 CAPSULE, DELAYED RELEASE ORAL at 20:47

## 2019-12-19 ASSESSMENT — ENCOUNTER SYMPTOMS
SEIZURES: 0
ABDOMINAL PAIN: 0
NAUSEA: 0
EYE PAIN: 0
DEPRESSION: 0
HEADACHES: 0
STRIDOR: 0
PALPITATIONS: 0
SHORTNESS OF BREATH: 0
VOMITING: 0
HEARTBURN: 0
EYE REDNESS: 0
INSOMNIA: 0
BLURRED VISION: 0
BACK PAIN: 0
FOCAL WEAKNESS: 0
COUGH: 0
FEVER: 0
WEIGHT LOSS: 0
ORTHOPNEA: 0
NECK PAIN: 0
CHILLS: 0
EYE DISCHARGE: 0
SPUTUM PRODUCTION: 0
DIARRHEA: 0
MYALGIAS: 0
NERVOUS/ANXIOUS: 0
DIZZINESS: 0

## 2019-12-19 ASSESSMENT — COGNITIVE AND FUNCTIONAL STATUS - GENERAL
MOBILITY SCORE: 24
SUGGESTED CMS G CODE MODIFIER DAILY ACTIVITY: CH
DAILY ACTIVITIY SCORE: 24
SUGGESTED CMS G CODE MODIFIER MOBILITY: CH

## 2019-12-19 ASSESSMENT — LIFESTYLE VARIABLES
TOTAL SCORE: 0
HOW MANY TIMES IN THE PAST YEAR HAVE YOU HAD 5 OR MORE DRINKS IN A DAY: 0
TOTAL SCORE: 0
ALCOHOL_USE: NO
EVER HAD A DRINK FIRST THING IN THE MORNING TO STEADY YOUR NERVES TO GET RID OF A HANGOVER: NO
EVER_SMOKED: NEVER
EVER FELT BAD OR GUILTY ABOUT YOUR DRINKING: NO
TOTAL SCORE: 0
CONSUMPTION TOTAL: NEGATIVE
HAVE YOU EVER FELT YOU SHOULD CUT DOWN ON YOUR DRINKING: NO
AVERAGE NUMBER OF DAYS PER WEEK YOU HAVE A DRINK CONTAINING ALCOHOL: 0
ON A TYPICAL DAY WHEN YOU DRINK ALCOHOL HOW MANY DRINKS DO YOU HAVE: 0
HAVE PEOPLE ANNOYED YOU BY CRITICIZING YOUR DRINKING: NO
DOES PATIENT WANT TO STOP DRINKING: NO

## 2019-12-19 ASSESSMENT — PATIENT HEALTH QUESTIONNAIRE - PHQ9
2. FEELING DOWN, DEPRESSED, IRRITABLE, OR HOPELESS: NOT AT ALL
SUM OF ALL RESPONSES TO PHQ9 QUESTIONS 1 AND 2: 0
1. LITTLE INTEREST OR PLEASURE IN DOING THINGS: NOT AT ALL

## 2019-12-19 NOTE — ED TRIAGE NOTES
89 y/o female ambulatory to triage with c/o left sided chest pain that began after eating lunch today. Pt states the pain radiates into her left arm, she denies any n/v/d or sob. Pt a&o, NAD noted.

## 2019-12-20 ENCOUNTER — APPOINTMENT (OUTPATIENT)
Dept: RADIOLOGY | Facility: MEDICAL CENTER | Age: 84
End: 2019-12-20
Attending: INTERNAL MEDICINE
Payer: MEDICARE

## 2019-12-20 VITALS
WEIGHT: 118.39 LBS | BODY MASS INDEX: 20.98 KG/M2 | HEIGHT: 63 IN | DIASTOLIC BLOOD PRESSURE: 68 MMHG | HEART RATE: 63 BPM | SYSTOLIC BLOOD PRESSURE: 134 MMHG | RESPIRATION RATE: 17 BRPM | OXYGEN SATURATION: 94 % | TEMPERATURE: 98.4 F

## 2019-12-20 PROBLEM — I16.0 HYPERTENSIVE URGENCY: Status: ACTIVE | Noted: 2019-12-20

## 2019-12-20 PROBLEM — J90 PLEURAL EFFUSION: Status: ACTIVE | Noted: 2019-12-20

## 2019-12-20 PROBLEM — I16.0 HYPERTENSIVE URGENCY: Status: RESOLVED | Noted: 2019-12-20 | Resolved: 2019-12-20

## 2019-12-20 LAB
ALBUMIN SERPL BCP-MCNC: 3.6 G/DL (ref 3.2–4.9)
ALBUMIN/GLOB SERPL: 1.5 G/DL
ALP SERPL-CCNC: 47 U/L (ref 30–99)
ALT SERPL-CCNC: 17 U/L (ref 2–50)
ANION GAP SERPL CALC-SCNC: 8 MMOL/L (ref 0–11.9)
AST SERPL-CCNC: 19 U/L (ref 12–45)
BILIRUB SERPL-MCNC: 0.5 MG/DL (ref 0.1–1.5)
BUN SERPL-MCNC: 13 MG/DL (ref 8–22)
CALCIUM SERPL-MCNC: 9.4 MG/DL (ref 8.5–10.5)
CHLORIDE SERPL-SCNC: 109 MMOL/L (ref 96–112)
CO2 SERPL-SCNC: 19 MMOL/L (ref 20–33)
CREAT SERPL-MCNC: 0.64 MG/DL (ref 0.5–1.4)
EKG IMPRESSION: NORMAL
ERYTHROCYTE [DISTWIDTH] IN BLOOD BY AUTOMATED COUNT: 44.8 FL (ref 35.9–50)
GLOBULIN SER CALC-MCNC: 2.4 G/DL (ref 1.9–3.5)
GLUCOSE SERPL-MCNC: 103 MG/DL (ref 65–99)
HCT VFR BLD AUTO: 36.8 % (ref 37–47)
HGB BLD-MCNC: 12 G/DL (ref 12–16)
MCH RBC QN AUTO: 32.6 PG (ref 27–33)
MCHC RBC AUTO-ENTMCNC: 32.6 G/DL (ref 33.6–35)
MCV RBC AUTO: 100 FL (ref 81.4–97.8)
PLATELET # BLD AUTO: 213 K/UL (ref 164–446)
PMV BLD AUTO: 9.7 FL (ref 9–12.9)
POTASSIUM SERPL-SCNC: 3.8 MMOL/L (ref 3.6–5.5)
PROT SERPL-MCNC: 6 G/DL (ref 6–8.2)
RBC # BLD AUTO: 3.68 M/UL (ref 4.2–5.4)
SODIUM SERPL-SCNC: 136 MMOL/L (ref 135–145)
TROPONIN T SERPL-MCNC: 11 NG/L (ref 6–19)
WBC # BLD AUTO: 5.8 K/UL (ref 4.8–10.8)

## 2019-12-20 PROCEDURE — 96374 THER/PROPH/DIAG INJ IV PUSH: CPT

## 2019-12-20 PROCEDURE — 94760 N-INVAS EAR/PLS OXIMETRY 1: CPT

## 2019-12-20 PROCEDURE — A9270 NON-COVERED ITEM OR SERVICE: HCPCS | Performed by: HOSPITALIST

## 2019-12-20 PROCEDURE — 700111 HCHG RX REV CODE 636 W/ 250 OVERRIDE (IP)

## 2019-12-20 PROCEDURE — 700102 HCHG RX REV CODE 250 W/ 637 OVERRIDE(OP): Performed by: HOSPITALIST

## 2019-12-20 PROCEDURE — 99217 PR OBSERVATION CARE DISCHARGE: CPT | Performed by: HOSPITALIST

## 2019-12-20 PROCEDURE — A9502 TC99M TETROFOSMIN: HCPCS

## 2019-12-20 PROCEDURE — G0378 HOSPITAL OBSERVATION PER HR: HCPCS

## 2019-12-20 PROCEDURE — 85027 COMPLETE CBC AUTOMATED: CPT

## 2019-12-20 PROCEDURE — 700102 HCHG RX REV CODE 250 W/ 637 OVERRIDE(OP): Performed by: INTERNAL MEDICINE

## 2019-12-20 PROCEDURE — 36415 COLL VENOUS BLD VENIPUNCTURE: CPT

## 2019-12-20 PROCEDURE — 84484 ASSAY OF TROPONIN QUANT: CPT

## 2019-12-20 PROCEDURE — 80053 COMPREHEN METABOLIC PANEL: CPT

## 2019-12-20 PROCEDURE — A9270 NON-COVERED ITEM OR SERVICE: HCPCS | Performed by: INTERNAL MEDICINE

## 2019-12-20 PROCEDURE — 93010 ELECTROCARDIOGRAM REPORT: CPT | Mod: 59 | Performed by: INTERNAL MEDICINE

## 2019-12-20 RX ORDER — FLUTICASONE PROPIONATE 110 UG/1
2 AEROSOL, METERED RESPIRATORY (INHALATION) EVERY 12 HOURS
Status: DISCONTINUED | OUTPATIENT
Start: 2019-12-20 | End: 2019-12-20

## 2019-12-20 RX ORDER — ATORVASTATIN CALCIUM 80 MG/1
80 TABLET, FILM COATED ORAL NIGHTLY
Status: DISCONTINUED | OUTPATIENT
Start: 2019-12-20 | End: 2019-12-20 | Stop reason: HOSPADM

## 2019-12-20 RX ORDER — REGADENOSON 0.08 MG/ML
INJECTION, SOLUTION INTRAVENOUS
Status: COMPLETED
Start: 2019-12-20 | End: 2019-12-20

## 2019-12-20 RX ORDER — FLUTICASONE PROPIONATE 110 UG/1
2 AEROSOL, METERED RESPIRATORY (INHALATION) EVERY 12 HOURS
Status: DISCONTINUED | OUTPATIENT
Start: 2019-12-20 | End: 2019-12-20 | Stop reason: HOSPADM

## 2019-12-20 RX ORDER — FLUTICASONE PROPIONATE 110 UG/1
2 AEROSOL, METERED RESPIRATORY (INHALATION)
Status: DISCONTINUED | OUTPATIENT
Start: 2019-12-20 | End: 2019-12-20

## 2019-12-20 RX ADMIN — ASPIRIN 325 MG: 325 TABLET, FILM COATED ORAL at 05:09

## 2019-12-20 RX ADMIN — REGADENOSON 0.4 MG: 0.08 INJECTION, SOLUTION INTRAVENOUS at 08:19

## 2019-12-20 RX ADMIN — GABAPENTIN 200 MG: 100 CAPSULE ORAL at 12:21

## 2019-12-20 RX ADMIN — AMLODIPINE BESYLATE 5 MG: 5 TABLET ORAL at 05:09

## 2019-12-20 RX ADMIN — GABAPENTIN 200 MG: 100 CAPSULE ORAL at 05:09

## 2019-12-20 RX ADMIN — LISINOPRIL 20 MG: 20 TABLET ORAL at 05:10

## 2019-12-20 RX ADMIN — CARVEDILOL 12.5 MG: 12.5 TABLET, FILM COATED ORAL at 09:18

## 2019-12-20 RX ADMIN — OMEPRAZOLE 20 MG: 20 CAPSULE, DELAYED RELEASE ORAL at 05:09

## 2019-12-20 RX ADMIN — FLUTICASONE PROPIONATE 220 MCG: 110 AEROSOL, METERED RESPIRATORY (INHALATION) at 11:32

## 2019-12-20 ASSESSMENT — COPD QUESTIONNAIRES
DURING THE PAST 4 WEEKS HOW MUCH DID YOU FEEL SHORT OF BREATH: NONE/LITTLE OF THE TIME
COPD SCREENING SCORE: 2
HAVE YOU SMOKED AT LEAST 100 CIGARETTES IN YOUR ENTIRE LIFE: NO/DON'T KNOW
DO YOU EVER COUGH UP ANY MUCUS OR PHLEGM?: NO/ONLY WITH OCCASIONAL COLDS OR INFECTIONS

## 2019-12-20 ASSESSMENT — LIFESTYLE VARIABLES: EVER_SMOKED: NEVER

## 2019-12-20 NOTE — H&P
Hospital Medicine History & Physical Note    Date of Service  12/19/2019    Primary Care Physician  Ned Lee, MARIANNE    Consultants  none    Code Status  full    Chief Complaint  Chest pain    History of Presenting Illness  89 y.o. female with PMH of CAD with stent in 2016, HTN, DLD who presented 12/19/2019 with chest pain started around 2 PM after lunch.  She stated that she was sitting at that time and felt throbbing sensation over the left chest.  Intermittent in nature, no radiation.  She said that this pain is similar to last time in 2016.  Initial work-up in ER were negative.  She will be admitted for observation for further management.    Review of Systems  Review of Systems   Constitutional: Negative for chills, fever and weight loss.   HENT: Negative for congestion and nosebleeds.    Eyes: Negative for blurred vision, pain, discharge and redness.   Respiratory: Negative for cough, sputum production, shortness of breath and stridor.    Cardiovascular: Positive for chest pain. Negative for palpitations and orthopnea.   Gastrointestinal: Negative for abdominal pain, diarrhea, heartburn, nausea and vomiting.   Genitourinary: Negative for dysuria, frequency and urgency.   Musculoskeletal: Negative for back pain, myalgias and neck pain.   Skin: Negative for itching and rash.   Neurological: Negative for dizziness, focal weakness, seizures and headaches.   Psychiatric/Behavioral: Negative for depression. The patient is not nervous/anxious and does not have insomnia.        Past Medical History   has a past medical history of Allergy, Anxiety, ASTHMA, CAD (coronary artery disease), Hyperlipidemia, Hypertension, Lumbar back pain (9/10/2016), OSTEOPOROSIS, and PVD (peripheral vascular disease) ([facility]).    Surgical History   has a past surgical history that includes appendectomy; abdominal hysterectomy total; hernia repair; tonsillectomy; and zzz cardiac cath.     Family History  Reviewed and no  pertinent [facility]     Social History   reports that she has never smoked. She has never used smokeless tobacco. She reports that she does not drink alcohol or use drugs.    Allergies  Allergies   Allergen Reactions   • Bactrim [Sulfamethoxazole W-Trimethoprim]      Rash   • Pcn [Penicillins] Rash     About 70 years   • Codeine Vomiting       Medications  Prior to Admission Medications   Prescriptions Last Dose Informant Patient Reported? Taking?   Biotin w/ Vitamins C & E (HAIR/SKIN/NAILS PO)   Yes No   Sig: Take  by mouth.   Cholecalciferol (VITAMIN D) 2000 UNIT Tab  Patient Yes No   Sig: Take 2,000 Units by mouth every day.   LORazepam (ATIVAN) 0.5 MG Tab   No No   Sig: Take 0.5 mg orally in the afternoon. Take 1 mg orally every night.   Multiple Vitamins-Minerals (CENTRUM SILVER ADULT 50+) Tab  Patient Yes No   Sig: Take 1 Tab by mouth every day.   QVAR REDIHALER 80 MCG/ACT inhaler   No No   Sig: INHALE ONE PUFF BY MOUTH TWICE DAILY    VITAMIN E PO   Yes No   Sig: Take  by mouth.   albuterol 108 (90 Base) MCG/ACT Aero Soln inhalation aerosol   No No   Sig: Inhale 2 Puffs by mouth every 6 hours as needed for Shortness of Breath.   amLODIPine (NORVASC) 5 MG Tab   No No   Sig: TAKE ONE TABLET BY MOUTH ONE TIME DAILY    ascorbic acid (ASCORBIC ACID) 500 MG Tab   Yes No   Sig: Take 500 mg by mouth every day.   aspirin (ASA) 81 MG Chew Tab chewable tablet  Patient Yes No   Sig: Take 1 Tab by mouth every day.   atorvastatin (LIPITOR) 80 MG tablet   No No   Sig: Take 1 Tab by mouth every evening.   carvedilol (COREG) 12.5 MG Tab   No No   Sig: TAKE ONE TABLET BY MOUTH TWICE DAILY WITH MEALS   diclofenac EC (VOLTAREN) 75 MG Tablet Delayed Response   No No   Sig: Take 1 Tab by mouth 2 times a day.   Patient not taking: Reported on 10/7/2019   diphenhydrAMINE (BENADRYL) 25 MG Tab   Yes No   Sig: Take 25 mg by mouth every 6 hours as needed for Sleep.   fluticasone (FLONASE) 50 MCG/ACT nasal spray   No No   Sig: USE 1 SPRAY IN  EACH NOSTRIL DAILY   gabapentin (NEURONTIN) 100 MG Cap   No No   Sig: Take 2 Caps by mouth 3 times a day for 90 days.   guaifenesin LA (MUCINEX) 600 MG TABLET SR 12 HR   Yes No   Sig: Take 600 mg by mouth every 12 hours.   isosorbide mononitrate SR (IMDUR) 30 MG TABLET SR 24 HR   No No   Sig: Take 1 Tab by mouth every evening.   lisinopril (PRINIVIL) 20 MG Tab   No No   Sig: TAKE ONE TABLET BY MOUTH ONE TIME DAILY    montelukast (SINGULAIR) 10 MG Tab   No No   Sig: Take 1 Tab by mouth every day.   nitroglycerin (NITROSTAT) 0.4 MG SL Tab   No No   Sig: Place 1 tab under tongue every 5 min as needed for chest pain max 3 doses   omeprazole (PRILOSEC) 20 MG delayed-release capsule  Patient Yes No   Sig: Take 20 mg by mouth 2 times a day.   potassium chloride SA (KDUR) 20 MEQ Tab CR   No No   Sig: TAKE ONE TABLET BY MOUTH TWICE DAILY      Facility-Administered Medications: None       Physical Exam  Temp:  [36.8 °C (98.2 °F)] 36.8 °C (98.2 °F)  Pulse:  [69-77] 69  Resp:  [12-17] 12  BP: (150-159)/(65-82) 159/65  SpO2:  [93 %-95 %] 93 %    Physical Exam  Vitals signs reviewed.   Constitutional:       General: She is not in acute distress.     Appearance: Normal appearance.   HENT:      Head: Normocephalic and atraumatic.      Nose: No congestion or rhinorrhea.   Eyes:      Extraocular Movements: Extraocular movements intact.      Pupils: Pupils are equal, round, and reactive to light.   Neck:      Musculoskeletal: Normal range of motion and neck supple.   Cardiovascular:      Rate and Rhythm: Normal rate and regular rhythm.      Pulses: Normal pulses.   Pulmonary:      Effort: Pulmonary effort is normal. No respiratory distress.      Breath sounds: Normal breath sounds.   Abdominal:      General: Bowel sounds are normal. There is no distension.      Palpations: Abdomen is soft.      Tenderness: There is no tenderness.   Musculoskeletal:         General: No swelling or tenderness.   Skin:     General: Skin is warm.       Findings: No erythema.   Neurological:      General: No focal deficit present.      Mental Status: She is alert and oriented to person, place, and time.         Laboratory:  Recent Labs     12/19/19  1620   WBC 7.6   RBC 4.23   HEMOGLOBIN 13.7   HEMATOCRIT 41.1   MCV 97.2   MCH 32.4   MCHC 33.3*   RDW 44.1   PLATELETCT 241   MPV 9.6     Recent Labs     12/19/19  1620   SODIUM 136   POTASSIUM 3.9   CHLORIDE 105   CO2 23   GLUCOSE 112*   BUN 13   CREATININE 0.61   CALCIUM 10.4     Recent Labs     12/19/19  1620   ALTSGPT 22   ASTSGOT 22   ALKPHOSPHAT 69   TBILIRUBIN 0.5   GLUCOSE 112*         No results for input(s): NTPROBNP in the last 72 hours.      Recent Labs     12/19/19  1620   TROPONINT 11       Urinalysis:    No results found     Imaging:  DX-CHEST-PORTABLE (1 VIEW)    (Results Pending)   NM-CARDIAC STRESS TEST    (Results Pending)         Assessment/Plan:  I anticipate this patient is appropriate for observation status at this time.    Pain in the chest- (present on admission)  Assessment & Plan  History of CAD with stent  Trend trop and ekg  NPO  Stress test in am  Asa, statin, nitro, bblocker    Dyslipidemia- (present on admission)  Assessment & Plan  On statin    Essential hypertension- (present on admission)  Assessment & Plan  Continue outpatient meds      VTE prophylaxis: ambulatory

## 2019-12-20 NOTE — DISCHARGE INSTRUCTIONS
Discharge Instructions    Discharged to home by car with relative. Discharged via walking, hospital escort: Refused.  Special equipment needed: Not Applicable    Be sure to schedule a follow-up appointment with your primary care doctor or any specialists as instructed.     Discharge Plan:   Influenza Vaccine Indication: Patient Refuses    I understand that a diet low in cholesterol, fat, and sodium is recommended for good health. Unless I have been given specific instructions below for another diet, I accept this instruction as my diet prescription.   Other diet: Regualr    Special Instructions: None    · Is patient discharged on Warfarin / Coumadin?   No     Depression / Suicide Risk    As you are discharged from this [facility] facility, it is important to learn how to keep safe from harming yourself.    Recognize the warning signs:  · Abrupt changes in personality, positive or negative- including increase in energy   · Giving away possessions  · Change in eating patterns- significant weight changes-  positive or negative  · Change in sleeping patterns- unable to sleep or sleeping all the time   · Unwillingness or inability to communicate  · Depression  · Unusual sadness, discouragement and loneliness  · Talk of wanting to die  · Neglect of personal appearance   · Rebelliousness- reckless behavior  · Withdrawal from people/activities they love  · Confusion- inability to concentrate     If you or a loved one observes any of these behaviors or has concerns about self-harm, here's what you can do:  · Talk about it- your feelings and reasons for harming yourself  · Remove any means that you might use to hurt yourself (examples: pills, rope, extension cords, firearm)  · Get professional help from the community (Mental Health, Substance Abuse, psychological counseling)  · Do not be alone:Call your Safe Contact- someone whom you trust who will be there for you.  · Call your local CRISIS HOTLINE 909-6418 or  361-444-4124  · Call your local Children's Mobile Crisis Response Team [place] (568) 710-2383 or www.[employer].Expertcloud.de  · Call the toll free National Suicide Prevention Hotlines   · National Suicide Prevention Lifeline 558-015-SUIN (7560)  · National Hope Line Network 800-SUICIDE (265-9206)      Discharge Instructions per David Hopper M.D.  Follow-up with primary care provider within 5 days.  DIAGNOSIS: Chest pain , Pleural effusion, Hypertensive urgency   Return to ER if you develop any of the following symptoms fever, chills, weakness, not feeling well, rash, bleeding, blurred vision, palpitations, cough, pain in any part of your body, shortness of breath, nausea, vomiting, diarrhea, difficulty with urination, dizziness, headache, seizures, loss of consciousness or if you develop any new symptoms.

## 2019-12-20 NOTE — PROGRESS NOTES
Patient discharged in stable condition. Reviewed DC instructions, per patient no other questions at this time. Agreed to follow up with PCP and cardiologist.

## 2019-12-20 NOTE — ED NOTES
Med Rec Updated and Complete per Pt and Family at bedside  Allergies Reviewed  No PO ABX last 14 days.    Pt taking LD ASA daily.

## 2019-12-20 NOTE — DISCHARGE SUMMARY
Discharge Summary    CHIEF COMPLAINT ON ADMISSION  Chief Complaint   Patient presents with   • Chest Pain     Reason for Admission  Chest pain    Admission Date  12/19/2019    CODE STATUS  Full    HPI & HOSPITAL COURSE  This is a 89 y.o. female with past medical history of coronary artery disease, hypertension and dyslipidemia admitted December 19 with chest pain.  Patient underwent myocardial perfusion imaging that showed no evidence of significant jeopardized viable myocardium.  Normal left ventricular size, wall motion and ejection fraction.  Chest pain resolved.  Patient was noted to have small left pleural effusion.  Too small to be tapped, finding was discussed with the patient and will need follow-up imaging, consider thoracentesis if get worse.  She did have hypertensive urgency.  She was counseled to keep blood pressure log and follow with her primary care provider.  Therefore, she is discharged in fair and stable condition to home with close outpatient follow-up.    Observation admission    Discharge Date  12/20/2019     FOLLOW UP ITEMS POST DISCHARGE  Follow-up with primary care provider within 5 days, repeat imaging to follow on pleural effusion    DISCHARGE DIAGNOSES  Active Problems:    Essential hypertension POA: Yes    Dyslipidemia (Chronic) POA: Yes    Pleural effusion POA: Unknown  Resolved Problems:    Pain in the chest POA: Yes    Hypertensive urgency POA: Unknown    FOLLOW UP  Future Appointments   Date Time Provider Department Center   2/4/2020  2:10 PM Torres Fall M.D. [facility] None   2/20/2020  1:40 PM Ned Lee, A.P.R.N. [facility] A.P.R.N.  [address]  [facility] [id number]  [phone]     MEDICATIONS ON DISCHARGE     Medication List      CONTINUE taking these medications      Instructions   albuterol 108 (90 Base) MCG/ACT Aers inhalation aerosol   Inhale 2 Puffs by mouth every 6 hours as needed for Shortness of Breath.  Dose:  2  Puff     amLODIPine 5 MG Tabs  Commonly known as:  NORVASC   Take 5 mg by mouth every day.  Dose:  5 mg     aspirin 81 MG Chew chewable tablet  Commonly known as:  ASA   Take 1 Tab by mouth every day.  Dose:  81 mg     ATIVAN 0.5 MG Tabs  Generic drug:  LORazepam   Take 0.5-1 mg by mouth 2 Times a Day. 0.5mg in AFTERNOON  1mg at PM  Dose:  0.5-1 mg     atorvastatin 80 MG tablet  Commonly known as:  LIPITOR   Take 80 mg by mouth every evening.  Dose:  80 mg     carvedilol 12.5 MG Tabs  Commonly known as:  COREG   Take 12.5 mg by mouth 2 times a day, with meals.  Dose:  12.5 mg     [employer] CORTISONE COOLING RELIEF 1 % Gel  Generic drug:  HYDROCORTISONE (TOPICAL)   1 g by Apply externally route 1 time daily as needed (Back Itching.).  Dose:  1 g     fluticasone 50 MCG/ACT nasal spray  Commonly known as:  FLONASE   Spray 1 Spray in nose every day.  Dose:  1 Spray     gabapentin 100 MG Caps  Commonly known as:  NEURONTIN   Take 200 mg by mouth 3 times a day.  Dose:  200 mg     guaiFENesin  MG Tb12  Commonly known as:  MUCINEX   Take 600 mg by mouth every morning.  Dose:  600 mg     HAIR/SKIN/NAILS 1250-7.5-7.5 MCG-MG-UNT Chew  Generic drug:  Biotin w/ Vitamins C & E   Take 1 Tab by mouth every day.  Dose:  1 Tab     ICY HOT ARTHRITIS PAIN RELIEF 16-4 % Lotn  Generic drug:  Menthol-Camphor   1 g by Apply externally route 1 time daily as needed (Back Itching.).  Dose:  1 g     isosorbide mononitrate SR 30 MG Tb24  Commonly known as:  IMDUR   Take 30 mg by mouth every evening.  Dose:  30 mg     lisinopril 20 MG Tabs  Commonly known as:  PRINIVIL   Take 20 mg by mouth every day.  Dose:  20 mg     montelukast 10 MG Tabs  Commonly known as:  SINGULAIR   Take 10 mg by mouth every day.  Dose:  10 mg     nitroglycerin 0.4 MG Subl  Commonly known as:  NITROSTAT   Place 1 tab under tongue every 5 min as needed for chest pain max 3 doses     omeprazole 20 MG delayed-release capsule  Commonly known as:  PRILOSEC   Take 20 mg by  mouth 2 times a day.  Dose:  20 mg     potassium chloride SA 20 MEQ Tbcr  Commonly known as:  Kdur   Take 20 mEq by mouth 2 times a day.  Dose:  20 mEq     QVAR REDIHALER 80 MCG/ACT inhaler  Generic drug:  beclomethasone HFA   INHALE ONE PUFF BY MOUTH TWICE DAILY     therapeutic multivitamin-minerals Tabs   Take 1 Tab by mouth every day.  Dose:  1 Tab     Vitamin C 1000 MG Tabs   Take 1,000 mg by mouth every day.  Dose:  1,000 mg     vitamin D 2000 UNIT Tabs   Take 2,000 Units by mouth every day.  Dose:  2,000 Units     VITAMIN E PO   Take 1 Dose by mouth every day. Unknown OTC Strength.  Dose:  1 Dose            Allergies  Allergies   Allergen Reactions   • Bactrim [Sulfamethoxazole W-Trimethoprim]      Rash   • Pcn [Penicillins] Rash     About 70 years   • Codeine Vomiting       DIET  Orders Placed This Encounter   Procedures   • Diet Order Cardiac     Standing Status:   Standing     Number of Occurrences:   1     Order Specific Question:   Diet:     Answer:   Cardiac [6]     CONSULTATIONS  None     PROCEDURES  None     LABORATORY  Lab Results   Component Value Date    SODIUM 136 12/20/2019    POTASSIUM 3.8 12/20/2019    CHLORIDE 109 12/20/2019    CO2 19 (L) 12/20/2019    GLUCOSE 103 (H) 12/20/2019    BUN 13 12/20/2019    CREATININE 0.64 12/20/2019        Lab Results   Component Value Date    WBC 5.8 12/20/2019    HEMOGLOBIN 12.0 12/20/2019    HEMATOCRIT 36.8 (L) 12/20/2019    PLATELETCT 213 12/20/2019        Total time of the discharge process exceeds 33 minutes.

## 2019-12-20 NOTE — PROGRESS NOTES
Pt transported to room on zoll monitoring. Pt A&O X4. Pt ambulated to bed, oriented to room and call light. Bed locked and in lowest position, call light within reach. Educated pt to call for assistance.

## 2019-12-20 NOTE — ED NOTES
Pt resting in bed with family at bedside. NSR on monitor. Awaiting bed assignment. 0/10 pain at this time.

## 2019-12-20 NOTE — ASSESSMENT & PLAN NOTE
History of CAD with stent  Trend trop and ekg  NPO  Stress test in am  Asa, statin, nitro, bblocker

## 2019-12-20 NOTE — PROGRESS NOTES
Bedside report received from night shift RN. Assumed care of pt. Pt sleeping in bed. No signs of distress, no complaints of pain at this time. Tele box on. Call light and belongings within reach. Bed alarm on, bed locked and in lowest position.

## 2019-12-20 NOTE — ED PROVIDER NOTES
"ED Provider Note    Scribed for Isabelle Hamlin M.D. by Viola Isidro. 12/19/2019, 6:04 PM.    Primary care provider: MARIANNE Vallejo  Means of arrival: Walk in  History obtained from: Patient   History limited by: None     CHIEF COMPLAINT  Chief Complaint   Patient presents with   • Chest Pain       HPI  Dayana Lechuga is a 89 y.o. female who presents to the Emergency Department for evaluation of left sided chest pain that began earlier today after she ate lunch at 2 PM. She describes the pain as heart pain not chest pain and states it \"feels like blood can't get through\". The pain radiates to her left arm. She states she had one episode of acid reflux type feelings when she bent over today. She denies any nausea, vomiting, diarrhea, or shortness of breath. Patient endorses a previous heart attack with similar symptoms 2 years ago. She had a stent placed. She ednorses some leg swelling but that is known and being worked on. She took nitroglycerin a few weeks ago for her pain and took three more today along with a low dose aspirin. Patient is hypertensive on exam but does not have a history of hypertension.     REVIEW OF SYSTEMS  Pertinent positives include chest pain, radiates to left arm, acid reflux, leg swelling (chronic). Pertinent negatives include no nausea, vomiting, diarrhea, or shortness of breath. As above, all other systems reviewed and are negative.   See HPI for further details.     PAST MEDICAL HISTORY  Past Medical History:   Diagnosis Date   • Allergy    • Anxiety    • ASTHMA    • CAD (coronary artery disease)     JT to RCA; 70% stenosis in LAD   • Hyperlipidemia    • Hypertension    • Lumbar back pain 9/10/2016   • OSTEOPOROSIS    • PVD (peripheral vascular disease) ([facility])     70% PAD-followed by L[facility] AMBROCIO       SURGICAL HISTORY  Past Surgical History:   Procedure Laterality Date   • ABDOMINAL HYSTERECTOMY TOTAL     • APPENDECTOMY     • HERNIA REPAIR     • TONSILLECTOMY   "   • ZZZ CARDIAC CATH         SOCIAL HISTORY  Social History     Tobacco Use   • Smoking status: Never Smoker   • Smokeless tobacco: Never Used   Substance Use Topics   • Alcohol use: No     Alcohol/week: 0.0 oz   • Drug use: No      Social History     Substance and Sexual Activity   Drug Use No       FAMILY HISTORY  Family History   Problem Relation Age of Onset   • Heart Disease Neg Hx    • Heart Failure Neg Hx    • Hyperlipidemia Neg Hx        CURRENT MEDICATIONS  Home Medications     Reviewed by Lucho Ragland R.N. (Registered Nurse) on 12/19/19 at 1551  Med List Status: <None>   Medication Last Dose Status   albuterol 108 (90 Base) MCG/ACT Aero Soln inhalation aerosol  Active   amLODIPine (NORVASC) 5 MG Tab  Active   ascorbic acid (ASCORBIC ACID) 500 MG Tab  Active   aspirin (ASA) 81 MG Chew Tab chewable tablet  Active   atorvastatin (LIPITOR) 80 MG tablet  Active   Biotin w/ Vitamins C & E (HAIR/SKIN/NAILS PO)  Active   carvedilol (COREG) 12.5 MG Tab  Active   Cholecalciferol (VITAMIN D) 2000 UNIT Tab  Active   diclofenac EC (VOLTAREN) 75 MG Tablet Delayed Response  Active   diphenhydrAMINE (BENADRYL) 25 MG Tab  Active   fluticasone (FLONASE) 50 MCG/ACT nasal spray  Active   gabapentin (NEURONTIN) 100 MG Cap  Active   guaifenesin LA (MUCINEX) 600 MG TABLET SR 12 HR  Active   isosorbide mononitrate SR (IMDUR) 30 MG TABLET SR 24 HR  Active   lisinopril (PRINIVIL) 20 MG Tab  Active   LORazepam (ATIVAN) 0.5 MG Tab  Active   montelukast (SINGULAIR) 10 MG Tab  Active   Multiple Vitamins-Minerals (CENTRUM SILVER ADULT 50+) Tab  Active   nitroglycerin (NITROSTAT) 0.4 MG SL Tab  Active   omeprazole (PRILOSEC) 20 MG delayed-release capsule  Active   potassium chloride SA (KDUR) 20 MEQ Tab CR  Active   QVAR REDIHALER 80 MCG/ACT inhaler  Active   VITAMIN E PO  Active                ALLERGIES  Allergies   Allergen Reactions   • Bactrim [Sulfamethoxazole W-Trimethoprim]      Rash   • Pcn [Penicillins] Rash     About 70  "years   • Codeine Vomiting       PHYSICAL EXAM  VITAL SIGNS: /65   Pulse 69   Temp 36.8 °C (98.2 °F) (Temporal)   Resp 12   Ht 1.6 m (5' 3\")   Wt 55.3 kg (121 lb 14.6 oz)   LMP  (LMP Unknown)   SpO2 93%   BMI 21.60 kg/m²   Vitals reviewed.  Consitutional: Well-developed, thin. Negative for: distress.  HENT: Normocephalic, right external ear normal, left external ear normal, oropharynx clear and moist.  Eyes: Conjunctivae normal, extraocular movements normal. Negative for: discharge in right and left eye, icterus.  Neck: Range of motion normal, supple. Negative for cervical adenopathy.  Cardiovascular: Normal rate, regular rhythm, heart sounds normal, intact distal pulses. Negative for: murmur, rub, gallop.  Pulmonary/Chest Wall: Effort normal, breath sounds normal. Negative for: respiratory distress, wheezes, rales, rhonchi.   Abdominal: Soft, bowel sounds normal. Negative for: distention, tenderness, rebound, guarding.  Musculoskeletal: Normal range of motion. Negative for edema.  No tenderness to either calf  Neurological: Alert and oriented x3. No focal deficits.  Skin: Warm, dry. Negative for rash.  Psych: Mood/affect normal, behavior normal, judgment normal.      DIAGNOSTIC STUDIES / PROCEDURES    LABS  Results for orders placed or performed during the hospital encounter of 12/19/19   CBC with Differential   Result Value Ref Range    WBC 7.6 4.8 - 10.8 K/uL    RBC 4.23 4.20 - 5.40 M/uL    Hemoglobin 13.7 12.0 - 16.0 g/dL    Hematocrit 41.1 37.0 - 47.0 %    MCV 97.2 81.4 - 97.8 fL    MCH 32.4 27.0 - 33.0 pg    MCHC 33.3 (L) 33.6 - 35.0 g/dL    RDW 44.1 35.9 - 50.0 fL    Platelet Count 241 164 - 446 K/uL    MPV 9.6 9.0 - 12.9 fL    Neutrophils-Polys 60.30 44.00 - 72.00 %    Lymphocytes 24.10 22.00 - 41.00 %    Monocytes 10.10 0.00 - 13.40 %    Eosinophils 3.80 0.00 - 6.90 %    Basophils 1.40 0.00 - 1.80 %    Immature Granulocytes 0.30 0.00 - 0.90 %    Nucleated RBC 0.00 /100 WBC    Neutrophils " (Absolute) 4.57 2.00 - 7.15 K/uL    Lymphs (Absolute) 1.83 1.00 - 4.80 K/uL    Monos (Absolute) 0.77 0.00 - 0.85 K/uL    Eos (Absolute) 0.29 0.00 - 0.51 K/uL    Baso (Absolute) 0.11 0.00 - 0.12 K/uL    Immature Granulocytes (abs) 0.02 0.00 - 0.11 K/uL    NRBC (Absolute) 0.00 K/uL   Complete Metabolic Panel (CMP)   Result Value Ref Range    Sodium 136 135 - 145 mmol/L    Potassium 3.9 3.6 - 5.5 mmol/L    Chloride 105 96 - 112 mmol/L    Co2 23 20 - 33 mmol/L    Anion Gap 8.0 0.0 - 11.9    Glucose 112 (H) 65 - 99 mg/dL    Bun 13 8 - 22 mg/dL    Creatinine 0.61 0.50 - 1.40 mg/dL    Calcium 10.4 8.5 - 10.5 mg/dL    AST(SGOT) 22 12 - 45 U/L    ALT(SGPT) 22 2 - 50 U/L    Alkaline Phosphatase 69 30 - 99 U/L    Total Bilirubin 0.5 0.1 - 1.5 mg/dL    Albumin 4.5 3.2 - 4.9 g/dL    Total Protein 7.6 6.0 - 8.2 g/dL    Globulin 3.1 1.9 - 3.5 g/dL    A-G Ratio 1.5 g/dL   Troponin   Result Value Ref Range    Troponin T 11 6 - 19 ng/L   ESTIMATED GFR   Result Value Ref Range    GFR If African American >60 >60 mL/min/1.73 m 2    GFR If Non African American >60 >60 mL/min/1.73 m 2   EKG (NOW)   Result Value Ref Range    Report       [facility] Emergency Dept.    Test Date:  2019  Pt Name:    LAVERN OAKLEY           Department: ER  MRN:        [id number]                      Room:  Gender:     Female                       Technician: [id number]  :        [id number]                   Requested By:ER TRIAGE PROTOCOL  Order #:    [id number]                    Reading MD: MAKI MILLER MD    Measurements  Intervals                                Axis  Rate:       70                           P:          63  AK:         228                          QRS:        -41  QRSD:       82                           T:          10  QT:         396  QTc:        428    Interpretive Statements  SINUS RHYTHM  FIRST DEGREE AV BLOCK  PROBABLE INFERIOR INFARCT, AGE INDETERMINATE  Compared to ECG 09/10/2016 15:20:04  First degree AV  block now present  Sinus bradycardia no longer present  Myocardial infarct finding still present  Electronically Signed On 12- 18:03:08 PST by MAKI HAMLIN MD       All labs reviewed by me.    Twelve-lead EKG by my interpretation is as above.  No ST or T wave changes to indicate ischemia or infarct    RADIOLOGY  DX-CHEST-PORTABLE (1 VIEW)    (Results Pending)   NM-CARDIAC STRESS TEST    (Results Pending)     The radiologist's interpretation of all radiological studies have been reviewed by me.    COURSE & MEDICAL DECISION MAKING  Nursing notes, VS, PMSFHx reviewed in chart.    Obtained and reviewed past medical records from August 2016 when the patient had a heart catheterization by Dr. Panfilo bhatt.    6:04 PM Patient seen and examined at bedside. The patient presents with left throbbing chest pain and the differential diagnosis includes but is not limited to ACS, unstable angina, musculoskeletal pain, anxiety. Ordered DX Chest, Estimated GFR, CBC w/ differential, CMP, Troponin, EKG. I told the patient that her EKG and lab work look ok but due to her previous heart problems and recent use of nitroglycerin I recommend she stay overnight for observation. The patient verbalizes agreement to the plan for care.  She already took an aspirin today and declines another.    6:17 PM I discussed the patient's case and the above findings with Dr. Herman (Hospitalist) who agreed to evaluate the patient for admittance.     DISPOSITION:  Patient will be hospitalized by Dr. Herman (Hospitalist) in guarded condition.    FINAL IMPRESSION  1. Precordial pain    2. Coronary artery disease of native artery of native heart with stable angina pectoris (HCC)          Viola PETTIT (Macarena), am scribing for, and in the presence of, Maki Hamlin M.D..    Electronically signed by: Viola Diaz), 12/19/2019    Maki PETTIT M.D. personally performed the services described in this documentation, as scribed by Viola  Sanna in my presence, and it is both accurate and complete.    C    The note accurately reflects work and decisions made by me.  Isabelle Hamlin  12/19/2019  7:12 PM

## 2019-12-20 NOTE — PROGRESS NOTES
2 RN Skin Check    2 RN skin check complete with Marylou.   Devices in place: tele box.  Skin assessed under devices: yes.  Confirmed pressure ulcers found on: n/a.  New potential pressure ulcers noted on n/a. Wound consult placed N/A.  The following interventions in place Pillows.    Bilateral knee bruising.  Bruise on right palm of hand  Bruise on left top of hand.

## 2019-12-20 NOTE — DISCHARGE PLANNING
Patient is a 89-year old women who looks younger then stated age. Lives alone in [place], [place]. Has support from daughter and son.     PCP is Dr. Roly Nelson. Has cardiologist. No issues with ADLs or IADLs. No home O2. No AD on file, refused packet. No issues with AOD or MH. Patient reports she does not need anything to D/C home. Has ride upon D/C.    Patient expresses she is overwhelmed, still does not know what is wrong with her. Reports intermittent pain in chest, yet reports tests did not show anything. Wonders why this is happening? Wants to know what she should expect once home? Will this be ongoing? SW reports hospitalist is Dr. Hopper today, will follow up regarding medical questions.     Patient reports her daughter is coming home for [place], which is a surprise. Patient seems surprised by this, wondered why she is coming home. SW reports to patient that perhaps she was worried since she was in hospital. Provided validation that relationships become important in times of medical issues. Patient is very happy that her daughter is coming home, has not seen her in 1-year. She reports her daughters   2-months ago.     Patient is tearful, overwhelmed. Would benefit from full explanation regarding medical issue.     D/C home today with ride. Follow up OP with cardiology.     Care Transition Team Assessment    Information Source  Orientation : Oriented x 4  Information Given By: Patient  Who is responsible for making decisions for patient? : Patient    Readmission Evaluation  Is this a readmission?: No    Elopement Risk  Legal Hold: No  Ambulatory or Self Mobile in Wheelchair: Yes  Disoriented: No  Psychiatric Symptoms: None  History of Wandering: No  Elopement this Admit: No  Vocalizing Wanting to Leave: No  Displays Behaviors, Body Language Wanting to Leave: No-Not at Risk for Elopement  Elopement Risk: Not at Risk for Elopement    Interdisciplinary Discharge Planning  Patient or legal guardian  wants to designate a caregiver (see row info): No    Discharge Preparedness  What is your plan after discharge?: Home with help  What are your discharge supports?: Child  Prior Functional Level: Ambulatory, Independent with Activities of Daily Living, Independent with Medication Management  Difficulity with ADLs: None  Difficulity with IADLs: None    Functional Assesment  Prior Functional Level: Ambulatory, Independent with Activities of Daily Living, Independent with Medication Management    Finances  Financial Barriers to Discharge: No  Prescription Coverage: Yes    Vision / Hearing Impairment  Vision Impairment : No  Hearing Impairment : No         Advance Directive  Advance Directive?: None  Advance Directive offered?: AD Booklet refused    Domestic Abuse  Have you ever been the victim of abuse or violence?: No  Physical Abuse or Sexual Abuse: No  Verbal Abuse or Emotional Abuse: No  Possible Abuse Reported to:: Not Applicable    Psychological Assessment  History of Substance Abuse: None  History of Psychiatric Problems: No  Non-compliant with Treatment: No  Newly Diagnosed Illness: Yes    Discharge Risks or Barriers  Discharge risks or barriers?: No    Anticipated Discharge Information  Anticipated discharge disposition: Home  Discharge Address: [address] [place] [address] [place] [address]  Discharge Contact Phone Number: [phone]

## 2019-12-23 ENCOUNTER — PATIENT OUTREACH (OUTPATIENT)
Dept: HEALTH INFORMATION MANAGEMENT | Facility: OTHER | Age: 84
End: 2019-12-23

## 2020-01-01 NOTE — DISCHARGE INSTRUCTIONS
Ot attempted tx session this PM, however, pt is getting ready for d/c. PT provided final caregiver education along with developmental milestone handouts.    Please take the medication as prescribed.  Call your primary care physician today to schedule a close follow-up appointment.  Please let them know that you were seen in the emergency department and require a follow-up appointment within the next 3 to 4 days.  Return to the emergency department if you develop any new or worsening symptoms including worsening leg pain, swelling, rashes or redness, pain in the joints, fevers, or if you have any further concerns.  Additionally, please return if your symptoms are not continuing to improve over the next week, and you are not able to follow-up with your primary care physician.

## 2020-01-08 ENCOUNTER — HOSPITAL ENCOUNTER (OUTPATIENT)
Facility: MEDICAL CENTER | Age: 85
End: 2020-01-08
Attending: FAMILY MEDICINE
Payer: MEDICARE

## 2020-01-08 ENCOUNTER — OFFICE VISIT (OUTPATIENT)
Dept: URGENT CARE | Facility: CLINIC | Age: 85
End: 2020-01-08
Payer: MEDICARE

## 2020-01-08 VITALS
HEART RATE: 88 BPM | SYSTOLIC BLOOD PRESSURE: 118 MMHG | DIASTOLIC BLOOD PRESSURE: 80 MMHG | BODY MASS INDEX: 22.84 KG/M2 | TEMPERATURE: 99 F | WEIGHT: 121 LBS | RESPIRATION RATE: 16 BRPM | HEIGHT: 61 IN | OXYGEN SATURATION: 93 %

## 2020-01-08 DIAGNOSIS — N30.01 ACUTE CYSTITIS WITH HEMATURIA: ICD-10-CM

## 2020-01-08 LAB
APPEARANCE UR: NORMAL
BILIRUB UR STRIP-MCNC: NEGATIVE MG/DL
COLOR UR AUTO: YELLOW
GLUCOSE UR STRIP.AUTO-MCNC: NEGATIVE MG/DL
KETONES UR STRIP.AUTO-MCNC: NEGATIVE MG/DL
LEUKOCYTE ESTERASE UR QL STRIP.AUTO: NORMAL
NITRITE UR QL STRIP.AUTO: NEGATIVE
PH UR STRIP.AUTO: 6.5 [PH] (ref 5–8)
PROT UR QL STRIP: NEGATIVE MG/DL
RBC UR QL AUTO: NORMAL
SP GR UR STRIP.AUTO: 1.01
UROBILINOGEN UR STRIP-MCNC: 0.2 MG/DL

## 2020-01-08 PROCEDURE — 81002 URINALYSIS NONAUTO W/O SCOPE: CPT | Performed by: FAMILY MEDICINE

## 2020-01-08 PROCEDURE — 87186 SC STD MICRODIL/AGAR DIL: CPT

## 2020-01-08 PROCEDURE — 99214 OFFICE O/P EST MOD 30 MIN: CPT | Performed by: FAMILY MEDICINE

## 2020-01-08 PROCEDURE — 87077 CULTURE AEROBIC IDENTIFY: CPT

## 2020-01-08 PROCEDURE — 87086 URINE CULTURE/COLONY COUNT: CPT

## 2020-01-08 RX ORDER — CEFDINIR 300 MG/1
300 CAPSULE ORAL 2 TIMES DAILY
Qty: 14 CAP | Refills: 0 | Status: SHIPPED | OUTPATIENT
Start: 2020-01-08 | End: 2020-01-15

## 2020-01-08 ASSESSMENT — ENCOUNTER SYMPTOMS
BACK PAIN: 1
NAUSEA: 0
CHILLS: 0
FEVER: 0

## 2020-01-08 NOTE — PROGRESS NOTES
"Subjective:   Dayana Lechuga  is a 89 y.o. female who presents for Urinary Frequency (x 4 days,off / on urinary frequency)        Urinary Frequency   This is a new problem. The current episode started in the past 7 days. The problem occurs constantly. The problem has been gradually worsening. Pertinent negatives include no chills, fever or nausea.     Review of Systems   Constitutional: Negative for chills and fever.   Gastrointestinal: Negative for nausea.   Genitourinary: Positive for dysuria and frequency.   Musculoskeletal: Positive for back pain.     Allergies   Allergen Reactions   • Bactrim [Sulfamethoxazole W-Trimethoprim]      Rash   • Pcn [Penicillins] Rash     About 70 years   • Codeine Vomiting      Objective:   /80 (BP Location: Left arm, Patient Position: Sitting, BP Cuff Size: Adult)   Pulse 88   Temp 37.2 °C (99 °F) (Temporal)   Resp 16   Ht 1.552 m (5' 1.1\")   Wt 54.9 kg (121 lb)   LMP  (LMP Unknown)   SpO2 93%   BMI 22.79 kg/m²   Physical Exam  Constitutional:       General: She is not in acute distress.     Appearance: She is well-developed.   HENT:      Head: Normocephalic and atraumatic.   Eyes:      Conjunctiva/sclera: Conjunctivae normal.      Pupils: Pupils are equal, round, and reactive to light.   Cardiovascular:      Rate and Rhythm: Normal rate and regular rhythm.      Heart sounds: No murmur.   Pulmonary:      Effort: Pulmonary effort is normal. No respiratory distress.      Breath sounds: Normal breath sounds.   Abdominal:      General: Bowel sounds are normal. There is no distension.      Palpations: Abdomen is soft.      Tenderness: There is tenderness in the suprapubic area.   Skin:     General: Skin is warm and dry.   Neurological:      Mental Status: She is alert and oriented to person, place, and time.      Sensory: No sensory deficit.      Deep Tendon Reflexes: Reflexes are normal and symmetric.           Assessment/Plan:   1. Acute cystitis with hematuria  - " POCT Urinalysis  - cefdinir (OMNICEF) 300 MG Cap; Take 1 Cap by mouth 2 times a day for 7 days.  Dispense: 14 Cap; Refill: 0  - URINE CULTURE(NEW); Future    Differential diagnosis, natural history, supportive care, and indications for immediate follow-up discussed.

## 2020-01-10 ENCOUNTER — TELEPHONE (OUTPATIENT)
Dept: URGENT CARE | Facility: CLINIC | Age: 85
End: 2020-01-10

## 2020-01-10 RX ORDER — AMLODIPINE BESYLATE 5 MG/1
5 TABLET ORAL DAILY
Qty: 90 TAB | Refills: 0 | Status: SHIPPED | OUTPATIENT
Start: 2020-01-10 | End: 2020-04-13

## 2020-01-10 RX ORDER — CARVEDILOL 12.5 MG/1
12.5 TABLET ORAL 2 TIMES DAILY
Qty: 180 TAB | Refills: 0 | Status: SHIPPED | OUTPATIENT
Start: 2020-01-10 | End: 2020-04-13

## 2020-01-10 RX ORDER — LISINOPRIL 20 MG/1
20 TABLET ORAL DAILY
Qty: 100 TAB | Refills: 0 | Status: SHIPPED | OUTPATIENT
Start: 2020-01-10 | End: 2020-04-29

## 2020-01-10 RX ORDER — FLUTICASONE PROPIONATE 50 MCG
SPRAY, SUSPENSION (ML) NASAL
Qty: 16 G | Refills: 0 | Status: SHIPPED | OUTPATIENT
Start: 2020-01-10 | End: 2020-04-13

## 2020-01-10 RX ORDER — BECLOMETHASONE DIPROPIONATE HFA 80 UG/1
AEROSOL, METERED RESPIRATORY (INHALATION)
Qty: 10.6 G | Refills: 0 | Status: SHIPPED | OUTPATIENT
Start: 2020-01-10 | End: 2020-03-23

## 2020-01-10 NOTE — TELEPHONE ENCOUNTER
[facility] results[facility]  Patient tested positive for MRSA.

## 2020-01-16 ENCOUNTER — PATIENT OUTREACH (OUTPATIENT)
Dept: HEALTH INFORMATION MANAGEMENT | Facility: OTHER | Age: 85
End: 2020-01-16

## 2020-01-20 ENCOUNTER — PATIENT OUTREACH (OUTPATIENT)
Dept: HEALTH INFORMATION MANAGEMENT | Facility: OTHER | Age: 85
End: 2020-01-20

## 2020-01-27 NOTE — PROGRESS NOTES
A 89-year-old female was an emergent admission to [facility] from 12/19/2019 to 12/20/2019 to treat Chest pain, unspecified. IHD visited the patient bedside. The patient was discharged Home. No additional medical conditions were listed. The patient was not under clinical case management.     The patient has no medication orders.     The patient was ordered to follow-up with PCP and Specialist. The patient had the following appointment: 1/20/2020 @ 11:45 Ned Maria general/family practice - PATIENT DECLINED APPOINTMENT. The patient has future appointments scheduled for:     1) 2/4/2020 @ 2:10 Torres Fall , physical medicine/rehab - SCHEDULED     2) 2/20/2020 @ 1:40 Ned Maria general/family fer - SCHEDULED    IHD communicated with the patient/caregiver via phone.

## 2020-01-29 ENCOUNTER — PATIENT OUTREACH (OUTPATIENT)
Dept: HEALTH INFORMATION MANAGEMENT | Facility: OTHER | Age: 85
End: 2020-01-29

## 2020-01-29 NOTE — PROGRESS NOTES
Outcome: Left Message- SCP intro needed     Please transfer to Patient Outreach Team at [phone] when patient returns call.    [facility] Verified: yes    Attempt # 1

## 2020-01-31 NOTE — PROGRESS NOTES
Outcome: Left Message- SCP intro needed & IHD     Please transfer to Patient Outreach Team at [phone] when patient returns call.    [facility] Verified: yes    Attempt # 3

## 2020-02-04 ENCOUNTER — TELEPHONE (OUTPATIENT)
Dept: MEDICAL GROUP | Facility: PHYSICIAN GROUP | Age: 85
End: 2020-02-04

## 2020-02-04 NOTE — TELEPHONE ENCOUNTER
Phone Number Called: [phone] (home)       Call outcome: Did not leave a detailed message. Requested patient to call back.    Message: Left message to CB about Med Refill.

## 2020-02-04 NOTE — TELEPHONE ENCOUNTER
ESTABLISHED PATIENT PRE-VISIT PLANNING     Patient was NOT contacted to complete PVP.      1.  Reviewed notes from the last few office visits within the medical group: Yes    2.  If any orders were placed at last visit or intended to be done for this visit (i.e. 6 mos follow-up), do we have Results/Consult Notes?        •  Labs - Labs ordered, completed on 09/23/2019 and results are in chart.         •  Imaging - Imaging was not ordered at last office visit.       •  Referrals - No referrals were ordered at last office visit.    3. Is this appointment scheduled as a Hospital Follow-Up? No    4.  Immunizations were updated in Epic using WebIZ?: Epic matches WebIZ       •  Web Iz Recommendations: TD, VARICELLA (Chicken Pox)  and SHINGRIX (Shingles)    5.  Patient is due for the following Health Maintenance Topics:   Health Maintenance Due   Topic Date Due   • IMM HEP B VACCINE (1 of 3 - Risk 3-dose series) 02/18/1948   • IMM ZOSTER VACCINES (1 of 2) 02/18/1979   • Annual Wellness Visit  06/21/2017   • MAMMOGRAM  02/06/2019       - Patient plans to schedule appointment for Annual Wellness Visit (AWV) and Mammogram.    6. Orders for overdue Health Maintenance topics pended in Pre-Charting? NO    7.  AHA (MDX) form printed for Provider? YES    8.  Patient was NOT informed to arrive 15 min prior to their scheduled appointment and bring in their medication bottles.

## 2020-02-05 NOTE — TELEPHONE ENCOUNTER
Phone Number Called: [phone] (home)       Call outcome: Did not leave a detailed message. Requested patient to call back.    Message: Left VM for Pt to CB about Medication clarification

## 2020-02-06 NOTE — TELEPHONE ENCOUNTER
Phone Number Called: [phone] (home) .    Call outcome: Did not leave a detailed message. Requested patient to call back.    Message: Left 2nd VM for Pt to CB in regards to Medication Clarification

## 2020-02-07 RX ORDER — POTASSIUM CHLORIDE 20 MEQ/1
TABLET, EXTENDED RELEASE ORAL
Qty: 180 TAB | Refills: 0 | Status: SHIPPED | OUTPATIENT
Start: 2020-02-07 | End: 2020-06-30

## 2020-02-07 NOTE — TELEPHONE ENCOUNTER
Phone Number Called: [phone] (home)       Call outcome: Spoke to patient regarding message below.    Message: Pt replied she has been taking Potassium Chloride for years, twice a day.

## 2020-02-20 ENCOUNTER — OFFICE VISIT (OUTPATIENT)
Dept: MEDICAL GROUP | Facility: PHYSICIAN GROUP | Age: 85
End: 2020-02-20
Payer: MEDICARE

## 2020-02-20 VITALS
SYSTOLIC BLOOD PRESSURE: 124 MMHG | DIASTOLIC BLOOD PRESSURE: 60 MMHG | HEART RATE: 68 BPM | OXYGEN SATURATION: 96 % | WEIGHT: 122.4 LBS | RESPIRATION RATE: 16 BRPM | TEMPERATURE: 99.3 F | HEIGHT: 61 IN | BODY MASS INDEX: 23.11 KG/M2

## 2020-02-20 DIAGNOSIS — J90 PLEURAL EFFUSION ON LEFT: ICD-10-CM

## 2020-02-20 DIAGNOSIS — F41.9 ANXIETY: ICD-10-CM

## 2020-02-20 DIAGNOSIS — M48.062 SPINAL STENOSIS OF LUMBAR REGION WITH NEUROGENIC CLAUDICATION: ICD-10-CM

## 2020-02-20 PROBLEM — R33.9 INCOMPLETE EMPTYING OF BLADDER: Status: ACTIVE | Noted: 2019-10-08

## 2020-02-20 PROCEDURE — 99214 OFFICE O/P EST MOD 30 MIN: CPT | Performed by: NURSE PRACTITIONER

## 2020-02-20 RX ORDER — LORAZEPAM 0.5 MG/1
.5-1 TABLET ORAL 2 TIMES DAILY
Qty: 90 TAB | Refills: 3 | Status: SHIPPED | OUTPATIENT
Start: 2020-02-20 | End: 2020-07-30 | Stop reason: SDUPTHER

## 2020-02-20 RX ORDER — GABAPENTIN 100 MG/1
200 CAPSULE ORAL 3 TIMES DAILY
Qty: 540 CAP | Refills: 3 | Status: SHIPPED | OUTPATIENT
Start: 2020-02-20 | End: 2020-05-20

## 2020-02-20 ASSESSMENT — PATIENT HEALTH QUESTIONNAIRE - PHQ9: CLINICAL INTERPRETATION OF PHQ2 SCORE: 0

## 2020-02-20 NOTE — PROGRESS NOTES
Chief Complaint   Patient presents with   • Follow-Up     3 mo        HISTORY OF THE PRESENT ILLNESS: This is a 89 y.o. female established patient who presents today for a 3 month follow up.    Pleural effusion on left  She was recently admitted to the hospital for chest pain on 12/19/2019. She underwent extensive cardiac workup, which was normal, however they did find a small left pleural effusion via chest xray. She was recommended to have follow up imaging, however did not complete this after being discharged from the hospital. Her chest pain resolved upon discharge. She denies any symptoms of shortness of breath since her admission.     IMPRESSION of chest xray from 12/19/2019:   Small left pleural effusion with linear atelectasis within the left lung base.    Spinal stenosis of lumbar region with neurogenic claudication  Known chronic history and well controlled on gabapentin without any reported side effects. She regularly attends PT.    Anxiety  Known history and well controlled on lorazepam 0.5 mg without any reported side effects.    Of note, she has new complaints of chronic right knee pain with an onset of 1 month. Her pain is located to the inside of her knee and she notes associated slight right ankle swelling. She has a history of a right knee injury and is currently experiencing a mild flare up. She presents in a knee brace today. She has been doing at home exercises which seems to alleviate her pain. She is not taking any pain medications for this. She denies shortness of breath.       Past Medical History:   Diagnosis Date   • Allergy    • Anxiety    • ASTHMA    • CAD (coronary artery disease)     JT to RCA; 70% stenosis in LAD   • Hyperlipidemia    • Hypertension    • Lumbar back pain 9/10/2016   • OSTEOPOROSIS    • PVD (peripheral vascular disease) (HCC)     70% PAD-followed by [facility]       Past Surgical History:   Procedure Laterality Date   • ABDOMINAL HYSTERECTOMY TOTAL     • APPENDECTOMY      • HERNIA REPAIR     • TONSILLECTOMY     • ZZZ CARDIAC CATH         Family Status   Relation Name Status   • Neg Hx  (Not Specified)     Family History   Problem Relation Age of Onset   • Heart Disease Neg Hx    • Heart Failure Neg Hx    • Hyperlipidemia Neg Hx        Social History     Tobacco Use   • Smoking status: Never Smoker   • Smokeless tobacco: Never Used   Substance Use Topics   • Alcohol use: No     Alcohol/week: 0.0 oz   • Drug use: No       Allergies: Antihistamines, loratadine-type  [piperidines]; Bactrim [sulfamethoxazole w-trimethoprim]; Codeine; Pcn [penicillins]; and Codeine    Current Outpatient Medications Ordered in Epic   Medication Sig Dispense Refill   • gabapentin (NEURONTIN) 100 MG Cap Take 2 Caps by mouth 3 times a day for 90 days. 540 Cap 3   • LORazepam (ATIVAN) 0.5 MG Tab Take 1-2 Tabs by mouth 2 Times a Day for 30 days. 0.5mg in AFTERNOON  1mg at PM   90 tabs/30 days 90 Tab 3   • potassium chloride SA (KDUR) 20 MEQ Tab CR TAKE ONE TABLET BY MOUTH TWICE DAILY  180 Tab 0   • QVAR REDIHALER 80 MCG/ACT inhaler INHALE 1 PUFF BY MOUTH TWICE DAILY  10.6 g 0   • lisinopril (PRINIVIL) 20 MG Tab Take 1 Tab by mouth every day. 100 Tab 0   • amLODIPine (NORVASC) 5 MG Tab Take 1 Tab by mouth every day. 90 Tab 0   • carvedilol (COREG) 12.5 MG Tab Take 1 Tab by mouth 2 times a day. 180 Tab 0   • fluticasone (FLONASE) 50 MCG/ACT nasal spray USE 1 SPRAY IN EACH NOSTRIL DAILY 16 g 0   • atorvastatin (LIPITOR) 80 MG tablet Take 80 mg by mouth every evening.     • Biotin w/ Vitamins C & E (HAIR/SKIN/NAILS) 1250-7.5-7.5 MCG-MG-UNT Chew Tab Take 1 Tab by mouth every day.     • isosorbide mononitrate SR (IMDUR) 30 MG TABLET SR 24 HR Take 30 mg by mouth every evening.     • therapeutic multivitamin-minerals (THERAGRAN-M) Tab Take 1 Tab by mouth every day.     • montelukast (SINGULAIR) 10 MG Tab Take 10 mg by mouth every day.     • HYDROCORTISONE, TOPICAL, ([employer]S CORTISONE COOLING RELIEF) 1 % Gel 1 g by Apply  externally route 1 time daily as needed (Back Itching.).     • nitroglycerin (NITROSTAT) 0.4 MG SL Tab Place 1 tab under tongue every 5 min as needed for chest pain max 3 doses 100 Tab 0   • guaifenesin LA (MUCINEX) 600 MG TABLET SR 12 HR Take 600 mg by mouth every morning.     • Ascorbic Acid (VITAMIN C) 1000 MG Tab Take 1,000 mg by mouth every day.     • albuterol 108 (90 Base) MCG/ACT Aero Soln inhalation aerosol Inhale 2 Puffs by mouth every 6 hours as needed for Shortness of Breath. 20.1 g 0   • VITAMIN E PO Take 1 Dose by mouth every day. Unknown OTC Strength.     • aspirin (ASA) 81 MG Chew Tab chewable tablet Take 1 Tab by mouth every day. 100 Tab 11   • Cholecalciferol (VITAMIN D) 2000 UNIT Tab Take 2,000 Units by mouth every day.     • omeprazole (PRILOSEC) 20 MG delayed-release capsule Take 20 mg by mouth 2 times a day.     • Menthol-Camphor (ICY HOT ARTHRITIS PAIN RELIEF) 16-4 % Lotion 1 g by Apply externally route 1 time daily as needed (Back Itching.).       No current Epic-ordered facility-administered medications on file.        Review of Systems   Constitutional:  Negative for fever, chills, weight loss and malaise/fatigue.   HENT:  Negative for ear pain, nosebleeds, congestion, sore throat and neck pain.    Eyes:  Negative for blurred vision.   Respiratory:  Negative for cough, sputum production, shortness of breath and wheezing.    Cardiovascular:  Negative for chest pain, palpitations, orthopnea and leg swelling.   Gastrointestinal:  Negative for heartburn, nausea, vomiting and abdominal pain.   Genitourinary:  Negative for dysuria, urgency and frequency.   Musculoskeletal: Chronic right knee pain, Negative for myalgias, back pain    Skin:  Negative for rash and itching.   Neurological:  Negative for dizziness, tingling, tremors, sensory change, focal weakness and headaches.   Endo/Heme/Allergies:  Does not bruise/bleed easily.   Psychiatric/Behavioral:  Negative for depression, anxiety, or memory  "loss.     All other systems reviewed and are negative except as in HPI.    Exam: /60 (BP Location: Right arm, Patient Position: Sitting, BP Cuff Size: Adult)   Pulse 68   Temp 37.4 °C (99.3 °F) (Temporal)   Resp 16   Ht 1.549 m (5' 1\")   Wt 55.5 kg (122 lb 6.4 oz)   SpO2 96%   General:  Normal appearing. No distress.  Neck: Supple without JVD or bruit. Thyroid is not enlarged.  Pulmonary:  Clear to ausculation.  Normal effort. No rales, ronchi, or wheezing.  Cardiovascular:  Regular rate and rhythm without murmur. Carotid and radial pulses are intact and equal bilaterally.  Neurologic:  Grossly nonfocal  Skin:  Warm and dry.  No obvious lesions.  Musculoskeletal:  Normal gait. No extremity cyanosis, clubbing, or edema. Knees: No erythema/edema/ecchymosis/effusion. Tenderness to palpation on right lateral knee. Joint line tenderness negative. Patellar grind test negative. Lachman's negative. Nieves's negative. ROM intact.  Psych:  Normal mood and affect. Alert and oriented x3. Judgment and insight is normal.    PLAN:    1. Pleural effusion on left  Portable chest xray from 12/19/2019 showed evidence of a small left pleural effusion. Follow up imaging ordered to further evaluate.  - DX-CHEST-2 VIEWS; Future    2. Spinal stenosis of lumbar region with neurogenic claudication  Known chronic history. She follows up with PT and will continue to do so. She is well controlled on gabapentin and a refill is provided. Reassured the patient that the dose of gabapentin she is on is safe.  - gabapentin (NEURONTIN) 100 MG Cap; Take 2 Caps by mouth 3 times a day for 90 days.  Dispense: 540 Cap; Refill: 3    3. Anxiety  Known chronic history. She is well controlled on lorazepam and a refill is provided.  - LORazepam (ATIVAN) 0.5 MG Tab; Take 1-2 Tabs by mouth 2 Times a Day for 30 days. 0.5mg in AFTERNOON  1mg at PM   90 tabs/30 days  Dispense: 90 Tab; Refill: 3      Follow-up in 3 months or sooner as needed.    Please " note that this dictation was created using voice recognition software. I have made every reasonable attempt to correct obvious errors, but I expect that there are errors of grammar and possibly content that I did not discover before finalizing the note.    Assessment/Plan  1. Pleural effusion on left  DX-CHEST-2 VIEWS   2. Spinal stenosis of lumbar region with neurogenic claudication  gabapentin (NEURONTIN) 100 MG Cap   3. Anxiety  LORazepam (ATIVAN) 0.5 MG Tab       Patient is encouraged to be seen in the emergency room for chest pain, palpitations, shortness of breath, dizziness, severe abdominal pain or other concerning symptoms.     I have placed the below orders and discussed them with an approved delegating provider. The MA is performing the below orders under the direction of Dr. Collier.       Abner PETTIT (Scribe), am scribing for, and in the presence of, SOHEILA Mills    Electronically signed by: Abner Carvajal ([place]), 2/20/2020    Ned PETTIT APRN personally performed the services described in this documentation, as scribed by Abner Carvajal in my presence, and it is both accurate and complete.

## 2020-03-04 ENCOUNTER — OFFICE VISIT (OUTPATIENT)
Dept: CARDIOLOGY | Facility: MEDICAL CENTER | Age: 85
End: 2020-03-04
Payer: MEDICARE

## 2020-03-04 VITALS
HEIGHT: 63 IN | WEIGHT: 121 LBS | BODY MASS INDEX: 21.44 KG/M2 | DIASTOLIC BLOOD PRESSURE: 84 MMHG | OXYGEN SATURATION: 94 % | HEART RATE: 76 BPM | SYSTOLIC BLOOD PRESSURE: 144 MMHG | RESPIRATION RATE: 14 BRPM

## 2020-03-04 DIAGNOSIS — I70.201 POPLITEAL ARTERY STENOSIS, RIGHT (HCC): ICD-10-CM

## 2020-03-04 DIAGNOSIS — Z98.42 S/P BILATERAL CATARACT EXTRACTION: ICD-10-CM

## 2020-03-04 DIAGNOSIS — Z98.41 S/P BILATERAL CATARACT EXTRACTION: ICD-10-CM

## 2020-03-04 DIAGNOSIS — E78.5 DYSLIPIDEMIA: Chronic | ICD-10-CM

## 2020-03-04 DIAGNOSIS — M54.42 ACUTE BILATERAL LOW BACK PAIN WITH BILATERAL SCIATICA: ICD-10-CM

## 2020-03-04 DIAGNOSIS — M54.41 ACUTE BILATERAL LOW BACK PAIN WITH BILATERAL SCIATICA: ICD-10-CM

## 2020-03-04 DIAGNOSIS — I10 ESSENTIAL HYPERTENSION: ICD-10-CM

## 2020-03-04 DIAGNOSIS — I73.9 CLAUDICATION OF RIGHT LOWER EXTREMITY (HCC): ICD-10-CM

## 2020-03-04 DIAGNOSIS — J90 PLEURAL EFFUSION: ICD-10-CM

## 2020-03-04 DIAGNOSIS — I73.9 PVD (PERIPHERAL VASCULAR DISEASE) (HCC): ICD-10-CM

## 2020-03-04 DIAGNOSIS — I25.119 CORONARY ARTERY DISEASE INVOLVING NATIVE CORONARY ARTERY OF NATIVE HEART WITH ANGINA PECTORIS (HCC): ICD-10-CM

## 2020-03-04 PROCEDURE — 99214 OFFICE O/P EST MOD 30 MIN: CPT | Performed by: INTERNAL MEDICINE

## 2020-03-04 RX ORDER — NITROGLYCERIN 0.4 MG/1
TABLET SUBLINGUAL
Qty: 100 TAB | Refills: 0 | Status: SHIPPED | OUTPATIENT
Start: 2020-03-04 | End: 2021-03-17

## 2020-03-04 ASSESSMENT — ENCOUNTER SYMPTOMS
GASTROINTESTINAL NEGATIVE: 1
PND: 0
RESPIRATORY NEGATIVE: 1
DIZZINESS: 0
NEUROLOGICAL NEGATIVE: 1
SPUTUM PRODUCTION: 0
STRIDOR: 0
HEMOPTYSIS: 0
LOSS OF CONSCIOUSNESS: 0
CHILLS: 0
CARDIOVASCULAR NEGATIVE: 1
BACK PAIN: 1
CLAUDICATION: 0
COUGH: 0
PALPITATIONS: 0
WEAKNESS: 0
BRUISES/BLEEDS EASILY: 0
CONSTITUTIONAL NEGATIVE: 1
ORTHOPNEA: 0
EYES NEGATIVE: 1
SHORTNESS OF BREATH: 0
FEVER: 0
WHEEZING: 0
SORE THROAT: 0

## 2020-03-04 ASSESSMENT — FIBROSIS 4 INDEX: FIB4 SCORE: 1.97

## 2020-03-04 NOTE — PROGRESS NOTES
Chief Complaint   Patient presents with   • Coronary Artery Disease     F/V: 5 MO       Subjective:   Dayana Lechuga is a 89 y.o. female who presents today as a follow-up for her CAD peripheral vascular disease hypertension hyperlipidemia and chest pain.  She is had chest pain twice in the last couple of months.  One was describes a tingling sensation in her mid chest.  Today she woke up with chest pain describes a squeezing-like sensation in her mid chest which spread out was present at rest and got better when she took her dog for a walk.  Though she has nitroglycerin she did not take any.  She has not had a recurrence and this is the first time it happened.  She is well medically managed.  Her blood pressure at home is running 120s to 130s.  She is denying symptoms of claudication.    Past Medical History:   Diagnosis Date   • Allergy    • Anxiety    • ASTHMA    • CAD (coronary artery disease)     JT to RCA; 70% stenosis in LAD   • Hyperlipidemia    • Hypertension    • Lumbar back pain 9/10/2016   • OSTEOPOROSIS    • PVD (peripheral vascular disease) ([facility])     70% PAD-followed by [facility]     Past Surgical History:   Procedure Laterality Date   • ABDOMINAL HYSTERECTOMY TOTAL     • APPENDECTOMY     • HERNIA REPAIR     • TONSILLECTOMY     • ZZZ CARDIAC CATH       Family History   Problem Relation Age of Onset   • Heart Disease Neg Hx    • Heart Failure Neg Hx    • Hyperlipidemia Neg Hx      Social History     Socioeconomic History   • Marital status:      Spouse name: Not on file   • Number of children: Not on file   • Years of education: Not on file   • Highest education level: Not on file   Occupational History   • Not on file   Social Needs   • Financial resource strain: Not on file   • Food insecurity     Worry: Not on file     Inability: Not on file   • Transportation needs     Medical: Not on file     Non-medical: Not on file   Tobacco Use   • Smoking status: Never Smoker   • Smokeless tobacco:  Never Used   Substance and Sexual Activity   • Alcohol use: No     Alcohol/week: 0.0 oz   • Drug use: No   • Sexual activity: Not Currently   Lifestyle   • Physical activity     Days per week: Not on file     Minutes per session: Not on file   • Stress: Not on file   Relationships   • Social connections     Talks on phone: Not on file     Gets together: Not on file     Attends [employer] service: Not on file     Active member of club or organization: Not on file     Attends meetings of clubs or organizations: Not on file     Relationship status: Not on file   • Intimate partner violence     Fear of current or ex partner: Not on file     Emotionally abused: Not on file     Physically abused: Not on file     Forced sexual activity: Not on file   Other Topics Concern   •  Service No   • Blood Transfusions Yes   • Caffeine Concern No   • Occupational Exposure No   • Hobby Hazards No   • Sleep Concern Yes   • Stress Concern Yes   • Weight Concern No   • Special Diet No   • Back Care No   • Exercise No   • Bike Helmet No   • Seat Belt Yes   • Self-Exams No   Social History Narrative   • Not on file     Allergies   Allergen Reactions   • Bactrim [Sulfamethoxazole W-Trimethoprim] Rash     Rash   • Codeine    • Pcn [Penicillins] Rash     About 70 years     Outpatient Encounter Medications as of 3/4/2020   Medication Sig Dispense Refill   • nitroglycerin (NITROSTAT) 0.4 MG SL Tab Place 1 tab under tongue every 5 min as needed for chest pain max 3 doses 100 Tab 0   • gabapentin (NEURONTIN) 100 MG Cap Take 2 Caps by mouth 3 times a day for 90 days. 540 Cap 3   • LORazepam (ATIVAN) 0.5 MG Tab Take 1-2 Tabs by mouth 2 Times a Day for 30 days. 0.5mg in AFTERNOON  1mg at PM   90 tabs/30 days 90 Tab 3   • potassium chloride SA (KDUR) 20 MEQ Tab CR TAKE ONE TABLET BY MOUTH TWICE DAILY  180 Tab 0   • QVAR REDIHALER 80 MCG/ACT inhaler INHALE 1 PUFF BY MOUTH TWICE DAILY  10.6 g 0   • lisinopril (PRINIVIL) 20 MG Tab Take 1 Tab by  mouth every day. 100 Tab 0   • amLODIPine (NORVASC) 5 MG Tab Take 1 Tab by mouth every day. 90 Tab 0   • carvedilol (COREG) 12.5 MG Tab Take 1 Tab by mouth 2 times a day. 180 Tab 0   • fluticasone (FLONASE) 50 MCG/ACT nasal spray USE 1 SPRAY IN EACH NOSTRIL DAILY 16 g 0   • atorvastatin (LIPITOR) 80 MG tablet Take 80 mg by mouth every evening.     • Biotin w/ Vitamins C & E (HAIR/SKIN/NAILS) 1250-7.5-7.5 MCG-MG-UNT Chew Tab Take 1 Tab by mouth every day.     • isosorbide mononitrate SR (IMDUR) 30 MG TABLET SR 24 HR Take 30 mg by mouth every evening.     • therapeutic multivitamin-minerals (THERAGRAN-M) Tab Take 1 Tab by mouth every day.     • montelukast (SINGULAIR) 10 MG Tab Take 10 mg by mouth every day.     • Menthol-Camphor (ICY HOT ARTHRITIS PAIN RELIEF) 16-4 % Lotion 1 g by Apply externally route 1 time daily as needed (Back Itching.).     • HYDROCORTISONE, TOPICAL, (CVS CORTISONE COOLING RELIEF) 1 % Gel 1 g by Apply externally route 1 time daily as needed (Back Itching.).     • guaifenesin LA (MUCINEX) 600 MG TABLET SR 12 HR Take 600 mg by mouth every morning.     • Ascorbic Acid (VITAMIN C) 1000 MG Tab Take 1,000 mg by mouth every day.     • albuterol 108 (90 Base) MCG/ACT Aero Soln inhalation aerosol Inhale 2 Puffs by mouth every 6 hours as needed for Shortness of Breath. 20.1 g 0   • VITAMIN E PO Take 1 Dose by mouth every day. Unknown OTC Strength.     • aspirin (ASA) 81 MG Chew Tab chewable tablet Take 1 Tab by mouth every day. 100 Tab 11   • Cholecalciferol (VITAMIN D) 2000 UNIT Tab Take 2,000 Units by mouth every day.     • omeprazole (PRILOSEC) 20 MG delayed-release capsule Take 20 mg by mouth every day.     • [DISCONTINUED] nitroglycerin (NITROSTAT) 0.4 MG SL Tab Place 1 tab under tongue every 5 min as needed for chest pain max 3 doses 100 Tab 0     No facility-administered encounter medications on file as of 3/4/2020.      Review of Systems   Constitutional: Negative.  Negative for chills, fever and  "malaise/fatigue.   HENT: Negative.  Negative for sore throat.    Eyes: Negative.    Respiratory: Negative.  Negative for cough, hemoptysis, sputum production, shortness of breath, wheezing and stridor.    Cardiovascular: Negative.  Negative for chest pain, palpitations, orthopnea, claudication, leg swelling and PND.   Gastrointestinal: Negative.    Genitourinary: Negative.    Musculoskeletal: Positive for back pain and joint pain.   Skin: Negative.    Neurological: Negative.  Negative for dizziness, loss of consciousness and weakness.   Endo/Heme/Allergies: Negative.  Does not bruise/bleed easily.   All other systems reviewed and are negative.       Objective:   /84 (BP Location: Left arm, Patient Position: Sitting, BP Cuff Size: Adult)   Pulse 76   Resp 14   Ht 1.6 m (5' 3\")   Wt 54.9 kg (121 lb)   LMP  (LMP Unknown)   SpO2 94%   BMI 21.43 kg/m²     Physical Exam   Constitutional: She appears well-developed and well-nourished. No distress.   HENT:   Head: Normocephalic and atraumatic.   Right Ear: External ear normal.   Left Ear: External ear normal.   Nose: Nose normal.   Mouth/Throat: No oropharyngeal exudate.   Eyes: Pupils are equal, round, and reactive to light. Conjunctivae and EOM are normal. Right eye exhibits no discharge. Left eye exhibits no discharge. No scleral icterus.   Neck: Neck supple. No JVD present.   Cardiovascular: Normal rate, regular rhythm and intact distal pulses. Exam reveals no gallop and no friction rub.   No murmur heard.  Pulmonary/Chest: Effort normal. No stridor. No respiratory distress. She has no wheezes. She has no rales. She exhibits no tenderness.   Abdominal: Soft. She exhibits no distension. There is no guarding.   Musculoskeletal: Normal range of motion.         General: No tenderness, deformity or edema.   Neurological: She is alert. She has normal reflexes. She displays normal reflexes. No cranial nerve deficit. She exhibits normal muscle tone. Coordination " normal.   Skin: Skin is warm and dry. No rash noted. She is not diaphoretic. No erythema. No pallor.   Psychiatric: She has a normal mood and affect. Her behavior is normal. Judgment and thought content normal.   Nursing note and vitals reviewed.      Assessment:     1. Acute bilateral low back pain with bilateral sciatica     2. Claudication of right lower extremity (HCC)     3. Coronary artery disease involving native coronary artery of native heart with angina pectoris (HCC)  nitroglycerin (NITROSTAT) 0.4 MG SL Tab   4. Dyslipidemia     5. Essential hypertension     6. Pleural effusion     7. Popliteal artery stenosis, right (HCC)     8. S/P bilateral cataract extraction         Medical Decision Making:  Today's Assessment / Status / Plan:     89-year-old female peripheral vascular disease coronary disease hypertension hyperlipidemia.  Her lipids are goal.  Her blood pressures goal.  For her chest pain I do not feel that it sounds cardiac in origin.  I did refill her nitroglycerin encouraged her to take it.  I did offer her stress testing and/or angiography we agreed that this is invasive for her and not necessarily indicated.  We can see her back in 1 year.

## 2020-03-12 NOTE — OP THERAPY DAILY TREATMENT
Outpatient Physical Therapy  DAILY TREATMENT     [facility] [address], [address]  [place] [address]  Phone:  [phone]    Date: 11/05/2019    Patient: Dayana Lechuga  YOB: 1929  MRN: [id number]     Time Calculation  Start time: 1000  Stop time: 1030 Time Calculation (min): 30 minutes       Chief Complaint: Back Problem    Visit #: 5    SUBJECTIVE:  NOt sure if PT is helping.  Feels like it may have in the beginning but now she is feeling more sore in L leg and back.  PT in the morning throws off her medication schedule as well and makes it less effective.      OBJECTIVE:          Therapeutic Exercises (CPT 63101):     1. SKTC, 5x 15 sec. each    2. Post Pelvic tilts, 10x 2 sets, hold 5 sec.    3. L/S rotation, 10x, hooklying    4. Sahrmann B hip abd/ER x 15    5. Hip/knee flex--Ball rolls, 10x 2 sets    6. Dynadisc static stand, B head rot, lateral WS, minisquats x 1 min each with SBA/CGA    7. B post chain stretch in sitting with leg ext and APs x 10 each.       Time-based treatments/modalities:  Therapeutic exercise minutes (CPT 60474): 30 minutes         ASSESSMENT:   Response to treatment: Pt demonstrating good B hip flexibility.  Continues to have decreased activity tolerance in B quads.  Minimal pelvic excursion with tilting.  Increased core stability with practice. Decreased intensity of session today with emphasis on flexibility and gentle core recruitment.  If pain persists with these changes will most likely d/c PT.      PLAN/RECOMMENDATIONS:   Plan for treatment: therapy treatment to continue next visit.  Planned interventions for next visit: continue with current treatment. Assess changes made above.       Detail Level: Detailed Quality 110: Preventive Care And Screening: Influenza Immunization: Influenza Immunization previously received during influenza season

## 2020-03-23 RX ORDER — BECLOMETHASONE DIPROPIONATE HFA 80 UG/1
AEROSOL, METERED RESPIRATORY (INHALATION)
Qty: 10.6 G | Refills: 0 | Status: SHIPPED | OUTPATIENT
Start: 2020-03-23 | End: 2020-05-14

## 2020-04-10 DIAGNOSIS — E78.5 DYSLIPIDEMIA: ICD-10-CM

## 2020-04-10 DIAGNOSIS — I25.9 CHEST PAIN DUE TO MYOCARDIAL ISCHEMIA, UNSPECIFIED ISCHEMIC CHEST PAIN TYPE: ICD-10-CM

## 2020-04-13 ENCOUNTER — TELEPHONE (OUTPATIENT)
Dept: PHYSICAL THERAPY | Facility: REHABILITATION | Age: 85
End: 2020-04-13

## 2020-04-13 RX ORDER — AMLODIPINE BESYLATE 5 MG/1
TABLET ORAL
Qty: 90 TAB | Refills: 0 | Status: SHIPPED | OUTPATIENT
Start: 2020-04-13 | End: 2020-06-30

## 2020-04-13 RX ORDER — CARVEDILOL 12.5 MG/1
TABLET ORAL
Qty: 180 TAB | Refills: 0 | Status: SHIPPED | OUTPATIENT
Start: 2020-04-13 | End: 2020-06-30

## 2020-04-13 RX ORDER — FLUTICASONE PROPIONATE 50 MCG
SPRAY, SUSPENSION (ML) NASAL
Qty: 16 G | Refills: 0 | Status: SHIPPED | OUTPATIENT
Start: 2020-04-13 | End: 2020-06-15

## 2020-04-13 RX ORDER — ATORVASTATIN CALCIUM 80 MG/1
TABLET, FILM COATED ORAL
Qty: 100 TAB | Refills: 3 | Status: SHIPPED
Start: 2020-04-13 | End: 2020-09-11

## 2020-04-13 RX ORDER — ISOSORBIDE MONONITRATE 30 MG/1
TABLET, EXTENDED RELEASE ORAL
Qty: 90 TAB | Refills: 3 | Status: SHIPPED
Start: 2020-04-13 | End: 2020-09-11

## 2020-04-13 NOTE — OP THERAPY DISCHARGE SUMMARY
Outpatient Physical Therapy  DISCHARGE SUMMARY NOTE      [facility] [address]  [address]  [place] [address]  Phone:  [phone]  Fax:  [phone]    Date of Visit: 04/13/2020    Patient: Dayana Lechuga  YOB: 1929  MRN: [id number]     Referring Provider: No referring provider defined for this encounter.   Referring Diagnosis No admission diagnoses are documented for this encounter.     Physical Therapy Occurrence Codes    Date of onset of impairment:  2/28/19   Date physical therapy care plan established or reviewed:  3/11/19   Date physical therapy treatment started:  3/11/19          Functional Assessment Used  Jeff Brandt Low Back Pain and Disability Score: 25         Your patient is being discharged from Physical Therapy with the following comments:   · Goals met    Comments:  Pt was last seen 11/14/2019 after 8 visits of PT.  At that time a discharge summary was overlooked and is being written today.  Pt met all goals set as follows:      Short Term Goals:   1) Indep with HEP met  2) Able to walk her dog longer distances 50% of the time Met  3) Improvement in mobility by 25% in trunk sidebending and trunk flexion Met    Short term goal time span:  2-4 weeks      Long Term Goals:    1) Progressions and regressions with HEP  Met  2) Able to go up and downt he steps with less pain by 1-2 grades on VAS met   3) Able to get up in the morning and stand without pain met.      Limitations Remaining:  She did have continued occasional bouts of intense L leg pain that would result in disrupted sleep.     Recommendations:  D/C PT.  Continue with HEP for additional gains and self maintenance.  Thank you for this referral.     Jeanna Kirkland, PT, MPT, OCS    Date: 4/13/2020

## 2020-04-29 RX ORDER — LISINOPRIL 20 MG/1
TABLET ORAL
Qty: 100 TAB | Refills: 3 | Status: SHIPPED
Start: 2020-04-29 | End: 2020-09-11

## 2020-05-02 ENCOUNTER — OFFICE VISIT (OUTPATIENT)
Dept: URGENT CARE | Facility: PHYSICIAN GROUP | Age: 85
End: 2020-05-02
Payer: MEDICARE

## 2020-05-02 ENCOUNTER — HOSPITAL ENCOUNTER (OUTPATIENT)
Facility: MEDICAL CENTER | Age: 85
End: 2020-05-02
Attending: PHYSICIAN ASSISTANT
Payer: MEDICARE

## 2020-05-02 VITALS
TEMPERATURE: 98.6 F | SYSTOLIC BLOOD PRESSURE: 104 MMHG | HEIGHT: 63 IN | OXYGEN SATURATION: 93 % | HEART RATE: 70 BPM | RESPIRATION RATE: 18 BRPM | DIASTOLIC BLOOD PRESSURE: 62 MMHG | BODY MASS INDEX: 21.16 KG/M2 | WEIGHT: 119.4 LBS

## 2020-05-02 DIAGNOSIS — N30.00 ACUTE CYSTITIS WITHOUT HEMATURIA: ICD-10-CM

## 2020-05-02 DIAGNOSIS — R30.0 DYSURIA: ICD-10-CM

## 2020-05-02 LAB
APPEARANCE UR: NORMAL
BILIRUB UR STRIP-MCNC: NORMAL MG/DL
COLOR UR AUTO: YELLOW
GLUCOSE UR STRIP.AUTO-MCNC: NORMAL MG/DL
KETONES UR STRIP.AUTO-MCNC: NORMAL MG/DL
LEUKOCYTE ESTERASE UR QL STRIP.AUTO: NORMAL
NITRITE UR QL STRIP.AUTO: NORMAL
PH UR STRIP.AUTO: 7 [PH] (ref 5–8)
PROT UR QL STRIP: NORMAL MG/DL
RBC UR QL AUTO: NORMAL
SP GR UR STRIP.AUTO: 1.01
UROBILINOGEN UR STRIP-MCNC: NORMAL MG/DL

## 2020-05-02 PROCEDURE — 99000 SPECIMEN HANDLING OFFICE-LAB: CPT | Performed by: PHYSICIAN ASSISTANT

## 2020-05-02 PROCEDURE — 87086 URINE CULTURE/COLONY COUNT: CPT

## 2020-05-02 PROCEDURE — 99214 OFFICE O/P EST MOD 30 MIN: CPT | Performed by: PHYSICIAN ASSISTANT

## 2020-05-02 PROCEDURE — 81002 URINALYSIS NONAUTO W/O SCOPE: CPT | Performed by: PHYSICIAN ASSISTANT

## 2020-05-02 RX ORDER — DOXYCYCLINE HYCLATE 100 MG
100 TABLET ORAL 2 TIMES DAILY
Qty: 14 TAB | Refills: 0 | Status: SHIPPED | OUTPATIENT
Start: 2020-05-02 | End: 2020-05-09

## 2020-05-02 ASSESSMENT — ENCOUNTER SYMPTOMS
FLANK PAIN: 0
SORE THROAT: 0
HEADACHES: 0
SHORTNESS OF BREATH: 0
NAUSEA: 0
EYE PAIN: 0
CONSTIPATION: 0
MYALGIAS: 0
FEVER: 0
VOMITING: 0
COUGH: 0
CHILLS: 0
ABDOMINAL PAIN: 0
DIARRHEA: 0

## 2020-05-02 ASSESSMENT — FIBROSIS 4 INDEX: FIB4 SCORE: 1.97

## 2020-05-02 NOTE — PROGRESS NOTES
Subjective:   Dayana Lechuga is a 89 y.o. female who presents for Urinary Pain (urinary frequency, burning sensation, painful urination, x3 days )      This 89-year-old female with a history of MRSA urinary tract infection presents to the clinic with 3 days of urinary frequency and intermittent dysuria described as a burning sensation and at the end of voiding.  She denies any constitutional symptoms such as fever, chills, myalgias.  She was previously treated with cefdinir and reports a complete resolution of her symptoms.  She has been eating and drinking normally, able to void, denies hematuria or rash      Review of Systems   Constitutional: Negative for chills and fever.   HENT: Negative for congestion, ear pain and sore throat.    Eyes: Negative for pain.   Respiratory: Negative for cough and shortness of breath.    Cardiovascular: Negative for chest pain.   Gastrointestinal: Negative for abdominal pain, constipation, diarrhea, nausea and vomiting.   Genitourinary: Positive for dysuria, frequency and urgency. Negative for flank pain and hematuria.   Musculoskeletal: Negative for myalgias.   Skin: Negative for rash.   Neurological: Negative for headaches.     Medications:    • albuterol Aers  • amLODIPine Tabs  • aspirin Chew  • atorvastatin  • Biotin w/ Vitamins C &amp; E Chew  • carvedilol Tabs  • doxycycline Tabs  • fluticasone  • gabapentin Caps  • guaiFENesin ER Tb12  • HYDROCORTISONE (TOPICAL) Gel  • isosorbide mononitrate SR Tb24  • lisinopril Tabs  • Menthol-Camphor Lotn  • montelukast Tabs  • nitroglycerin Subl  • omeprazole  • potassium chloride SA Tbcr  • Qvar RediHaler Aerb  • therapeutic multivitamin-minerals Tabs  • Vitamin C Tabs  • vitamin D Tabs  • VITAMIN E PO    Allergies: Bactrim [sulfamethoxazole w-trimethoprim]; Codeine; and Pcn [penicillins]    Problem List: Dayana Lechuga has Mild intermittent asthma with acute exacerbation; Essential hypertension; GERD (gastroesophageal reflux  "disease); Dyslipidemia; Osteopenia; Anxiety; Coronary artery disease involving native coronary artery of native heart with angina pectoris ([facility]); Chest pain due to myocardial ischemia; Benzodiazepine dependence ([facility]); S/P bilateral cataract extraction; Slow transit constipation; Claudication of right lower extremity (HCC); Popliteal artery stenosis, right (HCC); Dysuria; Diarrhea; Acute bilateral low back pain with bilateral sciatica; Urinary retention; Spinal stenosis of lumbar region with neurogenic claudication; Pleural effusion; Incomplete emptying of bladder; and PVD (peripheral vascular disease) ([facility]) on their problem list.    Surgical History:  Past Surgical History:   Procedure Laterality Date   • ABDOMINAL HYSTERECTOMY TOTAL     • APPENDECTOMY     • HERNIA REPAIR     • TONSILLECTOMY     • ZZZ CARDIAC CATH         Past Social Hx: Dayana Lechuga  reports that she has never smoked. She has never used smokeless tobacco. She reports that she does not drink alcohol or use drugs.     Past Family Hx:  Dayana Lechuga family history is not on file.     Problem list, medications, and allergies reviewed by myself today in Epic.       Objective:     /62 (BP Location: Left arm, Patient Position: Sitting, BP Cuff Size: Adult)   Pulse 70   Temp 37 °C (98.6 °F) (Temporal)   Resp 18   Ht 1.6 m (5' 3\")   Wt 54.2 kg (119 lb 6.4 oz)   LMP  (LMP Unknown)   SpO2 93%   BMI 21.15 kg/m²     Physical Exam  Vitals signs reviewed.   Constitutional:       Appearance: Normal appearance.   HENT:      Head: Normocephalic and atraumatic.      Right Ear: External ear normal.      Left Ear: External ear normal.      Nose: Nose normal.      Mouth/Throat:      Mouth: Mucous membranes are moist.   Eyes:      Conjunctiva/sclera: Conjunctivae normal.   Cardiovascular:      Rate and Rhythm: Normal rate.   Pulmonary:      Effort: Pulmonary effort is normal.   Abdominal:      Tenderness: There is no abdominal tenderness. There " is no right CVA tenderness or left CVA tenderness.   Skin:     General: Skin is warm and dry.      Capillary Refill: Capillary refill takes less than 2 seconds.   Neurological:      Mental Status: She is alert and oriented to person, place, and time.           Assessment/Plan:         Diagnosis and associated orders:   1. Dysuria  POCT Urinalysis   2. Acute cystitis without hematuria  doxycycline (VIBRAMYCIN) 100 MG Tab    URINE CULTURE(NEW)          • I reviewed the patient's previous urine culture results for her MRSA urinary tract infection and it showed susceptibility to doxycycline at low concentrations.  Additionally patient has a allergy to penicillin and Bactrim, she has good kidney function, so I think that doxycycline for 7 days is a reasonable choice.  I cautioned her to stay out of the sun.  I will culture this urine regardless to see if his MRSA UTI persists.  I cautioned her about pyelonephritis and signs and symptoms for follow-up.  I recommended AZO for 1 to 2 days for help with urination discomfort    Differential diagnosis, natural history, supportive care, and indications for immediate follow-up discussed.    Advised the patient to follow-up with the primary care physician for recheck, reevaluation, and consideration of further management.    Please note that this dictation was created using voice recognition software. I have made reasonable attempt to correct obvious errors, but I expect that there are errors of grammar and possibly content that I did not discover before finalizing the note.    This note was electronically signed by Byron Heredia PA-C

## 2020-05-02 NOTE — PATIENT INSTRUCTIONS
You may also use over-the-counter phenazopyridine (AZO) as needed for urinary burning symptom relief.

## 2020-05-04 LAB
BACTERIA UR CULT: NORMAL
SIGNIFICANT IND 70042: NORMAL
SITE SITE: NORMAL
SOURCE SOURCE: NORMAL

## 2020-05-06 ENCOUNTER — TELEPHONE (OUTPATIENT)
Dept: URGENT CARE | Facility: CLINIC | Age: 85
End: 2020-05-06

## 2020-05-06 NOTE — TELEPHONE ENCOUNTER
Patient informed of culture results.  Her symptoms have completely resolved.  Told her just discontinue doxycycline.

## 2020-05-15 RX ORDER — BECLOMETHASONE DIPROPIONATE HFA 80 UG/1
AEROSOL, METERED RESPIRATORY (INHALATION)
Qty: 10.6 G | Refills: 0 | Status: SHIPPED | OUTPATIENT
Start: 2020-05-15 | End: 2020-06-15

## 2020-05-20 ENCOUNTER — OFFICE VISIT (OUTPATIENT)
Dept: MEDICAL GROUP | Facility: PHYSICIAN GROUP | Age: 85
End: 2020-05-20
Payer: MEDICARE

## 2020-05-20 VITALS
WEIGHT: 117.4 LBS | RESPIRATION RATE: 16 BRPM | OXYGEN SATURATION: 96 % | SYSTOLIC BLOOD PRESSURE: 124 MMHG | HEIGHT: 63 IN | DIASTOLIC BLOOD PRESSURE: 78 MMHG | TEMPERATURE: 98.6 F | HEART RATE: 80 BPM | BODY MASS INDEX: 20.8 KG/M2

## 2020-05-20 DIAGNOSIS — M25.561 CHRONIC PAIN OF RIGHT KNEE: ICD-10-CM

## 2020-05-20 DIAGNOSIS — G89.29 CHRONIC PAIN OF RIGHT KNEE: ICD-10-CM

## 2020-05-20 PROCEDURE — 99214 OFFICE O/P EST MOD 30 MIN: CPT | Performed by: PHYSICIAN ASSISTANT

## 2020-05-20 ASSESSMENT — FIBROSIS 4 INDEX: FIB4 SCORE: 1.97

## 2020-05-26 ENCOUNTER — TELEPHONE (OUTPATIENT)
Dept: MEDICAL GROUP | Facility: PHYSICIAN GROUP | Age: 85
End: 2020-05-26

## 2020-05-26 NOTE — TELEPHONE ENCOUNTER
Phone Number Called: [phone] (home)       Call outcome: Spoke to patient regarding message below.    Message: Pt wanted to ask if the Tylenol was not managing the pain, would you then recommend the creme.  Also if she needed to is it okay to take up to 1000 mg of Tylenol in one day.  Currently she is only taking 1 in the AM.

## 2020-05-26 NOTE — TELEPHONE ENCOUNTER
VOICEMAIL  1. Caller Name: Dayana Lechuga                      Call Back Number: [phone] (home)     2. Message: Pt stated she did not want to use creme prescribed by Milena Stone, as she is worried about the risks to her heart.  She is taking Tylenol as suggested for pain and is helping, usually hurts more at night she states.  She sometimes has some trouble rising after sitting for a time, no longer wearing the knee brace to try and strengthen her muscles.  She has an appt set up for the X-ray.  She is wearing the compression socks Milena suggested, and her legs are no longer swelling.  Wants to know what you think about her using the creme for her knee.  Do you think it is okay, or worth the risk?    Please respond.

## 2020-05-28 NOTE — TELEPHONE ENCOUNTER
If the Tylenol is not managing the pain then she can use the cream.  She can use up to 1000 mg of Tylenol in a day.

## 2020-06-08 NOTE — PROGRESS NOTES
Chief Complaint   Patient presents with   • Follow-Up     knee   • Medication Refill     ativan        HISTORY OF PRESENT ILLNESS: Dayana Lechuga is an established 89 y.o. female here to discuss the evaluation and management of:    Is a pleasant 89-year-old female here today discuss chronic knee pain symptoms.  She tells me on December 17, 2020 she fell and hurt her right knee not develop pain symptoms until 1 February.  States after she did experience some bruising and mild swelling.  States symptoms are not improving and she feels that they are worsening.  States she is wearing a brace throughout the day and has been taking Tylenol.  She is unable to take ibuprofen because it upsets her stomach.  She tells me medial sided knee is tender to palpation.  Denies gait abnormalities.  Admits to wearing supportive shoes.  Denies muscle atrophy, muscle weakness, numbness/tingling.  Patient is inquiring about treatment options.      Patient Active Problem List    Diagnosis Date Noted   • Chest pain due to myocardial ischemia 09/10/2016     Priority: High   • Popliteal artery stenosis, right ([facility]) 11/28/2018     Priority: Medium   • Claudication of right lower extremity ([facility]) 11/07/2018     Priority: Medium   • Coronary artery disease involving native coronary artery of native heart with angina pectoris ([facility]) 08/10/2016     Priority: Medium   • Dyslipidemia 10/31/2009     Priority: Medium   • Essential hypertension 10/02/2009     Priority: Medium   • Acute bilateral low back pain with bilateral sciatica 08/29/2019     Priority: Low   • Dysuria 06/27/2019     Priority: Low   • Diarrhea 06/27/2019     Priority: Low   • Slow transit constipation 08/29/2018     Priority: Low   • S/P bilateral cataract extraction 02/27/2018     Priority: Low   • Benzodiazepine dependence ([facility]) 11/13/2017     Priority: Low   • Anxiety 06/22/2016     Priority: Low   • GERD (gastroesophageal reflux disease) 10/31/2009     Priority: Low   •  Osteopenia 10/31/2009     Priority: Low   • Mild intermittent asthma with acute exacerbation 10/02/2009     Priority: Low   • PVD (peripheral vascular disease) ([facility]) 03/04/2020   • Pleural effusion 12/20/2019   • Incomplete emptying of bladder 10/08/2019   • Spinal stenosis of lumbar region with neurogenic claudication 09/23/2019   • Urinary retention 09/11/2019       Allergies:Bactrim [sulfamethoxazole w-trimethoprim]; Codeine; and Pcn [penicillins]    Current Outpatient Medications   Medication Sig Dispense Refill   • Diclofenac Sodium (VOLTAREN) 1 % Gel Lower extremities: Apply 4 g of 1% gel to affected area 4 times daily. 1 Tube 6   • QVAR REDIHALER 80 MCG/ACT inhaler INHALE 1 PUFF BY MOUTH TWICE A DAY 10.6 g 0   • lisinopril (PRINIVIL) 20 MG Tab TAKE ONE TABLET BY MOUTH ONE TIME DAILY  100 Tab 3   • fluticasone (FLONASE) 50 MCG/ACT nasal spray USE 1 SPRAY IN EACH NOSTRIL DAILY 16 g 0   • carvedilol (COREG) 12.5 MG Tab TAKE ONE TABLET BY MOUTH TWICE DAILY  180 Tab 0   • isosorbide mononitrate SR (IMDUR) 30 MG TABLET SR 24 HR TAKE 1 TABLET BY MOUTH EVERY EVENING.  90 Tab 3   • atorvastatin (LIPITOR) 80 MG tablet TAKE ONE TABLET BY MOUTH EVERY EVENING 100 Tab 3   • nitroglycerin (NITROSTAT) 0.4 MG SL Tab Place 1 tab under tongue every 5 min as needed for chest pain max 3 doses 100 Tab 0   • potassium chloride SA (KDUR) 20 MEQ Tab CR TAKE ONE TABLET BY MOUTH TWICE DAILY  180 Tab 0   • Biotin w/ Vitamins C & E (HAIR/SKIN/NAILS) 1250-7.5-7.5 MCG-MG-UNT Chew Tab Take 1 Tab by mouth every day.     • therapeutic multivitamin-minerals (THERAGRAN-M) Tab Take 1 Tab by mouth every day.     • montelukast (SINGULAIR) 10 MG Tab Take 10 mg by mouth every day.     • Menthol-Camphor (ICY HOT ARTHRITIS PAIN RELIEF) 16-4 % Lotion 1 g by Apply externally route 1 time daily as needed (Back Itching.).     • HYDROCORTISONE, TOPICAL, ([employer] CORTISONE COOLING RELIEF) 1 % Gel 1 g by Apply externally route 1 time daily as needed (Back  Itching.).     • guaifenesin LA (MUCINEX) 600 MG TABLET SR 12 HR Take 600 mg by mouth every morning.     • Ascorbic Acid (VITAMIN C) 1000 MG Tab Take 1,000 mg by mouth every day.     • albuterol 108 (90 Base) MCG/ACT Aero Soln inhalation aerosol Inhale 2 Puffs by mouth every 6 hours as needed for Shortness of Breath. 20.1 g 0   • VITAMIN E PO Take 1 Dose by mouth every day. Unknown OTC Strength.     • aspirin (ASA) 81 MG Chew Tab chewable tablet Take 1 Tab by mouth every day. 100 Tab 11   • Cholecalciferol (VITAMIN D) 2000 UNIT Tab Take 2,000 Units by mouth every day.     • omeprazole (PRILOSEC) 20 MG delayed-release capsule Take 20 mg by mouth every day.     • amLODIPine (NORVASC) 5 MG Tab TAKE 1 TABLET BY MOUTH EVERY DAY  90 Tab 0     No current facility-administered medications for this visit.        Social History     Tobacco Use   • Smoking status: Never Smoker   • Smokeless tobacco: Never Used   Substance Use Topics   • Alcohol use: No     Alcohol/week: 0.0 oz   • Drug use: No       Family Status   Relation Name Status   • Neg Hx  (Not Specified)     Family History   Problem Relation Age of Onset   • Heart Disease Neg Hx    • Heart Failure Neg Hx    • Hyperlipidemia Neg Hx        ROS:  Review of Systems   Constitutional: Negative for fever, chills, weight loss and malaise/fatigue.   HENT: Negative for ear pain, nosebleeds, congestion, sore throat and neck pain.    Eyes: Negative for blurred vision.   Respiratory: Negative for cough, sputum production, shortness of breath and wheezing.    Cardiovascular: Negative for chest pain, palpitations, orthopnea and leg swelling.   Gastrointestinal: Negative for heartburn, nausea, vomiting and abdominal pain.   Genitourinary: Negative for dysuria, urgency and frequency.   Musculoskeletal: Negative for myalgias, back pain. + for right knee pain.  Skin: Negative for rash and itching.   Neurological: Negative for dizziness, tingling, tremors, sensory change, focal weakness  "and headaches.   Endo/Heme/Allergies: Does not bruise/bleed easily.   Psychiatric/Behavioral: Negative for depression, suicidal ideas and memory loss.  The patient is not nervous/anxious and does not have insomnia.    All other systems reviewed and are negative except as in HPI.    Exam: /78 (BP Location: Left arm, Patient Position: Sitting, BP Cuff Size: Adult)   Pulse 80   Temp 37 °C (98.6 °F) (Temporal)   Resp 16   Ht 1.6 m (5' 3\")   Wt 53.3 kg (117 lb 6.4 oz)   SpO2 96%  Body mass index is 20.8 kg/m².  General: Normal appearing. No distress.  HEENT: Normocephalic. Eyes conjunctiva clear lids without ptosis, ears normal shape and contour.  Neck: Supple without JVD.  Thyroid is not enlarged.  Pulmonary: Clear to ausculation.  Normal effort. No rales, ronchi, or wheezing.  Cardiovascular: Regular rate and rhythm without murmur.   Abdomen: Soft, nontender, nondistended.  Neurologic: Grossly nonfocal.  Cranial nerves are normal.  Skin: Warm and dry.  No rashes or suspicious skin lesions.  Musculoskeletal: Normal gait. No extremity cyanosis, clubbing, or edema.  Right medial knee is tender to palpation and positive for mild swelling.  No signs of trauma.  Patient is wearing a brace during today's appointment.  No signs of infection.  Psych: Normal mood and affect. Alert and oriented x3. Judgment and insight is normal.    Medical decision-making and discussion:  1. Chronic pain of right knee  Has been ordered to further evaluate patient.  Patient will be contacted with results.  Patient has been prescribed a tearing gel.  Advised patient to not wear brace all the time.  Advised patient to elevate, compress, use ice and heat as needed.    Follow-up for worsening symptoms,lack of expected recovery, or should new symptoms or problems arise.      - DX-KNEE 3 VIEWS RIGHT; Future  - Diclofenac Sodium (VOLTAREN) 1 % Gel; Lower extremities: Apply 4 g of 1% gel to affected area 4 times daily.  Dispense: 1 Tube; " Refill: 6      Please note that this dictation was created using voice recognition software. I have made every reasonable attempt to correct obvious errors, but I expect that there are errors of grammar and possibly content that I did not discover before finalizing the note.    Assessment/Plan:  1. Chronic pain of right knee  DX-KNEE 3 VIEWS RIGHT    Diclofenac Sodium (VOLTAREN) 1 % Gel    DISCONTINUED: Diclofenac Sodium (VOLTAREN) 1 % Gel       Return if symptoms worsen or fail to improve.

## 2020-06-15 RX ORDER — BECLOMETHASONE DIPROPIONATE HFA 80 UG/1
AEROSOL, METERED RESPIRATORY (INHALATION)
Qty: 10.6 G | Refills: 0 | Status: SHIPPED | OUTPATIENT
Start: 2020-06-15 | End: 2020-09-04

## 2020-06-15 RX ORDER — FLUTICASONE PROPIONATE 50 MCG
SPRAY, SUSPENSION (ML) NASAL
Qty: 16 G | Refills: 0 | Status: SHIPPED | OUTPATIENT
Start: 2020-06-15 | End: 2020-10-06

## 2020-06-24 ENCOUNTER — HOSPITAL ENCOUNTER (OUTPATIENT)
Dept: RADIOLOGY | Facility: MEDICAL CENTER | Age: 85
End: 2020-06-24
Attending: PHYSICIAN ASSISTANT
Payer: MEDICARE

## 2020-06-24 DIAGNOSIS — M25.561 CHRONIC PAIN OF RIGHT KNEE: ICD-10-CM

## 2020-06-24 DIAGNOSIS — G89.29 CHRONIC PAIN OF RIGHT KNEE: ICD-10-CM

## 2020-06-24 PROCEDURE — 73562 X-RAY EXAM OF KNEE 3: CPT | Mod: RT

## 2020-06-30 RX ORDER — MONTELUKAST SODIUM 10 MG/1
TABLET ORAL
Qty: 90 TAB | Refills: 0 | Status: SHIPPED
Start: 2020-06-30 | End: 2020-09-11

## 2020-06-30 RX ORDER — AMLODIPINE BESYLATE 5 MG/1
TABLET ORAL
Qty: 90 TAB | Refills: 0 | Status: SHIPPED
Start: 2020-06-30 | End: 2020-09-11

## 2020-06-30 RX ORDER — ALBUTEROL SULFATE 90 UG/1
2 AEROSOL, METERED RESPIRATORY (INHALATION) EVERY 6 HOURS PRN
Qty: 20.1 G | Refills: 0 | Status: SHIPPED | OUTPATIENT
Start: 2020-06-30 | End: 2020-07-06 | Stop reason: SDUPTHER

## 2020-06-30 RX ORDER — POTASSIUM CHLORIDE 20 MEQ/1
TABLET, EXTENDED RELEASE ORAL
Qty: 180 TAB | Refills: 0 | Status: SHIPPED
Start: 2020-06-30 | End: 2020-09-11

## 2020-06-30 RX ORDER — CARVEDILOL 12.5 MG/1
TABLET ORAL
Qty: 180 TAB | Refills: 0 | Status: SHIPPED
Start: 2020-06-30 | End: 2020-09-11

## 2020-07-06 ENCOUNTER — TELEPHONE (OUTPATIENT)
Dept: MEDICAL GROUP | Facility: PHYSICIAN GROUP | Age: 85
End: 2020-07-06

## 2020-07-06 RX ORDER — ALBUTEROL SULFATE 90 UG/1
2 AEROSOL, METERED RESPIRATORY (INHALATION) EVERY 6 HOURS PRN
Qty: 3 INHALER | Refills: 0 | Status: SHIPPED | OUTPATIENT
Start: 2020-07-06 | End: 2021-02-23

## 2020-07-06 NOTE — TELEPHONE ENCOUNTER
VOICEMAIL  1. Caller Name: Dayana Lechuga                      Call Back Number: [phone] (home)     2. Message: Called patient back, she has an irritated, red, swollen itchy eyelid. Tried calling twice this morning and got what sounded like a dial up connection. No answer and not able to leave message    3. Patient approves office to leave a detailed voicemail/MyChart message: NA

## 2020-07-06 NOTE — TELEPHONE ENCOUNTER
Received request via: Pharmacy    Was the patient seen in the last year in this department? Yes    Does the patient have an active prescription (recently filled or refills available) for medication(s) requested? No     Mail order called stating rx should be resent for 3 inhalers.  New rx attached.

## 2020-07-16 ENCOUNTER — NURSE TRIAGE (OUTPATIENT)
Dept: HEALTH INFORMATION MANAGEMENT | Facility: OTHER | Age: 85
End: 2020-07-16

## 2020-07-16 NOTE — TELEPHONE ENCOUNTER
Regarding: COVID SCREENING  ----- Message from Joe Kennedy sent at 7/16/2020  4:08 PM PDT -----  TRANSFERRED PATIENT TO THE NURSE TEAM DUE TO THESE SYMPTOMS: COUGH / SHORTNESS OF BREATH    Wants to make an appt for a refill of her prescription for lorazepam. Has an asthma cough at baseline no new symptoms in the last two weeks.    1. Caller Name: Dayana Lechuga                   Call Back Number: [phone] (home)   [facility] PCP or Specialty Provider: Yes Has been seeing Milena Stone in the meantime while Ned Jerome is out.         2.  In the last two weeks, has the patient had any new or worsening symptoms (not explained by alternative diagnosis)? No. Does have some coughing r/t asthma.     3.  Does patient have any comoribidities? None     4.  Has the patient traveled in the last 14 days OR had any known contact with someone who is suspected or confirmed to have COVID-19?  No.    5. Disposition: Cleared by RN Triage as potential is low for COVID-19; OK to keep/schedule appointment    Note routed to [facility] Provider: [facility]ARA only.

## 2020-07-16 NOTE — TELEPHONE ENCOUNTER
Regarding: COVID SCREENING  ----- Message from Joe Kennedy sent at 7/16/2020  4:08 PM PDT -----  TRANSFERRED PATIENT TO THE NURSE TEAM DUE TO THESE SYMPTOMS: COUGH / SHORTNESS OF BREATH

## 2020-07-30 ENCOUNTER — HOSPITAL ENCOUNTER (OUTPATIENT)
Facility: MEDICAL CENTER | Age: 85
End: 2020-07-30
Attending: PHYSICIAN ASSISTANT
Payer: MEDICARE

## 2020-07-30 ENCOUNTER — OFFICE VISIT (OUTPATIENT)
Dept: MEDICAL GROUP | Facility: PHYSICIAN GROUP | Age: 85
End: 2020-07-30
Payer: MEDICARE

## 2020-07-30 VITALS
DIASTOLIC BLOOD PRESSURE: 70 MMHG | HEART RATE: 68 BPM | BODY MASS INDEX: 20.73 KG/M2 | WEIGHT: 117 LBS | HEIGHT: 63 IN | TEMPERATURE: 98.1 F | RESPIRATION RATE: 16 BRPM | OXYGEN SATURATION: 95 % | SYSTOLIC BLOOD PRESSURE: 154 MMHG

## 2020-07-30 DIAGNOSIS — Z79.899 CHRONIC USE OF BENZODIAZEPINE FOR THERAPEUTIC PURPOSE: ICD-10-CM

## 2020-07-30 DIAGNOSIS — B37.9 YEAST INFECTION: ICD-10-CM

## 2020-07-30 DIAGNOSIS — R60.0 MILD PERIPHERAL EDEMA: ICD-10-CM

## 2020-07-30 DIAGNOSIS — F41.9 ANXIETY: ICD-10-CM

## 2020-07-30 LAB — AMBIGUOUS DTTM AMBI4: NORMAL

## 2020-07-30 PROCEDURE — 99214 OFFICE O/P EST MOD 30 MIN: CPT | Performed by: PHYSICIAN ASSISTANT

## 2020-07-30 PROCEDURE — 80307 DRUG TEST PRSMV CHEM ANLYZR: CPT

## 2020-07-30 PROCEDURE — 99000 SPECIMEN HANDLING OFFICE-LAB: CPT | Performed by: PHYSICIAN ASSISTANT

## 2020-07-30 PROCEDURE — 87480 CANDIDA DNA DIR PROBE: CPT

## 2020-07-30 PROCEDURE — 8041 PR SCP AHA: Performed by: PHYSICIAN ASSISTANT

## 2020-07-30 PROCEDURE — 87660 TRICHOMONAS VAGIN DIR PROBE: CPT

## 2020-07-30 PROCEDURE — 87510 GARDNER VAG DNA DIR PROBE: CPT

## 2020-07-30 RX ORDER — FLUCONAZOLE 150 MG/1
TABLET ORAL
Qty: 2 TAB | Refills: 0 | Status: SHIPPED | OUTPATIENT
Start: 2020-07-30 | End: 2020-09-11

## 2020-07-30 RX ORDER — LORAZEPAM 0.5 MG/1
.5-1 TABLET ORAL 2 TIMES DAILY
Qty: 90 TAB | Refills: 3 | Status: SHIPPED | OUTPATIENT
Start: 2020-07-30 | End: 2020-08-29

## 2020-07-30 ASSESSMENT — FIBROSIS 4 INDEX: FIB4 SCORE: 1.97

## 2020-07-31 PROBLEM — R60.0 MILD PERIPHERAL EDEMA: Status: ACTIVE | Noted: 2020-07-31

## 2020-07-31 LAB
AMBIGUOUS DTTM AMBI4: NORMAL
CANDIDA DNA VAG QL PROBE+SIG AMP: NEGATIVE
G VAGINALIS DNA VAG QL PROBE+SIG AMP: NEGATIVE
SIGNIFICANT IND 70042: NORMAL
SITE SITE: NORMAL
SOURCE SOURCE: NORMAL
T VAGINALIS DNA VAG QL PROBE+SIG AMP: NEGATIVE

## 2020-07-31 NOTE — PROGRESS NOTES
Chief Complaint   Patient presents with   • Medication Refill     ativan    • Results     knee xray         HISTORY OF PRESENT ILLNESS: Dayana Lechuga is an established 89 y.o. female here to discuss the evaluation and management of:    Anxiety  Chronic use of benzodiazepine for therapeutic purpose  Patient is a pleasant 89-year-old female here with her daughter today.  Patient is requesting a refill of Ativan.  Patient's PCP has been monitoring medication and patient will be weaning off of medication in the near future under the supervision of her provider.  Patient denies misuse or abuse of medication.  She denies alcohol use or illicit drug use.  Denies operating her machine while taking prescribe medication.  She tells me medication was initially prescribed for anxiety and secondary to anxiety she was experiencing sleep reparation.  States she uses medication to treat both anxiety and sleep deprivation.    Yeast infection  Patient is concerned that she may have a yeast infection.  States this a.m. she started experiencing vaginal itching and last night noticed yellowish discharge in her underwear.  She tells me occasionally she has to wear depends because she experiences urinary leakage.  She denies fever, chills, nausea, vomiting, pelvic pain, abdominal pain, hematuria, dysuria, urgency, frequency, headache.    Mild peripheral edema  Chronic with stable problem.  Patient states she has been wearing compression stockings and since doing so right knee pain symptoms have improved and swelling has also improved.  States she elevates her legs when at rest.  She tells me her diet is healthy and she stays hydrated.        Patient Active Problem List    Diagnosis Date Noted   • Chest pain due to myocardial ischemia 09/10/2016     Priority: High   • Popliteal artery stenosis, right ([facility]) 11/28/2018     Priority: Medium   • Claudication of right lower extremity ([facility]) 11/07/2018     Priority: Medium   • Coronary  artery disease involving native coronary artery of native heart with angina pectoris ([facility]) 08/10/2016     Priority: Medium   • Dyslipidemia 10/31/2009     Priority: Medium   • Essential hypertension 10/02/2009     Priority: Medium   • Acute bilateral low back pain with bilateral sciatica 08/29/2019     Priority: Low   • Dysuria 06/27/2019     Priority: Low   • Diarrhea 06/27/2019     Priority: Low   • Slow transit constipation 08/29/2018     Priority: Low   • S/P bilateral cataract extraction 02/27/2018     Priority: Low   • Benzodiazepine dependence ([facility]) 11/13/2017     Priority: Low   • Anxiety 06/22/2016     Priority: Low   • GERD (gastroesophageal reflux disease) 10/31/2009     Priority: Low   • Osteopenia 10/31/2009     Priority: Low   • Mild intermittent asthma with acute exacerbation 10/02/2009     Priority: Low   • PVD (peripheral vascular disease) ([facility]) 03/04/2020   • Pleural effusion 12/20/2019   • Incomplete emptying of bladder 10/08/2019   • Spinal stenosis of lumbar region with neurogenic claudication 09/23/2019   • Urinary retention 09/11/2019       Allergies:Bactrim [sulfamethoxazole w-trimethoprim]; Codeine; and Pcn [penicillins]    Current Outpatient Medications   Medication Sig Dispense Refill   • LORazepam (ATIVAN) 0.5 MG Tab Take 1-2 Tabs by mouth 2 Times a Day for 30 days. 0.5mg in AFTERNOON 1mg at PM 90 tabs/30 days 90 Tab 3   • fluconazole (DIFLUCAN) 150 MG tablet Take 1 tablet by mouth.  If symptoms are still present 72 hours repeat dose x1. 2 Tab 0   • albuterol 108 (90 Base) MCG/ACT Aero Soln inhalation aerosol Inhale 2 Puffs by mouth every 6 hours as needed for Shortness of Breath. 3 Inhaler 0   • montelukast (SINGULAIR) 10 MG Tab TAKE 1 TABLET BY MOUTH EVERY DAY. 90 Tab 0   • amLODIPine (NORVASC) 5 MG Tab TAKE ONE TABLET BY MOUTH ONE TIME DAILY  90 Tab 0   • carvedilol (COREG) 12.5 MG Tab TAKE ONE TABLET BY MOUTH TWICE DAILY  180 Tab 0   • potassium chloride SA (KDUR) 20 MEQ Tab CR TAKE 1  TABLET BY MOUTH TWICE DAILY (GENERIC FOR KDUR) 180 Tab 0   • QVAR REDIHALER 80 MCG/ACT inhaler INHALE 1 PUFF BY MOUTH TWICE A DAY  10.6 g 0   • fluticasone (FLONASE) 50 MCG/ACT nasal spray USE ONE SPRAY INTO EACH NOSTRIL DAILY 16 g 0   • Diclofenac Sodium (VOLTAREN) 1 % Gel Lower extremities: Apply 4 g of 1% gel to affected area 4 times daily. 1 Tube 6   • lisinopril (PRINIVIL) 20 MG Tab TAKE ONE TABLET BY MOUTH ONE TIME DAILY  100 Tab 3   • isosorbide mononitrate SR (IMDUR) 30 MG TABLET SR 24 HR TAKE 1 TABLET BY MOUTH EVERY EVENING.  90 Tab 3   • atorvastatin (LIPITOR) 80 MG tablet TAKE ONE TABLET BY MOUTH EVERY EVENING 100 Tab 3   • nitroglycerin (NITROSTAT) 0.4 MG SL Tab Place 1 tab under tongue every 5 min as needed for chest pain max 3 doses 100 Tab 0   • Biotin w/ Vitamins C & E (HAIR/SKIN/NAILS) 1250-7.5-7.5 MCG-MG-UNT Chew Tab Take 1 Tab by mouth every day.     • therapeutic multivitamin-minerals (THERAGRAN-M) Tab Take 1 Tab by mouth every day.     • Menthol-Camphor (ICY HOT ARTHRITIS PAIN RELIEF) 16-4 % Lotion 1 g by Apply externally route 1 time daily as needed (Back Itching.).     • HYDROCORTISONE, TOPICAL, ([employer] CORTISONE COOLING RELIEF) 1 % Gel 1 g by Apply externally route 1 time daily as needed (Back Itching.).     • guaifenesin LA (MUCINEX) 600 MG TABLET SR 12 HR Take 600 mg by mouth every morning.     • Ascorbic Acid (VITAMIN C) 1000 MG Tab Take 1,000 mg by mouth every day.     • VITAMIN E PO Take 1 Dose by mouth every day. Unknown OTC Strength.     • aspirin (ASA) 81 MG Chew Tab chewable tablet Take 1 Tab by mouth every day. 100 Tab 11   • Cholecalciferol (VITAMIN D) 2000 UNIT Tab Take 2,000 Units by mouth every day.     • omeprazole (PRILOSEC) 20 MG delayed-release capsule Take 20 mg by mouth every day.       No current facility-administered medications for this visit.        Social History     Tobacco Use   • Smoking status: Never Smoker   • Smokeless tobacco: Never Used   Substance Use Topics   •  "Alcohol use: No     Alcohol/week: 0.0 oz   • Drug use: No       Family Status   Relation Name Status   • Neg Hx  (Not Specified)     Family History   Problem Relation Age of Onset   • Heart Disease Neg Hx    • Heart Failure Neg Hx    • Hyperlipidemia Neg Hx        ROS:  Review of Systems   Constitutional: Negative for fever, chills, weight loss and malaise/fatigue.   HENT: Negative for ear pain, nosebleeds, congestion, sore throat and neck pain.    Eyes: Negative for blurred vision.   Respiratory: Negative for cough, sputum production, shortness of breath and wheezing.    Cardiovascular: Negative for chest pain, palpitations, orthopnea and leg swelling.   Gastrointestinal: Negative for heartburn, nausea, vomiting and abdominal pain.   Genitourinary: Negative for dysuria, urgency and frequency.  + for vaginal itching.  Musculoskeletal: Negative for myalgias, back pain and joint pain.   Skin: Negative for rash and itching.   Neurological: Negative for dizziness, tingling, tremors, sensory change, focal weakness and headaches.   Endo/Heme/Allergies: Does not bruise/bleed easily.   Psychiatric/Behavioral: Negative for depression, suicidal ideas and memory loss.  Positive for anxiety and sleep deprivation.    All other systems reviewed and are negative except as in HPI.    Exam: /70 (BP Location: Right arm, Patient Position: Sitting)   Pulse 68   Temp 36.7 °C (98.1 °F) (Temporal)   Resp 16   Ht 1.6 m (5' 3\")   Wt 53.1 kg (117 lb)   SpO2 95%  Body mass index is 20.73 kg/m².  General: Normal appearing. No distress.  HEENT: Normocephalic. Eyes conjunctiva clear lids without ptosis, ears normal shape and contour.  Neck: Supple without JVD. Thyroid is not enlarged.  Pulmonary: Clear to ausculation.  Normal effort. No rales, ronchi, or wheezing.  Cardiovascular: Regular rate and rhythm without murmur.  Abdomen: Nondistended.   Neurologic: Grossly nonfocal.  Cranial nerves are normal.   Skin: Warm and dry.  No " "rashes or suspicious skin lesions.  Musculoskeletal: Normal gait. No extremity cyanosis, clubbing, or edema.  Patient is wearing bilateral compression stockings.  Psych: Normal mood and affect. Alert and oriented x3. Judgment and insight is normal.    Medical decision-making and discussion:  1. Anxiety  2. Chronic use of benzodiazepine for therapeutic purpose  Apryl reviewed: No red flags/    Refilled Ativan for patient.  Patient follow-up with her PCP in the near future to discuss weaning off of medication.  Patient agreed to plan.  UDS obtained during today's appointment and controlled substance was signed during today's appointment.    Discussed risk and alternative treatment options with patient.  Patient was to continue Ativan.    Patient understands this prescription is a controlled substance which is potentially habit-forming and its use is regulated by the [employer]. We also discussed the new \"black box\" warning regarding the lethal combination of opioids and benzodiazepines. Refills are subject to terms of a controlled substance agreement and patient has an updated one on file. Most recent UDS is appropriate. Any refill requires an office visit. Narcotics have may adverse effects and the risks of addiction, accidental overdose and death were emphasized. Provided prescriptions for the next 4 months.    - LORazepam (ATIVAN) 0.5 MG Tab; Take 1-2 Tabs by mouth 2 Times a Day for 30 days. 0.5mg in AFTERNOON 1mg at PM 90 tabs/30 days  Dispense: 90 Tab; Refill: 3  - Pain Management Screen; Future  - Controlled Substance Treatment Agreement    3. Yeast infection  Affirm  has been ordered.  Patient be contacted with results.  Patient has been scribed fluconazole.  Discussed common side effects and adverse reaction medication with patient.  Advised patient if AFFIRM  results are negative she will be prescribed a topical antifungal cream.  Patient agreed to plan.  - VAGINAL PATHOGENS DNA PANEL; Future  - fluconazole " (DIFLUCAN) 150 MG tablet; Take 1 tablet by mouth.  If symptoms are still present 72 hours repeat dose x1.  Dispense: 2 Tab; Refill: 0    4. Mild peripheral edema  Continue wearing compression stockings.  Continue eating a low-sodium diet.  Elevate lower extremities when at rest.  Continue work on hydration.      Discussed x-ray results of right knee from 6/24/2020 with patient.  Results are as follows:    FINDINGS:  No acute fracture or dislocation.     Very minimal joint osteoarthritis     No knee joint effusion.     Vascular calcification.     IMPRESSION:        1. No acute osseous abnormality.    Suggested for patient take over-the-counter Tylenol as needed for pain symptoms.  Do not exceed more than 3000 mg of Tylenol in a 24-hour span.    Follow-up for worsening symptoms,lack of expected recovery, or should new symptoms or problems arise.          Please note that this dictation was created using voice recognition software. I have made every reasonable attempt to correct obvious errors, but I expect that there are errors of grammar and possibly content that I did not discover before finalizing the note.    Assessment/Plan:  1. Anxiety  LORazepam (ATIVAN) 0.5 MG Tab    Pain Management Screen    Controlled Substance Treatment Agreement   2. Chronic use of benzodiazepine for therapeutic purpose  LORazepam (ATIVAN) 0.5 MG Tab    Pain Management Screen    Controlled Substance Treatment Agreement   3. Yeast infection  VAGINAL PATHOGENS DNA PANEL    fluconazole (DIFLUCAN) 150 MG tablet   4. Mild peripheral edema         No follow-ups on file.

## 2020-08-02 LAB
6MAM UR QL: NOT DETECTED
7AMINOCLONAZEPAM UR QL: NOT DETECTED
A-OH ALPRAZ UR QL: NOT DETECTED
ALPRAZ UR QL: NOT DETECTED
AMPHET UR QL SCN: NOT DETECTED
ANNOTATION COMMENT IMP: NORMAL
ANNOTATION COMMENT IMP: NORMAL
BARBITURATES UR QL: NOT DETECTED
BUPRENORPHINE UR QL: NOT DETECTED
BZE UR QL: NOT DETECTED
CARBOXYTHC UR QL: NOT DETECTED
CARISOPRODOL UR QL: NOT DETECTED
CLONAZEPAM UR QL: NOT DETECTED
CODEINE UR QL: NOT DETECTED
DIAZEPAM UR QL: NOT DETECTED
ETHYL GLUCURONIDE UR QL: NOT DETECTED
FENTANYL UR QL: NOT DETECTED
HYDROCODONE UR QL: NOT DETECTED
HYDROMORPHONE UR QL: NOT DETECTED
LORAZEPAM UR QL: PRESENT
MDA UR QL: NOT DETECTED
MDEA UR QL: NOT DETECTED
MDMA UR QL: NOT DETECTED
MEPERIDINE UR QL: NOT DETECTED
METHADONE UR QL: NOT DETECTED
METHAMPHET UR QL: NOT DETECTED
MIDAZOLAM UR QL SCN: NOT DETECTED
MORPHINE UR QL: NOT DETECTED
NORBUPRENORPHINE UR QL CFM: NOT DETECTED
NORDIAZEPAM UR QL: NOT DETECTED
NORFENTANYL UR QL: NOT DETECTED
NORHYDROCODONE UR QL CFM: NOT DETECTED
NOROXYCODONE UR QL CFM: NOT DETECTED
NOROXYMORPH CO100 Q0458: NOT DETECTED
OXAZEPAM UR QL: NOT DETECTED
OXYCODONE UR QL: NOT DETECTED
OXYMORPHONE UR QL: NOT DETECTED
PATHOLOGY STUDY: NORMAL
PCP UR QL: NOT DETECTED
PHENTERMINE UR QL: NOT DETECTED
PPAA UR QL: NOT DETECTED
PROPOXYPH UR QL: NOT DETECTED
SERVICE CMNT-IMP: NORMAL
TAPENADOL OSULF CO200 Q0473: NOT DETECTED
TAPENTADOL UR QL SCN: NOT DETECTED
TEMAZEPAM UR QL: NOT DETECTED
TRAMADOL UR QL: NOT DETECTED
ZOLPIDEM UR QL: NOT DETECTED

## 2020-08-28 ENCOUNTER — APPOINTMENT (OUTPATIENT)
Dept: URGENT CARE | Facility: CLINIC | Age: 85
End: 2020-08-28
Payer: MEDICARE

## 2020-08-28 ENCOUNTER — HOSPITAL ENCOUNTER (EMERGENCY)
Facility: MEDICAL CENTER | Age: 85
End: 2020-08-28
Payer: MEDICARE

## 2020-08-28 VITALS
RESPIRATION RATE: 14 BRPM | SYSTOLIC BLOOD PRESSURE: 158 MMHG | HEIGHT: 63 IN | OXYGEN SATURATION: 96 % | DIASTOLIC BLOOD PRESSURE: 78 MMHG | HEART RATE: 62 BPM | BODY MASS INDEX: 20.9 KG/M2 | WEIGHT: 117.95 LBS | TEMPERATURE: 97.9 F

## 2020-08-28 PROCEDURE — 302449 STATCHG TRIAGE ONLY (STATISTIC)

## 2020-08-28 ASSESSMENT — FIBROSIS 4 INDEX: FIB4 SCORE: 1.97

## 2020-08-28 NOTE — ED TRIAGE NOTES
"Pt ambulatory to triage, pt c/o \" generalized weakness\" \" all over body weakness since yesterday morning . States \"it is hard to describe\".  No unilateral deficits. C/o head fullness and dizziness as well that started yesterday morning as well.   "

## 2020-08-29 NOTE — ED NOTES
Patient requested to leave AMA, Risks explained to patient. Patient states understanding, Patient signed AMA.

## 2020-09-04 ENCOUNTER — TELEPHONE (OUTPATIENT)
Dept: MEDICAL GROUP | Facility: PHYSICIAN GROUP | Age: 85
End: 2020-09-04

## 2020-09-04 RX ORDER — BECLOMETHASONE DIPROPIONATE HFA 80 UG/1
AEROSOL, METERED RESPIRATORY (INHALATION)
Qty: 10.6 G | Refills: 0 | Status: SHIPPED | OUTPATIENT
Start: 2020-09-04 | End: 2020-10-06

## 2020-09-04 NOTE — TELEPHONE ENCOUNTER
Phone Number Called: [phone] (home)     Call outcome: Spoke to patient regarding message below.    Message: Gave Drs message . Patient understood and is feeling slightly better today. Will schedule Monday if needed

## 2020-09-04 NOTE — TELEPHONE ENCOUNTER
VOICEMAIL  1. Caller Name: Dayana Lechuga                        Call Back Number: [phone] (home)     2. Message: patient states she swallowed two pills however she believes they got stuck in the bottom of her throat. Patient believes the pills have dissolved however she is now concerned for cancer in her throat because of the 'toxicity in the pills'. Patient is now just sore and is unsure of next steps.    Please advise

## 2020-09-04 NOTE — TELEPHONE ENCOUNTER
Please let patient know that I'm sorry this happened.  The pills may cause some irritation, but will not increase her risk of cancer.  I would recommend she increase her water intake and consider having some throat lozenges to help with any sore throat.  She should feel better by Monday.  If not, she should call us to schedule an appointment.  -Dr. Feng covering for SOHEILA Ellis.

## 2020-09-11 ENCOUNTER — HOSPITAL ENCOUNTER (OUTPATIENT)
Facility: MEDICAL CENTER | Age: 85
End: 2020-09-13
Attending: EMERGENCY MEDICINE | Admitting: INTERNAL MEDICINE
Payer: MEDICARE

## 2020-09-11 ENCOUNTER — APPOINTMENT (OUTPATIENT)
Dept: RADIOLOGY | Facility: MEDICAL CENTER | Age: 85
End: 2020-09-11
Attending: EMERGENCY MEDICINE
Payer: MEDICARE

## 2020-09-11 DIAGNOSIS — R42 DIZZINESS: ICD-10-CM

## 2020-09-11 DIAGNOSIS — F07.81 POST CONCUSSION SYNDROME: ICD-10-CM

## 2020-09-11 DIAGNOSIS — I10 ESSENTIAL HYPERTENSION: ICD-10-CM

## 2020-09-11 DIAGNOSIS — R79.89 ELEVATED TROPONIN: ICD-10-CM

## 2020-09-11 DIAGNOSIS — I25.10 CORONARY ARTERY DISEASE INVOLVING NATIVE HEART WITHOUT ANGINA PECTORIS, UNSPECIFIED VESSEL OR LESION TYPE: ICD-10-CM

## 2020-09-11 PROBLEM — R00.1 BRADYCARDIA: Status: ACTIVE | Noted: 2020-09-11

## 2020-09-11 LAB
ALBUMIN SERPL BCP-MCNC: 4.3 G/DL (ref 3.2–4.9)
ALBUMIN/GLOB SERPL: 2 G/DL
ALP SERPL-CCNC: 54 U/L (ref 30–99)
ALT SERPL-CCNC: 15 U/L (ref 2–50)
ANION GAP SERPL CALC-SCNC: 14 MMOL/L (ref 7–16)
APPEARANCE UR: CLEAR
AST SERPL-CCNC: 17 U/L (ref 12–45)
BASOPHILS # BLD AUTO: 1.3 % (ref 0–1.8)
BASOPHILS # BLD: 0.09 K/UL (ref 0–0.12)
BILIRUB SERPL-MCNC: 0.6 MG/DL (ref 0.1–1.5)
BILIRUB UR QL STRIP.AUTO: NEGATIVE
BUN SERPL-MCNC: 12 MG/DL (ref 8–22)
CALCIUM SERPL-MCNC: 9.8 MG/DL (ref 8.5–10.5)
CHLORIDE SERPL-SCNC: 98 MMOL/L (ref 96–112)
CO2 SERPL-SCNC: 20 MMOL/L (ref 20–33)
COLOR UR: YELLOW
COVID ORDER STATUS COVID19: NORMAL
CREAT SERPL-MCNC: 0.48 MG/DL (ref 0.5–1.4)
EKG IMPRESSION: NORMAL
EOSINOPHIL # BLD AUTO: 0.1 K/UL (ref 0–0.51)
EOSINOPHIL NFR BLD: 1.4 % (ref 0–6.9)
ERYTHROCYTE [DISTWIDTH] IN BLOOD BY AUTOMATED COUNT: 44.1 FL (ref 35.9–50)
GLOBULIN SER CALC-MCNC: 2.2 G/DL (ref 1.9–3.5)
GLUCOSE SERPL-MCNC: 106 MG/DL (ref 65–99)
GLUCOSE UR STRIP.AUTO-MCNC: NEGATIVE MG/DL
HCT VFR BLD AUTO: 36.4 % (ref 37–47)
HGB BLD-MCNC: 12.4 G/DL (ref 12–16)
IMM GRANULOCYTES # BLD AUTO: 0.03 K/UL (ref 0–0.11)
IMM GRANULOCYTES NFR BLD AUTO: 0.4 % (ref 0–0.9)
KETONES UR STRIP.AUTO-MCNC: NEGATIVE MG/DL
LEUKOCYTE ESTERASE UR QL STRIP.AUTO: NEGATIVE
LYMPHOCYTES # BLD AUTO: 1.46 K/UL (ref 1–4.8)
LYMPHOCYTES NFR BLD: 21.1 % (ref 22–41)
MCH RBC QN AUTO: 33.7 PG (ref 27–33)
MCHC RBC AUTO-ENTMCNC: 34.1 G/DL (ref 33.6–35)
MCV RBC AUTO: 98.9 FL (ref 81.4–97.8)
MICRO URNS: NORMAL
MONOCYTES # BLD AUTO: 0.64 K/UL (ref 0–0.85)
MONOCYTES NFR BLD AUTO: 9.2 % (ref 0–13.4)
NEUTROPHILS # BLD AUTO: 4.61 K/UL (ref 2–7.15)
NEUTROPHILS NFR BLD: 66.6 % (ref 44–72)
NITRITE UR QL STRIP.AUTO: NEGATIVE
NRBC # BLD AUTO: 0 K/UL
NRBC BLD-RTO: 0 /100 WBC
PH UR STRIP.AUTO: 5.5 [PH] (ref 5–8)
PLATELET # BLD AUTO: 206 K/UL (ref 164–446)
PMV BLD AUTO: 10.1 FL (ref 9–12.9)
POTASSIUM SERPL-SCNC: 4.4 MMOL/L (ref 3.6–5.5)
PROT SERPL-MCNC: 6.5 G/DL (ref 6–8.2)
PROT UR QL STRIP: NEGATIVE MG/DL
RBC # BLD AUTO: 3.68 M/UL (ref 4.2–5.4)
RBC UR QL AUTO: NEGATIVE
SARS-COV-2 RNA RESP QL NAA+PROBE: NOTDETECTED
SODIUM SERPL-SCNC: 132 MMOL/L (ref 135–145)
SP GR UR STRIP.AUTO: 1.01
SPECIMEN SOURCE: NORMAL
TROPONIN T SERPL-MCNC: 21 NG/L (ref 6–19)
UROBILINOGEN UR STRIP.AUTO-MCNC: 0.2 MG/DL
WBC # BLD AUTO: 6.9 K/UL (ref 4.8–10.8)

## 2020-09-11 PROCEDURE — 84484 ASSAY OF TROPONIN QUANT: CPT

## 2020-09-11 PROCEDURE — 80053 COMPREHEN METABOLIC PANEL: CPT

## 2020-09-11 PROCEDURE — 36415 COLL VENOUS BLD VENIPUNCTURE: CPT

## 2020-09-11 PROCEDURE — 71045 X-RAY EXAM CHEST 1 VIEW: CPT

## 2020-09-11 PROCEDURE — G0378 HOSPITAL OBSERVATION PER HR: HCPCS

## 2020-09-11 PROCEDURE — 85025 COMPLETE CBC W/AUTO DIFF WBC: CPT

## 2020-09-11 PROCEDURE — 99285 EMERGENCY DEPT VISIT HI MDM: CPT

## 2020-09-11 PROCEDURE — 700102 HCHG RX REV CODE 250 W/ 637 OVERRIDE(OP): Performed by: HOSPITALIST

## 2020-09-11 PROCEDURE — A9270 NON-COVERED ITEM OR SERVICE: HCPCS | Performed by: HOSPITALIST

## 2020-09-11 PROCEDURE — U0003 INFECTIOUS AGENT DETECTION BY NUCLEIC ACID (DNA OR RNA); SEVERE ACUTE RESPIRATORY SYNDROME CORONAVIRUS 2 (SARS-COV-2) (CORONAVIRUS DISEASE [COVID-19]), AMPLIFIED PROBE TECHNIQUE, MAKING USE OF HIGH THROUGHPUT TECHNOLOGIES AS DESCRIBED BY CMS-2020-01-R: HCPCS

## 2020-09-11 PROCEDURE — C9803 HOPD COVID-19 SPEC COLLECT: HCPCS | Performed by: INTERNAL MEDICINE

## 2020-09-11 PROCEDURE — 93005 ELECTROCARDIOGRAM TRACING: CPT | Performed by: EMERGENCY MEDICINE

## 2020-09-11 PROCEDURE — 70450 CT HEAD/BRAIN W/O DYE: CPT

## 2020-09-11 PROCEDURE — 81003 URINALYSIS AUTO W/O SCOPE: CPT

## 2020-09-11 PROCEDURE — 99218 PR INITIAL OBSERVATION CARE,LEVL I: CPT | Performed by: HOSPITALIST

## 2020-09-11 RX ORDER — ASPIRIN 81 MG/1
81 TABLET, CHEWABLE ORAL DAILY
Status: DISCONTINUED | OUTPATIENT
Start: 2020-09-12 | End: 2020-09-13 | Stop reason: HOSPADM

## 2020-09-11 RX ORDER — MONTELUKAST SODIUM 10 MG/1
10 TABLET ORAL EVERY EVENING
COMMUNITY
End: 2020-09-30

## 2020-09-11 RX ORDER — POTASSIUM CHLORIDE 20 MEQ/1
20 TABLET, EXTENDED RELEASE ORAL 2 TIMES DAILY
COMMUNITY
End: 2020-09-30

## 2020-09-11 RX ORDER — ACETAMINOPHEN 500 MG
500 TABLET ORAL 2 TIMES DAILY
Status: ON HOLD | COMMUNITY
End: 2020-12-05

## 2020-09-11 RX ORDER — ISOSORBIDE MONONITRATE 30 MG/1
30 TABLET, EXTENDED RELEASE ORAL EVERY EVENING
Status: DISCONTINUED | OUTPATIENT
Start: 2020-09-11 | End: 2020-09-13 | Stop reason: HOSPADM

## 2020-09-11 RX ORDER — LISINOPRIL 20 MG/1
20 TABLET ORAL
Status: DISCONTINUED | OUTPATIENT
Start: 2020-09-12 | End: 2020-09-13 | Stop reason: HOSPADM

## 2020-09-11 RX ORDER — MONTELUKAST SODIUM 10 MG/1
10 TABLET ORAL EVERY EVENING
Status: DISCONTINUED | OUTPATIENT
Start: 2020-09-11 | End: 2020-09-13 | Stop reason: HOSPADM

## 2020-09-11 RX ORDER — LORAZEPAM 0.5 MG/1
.25-.5 TABLET ORAL 2 TIMES DAILY
COMMUNITY
End: 2020-10-28 | Stop reason: SDUPTHER

## 2020-09-11 RX ORDER — ONDANSETRON 2 MG/ML
4 INJECTION INTRAMUSCULAR; INTRAVENOUS EVERY 4 HOURS PRN
Status: DISCONTINUED | OUTPATIENT
Start: 2020-09-11 | End: 2020-09-13 | Stop reason: HOSPADM

## 2020-09-11 RX ORDER — LORAZEPAM 1 MG/1
1 TABLET ORAL
Status: DISCONTINUED | OUTPATIENT
Start: 2020-09-11 | End: 2020-09-12

## 2020-09-11 RX ORDER — AMLODIPINE BESYLATE 5 MG/1
5 TABLET ORAL
COMMUNITY
End: 2020-12-22

## 2020-09-11 RX ORDER — LORATADINE 10 MG/1
10 TABLET ORAL EVERY MORNING
COMMUNITY
End: 2021-06-08

## 2020-09-11 RX ORDER — CARVEDILOL 6.25 MG/1
6.25 TABLET ORAL 2 TIMES DAILY WITH MEALS
Status: DISCONTINUED | OUTPATIENT
Start: 2020-09-11 | End: 2020-09-13 | Stop reason: HOSPADM

## 2020-09-11 RX ORDER — AMOXICILLIN 250 MG
2 CAPSULE ORAL 2 TIMES DAILY
Status: DISCONTINUED | OUTPATIENT
Start: 2020-09-12 | End: 2020-09-13 | Stop reason: HOSPADM

## 2020-09-11 RX ORDER — AMLODIPINE BESYLATE 5 MG/1
5 TABLET ORAL
Status: DISCONTINUED | OUTPATIENT
Start: 2020-09-12 | End: 2020-09-13 | Stop reason: HOSPADM

## 2020-09-11 RX ORDER — BISACODYL 10 MG
10 SUPPOSITORY, RECTAL RECTAL
Status: DISCONTINUED | OUTPATIENT
Start: 2020-09-11 | End: 2020-09-13 | Stop reason: HOSPADM

## 2020-09-11 RX ORDER — ONDANSETRON 4 MG/1
4 TABLET, ORALLY DISINTEGRATING ORAL EVERY 4 HOURS PRN
Status: DISCONTINUED | OUTPATIENT
Start: 2020-09-11 | End: 2020-09-13 | Stop reason: HOSPADM

## 2020-09-11 RX ORDER — ACETAMINOPHEN 325 MG/1
650 TABLET ORAL EVERY 6 HOURS PRN
Status: DISCONTINUED | OUTPATIENT
Start: 2020-09-11 | End: 2020-09-13 | Stop reason: HOSPADM

## 2020-09-11 RX ORDER — POLYETHYLENE GLYCOL 3350 17 G/17G
1 POWDER, FOR SOLUTION ORAL
Status: DISCONTINUED | OUTPATIENT
Start: 2020-09-11 | End: 2020-09-13 | Stop reason: HOSPADM

## 2020-09-11 RX ORDER — ATORVASTATIN CALCIUM 20 MG/1
80 TABLET, FILM COATED ORAL EVERY EVENING
Status: DISCONTINUED | OUTPATIENT
Start: 2020-09-11 | End: 2020-09-13 | Stop reason: HOSPADM

## 2020-09-11 RX ORDER — ATORVASTATIN CALCIUM 80 MG/1
80 TABLET, FILM COATED ORAL NIGHTLY
COMMUNITY
End: 2021-03-17 | Stop reason: SDUPTHER

## 2020-09-11 RX ORDER — CARVEDILOL 12.5 MG/1
12.5 TABLET ORAL 2 TIMES DAILY
Status: ON HOLD | COMMUNITY
End: 2020-09-13 | Stop reason: SDUPTHER

## 2020-09-11 RX ORDER — ISOSORBIDE MONONITRATE 30 MG/1
30 TABLET, EXTENDED RELEASE ORAL EVERY EVENING
COMMUNITY
End: 2021-03-17 | Stop reason: SDUPTHER

## 2020-09-11 RX ORDER — LORAZEPAM 0.5 MG/1
0.25 TABLET ORAL
Status: DISCONTINUED | OUTPATIENT
Start: 2020-09-12 | End: 2020-09-12

## 2020-09-11 RX ORDER — LISINOPRIL 20 MG/1
20 TABLET ORAL
COMMUNITY
End: 2021-03-17 | Stop reason: SDUPTHER

## 2020-09-11 RX ADMIN — CARVEDILOL 6.25 MG: 6.25 TABLET, FILM COATED ORAL at 20:14

## 2020-09-11 RX ADMIN — ACETAMINOPHEN 650 MG: 325 TABLET, FILM COATED ORAL at 20:20

## 2020-09-11 RX ADMIN — LORAZEPAM 1 MG: 1 TABLET ORAL at 20:14

## 2020-09-11 RX ADMIN — MONTELUKAST 10 MG: 10 TABLET, FILM COATED ORAL at 20:13

## 2020-09-11 RX ADMIN — ATORVASTATIN CALCIUM 80 MG: 20 TABLET, FILM COATED ORAL at 20:13

## 2020-09-11 RX ADMIN — ISOSORBIDE MONONITRATE 30 MG: 30 TABLET, EXTENDED RELEASE ORAL at 20:13

## 2020-09-11 ASSESSMENT — PATIENT HEALTH QUESTIONNAIRE - PHQ9
1. LITTLE INTEREST OR PLEASURE IN DOING THINGS: NOT AT ALL
2. FEELING DOWN, DEPRESSED, IRRITABLE, OR HOPELESS: NOT AT ALL
SUM OF ALL RESPONSES TO PHQ9 QUESTIONS 1 AND 2: 0

## 2020-09-11 ASSESSMENT — COGNITIVE AND FUNCTIONAL STATUS - GENERAL
HELP NEEDED FOR BATHING: A LITTLE
PERSONAL GROOMING: A LITTLE
DAILY ACTIVITIY SCORE: 18
CLIMB 3 TO 5 STEPS WITH RAILING: A LITTLE
MOVING TO AND FROM BED TO CHAIR: A LITTLE
WALKING IN HOSPITAL ROOM: A LITTLE
SUGGESTED CMS G CODE MODIFIER DAILY ACTIVITY: CK
STANDING UP FROM CHAIR USING ARMS: A LITTLE
TURNING FROM BACK TO SIDE WHILE IN FLAT BAD: A LITTLE
TOILETING: A LITTLE
MOVING FROM LYING ON BACK TO SITTING ON SIDE OF FLAT BED: A LITTLE
DRESSING REGULAR LOWER BODY CLOTHING: A LITTLE
DRESSING REGULAR UPPER BODY CLOTHING: A LITTLE
EATING MEALS: A LITTLE
SUGGESTED CMS G CODE MODIFIER MOBILITY: CK
MOBILITY SCORE: 18

## 2020-09-11 ASSESSMENT — LIFESTYLE VARIABLES
ALCOHOL_USE: NO
TOTAL SCORE: 0
HAVE YOU EVER FELT YOU SHOULD CUT DOWN ON YOUR DRINKING: NO
AVERAGE NUMBER OF DAYS PER WEEK YOU HAVE A DRINK CONTAINING ALCOHOL: 0
HAVE PEOPLE ANNOYED YOU BY CRITICIZING YOUR DRINKING: NO
TOTAL SCORE: 0
ON A TYPICAL DAY WHEN YOU DRINK ALCOHOL HOW MANY DRINKS DO YOU HAVE: 0
EVER FELT BAD OR GUILTY ABOUT YOUR DRINKING: NO
EVER HAD A DRINK FIRST THING IN THE MORNING TO STEADY YOUR NERVES TO GET RID OF A HANGOVER: NO
DOES PATIENT WANT TO STOP DRINKING: NO
CONSUMPTION TOTAL: NEGATIVE
HOW MANY TIMES IN THE PAST YEAR HAVE YOU HAD 5 OR MORE DRINKS IN A DAY: 0
TOTAL SCORE: 0

## 2020-09-11 ASSESSMENT — FIBROSIS 4 INDEX
FIB4 SCORE: 1.94
FIB4 SCORE: 1.97

## 2020-09-11 ASSESSMENT — ENCOUNTER SYMPTOMS
FALLS: 1
DIZZINESS: 1
CHILLS: 0
FEVER: 0
SHORTNESS OF BREATH: 0

## 2020-09-11 ASSESSMENT — PAIN DESCRIPTION - PAIN TYPE
TYPE: ACUTE PAIN
TYPE: ACUTE PAIN

## 2020-09-11 NOTE — ED NOTES
Pt brought back from lobby via wheelchair, able to ambulate from wheelchair to gurney with steady gait.     Agree with triage note. Pt a/o x4, speaking in full sentences.

## 2020-09-11 NOTE — ED NOTES
Pharmacy Medication Reconciliation    Medication reconciliation has been completed by interviewing patient with  consent to do so with family/visitors in the room.   Allergies were reviewed and updated   No ORAL antibiotics within the past 14 days  [facility]

## 2020-09-11 NOTE — PROGRESS NOTES
Patient up to unit from ED. Patient is A&Ox4, complains of headache. VSS, no signs of distress. Tele monitor placed and verified by monitor room. All questions and concerns answered, bed in lowest and locked position, call light in reach, will continue to monitor.

## 2020-09-11 NOTE — ED NOTES
Pt transport now to room on [id number] upstairs, concerns addressed to receiving floor nurse. Pt in NAD at time of transfer. Vital signs stable. Belongings with pt.  Family with pt.

## 2020-09-11 NOTE — ED NOTES
Up to restroom again by wheelchair. Gown changed, provided with pillow and additional blankets for comfort. ERP at bedside to discuss findings. Patient and family verbalize understanding of plan for admit.

## 2020-09-11 NOTE — ED PROVIDER NOTES
ED Provider Note    CHIEF COMPLAINT  Chief Complaint   Patient presents with   • GLF     last Saturday hitting posterior head on wood mukesh.    • N/V     intermittenly since saturday   • Dizziness     since saturday       HPI  Dayana Lechuga is a 89 y.o. female who presents to the emergency department brought in by a family member, the patient tripped over her little dog last Saturday and hit the back of her head she thinks maybe she briefly suffered a loss of consciousness but is not sure.  Since that time she has not really been doing very well she has pain in the occipital part of the scalp and she has been feeling very dizzy she occasionally has some left shoulder discomfort, the family member that is with her says that the patient typically lives on her own without assistance but they have been calling her every day and she just does not seem to be doing very well since the fall so today she was brought in for evaluation    REVIEW OF SYSTEMS no fever or chills no nausea or vomiting no chest pain or difficulty breathing or cough no change in bowel or bladder function.  All other systems negative    PAST MEDICAL HISTORY  Past Medical History:   Diagnosis Date   • Allergy    • Anxiety    • ASTHMA    • CAD (coronary artery disease)     JT to RCA; 70% stenosis in LAD   • Hyperlipidemia    • Hypertension    • Lumbar back pain 9/10/2016   • OSTEOPOROSIS    • PVD (peripheral vascular disease) ([facility])     70% PAD-followed by Lary AMBROCIO       FAMILY HISTORY  Family History   Problem Relation Age of Onset   • Heart Disease Neg Hx    • Heart Failure Neg Hx    • Hyperlipidemia Neg Hx        SOCIAL HISTORY  Social History     Socioeconomic History   • Marital status:      Spouse name: Not on file   • Number of children: Not on file   • Years of education: Not on file   • Highest education level: Not on file   Occupational History   • Not on file   Social Needs   • Financial resource strain: Not on file    • Food insecurity     Worry: Not on file     Inability: Not on file   • Transportation needs     Medical: Not on file     Non-medical: Not on file   Tobacco Use   • Smoking status: Never Smoker   • Smokeless tobacco: Never Used   Substance and Sexual Activity   • Alcohol use: No     Alcohol/week: 0.0 oz   • Drug use: No   • Sexual activity: Not Currently   Lifestyle   • Physical activity     Days per week: Not on file     Minutes per session: Not on file   • Stress: Not on file   Relationships   • Social connections     Talks on phone: Not on file     Gets together: Not on file     Attends [employer] service: Not on file     Active member of club or organization: Not on file     Attends meetings of clubs or organizations: Not on file     Relationship status: Not on file   • Intimate partner violence     Fear of current or ex partner: Not on file     Emotionally abused: Not on file     Physically abused: Not on file     Forced sexual activity: Not on file   Other Topics Concern   •  Service No   • Blood Transfusions Yes   • Caffeine Concern No   • Occupational Exposure No   • Hobby Hazards No   • Sleep Concern Yes   • Stress Concern Yes   • Weight Concern No   • Special Diet No   • Back Care No   • Exercise No   • Bike Helmet No   • Seat Belt Yes   • Self-Exams No   Social History Narrative   • Not on file       SURGICAL HISTORY  Past Surgical History:   Procedure Laterality Date   • ABDOMINAL HYSTERECTOMY TOTAL     • APPENDECTOMY     • HERNIA REPAIR     • TONSILLECTOMY     • ZZZ CARDIAC CATH         CURRENT MEDICATIONS  Home Medications     Reviewed by Medina Reyna (Pharmacy Tech) on 09/11/20 at 1509  Med List Status: Complete   Medication Last Dose Status   acetaminophen (TYLENOL) 500 MG Tab 9/11/2020 Active   albuterol 108 (90 Base) MCG/ACT Aero Soln inhalation aerosol PRN Active   amLODIPine (NORVASC) 5 MG Tab 9/11/2020 Active   Ascorbic Acid (VITAMIN C) 1000 MG Tab 9/11/2020 Active   aspirin  "(ASA) 81 MG Chew Tab chewable tablet 9/11/2020 Active   atorvastatin (LIPITOR) 80 MG tablet 9/10/2020 Active   Biotin w/ Vitamins C & E (HAIR/SKIN/NAILS) 1250-7.5-7.5 MCG-MG-UNT Chew Tab 9/11/2020 Active   carvedilol (COREG) 12.5 MG Tab 9/11/2020 Active   Cholecalciferol (VITAMIN D) 2000 UNIT Tab 9/11/2020 Active   fluticasone (FLONASE) 50 MCG/ACT nasal spray 9/11/2020 Active   guaifenesin LA (MUCINEX) 600 MG TABLET SR 12 HR 9/11/2020 Active   isosorbide mononitrate SR (IMDUR) 30 MG TABLET SR 24 HR 9/10/2020 Active   lisinopril (PRINIVIL) 20 MG Tab 9/11/2020 Active   loratadine (CLARITIN) 10 MG Tab 9/10/2020 Active   LORazepam (ATIVAN) 0.5 MG Tab 9/10/2020 Active   montelukast (SINGULAIR) 10 MG Tab 9/10/2020 Active   Multiple Vitamins-Minerals (CENTRUM SILVER PO) 9/11/2020 Active   nitroglycerin (NITROSTAT) 0.4 MG SL Tab PRN Active   omeprazole (PRILOSEC) 20 MG delayed-release capsule 9/11/2020 Active   potassium chloride SA (KDUR) 20 MEQ Tab CR 9/11/2020 Active   QVAR REDIHALER 80 MCG/ACT inhaler 9/11/2020 Active   VITAMIN E PO 9/11/2020 Active                ALLERGIES  Allergies   Allergen Reactions   • Bactrim [Sulfamethoxazole W-Trimethoprim] Hives   • Pcn [Penicillins] Hives     About 70 years   • Codeine Unspecified     Unknown reaction         PHYSICAL EXAM  VITAL SIGNS: /82   Pulse 63   Temp 37.1 °C (98.7 °F) (Temporal)   Resp 16   Ht 1.6 m (5' 3\")   Wt 51.7 kg (114 lb)   LMP  (LMP Unknown)   SpO2 94%   BMI 20.19 kg/m²    Oxygen saturation is interpreted as adequate  Constitutional: Awake verbal talkative she does not appear toxic or distressed  HENT: No marks or contusions or hematomas of the head  Eyes: Pupils round extraocular motion present  Neck: Trachea midline no JVD no C-spine tenderness  Cardiovascular: Regular rate and rhythm  Lungs: Clear and equal bilaterally with no apparent difficulty breathing  Abdomen/Back: Soft nontender nondistended no rebound guarding or peritoneal " findings  Skin: Warm and dry  Musculoskeletal: No acute bony deformity the patient has good range of motion around the left shoulder  Neurologic: Awake verbal moving all extremities without difficulty the face moves normally    Laboratory  CBC shows white blood cell count of 6.9 hemoglobin is adequate at 12 basic metabolic panel is unremarkable LFTs are unremarkable troponin is minimally above the normal range at 21    EKG interpretation  Twelve-lead EKG shows sinus rhythm 55 bpm no pathologic ST elevation or depression there is a left axis deviation    Radiology  DX-CHEST-PORTABLE (1 VIEW)   Final Result      1.  Emphysema   2.  Atherosclerosis   3.  Persistent small LEFT pleural effusion or scar      CT-HEAD W/O   Final Result      1. Cerebral atrophy.   2. White matter lucencies most consistent with small vessel ischemic change versus demyelination or gliosis.   3. Otherwise, Head CT without contrast with no acute findings. No evidence of acute cerebral infarction, hemorrhage or mass lesion.        MEDICAL DECISION MAKING and DISPOSITION  In the emergency department an IV was established the patient has remained hemodynamically stable.  I reviewed all the findings with the patient and her family and at this point in time given that the patient is very elderly she is really not doing very well at home and she has recently fallen I think that she will require further evaluation.  Differential diagnosis includes postconcussive symptoms but also given her minimally elevated troponin and dizziness acute coronary syndrome would still be within the differential diagnosis.  I have reviewed the case with the hospitalist the patient is referred to the hospitalist service for further evaluation and treatment    IMPRESSION  1.  Dizziness  2.  Status post concussion  3.  Elevated troponin         Electronically signed by: Manoj Askew M.D., 9/11/2020 4:24 PM

## 2020-09-11 NOTE — CARE PLAN
Problem: Communication  Goal: The ability to communicate needs accurately and effectively will improve  Intervention: Educate patient and significant other/support system about the plan of care, procedures, treatments, medications and allow for questions  Note: Educated patient to voice questions and concerns regarding plan of care.     Problem: Safety  Goal: Will remain free from falls  Intervention: Implement fall precautions  Note: Safety precautions and fall prevention in place. Fall prevention education provided. Patient verbalized understanding. Bed in low locked position, bed alarm on, treaded socks on patient, call bell within reach. Patient calls appropriately as needed.

## 2020-09-11 NOTE — PROGRESS NOTES
Triage note    89 yo woman with HTN, HLD, GERD, asthma, CAD, PVD who ell on Saturday and complains of dizziness, nausea, and weakness since then. She lives independently but family says she has not been acting like herself. Also has had issues with low and high blood pressures at home. Takes multiple antihypertensives.  Trop 21. Has left shoulder pain. EKG without significant ST changes, bradycardic. CXR showed emphysema.  Dr. Quezada to admit

## 2020-09-11 NOTE — ASSESSMENT & PLAN NOTE
Ms. Lechuga has been dizzy and nauseous since hitting her head 9/6  PT/OT to assess her safety  She may benefit from very low dose meclizine

## 2020-09-11 NOTE — H&P
"Hospital Medicine History & Physical Note    Date of Service  9/11/2020    Primary Care Physician  Ned [facility], MARIANNE    Consultants  none    Code Status  Full Code    Chief Complaint  Chief Complaint   Patient presents with   • GLF     last Saturday hitting posterior head on wood mukesh.    • N/V     intermittenly since saturday   • Dizziness     since saturday       History of Presenting Illness  89 y.o. female who presented 9/11/2020 with dizziness and nausea.  Ms. Lechuga has a past medical history of sent to the right coronary artery 2016 as well as hypertension and dyslipidemia that have been her usual state of health until last Saturday, 9/6/2020, she tripped over her dog and had the misfortune hit her head on hardwood floor.  Since then she is felt dizzy \"like the room is spinning\" and is worse when she moves her head up and down or turns it left and right.  She is quite afraid of staying and another fall.  Is been quite nauseous since that head trauma.  Presented emergency room with these complaints here a CT of the head is negative.  Her EKG is a little unchanged and when questioned she states she has intermittent chest pain though this is been going on for many years and seems about her baseline from what I can ascertain.  Notably in the ER, her pulse was down to 55 and therefore the dose of her Coreg will be decreased.  She denies contact with persons known to have COVID, she denies fevers or chills, no cough or shortness of breath, a COVID screening test is been ordered per hospital policy.    She denies exposure to persons known to have COVID. She denies   Review of Systems  Review of Systems   Constitutional: Negative for chills and fever.   Respiratory: Negative for shortness of breath.    Cardiovascular: Positive for chest pain.   Musculoskeletal: Positive for falls.   Neurological: Positive for dizziness.   All other systems reviewed and are negative.      Past Medical History   " has a past medical history of Allergy, Anxiety, ASTHMA, CAD (coronary artery disease), Hyperlipidemia, Hypertension, Lumbar back pain (9/10/2016), OSTEOPOROSIS, and PVD (peripheral vascular disease) ([facility]).    Surgical History   has a past surgical history that includes appendectomy; abdominal hysterectomy total; hernia repair; tonsillectomy; and zzz cardiac cath.     Family History  No family hx sudden death.     Social History   reports that she has never smoked. She has never used smokeless tobacco. She reports that she does not drink alcohol or use drugs.    Allergies  Allergies   Allergen Reactions   • Bactrim [Sulfamethoxazole W-Trimethoprim] Rash     Rash   • Codeine    • Pcn [Penicillins] Rash     About 70 years       Medications  Prior to Admission Medications   Prescriptions Last Dose Informant Patient Reported? Taking?   Ascorbic Acid (VITAMIN C) 1000 MG Tab  Patient Yes No   Sig: Take 1,000 mg by mouth every day.   Biotin w/ Vitamins C & E (HAIR/SKIN/NAILS) 1250-7.5-7.5 MCG-MG-UNT Chew Tab  Patient Yes No   Sig: Take 1 Tab by mouth every day.   Cholecalciferol (VITAMIN D) 2000 UNIT Tab  Patient Yes No   Sig: Take 2,000 Units by mouth every day.   Diclofenac Sodium (VOLTAREN) 1 % Gel   No No   Sig: Lower extremities: Apply 4 g of 1% gel to affected area 4 times daily.   HYDROCORTISONE, TOPICAL, ([employer] CORTISONE COOLING RELIEF) 1 % Gel  Patient Yes No   Si g by Apply externally route 1 time daily as needed (Back Itching.).   Menthol-Camphor (ICY HOT ARTHRITIS PAIN RELIEF) 16-4 % Lotion  Patient Yes No   Si g by Apply externally route 1 time daily as needed (Back Itching.).   QVAR REDIHALER 80 MCG/ACT inhaler   No No   Sig: INHALE 1 PUFF BY MOUTH TWICE A DAY    VITAMIN E PO  Patient Yes No   Sig: Take 1 Dose by mouth every day. Unknown OTC Strength.   albuterol 108 (90 Base) MCG/ACT Aero Soln inhalation aerosol   No No   Sig: Inhale 2 Puffs by mouth every 6 hours as needed for Shortness of Breath.    amLODIPine (NORVASC) 5 MG Tab   No No   Sig: TAKE ONE TABLET BY MOUTH ONE TIME DAILY    aspirin (ASA) 81 MG Chew Tab chewable tablet  Patient Yes No   Sig: Take 1 Tab by mouth every day.   atorvastatin (LIPITOR) 80 MG tablet   No No   Sig: TAKE ONE TABLET BY MOUTH EVERY EVENING   carvedilol (COREG) 12.5 MG Tab   No No   Sig: TAKE ONE TABLET BY MOUTH TWICE DAILY    fluconazole (DIFLUCAN) 150 MG tablet   No No   Sig: Take 1 tablet by mouth.  If symptoms are still present 72 hours repeat dose x1.   fluticasone (FLONASE) 50 MCG/ACT nasal spray   No No   Sig: USE ONE SPRAY INTO EACH NOSTRIL DAILY   guaifenesin LA (MUCINEX) 600 MG TABLET SR 12 HR  Patient Yes No   Sig: Take 600 mg by mouth every morning.   isosorbide mononitrate SR (IMDUR) 30 MG TABLET SR 24 HR   No No   Sig: TAKE 1 TABLET BY MOUTH EVERY EVENING.    lisinopril (PRINIVIL) 20 MG Tab   No No   Sig: TAKE ONE TABLET BY MOUTH ONE TIME DAILY    montelukast (SINGULAIR) 10 MG Tab   No No   Sig: TAKE 1 TABLET BY MOUTH EVERY DAY.   nitroglycerin (NITROSTAT) 0.4 MG SL Tab   No No   Sig: Place 1 tab under tongue every 5 min as needed for chest pain max 3 doses   omeprazole (PRILOSEC) 20 MG delayed-release capsule  Patient Yes No   Sig: Take 20 mg by mouth every day.   potassium chloride SA (KDUR) 20 MEQ Tab CR   No No   Sig: TAKE 1 TABLET BY MOUTH TWICE DAILY (GENERIC FOR KDUR)   therapeutic multivitamin-minerals (THERAGRAN-M) Tab  Patient Yes No   Sig: Take 1 Tab by mouth every day.      Facility-Administered Medications: None       Physical Exam  Temp:  [36.5 °C (97.7 °F)] 36.5 °C (97.7 °F)  Pulse:  [56-69] 67  Resp:  [18-39] 22  BP: (141-188)/(69-91) 188/91  SpO2:  [93 %-96 %] 93 %    Physical Exam  Vitals signs and nursing note reviewed.   Constitutional:       Appearance: Normal appearance.   HENT:      Mouth/Throat:      Mouth: Mucous membranes are dry.      Pharynx: Oropharynx is clear.   Eyes:      General: No scleral icterus.     Conjunctiva/sclera:  Conjunctivae normal.   Neck:      Musculoskeletal: Normal range of motion and neck supple.   Cardiovascular:      Rate and Rhythm: Regular rhythm. Bradycardia present.      Heart sounds: No murmur.   Pulmonary:      Effort: Pulmonary effort is normal. No respiratory distress.      Breath sounds: Normal breath sounds.   Abdominal:      General: There is no distension.      Tenderness: There is no abdominal tenderness.   Musculoskeletal:      Right lower leg: No edema.      Left lower leg: No edema.   Skin:     General: Skin is warm and dry.   Neurological:      General: No focal deficit present.      Mental Status: She is alert and oriented to person, place, and time.   Psychiatric:         Mood and Affect: Mood normal.         Behavior: Behavior normal.         Laboratory:  Recent Labs     09/11/20  1054   WBC 6.9   RBC 3.68*   HEMOGLOBIN 12.4   HEMATOCRIT 36.4*   MCV 98.9*   MCH 33.7*   MCHC 34.1   RDW 44.1   PLATELETCT 206   MPV 10.1     Recent Labs     09/11/20  1054   SODIUM 132*   POTASSIUM 4.4   CHLORIDE 98   CO2 20   GLUCOSE 106*   BUN 12   CREATININE 0.48*   CALCIUM 9.8     Recent Labs     09/11/20  1054   ALTSGPT 15   ASTSGOT 17   ALKPHOSPHAT 54   TBILIRUBIN 0.6   GLUCOSE 106*         No results for input(s): NTPROBNP in the last 72 hours.      Recent Labs     09/11/20  1054   TROPONINT 21*       Imaging:  DX-CHEST-PORTABLE (1 VIEW)   Final Result      1.  Emphysema   2.  Atherosclerosis   3.  Persistent small LEFT pleural effusion or scar      CT-HEAD W/O   Final Result      1. Cerebral atrophy.   2. White matter lucencies most consistent with small vessel ischemic change versus demyelination or gliosis.   3. Otherwise, Head CT without contrast with no acute findings. No evidence of acute cerebral infarction, hemorrhage or mass lesion.        EKG interpreted by me sinus rate of 55, Q waves in V1 through V3, inverted T wave in lead III, flat T wave in aVF.    Compared to EKG 12/2019 the Q waves are the same  though the T wave was upright in 3    Assessment/Plan:  I anticipate this patient is appropriate for observation status at this time.    * Post concussion syndrome- (present on admission)  Assessment & Plan  Ms. Lechuga has been dizzy and nauseous since hitting her head 9/6  PT/OT to assess her safety  She may benefit from very low dose meclizine    CAD (coronary artery disease)- (present on admission)  Assessment & Plan  Hx stent RCA 2016   She is followed by Dr. Jones  She had a negative cardiac stress test December 2019    Essential hypertension- (present on admission)  Assessment & Plan  Continue lisinopril, isosorbide, norvasc, and decreased dose of coreg with holding parameters.     Bradycardia- (present on admission)  Assessment & Plan  Pulse was 55 in the ER  Decrease coreg and place holding parameters  Continuous tele monitoring

## 2020-09-11 NOTE — ED TRIAGE NOTES
"Dayana Lechuga  89 y.o.  Chief Complaint   Patient presents with   • GLF     last Saturday hitting posterior head on wood mukesh.    • N/V     intermittenly since saturday   • Dizziness     since saturday       Patient to triage in  with DIL and above complaint.  Pt reports tripping over her dog last Saturday and felt ok Saturday and Sunday then every \"went down hill from Monday till today\".  Pt reports having headache, n/v at times, and very dizzy throughout last couple of days.        Vitals:    09/11/20 1003   BP: 149/69   Pulse: 69   Resp: 18   Temp: 36.5 °C (97.7 °F)   SpO2: 96%       Triage process explained to patient, apologized for wait time, and returned to [facility].  Pt informed to notify staff of any change in condition. NAD at this time.    "

## 2020-09-11 NOTE — ASSESSMENT & PLAN NOTE
Hx stent RCA 2016   She is followed by Dr. Jones  She had a negative cardiac stress test December 2019

## 2020-09-11 NOTE — ED NOTES
Assumed care of patient, report from Ailin TAY. Assisted to restroom by wheelchair. Urine sample obtained and sent to lab.

## 2020-09-11 NOTE — PROGRESS NOTES
2 RN skin check complete with JASVIR Marsh  -Devices in place N/A  -Skin assessed under devices N/A  -Confirmed pressure ulcers found on N/A  -New potential pressure ulcers noted on N/A.   -The following interventions in place: patient turns independently.    -Ears are pink and blanching  -Elbows are pink and blanching  -Scattered bruising to upper and lower extremities  -Sacrum is pink and blanching  -Heels are pink and blanching    -Skin is intact. No areas of concern.

## 2020-09-12 PROCEDURE — 99225 PR SUBSEQUENT OBSERVATION CARE,LEVEL II: CPT | Performed by: STUDENT IN AN ORGANIZED HEALTH CARE EDUCATION/TRAINING PROGRAM

## 2020-09-12 PROCEDURE — A9270 NON-COVERED ITEM OR SERVICE: HCPCS | Performed by: HOSPITALIST

## 2020-09-12 PROCEDURE — 96372 THER/PROPH/DIAG INJ SC/IM: CPT

## 2020-09-12 PROCEDURE — 700111 HCHG RX REV CODE 636 W/ 250 OVERRIDE (IP): Performed by: HOSPITALIST

## 2020-09-12 PROCEDURE — 97161 PT EVAL LOW COMPLEX 20 MIN: CPT

## 2020-09-12 PROCEDURE — G0378 HOSPITAL OBSERVATION PER HR: HCPCS

## 2020-09-12 PROCEDURE — 700102 HCHG RX REV CODE 250 W/ 637 OVERRIDE(OP): Performed by: HOSPITALIST

## 2020-09-12 PROCEDURE — A9270 NON-COVERED ITEM OR SERVICE: HCPCS | Performed by: STUDENT IN AN ORGANIZED HEALTH CARE EDUCATION/TRAINING PROGRAM

## 2020-09-12 PROCEDURE — 700102 HCHG RX REV CODE 250 W/ 637 OVERRIDE(OP): Performed by: STUDENT IN AN ORGANIZED HEALTH CARE EDUCATION/TRAINING PROGRAM

## 2020-09-12 RX ORDER — MECLIZINE HYDROCHLORIDE 25 MG/1
12.5 TABLET ORAL 2 TIMES DAILY PRN
Status: DISCONTINUED | OUTPATIENT
Start: 2020-09-12 | End: 2020-09-13 | Stop reason: HOSPADM

## 2020-09-12 RX ORDER — LORAZEPAM 0.5 MG/1
0.25 TABLET ORAL
Status: DISCONTINUED | OUTPATIENT
Start: 2020-09-12 | End: 2020-09-13 | Stop reason: HOSPADM

## 2020-09-12 RX ORDER — LORAZEPAM 0.5 MG/1
TABLET ORAL
Status: ACTIVE
Start: 2020-09-12 | End: 2020-09-13

## 2020-09-12 RX ORDER — LORAZEPAM 1 MG/1
1 TABLET ORAL
Status: DISCONTINUED | OUTPATIENT
Start: 2020-09-12 | End: 2020-09-13 | Stop reason: HOSPADM

## 2020-09-12 RX ORDER — LORAZEPAM 0.5 MG/1
0.25 TABLET ORAL
Status: DISCONTINUED | OUTPATIENT
Start: 2020-09-12 | End: 2020-09-12

## 2020-09-12 RX ADMIN — ATORVASTATIN CALCIUM 80 MG: 20 TABLET, FILM COATED ORAL at 17:33

## 2020-09-12 RX ADMIN — ACETAMINOPHEN 650 MG: 325 TABLET, FILM COATED ORAL at 15:20

## 2020-09-12 RX ADMIN — ISOSORBIDE MONONITRATE 30 MG: 30 TABLET, EXTENDED RELEASE ORAL at 17:33

## 2020-09-12 RX ADMIN — ASPIRIN 81 MG: 81 TABLET, CHEWABLE ORAL at 05:24

## 2020-09-12 RX ADMIN — MONTELUKAST 10 MG: 10 TABLET, FILM COATED ORAL at 17:33

## 2020-09-12 RX ADMIN — LORAZEPAM 1 MG: 1 TABLET ORAL at 20:47

## 2020-09-12 RX ADMIN — CARVEDILOL 6.25 MG: 6.25 TABLET, FILM COATED ORAL at 08:10

## 2020-09-12 RX ADMIN — DOCUSATE SODIUM 50 MG AND SENNOSIDES 8.6 MG 1 TABLET: 8.6; 5 TABLET, FILM COATED ORAL at 17:33

## 2020-09-12 RX ADMIN — ENOXAPARIN SODIUM 40 MG: 40 INJECTION SUBCUTANEOUS at 05:24

## 2020-09-12 RX ADMIN — LORAZEPAM 0.25 MG: 0.5 TABLET ORAL at 14:19

## 2020-09-12 RX ADMIN — CARVEDILOL 6.25 MG: 6.25 TABLET, FILM COATED ORAL at 17:32

## 2020-09-12 ASSESSMENT — COGNITIVE AND FUNCTIONAL STATUS - GENERAL
WALKING IN HOSPITAL ROOM: A LITTLE
MOVING TO AND FROM BED TO CHAIR: A LITTLE
CLIMB 3 TO 5 STEPS WITH RAILING: A LITTLE
MOVING FROM LYING ON BACK TO SITTING ON SIDE OF FLAT BED: A LITTLE
SUGGESTED CMS G CODE MODIFIER MOBILITY: CK
STANDING UP FROM CHAIR USING ARMS: A LITTLE
MOBILITY SCORE: 19

## 2020-09-12 ASSESSMENT — PAIN DESCRIPTION - PAIN TYPE
TYPE: ACUTE PAIN

## 2020-09-12 ASSESSMENT — ENCOUNTER SYMPTOMS
DIZZINESS: 1
PALPITATIONS: 0
VOMITING: 0
ORTHOPNEA: 0
NAUSEA: 1

## 2020-09-12 ASSESSMENT — GAIT ASSESSMENTS
GAIT LEVEL OF ASSIST: SUPERVISED
ASSISTIVE DEVICE: FRONT WHEEL WALKER
DEVIATION: INCREASED BASE OF SUPPORT;DECREASED TOE OFF;DECREASED HEEL STRIKE
DISTANCE (FEET): 120

## 2020-09-12 NOTE — PROGRESS NOTES
"Hospital Medicine Daily Progress Note    Date of Service  9/12/2020    Chief Complaint  89 y.o. female admitted 9/11/2020 with CC of nausea and vertigo. Fall 1 week ago.     Hospital Course   HPI briefly as per the admitting physician\" Ms. Lechuga has a past medical history of  right coronary artery 2016 as well as hypertension and dyslipidemia. She have been in her usual state of health until last Saturday, 9/6/2020, she tripped over her dog and had the misfortune hit her head on hardwood floor.  Since then she is felt dizzy \"like the room is spinning\" and is worse when she moves her head up and down or turns it left and right.  She is quite afraid of staying and another fall.  Is been quite nauseous since that head trauma.  Presented emergency room with these complaints here a CT of the head is negative.  Her EKG is a little unchanged and when questioned she states she has intermittent chest pain though this is been going on for many years and seems about her baseline from what I can ascertain.  Notably in the ER, her pulse was down to 55 and therefore the dose of her Coreg will be decreased.  She denies contact with persons known to have COVID, she denies fevers or chills, no cough or shortness of breath, a COVID screening test is been ordered per hospital policy.\"  Patient to be evaluated by PT OT for fall risk prevention, balance and muscle strength training.              Interval Problem Update  9/12/20: Patient was seen and examined at bedside.  She reports intermittent vertigo and nausea since the fall worsen with certain movements and turning her neck.  She denies any loss of consciousness jerking movements, palpitations syncopal episode, shortness of breath, vision changes, focal weakness but admits to intermittent chest pain.    Consultants/Specialty  none    Code Status  Full Code    Disposition  Home with home health pending PT/OT    Review of Systems  Review of Systems   Cardiovascular: Positive for " chest pain. Negative for palpitations and orthopnea.   Gastrointestinal: Positive for nausea. Negative for vomiting.   Neurological: Positive for dizziness.   All other systems reviewed and are negative.       Physical Exam  Temp:  [36.2 °C (97.2 °F)-37.1 °C (98.7 °F)] 36.7 °C (98 °F)  Pulse:  [61-71] 63  Resp:  [12-21] 16  BP: (101-181)/(53-86) 132/58  SpO2:  [92 %-96 %] 94 %    Physical Exam  Vitals signs and nursing note reviewed.   Constitutional:       Appearance: Normal appearance.   HENT:      Head: Normocephalic and atraumatic.      Right Ear: External ear normal.      Left Ear: External ear normal.      Mouth/Throat:      Mouth: Mucous membranes are moist.   Eyes:      Extraocular Movements: Extraocular movements intact.      Conjunctiva/sclera: Conjunctivae normal.      Pupils: Pupils are equal, round, and reactive to light.   Neck:      Musculoskeletal: Normal range of motion and neck supple.   Cardiovascular:      Rate and Rhythm: Normal rate and regular rhythm.      Pulses: Normal pulses.      Heart sounds: Normal heart sounds.   Pulmonary:      Effort: Pulmonary effort is normal.      Breath sounds: Normal breath sounds.   Abdominal:      General: Abdomen is flat. Bowel sounds are normal.      Palpations: Abdomen is soft.   Musculoskeletal: Normal range of motion.   Skin:     General: Skin is warm.      Capillary Refill: Capillary refill takes less than 2 seconds.   Neurological:      General: No focal deficit present.      Mental Status: She is alert and oriented to person, place, and time.   Psychiatric:         Mood and Affect: Mood normal.         Fluids    Intake/Output Summary (Last 24 hours) at 9/12/2020 1442  Last data filed at 9/12/2020 1100  Gross per 24 hour   Intake 360 ml   Output --   Net 360 ml       Laboratory  Recent Labs     09/11/20  1054   WBC 6.9   RBC 3.68*   HEMOGLOBIN 12.4   HEMATOCRIT 36.4*   MCV 98.9*   MCH 33.7*   MCHC 34.1   RDW 44.1   PLATELETCT 206   MPV 10.1     Recent  Labs     09/11/20  1054   SODIUM 132*   POTASSIUM 4.4   CHLORIDE 98   CO2 20   GLUCOSE 106*   BUN 12   CREATININE 0.48*   CALCIUM 9.8                      Current Facility-Administered Medications:   •  LORazepam (ATIVAN) tablet 0.25 mg, 0.25 mg, Oral, Daily-1400, Hakeem Saunders M.D., 0.25 mg at 09/12/20 1419  •  LORazepam (ATIVAN) tablet 1 mg, 1 mg, Oral, QHS, Hakeem Saunders M.D.  •  LORAZEPAM 0.5 MG PO TABS, , , ,   •  amLODIPine (NORVASC) tablet 5 mg, 5 mg, Oral, Q DAY, Wai Quezada M.D., Stopped at 09/12/20 0600  •  aspirin (ASA) chewable tab 81 mg, 81 mg, Oral, DAILY, Wai Quezada M.D., 81 mg at 09/12/20 0524  •  atorvastatin (LIPITOR) tablet 80 mg, 80 mg, Oral, Q EVENING, Wai Quezada M.D., 80 mg at 09/11/20 2013  •  carvedilol (COREG) tablet 6.25 mg, 6.25 mg, Oral, BID WITH MEALS, Wai Quezada M.D., 6.25 mg at 09/12/20 0810  •  isosorbide mononitrate SR (IMDUR) tablet 30 mg, 30 mg, Oral, Q EVENING, Wai Quezada M.D., 30 mg at 09/11/20 2013  •  lisinopril (PRINIVIL) tablet 20 mg, 20 mg, Oral, Q DAY, Wai Quezada M.D., Stopped at 09/12/20 0600  •  senna-docusate (PERICOLACE or SENOKOT S) 8.6-50 MG per tablet 2 Tab, 2 Tab, Oral, BID **AND** polyethylene glycol/lytes (MIRALAX) PACKET 1 Packet, 1 Packet, Oral, QDAY PRN **AND** magnesium hydroxide (MILK OF MAGNESIA) suspension 30 mL, 30 mL, Oral, QDAY PRN **AND** bisacodyl (DULCOLAX) suppository 10 mg, 10 mg, Rectal, QDAY PRN, Wai Quezada M.D.  •  enoxaparin (LOVENOX) inj 40 mg, 40 mg, Subcutaneous, DAILY, Wai Quezada M.D., 40 mg at 09/12/20 0524  •  acetaminophen (TYLENOL) tablet 650 mg, 650 mg, Oral, Q6HRS PRN, Wai Quezada M.D., 650 mg at 09/11/20 2020  •  ondansetron (ZOFRAN) syringe/vial injection 4 mg, 4 mg, Intravenous, Q4HRS PRN, Wai Quezada M.D.  •  ondansetron (ZOFRAN ODT) dispertab 4 mg, 4 mg, Oral, Q4HRS PRN, Wai Quezada M.D.  •  montelukast (SINGULAIR) tablet 10 mg, 10 mg, Oral, Q EVENING, Wai Quezada M.D., 10 mg at 09/11/20  2013    Imaging  DX-CHEST-PORTABLE (1 VIEW)   Final Result      1.  Emphysema   2.  Atherosclerosis   3.  Persistent small LEFT pleural effusion or scar      CT-HEAD W/O   Final Result      1. Cerebral atrophy.   2. White matter lucencies most consistent with small vessel ischemic change versus demyelination or gliosis.   3. Otherwise, Head CT without contrast with no acute findings. No evidence of acute cerebral infarction, hemorrhage or mass lesion.           Assessment/Plan  * Post concussion syndrome- (present on admission)  Assessment & Plan  Ms. Lechuga has been dizzy and nauseous since hitting her head 9/6  PT/OT to assess her safety  She may benefit from very low dose meclizine    CAD (coronary artery disease)- (present on admission)  Assessment & Plan  Hx stent RCA 2016   She is followed by Dr. Jones  She had a negative cardiac stress test December 2019    Essential hypertension- (present on admission)  Assessment & Plan  Continue lisinopril, isosorbide, norvasc, and decreased dose of coreg with holding parameters.     Bradycardia- (present on admission)  Assessment & Plan  Pulse was 55 in the ER  Decrease coreg and place holding parameters  Continuous tele monitoring          VTE prophylaxis: Enoxaparin

## 2020-09-12 NOTE — PROGRESS NOTES
Assumed care this am from night shift RN. Patient awake in bed, holding appropriate conversation. Patient AxO 4, O2 @ RA, VSS. Call bell and personal items within reach, bed locked in low position. Bed exit alarm on. Hourly rounding in place. Tele box in place, monitor room notified.

## 2020-09-12 NOTE — CARE PLAN
Problem: Communication  Goal: The ability to communicate needs accurately and effectively will improve  Outcome: PROGRESSING AS EXPECTED  Note: Encourage pt. to voice concerns or questions in regards to her plan of care.      Problem: Safety  Goal: Will remain free from injury  Outcome: PROGRESSING AS EXPECTED  Note: Patient's room close to nursing station, bed in the lowest position, call light within reach, hourly rounding on the pt.

## 2020-09-12 NOTE — THERAPY
"Physical Therapy   Initial Evaluation     Patient Name: Dayana Lechuga  Age:  89 y.o., Sex:  female  Medical Record #: [id number]  Today's Date: 9/12/2020     Precautions: Fall Risk    Assessment  Patient is 89 y.o. female who sustained a fall when attempting to sit down and tripped backwards over her dog. Pt states she hit the back of her head. Pt now has intermittent \"dizziness\"  Vestibular screening negative. Pt presents with guarded gait patten requiring  use of a  FWW for balance. Pt states she was active, exercised and has never needed a walker in the past. Pt will benefit from continued PT during acute stay for further assessment of DME needs. Recommend HH PT for home safety assessment.     Plan    Recommend Physical Therapy 3 times per week until therapy goals are met for the following treatments:  Equipment, Gait Training, Neuro Re-Education / Balance and Self Care/Home Evaluation    DC Equipment Recommendations: Unable to determine at this time(May need FWW or SPC depending on progress prior to discharge)  Discharge Recommendations: Recommend home health for continued physical therapy services          Objective       09/12/20 1450   Prior Living Situation   Prior Services None   Housing / Facility Independent Living Facility   Steps Into Home 0   Steps In Home 0   Elevator Yes   Equipment Owned None   Lives with - Patient's Self Care Capacity Alone and Able to Care For Self   Prior Level of Functional Mobility   Bed Mobility Independent   Transfer Status Independent   Ambulation Independent   Assistive Devices Used None   Comments states she walked her dog every morning prior to recent fall   History of Falls   History of Falls Yes   Date of Last Fall 09/07/20   Strength Lower Body   Lower Body Strength  WDL   Comments no isolated weakness,    Neurological Concerns   Neurological Concerns No   Comments Vestibular assessment: unable to provoke nystagmus    Balance Assessment   Sitting Balance " "(Static) Good   Sitting Balance (Dynamic) Good   Standing Balance (Static) Good   Standing Balance (Dynamic) Fair +   Weight Shift Sitting Good   Weight Shift Standing Fair   Comments guarded, impaired initial standing balance, improved as session progressed   Gait Analysis   Gait Level Of Assist Supervised   Assistive Device Front Wheel Walker   Distance (Feet) 120  (HHA for 120 ft, guarded but no LOB)   Deviation Increased Base Of Support;Decreased Toe Off;Decreased Heel Strike   Vision Deficits Impacting Mobility vague reports of \"Dizziness, but not dizziness\"   Bed Mobility    Supine to Sit Independent   Sit to Supine Independent   Scooting Independent   Rolling Independent   Functional Mobility   Sit to Stand Supervised   Bed, Chair, Wheelchair Transfer Supervised   Transfer Method Stand Step   Comments guarded with basic mobility .    Patient / Family Goals    Patient / Family Goal #1 Return home    Short Term Goals    Short Term Goal # 1 Pt will ambulate for 300 ft with LRAD in order to access all required areas of her home by the 6thPT visit          "

## 2020-09-12 NOTE — PROGRESS NOTES
"      Spiritual Care Note    Patient Information     Patient's Name: Dayana Lechuga   MRN: [id number]    YOB: 1929   Age and Gender: 89 y.o. female   Service Area: Telemetry   Room (and Bed): [place] [address]   Ethnicity or Nationality:     Primary Language: English   Congregation/Spiritual preference: Baptism   Place of Residence: [place]   Family/Friends/Others Present: No   Clinical Team Present: No   Medical Diagnosis(-es)/Procedure(s): Dizziness   Code Status: Full Code    Date of Admission: 9/11/2020   Length of Stay: 0 days        Spiritual Care Provider Information:  Name of Spiritual Care Provider: Ashleigh Oconnell  Title of Spiritual Care Provider: Associate   Phone Number: [phone]  E-mail: [email or web address]  Total time : 10 minutes    Spiritual Screen Results:    Gen Nursing  Spiritual Screen  Is your spiritual health or inner well-being important to you as you cope with your medical condition?: No  Would you like to receive a visit from our Spiritual Care team or your own [employer] or spiritual leader?: No  Was spiritual care education provided to the patient?: Declined     Palliative Care  PC Congregation/Spiritual Screening  Was spiritual care education provided to the patient?: Declined      Encounter/Request Information  Encounter/Request Type   Visited With: Patient  Nature of the Visit: Initial, On shift  General Visit: Yes  Referral From/ Origin of Request: Verbal patient    Religous Needs/Values  Congregation Needs Visit  Congregation Needs: Prayer    Spiritual Assessment     Spiritual Care Encounters    Observations/Symptoms: Accepting, Thankfulness    Interaction/Conversation:  was visiting this pt's roommate, who said, \"Dayana might like a prayer.\"  Dayana did request prayer, and thanked the .    Assessment: Need    Need: Seeking Spiritual Assistance and Support    Interventions: Compassionate presence, prayer.    Outcomes: Spiritual " Comfort    Plan: Visit Upon Request    Notes:

## 2020-09-12 NOTE — PROGRESS NOTES
Bedside report received. Pt. Lying in bed, reports a HA, prn pain medication administered as prescribed. All other needs are assessed, bed in lowest position, call light within reach. Fall precautions in place, pt. educated to call for assistance.

## 2020-09-13 ENCOUNTER — HOME HEALTH ADMISSION (OUTPATIENT)
Dept: HOME HEALTH SERVICES | Facility: HOME HEALTHCARE | Age: 85
End: 2020-09-13
Payer: MEDICARE

## 2020-09-13 VITALS
WEIGHT: 115.6 LBS | SYSTOLIC BLOOD PRESSURE: 144 MMHG | HEIGHT: 63 IN | TEMPERATURE: 99.1 F | HEART RATE: 60 BPM | BODY MASS INDEX: 20.48 KG/M2 | RESPIRATION RATE: 18 BRPM | OXYGEN SATURATION: 96 % | DIASTOLIC BLOOD PRESSURE: 73 MMHG

## 2020-09-13 PROCEDURE — A9270 NON-COVERED ITEM OR SERVICE: HCPCS | Performed by: HOSPITALIST

## 2020-09-13 PROCEDURE — 700102 HCHG RX REV CODE 250 W/ 637 OVERRIDE(OP): Performed by: HOSPITALIST

## 2020-09-13 PROCEDURE — 700111 HCHG RX REV CODE 636 W/ 250 OVERRIDE (IP): Performed by: HOSPITALIST

## 2020-09-13 PROCEDURE — 96372 THER/PROPH/DIAG INJ SC/IM: CPT

## 2020-09-13 PROCEDURE — G0378 HOSPITAL OBSERVATION PER HR: HCPCS

## 2020-09-13 PROCEDURE — 97166 OT EVAL MOD COMPLEX 45 MIN: CPT

## 2020-09-13 PROCEDURE — 99217 PR OBSERVATION CARE DISCHARGE: CPT | Performed by: STUDENT IN AN ORGANIZED HEALTH CARE EDUCATION/TRAINING PROGRAM

## 2020-09-13 RX ORDER — CARVEDILOL 12.5 MG/1
6.25 TABLET ORAL 2 TIMES DAILY
Qty: 30 TAB | Refills: 0 | Status: ON HOLD | OUTPATIENT
Start: 2020-09-13 | End: 2020-12-05

## 2020-09-13 RX ADMIN — CARVEDILOL 6.25 MG: 6.25 TABLET, FILM COATED ORAL at 08:47

## 2020-09-13 RX ADMIN — ENOXAPARIN SODIUM 40 MG: 40 INJECTION SUBCUTANEOUS at 05:13

## 2020-09-13 RX ADMIN — ASPIRIN 81 MG: 81 TABLET, CHEWABLE ORAL at 05:13

## 2020-09-13 ASSESSMENT — COGNITIVE AND FUNCTIONAL STATUS - GENERAL
HELP NEEDED FOR BATHING: A LITTLE
DRESSING REGULAR LOWER BODY CLOTHING: A LITTLE
SUGGESTED CMS G CODE MODIFIER DAILY ACTIVITY: CJ
TOILETING: A LITTLE
DAILY ACTIVITIY SCORE: 21

## 2020-09-13 ASSESSMENT — PAIN DESCRIPTION - PAIN TYPE
TYPE: ACUTE PAIN
TYPE: ACUTE PAIN

## 2020-09-13 ASSESSMENT — ACTIVITIES OF DAILY LIVING (ADL): TOILETING: INDEPENDENT

## 2020-09-13 NOTE — PROGRESS NOTES
Bedside report received. Pt. Lying in bed calm with unlabored breathing. All other needs are assessed, bed in lowest position, call light within reach. Fall precautions in place, pt. Educated to call for assistance.

## 2020-09-13 NOTE — FACE TO FACE
"Face to Face Supporting Documentation - Home Health    The encounter with this patient was in whole or in part the primary reason for home health admission.    Date of encounter:   Patient:                    MRN:                       YOB: 2020  Dayana Lechuga  [id number]  2/18/1929     Home health to see patient for:  Physical Therapy evaluation and treatment    Skilled need for:  Comment: Patient is 89 y.o. female who sustained a fall when attempting to sit down and tripped backwards over her dog. Pt states she hit the back of her head. Pt now has intermittent \"dizziness\"  Vestibular screening negative. Pt presents with guarded gait patten requiring  use of a  FWW for balance. Pt states she was active, exercised and has never needed a walker in the past. Pt will benefit from continued PT during acute stay for further assessment of DME needs. Recommend  PT for home safety assessment.     Skilled nursing interventions to include:  Comment: Recommend Physical Therapy 3 times per week until therapy goals are met for the following treatments:  Equipment, Gait Training, Neuro Re-Education / BRecommend Physical Therapy 3 times per week until therapy goals are met for the following treatments:  Equipment, Gait Training, Neuro Re-Education / Balance and Self Care/Home Evaluationalance and Self Care/Home Evaluation    Homebound status evidenced by:  Needs the assistance of another person in order to leave the home. Leaving home requires a considerable and taxing effort. There is a normal inability to leave the home.    Community Physician to provide follow up care: Ned Lee, A.P.R.N.     Optional Interventions? No      I certify the face to face encounter for this home health care referral meets the CMS requirements and the encounter/clinical assessment with the patient was, in whole, or in part, for the medical condition(s) listed above, which is the primary reason for " home health care. Based on my clinical findings: the service(s) are medically necessary, support the need for home health care, and the homebound criteria are met.  I certify that this patient has had a face to face encounter by myself.  Hakeem Saunders M.D. - NPI: [id number]

## 2020-09-13 NOTE — DISCHARGE PLANNING
Anticipated Discharge Disposition: home with home health and walker    Action: Choice forms for DME and Home health faxed to CARMELO Serrano at [phone]: [facility]. Awaiting order for front wheel walker- spoke with resident, who will place.     Barriers to Discharge: home health acceptance. Pt is medically clear.     Plan: [facility] with [facility]

## 2020-09-13 NOTE — DISCHARGE SUMMARY
"Discharge Summary    CHIEF COMPLAINT ON ADMISSION  Chief Complaint   Patient presents with   • GLF     last Saturday hitting posterior head on wood mukesh.    • N/V     intermittenly since saturday   • Dizziness     since saturday       Reason for Admission  FALL, HEAD PAIN     Admission Date  9/11/2020    CODE STATUS  Full Code    HPI & HOSPITAL COURSE  This is a 89 y.o. female here with cc of Ground Level fall and vertigo.  HPI briefly as per the admitting physician\" Ms. Lechuga has a past medical history of  right coronary artery 2016 as well as hypertension and dyslipidemia. She have been in her usual state of health until last Saturday, 9/6/2020, she tripped over her dog and had the misfortune hit her head on hardwood floor.  Since then she is felt dizzy \"like the room is spinning\" and is worse when she moves her head up and down or turns it left and right.  She is quite afraid of staying and another fall.  Is been quite nauseous since that head trauma.  Presented emergency room with these complaints here a CT of the head is negative.  Her EKG is a little unchanged and when questioned she states she has intermittent chest pain though this is been going on for many years and seems about her baseline from what I can ascertain.  Notably in the ER, her pulse was down to 55 and therefore the dose of her Coreg will be decreased.  She denies contact with persons known to have COVID, she denies fevers or chills, no cough or shortness of breath, a COVID screening test is been ordered per hospital policy.\"  Patient to be evaluated by PT OT for fall risk prevention, balance and muscle strength training.         Patient improved symptomatically during the hospital stay. She was evaluated by PT and was noted to have  guarded gait patten requiring  use of a  FWW for balance.  PT/OT recommended Home Health for Continued PT services.     Therefore, she is discharged in good and stable condition to home with close outpatient " follow-up.    The patient met 2-midnight criteria for an inpatient stay at the time of discharge.    Discharge Date  09/13/20    FOLLOW UP ITEMS POST DISCHARGE  Monitor for worsening symptoms.  Instructed to seek help at ER if worsening symptoms including increased vomiting, nausea, head ache, vertigo, confusion    DISCHARGE DIAGNOSES  Principal Problem:    Post concussion syndrome POA: Yes  Active Problems:    CAD (coronary artery disease) POA: Yes    Essential hypertension POA: Yes    Bradycardia POA: Yes  Resolved Problems:    * No resolved hospital problems. *      FOLLOW UP  Future Appointments   Date Time Provider Department Center   10/28/2020 11:00 AM MARIANNE Vallejo [facility] [address]     [facility]  [address], [address]  [facility] [address] [phone]  In 1 week  As needed      MEDICATIONS ON DISCHARGE     Medication List      CHANGE how you take these medications      Instructions   carvedilol 12.5 MG Tabs  What changed: how much to take  Commonly known as: COREG   Take 0.5 Tabs by mouth 2 Times a Day.  Dose: 6.25 mg        CONTINUE taking these medications      Instructions   acetaminophen 500 MG Tabs  Commonly known as: TYLENOL   Take 500 mg by mouth every morning.  Dose: 500 mg     albuterol 108 (90 Base) MCG/ACT Aers inhalation aerosol   Inhale 2 Puffs by mouth every 6 hours as needed for Shortness of Breath.  Dose: 2 Puff     amLODIPine 5 MG Tabs  Commonly known as: NORVASC   Take 5 mg by mouth every morning.  Dose: 5 mg     aspirin 81 MG Chew chewable tablet  Commonly known as: ASA   Take 1 Tab by mouth every day.  Dose: 81 mg     atorvastatin 80 MG tablet  Commonly known as: LIPITOR   Take 80 mg by mouth every evening.  Dose: 80 mg     CENTRUM SILVER PO   Take 1 Tab by mouth every morning.  Dose: 1 Tab     fluticasone 50 MCG/ACT nasal spray  Commonly known as: FLONASE   USE ONE SPRAY INTO EACH NOSTRIL DAILY     guaiFENesin  MG Tb12  Commonly known  as: MUCINEX   Take 600 mg by mouth every morning.  Dose: 600 mg     Hair/Skin/Nails 1250-7.5-7.5 MCG-MG-UNT Chew  Generic drug: Biotin w/ Vitamins C & E   Take 1 Tab by mouth every day.  Dose: 1 Tab     isosorbide mononitrate SR 30 MG Tb24  Commonly known as: IMDUR   Take 30 mg by mouth every evening.  Dose: 30 mg     lisinopril 20 MG Tabs  Commonly known as: PRINIVIL   Take 20 mg by mouth every morning.  Dose: 20 mg     loratadine 10 MG Tabs  Commonly known as: CLARITIN   Take 10 mg by mouth every morning.  Dose: 10 mg     LORazepam 0.5 MG Tabs  Commonly known as: ATIVAN   Take 0.25-0.5 mg by mouth 2 Times a Day. 0.25mg at 1400 & 1mg in the evening  Dose: 0.25-0.5 mg     montelukast 10 MG Tabs  Commonly known as: SINGULAIR   Take 10 mg by mouth every evening.  Dose: 10 mg     nitroglycerin 0.4 MG Subl  Commonly known as: NITROSTAT   Place 1 tab under tongue every 5 min as needed for chest pain max 3 doses     omeprazole 20 MG delayed-release capsule  Commonly known as: PRILOSEC   Take 20 mg by mouth every day.  Dose: 20 mg     potassium chloride SA 20 MEQ Tbcr  Commonly known as: Kdur   Take 20 mEq by mouth 2 times a day.  Dose: 20 mEq     Qvar RediHaler 80 MCG/ACT inhaler  Generic drug: beclomethasone HFA   INHALE 1 PUFF BY MOUTH TWICE A DAY     Vitamin C 1000 MG Tabs   Take 1,000 mg by mouth every day.  Dose: 1,000 mg     vitamin D 2000 UNIT Tabs   Take 2,000 Units by mouth every day.  Dose: 2,000 Units     VITAMIN E PO   Take 1 Dose by mouth every day. Unknown OTC Strength.  Dose: 1 Dose            Allergies  Allergies   Allergen Reactions   • Bactrim [Sulfamethoxazole W-Trimethoprim] Hives   • Pcn [Penicillins] Hives     About 70 years   • Codeine Unspecified     Unknown reaction         DIET  Orders Placed This Encounter   Procedures   • Diet Order Regular     Standing Status:   Standing     Number of Occurrences:   1     Order Specific Question:   Diet:     Answer:   Regular [1]       ACTIVITY  As  tolerated.  Weight bearing as tolerated    CONSULTATIONS  None    PROCEDURES  none    LABORATORY  Lab Results   Component Value Date    SODIUM 132 (L) 09/11/2020    POTASSIUM 4.4 09/11/2020    CHLORIDE 98 09/11/2020    CO2 20 09/11/2020    GLUCOSE 106 (H) 09/11/2020    BUN 12 09/11/2020    CREATININE 0.48 (L) 09/11/2020        Lab Results   Component Value Date    WBC 6.9 09/11/2020    HEMOGLOBIN 12.4 09/11/2020    HEMATOCRIT 36.4 (L) 09/11/2020    PLATELETCT 206 09/11/2020        Total time of the discharge process exceeds 32 minutes.

## 2020-09-13 NOTE — PROGRESS NOTES
Discharge instructions given to patient. Prescriptions given to patient. Discharge instructions given to patient at bedside, verbalizes understanding and states plans for follow-up with PCP and neurology. New and home medication review, post-discharge activity level and worsening of symptoms needing follow-up care discussed. Telemetry monitor/IV cathlon removed. All belongings accounted for, all questions answered at this time.  Patient waiting for daughter-in-law to pick her up.

## 2020-09-13 NOTE — DISCHARGE PLANNING
ATTN: Case Management  RE: Referral for Home Health    As of 9/13/20, we have accepted the Home Health referral for the patient listed above.    A [facility] clinician will be out to see the patient within 48 hours. If you have any questions or concerns regarding the patient's transition to Home Health, please do not hesitate to contact us at [phone].      We look forward to collaborating with you,  [facility] Home Health Team

## 2020-09-13 NOTE — DISCHARGE PLANNING
Received Choice form at 0912  Agency/Facility Name: [facility]   Referral sent per Choice form @ 104    Received Choice form at 0997  Agency/Facility Name: [facility]  Referral sent per Choice form @ 2298

## 2020-09-13 NOTE — THERAPY
Occupational Therapy   Initial Evaluation     Patient Name: Dayana Lechuga  Age:  89 y.o., Sex:  female  Medical Record #: [id number]  Today's Date: 9/13/2020     Precautions  Precautions: Fall Risk    Assessment  Patient is 89 y.o. female with a diagnosis of dizziness, fell backwards over dog.  Additional factors influencing patient status / progress: good community/ family support. Prior level of independence.      Plan    Recommend Occupational Therapy 2 times per week until therapy goals are met for the following treatments:  Self Care/Activities of Daily Living, Therapeutic Activities and Therapeutic Exercises.    DC Equipment Recommendations: None  Discharge Recommendations: Recommend home health for continued occupational therapy services     Subjective    Pleasant and cooperative, eager to return home     Objective       09/13/20 1310   Total Time Spent   Total Time Spent (Mins) 40   Charge Group   OT Evaluation OT Evaluation Mod   Initial Contact Note    Initial Contact Note Order Received and Verified, Occupational Therapy Evaluation in Progress with Full Report to Follow.   Prior Living Situation   Prior Services None   Housing / Facility Independent Living Facility  ([facility], third floor)   Steps Into Home 0   Steps In Home 0   Elevator Yes  (lives on third floor)   Bathroom Set up Walk In Shower;Grab Bars;Shower Chair   Equipment Owned None   Lives with - Patient's Self Care Capacity Alone and Able to Care For Self   Comments walks her dog, Deshawn, every morning   Prior Level of ADL Function   Self Feeding Independent   Grooming / Hygiene Independent   Bathing Independent   Dressing Independent   Toileting Independent   Prior Level of IADL Function   Medication Management Independent   Laundry Requires Assist  (facility completes, once weekly)   Kitchen Mobility Independent   Finances Independent   Home Management Requires Assist  (facility completes weekly)   Shopping Requires Assist   Prior  Level Of Mobility Independent Without Device in Home   Driving / Transportation Relatives / Others Provide Transportation  (facility hs transport van, family assist)   Occupation (Pre-Hospital Vocational) Retired Due To Age   History of Falls   History of Falls Yes   Date of Last Fall 09/07/20   Precautions   Precautions Fall Risk   Vitals   O2 Delivery Device None - Room Air   Pain 0 - 10 Group   Therapist Pain Assessment Post Activity Pain Same as Prior to Activity;Nurse Notified;0   Cognition    Level of Consciousness Alert   Passive ROM Upper Body   Passive ROM Upper Body WDL   Active ROM Upper Body   Active ROM Upper Body  WDL   Dominant Hand Right   Strength Upper Body   Upper Body Strength  WDL   Sensation Upper Body   Upper Extremity Sensation  WDL   Upper Body Muscle Tone   Upper Body Muscle Tone  WDL   Coordination Upper Body   Coordination WDL   Balance Assessment   Sitting Balance (Static) Good   Sitting Balance (Dynamic) Good   Standing Balance (Static) Fair +   Standing Balance (Dynamic) Fair +   Weight Shift Sitting Good   Weight Shift Standing Fair   Bed Mobility    Supine to Sit Modified Independent   Sit to Supine Modified Independent   Scooting Modified Independent   Rolling Modified Independent   ADL Assessment   Eating Supervision   Grooming Supervision;Standing   Bathing Supervision   Upper Body Dressing Supervision   Lower Body Dressing Minimal Assist   Toileting Supervision   How much help from another person does the patient currently need...   Putting on and taking off regular lower body clothing? 3   Bathing (including washing, rinsing, and drying)? 3   Toileting, which includes using a toilet, bedpan, or urinal? 3   Putting on and taking off regular upper body clothing? 4   Taking care of personal grooming such as brushing teeth? 4   Eating meals? 4   6 Clicks Daily Activity Score 21   Functional Mobility   Sit to Stand Supervised   Bed, Chair, Wheelchair Transfer Supervised   Toilet  Transfers Minimal Assist   Transfer Method Stand Pivot   Visual Perception   Visual Perception  WDL   Patient / Family Goals   Patient / Family Goal #1 return home   Short Term Goals   Short Term Goal # 1 olerate 10 minutes of continuous activity, prior to resting, no MONZON   Short Term Goal # 2 distant supervision for toileting tasks, no cues for safety   Short Term Goal # 3 mod I dressing tasks, seated   Education Group   Role of Occupational Therapist Patient Response Patient;Acceptance;Explanation;Demonstration;Verbal Demonstration   Problem List   Problem List Decreased Activity Tolerance;Decreased Active Daily Living Skills   Anticipated Discharge Equipment and Recommendations   DC Equipment Recommendations None   Discharge Recommendations Recommend home health for continued occupational therapy services   Interdisciplinary Plan of Care Collaboration   IDT Collaboration with  Nursing   Patient Position at End of Therapy Seated;In Bed;Call Light within Reach;Tray Table within Reach;Phone within Reach   Collaboration Comments RN aware   Session Information   Date / Session Number  9/13,1( 1/2,9/19)   Priority 2

## 2020-09-13 NOTE — DISCHARGE PLANNING
Received Choice form at 0913  Agency/Facility Name: [facility]  Referral sent per Choice form @ Unable to fax referral.  Currently no HH order.    JASVIR Barbosa notified via voalte.       Received Choice form at 0914  Agency/Facility Name: [facility]  Referral sent per Choice form @ Unable to fax referral.  Currently no DME order.    JASVIR Barbosa notified via voalte.

## 2020-09-13 NOTE — CARE PLAN
Problem: Communication  Goal: The ability to communicate needs accurately and effectively will improve  Outcome: PROGRESSING AS EXPECTED  Intervention: [place] patient and significant other/support system to call light to alert staff of needs  Flowsheets (Taken 9/13/2020 1125)  Oriented to:: All of the Following : Location of Bathroom, Visiting Policy, Unit Routine, Call Light and Bedside Controls, Bedside Rail Policy, Smoking Policy, Rights and Responsibilities, Bedside Report, and Patient Education Notebook     Problem: Safety  Goal: Will remain free from injury  Note: Fall risk assessed, fall precautions in place, pt verbalizes understanding of fall risk.   Goal: Will remain free from falls  Outcome: PROGRESSING AS EXPECTED

## 2020-09-13 NOTE — PROGRESS NOTES
Patient escorted off unit with RN for discharge. All personal belongings account for and in the patient's possession.

## 2020-09-13 NOTE — DISCHARGE INSTRUCTIONS
Discharge Instructions    Discharged to home by car with relative. Discharged via wheelchair, hospital escort: Yes.  Special equipment needed: Walker    Be sure to schedule a follow-up appointment with your primary care doctor or any specialists as instructed.     Discharge Plan:        I understand that a diet low in cholesterol, fat, and sodium is recommended for good health. Unless I have been given specific instructions below for another diet, I accept this instruction as my diet prescription.     Special Instructions: None    · Is patient discharged on Warfarin / Coumadin?   No     Depression / Suicide Risk    As you are discharged from this [facility] facility, it is important to learn how to keep safe from harming yourself.    Recognize the warning signs:  · Abrupt changes in personality, positive or negative- including increase in energy   · Giving away possessions  · Change in eating patterns- significant weight changes-  positive or negative  · Change in sleeping patterns- unable to sleep or sleeping all the time   · Unwillingness or inability to communicate  · Depression  · Unusual sadness, discouragement and loneliness  · Talk of wanting to die  · Neglect of personal appearance   · Rebelliousness- reckless behavior  · Withdrawal from people/activities they love  · Confusion- inability to concentrate     If you or a loved one observes any of these behaviors or has concerns about self-harm, here's what you can do:  · Talk about it- your feelings and reasons for harming yourself  · Remove any means that you might use to hurt yourself (examples: pills, rope, extension cords, firearm)  · Get professional help from the community (Mental Health, Substance Abuse, psychological counseling)  · Do not be alone:Call your Safe Contact- someone whom you trust who will be there for you.  · Call your local CRISIS HOTLINE 345-1015 or 728-490-3560  · Call your local Children's Mobile Crisis Response Team [place] (768) 459-1920 or www.Inoapps  · Call the toll free National Suicide Prevention Hotlines   · National Suicide Prevention Lifeline 127-453-XHUZ (3724)  · [facility] Line Network 800-SUICIDE (419-4926)    Dizziness    Dizziness is a common problem. It makes you feel unsteady or light-headed. You may feel like you are about to pass out (faint). Dizziness can lead to getting hurt if you stumble or fall. Dizziness can be caused by many things, including:  · Medicines.  · Not having enough water in your body (dehydration).  · Illness.  Follow these instructions at home:  Eating and drinking    · Drink enough fluid to keep your pee (urine) clear or pale yellow. This helps to keep you from getting dehydrated. Try to drink more clear fluids, such as water.  · Do not drink alcohol.  · Limit how much caffeine you drink or eat, if your doctor tells you to do that.  · Limit how much salt (sodium) you drink or eat, if your doctor tells you to do that.  Activity    · Avoid making quick movements.  ? When you stand up from sitting in a chair, steady yourself until you feel okay.  ? In the morning, first sit up on the side of the bed. When you feel okay, stand slowly while you hold onto something. Do this until you know that your balance is fine.  · If you need to  one place for a long time, move your legs often. Tighten and relax the muscles in your legs while you are standing.  · Do not drive or use heavy machinery if you feel dizzy.  · Avoid bending down if you feel dizzy. Place items in your home so you can reach them easily without leaning over.  Lifestyle  · Do not use any products that contain nicotine or tobacco, such as cigarettes and e-cigarettes. If you need help quitting, ask your doctor.  · Try to lower your stress level. You can do this by using methods such as yoga or meditation. Talk with your doctor if you need help.  General instructions  · Watch your dizziness for any changes.  · Take  over-the-counter and prescription medicines only as told by your doctor. Talk with your doctor if you think that you are dizzy because of a medicine that you are taking.  · Tell a friend or a family member that you are feeling dizzy. If he or she notices any changes in your behavior, have this person call your doctor.  · Keep all follow-up visits as told by your doctor. This is important.  Contact a doctor if:  · Your dizziness does not go away.  · Your dizziness or light-headedness gets worse.  · You feel sick to your stomach (nauseous).  · You have trouble hearing.  · You have new symptoms.  · You are unsteady on your feet.  · You feel like the room is spinning.  Get help right away if:  · You throw up (vomit) or have watery poop (diarrhea), and you cannot eat or drink anything.  · You have trouble:  ? Talking.  ? Walking.  ? Swallowing.  ? Using your arms, hands, or legs.  · You feel generally weak.  · You are not thinking clearly, or you have trouble forming sentences. A friend or family member may notice this.  · You have:  ? Chest pain.  ? Pain in your belly (abdomen).  ? Shortness of breath.  ? Sweating.  · Your vision changes.  · You are bleeding.  · You have a very bad headache.  · You have neck pain or a stiff neck.  · You have a fever.  These symptoms may be an emergency. Do not wait to see if the symptoms will go away. Get medical help right away. Call your local emergency services (911 in the [place] Do not drive yourself to the hospital.  Summary  · Dizziness makes you feel unsteady or light-headed. You may feel like you are about to pass out (faint).  · Drink enough fluid to keep your pee (urine) clear or pale yellow. Do not drink alcohol.  · Avoid making quick movements if you feel dizzy.  · Watch your dizziness for any changes.  This information is not intended to replace advice given to you by your health care provider. Make sure you discuss any questions you have with your health care  provider.  Document Released: 12/06/2012 Document Revised: 12/21/2018 Document Reviewed: 01/04/2018  Elsevier Patient Education © 2020 [employer]

## 2020-09-15 ENCOUNTER — TELEPHONE (OUTPATIENT)
Dept: MEDICAL GROUP | Facility: PHYSICIAN GROUP | Age: 85
End: 2020-09-15

## 2020-09-15 NOTE — TELEPHONE ENCOUNTER
VOICEMAIL  1. Caller Name: Dayana Lechuga                        Call Back Number: [phone] (home)     2. Message: Pt called to inform REESE Vallejo. she had been in the hospital over the weekend.  She is home now, but her back is in pain due to laying in the bed. Pt wants to know if it is okay for her to take the OTC IcyHot lidocaine pain patches, the ones that are 12hr strength.    Please advise

## 2020-09-16 ENCOUNTER — HOME CARE VISIT (OUTPATIENT)
Dept: HOME HEALTH SERVICES | Facility: HOME HEALTHCARE | Age: 85
End: 2020-09-16

## 2020-09-16 NOTE — TELEPHONE ENCOUNTER
Phone Number Called: [phone] (home)     Call outcome: Did not leave a detailed message. Requested patient to call back.    Message: LVM for Pt to cb in regards to message below

## 2020-09-17 NOTE — TELEPHONE ENCOUNTER
Phone Number Called: [phone] (home)     Call outcome: Did not leave a detailed message. Requested patient to call back.    Message: LVM for Pt to cb about message below.  2nd attempt

## 2020-09-17 NOTE — TELEPHONE ENCOUNTER
Phone Number Called: [phone] (home)      Call outcome: Spoke to patient regarding message below.    Message: informed Pt, said her back is doing better, but now she's feeling as if she is lightheaded, and sometimes seems faint, a little fuzzy.  Her BP med carvedilol was already decreased, her BP has been running between 129/64, 127/70, 119/59, 123/59.  She feels better when she is sitting down.

## 2020-09-17 NOTE — TELEPHONE ENCOUNTER
Phone Number Called: [phone] (home)     Call outcome: Spoke to patient regarding message below.    Message: Pt informed, had Pt check BP while on the phone 140/74 pulse 74, advised to try and keep the systolic between 100-120 and diastolic between 60-90.  Said her head was feeling a little more normal.  Using her spirometers and that has been helping her head, and walked her dog today as well.  Could these be because of asthma?

## 2020-09-18 NOTE — TELEPHONE ENCOUNTER
Phone Number Called: [phone] (home)     Call outcome: Spoke to patient regarding message below.    Message: Pt informed, Pt understood, no questions at this time.

## 2020-09-30 RX ORDER — POTASSIUM CHLORIDE 20 MEQ/1
TABLET, EXTENDED RELEASE ORAL
Qty: 180 TAB | Refills: 0 | Status: ON HOLD
Start: 2020-09-30 | End: 2020-12-05

## 2020-09-30 RX ORDER — MONTELUKAST SODIUM 10 MG/1
TABLET ORAL
Qty: 90 TAB | Refills: 0 | Status: SHIPPED | OUTPATIENT
Start: 2020-09-30 | End: 2021-02-23

## 2020-10-06 RX ORDER — BECLOMETHASONE DIPROPIONATE HFA 80 UG/1
AEROSOL, METERED RESPIRATORY (INHALATION)
Qty: 10.6 G | Refills: 0 | Status: SHIPPED
Start: 2020-10-06 | End: 2021-03-17

## 2020-10-06 RX ORDER — FLUTICASONE PROPIONATE 50 MCG
SPRAY, SUSPENSION (ML) NASAL
Qty: 16 G | Refills: 0 | Status: SHIPPED | OUTPATIENT
Start: 2020-10-06 | End: 2020-12-22

## 2020-10-12 ENCOUNTER — TELEPHONE (OUTPATIENT)
Dept: MEDICAL GROUP | Facility: PHYSICIAN GROUP | Age: 85
End: 2020-10-12

## 2020-10-12 NOTE — TELEPHONE ENCOUNTER
VOICEMAIL  1. Caller Name: Dayana Lechuga                        Call Back Number: [phone] (home)     2. Message: patient wants to know if she can go back on carvedilol full strength now. She was told after her hospital visit to cut the dosage in half.       Please advise

## 2020-10-12 NOTE — TELEPHONE ENCOUNTER
Phone Number Called: [phone] (home)     Call outcome: Left detailed message for patient. Informed to call back with any additional questions.

## 2020-10-12 NOTE — TELEPHONE ENCOUNTER
Phone Number Called: [phone] (home)     Call outcome: Spoke to patient regarding message below.    Message: patient says she is also taking 2 extra low dose aspirin for headaches and wants to know if its ok

## 2020-10-20 ENCOUNTER — TELEPHONE (OUTPATIENT)
Dept: MEDICAL GROUP | Facility: PHYSICIAN GROUP | Age: 85
End: 2020-10-20

## 2020-10-20 NOTE — TELEPHONE ENCOUNTER
ESTABLISHED PATIENT PRE-VISIT PLANNING     Patient was NOT contacted to complete PVP.     Note: Patient will not be contacted if there is no indication to call.     1.  Reviewed notes from the last few office visits within the medical group: Yes    2.  If any orders were placed at last visit or intended to be done for this visit (i.e. 6 mos follow-up), do we have Results/Consult Notes?        •  Labs - Labs were not ordered at last office visit.   Note: If patient appointment is for lab review and patient did not complete labs, check with provider if OK to reschedule patient until labs completed.       •  Imaging - Imaging was not ordered at last office visit.       •  Referrals - No referrals were ordered at last office visit.    3. Is this appointment scheduled as a Hospital Follow-Up? No    4.  Immunizations were updated in Epic using WebIZ?: Epic matches WebIZ       •  Web Iz Recommendations: HEPATITIS B and SHINGRIX (Shingles)    5.  Patient is due for the following Health Maintenance Topics:   Health Maintenance Due   Topic Date Due   • IMM HEP B VACCINE (1 of 3 - Risk 3-dose series) 02/18/1948   • IMM ZOSTER VACCINES (1 of 2) 02/18/1979   • Annual Wellness Visit  06/21/2017   • MAMMOGRAM  02/06/2019       - Patient plans to schedule appointment for Annual Wellness Visit (AWV) and Mammogram.    6. Orders for overdue Health Maintenance topics pended in Pre-Charting? N\A    7.  AHA (MDX) form printed for Provider? No, already completed    8.  Patient was NOT informed to arrive 15 min prior to their scheduled appointment and bring in their medication bottles.

## 2020-10-20 NOTE — TELEPHONE ENCOUNTER
Phone Number Called: [phone] (home)     Call outcome: Did not leave a detailed message. Requested patient to call back.    Message: LVM to call back.   Needs appt.

## 2020-10-20 NOTE — TELEPHONE ENCOUNTER
VOICEMAIL  1. Caller Name: Dayana Lechuga     Call Back Number: [phone] (home)     2. Message: Pt called to report she has what she believes to be a boil in her private parts.  She wants to know what to do about it.    Please advise.

## 2020-10-20 NOTE — TELEPHONE ENCOUNTER
Patient called stating she has pending appt with Milena on 10/28/20 and will discuss it with her then.

## 2020-10-28 ENCOUNTER — OFFICE VISIT (OUTPATIENT)
Dept: MEDICAL GROUP | Facility: PHYSICIAN GROUP | Age: 85
End: 2020-10-28
Payer: MEDICARE

## 2020-10-28 VITALS
OXYGEN SATURATION: 93 % | HEART RATE: 70 BPM | DIASTOLIC BLOOD PRESSURE: 59 MMHG | BODY MASS INDEX: 21.19 KG/M2 | SYSTOLIC BLOOD PRESSURE: 119 MMHG | WEIGHT: 119.6 LBS | TEMPERATURE: 98.1 F | HEIGHT: 63 IN | RESPIRATION RATE: 15 BRPM

## 2020-10-28 DIAGNOSIS — F41.9 ANXIETY: ICD-10-CM

## 2020-10-28 DIAGNOSIS — Z79.899 CHRONIC USE OF BENZODIAZEPINE FOR THERAPEUTIC PURPOSE: ICD-10-CM

## 2020-10-28 PROCEDURE — 99214 OFFICE O/P EST MOD 30 MIN: CPT | Performed by: PHYSICIAN ASSISTANT

## 2020-10-28 RX ORDER — LORAZEPAM 0.5 MG/1
TABLET ORAL
Qty: 90 TAB | Refills: 0 | Status: SHIPPED | OUTPATIENT
Start: 2021-02-28 | End: 2021-01-13 | Stop reason: SDUPTHER

## 2020-10-28 RX ORDER — LORAZEPAM 0.5 MG/1
TABLET ORAL
Qty: 90 TAB | Refills: 0 | Status: SHIPPED | OUTPATIENT
Start: 2020-12-28 | End: 2020-10-28 | Stop reason: SDUPTHER

## 2020-10-28 RX ORDER — GABAPENTIN 100 MG/1
200 CAPSULE ORAL 3 TIMES DAILY
COMMUNITY
Start: 2020-08-19 | End: 2021-02-12

## 2020-10-28 RX ORDER — LORAZEPAM 0.5 MG/1
TABLET ORAL
Qty: 90 TAB | Refills: 0 | Status: SHIPPED | OUTPATIENT
Start: 2020-11-28 | End: 2020-10-28 | Stop reason: SDUPTHER

## 2020-10-28 ASSESSMENT — PATIENT HEALTH QUESTIONNAIRE - PHQ9: CLINICAL INTERPRETATION OF PHQ2 SCORE: 0

## 2020-10-28 ASSESSMENT — FIBROSIS 4 INDEX: FIB4 SCORE: 1.94

## 2020-10-28 NOTE — PROGRESS NOTES
Chief Complaint   Patient presents with   • Medication Refill     Lorazapam       HISTORY OF PRESENT ILLNESS: Dayana Lechuga is an established 89 y.o. female here to discuss the evaluation and management of:    Anxiety  Chronic use of benzodiazepine for therapeutic purpose    Patient is a pleasant 89-year-old female here with her daughter-in-law today.  Patient is requesting a refill of Ativan.  Patient's PCP has been monitoring medication and patient will be weaning off of medication in the near future under the supervision of her provider.  Patient denies misuse or abuse of medication.  She denies alcohol use or illicit drug use.  Denies operating her machine while taking prescribe medication.  She tells me medication was initially prescribed for anxiety and secondary to anxiety she was experiencing sleep reparation.  States she uses medication to treat both anxiety and sleep deprivation.  Patient denies side effects or complications of medication.        Patient Active Problem List    Diagnosis Date Noted   • CAD (coronary artery disease) 09/11/2020     Priority: High   • Chest pain due to myocardial ischemia 09/10/2016     Priority: High   • Popliteal artery stenosis, right ([facility]) 11/28/2018     Priority: Medium   • Claudication of right lower extremity ([facility]) 11/07/2018     Priority: Medium   • Coronary artery disease involving native coronary artery of native heart with angina pectoris ([facility]) 08/10/2016     Priority: Medium   • Dyslipidemia 10/31/2009     Priority: Medium   • Essential hypertension 10/02/2009     Priority: Medium   • Acute bilateral low back pain with bilateral sciatica 08/29/2019     Priority: Low   • Dysuria 06/27/2019     Priority: Low   • Diarrhea 06/27/2019     Priority: Low   • Slow transit constipation 08/29/2018     Priority: Low   • S/P bilateral cataract extraction 02/27/2018     Priority: Low   • Benzodiazepine dependence ([facility]) 11/13/2017     Priority: Low   • Anxiety  06/22/2016     Priority: Low   • GERD (gastroesophageal reflux disease) 10/31/2009     Priority: Low   • Osteopenia 10/31/2009     Priority: Low   • Mild intermittent asthma with acute exacerbation 10/02/2009     Priority: Low   • Bradycardia 09/11/2020   • Post concussion syndrome 09/11/2020   • Mild peripheral edema 07/31/2020   • PVD (peripheral vascular disease) ([facility]) 03/04/2020   • Pleural effusion 12/20/2019   • Incomplete emptying of bladder 10/08/2019   • Spinal stenosis of lumbar region with neurogenic claudication 09/23/2019   • Urinary retention 09/11/2019       Allergies:Bactrim [sulfamethoxazole w-trimethoprim], Pcn [penicillins], and Codeine    Current Outpatient Medications   Medication Sig Dispense Refill   • gabapentin (NEURONTIN) 100 MG Cap TK 2 CS PO TID     • [START ON 2/28/2021] LORazepam (ATIVAN) 0.5 MG Tab Take 1-2 Tabs by mouth 2 Times a Day for 30 days. 90 Tab 0   • QVAR REDIHALER 80 MCG/ACT inhaler INHALE 1 PUFF BY MOUTH TWICE A DAY  10.6 g 0   • fluticasone (FLONASE) 50 MCG/ACT nasal spray USE 1 SPRAY INTO EACH NOSTRIL DAILY 16 g 0   • montelukast (SINGULAIR) 10 MG Tab TAKE ONE TABLET BY MOUTH ONE TIME DAILY  90 Tab 0   • potassium chloride SA (KDUR) 20 MEQ Tab CR TAKE 1 TABLET ORALLY 2 TIMES A DAY (GENERIC KDUR) 180 Tab 0   • carvedilol (COREG) 12.5 MG Tab Take 0.5 Tabs by mouth 2 Times a Day. 30 Tab 0   • amLODIPine (NORVASC) 5 MG Tab Take 5 mg by mouth every morning.     • atorvastatin (LIPITOR) 80 MG tablet Take 80 mg by mouth every evening.     • isosorbide mononitrate SR (IMDUR) 30 MG TABLET SR 24 HR Take 30 mg by mouth every evening.     • lisinopril (PRINIVIL) 20 MG Tab Take 20 mg by mouth every morning.     • acetaminophen (TYLENOL) 500 MG Tab Take 500 mg by mouth every morning.     • Multiple Vitamins-Minerals (CENTRUM SILVER PO) Take 1 Tab by mouth every morning.     • loratadine (CLARITIN) 10 MG Tab Take 10 mg by mouth every morning.     • albuterol 108 (90 Base) MCG/ACT Aero  Soln inhalation aerosol Inhale 2 Puffs by mouth every 6 hours as needed for Shortness of Breath. 3 Inhaler 0   • nitroglycerin (NITROSTAT) 0.4 MG SL Tab Place 1 tab under tongue every 5 min as needed for chest pain max 3 doses 100 Tab 0   • Biotin w/ Vitamins C & E (HAIR/SKIN/NAILS) 1250-7.5-7.5 MCG-MG-UNT Chew Tab Take 1 Tab by mouth every day.     • guaifenesin LA (MUCINEX) 600 MG TABLET SR 12 HR Take 600 mg by mouth every morning.     • Ascorbic Acid (VITAMIN C) 1000 MG Tab Take 1,000 mg by mouth every day.     • VITAMIN E PO Take 1 Dose by mouth every day. Unknown OTC Strength.     • aspirin (ASA) 81 MG Chew Tab chewable tablet Take 1 Tab by mouth every day. 100 Tab 11   • Cholecalciferol (VITAMIN D) 2000 UNIT Tab Take 2,000 Units by mouth every day.     • omeprazole (PRILOSEC) 20 MG delayed-release capsule Take 20 mg by mouth every day.       No current facility-administered medications for this visit.        Social History     Tobacco Use   • Smoking status: Never Smoker   • Smokeless tobacco: Never Used   Substance Use Topics   • Alcohol use: No     Alcohol/week: 0.0 oz   • Drug use: No       Family Status   Relation Name Status   • Neg Hx  (Not Specified)     Family History   Problem Relation Age of Onset   • Heart Disease Neg Hx    • Heart Failure Neg Hx    • Hyperlipidemia Neg Hx        ROS:  Review of Systems   Constitutional: Negative for fever, chills, weight loss and malaise/fatigue.   HENT: Negative for ear pain, nosebleeds, congestion, sore throat and neck pain.    Eyes: Negative for blurred vision.   Respiratory: Negative for cough, sputum production, shortness of breath and wheezing.    Cardiovascular: Negative for chest pain, palpitations, orthopnea and leg swelling.   Gastrointestinal: Negative for heartburn, nausea, vomiting and abdominal pain.   Genitourinary: Negative for dysuria, urgency and frequency.   Musculoskeletal: Negative for myalgias, back pain and joint pain.   Skin: Negative for  "rash and itching.   Neurological: Negative for dizziness, tingling, tremors, sensory change, focal weakness and headaches.   Endo/Heme/Allergies: Does not bruise/bleed easily.   Psychiatric/Behavioral: Negative for depression, suicidal ideas and memory loss.  + for Anxiety and insomnia.  All other systems reviewed and are negative except as in HPI.    Exam: /59 (BP Location: Left arm, Patient Position: Sitting, BP Cuff Size: Adult)   Pulse 70   Temp 36.7 °C (98.1 °F) (Temporal)   Resp 15   Ht 1.6 m (5' 3\")   Wt 54.3 kg (119 lb 9.6 oz)   SpO2 93%  Body mass index is 21.19 kg/m².  General: Normal appearing. No distress.  HEENT: Normocephalic. Eyes conjunctiva clear lids without ptosis, ears normal shape and contour.  Neck: Supple without JVD. Thyroid is not enlarged.  Pulmonary: Clear to ausculation.  Normal effort. No rales, ronchi, or wheezing.  Cardiovascular: Regular rate and rhythm without murmur.   Abdomen: Nondistended.   Neurologic: Grossly nonfocal.  Cranial nerves are normal.   \Skin: Warm and dry.  No rashes or suspicious skin lesions.  Musculoskeletal: Normal gait. No extremity cyanosis, clubbing, or edema.  Patient is wearing compression stockings bilaterally.  Psych: Normal mood and affect. Alert and oriented x3. Judgment and insight is normal.    Medical decision-making and discussion:  1. Anxiety  2. Chronic use of benzodiazepine for therapeutic purpose    Refilled Ativan for patient.  Patient is asking if I will be her PCP.  PCP will be changed to me.  Pt. agreed to them work on decreasing Ativan dosage.  She will follow-up in 3 months and we discuss in more detail. Patient agreed to plan.    UDS is up-to-date.  No abnormal findings.     Discussed risk and alternative treatment options with patient.  Patient was to continue Ativan.     Patient understands this prescription is a controlled substance which is potentially habit-forming and its use is regulated by the [place]. We also discussed " "the new \"black box\" warning regarding the lethal combination of opioids and benzodiazepines. Refills are subject to terms of a controlled substance agreement and patient has an updated one on file. Most recent UDS is appropriate. Any refill requires an office visit. Narcotics have may adverse effects and the risks of addiction, accidental overdose and death were emphasized. Provided prescriptions for the next 3 months.    - LORazepam (ATIVAN) 0.5 MG Tab; Take 1-2 Tabs by mouth 2 Times a Day for 30 days.  Dispense: 90 Tab; Refill: 0      Please note that this dictation was created using voice recognition software. I have made every reasonable attempt to correct obvious errors, but I expect that there are errors of grammar and possibly content that I did not discover before finalizing the note.    Assessment/Plan:  1. Anxiety  LORazepam (ATIVAN) 0.5 MG Tab    DISCONTINUED: LORazepam (ATIVAN) 0.5 MG Tab    DISCONTINUED: LORazepam (ATIVAN) 0.5 MG Tab    DISCONTINUED: LORazepam (ATIVAN) 0.5 MG Tab   2. Chronic use of benzodiazepine for therapeutic purpose  LORazepam (ATIVAN) 0.5 MG Tab    DISCONTINUED: LORazepam (ATIVAN) 0.5 MG Tab    DISCONTINUED: LORazepam (ATIVAN) 0.5 MG Tab    DISCONTINUED: LORazepam (ATIVAN) 0.5 MG Tab       Return in about 3 months (around 1/28/2021).  "

## 2020-10-29 ENCOUNTER — OFFICE VISIT (OUTPATIENT)
Dept: NEUROLOGY | Facility: MEDICAL CENTER | Age: 85
End: 2020-10-29
Payer: MEDICARE

## 2020-10-29 VITALS
DIASTOLIC BLOOD PRESSURE: 56 MMHG | SYSTOLIC BLOOD PRESSURE: 109 MMHG | WEIGHT: 118.8 LBS | HEIGHT: 63 IN | TEMPERATURE: 98.5 F | HEART RATE: 74 BPM | BODY MASS INDEX: 21.05 KG/M2 | RESPIRATION RATE: 14 BRPM | OXYGEN SATURATION: 94 %

## 2020-10-29 DIAGNOSIS — F07.81 POST CONCUSSION SYNDROME: ICD-10-CM

## 2020-10-29 PROCEDURE — 99204 OFFICE O/P NEW MOD 45 MIN: CPT | Performed by: NURSE PRACTITIONER

## 2020-10-29 ASSESSMENT — ENCOUNTER SYMPTOMS
NECK PAIN: 1
BLURRED VISION: 0
FEVER: 0
DIZZINESS: 1
SHORTNESS OF BREATH: 1
DOUBLE VISION: 0
FALLS: 1
COUGH: 0
NAUSEA: 0
DEPRESSION: 1
HEARTBURN: 0
HEADACHES: 1
NERVOUS/ANXIOUS: 1
PALPITATIONS: 1
TINGLING: 1
CHILLS: 0
VOMITING: 0
MYALGIAS: 1
BACK PAIN: 1
BRUISES/BLEEDS EASILY: 0

## 2020-10-29 ASSESSMENT — FIBROSIS 4 INDEX: FIB4 SCORE: 1.94

## 2020-10-29 NOTE — PATIENT INSTRUCTIONS
Post-Concussion Syndrome    Post-concussion syndrome is when symptoms last longer than normal after a head injury.  What are the signs or symptoms?  After a head injury, you may:  · Have headaches.  · Feel tired.  · Feel dizzy.  · Feel weak.  · Have trouble seeing.  · Have trouble in bright lights.  · Have trouble hearing.  · Not be able to remember things.  · Not be able to focus.  · Have trouble sleeping.  · Have mood swings.  · Have trouble learning new things.  These can last from weeks to months.  Follow these instructions at home:  Medicines  · Take all medicines only as told by your doctor.  · Do not take prescription pain medicines.  Activity  · Limit activities as told by your doctor. This includes:  ? Homework.  ? Job-related work.  ? Thinking.  ? Watching TV.  ? Using a computer or phone.  ? Puzzles.  ? Exercise.  ? Sports.  · Slowly return to your normal activity as told by your doctor.  · Stop an activity if you have symptoms.  · Do not do anything that may cause you to get injured again.  General instructions  · Rest. Try to:  ? Sleep 7-9 hours each night.  ? Take naps or breaks when you feel tired during the day.  · Do not drink alcohol until your doctor says that you can.  · Keep track of your symptoms.  · Keep all follow-up visits as told by your doctor. This is important.  Contact a doctor if:  · You do not improve.  · You get worse.  · You have another injury.  Get help right away if:  · You have a very bad headache.  · You feel confused.  · You feel very sleepy.  · You pass out (faint).  · You throw up (vomit).  · You feel weak in any part of your body.  · You feel numb in any part of your body.  · You start shaking (have a seizure).  · You have trouble talking.  Summary  · Post-concussion syndrome is when symptoms last longer than normal after a head injury.  · Limit all activity after your injury. Gradually return to normal activity as told by your doctor.  · Rest, do not drink alcohol, and  avoid prescription pain medicines after a concussion.  · Call your doctor if your symptoms get worse.  This information is not intended to replace advice given to you by your health care provider. Make sure you discuss any questions you have with your health care provider.  Document Released: 01/25/2006 Document Revised: 10/10/2019 Document Reviewed: 01/22/2019  Elsevier Patient Education © 2020 [employer]

## 2020-10-29 NOTE — PROGRESS NOTES
"Subjective:    HPI  Dayana Lechuga is a 89 y.o. female who presents with headache following a concussion.  On 9/6/2020 she tripped over her dog and hit her head on the wood floor.  She thinks she might have lost consciousness for about 30 seconds.  She had complained of feeling \"dizzy\" and \"room spinning\" per ER note on 9/11/2020 when she presented to the Emergency Department on 9/11/2020 with these complaints.  CT of head negative for acute process.  She was discharged with home health for PT.   Her heart rate during her hospital stay was rather low, her carvedilol dose was reduced.    She is here today with her daughter in law.  She states she never had vertigo but she did have lightheadedness.  She states that it felt like her head was in a fog, not real pain.  In the past week, she has been feeling really well. She feels that she is about 90% of her baseline. She describes some problems with sciatica and shortness of breath when she walks her dog every morning and when she walks up stairs but is unable to verbalize how the fall and injury to the head are still affecting her.     PMH includes:  Dyslipidemia, HTN, CAD, GERD, asthma, PVD    Social:  Denies smoking, no alcohol use, lives alone in [facility], has a dog.       Review of Systems   Constitutional: Negative for chills and fever.   HENT: Negative for hearing loss and tinnitus.    Eyes: Negative for blurred vision and double vision.   Respiratory: Positive for shortness of breath. Negative for cough.         Asthma   Cardiovascular: Positive for palpitations. Negative for chest pain.   Gastrointestinal: Negative for heartburn, nausea and vomiting.   Genitourinary: Negative for dysuria.   Musculoskeletal: Positive for back pain, falls, myalgias and neck pain.   Skin: Negative for rash.   Neurological: Positive for dizziness, tingling and headaches.   Endo/Heme/Allergies: Does not bruise/bleed easily.   Psychiatric/Behavioral: " Positive for depression. The patient is nervous/anxious.        Past Medical History:   Diagnosis Date   • Allergy    • Anxiety    • ASTHMA    • CAD (coronary artery disease)     JT to RCA; 70% stenosis in LAD   • Hyperlipidemia    • Hypertension    • Lumbar back pain 9/10/2016   • OSTEOPOROSIS    • PVD (peripheral vascular disease) ([facility])     70% PAD-followed by [facility]     Current Outpatient Medications on File Prior to Visit   Medication Sig Dispense Refill   • gabapentin (NEURONTIN) 100 MG Cap TK 2 CS PO TID     • [START ON 2/28/2021] LORazepam (ATIVAN) 0.5 MG Tab Take 1-2 Tabs by mouth 2 Times a Day for 30 days. 90 Tab 0   • QVAR REDIHALER 80 MCG/ACT inhaler INHALE 1 PUFF BY MOUTH TWICE A DAY  10.6 g 0   • fluticasone (FLONASE) 50 MCG/ACT nasal spray USE 1 SPRAY INTO EACH NOSTRIL DAILY 16 g 0   • montelukast (SINGULAIR) 10 MG Tab TAKE ONE TABLET BY MOUTH ONE TIME DAILY  90 Tab 0   • potassium chloride SA (KDUR) 20 MEQ Tab CR TAKE 1 TABLET ORALLY 2 TIMES A DAY (GENERIC KDUR) 180 Tab 0   • carvedilol (COREG) 12.5 MG Tab Take 0.5 Tabs by mouth 2 Times a Day. 30 Tab 0   • amLODIPine (NORVASC) 5 MG Tab Take 5 mg by mouth every morning.     • atorvastatin (LIPITOR) 80 MG tablet Take 80 mg by mouth every evening.     • isosorbide mononitrate SR (IMDUR) 30 MG TABLET SR 24 HR Take 30 mg by mouth every evening.     • lisinopril (PRINIVIL) 20 MG Tab Take 20 mg by mouth every morning.     • acetaminophen (TYLENOL) 500 MG Tab Take 500 mg by mouth every morning.     • Multiple Vitamins-Minerals (CENTRUM SILVER PO) Take 1 Tab by mouth every morning.     • loratadine (CLARITIN) 10 MG Tab Take 10 mg by mouth every morning.     • albuterol 108 (90 Base) MCG/ACT Aero Soln inhalation aerosol Inhale 2 Puffs by mouth every 6 hours as needed for Shortness of Breath. 3 Inhaler 0   • nitroglycerin (NITROSTAT) 0.4 MG SL Tab Place 1 tab under tongue every 5 min as needed for chest pain max 3 doses 100 Tab 0   • Biotin w/ Vitamins C & E  "(HAIR/SKIN/NAILS) 1250-7.5-7.5 MCG-MG-UNT Chew Tab Take 1 Tab by mouth every day.     • guaifenesin LA (MUCINEX) 600 MG TABLET SR 12 HR Take 600 mg by mouth every morning.     • Ascorbic Acid (VITAMIN C) 1000 MG Tab Take 1,000 mg by mouth every day.     • VITAMIN E PO Take 1 Dose by mouth every day. Unknown OTC Strength.     • aspirin (ASA) 81 MG Chew Tab chewable tablet Take 1 Tab by mouth every day. 100 Tab 11   • Cholecalciferol (VITAMIN D) 2000 UNIT Tab Take 2,000 Units by mouth every day.     • omeprazole (PRILOSEC) 20 MG delayed-release capsule Take 20 mg by mouth every day.       No current facility-administered medications on file prior to visit.         Objective:   I personally reviewed imaging below and agree with findings:    CT head 9/11/2020    1. Cerebral atrophy.  2. White matter lucencies most consistent with small vessel ischemic change versus demyelination or gliosis.  3. Otherwise, Head CT without contrast with no acute findings. No evidence of acute cerebral infarction, hemorrhage or mass lesion.        Encounter Vitals  Standard Vitals  Vitals  Blood Pressure : 109/56  Temperature: 36.9 °C (98.5 °F)  Temp src: Temporal  Pulse: 74  Respiration: 14  Pulse Oximetry: 94 %  Height: 160 cm (5' 3\")  Weight: 53.9 kg (118 lb 12.8 oz)  Encounter Vitals  Temperature: 36.9 °C (98.5 °F)  Temp src: Temporal  Blood Pressure : 109/56  Pulse: 74  Respiration: 14  Pulse Oximetry: 94 %  Weight: 53.9 kg (118 lb 12.8 oz)  Height: 160 cm (5' 3\")  BMI (Calculated): 21.04    Physical Exam    Constitutional:  Alert, no apparent distress, Looks much younger than stated age.  Psych:   mood and affect WNL  Neuro:  Oriented X 4, speech fluent, naming and memory intact  Muskuloskeletal:  Moves all extremities equally, strength 5/5 bilaterally  CN II: Visual fields are full to confrontation. Fundoscopic exam is normal with sharp discs and no vascular changes. Pupils are 3 mm and briskly reactive to light.   CN III, IV, VI  " EOMs intact, no ptosis  CN V: Facial sensation is intact to pinprick in all 3 divisions bilaterally. Corneal responses are intact.  CN VII: Face is symmetric with normal eye closure and smile.  CN VII: Hearing is normal to rubbing fingers  CN IX, X: Palate elevates symmetrically. Phonation is normal.  CN XI: Head turning and shoulder shrug are intact  CN XII: Tongue is midline with normal movements and no atrophy.              Strength 5/5 BUE/BLE, no drift                 Sensation to PP equal bilaterally                 No limb ataxia with finger to nose and heel to shin                 Ambulates with steady gait.                 Rhomberg negative                Biceps,brachioradialis, tricep, patellar and ankle reflex all 2+     Cardiovascular:    S1S2, no abnormal rhythm auscultated, no peripheral edema  Neck:                     No carotid bruits noted   Pulmonary:            Respirations easy, lungs clear to auscultation all fields.     Skin:                     No obvious rashes.         Assessment/Plan:   1. Post concussion syndrome, she is doing remarkably well.  She is exercising (walking) daily, she is socially  Mentally and physically active.  She should continue to do what she is doing.  We can see her back if she has any problems.    Get plenty of rest  Avoid over stimulation.  Sit in quiet dark place if feeling overwhelmed.    Discussed red flags such as confusion and sudden onset of severe headache, sudden visual changes or changes in speech.    Follow up PRN

## 2020-11-17 ENCOUNTER — NURSE TRIAGE (OUTPATIENT)
Dept: HEALTH INFORMATION MANAGEMENT | Facility: OTHER | Age: 85
End: 2020-11-17

## 2020-11-17 ENCOUNTER — HOSPITAL ENCOUNTER (OUTPATIENT)
Facility: MEDICAL CENTER | Age: 85
DRG: 177 | End: 2020-11-17
Attending: FAMILY MEDICINE
Payer: MEDICARE

## 2020-11-17 ENCOUNTER — TELEPHONE (OUTPATIENT)
Dept: MEDICAL GROUP | Facility: PHYSICIAN GROUP | Age: 85
End: 2020-11-17

## 2020-11-17 ENCOUNTER — OFFICE VISIT (OUTPATIENT)
Dept: URGENT CARE | Facility: CLINIC | Age: 85
End: 2020-11-17
Payer: MEDICARE

## 2020-11-17 VITALS
BODY MASS INDEX: 20.2 KG/M2 | HEART RATE: 78 BPM | RESPIRATION RATE: 18 BRPM | WEIGHT: 114 LBS | TEMPERATURE: 98.4 F | HEIGHT: 63 IN | DIASTOLIC BLOOD PRESSURE: 76 MMHG | OXYGEN SATURATION: 95 % | SYSTOLIC BLOOD PRESSURE: 130 MMHG

## 2020-11-17 DIAGNOSIS — R05.9 COUGH: ICD-10-CM

## 2020-11-17 DIAGNOSIS — Z20.822 SUSPECTED COVID-19 VIRUS INFECTION: ICD-10-CM

## 2020-11-17 DIAGNOSIS — M79.10 MYALGIA: ICD-10-CM

## 2020-11-17 DIAGNOSIS — R19.5 LOOSE STOOLS: ICD-10-CM

## 2020-11-17 PROCEDURE — U0003 INFECTIOUS AGENT DETECTION BY NUCLEIC ACID (DNA OR RNA); SEVERE ACUTE RESPIRATORY SYNDROME CORONAVIRUS 2 (SARS-COV-2) (CORONAVIRUS DISEASE [COVID-19]), AMPLIFIED PROBE TECHNIQUE, MAKING USE OF HIGH THROUGHPUT TECHNOLOGIES AS DESCRIBED BY CMS-2020-01-R: HCPCS

## 2020-11-17 PROCEDURE — 99214 OFFICE O/P EST MOD 30 MIN: CPT | Mod: CS | Performed by: FAMILY MEDICINE

## 2020-11-17 RX ORDER — BENZONATATE 100 MG/1
100 CAPSULE ORAL 3 TIMES DAILY PRN
Qty: 30 CAP | Refills: 0 | Status: ON HOLD | OUTPATIENT
Start: 2020-11-17 | End: 2020-12-05

## 2020-11-17 ASSESSMENT — ENCOUNTER SYMPTOMS
SHORTNESS OF BREATH: 0
VOMITING: 0
SORE THROAT: 0
NAUSEA: 0
DIARRHEA: 1
CHILLS: 0
DIZZINESS: 0
COUGH: 1
FEVER: 0
MYALGIAS: 1

## 2020-11-17 ASSESSMENT — FIBROSIS 4 INDEX: FIB4 SCORE: 1.94

## 2020-11-17 NOTE — TELEPHONE ENCOUNTER
1. Caller Name:  Dayana Lechuga                Call Back Number: [phone]  [facility] PCP or Specialty Provider: Yes         2.  Has the patient previously tested positive for COVID-19? No    3.  In the last two weeks, has the patient had any new or worsening symptoms (not explained by alternative diagnosis)? Yes, the patient reports the following COVID-19 consistent symptoms: cough, fatigue, headache, nausea and diarrhea.    4.  Does patient have any comoribidities? None   Asthma  5.  Has the patient had any known contact with someone who is suspected or confirmed to have COVID-19? No.    5. Disposition: Offered patient virtual visit to limit potential exposure to others; patient also given self care instructions    Note routed to [facility] Provider: [facility]ARA only.       Reason for Disposition  • [1] MILD diarrhea (e.g., 1-3 or more stools than normal in past 24 hours) without known cause AND [2] present >  7 days    Additional Information  • Negative: Shock suspected (e.g., cold/pale/clammy skin, too weak to stand, low BP, rapid pulse)  • Negative: Difficult to awaken or acting confused (e.g., disoriented, slurred speech)  • Negative: Sounds like a life-threatening emergency to the triager  • Negative: Vomiting also present and worse than the diarrhea  • Negative: [1] Blood in stool AND [2] without diarrhea  • Negative: Diarrhea in a cancer patient who is currently (or recently) receiving chemotherapy or radiation therapy, or cancer patient who has metastatic or end-stage cancer and is receiving palliative care  • Negative: [1] SEVERE abdominal pain (e.g., excruciating) AND [2] present > 1 hour  • Negative: [1] SEVERE abdominal pain AND [2] age > 60  • Negative: [1] Blood in the stool AND [2] moderate or large amount of blood  • Negative: Black or tarry bowel movements  (Exception: chronic-unchanged  black-grey bowel movements AND is taking iron pills or Pepto-bismol)  • Negative: [1] Drinking very little AND [2]  dehydration suspected (e.g., no urine > 12 hours, very dry mouth, very lightheaded)  • Negative: Patient sounds very sick or weak to the triager  • Negative: [1] SEVERE diarrhea (e.g., 7 or more times / day more than normal) AND [2] age > 60 years  • Negative: [1] Constant abdominal pain AND [2] present > 2 hours  • Negative: [1] Fever > 103 F (39.4 C) AND [2] not able to get the fever down using Fever Care Advice  • Negative: [1] SEVERE diarrhea (e.g., 7 or more times / day more than normal) AND [2] present > 24 hours (1 day)  • Negative: [1] MODERATE diarrhea (e.g., 4-6 times / day more than normal) AND [2] present > 48 hours (2 days)  • Negative: [1] MODERATE diarrhea (e.g., 4-6 times / day more than normal) AND [2] age > 70 years  • Negative: Fever > 101 F (38.3 C)  • Negative: Abdominal pain  (Exception: Pain clears with each passage of diarrhea stool)  • Negative: Fever present > 3 days (72 hours)  • Negative: [1] Blood in the stool AND [2] small amount of blood   (Exception: only on toilet paper. Reason: diarrhea can cause rectal irritation with blood on wiping)  • Negative: [1] Mucus or pus in stool AND [2] present > 2 days AND [3] diarrhea is more than mild  • Negative: [1] Recent antibiotic therapy (i.e., within last 2 months) AND [2] diarrhea present > 3 days since antibiotic was stopped  • Negative: [1] Recent hospitalization AND [2] diarrhea present > 3 days  • Negative: Weak immune system (e.g., HIV positive, cancer chemo, splenectomy, organ transplant, chronic steroids)  • Negative: Tube feedings (e.g., nasogastric, g-tube, j-tube)  • Negative: Travel to a foreign country in past month  • Negative: Diarrhea is a chronic symptom (recurrent or ongoing AND present > 4 weeks)  • Negative: SEVERE diarrhea (e.g., 7 or more times / day more than normal)  • Negative: MILD-MODERATE diarrhea (e.g., 1-6 times / day more than normal)  • Negative: [1] MILD diarrhea AND [2] taking antibiotics    Protocols used:  DIARRHEA-A-AH

## 2020-11-17 NOTE — TELEPHONE ENCOUNTER
VOICEMAIL  1. Caller Name: Tonie -[phone]    2. Message: Tonie would like to speak with PCP regarding cold/flu symptoms that patient is experiencing.     3. Patient approves office to leave a detailed voicemail/MyChart message: yes      Lucho, are you able to triage?

## 2020-11-17 NOTE — TELEPHONE ENCOUNTER
Regarding: symptoms: cough, fatigue, muscle or body aches, headaches ,nausea and diarrhea  ----- Message from Alejandra Hauser sent at 11/17/2020  3:24 PM PST -----  Pt is having symptoms, cough, fatigue, muscle or body aches, headache, nausea and diarrhea, pt wants to make an appt with pcp, not feeling good.

## 2020-11-17 NOTE — TELEPHONE ENCOUNTER
VOICEMAIL  1. Caller Name: Dayana Lechuga                        Call Back Number: [phone] (home)     2. Message: patient lvm explaining she has recently started a cough on 11/13 that is worsening, no other symptoms  and would like suggestions on what to do.    Please advise

## 2020-11-17 NOTE — TELEPHONE ENCOUNTER
"Symptoms began Thursday or Friday.  Worst symptom is coughing which kept her awake.     Instructed patient to go to ED.  Patient is refusing to go to ED. Patient stated her son would not want her to go to ED.  Last time she went to ED she was kept for a few days and does not want to go there again.     Transferred to [facility] scheduling for  appt.    Reason for Disposition  • Patient sounds very sick or weak to the triager    Additional Information  • Negative: SEVERE difficulty breathing (e.g., struggling for each breath, speaks in single words)  • Negative: Difficult to awaken or acting confused (e.g., disoriented, slurred speech)  • Negative: Bluish (or gray) lips or face now  • Negative: Shock suspected (e.g., cold/pale/clammy skin, too weak to stand, low BP, rapid pulse)  • Negative: Sounds like a life-threatening emergency to the triager  • Negative: [1] COVID-19 suspected (e.g., cough, fever, shortness of breath) AND [2] public health department recommends testing  • Negative: [1] COVID-19 exposure AND [2] no symptoms  • Negative: COVID-19 and Breastfeeding, questions about  • Negative: SEVERE or constant chest pain (Exception: mild central chest pain, present only when coughing)  • Negative: MODERATE difficulty breathing (e.g., speaks in phrases, SOB even at rest, pulse 100-120)  • Negative: Chest pain  • Negative: MILD difficulty breathing (e.g., minimal/no SOB at rest, SOB with walking, pulse <100)    Answer Assessment - Initial Assessment Questions  1. COVID-19 DIAGNOSIS: \"Who made your Coronavirus (COVID-19) diagnosis?\" \"Was it confirmed by a positive lab test?\" If not diagnosed by a HCP, ask \"Are there lots of cases (community spread) where you live?\" (See public health department website, if unsure)    * MAJOR community spread: high number of cases; numbers of cases are increasing; many people hospitalized.    * MINOR community spread: low number of cases; not increasing; few or no people " "hospitalized      major  2. ONSET: \"When did the COVID-19 symptoms start?\"     11/12 or 11/13  3. WORST SYMPTOM: \"What is your worst symptom?\" (e.g., cough, fever, shortness of breath, muscle aches)      Coughing  4. COUGH: \"How bad is the cough?\"        Dry cough  5. FEVER: \"Do you have a fever?\" If so, ask: \"What is your temperature, how was it measured, and when did it start?\"      no  6. RESPIRATORY STATUS: \"Describe your breathing?\" (e.g., shortness of breath, wheezing, unable to speak)       No SOB or wheezing  7. BETTER-SAME-WORSE: \"Are you getting better, staying the same or getting worse compared to yesterday?\"  If getting worse, ask, \"In what way?\"      Worse with the cough  8. HIGH RISK DISEASE: \"Do you have any chronic medical problems?\" (e.g., asthma, heart or lung disease, weak immune system, etc.)      Asthma, HTN  9. PREGNANCY: \"Is there any chance you are pregnant?\" \"When was your last menstrual period?\"      no  10. OTHER SYMPTOMS: \"Do you have any other symptoms?\"  (e.g., runny nose, headache, sore throat, loss of smell)       Congestion in the chest, back pain, skin hurts, cough, sore throat (11/13), more fatigue than usual, diarrhea, nausea.  Lack of appetite    Protocols used: CORONAVIRUS (COVID-19) DIAGNOSED OR [id number]      "

## 2020-11-17 NOTE — TELEPHONE ENCOUNTER
Phone Number Called: [phone] (home)       Call outcome: Spoke to patient regarding message below.    Message: Patient reports she has had a cough since 11/13. Pt reports it is a dry and nonproductive cough. Pt denies having other symptoms. Pt states her cough isn't as bad this morning but last night it was at it's worst. Pt states she has been taking over the counter Dayquil and Theraflu. Pt advised to continue to monitor her cough and take over the counter medication. Pt also advised to visit urgent care if she feels her cough has severely worsened. Pt declined to make an appt with PCP.

## 2020-11-18 NOTE — TELEPHONE ENCOUNTER
Spoke with daughter-in-law Tonie. Tonie was notified that this RN spoke with the patient earlier this morning. Tonie was notified that per the patient's description of symptoms, it does not sound like patient needs to visit urgent care as her cough is getting better. Tonie verbalized understanding and had no other questions or concerns.

## 2020-11-18 NOTE — PROGRESS NOTES
Subjective:   Dayana Lechuag is a 89 y.o. female who presents for Diarrhea (x5days, cough, diarrhea, body aches)        Diarrhea   This is a new problem. Episode onset: 5 days. Associated symptoms include coughing and myalgias. Pertinent negatives include no chills, fever or vomiting. Treatments tried: immodium. The treatment provided mild relief.     PMH:  has a past medical history of Allergy, Anxiety, ASTHMA, CAD (coronary artery disease), Hyperlipidemia, Hypertension, Lumbar back pain (9/10/2016), OSTEOPOROSIS, and PVD (peripheral vascular disease) ([facility]).  MEDS:   Current Outpatient Medications:   •  benzonatate (TESSALON) 100 MG Cap, Take 1 Cap by mouth 3 times a day as needed for Cough., Disp: 30 Cap, Rfl: 0  •  gabapentin (NEURONTIN) 100 MG Cap, TK 2 CS PO TID, Disp: , Rfl:   •  [START ON 2/28/2021] LORazepam (ATIVAN) 0.5 MG Tab, Take 1-2 Tabs by mouth 2 Times a Day for 30 days., Disp: 90 Tab, Rfl: 0  •  QVAR REDIHALER 80 MCG/ACT inhaler, INHALE 1 PUFF BY MOUTH TWICE A DAY , Disp: 10.6 g, Rfl: 0  •  fluticasone (FLONASE) 50 MCG/ACT nasal spray, USE 1 SPRAY INTO EACH NOSTRIL DAILY, Disp: 16 g, Rfl: 0  •  montelukast (SINGULAIR) 10 MG Tab, TAKE ONE TABLET BY MOUTH ONE TIME DAILY , Disp: 90 Tab, Rfl: 0  •  potassium chloride SA (KDUR) 20 MEQ Tab CR, TAKE 1 TABLET ORALLY 2 TIMES A DAY (GENERIC KDUR), Disp: 180 Tab, Rfl: 0  •  carvedilol (COREG) 12.5 MG Tab, Take 0.5 Tabs by mouth 2 Times a Day., Disp: 30 Tab, Rfl: 0  •  amLODIPine (NORVASC) 5 MG Tab, Take 5 mg by mouth every morning., Disp: , Rfl:   •  atorvastatin (LIPITOR) 80 MG tablet, Take 80 mg by mouth every evening., Disp: , Rfl:   •  isosorbide mononitrate SR (IMDUR) 30 MG TABLET SR 24 HR, Take 30 mg by mouth every evening., Disp: , Rfl:   •  lisinopril (PRINIVIL) 20 MG Tab, Take 20 mg by mouth every morning., Disp: , Rfl:   •  acetaminophen (TYLENOL) 500 MG Tab, Take 500 mg by mouth every morning., Disp: , Rfl:   •  Multiple Vitamins-Minerals  (CENTRUM SILVER PO), Take 1 Tab by mouth every morning., Disp: , Rfl:   •  loratadine (CLARITIN) 10 MG Tab, Take 10 mg by mouth every morning., Disp: , Rfl:   •  albuterol 108 (90 Base) MCG/ACT Aero Soln inhalation aerosol, Inhale 2 Puffs by mouth every 6 hours as needed for Shortness of Breath., Disp: 3 Inhaler, Rfl: 0  •  nitroglycerin (NITROSTAT) 0.4 MG SL Tab, Place 1 tab under tongue every 5 min as needed for chest pain max 3 doses, Disp: 100 Tab, Rfl: 0  •  Biotin w/ Vitamins C & E (HAIR/SKIN/NAILS) 1250-7.5-7.5 MCG-MG-UNT Chew Tab, Take 1 Tab by mouth every day., Disp: , Rfl:   •  guaifenesin LA (MUCINEX) 600 MG TABLET SR 12 HR, Take 600 mg by mouth every morning., Disp: , Rfl:   •  Ascorbic Acid (VITAMIN C) 1000 MG Tab, Take 1,000 mg by mouth every day., Disp: , Rfl:   •  VITAMIN E PO, Take 1 Dose by mouth every day. Unknown OTC Strength., Disp: , Rfl:   •  aspirin (ASA) 81 MG Chew Tab chewable tablet, Take 1 Tab by mouth every day., Disp: 100 Tab, Rfl: 11  •  Cholecalciferol (VITAMIN D) 2000 UNIT Tab, Take 2,000 Units by mouth every day., Disp: , Rfl:   •  omeprazole (PRILOSEC) 20 MG delayed-release capsule, Take 20 mg by mouth every day., Disp: , Rfl:   ALLERGIES:   Allergies   Allergen Reactions   • Bactrim [Sulfamethoxazole W-Trimethoprim] Hives   • Pcn [Penicillins] Hives     About 70 years   • Codeine Unspecified     Unknown reaction       SURGHX:   Past Surgical History:   Procedure Laterality Date   • ABDOMINAL HYSTERECTOMY TOTAL     • APPENDECTOMY     • HERNIA REPAIR     • TONSILLECTOMY     • ZZZ CARDIAC CATH       SOCHX:  reports that she has never smoked. She has never used smokeless tobacco. She reports that she does not drink alcohol or use drugs.  FH:   Family History   Problem Relation Age of Onset   • Heart Disease Neg Hx    • Heart Failure Neg Hx    • Hyperlipidemia Neg Hx      Review of Systems   Constitutional: Negative for chills and fever.   HENT: Negative for sore throat.   "  Respiratory: Positive for cough. Negative for shortness of breath.    Cardiovascular: Negative for chest pain.   Gastrointestinal: Positive for diarrhea. Negative for nausea and vomiting.   Genitourinary: Negative for dysuria.   Musculoskeletal: Positive for myalgias.   Skin: Negative for rash.   Neurological: Negative for dizziness.        Objective:   /76 (BP Location: Left arm, Patient Position: Sitting, BP Cuff Size: Adult)   Pulse 78   Temp 36.9 °C (98.4 °F) (Temporal)   Resp 18   Ht 1.6 m (5' 3\")   Wt 51.7 kg (114 lb)   LMP  (LMP Unknown)   SpO2 95%   BMI 20.19 kg/m²   Physical Exam  Vitals signs and nursing note reviewed.   Constitutional:       General: She is not in acute distress.     Appearance: She is well-developed.   HENT:      Head: Normocephalic and atraumatic.      Right Ear: External ear normal.      Left Ear: External ear normal.      Nose: Nose normal.      Mouth/Throat:      Mouth: Mucous membranes are moist.   Eyes:      Conjunctiva/sclera: Conjunctivae normal.   Cardiovascular:      Rate and Rhythm: Normal rate and regular rhythm.   Pulmonary:      Effort: Pulmonary effort is normal. No respiratory distress.      Breath sounds: Normal breath sounds.   Abdominal:      General: There is no distension.   Musculoskeletal: Normal range of motion.   Skin:     General: Skin is warm and dry.   Neurological:      General: No focal deficit present.      Mental Status: She is alert and oriented to person, place, and time. Mental status is at baseline.      Gait: Gait (gait at baseline) normal.   Psychiatric:         Judgment: Judgment normal.     POCT influenza: negative         Assessment/Plan:   1. Suspected COVID-19 virus infection  - COVID/SARS COV-2 PCR; Future    2. Cough  - POCT Influenza A/B  - benzonatate (TESSALON) 100 MG Cap; Take 1 Cap by mouth 3 times a day as needed for Cough.  Dispense: 30 Cap; Refill: 0    3. Loose stools    4. Myalgia      Medical decision-making/course: " The patient remained afebrile, hemodynamically and neurologically stable with no evidence of respiratory compromise throughout the urgent care course.  There was no immediate clinical indication for the necessity of emergency department evaluation or inpatient admission and the patient requested a trial of outpatient management.        Advised routine CDC social distancing guielines, symptomatic and supportive measures      Discussed close monitoring, return precautions, and supportive measures of maintaining adequate fluid hydration and caloric intake, relative rest and symptom management as needed for pain and/or fever.    Differential diagnosis, natural history, supportive care, and indications for immediate follow-up discussed.     Advised the patient to follow-up with the primary care physician for recheck, reevaluation, and consideration of further management.    Please note that this dictation was created using voice recognition software. I have worked with consultants from the vendor as well as technical experts from [employer] to optimize the interface. I have made every reasonable attempt to correct obvious errors, but I expect that there are errors of grammar and possibly content that I did not discover before finalizing the note.

## 2020-11-19 DIAGNOSIS — Z20.822 SUSPECTED COVID-19 VIRUS INFECTION: ICD-10-CM

## 2020-11-19 LAB — COVID ORDER STATUS COVID19: NORMAL

## 2020-11-20 ENCOUNTER — APPOINTMENT (OUTPATIENT)
Dept: RADIOLOGY | Facility: MEDICAL CENTER | Age: 85
DRG: 177 | End: 2020-11-20
Attending: EMERGENCY MEDICINE
Payer: MEDICARE

## 2020-11-20 ENCOUNTER — HOSPITAL ENCOUNTER (INPATIENT)
Facility: MEDICAL CENTER | Age: 85
LOS: 15 days | DRG: 177 | End: 2020-12-05
Attending: EMERGENCY MEDICINE | Admitting: HOSPITALIST
Payer: MEDICARE

## 2020-11-20 ENCOUNTER — TELEPHONE (OUTPATIENT)
Dept: MEDICAL GROUP | Facility: PHYSICIAN GROUP | Age: 85
End: 2020-11-20

## 2020-11-20 DIAGNOSIS — U07.1 COVID-19: ICD-10-CM

## 2020-11-20 DIAGNOSIS — E87.1 HYPONATREMIA: ICD-10-CM

## 2020-11-20 DIAGNOSIS — R09.02 HYPOXIA: ICD-10-CM

## 2020-11-20 DIAGNOSIS — R53.81 DEBILITY: ICD-10-CM

## 2020-11-20 PROBLEM — J96.01 ACUTE HYPOXEMIC RESPIRATORY FAILURE DUE TO COVID-19 (HCC): Status: ACTIVE | Noted: 2020-11-20

## 2020-11-20 PROBLEM — G62.9 PERIPHERAL NEUROPATHY: Status: ACTIVE | Noted: 2020-11-20

## 2020-11-20 LAB
ALBUMIN SERPL BCP-MCNC: 3.6 G/DL (ref 3.2–4.9)
ALBUMIN/GLOB SERPL: 1.3 G/DL
ALP SERPL-CCNC: 46 U/L (ref 30–99)
ALT SERPL-CCNC: 18 U/L (ref 2–50)
ANION GAP SERPL CALC-SCNC: 10 MMOL/L (ref 7–16)
AST SERPL-CCNC: 21 U/L (ref 12–45)
BASOPHILS # BLD AUTO: 0.3 % (ref 0–1.8)
BASOPHILS # BLD: 0.02 K/UL (ref 0–0.12)
BILIRUB SERPL-MCNC: 0.5 MG/DL (ref 0.1–1.5)
BUN SERPL-MCNC: 8 MG/DL (ref 8–22)
CALCIUM SERPL-MCNC: 8.7 MG/DL (ref 8.5–10.5)
CHLORIDE SERPL-SCNC: 89 MMOL/L (ref 96–112)
CO2 SERPL-SCNC: 22 MMOL/L (ref 20–33)
COVID ORDER STATUS COVID19: NORMAL
CREAT SERPL-MCNC: 0.46 MG/DL (ref 0.5–1.4)
EKG IMPRESSION: NORMAL
EOSINOPHIL # BLD AUTO: 0.01 K/UL (ref 0–0.51)
EOSINOPHIL NFR BLD: 0.2 % (ref 0–6.9)
ERYTHROCYTE [DISTWIDTH] IN BLOOD BY AUTOMATED COUNT: 44.8 FL (ref 35.9–50)
FLUAV RNA SPEC QL NAA+PROBE: NEGATIVE
FLUBV RNA SPEC QL NAA+PROBE: NEGATIVE
GLOBULIN SER CALC-MCNC: 2.7 G/DL (ref 1.9–3.5)
GLUCOSE SERPL-MCNC: 113 MG/DL (ref 65–99)
HCT VFR BLD AUTO: 35.6 % (ref 37–47)
HGB BLD-MCNC: 12.2 G/DL (ref 12–16)
IMM GRANULOCYTES # BLD AUTO: 0.02 K/UL (ref 0–0.11)
IMM GRANULOCYTES NFR BLD AUTO: 0.3 % (ref 0–0.9)
LYMPHOCYTES # BLD AUTO: 0.93 K/UL (ref 1–4.8)
LYMPHOCYTES NFR BLD: 15.2 % (ref 22–41)
MCH RBC QN AUTO: 33.2 PG (ref 27–33)
MCHC RBC AUTO-ENTMCNC: 34.3 G/DL (ref 33.6–35)
MCV RBC AUTO: 97 FL (ref 81.4–97.8)
MONOCYTES # BLD AUTO: 0.65 K/UL (ref 0–0.85)
MONOCYTES NFR BLD AUTO: 10.6 % (ref 0–13.4)
NEUTROPHILS # BLD AUTO: 4.49 K/UL (ref 2–7.15)
NEUTROPHILS NFR BLD: 73.4 % (ref 44–72)
NRBC # BLD AUTO: 0 K/UL
NRBC BLD-RTO: 0 /100 WBC
NT-PROBNP SERPL IA-MCNC: 371 PG/ML (ref 0–125)
PLATELET # BLD AUTO: 149 K/UL (ref 164–446)
PMV BLD AUTO: 10.2 FL (ref 9–12.9)
POTASSIUM SERPL-SCNC: 4.3 MMOL/L (ref 3.6–5.5)
PROT SERPL-MCNC: 6.3 G/DL (ref 6–8.2)
RBC # BLD AUTO: 3.67 M/UL (ref 4.2–5.4)
RSV RNA SPEC QL NAA+PROBE: NEGATIVE
SARS-COV-2 RNA RESP QL NAA+PROBE: DETECTED
SARS-COV-2 RNA RESP QL NAA+PROBE: DETECTED
SODIUM SERPL-SCNC: 121 MMOL/L (ref 135–145)
SPECIMEN SOURCE: ABNORMAL
SPECIMEN SOURCE: ABNORMAL
TROPONIN T SERPL-MCNC: 18 NG/L (ref 6–19)
WBC # BLD AUTO: 6.1 K/UL (ref 4.8–10.8)

## 2020-11-20 PROCEDURE — 36415 COLL VENOUS BLD VENIPUNCTURE: CPT

## 2020-11-20 PROCEDURE — 93005 ELECTROCARDIOGRAM TRACING: CPT | Performed by: EMERGENCY MEDICINE

## 2020-11-20 PROCEDURE — 85025 COMPLETE CBC W/AUTO DIFF WBC: CPT

## 2020-11-20 PROCEDURE — 99223 1ST HOSP IP/OBS HIGH 75: CPT | Performed by: HOSPITALIST

## 2020-11-20 PROCEDURE — 71045 X-RAY EXAM CHEST 1 VIEW: CPT

## 2020-11-20 PROCEDURE — 700102 HCHG RX REV CODE 250 W/ 637 OVERRIDE(OP): Performed by: HOSPITALIST

## 2020-11-20 PROCEDURE — A9270 NON-COVERED ITEM OR SERVICE: HCPCS | Performed by: HOSPITALIST

## 2020-11-20 PROCEDURE — 84484 ASSAY OF TROPONIN QUANT: CPT

## 2020-11-20 PROCEDURE — 99285 EMERGENCY DEPT VISIT HI MDM: CPT

## 2020-11-20 PROCEDURE — 770021 HCHG ROOM/CARE - ISO PRIVATE

## 2020-11-20 PROCEDURE — 700111 HCHG RX REV CODE 636 W/ 250 OVERRIDE (IP): Performed by: HOSPITALIST

## 2020-11-20 PROCEDURE — 83880 ASSAY OF NATRIURETIC PEPTIDE: CPT

## 2020-11-20 PROCEDURE — 0241U HCHG SARS-COV-2 COVID-19 NFCT DS RESP RNA 4 TRGT MIC: CPT

## 2020-11-20 PROCEDURE — 80053 COMPREHEN METABOLIC PANEL: CPT

## 2020-11-20 PROCEDURE — 93005 ELECTROCARDIOGRAM TRACING: CPT

## 2020-11-20 RX ORDER — ACETAMINOPHEN 325 MG/1
650 TABLET ORAL EVERY 6 HOURS PRN
Status: DISCONTINUED | OUTPATIENT
Start: 2020-11-20 | End: 2020-12-03

## 2020-11-20 RX ORDER — ACETAMINOPHEN 500 MG
500 TABLET ORAL 2 TIMES DAILY
Status: DISCONTINUED | OUTPATIENT
Start: 2020-11-20 | End: 2020-11-23

## 2020-11-20 RX ORDER — LORAZEPAM 1 MG/1
1 TABLET ORAL
Status: DISCONTINUED | OUTPATIENT
Start: 2020-11-20 | End: 2020-12-05 | Stop reason: HOSPADM

## 2020-11-20 RX ORDER — ASPIRIN 81 MG/1
81 TABLET, CHEWABLE ORAL
Status: DISCONTINUED | OUTPATIENT
Start: 2020-11-21 | End: 2020-12-05 | Stop reason: HOSPADM

## 2020-11-20 RX ORDER — ATORVASTATIN CALCIUM 80 MG/1
80 TABLET, FILM COATED ORAL NIGHTLY
Status: DISCONTINUED | OUTPATIENT
Start: 2020-11-20 | End: 2020-11-23

## 2020-11-20 RX ORDER — ISOSORBIDE MONONITRATE 30 MG/1
30 TABLET, EXTENDED RELEASE ORAL EVERY EVENING
Status: DISCONTINUED | OUTPATIENT
Start: 2020-11-20 | End: 2020-12-05 | Stop reason: HOSPADM

## 2020-11-20 RX ORDER — AMLODIPINE BESYLATE 5 MG/1
5 TABLET ORAL
Status: DISCONTINUED | OUTPATIENT
Start: 2020-11-21 | End: 2020-12-05 | Stop reason: HOSPADM

## 2020-11-20 RX ORDER — CARVEDILOL 6.25 MG/1
6.25 TABLET ORAL 2 TIMES DAILY WITH MEALS
Status: DISCONTINUED | OUTPATIENT
Start: 2020-11-20 | End: 2020-12-05 | Stop reason: HOSPADM

## 2020-11-20 RX ORDER — GABAPENTIN 100 MG/1
200 CAPSULE ORAL 3 TIMES DAILY
Status: DISCONTINUED | OUTPATIENT
Start: 2020-11-20 | End: 2020-12-05 | Stop reason: HOSPADM

## 2020-11-20 RX ORDER — DEXAMETHASONE 4 MG/1
6 TABLET ORAL DAILY
Status: COMPLETED | OUTPATIENT
Start: 2020-11-20 | End: 2020-11-29

## 2020-11-20 RX ORDER — BENZONATATE 100 MG/1
100 CAPSULE ORAL 3 TIMES DAILY PRN
Status: DISCONTINUED | OUTPATIENT
Start: 2020-11-20 | End: 2020-12-05 | Stop reason: HOSPADM

## 2020-11-20 RX ORDER — BISACODYL 10 MG
10 SUPPOSITORY, RECTAL RECTAL
Status: DISCONTINUED | OUTPATIENT
Start: 2020-11-20 | End: 2020-11-23

## 2020-11-20 RX ORDER — LORAZEPAM 1 MG/1
.5-1 TABLET ORAL SEE ADMIN INSTRUCTIONS
Status: DISCONTINUED | OUTPATIENT
Start: 2020-11-20 | End: 2020-11-20

## 2020-11-20 RX ORDER — ASCORBIC ACID 500 MG
1000 TABLET ORAL EVERY MORNING
Status: DISCONTINUED | OUTPATIENT
Start: 2020-11-21 | End: 2020-11-23

## 2020-11-20 RX ORDER — POLYETHYLENE GLYCOL 3350 17 G/17G
1 POWDER, FOR SOLUTION ORAL
Status: DISCONTINUED | OUTPATIENT
Start: 2020-11-20 | End: 2020-11-23

## 2020-11-20 RX ORDER — LORAZEPAM 1 MG/1
0.5 TABLET ORAL DAILY
Status: DISCONTINUED | OUTPATIENT
Start: 2020-11-20 | End: 2020-12-05 | Stop reason: HOSPADM

## 2020-11-20 RX ORDER — FLUTICASONE PROPIONATE 110 UG/1
2 AEROSOL, METERED RESPIRATORY (INHALATION) 2 TIMES DAILY
Status: DISCONTINUED | OUTPATIENT
Start: 2020-11-20 | End: 2020-12-05 | Stop reason: HOSPADM

## 2020-11-20 RX ORDER — LISINOPRIL 20 MG/1
20 TABLET ORAL
Status: DISCONTINUED | OUTPATIENT
Start: 2020-11-21 | End: 2020-12-05 | Stop reason: HOSPADM

## 2020-11-20 RX ORDER — OMEPRAZOLE 20 MG/1
20 CAPSULE, DELAYED RELEASE ORAL
Status: DISCONTINUED | OUTPATIENT
Start: 2020-11-21 | End: 2020-11-23

## 2020-11-20 RX ORDER — MONTELUKAST SODIUM 10 MG/1
10 TABLET ORAL EVERY EVENING
Status: DISCONTINUED | OUTPATIENT
Start: 2020-11-20 | End: 2020-12-05 | Stop reason: HOSPADM

## 2020-11-20 RX ORDER — AMOXICILLIN 250 MG
2 CAPSULE ORAL 2 TIMES DAILY
Status: DISCONTINUED | OUTPATIENT
Start: 2020-11-20 | End: 2020-11-23

## 2020-11-20 RX ADMIN — DEXAMETHASONE 6 MG: 4 TABLET ORAL at 17:17

## 2020-11-20 RX ADMIN — ACETAMINOPHEN 500 MG: 500 TABLET ORAL at 17:16

## 2020-11-20 RX ADMIN — ATORVASTATIN CALCIUM 80 MG: 80 TABLET, FILM COATED ORAL at 20:33

## 2020-11-20 RX ADMIN — LORAZEPAM 1 MG: 1 TABLET ORAL at 20:33

## 2020-11-20 RX ADMIN — MONTELUKAST 10 MG: 10 TABLET, FILM COATED ORAL at 17:16

## 2020-11-20 RX ADMIN — FLUTICASONE PROPIONATE 220 MCG: 110 AEROSOL, METERED RESPIRATORY (INHALATION) at 20:33

## 2020-11-20 RX ADMIN — LORAZEPAM 0.5 MG: 1 TABLET ORAL at 17:15

## 2020-11-20 RX ADMIN — GABAPENTIN 200 MG: 100 CAPSULE ORAL at 17:16

## 2020-11-20 RX ADMIN — ISOSORBIDE MONONITRATE 30 MG: 60 TABLET, EXTENDED RELEASE ORAL at 17:16

## 2020-11-20 RX ADMIN — CARVEDILOL 6.25 MG: 6.25 TABLET, FILM COATED ORAL at 17:16

## 2020-11-20 RX ADMIN — ENOXAPARIN SODIUM 40 MG: 100 INJECTION SUBCUTANEOUS at 17:17

## 2020-11-20 ASSESSMENT — COGNITIVE AND FUNCTIONAL STATUS - GENERAL
TOILETING: A LITTLE
CLIMB 3 TO 5 STEPS WITH RAILING: A LITTLE
DRESSING REGULAR LOWER BODY CLOTHING: A LITTLE
SUGGESTED CMS G CODE MODIFIER MOBILITY: CK
STANDING UP FROM CHAIR USING ARMS: A LITTLE
MOVING FROM LYING ON BACK TO SITTING ON SIDE OF FLAT BED: A LOT
DRESSING REGULAR UPPER BODY CLOTHING: A LITTLE
DAILY ACTIVITIY SCORE: 18
EATING MEALS: A LITTLE
MOVING TO AND FROM BED TO CHAIR: A LOT
TURNING FROM BACK TO SIDE WHILE IN FLAT BAD: A LITTLE
WALKING IN HOSPITAL ROOM: A LITTLE
PERSONAL GROOMING: A LITTLE
HELP NEEDED FOR BATHING: A LITTLE
MOBILITY SCORE: 16
SUGGESTED CMS G CODE MODIFIER DAILY ACTIVITY: CK

## 2020-11-20 ASSESSMENT — FIBROSIS 4 INDEX: FIB4 SCORE: 1.94

## 2020-11-20 ASSESSMENT — LIFESTYLE VARIABLES: DO YOU DRINK ALCOHOL: NO

## 2020-11-20 NOTE — DISCHARGE PLANNING
Pt said she lives at an independent living facility called [facility]. Pt said they do housekeeping and deliver her meals. Pt no longer drives. She normally does not use any DMEs . She would be interested to have homehealth upon discharge.  Son lives in [place], [place].    Care Transition Team Assessment    Information Source  Information Given By: Patient  Informant's Name: Dayana    Readmission Evaluation  Is this a readmission?: No    Elopement Risk  Legal Hold: No  Ambulatory or Self Mobile in Wheelchair: No-Not an Elopement Risk    Interdisciplinary Discharge Planning  Does Admitting Nurse Feel This Could be a Complex Discharge?: No  Primary Care Physician: Milena ESPAÑA  Lives with - Patient's Self Care Capacity: Other (Comments)(Indepent Living [facility])  Support Systems: Family Member(s), Friends / Neighbors(son lives in [place])  Housing / Facility: Other (Comments)(has elevator)  Name of Care Facility: [facility]  Do You Take your Prescribed Medications Regularly: Yes([facility])  Able to Return to Previous ADL's: Yes  Mobility Issues: No  Prior Services: (regular housekeeping, deliver meals from facility)  Patient Prefers to be Discharged to:: Home at [facility]  Assistance Needed: No  Durable Medical Equipment: Not Applicable    Discharge Preparedness  What is your plan after discharge?: Home with help  What are your discharge supports?: Child  Prior Functional Level: Ambulatory, Independent with Activities of Daily Living, Independent with Medication Management  Difficulity with ADLs: None  Difficulity with IADLs: Driving    Functional Assesment  Prior Functional Level: Ambulatory, Independent with Activities of Daily Living, Independent with Medication Management    Finances  Financial Barriers to Discharge: No  Prescription Coverage: Yes    Vision / Hearing Impairment  Vision Impairment : No  Hearing Impairment : No              Domestic Abuse  Have you ever been the victim of abuse or violence?:  No         Discharge Risks or Barriers  Discharge risks or barriers?: No    Anticipated Discharge Information  Discharge Disposition: Still a Patient (30)

## 2020-11-20 NOTE — ASSESSMENT & PLAN NOTE
Continue home medications of amlodipine, carvedilol, isosorbide mononitrate, and lisinopril with hold parameters.

## 2020-11-20 NOTE — ASSESSMENT & PLAN NOTE
Per chart review as admitting physician: While I am generally reluctant to give benzodiazepines to the elderly, the risks of withdrawal probably outweigh the risk of continuation at this juncture.  Home dose benzos will be continued.

## 2020-11-20 NOTE — ED TRIAGE NOTES
".  Chief Complaint   Patient presents with   • Weakness     since last week   • N/V   • Sore Throat   • Shortness of Breath   • Cough   Pt states \" the cough has been all the time, same with the sore throat. \"  N/V onset this morning.  Last emesis today at 0800, no blood.  No diarrhea.  No chest pain.  No fever, \" I do get chilly sometimes. \"  + headache.     Pt denies any recent travel, denies exposure to COVID positive individuals.  Mask in place.     "

## 2020-11-20 NOTE — ED NOTES
Chief Complaint   Patient presents with   • Weakness     since last week   • N/V   • Sore Throat   • Shortness of Breath   • Cough     Pt ambulated to triage with above complaints. Pt was seen at urgent care couple days ago with pending flu & covid test. Pt came here today because she started vomiting this morning.    Normal

## 2020-11-20 NOTE — ASSESSMENT & PLAN NOTE
Does not appear currently exacerbated however given her viral infection she is at risk for exacerbation, will maintain a close clinical eye, continue her home medications.

## 2020-11-20 NOTE — ASSESSMENT & PLAN NOTE
- Secondary to Covid pneumonia.   - Oxygen as needed; wean as tolerated.   -decadron 6 mg daily and lovenox.  -We will start the patient ISS due to decadron, as well as hypoglycemia protocol.  The patient was started on remdesivir on 11/29/2020 by the intensivist due to the patient being on 6L of NC.  -Patient's present oxygenation needs 2-3 L nasal cannula to maintain 94% continue remdesivir at this time.  We will encourage self proning and incentive spirometer use.

## 2020-11-20 NOTE — ED PROVIDER NOTES
"ED Provider Note    CHIEF COMPLAINT  Chief Complaint   Patient presents with   • Weakness     since last week   • N/V   • Sore Throat   • Shortness of Breath   • Cough       HPI  Dayana Lechuga is a 89 y.o. female PMH MI 1986 s/p stent without further incidents, GERD, anxiety on benzodiazepines, BIB by son, who presents with cough x 1 week, as well as intermittent mild dyspnea, R-sided ear pressure, headache, malaise, myalgias. She did have diarrhea a few days ago but this has since resolved. Today she has had nausea with 3-4 episodes of emesis (non-bloody).     The patient does the the flu shot annually, and had hers this year. She does state she has had reduced PO intake, especially food, but has been trying to drink as much fluid as possible. She states she is still making urine, and it doesn't appear to be concentrated. She states her son was concerned about her oral intake and finally convinced her to present to ED.     Pt lives alone with her dog and states that she can generally take care of ADLs. She states she was unable to take her \"heart meds\" today 2/2 feeling bad.     REVIEW OF SYSTEMS  See HPI for further details. All other systems are negative.     PAST MEDICAL HISTORY   has a past medical history of Allergy, Anxiety, ASTHMA, CAD (coronary artery disease), Hyperlipidemia, Hypertension, Lumbar back pain (9/10/2016), OSTEOPOROSIS, and PVD (peripheral vascular disease) ([facility]).    SOCIAL HISTORY  Social History     Tobacco Use   • Smoking status: Never Smoker   • Smokeless tobacco: Never Used   Substance and Sexual Activity   • Alcohol use: No     Alcohol/week: 0.0 oz   • Drug use: No   • Sexual activity: Not Currently       SURGICAL HISTORY   has a past surgical history that includes appendectomy; abdominal hysterectomy total; hernia repair; tonsillectomy; and zzz cardiac cath.    CURRENT MEDICATIONS  Home Medications     Reviewed by Medina Harden (Pharmacy Tech) on 11/20/20 at 1035  Med " "List Status: Complete   Medication Last Dose Status   acetaminophen (TYLENOL) 500 MG Tab 11/20/2020 Active   albuterol 108 (90 Base) MCG/ACT Aero Soln inhalation aerosol Not Taking Active   amLODIPine (NORVASC) 5 MG Tab 11/19/2020 Active   Ascorbic Acid (VITAMIN C) 1000 MG Tab 11/19/2020 Active   aspirin (ASA) 81 MG Chew Tab chewable tablet 11/19/2020 Active   atorvastatin (LIPITOR) 80 MG tablet 11/20/2020 Active   benzonatate (TESSALON) 100 MG Cap 11/20/2020 Active   Biotin w/ Vitamins C & E (HAIR/SKIN/NAILS) 1250-7.5-7.5 MCG-MG-UNT Chew Tab 11/19/2020 Active   carvedilol (COREG) 12.5 MG Tab 11/19/2020 Active   Cholecalciferol (VITAMIN D) 50 MCG (2000 UT) Cap 11/19/2020 Active   fluticasone (FLONASE) 50 MCG/ACT nasal spray 11/19/2020 Active   gabapentin (NEURONTIN) 100 MG Cap 11/20/2020 Active   guaifenesin LA (MUCINEX) 600 MG TABLET SR 12 HR 11/20/2020 Active   isosorbide mononitrate SR (IMDUR) 30 MG TABLET SR 24 HR 11/19/2020 Active   lisinopril (PRINIVIL) 20 MG Tab 11/19/2020 Active   loratadine (CLARITIN) 10 MG Tab 11/19/2020 Active   LORazepam (ATIVAN) 0.5 MG Tab 11/19/2020 Active   montelukast (SINGULAIR) 10 MG Tab 11/19/2020 Active   Multiple Vitamins-Minerals (CENTRUM SILVER PO) 11/19/2020 Active   nitroglycerin (NITROSTAT) 0.4 MG SL Tab >6 months Active   omeprazole (PRILOSEC) 20 MG delayed-release capsule 11/19/2020 Active   potassium chloride SA (KDUR) 20 MEQ Tab CR 11/19/2020 Active   Pseudoephedrine-APAP-DM (DAYQUIL PO) 11/19/2020 Active   QVAR REDIHALER 80 MCG/ACT inhaler 11/19/2020 Active   VITAMIN E PO 11/19/2020 Active                ALLERGIES  Allergies   Allergen Reactions   • Bactrim [Sulfamethoxazole W-Trimethoprim] Hives   • Pcn [Penicillins] Hives     About 70 years   • Codeine Unspecified     Unknown reaction         PHYSICAL EXAM  VITAL SIGNS: /70   Pulse 67   Temp 36.3 °C (97.4 °F) (Oral)   Resp 17   Ht 1.6 m (5' 3\")   Wt 55 kg (121 lb 4.1 oz)   LMP  (LMP Unknown)   SpO2 96%  "  BMI 21.48 kg/m²  @ANTWAN[[id number]::@   Pulse ox interpretation: I interpret this pulse ox as normal.  Constitutional: Alert but somnolent, in no apparent distress.  HENT: No signs of trauma, Bilateral external ears normal, TMs nonbulging and no erythema to the external canals; thin coat of cerumen vs scarring to right TM, Nose normal.   Eyes: Pupils are equal and reactive  Neck: Normal range of motion, No tenderness, Supple   Cardiovascular: Regular rate and rhythm, no murmurs.   Thorax & Lungs: Faint b/l lower lung field crackles, otherwise lungs clear with good air movement, no respiratory distress, No wheezing, No chest tenderness.   Abdomen: Bowel sounds normal, Soft, No tenderness  Skin: Warm, Dry, No erythema, No rash.   Back: No bony tenderness  Extremities: Intact distal pulses, No edema, No tenderness  Musculoskeletal: Good range of motion in all major joints. No tenderness to palpation or major deformities noted.   Neurologic: Alert , Normal motor function, Normal sensory function, No focal deficits noted.   Psychiatric: Affect normal, Judgment normal, Mood normal.       DIAGNOSTIC STUDIES / PROCEDURES    EKG  Results for orders placed or performed during the hospital encounter of 20   EKG (NOW)   Result Value Ref Range    Report       [facility] Emergency Dept.    Test Date:  2020  Pt Name:    LAVERN OAKLEY           Department: ER  MRN:        [id number]                      Room:  Gender:     Female                       Technician: [id number]  :        [id number]                   Requested By:ER TRIAGE PROTOCOL  Order #:    [id number]                    Reading MD:    Measurements  Intervals                                Axis  Rate:       72                           P:          37  TX:         196                          QRS:        -42  QRSD:       82                           T:          12  QT:         388  QTc:        425    Interpretive Statements  SINUS  RHYTHM  PROBABLE INFERIOR INFARCT, AGE INDETERMINATE  ABNRM R PROG, CONSIDER ASMI OR LEAD PLACEMENT  Compared to ECG 09/11/2020 11:17:58  Sinus bradycardia no longer present  Left-axis deviation no longer present  T-wave abnormality no longer present  Myocardial infarct finding still present           LABS  Results for orders placed or performed during the hospital encounter of 11/20/20   COVID/SARS CoV-2 PCR    Specimen: Nasopharyngeal; Respirate   Result Value Ref Range    COVID Order Status Received    CBC WITH DIFFERENTIAL   Result Value Ref Range    WBC 6.1 4.8 - 10.8 K/uL    RBC 3.67 (L) 4.20 - 5.40 M/uL    Hemoglobin 12.2 12.0 - 16.0 g/dL    Hematocrit 35.6 (L) 37.0 - 47.0 %    MCV 97.0 81.4 - 97.8 fL    MCH 33.2 (H) 27.0 - 33.0 pg    MCHC 34.3 33.6 - 35.0 g/dL    RDW 44.8 35.9 - 50.0 fL    Platelet Count 149 (L) 164 - 446 K/uL    MPV 10.2 9.0 - 12.9 fL    Neutrophils-Polys 73.40 (H) 44.00 - 72.00 %    Lymphocytes 15.20 (L) 22.00 - 41.00 %    Monocytes 10.60 0.00 - 13.40 %    Eosinophils 0.20 0.00 - 6.90 %    Basophils 0.30 0.00 - 1.80 %    Immature Granulocytes 0.30 0.00 - 0.90 %    Nucleated RBC 0.00 /100 WBC    Neutrophils (Absolute) 4.49 2.00 - 7.15 K/uL    Lymphs (Absolute) 0.93 (L) 1.00 - 4.80 K/uL    Monos (Absolute) 0.65 0.00 - 0.85 K/uL    Eos (Absolute) 0.01 0.00 - 0.51 K/uL    Baso (Absolute) 0.02 0.00 - 0.12 K/uL    Immature Granulocytes (abs) 0.02 0.00 - 0.11 K/uL    NRBC (Absolute) 0.00 K/uL   CoV-2, Flu A/B, And RSV by PCR   Result Value Ref Range    Influenza virus A RNA Negative Negative    Influenza virus B, PCR Negative Negative    RSV, PCR Negative Negative    SARS-CoV-2 by PCR DETECTED (AA)     SARS-CoV-2 Source NP Swab    Comp Metabolic Panel   Result Value Ref Range    Sodium 121 (L) 135 - 145 mmol/L    Potassium 4.3 3.6 - 5.5 mmol/L    Chloride 89 (L) 96 - 112 mmol/L    Co2 22 20 - 33 mmol/L    Anion Gap 10.0 7.0 - 16.0    Glucose 113 (H) 65 - 99 mg/dL    Bun 8 8 - 22 mg/dL     Creatinine 0.46 (L) 0.50 - 1.40 mg/dL    Calcium 8.7 8.5 - 10.5 mg/dL    AST(SGOT) 21 12 - 45 U/L    ALT(SGPT) 18 2 - 50 U/L    Alkaline Phosphatase 46 30 - 99 U/L    Total Bilirubin 0.5 0.1 - 1.5 mg/dL    Albumin 3.6 3.2 - 4.9 g/dL    Total Protein 6.3 6.0 - 8.2 g/dL    Globulin 2.7 1.9 - 3.5 g/dL    A-G Ratio 1.3 g/dL   TROPONIN   Result Value Ref Range    Troponin T 18 6 - 19 ng/L   proBrain Natriuretic Peptide, NT   Result Value Ref Range    NT-proBNP 371 (H) 0 - 125 pg/mL   ESTIMATED GFR   Result Value Ref Range    GFR If African American >60 >60 mL/min/1.73 m 2    GFR If Non African American >60 >60 mL/min/1.73 m 2   EKG (NOW)   Result Value Ref Range    Report       [facility] Emergency Dept.    Test Date:  2020  Pt Name:    LAVERN OAKLEY           Department: ER  MRN:        [id number]                      Room:  Gender:     Female                       Technician: [id number]  :        [id number]                   Requested By:ER TRIAGE PROTOCOL  Order #:    [id number]                    Reading MD:    Measurements  Intervals                                Axis  Rate:       72                           P:          37  PA:         196                          QRS:        -42  QRSD:       82                           T:          12  QT:         388  QTc:        425    Interpretive Statements  SINUS RHYTHM  PROBABLE INFERIOR INFARCT, AGE INDETERMINATE  ABNRM R PROG, CONSIDER ASMI OR LEAD PLACEMENT  Compared to ECG 2020 11:17:58  Sinus bradycardia no longer present  Left-axis deviation no longer present  T-wave abnormality no longer present  Myocardial infarct finding still present           RADIOLOGY  DX-CHEST-PORTABLE (1 VIEW)   Final Result      1.  Chronic blunting of LEFT costophrenic angle, minimal pleural fluid versus pleural reactive change.   2.  No pneumonia or pulmonary edema.              COURSE & MEDICAL DECISION MAKING  Pertinent Labs & Imaging studies reviewed. (See  "chart for details)    89 F PMH CAD, s/p MI and stent 1986, anxiety, GERD, presenting with 7 days' cough, myalgias, headache, intermittent mild dyspnea, diarrhea and vomiting, presenting to ED, BIB by son, with concerns of reduced PO intake. Differential diagnosis includes viral syndrome, including COVID-19, flu, other viral infection vs less likely CAP, AOM, GAS pharyngitis. Do doubt ACS at this time but given history of MI and age, will order EKG and troponin.     Orders will include: COVID-19 swab, rapid flu, troponin, BNP, EKG, CXR, CMP, CBC.     11:15 AM (Roxana) - SARS-CoV-2 PCR positive. CBC with Hgb 12.2, WBC 6.1 with absolute lymphs 0.93.     12:03 PM Decision to admit based on borderline O2 saturations, poor PO intake. Hospitalist paged.  Chest xray with blunting of the left costophrenic angle (chronic), and \"minimal pleural fluid versus plerual reactive change. No pneumonia or pulmonary edema.\" Agree with this read.     12:49 PM (Roxana) - CMP with Na 121, Cl 89, , otherwise WNL. Most likely hypo- vs euvolemic hyponatremia (SIADH vs low solute intake). Does not appear to be on diuretics      FINAL IMPRESSION  1. COVID-19    2. Hypoxia    3. Hyponatremia            Electronically signed by: Florentino Chance M.D., 11/20/2020 10:04 AM      " Detail Level: Simple Instructions: This plan will send the code FBSD to the PM system.  DO NOT or CHANGE the price. Price (Do Not Change): 0.00

## 2020-11-20 NOTE — ED NOTES
Pt arrived with son, advised him that she cannot have visitor since she r/o for corona virus. Pt has way to contact him for update. Explained er process. Apologized for the inconvenient.

## 2020-11-20 NOTE — ED NOTES
Med rec completed per Pt at bedside.  Allergies reviewed with Pt.  No oral ABX in last 14 days.    Pt takes ASPIRIN.

## 2020-11-20 NOTE — H&P
Hospital Medicine History & Physical Note    Date of Service  11/20/2020    Primary Care Physician  Milena Stone P.A.-C.    Consultants  None at this time.    Code Status  Full Code    Chief Complaint  Chief Complaint   Patient presents with   • Weakness     since last week   • N/V   • Sore Throat   • Shortness of Breath   • Cough       History of Presenting Illness  89 y.o. female who presented 11/20/2020 with vomiting this morning.  She notes a viral prodrome of cough with myalgias and weakness over the past several days.  She has had normal taste and smell.  She reports a general lack of any appetite for the past several days.  She was found to be Covid positive here in our emergency department.  She was further found to be hypoxic requiring supplemental oxygenation to maintain saturation.  Location is generalized, acuity is acute, onset is gradual, course is progressive, associated symptoms per above, timing is variable, severity is severe.    Review of Systems  All systems reviewed and negative except as noted per HPI.    Past Medical History   has a past medical history of Allergy, Anxiety, ASTHMA, CAD (coronary artery disease), Hyperlipidemia, Hypertension, Lumbar back pain (9/10/2016), OSTEOPOROSIS, and PVD (peripheral vascular disease) ([facility]).    Surgical History   has a past surgical history that includes appendectomy; abdominal hysterectomy total; hernia repair; tonsillectomy; and zzz cardiac cath.     Family History  Negative    Social History   reports that she has never smoked. She has never used smokeless tobacco. She reports that she does not drink alcohol or use drugs.    Allergies  Allergies   Allergen Reactions   • Bactrim [Sulfamethoxazole W-Trimethoprim] Hives   • Pcn [Penicillins] Hives     About 70 years   • Codeine Unspecified     Unknown reaction         Medications  Prior to Admission Medications   Prescriptions Last Dose Informant Patient Reported? Taking?   Ascorbic Acid (VITAMIN C) 1000  MG Tab 11/19/2020 at AM Patient Yes No   Sig: Take 1,000 mg by mouth every morning.   Biotin w/ Vitamins C & E (HAIR/SKIN/NAILS) 1250-7.5-7.5 MCG-MG-UNT Chew Tab 11/19/2020 at AM Patient Yes No   Sig: Chew 1 Tab every morning.   Cholecalciferol (VITAMIN D) 50 MCG (2000 UT) Cap 11/19/2020 at AM Patient Yes No   Sig: Take 2,000 Units by mouth every morning.   LORazepam (ATIVAN) 0.5 MG Tab 11/19/2020 at 1900 Patient No No   Sig: Take 1-2 Tabs by mouth 2 Times a Day for 30 days.   Patient taking differently: Take 0.5-1 mg by mouth See Admin Instructions. 2 tablets = 1 mg. Pt takes 0.5 mg in afternoon and 1 mg in the evening for sleep   Multiple Vitamins-Minerals (CENTRUM SILVER PO) 11/19/2020 at AM Patient Yes No   Sig: Take 1 Tab by mouth every morning.   Pseudoephedrine-APAP-DM (DAYQUIL PO) 11/19/2020 at PM Patient Yes Yes   Sig: Take 1 Dose by mouth 2 Times a Day.   QVAR REDIHALER 80 MCG/ACT inhaler 11/19/2020 at PM Patient No No   Sig: INHALE 1 PUFF BY MOUTH TWICE A DAY    Patient taking differently: Inhale 1 Puff 2 times a day.   VITAMIN E PO 11/19/2020 at AM Patient Yes No   Sig: Take 1 Cap by mouth every morning.   acetaminophen (TYLENOL) 500 MG Tab 11/20/2020 at 0830 Patient Yes No   Sig: Take 500 mg by mouth 2 Times a Day. AM/PM   albuterol 108 (90 Base) MCG/ACT Aero Soln inhalation aerosol Not Taking at Not Taking Patient No No   Sig: Inhale 2 Puffs by mouth every 6 hours as needed for Shortness of Breath.   Patient not taking: Reported on 11/20/2020   amLODIPine (NORVASC) 5 MG Tab 11/19/2020 at AM Patient Yes No   Sig: Take 5 mg by mouth every morning with breakfast.   aspirin (ASA) 81 MG Chew Tab chewable tablet 11/19/2020 at AM Patient Yes No   Sig: Chew 81 mg every morning with breakfast.   atorvastatin (LIPITOR) 80 MG tablet 11/20/2020 at 1900 Patient Yes No   Sig: Take 80 mg by mouth every evening.   benzonatate (TESSALON) 100 MG Cap 11/20/2020 at 0800 Patient No No   Sig: Take 1 Cap by mouth 3 times a  day as needed for Cough.   carvedilol (COREG) 12.5 MG Tab 11/19/2020 at AM Patient No No   Sig: Take 0.5 Tabs by mouth 2 Times a Day.   Patient taking differently: Take 6.25 mg by mouth 2 times a day, with meals. 1/2 tablet = 6.25 mg.   fluticasone (FLONASE) 50 MCG/ACT nasal spray 11/19/2020 at AM Patient No No   Sig: USE 1 SPRAY INTO EACH NOSTRIL DAILY   Patient taking differently: Administer 1 Spray into affected nostril(S) every day.   gabapentin (NEURONTIN) 100 MG Cap 11/20/2020 at 0830 Patient Yes No   Sig: Take 200 mg by mouth 3 times a day. 2 capsules = 200 mg.   guaifenesin LA (MUCINEX) 600 MG TABLET SR 12 HR 11/20/2020 at 0830 Patient Yes No   Sig: Take 600 mg by mouth every morning.   isosorbide mononitrate SR (IMDUR) 30 MG TABLET SR 24 HR 11/19/2020 at 1900 Patient Yes No   Sig: Take 30 mg by mouth every evening.   lisinopril (PRINIVIL) 20 MG Tab 11/19/2020 at AM Patient Yes No   Sig: Take 20 mg by mouth every morning with breakfast.   loratadine (CLARITIN) 10 MG Tab 11/19/2020 at AM Patient Yes No   Sig: Take 10 mg by mouth every morning.   montelukast (SINGULAIR) 10 MG Tab 11/19/2020 at 1900 Patient No No   Sig: TAKE ONE TABLET BY MOUTH ONE TIME DAILY    Patient taking differently: Take 10 mg by mouth every evening.   nitroglycerin (NITROSTAT) 0.4 MG SL Tab >6 months at unknown Patient No No   Sig: Place 1 tab under tongue every 5 min as needed for chest pain max 3 doses   Patient taking differently: Place 0.4 mg under the tongue as needed. Place 1 tab under tongue every 5 min as needed for chest pain max 3 doses   omeprazole (PRILOSEC) 20 MG delayed-release capsule 11/19/2020 at AM Patient Yes No   Sig: Take 20 mg by mouth every morning with breakfast.   potassium chloride SA (KDUR) 20 MEQ Tab CR 11/19/2020 at 1900 Patient No No   Sig: TAKE 1 TABLET ORALLY 2 TIMES A DAY (GENERIC KDUR)   Patient taking differently: Take 20 mEq by mouth 2 times a day.      Facility-Administered Medications: None        Physical Exam  Temp:  [36.3 °C (97.4 °F)] 36.3 °C (97.4 °F)  Pulse:  [67-78] 67  Resp:  [17-24] 17  BP: (132-166)/(63-92) 151/70  SpO2:  [88 %-97 %] 96 %    General: No acute distress  HEENT atraumatic, normocephalic, pupils equal round reactive to light  Neck: No JVD  Chest: Distant breath sounds bilaterally.  Cardiac: Physiologic S1 and S2  Abdomen: Soft, nontender, nondistended  Extremities: Without clubbing, cyanosis or edema  Neuro: Cranial nerves II through XII are grossly intact.  Psych: No anxiety, judgement intact.  Denies issues with memory loss or sundowning.      Laboratory:  Recent Labs     11/20/20  1005   WBC 6.1   RBC 3.67*   HEMOGLOBIN 12.2   HEMATOCRIT 35.6*   MCV 97.0   MCH 33.2*   MCHC 34.3   RDW 44.8   PLATELETCT 149*   MPV 10.2     Recent Labs     11/20/20  1130   SODIUM 121*   POTASSIUM 4.3   CHLORIDE 89*   CO2 22   GLUCOSE 113*   BUN 8   CREATININE 0.46*   CALCIUM 8.7     Recent Labs     11/20/20  1130   ALTSGPT 18   ASTSGOT 21   ALKPHOSPHAT 46   TBILIRUBIN 0.5   GLUCOSE 113*         Recent Labs     11/20/20  1130   NTPROBNP 371*         Recent Labs     11/20/20  1130   TROPONINT 18       Imaging:  DX-CHEST-PORTABLE (1 VIEW)   Final Result      1.  Chronic blunting of LEFT costophrenic angle, minimal pleural fluid versus pleural reactive change.   2.  No pneumonia or pulmonary edema.        Cardiology:  1.  Twelve-lead EKG discloses a normal sinus rhythm at 72 ventricular beats per minute, QTC is 425 ms.  Axis is leftward.  No ST segment elevations or deviations concerning for ischemia are seen.  T waves are upright throughout the precordial leads.  Of note, read as per my interpretation of images pending the benefit of cardiology at the time of this dictation.       Assessment/Plan:  I anticipate this patient will require at least two midnights for appropriate medical management, necessitating inpatient admission.    Acute hypoxemic respiratory failure due to COVID-19 (HCC)  Assessment  & Plan  Patient will be admitted for routine supportive care to include supplemental oxygenation to maintain saturation greater than 89%, dexamethasone therapy has been started.  I do not believe she meets criteria for remdesivir therapy at this juncture.      Given patient's overall fragility, which appears to be near her baseline, and extensive comorbid issues [patient is on 25 daily medications and vitamins as an outpatient] I do not feel she would be a good candidate for the ancillary care site.  She will instead be admitted to the main hospital.  Physical and Occupational Therapy evaluations will be checked, if she remains stable after 48 hours and there are no further concerns she may be reevaluated for the ancillary care site.    CAD (coronary artery disease)- (present on admission)  Assessment & Plan  Continue home dose aspirin, statin, beta blockade.      Hyponatremia  Assessment & Plan  Unclear etiology, no fluids indicated at this juncture, trying to minimize fluids given her acute COVID-19 diagnosis.  I will recheck serum sodium in the morning.  Check urine electrolytes.  Check serum osm in the morning.      Essential hypertension- (present on admission)  Assessment & Plan  Continue home medications of amlodipine, carvedilol, isosorbide mononitrate, and lisinopril with hold parameters.     Peripheral neuropathy  Assessment & Plan  Continue home dose gabapentin.    Debility  Assessment & Plan  Appears to be slightly exacerbated by her acute viral illness.  I will obtain physical and Occupational Therapy evaluations to ensure there is no further deconditioning while she is inpatient.    Benzodiazepine dependence (HCC)- (present on admission)  Assessment & Plan  While I am generally reluctant to give benzodiazepines to the elderly, the risks of withdrawal probably outweigh the risk of continuation at this juncture.  Home dose benzos will be continued.    Mild intermittent asthma with acute exacerbation-  (present on admission)  Assessment & Plan  Does not appear currently exacerbated however given her viral infection she is at risk for exacerbation, will maintain a close clinical eye, continue her home medications.

## 2020-11-20 NOTE — TELEPHONE ENCOUNTER
Pt's daughter Tonie ([phone]) called office to update that the patient was taken to UC on 11/17, and then was taken to the ED today by her  Jl 11/20 due to throwing up. Tonie states pt tested positive for COVID and was admitted. Tonie was advised to monitor for symptoms of COVID for her and her , and to quarantine for 2 weeks. Tonie verbalized understanding.

## 2020-11-21 LAB
ANION GAP SERPL CALC-SCNC: 11 MMOL/L (ref 7–16)
BUN SERPL-MCNC: 18 MG/DL (ref 8–22)
CALCIUM SERPL-MCNC: 8.9 MG/DL (ref 8.5–10.5)
CHLORIDE SERPL-SCNC: 92 MMOL/L (ref 96–112)
CO2 SERPL-SCNC: 22 MMOL/L (ref 20–33)
CREAT SERPL-MCNC: 0.69 MG/DL (ref 0.5–1.4)
EKG IMPRESSION: NORMAL
ERYTHROCYTE [DISTWIDTH] IN BLOOD BY AUTOMATED COUNT: 43 FL (ref 35.9–50)
GLUCOSE SERPL-MCNC: 258 MG/DL (ref 65–99)
HCT VFR BLD AUTO: 36 % (ref 37–47)
HGB BLD-MCNC: 12.2 G/DL (ref 12–16)
MCH RBC QN AUTO: 32.3 PG (ref 27–33)
MCHC RBC AUTO-ENTMCNC: 33.9 G/DL (ref 33.6–35)
MCV RBC AUTO: 95.2 FL (ref 81.4–97.8)
OSMOLALITY SERPL: 276 MOSM/KG H2O (ref 278–298)
PLATELET # BLD AUTO: 179 K/UL (ref 164–446)
PMV BLD AUTO: 10.2 FL (ref 9–12.9)
POTASSIUM SERPL-SCNC: 3.9 MMOL/L (ref 3.6–5.5)
RBC # BLD AUTO: 3.78 M/UL (ref 4.2–5.4)
SODIUM SERPL-SCNC: 125 MMOL/L (ref 135–145)
WBC # BLD AUTO: 3.7 K/UL (ref 4.8–10.8)

## 2020-11-21 PROCEDURE — 770021 HCHG ROOM/CARE - ISO PRIVATE

## 2020-11-21 PROCEDURE — 700102 HCHG RX REV CODE 250 W/ 637 OVERRIDE(OP): Performed by: HOSPITALIST

## 2020-11-21 PROCEDURE — 85027 COMPLETE CBC AUTOMATED: CPT

## 2020-11-21 PROCEDURE — 36415 COLL VENOUS BLD VENIPUNCTURE: CPT

## 2020-11-21 PROCEDURE — 700111 HCHG RX REV CODE 636 W/ 250 OVERRIDE (IP): Performed by: HOSPITALIST

## 2020-11-21 PROCEDURE — 93010 ELECTROCARDIOGRAM REPORT: CPT | Performed by: INTERNAL MEDICINE

## 2020-11-21 PROCEDURE — 80048 BASIC METABOLIC PNL TOTAL CA: CPT

## 2020-11-21 PROCEDURE — 99233 SBSQ HOSP IP/OBS HIGH 50: CPT | Performed by: STUDENT IN AN ORGANIZED HEALTH CARE EDUCATION/TRAINING PROGRAM

## 2020-11-21 PROCEDURE — 83930 ASSAY OF BLOOD OSMOLALITY: CPT

## 2020-11-21 PROCEDURE — A9270 NON-COVERED ITEM OR SERVICE: HCPCS | Performed by: HOSPITALIST

## 2020-11-21 RX ADMIN — LORAZEPAM 1 MG: 1 TABLET ORAL at 20:02

## 2020-11-21 RX ADMIN — MONTELUKAST 10 MG: 10 TABLET, FILM COATED ORAL at 16:40

## 2020-11-21 RX ADMIN — ATORVASTATIN CALCIUM 80 MG: 80 TABLET, FILM COATED ORAL at 20:02

## 2020-11-21 RX ADMIN — LORAZEPAM 0.5 MG: 1 TABLET ORAL at 15:44

## 2020-11-21 RX ADMIN — OXYCODONE HYDROCHLORIDE AND ACETAMINOPHEN 1000 MG: 500 TABLET ORAL at 04:34

## 2020-11-21 RX ADMIN — OMEPRAZOLE 20 MG: 20 CAPSULE, DELAYED RELEASE ORAL at 08:01

## 2020-11-21 RX ADMIN — GABAPENTIN 200 MG: 100 CAPSULE ORAL at 16:40

## 2020-11-21 RX ADMIN — ACETAMINOPHEN 500 MG: 500 TABLET ORAL at 16:40

## 2020-11-21 RX ADMIN — GABAPENTIN 200 MG: 100 CAPSULE ORAL at 12:01

## 2020-11-21 RX ADMIN — GABAPENTIN 200 MG: 100 CAPSULE ORAL at 04:34

## 2020-11-21 RX ADMIN — CARVEDILOL 6.25 MG: 6.25 TABLET, FILM COATED ORAL at 16:40

## 2020-11-21 RX ADMIN — ACETAMINOPHEN 500 MG: 500 TABLET ORAL at 04:34

## 2020-11-21 RX ADMIN — DEXAMETHASONE 6 MG: 4 TABLET ORAL at 04:35

## 2020-11-21 RX ADMIN — FLUTICASONE PROPIONATE 220 MCG: 110 AEROSOL, METERED RESPIRATORY (INHALATION) at 04:35

## 2020-11-21 RX ADMIN — ASPIRIN 81 MG CHEWABLE TABLET 81 MG: 81 TABLET CHEWABLE at 08:00

## 2020-11-21 RX ADMIN — CARVEDILOL 6.25 MG: 6.25 TABLET, FILM COATED ORAL at 08:00

## 2020-11-21 RX ADMIN — ENOXAPARIN SODIUM 40 MG: 100 INJECTION SUBCUTANEOUS at 04:35

## 2020-11-21 RX ADMIN — FLUTICASONE PROPIONATE 220 MCG: 110 AEROSOL, METERED RESPIRATORY (INHALATION) at 16:39

## 2020-11-21 ASSESSMENT — ENCOUNTER SYMPTOMS
TREMORS: 0
NAUSEA: 1
CHILLS: 0
FEVER: 0
SINUS PAIN: 0
HEMOPTYSIS: 0
MYALGIAS: 0
BLURRED VISION: 0
WEAKNESS: 0
ORTHOPNEA: 0
VOMITING: 1
EYE PAIN: 0
HEADACHES: 0
COUGH: 0
FOCAL WEAKNESS: 0
DIARRHEA: 0

## 2020-11-21 NOTE — PROGRESS NOTES
Pt arrived to floor at this time in stable condition, no s/s of distress at this time, Pt on 3L O2 and sating good, educated on importance of using call light and she verbalized understanding, incontinent of large amount of urine and pt cleaned up and changed, no voiced needs at this time, safety precautions in place, call bell in reach, will continue to monitor.

## 2020-11-21 NOTE — PROGRESS NOTES
[facility] Medicine Daily Progress Note    Date of Service  11/21/2020    Chief Complaint  89 y.o. female admitted 11/20/2020 with vomiting    Hospital Course  No notes on file  89 y.o. female medical history of asthma, coronary artery disease, peripheral vascular disease, osteoporosis who presented 11/20/2020 with vomiting this morning.  She notes a viral prodrome of cough with myalgias and weakness over the past several days.  She has had normal taste and smell.  She reports a general lack of any appetite for the past several days.  She was found to be Covid positive here in our emergency department.  She was further found to be hypoxic requiring supplemental oxygenation to maintain saturation.    Patient was started on dexamethasone.  Her labs were significant for hyponatremia sodium 121 improving 125.  White count noted to be 3.7.  EKG no acute ST or T wave changes  Interval Problem Update  Patient seen and examined at bedside, complains of cough  On 4 L of oxygen via nasal cannula maintaining saturation at 95%  Denies any chest pain, palpitations, worsening shortness of breath    Consultants/Specialty  None  Code Status  Full Code    Disposition  Pending PT/OT eval    Review of Systems  Review of Systems   Constitutional: Negative for chills and fever.   HENT: Negative for ear pain and sinus pain.    Eyes: Negative for blurred vision and pain.   Respiratory: Negative for cough and hemoptysis.    Cardiovascular: Negative for chest pain and orthopnea.   Gastrointestinal: Positive for nausea and vomiting. Negative for diarrhea.   Genitourinary: Negative for dysuria and hematuria.   Musculoskeletal: Negative for myalgias.   Skin: Negative for itching.   Neurological: Negative for tremors, focal weakness, weakness and headaches.   Psychiatric/Behavioral: Negative for suicidal ideas.        Physical Exam  Temp:  [36.3 °C (97.4 °F)-37.3 °C (99.1 °F)] 36.3 °C (97.4 °F)  Pulse:  [64-72] 70  Resp:  [18-20] 18  BP:  (112-152)/() 122/57  SpO2:  [95 %-96 %] 95 %    Physical Exam  Vitals signs and nursing note reviewed.   Constitutional:       Appearance: Normal appearance.   HENT:      Head: Normocephalic and atraumatic.      Right Ear: External ear normal.      Left Ear: External ear normal.      Mouth/Throat:      Mouth: Mucous membranes are moist.   Eyes:      Extraocular Movements: Extraocular movements intact.      Conjunctiva/sclera: Conjunctivae normal.      Pupils: Pupils are equal, round, and reactive to light.   Neck:      Musculoskeletal: Normal range of motion and neck supple.   Cardiovascular:      Rate and Rhythm: Normal rate and regular rhythm.      Pulses: Normal pulses.      Heart sounds: Normal heart sounds.   Pulmonary:      Effort: Pulmonary effort is normal.      Breath sounds: Rales present.   Abdominal:      General: Abdomen is flat. Bowel sounds are normal.      Palpations: Abdomen is soft.   Musculoskeletal: Normal range of motion.   Skin:     General: Skin is warm.      Capillary Refill: Capillary refill takes less than 2 seconds.   Neurological:      General: No focal deficit present.      Mental Status: She is alert and oriented to person, place, and time.   Psychiatric:         Mood and Affect: Mood normal.         Fluids    Intake/Output Summary (Last 24 hours) at 11/21/2020 1535  Last data filed at 11/21/2020 0900  Gross per 24 hour   Intake 240 ml   Output --   Net 240 ml       Laboratory  Recent Labs     11/20/20  1005 11/21/20  1014   WBC 6.1 3.7*   RBC 3.67* 3.78*   HEMOGLOBIN 12.2 12.2   HEMATOCRIT 35.6* 36.0*   MCV 97.0 95.2   MCH 33.2* 32.3   MCHC 34.3 33.9   RDW 44.8 43.0   PLATELETCT 149* 179   MPV 10.2 10.2     Recent Labs     11/20/20  1130 11/21/20  1014   SODIUM 121* 125*   POTASSIUM 4.3 3.9   CHLORIDE 89* 92*   CO2 22 22   GLUCOSE 113* 258*   BUN 8 18   CREATININE 0.46* 0.69   CALCIUM 8.7 8.9                   Imaging  DX-CHEST-PORTABLE (1 VIEW)   Final Result      1.  Chronic  blunting of LEFT costophrenic angle, minimal pleural fluid versus pleural reactive change.   2.  No pneumonia or pulmonary edema.           Assessment/Plan  Acute hypoxemic respiratory failure due to COVID-19 (HCC)  Assessment & Plan  Patient will be admitted for routine supportive care to include supplemental oxygenation to maintain saturation greater than 89%, dexamethasone therapy has been started.  I do not believe she meets criteria for remdesivir therapy at this juncture.          CAD (coronary artery disease)- (present on admission)  Assessment & Plan  Continue home dose aspirin, statin, beta blockade.      Hyponatremia  Assessment & Plan  Unclear etiology, no fluids indicated at this juncture, trying to minimize fluids given her acute COVID-19 diagnosis.   Sodium trending up from 1 21-1 25 today likely secondary to hypovolemic versus SIADH  Follow-up urine osmolality, urine sodium BMP daily    Essential hypertension- (present on admission)  Assessment & Plan  Continue home medications of amlodipine, carvedilol, isosorbide mononitrate, and lisinopril with hold parameters.     Peripheral neuropathy  Assessment & Plan  Continue home dose gabapentin.    Debility  Assessment & Plan  Appears to be slightly exacerbated by her acute viral illness.  I will obtain physical and Occupational Therapy evaluations to ensure there is no further deconditioning while she is inpatient.    Benzodiazepine dependence (HCC)- (present on admission)  Assessment & Plan  While I am generally reluctant to give benzodiazepines to the elderly, the risks of withdrawal probably outweigh the risk of continuation at this juncture.  Home dose benzos will be continued.    Mild intermittent asthma with acute exacerbation- (present on admission)  Assessment & Plan  Does not appear currently exacerbated however given her viral infection she is at risk for exacerbation, will maintain a close clinical eye, continue her home medications.       VTE  prophylaxis: Lovenox

## 2020-11-21 NOTE — PROGRESS NOTES
2 RN Skin Check    2 RN skin check complete.   Devices in place: Nasal Cannula.  Skin assessed under devices: yes.  Confirmed pressure ulcers found on: None.  New potential pressure ulcers noted on None  . Wound consult placed N/A.  The following interventions in place Barrier cream.    Pt skin intact, no open areas noted, coccyx slightky red but blanchable

## 2020-11-21 NOTE — PROGRESS NOTES
With patient’s permission (if able), completed daily phone call to designated support person, name Naila  Discussed patient condition and plan of care. All questions answered.

## 2020-11-22 LAB
ALBUMIN SERPL BCP-MCNC: 3.4 G/DL (ref 3.2–4.9)
ALBUMIN/GLOB SERPL: 1.1 G/DL
ALP SERPL-CCNC: 60 U/L (ref 30–99)
ALT SERPL-CCNC: 23 U/L (ref 2–50)
ANION GAP SERPL CALC-SCNC: 8 MMOL/L (ref 7–16)
AST SERPL-CCNC: 27 U/L (ref 12–45)
BILIRUB SERPL-MCNC: 0.3 MG/DL (ref 0.1–1.5)
BUN SERPL-MCNC: 15 MG/DL (ref 8–22)
CALCIUM SERPL-MCNC: 8.9 MG/DL (ref 8.5–10.5)
CHLORIDE SERPL-SCNC: 94 MMOL/L (ref 96–112)
CO2 SERPL-SCNC: 24 MMOL/L (ref 20–33)
CREAT SERPL-MCNC: 0.48 MG/DL (ref 0.5–1.4)
GLOBULIN SER CALC-MCNC: 3 G/DL (ref 1.9–3.5)
GLUCOSE SERPL-MCNC: 120 MG/DL (ref 65–99)
POTASSIUM SERPL-SCNC: 4.1 MMOL/L (ref 3.6–5.5)
PROT SERPL-MCNC: 6.4 G/DL (ref 6–8.2)
SODIUM SERPL-SCNC: 126 MMOL/L (ref 135–145)

## 2020-11-22 PROCEDURE — 80053 COMPREHEN METABOLIC PANEL: CPT

## 2020-11-22 PROCEDURE — 700111 HCHG RX REV CODE 636 W/ 250 OVERRIDE (IP): Performed by: HOSPITALIST

## 2020-11-22 PROCEDURE — 770021 HCHG ROOM/CARE - ISO PRIVATE

## 2020-11-22 PROCEDURE — 700102 HCHG RX REV CODE 250 W/ 637 OVERRIDE(OP): Performed by: HOSPITALIST

## 2020-11-22 PROCEDURE — 36415 COLL VENOUS BLD VENIPUNCTURE: CPT

## 2020-11-22 PROCEDURE — A9270 NON-COVERED ITEM OR SERVICE: HCPCS | Performed by: HOSPITALIST

## 2020-11-22 PROCEDURE — 99232 SBSQ HOSP IP/OBS MODERATE 35: CPT | Performed by: STUDENT IN AN ORGANIZED HEALTH CARE EDUCATION/TRAINING PROGRAM

## 2020-11-22 RX ADMIN — GABAPENTIN 200 MG: 100 CAPSULE ORAL at 17:09

## 2020-11-22 RX ADMIN — ACETAMINOPHEN 500 MG: 500 TABLET ORAL at 17:09

## 2020-11-22 RX ADMIN — ACETAMINOPHEN 500 MG: 500 TABLET ORAL at 04:42

## 2020-11-22 RX ADMIN — AMLODIPINE BESYLATE 5 MG: 5 TABLET ORAL at 08:14

## 2020-11-22 RX ADMIN — CARVEDILOL 6.25 MG: 6.25 TABLET, FILM COATED ORAL at 17:09

## 2020-11-22 RX ADMIN — DEXAMETHASONE 6 MG: 4 TABLET ORAL at 04:42

## 2020-11-22 RX ADMIN — ENOXAPARIN SODIUM 40 MG: 100 INJECTION SUBCUTANEOUS at 04:42

## 2020-11-22 RX ADMIN — BENZONATATE 100 MG: 100 CAPSULE ORAL at 17:15

## 2020-11-22 RX ADMIN — GABAPENTIN 200 MG: 100 CAPSULE ORAL at 04:42

## 2020-11-22 RX ADMIN — OMEPRAZOLE 20 MG: 20 CAPSULE, DELAYED RELEASE ORAL at 08:14

## 2020-11-22 RX ADMIN — ACETAMINOPHEN 650 MG: 325 TABLET, FILM COATED ORAL at 20:54

## 2020-11-22 RX ADMIN — ISOSORBIDE MONONITRATE 30 MG: 60 TABLET, EXTENDED RELEASE ORAL at 17:09

## 2020-11-22 RX ADMIN — FLUTICASONE PROPIONATE 220 MCG: 110 AEROSOL, METERED RESPIRATORY (INHALATION) at 17:10

## 2020-11-22 RX ADMIN — GABAPENTIN 200 MG: 100 CAPSULE ORAL at 11:50

## 2020-11-22 RX ADMIN — LORAZEPAM 0.5 MG: 1 TABLET ORAL at 15:16

## 2020-11-22 RX ADMIN — ASPIRIN 81 MG CHEWABLE TABLET 81 MG: 81 TABLET CHEWABLE at 08:14

## 2020-11-22 RX ADMIN — ATORVASTATIN CALCIUM 80 MG: 80 TABLET, FILM COATED ORAL at 20:54

## 2020-11-22 RX ADMIN — CARVEDILOL 6.25 MG: 6.25 TABLET, FILM COATED ORAL at 08:14

## 2020-11-22 RX ADMIN — OXYCODONE HYDROCHLORIDE AND ACETAMINOPHEN 1000 MG: 500 TABLET ORAL at 04:42

## 2020-11-22 RX ADMIN — LISINOPRIL 20 MG: 20 TABLET ORAL at 08:14

## 2020-11-22 RX ADMIN — FLUTICASONE PROPIONATE 220 MCG: 110 AEROSOL, METERED RESPIRATORY (INHALATION) at 04:42

## 2020-11-22 RX ADMIN — LORAZEPAM 1 MG: 1 TABLET ORAL at 20:54

## 2020-11-22 RX ADMIN — MONTELUKAST 10 MG: 10 TABLET, FILM COATED ORAL at 17:09

## 2020-11-22 ASSESSMENT — ENCOUNTER SYMPTOMS
CHILLS: 0
EYE PAIN: 0
FOCAL WEAKNESS: 0
ORTHOPNEA: 0
FEVER: 0
DIARRHEA: 0
TREMORS: 0
HEADACHES: 0
VOMITING: 0
HEMOPTYSIS: 0
SINUS PAIN: 0
MYALGIAS: 0
WEAKNESS: 0
BLURRED VISION: 0
COUGH: 1
NAUSEA: 0

## 2020-11-22 NOTE — PROGRESS NOTES
[facility] Medicine Daily Progress Note    Date of Service  11/22/2020    Chief Complaint  89 y.o. female admitted 11/20/2020 with vomiting    Hospital Course  No notes on file  89 y.o. female medical history of asthma, coronary artery disease, peripheral vascular disease, osteoporosis who presented 11/20/2020 with vomiting this morning.  She notes a viral prodrome of cough with myalgias and weakness over the past several days.  She has had normal taste and smell.  She reports a general lack of any appetite for the past several days.  She was found to be Covid positive here in our emergency department.  She was further found to be hypoxic requiring supplemental oxygenation to maintain saturation.    Patient was started on dexamethasone.  Her labs were significant for hyponatremia sodium 121 improving 125.  White count noted to be 3.7.  EKG no acute ST or T wave changes  Interval Problem Update  Patient seen and examined at bedside, complains of cough  On 4 L of oxygen via nasal cannula maintaining saturation at 95%  Denies any chest pain, palpitations, worsening shortness of breath    Consultants/Specialty  None  Code Status  Full Code    Disposition  Pending PT/OT eval    Review of Systems  Review of Systems   Constitutional: Negative for chills and fever.   HENT: Negative for ear pain and sinus pain.    Eyes: Negative for blurred vision and pain.   Respiratory: Positive for cough. Negative for hemoptysis.    Cardiovascular: Negative for chest pain and orthopnea.   Gastrointestinal: Negative for diarrhea, nausea and vomiting.   Genitourinary: Negative for dysuria and hematuria.   Musculoskeletal: Negative for myalgias.   Skin: Negative for itching.   Neurological: Negative for tremors, focal weakness, weakness and headaches.   Psychiatric/Behavioral: Negative for suicidal ideas.        Physical Exam  Temp:  [36.1 °C (96.9 °F)-36.9 °C (98.4 °F)] 36.4 °C (97.5 °F)  Pulse:  [61-84] 61  Resp:  [16-18] 16  BP:  (125-149)/(50-68) 144/65  SpO2:  [93 %-96 %] 95 %    Physical Exam  Vitals signs and nursing note reviewed.   Constitutional:       Appearance: Normal appearance.   HENT:      Head: Normocephalic and atraumatic.      Right Ear: External ear normal.      Left Ear: External ear normal.      Mouth/Throat:      Mouth: Mucous membranes are moist.   Eyes:      Extraocular Movements: Extraocular movements intact.      Conjunctiva/sclera: Conjunctivae normal.      Pupils: Pupils are equal, round, and reactive to light.   Neck:      Musculoskeletal: Normal range of motion and neck supple.   Cardiovascular:      Rate and Rhythm: Normal rate and regular rhythm.      Pulses: Normal pulses.      Heart sounds: Normal heart sounds.   Pulmonary:      Effort: Pulmonary effort is normal.   Abdominal:      General: Abdomen is flat. Bowel sounds are normal.      Palpations: Abdomen is soft.   Musculoskeletal: Normal range of motion.   Skin:     General: Skin is warm.      Capillary Refill: Capillary refill takes less than 2 seconds.   Neurological:      General: No focal deficit present.      Mental Status: She is alert and oriented to person, place, and time.   Psychiatric:         Mood and Affect: Mood normal.         Fluids    Intake/Output Summary (Last 24 hours) at 11/22/2020 1105  Last data filed at 11/22/2020 0900  Gross per 24 hour   Intake 390 ml   Output --   Net 390 ml       Laboratory  Recent Labs     11/20/20  1005 11/21/20  1014   WBC 6.1 3.7*   RBC 3.67* 3.78*   HEMOGLOBIN 12.2 12.2   HEMATOCRIT 35.6* 36.0*   MCV 97.0 95.2   MCH 33.2* 32.3   MCHC 34.3 33.9   RDW 44.8 43.0   PLATELETCT 149* 179   MPV 10.2 10.2     Recent Labs     11/20/20  1130 11/21/20  1014 11/22/20  0515   SODIUM 121* 125* 126*   POTASSIUM 4.3 3.9 4.1   CHLORIDE 89* 92* 94*   CO2 22 22 24   GLUCOSE 113* 258* 120*   BUN 8 18 15   CREATININE 0.46* 0.69 0.48*   CALCIUM 8.7 8.9 8.9                   Imaging  DX-CHEST-PORTABLE (1 VIEW)   Final Result       1.  Chronic blunting of LEFT costophrenic angle, minimal pleural fluid versus pleural reactive change.   2.  No pneumonia or pulmonary edema.           Assessment/Plan  Acute hypoxemic respiratory failure due to COVID-19 (HCC)  Assessment & Plan  Patient will be admitted for routine supportive care to include supplemental oxygenation to maintain saturation greater than 89%, dexamethasone therapy has been started.  I do not believe she meets criteria for remdesivir therapy at this juncture.          CAD (coronary artery disease)- (present on admission)  Assessment & Plan  Continue home dose aspirin, statin, beta blockade.      Hyponatremia  Assessment & Plan  Unclear etiology, no fluids indicated at this juncture, trying to minimize fluids given her acute COVID-19 diagnosis.   Sodium trending up from 1 21-1 26 today likely secondary to hypovolemic versus SIADH  Follow-up urine osmolality, urine sodium BMP daily    Essential hypertension- (present on admission)  Assessment & Plan  Continue home medications of amlodipine, carvedilol, isosorbide mononitrate, and lisinopril with hold parameters.     Peripheral neuropathy  Assessment & Plan  Continue home dose gabapentin.    Debility  Assessment & Plan  Appears to be slightly exacerbated by her acute viral illness.  I will obtain physical and Occupational Therapy evaluations to ensure there is no further deconditioning while she is inpatient.    Benzodiazepine dependence (HCC)- (present on admission)  Assessment & Plan  While I am generally reluctant to give benzodiazepines to the elderly, the risks of withdrawal probably outweigh the risk of continuation at this juncture.  Home dose benzos will be continued.    Mild intermittent asthma with acute exacerbation- (present on admission)  Assessment & Plan  Does not appear currently exacerbated however given her viral infection she is at risk for exacerbation, will maintain a close clinical eye, continue her home  medications.       VTE prophylaxis: Lovenox

## 2020-11-22 NOTE — PROGRESS NOTES
With patient’s permission (if able), completed daily phone call to designated support person, russ Parikh   Discussed patient condition and plan of care. All questions answered.

## 2020-11-23 LAB
ALBUMIN SERPL BCP-MCNC: 3.6 G/DL (ref 3.2–4.9)
ALBUMIN/GLOB SERPL: 1.1 G/DL
ALP SERPL-CCNC: 56 U/L (ref 30–99)
ALT SERPL-CCNC: 23 U/L (ref 2–50)
ANION GAP SERPL CALC-SCNC: 9 MMOL/L (ref 7–16)
AST SERPL-CCNC: 26 U/L (ref 12–45)
BILIRUB SERPL-MCNC: 0.4 MG/DL (ref 0.1–1.5)
BUN SERPL-MCNC: 16 MG/DL (ref 8–22)
CALCIUM SERPL-MCNC: 9.2 MG/DL (ref 8.5–10.5)
CHLORIDE SERPL-SCNC: 97 MMOL/L (ref 96–112)
CO2 SERPL-SCNC: 24 MMOL/L (ref 20–33)
CREAT SERPL-MCNC: 0.56 MG/DL (ref 0.5–1.4)
GLOBULIN SER CALC-MCNC: 3.3 G/DL (ref 1.9–3.5)
GLUCOSE SERPL-MCNC: 214 MG/DL (ref 65–99)
POTASSIUM SERPL-SCNC: 4.1 MMOL/L (ref 3.6–5.5)
PROT SERPL-MCNC: 6.9 G/DL (ref 6–8.2)
SODIUM SERPL-SCNC: 130 MMOL/L (ref 135–145)

## 2020-11-23 PROCEDURE — 97165 OT EVAL LOW COMPLEX 30 MIN: CPT

## 2020-11-23 PROCEDURE — A9270 NON-COVERED ITEM OR SERVICE: HCPCS | Performed by: HOSPITALIST

## 2020-11-23 PROCEDURE — 97161 PT EVAL LOW COMPLEX 20 MIN: CPT

## 2020-11-23 PROCEDURE — 94640 AIRWAY INHALATION TREATMENT: CPT

## 2020-11-23 PROCEDURE — 770014 HCHG ROOM/CARE - WARD (150)

## 2020-11-23 PROCEDURE — 36415 COLL VENOUS BLD VENIPUNCTURE: CPT

## 2020-11-23 PROCEDURE — 700111 HCHG RX REV CODE 636 W/ 250 OVERRIDE (IP): Performed by: HOSPITALIST

## 2020-11-23 PROCEDURE — 80053 COMPREHEN METABOLIC PANEL: CPT

## 2020-11-23 PROCEDURE — 99232 SBSQ HOSP IP/OBS MODERATE 35: CPT | Performed by: STUDENT IN AN ORGANIZED HEALTH CARE EDUCATION/TRAINING PROGRAM

## 2020-11-23 PROCEDURE — 700102 HCHG RX REV CODE 250 W/ 637 OVERRIDE(OP): Performed by: HOSPITALIST

## 2020-11-23 RX ADMIN — DEXAMETHASONE 6 MG: 4 TABLET ORAL at 04:07

## 2020-11-23 RX ADMIN — FLUTICASONE PROPIONATE 220 MCG: 110 AEROSOL, METERED RESPIRATORY (INHALATION) at 17:52

## 2020-11-23 RX ADMIN — CARVEDILOL 6.25 MG: 6.25 TABLET, FILM COATED ORAL at 17:51

## 2020-11-23 RX ADMIN — BENZONATATE 100 MG: 100 CAPSULE ORAL at 21:22

## 2020-11-23 RX ADMIN — ACETAMINOPHEN 650 MG: 325 TABLET, FILM COATED ORAL at 08:44

## 2020-11-23 RX ADMIN — LORAZEPAM 1 MG: 1 TABLET ORAL at 21:22

## 2020-11-23 RX ADMIN — ASPIRIN 81 MG CHEWABLE TABLET 81 MG: 81 TABLET CHEWABLE at 08:43

## 2020-11-23 RX ADMIN — GABAPENTIN 200 MG: 100 CAPSULE ORAL at 17:51

## 2020-11-23 RX ADMIN — ACETAMINOPHEN 650 MG: 325 TABLET, FILM COATED ORAL at 21:22

## 2020-11-23 RX ADMIN — ENOXAPARIN SODIUM 40 MG: 100 INJECTION SUBCUTANEOUS at 04:06

## 2020-11-23 RX ADMIN — MONTELUKAST 10 MG: 10 TABLET, FILM COATED ORAL at 17:51

## 2020-11-23 RX ADMIN — FLUTICASONE PROPIONATE 220 MCG: 110 AEROSOL, METERED RESPIRATORY (INHALATION) at 07:21

## 2020-11-23 RX ADMIN — GABAPENTIN 200 MG: 100 CAPSULE ORAL at 10:15

## 2020-11-23 RX ADMIN — CARVEDILOL 6.25 MG: 6.25 TABLET, FILM COATED ORAL at 08:42

## 2020-11-23 RX ADMIN — ISOSORBIDE MONONITRATE 30 MG: 60 TABLET, EXTENDED RELEASE ORAL at 17:51

## 2020-11-23 RX ADMIN — LORAZEPAM 0.5 MG: 1 TABLET ORAL at 15:10

## 2020-11-23 ASSESSMENT — ENCOUNTER SYMPTOMS
SORE THROAT: 0
TREMORS: 0
NERVOUS/ANXIOUS: 0
NAUSEA: 0
VOMITING: 0
COUGH: 1
FEVER: 0
ORTHOPNEA: 0
STRIDOR: 0
EYE DISCHARGE: 0
DEPRESSION: 0
EYE REDNESS: 0
CHILLS: 0
FOCAL WEAKNESS: 0
WEAKNESS: 0
HEADACHES: 0
ABDOMINAL PAIN: 0
SINUS PAIN: 0
HEMOPTYSIS: 0
MYALGIAS: 0

## 2020-11-23 ASSESSMENT — COGNITIVE AND FUNCTIONAL STATUS - GENERAL
DAILY ACTIVITIY SCORE: 24
WALKING IN HOSPITAL ROOM: A LITTLE
MOVING FROM LYING ON BACK TO SITTING ON SIDE OF FLAT BED: A LITTLE
CLIMB 3 TO 5 STEPS WITH RAILING: A LITTLE
SUGGESTED CMS G CODE MODIFIER DAILY ACTIVITY: CH
SUGGESTED CMS G CODE MODIFIER MOBILITY: CK
STANDING UP FROM CHAIR USING ARMS: A LITTLE
MOVING TO AND FROM BED TO CHAIR: A LITTLE
MOBILITY SCORE: 19

## 2020-11-23 ASSESSMENT — GAIT ASSESSMENTS
ASSISTIVE DEVICE: FRONT WHEEL WALKER
GAIT LEVEL OF ASSIST: SUPERVISED
DISTANCE (FEET): 150
DEVIATION: DECREASED BASE OF SUPPORT

## 2020-11-23 ASSESSMENT — PAIN DESCRIPTION - PAIN TYPE
TYPE: ACUTE PAIN
TYPE: ACUTE PAIN

## 2020-11-23 ASSESSMENT — ACTIVITIES OF DAILY LIVING (ADL): TOILETING: INDEPENDENT

## 2020-11-23 NOTE — THERAPY
Physical Therapy   Initial Evaluation     Patient Name: Dayana Lechuga  Age:  89 y.o., Sex:  female  Medical Record #: [id number]  Today's Date: 11/23/2020          Assessment  Patient is 89 y.o. female with c/o nausea/vomiting and weakness; COVID-19 positive. Patient presented to acute PT appearing near baseline functional mobility. Patient required no physical assist with transfers, stairs, and ambulation w FWW for [id number]; demonstrated no increased WOB with mobility. She required verbal cues and assistance placing FWW at top of stairs however pt reported she used the elevator to enter her home PTA. Anticipate patient may return home safely upon medical discharge due to current functional mobility. Will not follow.      Plan    Recommend Physical Therapy for Evaluation only     DC Equipment Recommendations: None(pt owns FWW)  Discharge Recommendations: Anticipate that the patient will have no further physical therapy needs after discharge from the hospital       Subjective    Pleasant, cooperative.      Objective       11/23/20 0955   Initial Contact Note    Initial Contact Note Order Received and Verified, Evaluation Only - Patient Does Not Require Further Acute Physical Therapy at this Time.  However, May Benefit from Post Acute Therapy for Higher Level Functional Deficits.   Vitals   O2 (LPM) 3   O2 Delivery Device Silicone Nasal Cannula   Vitals Comments desat to 80% after ambulate, no over increase WOB. Quickly returned to 85% with seated rest   Pain 0 - 10 Group   Therapist Pain Assessment   (no pain reported)   Prior Living Situation   Prior Services Housekeeping / Homemaker Services   Housing / Facility Independent Living Facility  (3rd floor)   Steps Into Home   (3 FOS)   Steps In Home 0   Elevator Yes   Bathroom Set up Walk In Shower   Equipment Owned Front-Wheel Walker   Lives with - Patient's Self Care Capacity Other (Comments)  (Independent Living [facility])   Comments Pt reports living at [facility] living facility where they provide meals and housekeeping. Patient reports she uses elevator to access her home.    Prior Level of Functional Mobility   Bed Mobility Independent   Transfer Status Independent   Ambulation Independent   Distance Ambulation (Feet)   (limited community)   Assistive Devices Used Front-Wheel Walker   Stairs Unable To Determine At This Time   Comments pt reports normally active, walks dog every day. Pt reports requiring no assist w mobility PTA. Has just recently gotten FWW from prior admit 2 mo ago   History of Falls   History of Falls Yes   Date of Last Fall   (2 mo ago)   Cognition    Cognition / Consciousness WDL   Level of Consciousness Alert   Comments soft spoken; pleasant and cooperative.    Passive ROM Lower Body   Passive ROM Lower Body WDL   Comments NT formally, WFL for mobility   Active ROM Lower Body    Active ROM Lower Body  WDL   Comments as above   Strength Lower Body   Lower Body Strength  WDL   Comments as above   Sensation Lower Body   Lower Extremity Sensation   Not Tested   Lower Body Muscle Tone   Lower Body Muscle Tone  WDL   Comments NT formally, WFL for mobility   Neurological Concerns   Neurological Concerns No   Comments no observed neurological impairments   Coordination Lower Body    Coordination Lower Body  WDL   Comments NT formally, WFL for mobility   Balance Assessment   Sitting Balance (Static) Fair +   Sitting Balance (Dynamic) Fair   Standing Balance (Static) Fair   Standing Balance (Dynamic) Fair   Weight Shift Sitting Fair   Weight Shift Standing Fair   Comments w FWW, no overt LOB   Gait Analysis   Gait Level Of Assist Supervised   Assistive Device Front Wheel Walker   Distance (Feet) 150   # of Times Distance was Traveled 2   Deviation Decreased Base Of Support   # of Stairs Climbed 2   Level of Assist with Stairs Supervised   Weight Bearing Status FWB   Comments VC and assist needed to bring FWW up stairs but otherwise required no  physical assist.    Bed Mobility    Supine to Sit   (NT, pt EOB upon arrive)   Sit to Supine   (NT, pt EOB end of session)   Scooting Supervised  (seated)   Comments not assessed however pt appears capable   Functional Mobility   Sit to Stand Supervised   Bed, Chair, Wheelchair Transfer Supervised   Toilet Transfers Supervised   Transfer Method Stand Step   Comments w FWW, no physical assist needed. Safe technique demonstrated with transfers   How much difficulty does the patient currently have...   Turning over in bed (including adjusting bedclothes, sheets and blankets)? 4   Sitting down on and standing up from a chair with arms (e.g., wheelchair, bedside commode, etc.) 3   Moving from lying on back to sitting on the side of the bed? 3   How much help from another person does the patient currently need...   Moving to and from a bed to a chair (including a wheelchair)? 3   Need to walk in a hospital room? 3   Climbing 3-5 steps with a railing? 3   6 clicks Mobility Score 19   Activity Tolerance   Sitting in Chair 5 min on toilet   Sitting Edge of Bed pt seated EOB at end of session   Standing 15 min   Comments no overt SOB, pain, fatigue, dizziness   Edema / Skin Assessment   Edema / Skin  Not Assessed   Education Group   Education Provided Role of Physical Therapist;Stair Training   Role of Physical Therapist Patient Response Patient;Acceptance;Explanation;Verbal Demonstration   Stair Training Patient Response Patient;Acceptance;Explanation;Verbal Demonstration   Additional Comments education re: ambulation w RN staff; pt agreeable   Anticipated Discharge Equipment and Recommendations   DC Equipment Recommendations None  (pt owns FWW)   Discharge Recommendations Anticipate that the patient will have no further physical therapy needs after discharge from the hospital   Interdisciplinary Plan of Care Collaboration   IDT Collaboration with  Nursing;Occupational Therapist   Patient Position at End of Therapy Edge of  Bed;Call Light within Reach;Tray Table within Reach;Phone within Reach   Collaboration Comments RN aware of visit, response   Session Information   Date / Session Number  11/23- DC needs only   Priority 0

## 2020-11-23 NOTE — PROGRESS NOTES
Notified that pt will be transferred to [facility], pt notified. Report called to RN assuming care. Pt sent with all belongings.

## 2020-11-23 NOTE — THERAPY
Occupational Therapy   Initial Evaluation     Patient Name: Dayana Lechuga  Age:  89 y.o., Sex:  female  Medical Record #: [id number]  Today's Date: 11/23/2020     Precautions  Precautions: Fall Risk    Assessment  Patient is 89 y.o. female with past medical history of asthma, coronary artery disease, peripheral vascular disease, osteoporosis who presented 11/20/2020 with vomiting, found to be positive for COVID-19. Pt appears to be functioning at baseline. Pt able to complete all ADLs, ADL transfers, and functional mobility with spv, no overt LOB, using FWW. Pt reports she was recently admitted for fall 2 months ago and was given a FWW when discharged. Pt encouraged to use FWW once she returns home. Pt lives at [facility] with meals and housekeeping provided. Pt has all needed DME at home for safety with ADL transfers. Pt with no acute OT needs at this time. Patient will not be actively followed for occupational therapy services at this time, however may be seen if requested by physician for 1 more visit within 30 days to address any discharge or equipment needs.       Plan    Recommend Occupational Therapy for Evaluation only.    DC Equipment Recommendations: None  Discharge Recommendations: Anticipate that the patient will have no further occupational therapy needs after discharge from the hospital     Subjective    Pt alert, pleasant, and cooperative with OT eval.     Objective       11/23/20 0956   Prior Living Situation   Prior Services Housekeeping / Homemaker Services   Housing / Facility Independent Living Facility  (3rd floor)   Elevator Yes   Bathroom Set up Walk In Shower;Shower Chair;Grab Bars   Equipment Owned Front-Wheel Walker   Lives with - Patient's Self Care Capacity Other (Comments)  (independent living facility, [facility])   Comments Pt lives at [facility] ILF, meals and housekeeping are provided. Pt able to complete ADLs independently PTA.   Prior Level of ADL Function    Self Feeding Independent   Grooming / Hygiene Independent   Bathing Independent   Dressing Independent   Toileting Independent   Prior Level of IADL Function   Medication Management Independent   Laundry Requires Assist   Kitchen Mobility Requires Assist   Finances Independent   Home Management Requires Assist   Shopping Requires Assist   Prior Level Of Mobility Independent Without Device in Community;Independent Without Device in Home   Driving / Transportation Unable To Determine At This Time   Occupation (Pre-Hospital Vocational) Retired Due To Age   Leisure Interests Pets;Other (Comments)  (bridge)   History of Falls   History of Falls Yes   Date of Last Fall   (2 mo ago)   Cognition    Comments pleasant, cooperative   Balance Assessment   Comments standing with FWW, no overt LOB   Bed Mobility    Supine to Sit   (seated EOB upon arrival)   Sit to Supine   (Left seated EOB at end of session)   ADL Assessment   Grooming Supervision;Standing  (oral hygiene)   Lower Body Dressing Supervision  (socks)   Toileting Supervision   Functional Mobility   Sit to Stand Supervised   Toilet Transfers Supervised   Mobility in unit and bathroom with FWW, close spv   Activity Tolerance   Comments no signs/symptoms or complaints of fatigue   Patient / Family Goals   Patient / Family Goal #1 To get better and go home

## 2020-11-23 NOTE — PROGRESS NOTES
[facility] Medicine Daily Progress Note    Date of Service  11/23/2020    Chief Complaint  89 y.o. female admitted 11/20/2020 with vomiting    Hospital Course  No notes on file  89 y.o. female medical history of asthma, coronary artery disease, peripheral vascular disease, osteoporosis who presented 11/20/2020 with vomiting this morning.  She notes a viral prodrome of cough with myalgias and weakness over the past several days.  She has had normal taste and smell.  She reports a general lack of any appetite for the past several days.  She was found to be Covid positive here in our emergency department.  She was further found to be hypoxic requiring supplemental oxygenation to maintain saturation.    Patient was started on dexamethasone.  Her labs were significant for hyponatremia sodium 121 improving 125.  White count noted to be 3.7.  EKG no acute ST or T wave changes    Interval Problem Update  Transferred to ACS overnight.  Saturating 93% on 4 L oxygen on exam this morning.  Reports continued coughing.  Denies chest pain, SOB.     Consultants/Specialty  None  Code Status  Full Code    Disposition  Pending PT/OT eval    Review of Systems  Review of Systems   Constitutional: Negative for chills and fever.   HENT: Negative for sinus pain and sore throat.    Eyes: Negative for discharge and redness.   Respiratory: Positive for cough. Negative for hemoptysis and stridor.    Cardiovascular: Negative for chest pain and orthopnea.   Gastrointestinal: Negative for abdominal pain, nausea and vomiting.   Genitourinary: Negative for dysuria and hematuria.   Musculoskeletal: Negative for myalgias.   Skin: Negative for itching.   Neurological: Negative for tremors, focal weakness, weakness and headaches.   Psychiatric/Behavioral: Negative for depression. The patient is not nervous/anxious.         Physical Exam  Temp:  [36 °C (96.8 °F)-37.2 °C (98.9 °F)] 37.2 °C (98.9 °F)  Pulse:  [64-74] 70  Resp:  [16-18] 16  BP:  (110-137)/(54-67) 123/67  SpO2:  [94 %-96 %] 95 %    Physical Exam  Vitals signs and nursing note reviewed.   Constitutional:       General: She is not in acute distress.     Appearance: She is not toxic-appearing.   HENT:      Head: Normocephalic and atraumatic.      Mouth/Throat:      Mouth: Mucous membranes are moist.   Eyes:      General: No scleral icterus.     Conjunctiva/sclera: Conjunctivae normal.   Neck:      Musculoskeletal: Normal range of motion and neck supple.   Cardiovascular:      Rate and Rhythm: Normal rate and regular rhythm.      Pulses: Normal pulses.      Heart sounds: Normal heart sounds. No murmur.   Pulmonary:      Effort: Pulmonary effort is normal. No respiratory distress.      Breath sounds: Normal breath sounds. No wheezing or rales.   Abdominal:      General: Bowel sounds are normal.      Palpations: Abdomen is soft.      Tenderness: There is no abdominal tenderness.   Musculoskeletal: Normal range of motion.   Skin:     General: Skin is warm.      Capillary Refill: Capillary refill takes less than 2 seconds.   Neurological:      Mental Status: She is alert and oriented to person, place, and time. Mental status is at baseline.   Psychiatric:         Mood and Affect: Mood normal.         Behavior: Behavior normal.         Fluids    Intake/Output Summary (Last 24 hours) at 11/23/2020 1040  Last data filed at 11/23/2020 0258  Gross per 24 hour   Intake 0 ml   Output 0 ml   Net 0 ml       Laboratory  Recent Labs     11/21/20  1014   WBC 3.7*   RBC 3.78*   HEMOGLOBIN 12.2   HEMATOCRIT 36.0*   MCV 95.2   MCH 32.3   MCHC 33.9   RDW 43.0   PLATELETCT 179   MPV 10.2     Recent Labs     11/20/20  1130 11/21/20  1014 11/22/20  0515   SODIUM 121* 125* 126*   POTASSIUM 4.3 3.9 4.1   CHLORIDE 89* 92* 94*   CO2 22 22 24   GLUCOSE 113* 258* 120*   BUN 8 18 15   CREATININE 0.46* 0.69 0.48*   CALCIUM 8.7 8.9 8.9                   Imaging  DX-CHEST-PORTABLE (1 VIEW)   Final Result      1.  Chronic  blunting of LEFT costophrenic angle, minimal pleural fluid versus pleural reactive change.   2.  No pneumonia or pulmonary edema.           Assessment/Plan  Acute hypoxemic respiratory failure due to COVID-19 (HCC)- (present on admission)  Assessment & Plan  - Secondary to Covid pneumonia.   - Oxygen as needed; wean as tolerated.     CAD (coronary artery disease)- (present on admission)  Assessment & Plan  Continue home dose aspirin, statin, beta blockade.      Hyponatremia- (present on admission)  Assessment & Plan  Unclear etiology, no fluids indicated at this juncture, trying to minimize fluids given her acute COVID-19 diagnosis.   Trending up.  Asymptomatic.   Monitor BMP.     Essential hypertension- (present on admission)  Assessment & Plan  Continue home medications of amlodipine, carvedilol, isosorbide mononitrate, and lisinopril with hold parameters.     Peripheral neuropathy- (present on admission)  Assessment & Plan  Continue home dose gabapentin.    Debility- (present on admission)  Assessment & Plan  Appears to be slightly exacerbated by her acute viral illness.    Follow up PT/OT recs.     Benzodiazepine dependence (HCC)- (present on admission)  Assessment & Plan  Per admitting physician: While I am generally reluctant to give benzodiazepines to the elderly, the risks of withdrawal probably outweigh the risk of continuation at this juncture.  Home dose benzos will be continued.    Mild intermittent asthma with acute exacerbation- (present on admission)  Assessment & Plan  Does not appear currently exacerbated however given her viral infection she is at risk for exacerbation, will maintain a close clinical eye, continue her home medications.       VTE prophylaxis: subcutaneous enoxaparin.

## 2020-11-24 PROBLEM — M48.062 SPINAL STENOSIS OF LUMBAR REGION WITH NEUROGENIC CLAUDICATION: Status: RESOLVED | Noted: 2019-09-23 | Resolved: 2020-11-24

## 2020-11-24 LAB
ANION GAP SERPL CALC-SCNC: 10 MMOL/L (ref 7–16)
BASOPHILS # BLD AUTO: 0.1 % (ref 0–1.8)
BASOPHILS # BLD: 0.01 K/UL (ref 0–0.12)
BUN SERPL-MCNC: 11 MG/DL (ref 8–22)
CALCIUM SERPL-MCNC: 9.6 MG/DL (ref 8.5–10.5)
CHLORIDE SERPL-SCNC: 96 MMOL/L (ref 96–112)
CO2 SERPL-SCNC: 23 MMOL/L (ref 20–33)
CREAT SERPL-MCNC: 0.43 MG/DL (ref 0.5–1.4)
EOSINOPHIL # BLD AUTO: 0 K/UL (ref 0–0.51)
EOSINOPHIL NFR BLD: 0 % (ref 0–6.9)
ERYTHROCYTE [DISTWIDTH] IN BLOOD BY AUTOMATED COUNT: 43.9 FL (ref 35.9–50)
GLUCOSE SERPL-MCNC: 135 MG/DL (ref 65–99)
HCT VFR BLD AUTO: 38.7 % (ref 37–47)
HGB BLD-MCNC: 13.1 G/DL (ref 12–16)
IMM GRANULOCYTES # BLD AUTO: 0.08 K/UL (ref 0–0.11)
IMM GRANULOCYTES NFR BLD AUTO: 0.6 % (ref 0–0.9)
LYMPHOCYTES # BLD AUTO: 0.42 K/UL (ref 1–4.8)
LYMPHOCYTES NFR BLD: 3.2 % (ref 22–41)
MCH RBC QN AUTO: 32.8 PG (ref 27–33)
MCHC RBC AUTO-ENTMCNC: 33.9 G/DL (ref 33.6–35)
MCV RBC AUTO: 97 FL (ref 81.4–97.8)
MONOCYTES # BLD AUTO: 0.58 K/UL (ref 0–0.85)
MONOCYTES NFR BLD AUTO: 4.4 % (ref 0–13.4)
NEUTROPHILS # BLD AUTO: 11.96 K/UL (ref 2–7.15)
NEUTROPHILS NFR BLD: 91.7 % (ref 44–72)
NRBC # BLD AUTO: 0 K/UL
NRBC BLD-RTO: 0 /100 WBC
PLATELET # BLD AUTO: 246 K/UL (ref 164–446)
PMV BLD AUTO: 9.4 FL (ref 9–12.9)
POTASSIUM SERPL-SCNC: 4.5 MMOL/L (ref 3.6–5.5)
RBC # BLD AUTO: 3.99 M/UL (ref 4.2–5.4)
SODIUM SERPL-SCNC: 129 MMOL/L (ref 135–145)
WBC # BLD AUTO: 13.1 K/UL (ref 4.8–10.8)

## 2020-11-24 PROCEDURE — 80048 BASIC METABOLIC PNL TOTAL CA: CPT

## 2020-11-24 PROCEDURE — 770014 HCHG ROOM/CARE - WARD (150)

## 2020-11-24 PROCEDURE — 99232 SBSQ HOSP IP/OBS MODERATE 35: CPT | Performed by: INTERNAL MEDICINE

## 2020-11-24 PROCEDURE — 85025 COMPLETE CBC W/AUTO DIFF WBC: CPT

## 2020-11-24 PROCEDURE — 36415 COLL VENOUS BLD VENIPUNCTURE: CPT

## 2020-11-24 PROCEDURE — A9270 NON-COVERED ITEM OR SERVICE: HCPCS | Performed by: HOSPITALIST

## 2020-11-24 PROCEDURE — 700102 HCHG RX REV CODE 250 W/ 637 OVERRIDE(OP): Performed by: HOSPITALIST

## 2020-11-24 PROCEDURE — 700111 HCHG RX REV CODE 636 W/ 250 OVERRIDE (IP): Performed by: HOSPITALIST

## 2020-11-24 RX ORDER — OMEPRAZOLE 20 MG/1
20 CAPSULE, DELAYED RELEASE ORAL
Status: DISCONTINUED | OUTPATIENT
Start: 2020-11-25 | End: 2020-11-24

## 2020-11-24 RX ADMIN — LORAZEPAM 0.5 MG: 1 TABLET ORAL at 15:52

## 2020-11-24 RX ADMIN — FLUTICASONE PROPIONATE 220 MCG: 110 AEROSOL, METERED RESPIRATORY (INHALATION) at 04:11

## 2020-11-24 RX ADMIN — GABAPENTIN 200 MG: 100 CAPSULE ORAL at 04:10

## 2020-11-24 RX ADMIN — LORAZEPAM 1 MG: 1 TABLET ORAL at 20:19

## 2020-11-24 RX ADMIN — BENZONATATE 100 MG: 100 CAPSULE ORAL at 04:10

## 2020-11-24 RX ADMIN — ENOXAPARIN SODIUM 40 MG: 100 INJECTION SUBCUTANEOUS at 04:11

## 2020-11-24 RX ADMIN — ISOSORBIDE MONONITRATE 30 MG: 60 TABLET, EXTENDED RELEASE ORAL at 18:22

## 2020-11-24 RX ADMIN — CARVEDILOL 6.25 MG: 6.25 TABLET, FILM COATED ORAL at 09:07

## 2020-11-24 RX ADMIN — ASPIRIN 81 MG CHEWABLE TABLET 81 MG: 81 TABLET CHEWABLE at 09:07

## 2020-11-24 RX ADMIN — GABAPENTIN 200 MG: 100 CAPSULE ORAL at 13:22

## 2020-11-24 RX ADMIN — MONTELUKAST 10 MG: 10 TABLET, FILM COATED ORAL at 18:22

## 2020-11-24 RX ADMIN — GABAPENTIN 200 MG: 100 CAPSULE ORAL at 18:22

## 2020-11-24 RX ADMIN — DEXAMETHASONE 6 MG: 4 TABLET ORAL at 04:10

## 2020-11-24 RX ADMIN — CARVEDILOL 6.25 MG: 6.25 TABLET, FILM COATED ORAL at 18:22

## 2020-11-24 RX ADMIN — AMLODIPINE BESYLATE 5 MG: 5 TABLET ORAL at 09:07

## 2020-11-24 ASSESSMENT — ENCOUNTER SYMPTOMS
TREMORS: 0
EYE REDNESS: 0
MYALGIAS: 0
ORTHOPNEA: 0
HEMOPTYSIS: 0
NERVOUS/ANXIOUS: 0
ABDOMINAL PAIN: 0
STRIDOR: 0
EYE DISCHARGE: 0
WEAKNESS: 0
VOMITING: 0
SORE THROAT: 0
NAUSEA: 0
FEVER: 0
CHILLS: 0
COUGH: 1
HEADACHES: 0
FOCAL WEAKNESS: 0
SINUS PAIN: 0
DEPRESSION: 0

## 2020-11-24 ASSESSMENT — PAIN DESCRIPTION - PAIN TYPE
TYPE: ACUTE PAIN

## 2020-11-24 NOTE — PROGRESS NOTES
Assessment done   patient states just feeling tired and weak reassured patient provided emotional support

## 2020-11-24 NOTE — PROGRESS NOTES
Bedside report received at 1900. Assessment done. VSS. AOx4. Unlabored and even breathing,3LNC. 3/10 rib cage pain at this time, medicated per MAR. Skin intact. IV saline locked. 100% dinner finished. Hourly patient rounding done. Droplet/contact precaution in place. Fall precautions in place.  Call light in place, bed locked and in lowest position. White board updated. Reviewed POC. All questions answered at this time.

## 2020-11-25 PROBLEM — E87.1 HYPONATREMIA: Status: RESOLVED | Noted: 2020-11-20 | Resolved: 2020-11-25

## 2020-11-25 LAB
ANION GAP SERPL CALC-SCNC: 9 MMOL/L (ref 7–16)
BASOPHILS # BLD AUTO: 0.1 % (ref 0–1.8)
BASOPHILS # BLD: 0.01 K/UL (ref 0–0.12)
BUN SERPL-MCNC: 14 MG/DL (ref 8–22)
CALCIUM SERPL-MCNC: 9.3 MG/DL (ref 8.5–10.5)
CHLORIDE SERPL-SCNC: 98 MMOL/L (ref 96–112)
CO2 SERPL-SCNC: 24 MMOL/L (ref 20–33)
CREAT SERPL-MCNC: 0.52 MG/DL (ref 0.5–1.4)
EOSINOPHIL # BLD AUTO: 0 K/UL (ref 0–0.51)
EOSINOPHIL NFR BLD: 0 % (ref 0–6.9)
ERYTHROCYTE [DISTWIDTH] IN BLOOD BY AUTOMATED COUNT: 44.9 FL (ref 35.9–50)
GLUCOSE SERPL-MCNC: 129 MG/DL (ref 65–99)
HCT VFR BLD AUTO: 37.5 % (ref 37–47)
HGB BLD-MCNC: 12.5 G/DL (ref 12–16)
IMM GRANULOCYTES # BLD AUTO: 0.13 K/UL (ref 0–0.11)
IMM GRANULOCYTES NFR BLD AUTO: 0.9 % (ref 0–0.9)
LYMPHOCYTES # BLD AUTO: 0.63 K/UL (ref 1–4.8)
LYMPHOCYTES NFR BLD: 4.5 % (ref 22–41)
MAGNESIUM SERPL-MCNC: 2.1 MG/DL (ref 1.5–2.5)
MCH RBC QN AUTO: 32.5 PG (ref 27–33)
MCHC RBC AUTO-ENTMCNC: 33.3 G/DL (ref 33.6–35)
MCV RBC AUTO: 97.4 FL (ref 81.4–97.8)
MONOCYTES # BLD AUTO: 0.61 K/UL (ref 0–0.85)
MONOCYTES NFR BLD AUTO: 4.4 % (ref 0–13.4)
NEUTROPHILS # BLD AUTO: 12.55 K/UL (ref 2–7.15)
NEUTROPHILS NFR BLD: 90.1 % (ref 44–72)
NRBC # BLD AUTO: 0 K/UL
NRBC BLD-RTO: 0 /100 WBC
PLATELET # BLD AUTO: 265 K/UL (ref 164–446)
PMV BLD AUTO: 9.6 FL (ref 9–12.9)
POTASSIUM SERPL-SCNC: 4.3 MMOL/L (ref 3.6–5.5)
RBC # BLD AUTO: 3.85 M/UL (ref 4.2–5.4)
SODIUM SERPL-SCNC: 131 MMOL/L (ref 135–145)
WBC # BLD AUTO: 13.9 K/UL (ref 4.8–10.8)

## 2020-11-25 PROCEDURE — 770014 HCHG ROOM/CARE - WARD (150)

## 2020-11-25 PROCEDURE — 94760 N-INVAS EAR/PLS OXIMETRY 1: CPT

## 2020-11-25 PROCEDURE — 99232 SBSQ HOSP IP/OBS MODERATE 35: CPT | Performed by: INTERNAL MEDICINE

## 2020-11-25 PROCEDURE — 700111 HCHG RX REV CODE 636 W/ 250 OVERRIDE (IP): Performed by: HOSPITALIST

## 2020-11-25 PROCEDURE — A9270 NON-COVERED ITEM OR SERVICE: HCPCS | Performed by: HOSPITALIST

## 2020-11-25 PROCEDURE — 83735 ASSAY OF MAGNESIUM: CPT

## 2020-11-25 PROCEDURE — 85025 COMPLETE CBC W/AUTO DIFF WBC: CPT

## 2020-11-25 PROCEDURE — 36415 COLL VENOUS BLD VENIPUNCTURE: CPT

## 2020-11-25 PROCEDURE — 700102 HCHG RX REV CODE 250 W/ 637 OVERRIDE(OP): Performed by: HOSPITALIST

## 2020-11-25 PROCEDURE — 80048 BASIC METABOLIC PNL TOTAL CA: CPT

## 2020-11-25 RX ADMIN — MONTELUKAST 10 MG: 10 TABLET, FILM COATED ORAL at 18:04

## 2020-11-25 RX ADMIN — LORAZEPAM 1 MG: 1 TABLET ORAL at 19:56

## 2020-11-25 RX ADMIN — ASPIRIN 81 MG CHEWABLE TABLET 81 MG: 81 TABLET CHEWABLE at 08:09

## 2020-11-25 RX ADMIN — GABAPENTIN 200 MG: 100 CAPSULE ORAL at 18:00

## 2020-11-25 RX ADMIN — GABAPENTIN 200 MG: 100 CAPSULE ORAL at 12:42

## 2020-11-25 RX ADMIN — GABAPENTIN 200 MG: 100 CAPSULE ORAL at 05:05

## 2020-11-25 RX ADMIN — FLUTICASONE PROPIONATE 220 MCG: 110 AEROSOL, METERED RESPIRATORY (INHALATION) at 05:04

## 2020-11-25 RX ADMIN — ENOXAPARIN SODIUM 40 MG: 100 INJECTION SUBCUTANEOUS at 05:05

## 2020-11-25 RX ADMIN — DEXAMETHASONE 6 MG: 4 TABLET ORAL at 05:05

## 2020-11-25 ASSESSMENT — ENCOUNTER SYMPTOMS
ABDOMINAL PAIN: 0
NERVOUS/ANXIOUS: 0
STRIDOR: 0
SINUS PAIN: 0
VOMITING: 0
DEPRESSION: 0
CHILLS: 0
FOCAL WEAKNESS: 0
EYE REDNESS: 0
HEMOPTYSIS: 0
HEADACHES: 0
COUGH: 1
FEVER: 0
ORTHOPNEA: 0
SORE THROAT: 0
NAUSEA: 0
EYE DISCHARGE: 0
TREMORS: 0
WEAKNESS: 0
MYALGIAS: 0

## 2020-11-25 ASSESSMENT — PAIN DESCRIPTION - PAIN TYPE: TYPE: ACUTE PAIN

## 2020-11-25 NOTE — PROGRESS NOTES
[facility] Medicine Daily Progress Note    Date of Service  11/25/2020    Chief Complaint  89 y.o. female admitted 11/20/2020 with vomiting    Hospital Course  No notes on file  89 y.o. female medical history of asthma, coronary artery disease, peripheral vascular disease, osteoporosis who presented 11/20/2020 with vomiting this morning.  She notes a viral prodrome of cough with myalgias and weakness over the past several days.  She has had normal taste and smell.  She reports a general lack of any appetite for the past several days.  She was found to be Covid positive here in our emergency department.  She was further found to be hypoxic requiring supplemental oxygenation to maintain saturation.    Patient was started on dexamethasone.  Her labs were significant for hyponatremia sodium 121 improving, currently at 131.  White count noted to be 3.7.  EKG no acute ST or T wave changes    Interval Problem Update  11/25: The patient is afebrile currently sitting at the side of the bed eating breakfast. The patient mentions she still feels SOB. She had to go back up to 5L of O2. Otherwise the patient is afebrile and as per nursing no other over night events were reported.    Consultants/Specialty  None    Code Status  Full Code    Disposition  Patient most likely will go back to assisted living once she is able to tolerate <4L. Continue to monitor on ACS.    Review of Systems  Review of Systems   Constitutional: Negative for chills and fever.   HENT: Negative for sinus pain and sore throat.    Eyes: Negative for discharge and redness.   Respiratory: Positive for cough. Negative for hemoptysis and stridor.    Cardiovascular: Negative for chest pain and orthopnea.   Gastrointestinal: Negative for abdominal pain, nausea and vomiting.   Genitourinary: Negative for dysuria and hematuria.   Musculoskeletal: Negative for myalgias.   Skin: Negative for itching.   Neurological: Negative for tremors, focal weakness, weakness and  headaches.   Psychiatric/Behavioral: Negative for depression. The patient is not nervous/anxious.         Physical Exam  Temp:  [36.7 °C (98 °F)-37.6 °C (99.6 °F)] 37.6 °C (99.6 °F)  Pulse:  [71-79] 72  Resp:  [16-18] 18  BP: (119-128)/(54-60) 119/56  SpO2:  [88 %-95 %] 94 %    Physical Exam  Vitals signs and nursing note reviewed.   Constitutional:       General: She is not in acute distress.     Appearance: She is not toxic-appearing.   HENT:      Head: Normocephalic and atraumatic.      Mouth/Throat:      Mouth: Mucous membranes are moist.   Eyes:      General: No scleral icterus.     Conjunctiva/sclera: Conjunctivae normal.   Neck:      Musculoskeletal: Normal range of motion and neck supple.   Cardiovascular:      Rate and Rhythm: Normal rate and regular rhythm.      Pulses: Normal pulses.      Heart sounds: Normal heart sounds. No murmur.   Pulmonary:      Effort: Pulmonary effort is normal. No respiratory distress.      Breath sounds: Normal breath sounds. No wheezing or rales.   Abdominal:      General: Bowel sounds are normal.      Palpations: Abdomen is soft.      Tenderness: There is no abdominal tenderness.   Musculoskeletal: Normal range of motion.   Skin:     General: Skin is warm.      Capillary Refill: Capillary refill takes less than 2 seconds.   Neurological:      Mental Status: She is alert and oriented to person, place, and time. Mental status is at baseline.   Psychiatric:         Mood and Affect: Mood normal.         Behavior: Behavior normal.         Fluids  No intake or output data in the 24 hours ending 11/25/20 1230    Laboratory  Recent Labs     11/24/20  0914 11/25/20  0800   WBC 13.1* 13.9*   RBC 3.99* 3.85*   HEMOGLOBIN 13.1 12.5   HEMATOCRIT 38.7 37.5   MCV 97.0 97.4   MCH 32.8 32.5   MCHC 33.9 33.3*   RDW 43.9 44.9   PLATELETCT 246 265   MPV 9.4 9.6     Recent Labs     11/23/20  1009 11/24/20  0914 11/25/20  0800   SODIUM 130* 129* 131*   POTASSIUM 4.1 4.5 4.3   CHLORIDE 97 96 98    CO2 24 23 24   GLUCOSE 214* 135* 129*   BUN 16 11 14   CREATININE 0.56 0.43* 0.52   CALCIUM 9.2 9.6 9.3                   Imaging  DX-CHEST-PORTABLE (1 VIEW)   Final Result      1.  Chronic blunting of LEFT costophrenic angle, minimal pleural fluid versus pleural reactive change.   2.  No pneumonia or pulmonary edema.           Assessment/Plan  Acute hypoxemic respiratory failure due to COVID-19 (HCC)- (present on admission)  Assessment & Plan  - Secondary to Covid pneumonia.   - Oxygen as needed; wean as tolerated.   -decadron 6 mg daily and lovenox.    CAD (coronary artery disease)- (present on admission)  Assessment & Plan  Continue home dose aspirin, statin, beta blocker.      Essential hypertension- (present on admission)  Assessment & Plan  Continue home medications of amlodipine, carvedilol, isosorbide mononitrate, and lisinopril with hold parameters.     Peripheral neuropathy- (present on admission)  Assessment & Plan  Continue home dose gabapentin.    Debility- (present on admission)  Assessment & Plan  Appears to be slightly exacerbated by her acute viral illness.    Follow up PT/OT recs.     Benzodiazepine dependence (HCC)- (present on admission)  Assessment & Plan  Per chart review as admitting physician: While I am generally reluctant to give benzodiazepines to the elderly, the risks of withdrawal probably outweigh the risk of continuation at this juncture.  Home dose benzos will be continued.    Mild intermittent asthma with acute exacerbation- (present on admission)  Assessment & Plan  Does not appear currently exacerbated however given her viral infection she is at risk for exacerbation, will maintain a close clinical eye, continue her home medications.       VTE prophylaxis: subcutaneous enoxaparin.

## 2020-11-25 NOTE — PROGRESS NOTES
[facility] Medicine Daily Progress Note    Date of Service  11/24/2020    Chief Complaint  89 y.o. female admitted 11/20/2020 with vomiting    Hospital Course  No notes on file  89 y.o. female medical history of asthma, coronary artery disease, peripheral vascular disease, osteoporosis who presented 11/20/2020 with vomiting this morning.  She notes a viral prodrome of cough with myalgias and weakness over the past several days.  She has had normal taste and smell.  She reports a general lack of any appetite for the past several days.  She was found to be Covid positive here in our emergency department.  She was further found to be hypoxic requiring supplemental oxygenation to maintain saturation.    Patient was started on dexamethasone.  Her labs were significant for hyponatremia sodium 121 improving 125.  White count noted to be 3.7.  EKG no acute ST or T wave changes    Interval Problem Update  Transferred to ACS.  Saturating 92% on  L oxygen on exam this morning, however she intermittently goes back to 4 L.  Reports continued coughing and SOB. The patient mentions she has not had a BM in 3 days, and feels weak when walking.    Consultants/Specialty  None    Code Status  Full Code    Disposition  Patient most likely will go back to assisted living. Continue to monitor on ACS.    Review of Systems  Review of Systems   Constitutional: Negative for chills and fever.   HENT: Negative for sinus pain and sore throat.    Eyes: Negative for discharge and redness.   Respiratory: Positive for cough. Negative for hemoptysis and stridor.    Cardiovascular: Negative for chest pain and orthopnea.   Gastrointestinal: Negative for abdominal pain, nausea and vomiting.   Genitourinary: Negative for dysuria and hematuria.   Musculoskeletal: Negative for myalgias.   Skin: Negative for itching.   Neurological: Negative for tremors, focal weakness, weakness and headaches.   Psychiatric/Behavioral: Negative for depression. The patient is  not nervous/anxious.         Physical Exam  Temp:  [36.5 °C (97.7 °F)-37.4 °C (99.4 °F)] 36.7 °C (98 °F)  Pulse:  [] 75  Resp:  [16-20] 16  BP: (124-134)/(48-66) 124/60  SpO2:  [90 %-95 %] 94 %    Physical Exam  Vitals signs and nursing note reviewed.   Constitutional:       General: She is not in acute distress.     Appearance: She is not toxic-appearing.   HENT:      Head: Normocephalic and atraumatic.      Mouth/Throat:      Mouth: Mucous membranes are moist.   Eyes:      General: No scleral icterus.     Conjunctiva/sclera: Conjunctivae normal.   Neck:      Musculoskeletal: Normal range of motion and neck supple.   Cardiovascular:      Rate and Rhythm: Normal rate and regular rhythm.      Pulses: Normal pulses.      Heart sounds: Normal heart sounds. No murmur.   Pulmonary:      Effort: Pulmonary effort is normal. No respiratory distress.      Breath sounds: Normal breath sounds. No wheezing or rales.   Abdominal:      General: Bowel sounds are normal.      Palpations: Abdomen is soft.      Tenderness: There is no abdominal tenderness.   Musculoskeletal: Normal range of motion.   Skin:     General: Skin is warm.      Capillary Refill: Capillary refill takes less than 2 seconds.   Neurological:      Mental Status: She is alert and oriented to person, place, and time. Mental status is at baseline.   Psychiatric:         Mood and Affect: Mood normal.         Behavior: Behavior normal.         Fluids  No intake or output data in the 24 hours ending 11/24/20 1603    Laboratory  Recent Labs     11/24/20  0914   WBC 13.1*   RBC 3.99*   HEMOGLOBIN 13.1   HEMATOCRIT 38.7   MCV 97.0   MCH 32.8   MCHC 33.9   RDW 43.9   PLATELETCT 246   MPV 9.4     Recent Labs     11/22/20  0515 11/23/20  1009 11/24/20  0914   SODIUM 126* 130* 129*   POTASSIUM 4.1 4.1 4.5   CHLORIDE 94* 97 96   CO2 24 24 23   GLUCOSE 120* 214* 135*   BUN 15 16 11   CREATININE 0.48* 0.56 0.43*   CALCIUM 8.9 9.2 9.6                    Imaging  DX-CHEST-PORTABLE (1 VIEW)   Final Result      1.  Chronic blunting of LEFT costophrenic angle, minimal pleural fluid versus pleural reactive change.   2.  No pneumonia or pulmonary edema.           Assessment/Plan  Acute hypoxemic respiratory failure due to COVID-19 (HCC)- (present on admission)  Assessment & Plan  - Secondary to Covid pneumonia.   - Oxygen as needed; wean as tolerated.     CAD (coronary artery disease)- (present on admission)  Assessment & Plan  Continue home dose aspirin, statin, beta blocker.      Hyponatremia- (present on admission)  Assessment & Plan  Unclear etiology, no fluids indicated at this juncture, trying to minimize fluids given her acute COVID-19 diagnosis.   Improving  Asymptomatic.   Monitor BMP.     Essential hypertension- (present on admission)  Assessment & Plan  Continue home medications of amlodipine, carvedilol, isosorbide mononitrate, and lisinopril with hold parameters.     Peripheral neuropathy- (present on admission)  Assessment & Plan  Continue home dose gabapentin.    Debility- (present on admission)  Assessment & Plan  Appears to be slightly exacerbated by her acute viral illness.    Follow up PT/OT recs.     Benzodiazepine dependence (HCC)- (present on admission)  Assessment & Plan  Per chart review as admitting physician: While I am generally reluctant to give benzodiazepines to the elderly, the risks of withdrawal probably outweigh the risk of continuation at this juncture.  Home dose benzos will be continued.    Mild intermittent asthma with acute exacerbation- (present on admission)  Assessment & Plan  Does not appear currently exacerbated however given her viral infection she is at risk for exacerbation, will maintain a close clinical eye, continue her home medications.       VTE prophylaxis: subcutaneous enoxaparin.

## 2020-11-26 PROCEDURE — A9270 NON-COVERED ITEM OR SERVICE: HCPCS | Performed by: HOSPITALIST

## 2020-11-26 PROCEDURE — 700102 HCHG RX REV CODE 250 W/ 637 OVERRIDE(OP): Performed by: HOSPITALIST

## 2020-11-26 PROCEDURE — 700111 HCHG RX REV CODE 636 W/ 250 OVERRIDE (IP): Performed by: HOSPITALIST

## 2020-11-26 PROCEDURE — 770014 HCHG ROOM/CARE - WARD (150)

## 2020-11-26 PROCEDURE — 99232 SBSQ HOSP IP/OBS MODERATE 35: CPT | Performed by: INTERNAL MEDICINE

## 2020-11-26 RX ADMIN — LISINOPRIL 20 MG: 20 TABLET ORAL at 09:07

## 2020-11-26 RX ADMIN — CARVEDILOL 6.25 MG: 6.25 TABLET, FILM COATED ORAL at 17:00

## 2020-11-26 RX ADMIN — CARVEDILOL 6.25 MG: 6.25 TABLET, FILM COATED ORAL at 09:07

## 2020-11-26 RX ADMIN — GABAPENTIN 200 MG: 100 CAPSULE ORAL at 04:54

## 2020-11-26 RX ADMIN — LORAZEPAM 1 MG: 1 TABLET ORAL at 20:42

## 2020-11-26 RX ADMIN — ASPIRIN 81 MG CHEWABLE TABLET 81 MG: 81 TABLET CHEWABLE at 09:07

## 2020-11-26 RX ADMIN — ENOXAPARIN SODIUM 40 MG: 100 INJECTION SUBCUTANEOUS at 04:53

## 2020-11-26 RX ADMIN — GABAPENTIN 200 MG: 100 CAPSULE ORAL at 17:00

## 2020-11-26 RX ADMIN — DEXAMETHASONE 6 MG: 4 TABLET ORAL at 04:54

## 2020-11-26 RX ADMIN — ISOSORBIDE MONONITRATE 30 MG: 60 TABLET, EXTENDED RELEASE ORAL at 17:00

## 2020-11-26 RX ADMIN — GABAPENTIN 200 MG: 100 CAPSULE ORAL at 11:32

## 2020-11-26 RX ADMIN — MONTELUKAST 10 MG: 10 TABLET, FILM COATED ORAL at 17:00

## 2020-11-26 RX ADMIN — LORAZEPAM 0.5 MG: 1 TABLET ORAL at 13:23

## 2020-11-26 RX ADMIN — AMLODIPINE BESYLATE 5 MG: 5 TABLET ORAL at 09:07

## 2020-11-26 ASSESSMENT — ENCOUNTER SYMPTOMS
FEVER: 0
EYE DISCHARGE: 0
HEADACHES: 0
EYE REDNESS: 0
STRIDOR: 0
TREMORS: 0
VOMITING: 0
DEPRESSION: 0
NERVOUS/ANXIOUS: 0
WEAKNESS: 0
COUGH: 1
NAUSEA: 0
SORE THROAT: 0
CHILLS: 0
ORTHOPNEA: 0
FOCAL WEAKNESS: 0
SINUS PAIN: 0
HEMOPTYSIS: 0
ABDOMINAL PAIN: 0
MYALGIAS: 0

## 2020-11-26 ASSESSMENT — PAIN DESCRIPTION - PAIN TYPE: TYPE: ACUTE PAIN

## 2020-11-26 NOTE — PROGRESS NOTES
Hospital Medicine Daily Progress Note    Date of Service  11/26/2020    Chief Complaint  89 y.o. female admitted 11/20/2020 with vomiting    Hospital Course  89 y.o. female medical history of asthma, coronary artery disease, peripheral vascular disease, osteoporosis who presented 11/20/2020 with vomiting this morning.  She notes a viral prodrome of cough with myalgias and weakness over the past several days.  She has had normal taste and smell.  She reports a general lack of any appetite for the past several days.  She was found to be Covid positive here in our emergency department.  She was further found to be hypoxic requiring supplemental oxygenation to maintain saturation.    Patient was started on dexamethasone.  Her labs were significant for hyponatremia sodium 121 improving, currently at 131.  White count noted to be 3.7.  EKG no acute ST or T wave changes    Interval Problem Update  11/25: The patient is afebrile currently sitting at the side of the bed eating breakfast. The patient mentions she still feels SOB. She had to go back up to 5L of O2. Otherwise the patient is afebrile and as per nursing no other over night events were reported.  11/26: The patient was seen at bedside this morning. Currently afebrile. The patient mentions she feels very weak. She is still needing 3-4 L of oxygen supplementation. We will try to work with her to have her up and moving. If needed we will re-evaluate with PT/OT. As per nursing no other over night events were reported.    Consultants/Specialty  None    Code Status  Full Code    Disposition  Patient most likely will go back to assisted living once she is able to tolerate <4L. Continue to monitor on ACS.    Review of Systems  Review of Systems   Constitutional: Negative for chills and fever.   HENT: Negative for sinus pain and sore throat.    Eyes: Negative for discharge and redness.   Respiratory: Positive for cough. Negative for hemoptysis and stridor.     Cardiovascular: Negative for chest pain and orthopnea.   Gastrointestinal: Negative for abdominal pain, nausea and vomiting.   Genitourinary: Negative for dysuria and hematuria.   Musculoskeletal: Negative for myalgias.   Skin: Negative for itching.   Neurological: Negative for tremors, focal weakness, weakness and headaches.   Psychiatric/Behavioral: Negative for depression. The patient is not nervous/anxious.         Physical Exam  Temp:  [36.2 °C (97.1 °F)-37.4 °C (99.3 °F)] 36.3 °C (97.3 °F)  Pulse:  [70-76] 76  Resp:  [18-21] 21  BP: (118-150)/(52-63) 150/63  SpO2:  [90 %-98 %] 90 %    Physical Exam  Vitals signs and nursing note reviewed.   Constitutional:       General: She is not in acute distress.     Appearance: She is not toxic-appearing.   HENT:      Head: Normocephalic and atraumatic.      Mouth/Throat:      Mouth: Mucous membranes are moist.   Eyes:      General: No scleral icterus.     Conjunctiva/sclera: Conjunctivae normal.   Neck:      Musculoskeletal: Normal range of motion and neck supple.   Cardiovascular:      Rate and Rhythm: Normal rate and regular rhythm.      Pulses: Normal pulses.      Heart sounds: Normal heart sounds. No murmur.   Pulmonary:      Effort: Pulmonary effort is normal. No respiratory distress.      Breath sounds: Normal breath sounds. No wheezing or rales.   Abdominal:      General: Bowel sounds are normal.      Palpations: Abdomen is soft.      Tenderness: There is no abdominal tenderness.   Musculoskeletal: Normal range of motion.   Skin:     General: Skin is warm.      Capillary Refill: Capillary refill takes less than 2 seconds.   Neurological:      Mental Status: She is alert and oriented to person, place, and time. Mental status is at baseline.   Psychiatric:         Mood and Affect: Mood normal.         Behavior: Behavior normal.         Fluids    Intake/Output Summary (Last 24 hours) at 11/26/2020 1229  Last data filed at 11/26/2020 0400  Gross per 24 hour   Intake  --   Output 300 ml   Net -300 ml       Laboratory  Recent Labs     11/24/20  0914 11/25/20  0800   WBC 13.1* 13.9*   RBC 3.99* 3.85*   HEMOGLOBIN 13.1 12.5   HEMATOCRIT 38.7 37.5   MCV 97.0 97.4   MCH 32.8 32.5   MCHC 33.9 33.3*   RDW 43.9 44.9   PLATELETCT 246 265   MPV 9.4 9.6     Recent Labs     11/24/20  0914 11/25/20  0800   SODIUM 129* 131*   POTASSIUM 4.5 4.3   CHLORIDE 96 98   CO2 23 24   GLUCOSE 135* 129*   BUN 11 14   CREATININE 0.43* 0.52   CALCIUM 9.6 9.3                   Imaging  DX-CHEST-PORTABLE (1 VIEW)   Final Result      1.  Chronic blunting of LEFT costophrenic angle, minimal pleural fluid versus pleural reactive change.   2.  No pneumonia or pulmonary edema.           Assessment/Plan  Acute hypoxemic respiratory failure due to COVID-19 (HCC)- (present on admission)  Assessment & Plan  - Secondary to Covid pneumonia.   - Oxygen as needed; wean as tolerated.   -decadron 6 mg daily and lovenox.    CAD (coronary artery disease)- (present on admission)  Assessment & Plan  Continue home dose aspirin, statin, beta blocker.      Essential hypertension- (present on admission)  Assessment & Plan  Continue home medications of amlodipine, carvedilol, isosorbide mononitrate, and lisinopril with hold parameters.     Peripheral neuropathy- (present on admission)  Assessment & Plan  Continue home dose gabapentin.    Debility- (present on admission)  Assessment & Plan  Appears to be slightly exacerbated by her acute viral illness.    Follow up PT/OT recs.     Benzodiazepine dependence (HCC)- (present on admission)  Assessment & Plan  Per chart review as admitting physician: While I am generally reluctant to give benzodiazepines to the elderly, the risks of withdrawal probably outweigh the risk of continuation at this juncture.  Home dose benzos will be continued.    Mild intermittent asthma with acute exacerbation- (present on admission)  Assessment & Plan  Does not appear currently exacerbated however given her  viral infection she is at risk for exacerbation, will maintain a close clinical eye, continue her home medications.       VTE prophylaxis: subcutaneous enoxaparin.

## 2020-11-26 NOTE — PROGRESS NOTES
Assessment completed at bedside. Patient AO x4. Pain 0/10. Breath sounds are crackles. SpO2 89%  Supplemental Oxygen and delivery: per nasal cannula, 3 L/min. Sputum is clear with a red tinge. Patient mobilizes independently. All needs met at this time.

## 2020-11-27 LAB
ALBUMIN SERPL BCP-MCNC: 3.1 G/DL (ref 3.2–4.9)
ALBUMIN/GLOB SERPL: 0.9 G/DL
ALP SERPL-CCNC: 56 U/L (ref 30–99)
ALT SERPL-CCNC: 58 U/L (ref 2–50)
ANION GAP SERPL CALC-SCNC: 13 MMOL/L (ref 7–16)
AST SERPL-CCNC: 41 U/L (ref 12–45)
BASOPHILS # BLD AUTO: 0.2 % (ref 0–1.8)
BASOPHILS # BLD: 0.02 K/UL (ref 0–0.12)
BILIRUB SERPL-MCNC: 0.5 MG/DL (ref 0.1–1.5)
BUN SERPL-MCNC: 19 MG/DL (ref 8–22)
CALCIUM SERPL-MCNC: 9.4 MG/DL (ref 8.5–10.5)
CHLORIDE SERPL-SCNC: 92 MMOL/L (ref 96–112)
CO2 SERPL-SCNC: 22 MMOL/L (ref 20–33)
CREAT SERPL-MCNC: 0.64 MG/DL (ref 0.5–1.4)
EOSINOPHIL # BLD AUTO: 0 K/UL (ref 0–0.51)
EOSINOPHIL NFR BLD: 0 % (ref 0–6.9)
ERYTHROCYTE [DISTWIDTH] IN BLOOD BY AUTOMATED COUNT: 44.5 FL (ref 35.9–50)
GLOBULIN SER CALC-MCNC: 3.3 G/DL (ref 1.9–3.5)
GLUCOSE SERPL-MCNC: 248 MG/DL (ref 65–99)
HCT VFR BLD AUTO: 36.3 % (ref 37–47)
HGB BLD-MCNC: 12.4 G/DL (ref 12–16)
IMM GRANULOCYTES # BLD AUTO: 0.08 K/UL (ref 0–0.11)
IMM GRANULOCYTES NFR BLD AUTO: 0.6 % (ref 0–0.9)
LYMPHOCYTES # BLD AUTO: 0.31 K/UL (ref 1–4.8)
LYMPHOCYTES NFR BLD: 2.4 % (ref 22–41)
MAGNESIUM SERPL-MCNC: 2 MG/DL (ref 1.5–2.5)
MCH RBC QN AUTO: 33.3 PG (ref 27–33)
MCHC RBC AUTO-ENTMCNC: 34.2 G/DL (ref 33.6–35)
MCV RBC AUTO: 97.6 FL (ref 81.4–97.8)
MONOCYTES # BLD AUTO: 0.2 K/UL (ref 0–0.85)
MONOCYTES NFR BLD AUTO: 1.6 % (ref 0–13.4)
NEUTROPHILS # BLD AUTO: 12.2 K/UL (ref 2–7.15)
NEUTROPHILS NFR BLD: 95.2 % (ref 44–72)
NRBC # BLD AUTO: 0 K/UL
NRBC BLD-RTO: 0 /100 WBC
PLATELET # BLD AUTO: 299 K/UL (ref 164–446)
PMV BLD AUTO: 9.3 FL (ref 9–12.9)
POTASSIUM SERPL-SCNC: 4.3 MMOL/L (ref 3.6–5.5)
PROT SERPL-MCNC: 6.4 G/DL (ref 6–8.2)
RBC # BLD AUTO: 3.72 M/UL (ref 4.2–5.4)
SODIUM SERPL-SCNC: 127 MMOL/L (ref 135–145)
WBC # BLD AUTO: 12.8 K/UL (ref 4.8–10.8)

## 2020-11-27 PROCEDURE — 83735 ASSAY OF MAGNESIUM: CPT

## 2020-11-27 PROCEDURE — 80053 COMPREHEN METABOLIC PANEL: CPT

## 2020-11-27 PROCEDURE — 85025 COMPLETE CBC W/AUTO DIFF WBC: CPT

## 2020-11-27 PROCEDURE — 700111 HCHG RX REV CODE 636 W/ 250 OVERRIDE (IP): Performed by: HOSPITALIST

## 2020-11-27 PROCEDURE — A9270 NON-COVERED ITEM OR SERVICE: HCPCS | Performed by: HOSPITALIST

## 2020-11-27 PROCEDURE — 770014 HCHG ROOM/CARE - WARD (150)

## 2020-11-27 PROCEDURE — 36415 COLL VENOUS BLD VENIPUNCTURE: CPT

## 2020-11-27 PROCEDURE — 99232 SBSQ HOSP IP/OBS MODERATE 35: CPT | Performed by: INTERNAL MEDICINE

## 2020-11-27 PROCEDURE — 700102 HCHG RX REV CODE 250 W/ 637 OVERRIDE(OP): Performed by: HOSPITALIST

## 2020-11-27 RX ADMIN — BENZONATATE 100 MG: 100 CAPSULE ORAL at 05:10

## 2020-11-27 RX ADMIN — ACETAMINOPHEN 650 MG: 325 TABLET, FILM COATED ORAL at 08:42

## 2020-11-27 RX ADMIN — BENZONATATE 100 MG: 100 CAPSULE ORAL at 17:20

## 2020-11-27 RX ADMIN — ISOSORBIDE MONONITRATE 30 MG: 60 TABLET, EXTENDED RELEASE ORAL at 17:17

## 2020-11-27 RX ADMIN — ACETAMINOPHEN 650 MG: 325 TABLET, FILM COATED ORAL at 17:17

## 2020-11-27 RX ADMIN — DEXAMETHASONE 6 MG: 4 TABLET ORAL at 05:09

## 2020-11-27 RX ADMIN — MONTELUKAST 10 MG: 10 TABLET, FILM COATED ORAL at 20:16

## 2020-11-27 RX ADMIN — CARVEDILOL 6.25 MG: 6.25 TABLET, FILM COATED ORAL at 08:42

## 2020-11-27 RX ADMIN — GABAPENTIN 200 MG: 100 CAPSULE ORAL at 05:08

## 2020-11-27 RX ADMIN — GABAPENTIN 200 MG: 100 CAPSULE ORAL at 11:56

## 2020-11-27 RX ADMIN — FLUTICASONE PROPIONATE 220 MCG: 110 AEROSOL, METERED RESPIRATORY (INHALATION) at 17:21

## 2020-11-27 RX ADMIN — ASPIRIN 81 MG CHEWABLE TABLET 81 MG: 81 TABLET CHEWABLE at 08:42

## 2020-11-27 RX ADMIN — ENOXAPARIN SODIUM 40 MG: 100 INJECTION SUBCUTANEOUS at 05:08

## 2020-11-27 RX ADMIN — LORAZEPAM 0.5 MG: 1 TABLET ORAL at 15:23

## 2020-11-27 RX ADMIN — CARVEDILOL 6.25 MG: 6.25 TABLET, FILM COATED ORAL at 17:17

## 2020-11-27 RX ADMIN — FLUTICASONE PROPIONATE 220 MCG: 110 AEROSOL, METERED RESPIRATORY (INHALATION) at 06:31

## 2020-11-27 RX ADMIN — GABAPENTIN 200 MG: 100 CAPSULE ORAL at 17:17

## 2020-11-27 RX ADMIN — LORAZEPAM 1 MG: 1 TABLET ORAL at 20:17

## 2020-11-27 ASSESSMENT — ENCOUNTER SYMPTOMS
CHILLS: 0
HEMOPTYSIS: 0
COUGH: 1
ORTHOPNEA: 0
TREMORS: 0
EYE REDNESS: 0
HEADACHES: 0
MYALGIAS: 0
SINUS PAIN: 0
EYE DISCHARGE: 0
WEAKNESS: 0
NAUSEA: 0
VOMITING: 0
DEPRESSION: 0
FOCAL WEAKNESS: 0
NERVOUS/ANXIOUS: 0
ABDOMINAL PAIN: 0
FEVER: 0
STRIDOR: 0
SORE THROAT: 0

## 2020-11-27 ASSESSMENT — PAIN DESCRIPTION - PAIN TYPE: TYPE: ACUTE PAIN

## 2020-11-27 NOTE — PROGRESS NOTES
Assumed care of pt at 1900.  Pt A&Ox4 anxious. Denies pain. Breath sounds diminished. SPO2 90% on 3L NC. Productive cough with thick white pink tinged sputum. OOB SBA.

## 2020-11-27 NOTE — PROGRESS NOTES
[facility] Medicine Daily Progress Note    Date of Service  11/27/2020    Chief Complaint  89 y.o. female admitted 11/20/2020 with vomiting    Hospital Course  89 y.o. female medical history of asthma, coronary artery disease, peripheral vascular disease, osteoporosis who presented 11/20/2020 with vomiting this morning.  She notes a viral prodrome of cough with myalgias and weakness over the past several days.  She has had normal taste and smell.  She reports a general lack of any appetite for the past several days.  She was found to be Covid positive here in our emergency department.  She was further found to be hypoxic requiring supplemental oxygenation to maintain saturation.    Patient was started on dexamethasone.  Her labs were significant for hyponatremia sodium 121 improving, currently at 131.  White count noted to be 3.7.  EKG no acute ST or T wave changes    Interval Problem Update  11/25: The patient is afebrile currently sitting at the side of the bed eating breakfast. The patient mentions she still feels SOB. She had to go back up to 5L of O2. Otherwise the patient is afebrile and as per nursing no other over night events were reported.  11/26: The patient was seen at bedside this morning. Currently afebrile. The patient mentions she feels very weak. She is still needing 3-4 L of oxygen supplementation. We will try to work with her to have her up and moving. If needed we will re-evaluate with PT/OT. As per nursing no other over night events were reported.  11/27: The patient is seen at bedside currently using 5 L of oxygen. The patient mentions she feels weak. We will order PT/OT evaluation for discharge planning. She will continue in the ACS for monitoring.    Consultants/Specialty  None    Code Status  Full Code    Disposition  Pending PT/OT for discharge planning. Continue to monitor on ACS.     Review of Systems  Review of Systems   Constitutional: Negative for chills and fever.   HENT: Negative for  sinus pain and sore throat.    Eyes: Negative for discharge and redness.   Respiratory: Positive for cough. Negative for hemoptysis and stridor.    Cardiovascular: Negative for chest pain and orthopnea.   Gastrointestinal: Negative for abdominal pain, nausea and vomiting.   Genitourinary: Negative for dysuria and hematuria.   Musculoskeletal: Negative for myalgias.   Skin: Negative for itching.   Neurological: Negative for tremors, focal weakness, weakness and headaches.   Psychiatric/Behavioral: Negative for depression. The patient is not nervous/anxious.         Physical Exam  Temp:  [36.1 °C (97 °F)-36.6 °C (97.9 °F)] 36.1 °C (97 °F)  Pulse:  [69-88] 81  Resp:  [17-21] 17  BP: (105-120)/(51-56) 120/56  SpO2:  [90 %-92 %] 92 %    Physical Exam  Vitals signs and nursing note reviewed.   Constitutional:       General: She is not in acute distress.     Appearance: She is not toxic-appearing.   HENT:      Head: Normocephalic and atraumatic.      Mouth/Throat:      Mouth: Mucous membranes are moist.   Eyes:      General: No scleral icterus.     Conjunctiva/sclera: Conjunctivae normal.   Neck:      Musculoskeletal: Normal range of motion and neck supple.   Cardiovascular:      Rate and Rhythm: Normal rate and regular rhythm.      Pulses: Normal pulses.      Heart sounds: Normal heart sounds. No murmur.   Pulmonary:      Effort: Pulmonary effort is normal. No respiratory distress.      Breath sounds: Normal breath sounds. No wheezing or rales.   Abdominal:      General: Bowel sounds are normal.      Palpations: Abdomen is soft.      Tenderness: There is no abdominal tenderness.   Musculoskeletal: Normal range of motion.   Skin:     General: Skin is warm.      Capillary Refill: Capillary refill takes less than 2 seconds.   Neurological:      Mental Status: She is alert and oriented to person, place, and time. Mental status is at baseline.   Psychiatric:         Mood and Affect: Mood normal.         Behavior: Behavior  normal.         Fluids  No intake or output data in the 24 hours ending 11/27/20 1403    Laboratory  Recent Labs     11/25/20  0800 11/27/20  0944   WBC 13.9* 12.8*   RBC 3.85* 3.72*   HEMOGLOBIN 12.5 12.4   HEMATOCRIT 37.5 36.3*   MCV 97.4 97.6   MCH 32.5 33.3*   MCHC 33.3* 34.2   RDW 44.9 44.5   PLATELETCT 265 299   MPV 9.6 9.3     Recent Labs     11/25/20  0800 11/27/20  0944   SODIUM 131* 127*   POTASSIUM 4.3 4.3   CHLORIDE 98 92*   CO2 24 22   GLUCOSE 129* 248*   BUN 14 19   CREATININE 0.52 0.64   CALCIUM 9.3 9.4                   Imaging  DX-CHEST-PORTABLE (1 VIEW)   Final Result      1.  Chronic blunting of LEFT costophrenic angle, minimal pleural fluid versus pleural reactive change.   2.  No pneumonia or pulmonary edema.           Assessment/Plan  Acute hypoxemic respiratory failure due to COVID-19 (HCC)- (present on admission)  Assessment & Plan  - Secondary to Covid pneumonia.   - Oxygen as needed; wean as tolerated.   -decadron 6 mg daily and lovenox.    CAD (coronary artery disease)- (present on admission)  Assessment & Plan  Continue home dose aspirin, statin, beta blocker.      Essential hypertension- (present on admission)  Assessment & Plan  Continue home medications of amlodipine, carvedilol, isosorbide mononitrate, and lisinopril with hold parameters.     Peripheral neuropathy- (present on admission)  Assessment & Plan  Continue home dose gabapentin.    Debility- (present on admission)  Assessment & Plan  Appears to be slightly exacerbated by her acute viral illness.    Follow up PT/OT recs.     Benzodiazepine dependence (HCC)- (present on admission)  Assessment & Plan  Per chart review as admitting physician: While I am generally reluctant to give benzodiazepines to the elderly, the risks of withdrawal probably outweigh the risk of continuation at this juncture.  Home dose benzos will be continued.    Mild intermittent asthma with acute exacerbation- (present on admission)  Assessment &  Plan  Does not appear currently exacerbated however given her viral infection she is at risk for exacerbation, will maintain a close clinical eye, continue her home medications.       VTE prophylaxis: subcutaneous enoxaparin.

## 2020-11-28 LAB
GLUCOSE BLD-MCNC: 148 MG/DL (ref 65–99)
GLUCOSE BLD-MCNC: 163 MG/DL (ref 65–99)
GLUCOSE BLD-MCNC: 165 MG/DL (ref 65–99)

## 2020-11-28 PROCEDURE — A9270 NON-COVERED ITEM OR SERVICE: HCPCS | Performed by: HOSPITALIST

## 2020-11-28 PROCEDURE — 770014 HCHG ROOM/CARE - WARD (150)

## 2020-11-28 PROCEDURE — 94669 MECHANICAL CHEST WALL OSCILL: CPT

## 2020-11-28 PROCEDURE — 82962 GLUCOSE BLOOD TEST: CPT | Mod: 91

## 2020-11-28 PROCEDURE — 700111 HCHG RX REV CODE 636 W/ 250 OVERRIDE (IP): Performed by: STUDENT IN AN ORGANIZED HEALTH CARE EDUCATION/TRAINING PROGRAM

## 2020-11-28 PROCEDURE — 700111 HCHG RX REV CODE 636 W/ 250 OVERRIDE (IP): Performed by: HOSPITALIST

## 2020-11-28 PROCEDURE — 99232 SBSQ HOSP IP/OBS MODERATE 35: CPT | Performed by: INTERNAL MEDICINE

## 2020-11-28 PROCEDURE — 700102 HCHG RX REV CODE 250 W/ 637 OVERRIDE(OP): Performed by: HOSPITALIST

## 2020-11-28 RX ORDER — ONDANSETRON 4 MG/1
4 TABLET, ORALLY DISINTEGRATING ORAL ONCE
Status: COMPLETED | OUTPATIENT
Start: 2020-11-28 | End: 2020-11-28

## 2020-11-28 RX ORDER — DEXTROSE MONOHYDRATE 25 G/50ML
50 INJECTION, SOLUTION INTRAVENOUS
Status: DISCONTINUED | OUTPATIENT
Start: 2020-11-28 | End: 2020-11-29

## 2020-11-28 RX ADMIN — LORAZEPAM 1 MG: 1 TABLET ORAL at 20:31

## 2020-11-28 RX ADMIN — BENZONATATE 100 MG: 100 CAPSULE ORAL at 05:18

## 2020-11-28 RX ADMIN — CARVEDILOL 6.25 MG: 6.25 TABLET, FILM COATED ORAL at 18:05

## 2020-11-28 RX ADMIN — ENOXAPARIN SODIUM 40 MG: 100 INJECTION SUBCUTANEOUS at 05:11

## 2020-11-28 RX ADMIN — FLUTICASONE PROPIONATE 220 MCG: 110 AEROSOL, METERED RESPIRATORY (INHALATION) at 18:06

## 2020-11-28 RX ADMIN — ONDANSETRON 4 MG: 4 TABLET, ORALLY DISINTEGRATING ORAL at 20:29

## 2020-11-28 RX ADMIN — GABAPENTIN 200 MG: 100 CAPSULE ORAL at 18:46

## 2020-11-28 RX ADMIN — MONTELUKAST 10 MG: 10 TABLET, FILM COATED ORAL at 18:02

## 2020-11-28 RX ADMIN — AMLODIPINE BESYLATE 5 MG: 5 TABLET ORAL at 08:10

## 2020-11-28 RX ADMIN — DEXAMETHASONE 6 MG: 4 TABLET ORAL at 05:10

## 2020-11-28 RX ADMIN — CARVEDILOL 6.25 MG: 6.25 TABLET, FILM COATED ORAL at 08:09

## 2020-11-28 RX ADMIN — LORAZEPAM 0.5 MG: 1 TABLET ORAL at 15:58

## 2020-11-28 RX ADMIN — GABAPENTIN 200 MG: 100 CAPSULE ORAL at 11:36

## 2020-11-28 RX ADMIN — ASPIRIN 81 MG CHEWABLE TABLET 81 MG: 81 TABLET CHEWABLE at 08:09

## 2020-11-28 RX ADMIN — GABAPENTIN 200 MG: 100 CAPSULE ORAL at 05:10

## 2020-11-28 RX ADMIN — LISINOPRIL 20 MG: 20 TABLET ORAL at 08:10

## 2020-11-28 RX ADMIN — ISOSORBIDE MONONITRATE 30 MG: 60 TABLET, EXTENDED RELEASE ORAL at 18:06

## 2020-11-28 ASSESSMENT — ENCOUNTER SYMPTOMS
ABDOMINAL PAIN: 0
COUGH: 1
NERVOUS/ANXIOUS: 0
HEMOPTYSIS: 0
CHILLS: 0
WEAKNESS: 0
MYALGIAS: 0
DEPRESSION: 0
NAUSEA: 0
FOCAL WEAKNESS: 0
FEVER: 0
VOMITING: 0
SORE THROAT: 0
HEADACHES: 0
EYE DISCHARGE: 0
TREMORS: 0
EYE REDNESS: 0
SINUS PAIN: 0
ORTHOPNEA: 0
STRIDOR: 0

## 2020-11-28 ASSESSMENT — FIBROSIS 4 INDEX: FIB4 SCORE: 1.64

## 2020-11-28 NOTE — PROGRESS NOTES
Hospital Medicine Daily Progress Note    Date of Service  11/28/2020    Chief Complaint  89 y.o. female admitted 11/20/2020 with vomiting    Hospital Course  89 y.o. female medical history of asthma, coronary artery disease, peripheral vascular disease, osteoporosis who presented 11/20/2020 with vomiting this morning.  She notes a viral prodrome of cough with myalgias and weakness over the past several days.  She has had normal taste and smell.  She reports a general lack of any appetite for the past several days.  She was found to be Covid positive here in our emergency department.  She was further found to be hypoxic requiring supplemental oxygenation to maintain saturation.    Patient was started on dexamethasone.  Her labs were significant for hyponatremia sodium 121 improving, currently at 131.  White count noted to be 3.7.  EKG no acute ST or T wave changes    Interval Problem Update  11/25: The patient is afebrile currently sitting at the side of the bed eating breakfast. The patient mentions she still feels SOB. She had to go back up to 5L of O2. Otherwise the patient is afebrile and as per nursing no other over night events were reported.  11/26: The patient was seen at bedside this morning. Currently afebrile. The patient mentions she feels very weak. She is still needing 3-4 L of oxygen supplementation. We will try to work with her to have her up and moving. If needed we will re-evaluate with PT/OT. As per nursing no other over night events were reported.  11/28: The patient is currently sitting down, as per nursing no other over night events were repotred. We are still pending PT/OT evaluation for discharge planning, she is currently using 5 L of NC.    Consultants/Specialty  None    Code Status  Full Code    Disposition  Pending PT/OT for discharge planning. Continue to monitor on ACS.     Review of Systems  Review of Systems   Constitutional: Negative for chills and fever.   HENT: Negative for sinus  pain and sore throat.    Eyes: Negative for discharge and redness.   Respiratory: Positive for cough. Negative for hemoptysis and stridor.    Cardiovascular: Negative for chest pain and orthopnea.   Gastrointestinal: Negative for abdominal pain, nausea and vomiting.   Genitourinary: Negative for dysuria and hematuria.   Musculoskeletal: Negative for myalgias.   Skin: Negative for itching.   Neurological: Negative for tremors, focal weakness, weakness and headaches.   Psychiatric/Behavioral: Negative for depression. The patient is not nervous/anxious.         Physical Exam  Temp:  [36.2 °C (97.1 °F)-36.8 °C (98.3 °F)] 36.5 °C (97.7 °F)  Pulse:  [63-74] 71  Resp:  [17-22] 17  BP: (112-137)/(41-63) 136/61  SpO2:  [89 %-95 %] 91 %    Physical Exam  Vitals signs and nursing note reviewed.   Constitutional:       General: She is not in acute distress.     Appearance: She is not toxic-appearing.   HENT:      Head: Normocephalic and atraumatic.      Mouth/Throat:      Mouth: Mucous membranes are moist.   Eyes:      General: No scleral icterus.     Conjunctiva/sclera: Conjunctivae normal.   Neck:      Musculoskeletal: Normal range of motion and neck supple.   Cardiovascular:      Rate and Rhythm: Normal rate and regular rhythm.      Pulses: Normal pulses.      Heart sounds: Normal heart sounds. No murmur.   Pulmonary:      Effort: Pulmonary effort is normal. No respiratory distress.      Breath sounds: Normal breath sounds. No wheezing or rales.   Abdominal:      General: Bowel sounds are normal.      Palpations: Abdomen is soft.      Tenderness: There is no abdominal tenderness.   Musculoskeletal: Normal range of motion.   Skin:     General: Skin is warm.      Capillary Refill: Capillary refill takes less than 2 seconds.   Neurological:      Mental Status: She is alert and oriented to person, place, and time. Mental status is at baseline.   Psychiatric:         Mood and Affect: Mood normal.         Behavior: Behavior  normal.         Fluids    Intake/Output Summary (Last 24 hours) at 11/28/2020 1333  Last data filed at 11/28/2020 0900  Gross per 24 hour   Intake 200 ml   Output --   Net 200 ml       Laboratory  Recent Labs     11/27/20  0944   WBC 12.8*   RBC 3.72*   HEMOGLOBIN 12.4   HEMATOCRIT 36.3*   MCV 97.6   MCH 33.3*   MCHC 34.2   RDW 44.5   PLATELETCT 299   MPV 9.3     Recent Labs     11/27/20  0944   SODIUM 127*   POTASSIUM 4.3   CHLORIDE 92*   CO2 22   GLUCOSE 248*   BUN 19   CREATININE 0.64   CALCIUM 9.4                   Imaging  DX-CHEST-PORTABLE (1 VIEW)   Final Result      1.  Chronic blunting of LEFT costophrenic angle, minimal pleural fluid versus pleural reactive change.   2.  No pneumonia or pulmonary edema.           Assessment/Plan  Acute hypoxemic respiratory failure due to COVID-19 (HCC)- (present on admission)  Assessment & Plan  - Secondary to Covid pneumonia.   - Oxygen as needed; wean as tolerated.   -decadron 6 mg daily and lovenox.  -We will start the patient ISS due to decadron, as well as hypoglycemia protocol.    CAD (coronary artery disease)- (present on admission)  Assessment & Plan  Continue home dose aspirin, statin, beta blocker.      Essential hypertension- (present on admission)  Assessment & Plan  Continue home medications of amlodipine, carvedilol, isosorbide mononitrate, and lisinopril with hold parameters.     Peripheral neuropathy- (present on admission)  Assessment & Plan  Continue home dose gabapentin.    Debility- (present on admission)  Assessment & Plan  Appears to be slightly exacerbated by her acute viral illness.    Follow up PT/OT recs.     Benzodiazepine dependence (HCC)- (present on admission)  Assessment & Plan  Per chart review as admitting physician: While I am generally reluctant to give benzodiazepines to the elderly, the risks of withdrawal probably outweigh the risk of continuation at this juncture.  Home dose benzos will be continued.    Mild intermittent asthma with  acute exacerbation- (present on admission)  Assessment & Plan  Does not appear currently exacerbated however given her viral infection she is at risk for exacerbation, will maintain a close clinical eye, continue her home medications.       VTE prophylaxis: subcutaneous enoxaparin.

## 2020-11-29 LAB
ALBUMIN SERPL BCP-MCNC: 2.8 G/DL (ref 3.2–4.9)
ALBUMIN/GLOB SERPL: 0.9 G/DL
ALP SERPL-CCNC: 59 U/L (ref 30–99)
ALT SERPL-CCNC: 136 U/L (ref 2–50)
ANION GAP SERPL CALC-SCNC: 5 MMOL/L (ref 7–16)
AST SERPL-CCNC: 64 U/L (ref 12–45)
BASOPHILS # BLD AUTO: 0.1 % (ref 0–1.8)
BASOPHILS # BLD: 0.02 K/UL (ref 0–0.12)
BILIRUB SERPL-MCNC: 0.5 MG/DL (ref 0.1–1.5)
BUN SERPL-MCNC: 17 MG/DL (ref 8–22)
CALCIUM SERPL-MCNC: 9.2 MG/DL (ref 8.5–10.5)
CHLORIDE SERPL-SCNC: 98 MMOL/L (ref 96–112)
CO2 SERPL-SCNC: 25 MMOL/L (ref 20–33)
CREAT SERPL-MCNC: 0.44 MG/DL (ref 0.5–1.4)
EOSINOPHIL # BLD AUTO: 0.01 K/UL (ref 0–0.51)
EOSINOPHIL NFR BLD: 0.1 % (ref 0–6.9)
ERYTHROCYTE [DISTWIDTH] IN BLOOD BY AUTOMATED COUNT: 43 FL (ref 35.9–50)
GLOBULIN SER CALC-MCNC: 3.2 G/DL (ref 1.9–3.5)
GLUCOSE BLD-MCNC: 149 MG/DL (ref 65–99)
GLUCOSE BLD-MCNC: 90 MG/DL (ref 65–99)
GLUCOSE SERPL-MCNC: 113 MG/DL (ref 65–99)
HCT VFR BLD AUTO: 35.2 % (ref 37–47)
HGB BLD-MCNC: 11.9 G/DL (ref 12–16)
IMM GRANULOCYTES # BLD AUTO: 0.13 K/UL (ref 0–0.11)
IMM GRANULOCYTES NFR BLD AUTO: 0.9 % (ref 0–0.9)
LYMPHOCYTES # BLD AUTO: 0.48 K/UL (ref 1–4.8)
LYMPHOCYTES NFR BLD: 3.3 % (ref 22–41)
MAGNESIUM SERPL-MCNC: 2.1 MG/DL (ref 1.5–2.5)
MCH RBC QN AUTO: 32.9 PG (ref 27–33)
MCHC RBC AUTO-ENTMCNC: 33.8 G/DL (ref 33.6–35)
MCV RBC AUTO: 97.2 FL (ref 81.4–97.8)
MONOCYTES # BLD AUTO: 0.85 K/UL (ref 0–0.85)
MONOCYTES NFR BLD AUTO: 5.8 % (ref 0–13.4)
NEUTROPHILS # BLD AUTO: 13.19 K/UL (ref 2–7.15)
NEUTROPHILS NFR BLD: 89.8 % (ref 44–72)
NRBC # BLD AUTO: 0 K/UL
NRBC BLD-RTO: 0 /100 WBC
PLATELET # BLD AUTO: 337 K/UL (ref 164–446)
PMV BLD AUTO: 9 FL (ref 9–12.9)
POTASSIUM SERPL-SCNC: 4.5 MMOL/L (ref 3.6–5.5)
PROT SERPL-MCNC: 6 G/DL (ref 6–8.2)
RBC # BLD AUTO: 3.62 M/UL (ref 4.2–5.4)
SODIUM SERPL-SCNC: 128 MMOL/L (ref 135–145)
WBC # BLD AUTO: 14.7 K/UL (ref 4.8–10.8)

## 2020-11-29 PROCEDURE — A9270 NON-COVERED ITEM OR SERVICE: HCPCS | Performed by: HOSPITALIST

## 2020-11-29 PROCEDURE — 83735 ASSAY OF MAGNESIUM: CPT

## 2020-11-29 PROCEDURE — 700101 HCHG RX REV CODE 250: Performed by: EMERGENCY MEDICINE

## 2020-11-29 PROCEDURE — 700105 HCHG RX REV CODE 258: Performed by: EMERGENCY MEDICINE

## 2020-11-29 PROCEDURE — 80053 COMPREHEN METABOLIC PANEL: CPT

## 2020-11-29 PROCEDURE — XW033E5 INTRODUCTION OF REMDESIVIR ANTI-INFECTIVE INTO PERIPHERAL VEIN, PERCUTANEOUS APPROACH, NEW TECHNOLOGY GROUP 5: ICD-10-PCS | Performed by: INTERNAL MEDICINE

## 2020-11-29 PROCEDURE — 36415 COLL VENOUS BLD VENIPUNCTURE: CPT

## 2020-11-29 PROCEDURE — 85025 COMPLETE CBC W/AUTO DIFF WBC: CPT

## 2020-11-29 PROCEDURE — 700102 HCHG RX REV CODE 250 W/ 637 OVERRIDE(OP): Performed by: HOSPITALIST

## 2020-11-29 PROCEDURE — 82962 GLUCOSE BLOOD TEST: CPT

## 2020-11-29 PROCEDURE — 700111 HCHG RX REV CODE 636 W/ 250 OVERRIDE (IP): Performed by: HOSPITALIST

## 2020-11-29 PROCEDURE — 99233 SBSQ HOSP IP/OBS HIGH 50: CPT | Performed by: INTERNAL MEDICINE

## 2020-11-29 PROCEDURE — 770014 HCHG ROOM/CARE - WARD (150)

## 2020-11-29 RX ADMIN — DEXAMETHASONE 6 MG: 4 TABLET ORAL at 04:57

## 2020-11-29 RX ADMIN — ASPIRIN 81 MG CHEWABLE TABLET 81 MG: 81 TABLET CHEWABLE at 08:43

## 2020-11-29 RX ADMIN — CARVEDILOL 6.25 MG: 6.25 TABLET, FILM COATED ORAL at 08:43

## 2020-11-29 RX ADMIN — GABAPENTIN 200 MG: 100 CAPSULE ORAL at 04:57

## 2020-11-29 RX ADMIN — LORAZEPAM 1 MG: 1 TABLET ORAL at 21:38

## 2020-11-29 RX ADMIN — LISINOPRIL 20 MG: 20 TABLET ORAL at 08:45

## 2020-11-29 RX ADMIN — ENOXAPARIN SODIUM 40 MG: 100 INJECTION SUBCUTANEOUS at 04:57

## 2020-11-29 RX ADMIN — GABAPENTIN 200 MG: 100 CAPSULE ORAL at 17:25

## 2020-11-29 RX ADMIN — MONTELUKAST 10 MG: 10 TABLET, FILM COATED ORAL at 17:25

## 2020-11-29 RX ADMIN — GABAPENTIN 200 MG: 100 CAPSULE ORAL at 12:01

## 2020-11-29 RX ADMIN — LORAZEPAM 0.5 MG: 1 TABLET ORAL at 15:02

## 2020-11-29 RX ADMIN — REMDESIVIR 200 MG: 100 INJECTION, POWDER, LYOPHILIZED, FOR SOLUTION INTRAVENOUS at 14:06

## 2020-11-29 RX ADMIN — FLUTICASONE PROPIONATE 220 MCG: 110 AEROSOL, METERED RESPIRATORY (INHALATION) at 04:57

## 2020-11-29 RX ADMIN — AMLODIPINE BESYLATE 5 MG: 5 TABLET ORAL at 08:43

## 2020-11-29 RX ADMIN — ACETAMINOPHEN 650 MG: 325 TABLET, FILM COATED ORAL at 21:38

## 2020-11-29 ASSESSMENT — ENCOUNTER SYMPTOMS
COUGH: 1
FOCAL WEAKNESS: 0
NERVOUS/ANXIOUS: 0
EYE DISCHARGE: 0
STRIDOR: 0
VOMITING: 0
NAUSEA: 0
HEMOPTYSIS: 0
TREMORS: 0
EYE REDNESS: 0
ABDOMINAL PAIN: 0
SINUS PAIN: 0
FEVER: 0
HEADACHES: 0
MYALGIAS: 0
SORE THROAT: 0
ORTHOPNEA: 0
CHILLS: 0
DEPRESSION: 0
WEAKNESS: 0

## 2020-11-29 ASSESSMENT — PAIN DESCRIPTION - PAIN TYPE: TYPE: ACUTE PAIN

## 2020-11-29 NOTE — PROGRESS NOTES
[facility] Medicine Daily Progress Note    Date of Service  11/29/2020    Chief Complaint  89 y.o. female admitted 11/20/2020 with vomiting    Hospital Course  89 y.o. female medical history of asthma, coronary artery disease, peripheral vascular disease, osteoporosis who presented 11/20/2020 with vomiting this morning.  She notes a viral prodrome of cough with myalgias and weakness over the past several days.  She has had normal taste and smell.  She reports a general lack of any appetite for the past several days.  She was found to be Covid positive here in our emergency department.  She was further found to be hypoxic requiring supplemental oxygenation to maintain saturation.    Patient was started on dexamethasone.  Her labs were significant for hyponatremia sodium 121 improving, currently at 131.  White count noted to be 3.7.  EKG no acute ST or T wave changes    Interval Problem Update  11/25: The patient is afebrile currently sitting at the side of the bed eating breakfast. The patient mentions she still feels SOB. She had to go back up to 5L of O2. Otherwise the patient is afebrile and as per nursing no other over night events were reported.  11/26: The patient was seen at bedside this morning. Currently afebrile. The patient mentions she feels very weak. She is still needing 3-4 L of oxygen supplementation. We will try to work with her to have her up and moving. If needed we will re-evaluate with PT/OT. As per nursing no other over night events were reported.  11/28: The patient is currently sitting down, as per nursing no other over night events were repotred. We are still pending PT/OT evaluation for discharge planning, she is currently using 5 L of NC.  11/29: The patient is currently using 6L of O2, we have consulted intensivist for remdesivir treatment, which they have started. We will monitor closely. The patient is not complaining of any new symptom at the moment. As per nursing no other over night  events were reported.    Consultants/Specialty  None    Code Status  Full Code    Disposition  Pending PT/OT for discharge planning. Continue to monitor on ACS.     Review of Systems  Review of Systems   Constitutional: Negative for chills and fever.   HENT: Negative for sinus pain and sore throat.    Eyes: Negative for discharge and redness.   Respiratory: Positive for cough. Negative for hemoptysis and stridor.    Cardiovascular: Negative for chest pain and orthopnea.   Gastrointestinal: Negative for abdominal pain, nausea and vomiting.   Genitourinary: Negative for dysuria and hematuria.   Musculoskeletal: Negative for myalgias.   Skin: Negative for itching.   Neurological: Negative for tremors, focal weakness, weakness and headaches.   Psychiatric/Behavioral: Negative for depression. The patient is not nervous/anxious.         Physical Exam  Temp:  [36.4 °C (97.6 °F)-37.1 °C (98.8 °F)] 36.4 °C (97.6 °F)  Pulse:  [60-71] 68  Resp:  [16-20] 18  BP: (118-122)/(53-59) 122/53  SpO2:  [87 %-95 %] 90 %    Physical Exam  Vitals signs and nursing note reviewed.   Constitutional:       General: She is not in acute distress.     Appearance: She is not toxic-appearing.   HENT:      Head: Normocephalic and atraumatic.      Mouth/Throat:      Mouth: Mucous membranes are moist.   Eyes:      General: No scleral icterus.     Conjunctiva/sclera: Conjunctivae normal.   Neck:      Musculoskeletal: Normal range of motion and neck supple.   Cardiovascular:      Rate and Rhythm: Normal rate and regular rhythm.      Pulses: Normal pulses.      Heart sounds: Normal heart sounds. No murmur.   Pulmonary:      Effort: Pulmonary effort is normal. No respiratory distress.      Breath sounds: Normal breath sounds. No wheezing or rales.   Abdominal:      General: Bowel sounds are normal.      Palpations: Abdomen is soft.      Tenderness: There is no abdominal tenderness.   Musculoskeletal: Normal range of motion.   Skin:     General: Skin is  warm.      Capillary Refill: Capillary refill takes less than 2 seconds.   Neurological:      Mental Status: She is alert and oriented to person, place, and time. Mental status is at baseline.   Psychiatric:         Mood and Affect: Mood normal.         Behavior: Behavior normal.         Fluids    Intake/Output Summary (Last 24 hours) at 11/29/2020 1351  Last data filed at 11/29/2020 0500  Gross per 24 hour   Intake 710 ml   Output --   Net 710 ml       Laboratory  Recent Labs     11/27/20  0944 11/29/20  0833   WBC 12.8* 14.7*   RBC 3.72* 3.62*   HEMOGLOBIN 12.4 11.9*   HEMATOCRIT 36.3* 35.2*   MCV 97.6 97.2   MCH 33.3* 32.9   MCHC 34.2 33.8   RDW 44.5 43.0   PLATELETCT 299 337   MPV 9.3 9.0     Recent Labs     11/27/20  0944 11/29/20  0833   SODIUM 127* 128*   POTASSIUM 4.3 4.5   CHLORIDE 92* 98   CO2 22 25   GLUCOSE 248* 113*   BUN 19 17   CREATININE 0.64 0.44*   CALCIUM 9.4 9.2                   Imaging  DX-CHEST-PORTABLE (1 VIEW)   Final Result      1.  Chronic blunting of LEFT costophrenic angle, minimal pleural fluid versus pleural reactive change.   2.  No pneumonia or pulmonary edema.           Assessment/Plan  Acute hypoxemic respiratory failure due to COVID-19 (HCC)- (present on admission)  Assessment & Plan  - Secondary to Covid pneumonia.   - Oxygen as needed; wean as tolerated.   -decadron 6 mg daily and lovenox.  -We will start the patient ISS due to decadron, as well as hypoglycemia protocol.  The patient was started on remdesivir today by the intensivist due to the patient being on 6L of NC.  We will encourage self proning and incentive spirometer use.    CAD (coronary artery disease)- (present on admission)  Assessment & Plan  Continue home dose aspirin, statin, beta blocker.      Essential hypertension- (present on admission)  Assessment & Plan  Continue home medications of amlodipine, carvedilol, isosorbide mononitrate, and lisinopril with hold parameters.     Peripheral neuropathy- (present on  admission)  Assessment & Plan  Continue home dose gabapentin.    Debility- (present on admission)  Assessment & Plan  Appears to be slightly exacerbated by her acute viral illness.    Follow up PT/OT recs.     Benzodiazepine dependence (HCC)- (present on admission)  Assessment & Plan  Per chart review as admitting physician: While I am generally reluctant to give benzodiazepines to the elderly, the risks of withdrawal probably outweigh the risk of continuation at this juncture.  Home dose benzos will be continued.    Mild intermittent asthma with acute exacerbation- (present on admission)  Assessment & Plan  Does not appear currently exacerbated however given her viral infection she is at risk for exacerbation, will maintain a close clinical eye, continue her home medications.       VTE prophylaxis: subcutaneous enoxaparin.

## 2020-11-29 NOTE — PROGRESS NOTES
Patient ambulated around the unit on 5 LNC. Patient tolerated well, steady gait, slight increase of WOB. Will continue to monitor.

## 2020-11-30 LAB
ALBUMIN SERPL BCP-MCNC: 2.6 G/DL (ref 3.2–4.9)
ALBUMIN/GLOB SERPL: 0.8 G/DL
ALP SERPL-CCNC: 58 U/L (ref 30–99)
ALT SERPL-CCNC: 103 U/L (ref 2–50)
ANION GAP SERPL CALC-SCNC: 6 MMOL/L (ref 7–16)
AST SERPL-CCNC: 36 U/L (ref 12–45)
BASOPHILS # BLD AUTO: 0.2 % (ref 0–1.8)
BASOPHILS # BLD: 0.02 K/UL (ref 0–0.12)
BILIRUB SERPL-MCNC: 0.4 MG/DL (ref 0.1–1.5)
BUN SERPL-MCNC: 20 MG/DL (ref 8–22)
CALCIUM SERPL-MCNC: 9.2 MG/DL (ref 8.5–10.5)
CHLORIDE SERPL-SCNC: 100 MMOL/L (ref 96–112)
CO2 SERPL-SCNC: 25 MMOL/L (ref 20–33)
CREAT SERPL-MCNC: 0.41 MG/DL (ref 0.5–1.4)
EOSINOPHIL # BLD AUTO: 0.05 K/UL (ref 0–0.51)
EOSINOPHIL NFR BLD: 0.4 % (ref 0–6.9)
ERYTHROCYTE [DISTWIDTH] IN BLOOD BY AUTOMATED COUNT: 43.6 FL (ref 35.9–50)
GLOBULIN SER CALC-MCNC: 3.2 G/DL (ref 1.9–3.5)
GLUCOSE SERPL-MCNC: 87 MG/DL (ref 65–99)
HCT VFR BLD AUTO: 35.9 % (ref 37–47)
HGB BLD-MCNC: 12.2 G/DL (ref 12–16)
IMM GRANULOCYTES # BLD AUTO: 0.1 K/UL (ref 0–0.11)
IMM GRANULOCYTES NFR BLD AUTO: 0.8 % (ref 0–0.9)
LYMPHOCYTES # BLD AUTO: 1.26 K/UL (ref 1–4.8)
LYMPHOCYTES NFR BLD: 9.5 % (ref 22–41)
MCH RBC QN AUTO: 33.5 PG (ref 27–33)
MCHC RBC AUTO-ENTMCNC: 34 G/DL (ref 33.6–35)
MCV RBC AUTO: 98.6 FL (ref 81.4–97.8)
MONOCYTES # BLD AUTO: 0.95 K/UL (ref 0–0.85)
MONOCYTES NFR BLD AUTO: 7.2 % (ref 0–13.4)
NEUTROPHILS # BLD AUTO: 10.85 K/UL (ref 2–7.15)
NEUTROPHILS NFR BLD: 81.9 % (ref 44–72)
NRBC # BLD AUTO: 0 K/UL
NRBC BLD-RTO: 0 /100 WBC
PLATELET # BLD AUTO: 326 K/UL (ref 164–446)
PMV BLD AUTO: 9.1 FL (ref 9–12.9)
POTASSIUM SERPL-SCNC: 4.3 MMOL/L (ref 3.6–5.5)
PROCALCITONIN SERPL-MCNC: <0.05 NG/ML
PROT SERPL-MCNC: 5.8 G/DL (ref 6–8.2)
RBC # BLD AUTO: 3.64 M/UL (ref 4.2–5.4)
SODIUM SERPL-SCNC: 131 MMOL/L (ref 135–145)
WBC # BLD AUTO: 13.2 K/UL (ref 4.8–10.8)

## 2020-11-30 PROCEDURE — 700111 HCHG RX REV CODE 636 W/ 250 OVERRIDE (IP): Performed by: HOSPITALIST

## 2020-11-30 PROCEDURE — 80053 COMPREHEN METABOLIC PANEL: CPT

## 2020-11-30 PROCEDURE — 99232 SBSQ HOSP IP/OBS MODERATE 35: CPT | Performed by: INTERNAL MEDICINE

## 2020-11-30 PROCEDURE — 770014 HCHG ROOM/CARE - WARD (150)

## 2020-11-30 PROCEDURE — A9270 NON-COVERED ITEM OR SERVICE: HCPCS | Performed by: HOSPITALIST

## 2020-11-30 PROCEDURE — 94760 N-INVAS EAR/PLS OXIMETRY 1: CPT

## 2020-11-30 PROCEDURE — 700102 HCHG RX REV CODE 250 W/ 637 OVERRIDE(OP): Performed by: HOSPITALIST

## 2020-11-30 PROCEDURE — 700101 HCHG RX REV CODE 250: Performed by: EMERGENCY MEDICINE

## 2020-11-30 PROCEDURE — 84145 PROCALCITONIN (PCT): CPT

## 2020-11-30 PROCEDURE — 97535 SELF CARE MNGMENT TRAINING: CPT

## 2020-11-30 PROCEDURE — 36415 COLL VENOUS BLD VENIPUNCTURE: CPT

## 2020-11-30 PROCEDURE — 85025 COMPLETE CBC W/AUTO DIFF WBC: CPT

## 2020-11-30 PROCEDURE — 700105 HCHG RX REV CODE 258: Performed by: EMERGENCY MEDICINE

## 2020-11-30 RX ADMIN — FLUTICASONE PROPIONATE 220 MCG: 110 AEROSOL, METERED RESPIRATORY (INHALATION) at 05:18

## 2020-11-30 RX ADMIN — LISINOPRIL 20 MG: 20 TABLET ORAL at 11:01

## 2020-11-30 RX ADMIN — GABAPENTIN 200 MG: 100 CAPSULE ORAL at 12:32

## 2020-11-30 RX ADMIN — ASPIRIN 81 MG CHEWABLE TABLET 81 MG: 81 TABLET CHEWABLE at 09:22

## 2020-11-30 RX ADMIN — MONTELUKAST 10 MG: 10 TABLET, FILM COATED ORAL at 18:24

## 2020-11-30 RX ADMIN — LORAZEPAM 1 MG: 1 TABLET ORAL at 20:02

## 2020-11-30 RX ADMIN — CARVEDILOL 6.25 MG: 6.25 TABLET, FILM COATED ORAL at 09:22

## 2020-11-30 RX ADMIN — GABAPENTIN 200 MG: 100 CAPSULE ORAL at 05:18

## 2020-11-30 RX ADMIN — REMDESIVIR 100 MG: 100 INJECTION, POWDER, LYOPHILIZED, FOR SOLUTION INTRAVENOUS at 18:51

## 2020-11-30 RX ADMIN — ENOXAPARIN SODIUM 40 MG: 100 INJECTION SUBCUTANEOUS at 05:18

## 2020-11-30 RX ADMIN — GABAPENTIN 200 MG: 100 CAPSULE ORAL at 18:23

## 2020-11-30 ASSESSMENT — ENCOUNTER SYMPTOMS
MYALGIAS: 0
NERVOUS/ANXIOUS: 0
WEAKNESS: 0
STRIDOR: 0
VOMITING: 0
SINUS PAIN: 0
EYE REDNESS: 0
ORTHOPNEA: 0
HEMOPTYSIS: 0
CHILLS: 0
TREMORS: 0
COUGH: 1
DEPRESSION: 0
HEADACHES: 0
FEVER: 0
EYE DISCHARGE: 0
FOCAL WEAKNESS: 0
NAUSEA: 0
SORE THROAT: 0
ABDOMINAL PAIN: 0

## 2020-11-30 ASSESSMENT — COGNITIVE AND FUNCTIONAL STATUS - GENERAL
SUGGESTED CMS G CODE MODIFIER DAILY ACTIVITY: CH
DAILY ACTIVITIY SCORE: 24

## 2020-11-30 NOTE — PROGRESS NOTES
[facility] Medicine Daily Progress Note    Date of Service  11/30/2020    Chief Complaint  89 y.o. female admitted 11/20/2020 with vomiting    Hospital Course  89 y.o. female medical history of asthma, coronary artery disease, peripheral vascular disease, osteoporosis who presented 11/20/2020 with vomiting this morning.  She notes a viral prodrome of cough with myalgias and weakness over the past several days.  She has had normal taste and smell.  She reports a general lack of any appetite for the past several days.  She was found to be Covid positive here in our emergency department.  She was further found to be hypoxic requiring supplemental oxygenation to maintain saturation.    Patient was started on dexamethasone.  Her labs were significant for hyponatremia sodium 121 improving, currently at 131.  White count noted to be 3.7.  EKG no acute ST or T wave changes.    Interval Problem Update  11/25: The patient is afebrile currently sitting at the side of the bed eating breakfast. The patient mentions she still feels SOB. She had to go back up to 5L of O2. Otherwise the patient is afebrile and as per nursing no other over night events were reported.  11/29: The patient is currently using 6L of O2, we have consulted intensivist for remdesivir treatment, which they have started. We will monitor closely. The patient is not complaining of any new symptom at the moment. As per nursing no other over night events were reported.  11/30: The patient was seen at bedside this morning. She is afebrile, she mention she is feeling better since yestereday. Remdesivir was started yesterday. Currently the patient is using 4-5L of O2.    We had reordered PT evaluation and treatment due to the fact that patient has deteriorated since they last saw the patient on the 23rd. We appreciate their further recommendations.     Consultants/Specialty  None    Code Status  Full Code    Disposition  Patient currently on 4-5L of oxygen. She will  continue to be monitored in the ACS for now.    Review of Systems  Review of Systems   Constitutional: Negative for chills and fever.   HENT: Negative for sinus pain and sore throat.    Eyes: Negative for discharge and redness.   Respiratory: Positive for cough. Negative for hemoptysis and stridor.    Cardiovascular: Negative for chest pain and orthopnea.   Gastrointestinal: Negative for abdominal pain, nausea and vomiting.   Genitourinary: Negative for dysuria and hematuria.   Musculoskeletal: Negative for myalgias.   Skin: Negative for itching.   Neurological: Negative for tremors, focal weakness, weakness and headaches.   Psychiatric/Behavioral: Negative for depression. The patient is not nervous/anxious.         Physical Exam  Temp:  [36.2 °C (97.2 °F)-37.2 °C (98.9 °F)] 36.3 °C (97.4 °F)  Pulse:  [63-70] 69  Resp:  [16-18] 17  BP: (117-148)/(48-70) 127/54  SpO2:  [86 %-94 %] 91 %    Physical Exam  Vitals signs and nursing note reviewed.   Constitutional:       General: She is not in acute distress.     Appearance: She is not toxic-appearing.   HENT:      Head: Normocephalic and atraumatic.      Mouth/Throat:      Mouth: Mucous membranes are moist.   Eyes:      General: No scleral icterus.     Conjunctiva/sclera: Conjunctivae normal.   Neck:      Musculoskeletal: Normal range of motion and neck supple.   Cardiovascular:      Rate and Rhythm: Normal rate and regular rhythm.      Pulses: Normal pulses.      Heart sounds: Normal heart sounds. No murmur.   Pulmonary:      Effort: Pulmonary effort is normal. No respiratory distress.      Breath sounds: Normal breath sounds. No wheezing or rales.   Abdominal:      General: Bowel sounds are normal.      Palpations: Abdomen is soft.      Tenderness: There is no abdominal tenderness.   Musculoskeletal: Normal range of motion.   Skin:     General: Skin is warm.      Capillary Refill: Capillary refill takes less than 2 seconds.   Neurological:      Mental Status: She is  alert and oriented to person, place, and time. Mental status is at baseline.   Psychiatric:         Mood and Affect: Mood normal.         Behavior: Behavior normal.         Fluids    Intake/Output Summary (Last 24 hours) at 11/30/2020 1116  Last data filed at 11/30/2020 0332  Gross per 24 hour   Intake 360 ml   Output --   Net 360 ml       Laboratory  Recent Labs     11/29/20  0833 11/30/20  0751   WBC 14.7* 13.2*   RBC 3.62* 3.64*   HEMOGLOBIN 11.9* 12.2   HEMATOCRIT 35.2* 35.9*   MCV 97.2 98.6*   MCH 32.9 33.5*   MCHC 33.8 34.0   RDW 43.0 43.6   PLATELETCT 337 326   MPV 9.0 9.1     Recent Labs     11/29/20  0833 11/30/20  0751   SODIUM 128* 131*   POTASSIUM 4.5 4.3   CHLORIDE 98 100   CO2 25 25   GLUCOSE 113* 87   BUN 17 20   CREATININE 0.44* 0.41*   CALCIUM 9.2 9.2                   Imaging  DX-CHEST-PORTABLE (1 VIEW)   Final Result      1.  Chronic blunting of LEFT costophrenic angle, minimal pleural fluid versus pleural reactive change.   2.  No pneumonia or pulmonary edema.           Assessment/Plan  Acute hypoxemic respiratory failure due to COVID-19 (HCC)- (present on admission)  Assessment & Plan  - Secondary to Covid pneumonia.   - Oxygen as needed; wean as tolerated.   -decadron 6 mg daily and lovenox.  -We will start the patient ISS due to decadron, as well as hypoglycemia protocol.  The patient was started on remdesivir on 11/29/2020 by the intensivist due to the patient being on 6L of NC.  We will encourage self proning and incentive spirometer use.    CAD (coronary artery disease)- (present on admission)  Assessment & Plan  Continue home dose aspirin, statin, beta blocker.      Hyponatremia  Assessment & Plan  Improving.  Asymptomatic.   Monitor BMP.     Essential hypertension- (present on admission)  Assessment & Plan  Continue home medications of amlodipine, carvedilol, isosorbide mononitrate, and lisinopril with hold parameters.     Peripheral neuropathy- (present on admission)  Assessment &  Plan  Continue home dose gabapentin.    Debility- (present on admission)  Assessment & Plan  Appears to be slightly exacerbated by her acute viral illness.    Follow up PT/OT recs.     Benzodiazepine dependence (HCC)- (present on admission)  Assessment & Plan  Per chart review as admitting physician: While I am generally reluctant to give benzodiazepines to the elderly, the risks of withdrawal probably outweigh the risk of continuation at this juncture.  Home dose benzos will be continued.    Mild intermittent asthma with acute exacerbation- (present on admission)  Assessment & Plan  Does not appear currently exacerbated however given her viral infection she is at risk for exacerbation, will maintain a close clinical eye, continue her home medications.       VTE prophylaxis: subcutaneous enoxaparin.

## 2020-12-01 PROCEDURE — 700101 HCHG RX REV CODE 250: Performed by: EMERGENCY MEDICINE

## 2020-12-01 PROCEDURE — 770014 HCHG ROOM/CARE - WARD (150)

## 2020-12-01 PROCEDURE — 700111 HCHG RX REV CODE 636 W/ 250 OVERRIDE (IP): Performed by: HOSPITALIST

## 2020-12-01 PROCEDURE — A9270 NON-COVERED ITEM OR SERVICE: HCPCS | Performed by: HOSPITALIST

## 2020-12-01 PROCEDURE — 700102 HCHG RX REV CODE 250 W/ 637 OVERRIDE(OP): Performed by: HOSPITALIST

## 2020-12-01 PROCEDURE — 700105 HCHG RX REV CODE 258: Performed by: EMERGENCY MEDICINE

## 2020-12-01 PROCEDURE — 99232 SBSQ HOSP IP/OBS MODERATE 35: CPT | Performed by: STUDENT IN AN ORGANIZED HEALTH CARE EDUCATION/TRAINING PROGRAM

## 2020-12-01 RX ADMIN — ISOSORBIDE MONONITRATE 30 MG: 60 TABLET, EXTENDED RELEASE ORAL at 17:49

## 2020-12-01 RX ADMIN — REMDESIVIR 100 MG: 100 INJECTION, POWDER, LYOPHILIZED, FOR SOLUTION INTRAVENOUS at 17:48

## 2020-12-01 RX ADMIN — ASPIRIN 81 MG CHEWABLE TABLET 81 MG: 81 TABLET CHEWABLE at 05:45

## 2020-12-01 RX ADMIN — LISINOPRIL 20 MG: 20 TABLET ORAL at 05:45

## 2020-12-01 RX ADMIN — GABAPENTIN 200 MG: 100 CAPSULE ORAL at 05:44

## 2020-12-01 RX ADMIN — GABAPENTIN 200 MG: 100 CAPSULE ORAL at 11:46

## 2020-12-01 RX ADMIN — LORAZEPAM 1 MG: 1 TABLET ORAL at 20:28

## 2020-12-01 RX ADMIN — ENOXAPARIN SODIUM 40 MG: 100 INJECTION SUBCUTANEOUS at 05:44

## 2020-12-01 RX ADMIN — CARVEDILOL 6.25 MG: 6.25 TABLET, FILM COATED ORAL at 05:45

## 2020-12-01 RX ADMIN — MONTELUKAST 10 MG: 10 TABLET, FILM COATED ORAL at 17:50

## 2020-12-01 RX ADMIN — GABAPENTIN 200 MG: 100 CAPSULE ORAL at 17:50

## 2020-12-01 RX ADMIN — ACETAMINOPHEN 650 MG: 325 TABLET, FILM COATED ORAL at 20:29

## 2020-12-01 RX ADMIN — AMLODIPINE BESYLATE 5 MG: 5 TABLET ORAL at 05:44

## 2020-12-01 RX ADMIN — CARVEDILOL 6.25 MG: 6.25 TABLET, FILM COATED ORAL at 17:49

## 2020-12-01 ASSESSMENT — ENCOUNTER SYMPTOMS
NAUSEA: 0
TREMORS: 0
HEMOPTYSIS: 0
COUGH: 1
ORTHOPNEA: 0
WEAKNESS: 0
SORE THROAT: 0
ABDOMINAL PAIN: 0
EYE REDNESS: 0
SINUS PAIN: 0
EYE DISCHARGE: 0
DEPRESSION: 0
VOMITING: 0
STRIDOR: 0
FOCAL WEAKNESS: 0
NERVOUS/ANXIOUS: 0
CHILLS: 0
MYALGIAS: 0
HEADACHES: 0
FEVER: 0

## 2020-12-01 NOTE — PROGRESS NOTES
[facility] Medicine Daily Progress Note    Date of Service  12/1/2020    Chief Complaint  89 y.o. female admitted 11/20/2020 with vomiting    Hospital Course  89 y.o. female medical history of asthma, coronary artery disease, peripheral vascular disease, osteoporosis who presented 11/20/2020 with vomiting this morning.  She notes a viral prodrome of cough with myalgias and weakness over the past several days.  She has had normal taste and smell.  She reports a general lack of any appetite for the past several days.  She was found to be Covid positive here in our emergency department.  She was further found to be hypoxic requiring supplemental oxygenation to maintain saturation.    Patient was started on dexamethasone.  Her labs were significant for hyponatremia sodium 121 improving, currently at 131.  White count noted to be 3.7.  EKG no acute ST or T wave changes.    Interval Problem Update  11/25: The patient is afebrile currently sitting at the side of the bed eating breakfast. The patient mentions she still feels SOB. She had to go back up to 5L of O2. Otherwise the patient is afebrile and as per nursing no other over night events were reported.  11/29: The patient is currently using 6L of O2, we have consulted intensivist for remdesivir treatment, which they have started. We will monitor closely. The patient is not complaining of any new symptom at the moment. As per nursing no other over night events were reported.  11/30: The patient was seen at bedside this morning. She is afebrile, she mention she is feeling better since yestereday. Remdesivir was started yesterday. Currently the patient is using 4-5L of O2.  12/1/2020 patient had no acute events overnight she remains afebrile saturating 94% on 4 L.  Continue to wait for placement needs.  Continue remdesivir.    We had reordered PT evaluation and treatment due to the fact that patient has deteriorated since they last saw the patient on the 23rd. We appreciate  their further recommendations.     Consultants/Specialty  None    Code Status  Full Code    Disposition  Patient currently on 4-5L of oxygen. She will continue to be monitored in the ACS for now.    Review of Systems  Review of Systems   Constitutional: Negative for chills and fever.   HENT: Negative for sinus pain and sore throat.    Eyes: Negative for discharge and redness.   Respiratory: Positive for cough. Negative for hemoptysis and stridor.    Cardiovascular: Negative for chest pain and orthopnea.   Gastrointestinal: Negative for abdominal pain, nausea and vomiting.   Genitourinary: Negative for dysuria and hematuria.   Musculoskeletal: Negative for myalgias.   Skin: Negative for itching.   Neurological: Negative for tremors, focal weakness, weakness and headaches.   Psychiatric/Behavioral: Negative for depression. The patient is not nervous/anxious.         Physical Exam  Temp:  [36.1 °C (97 °F)-37.4 °C (99.4 °F)] 36.1 °C (97 °F)  Pulse:  [70-80] 73  Resp:  [17-19] 17  BP: (103-120)/(44-52) 103/44  SpO2:  [89 %-94 %] 94 %    Physical Exam  Vitals signs and nursing note reviewed.   Constitutional:       General: She is not in acute distress.     Appearance: She is not toxic-appearing.   HENT:      Head: Normocephalic and atraumatic.      Mouth/Throat:      Mouth: Mucous membranes are moist.   Eyes:      General: No scleral icterus.     Conjunctiva/sclera: Conjunctivae normal.   Neck:      Musculoskeletal: Normal range of motion and neck supple.   Cardiovascular:      Rate and Rhythm: Normal rate and regular rhythm.      Pulses: Normal pulses.      Heart sounds: Normal heart sounds. No murmur.   Pulmonary:      Effort: Pulmonary effort is normal. No respiratory distress.      Breath sounds: Normal breath sounds. No wheezing or rales.   Abdominal:      General: Bowel sounds are normal.      Palpations: Abdomen is soft.      Tenderness: There is no abdominal tenderness.   Musculoskeletal: Normal range of motion.    Skin:     General: Skin is warm.      Capillary Refill: Capillary refill takes less than 2 seconds.   Neurological:      Mental Status: She is alert and oriented to person, place, and time. Mental status is at baseline.   Psychiatric:         Mood and Affect: Mood normal.         Behavior: Behavior normal.         Fluids    Intake/Output Summary (Last 24 hours) at 12/1/2020 1439  Last data filed at 12/1/2020 1412  Gross per 24 hour   Intake 100 ml   Output --   Net 100 ml       Laboratory  Recent Labs     11/29/20  0833 11/30/20  0751   WBC 14.7* 13.2*   RBC 3.62* 3.64*   HEMOGLOBIN 11.9* 12.2   HEMATOCRIT 35.2* 35.9*   MCV 97.2 98.6*   MCH 32.9 33.5*   MCHC 33.8 34.0   RDW 43.0 43.6   PLATELETCT 337 326   MPV 9.0 9.1     Recent Labs     11/29/20  0833 11/30/20  0751   SODIUM 128* 131*   POTASSIUM 4.5 4.3   CHLORIDE 98 100   CO2 25 25   GLUCOSE 113* 87   BUN 17 20   CREATININE 0.44* 0.41*   CALCIUM 9.2 9.2                   Imaging  DX-CHEST-PORTABLE (1 VIEW)   Final Result      1.  Chronic blunting of LEFT costophrenic angle, minimal pleural fluid versus pleural reactive change.   2.  No pneumonia or pulmonary edema.           Assessment/Plan  Acute hypoxemic respiratory failure due to COVID-19 (HCC)- (present on admission)  Assessment & Plan  - Secondary to Covid pneumonia.   - Oxygen as needed; wean as tolerated.   -decadron 6 mg daily and lovenox.  -We will start the patient ISS due to decadron, as well as hypoglycemia protocol.  The patient was started on remdesivir on 11/29/2020 by the intensivist due to the patient being on 6L of NC.  -Patient's present oxygenation needs 4 L nasal cannula to maintain 94% continue remdesivir at this time.  We will encourage self proning and incentive spirometer use.    CAD (coronary artery disease)- (present on admission)  Assessment & Plan  Continue home dose aspirin, statin, beta blocker.      Hyponatremia  Assessment & Plan  Improving.  Asymptomatic.   Monitor BMP.      Essential hypertension- (present on admission)  Assessment & Plan  Continue home medications of amlodipine, carvedilol, isosorbide mononitrate, and lisinopril with hold parameters.     Peripheral neuropathy- (present on admission)  Assessment & Plan  Continue home dose gabapentin.    Debility- (present on admission)  Assessment & Plan  Appears to be slightly exacerbated by her acute viral illness.    Follow up PT/OT recs.     Benzodiazepine dependence (HCC)- (present on admission)  Assessment & Plan  Per chart review as admitting physician: While I am generally reluctant to give benzodiazepines to the elderly, the risks of withdrawal probably outweigh the risk of continuation at this juncture.  Home dose benzos will be continued.    Mild intermittent asthma with acute exacerbation- (present on admission)  Assessment & Plan  Does not appear currently exacerbated however given her viral infection she is at risk for exacerbation, will maintain a close clinical eye, continue her home medications.       VTE prophylaxis: subcutaneous enoxaparin.

## 2020-12-01 NOTE — THERAPY
Occupational Therapy  Daily Treatment     Patient Name: Dayana Lechuga  Age:  89 y.o., Sex:  female  Medical Record #: [id number]  Today's Date: 11/30/2020     Precautions  Precautions: Fall Risk    Assessment    Pt seen upon physician request for re-evaluation. Pt appears to be functioning at baseline for ADLs, ADL transfers, and functional mobility. Pt able to ambulate to bathroom with spv using FWW with no physical assist, complete toilet transfer and toileting with spv, standing grooming/hygiene at sink with spv, and LE dressing with spv. Pt demonstrates good balance and safety awareness. No further acute OT indicated at this time. Recommended pt take 2 walks per day with nursing staff, ambulate to toilet instead of using BSC, and sit EOB for all meals. Pt verbalized understanding. As pt lives alone, recommend home health for continued occupational therapy services.     Plan    Patient will not be actively followed for occupational therapy services at this time, however may be seen if requested by physician for 1 more visit within 30 days to address any discharge or equipment needs.     DC Equipment Recommendations: None  Discharge Recommendations: Recommend home health for continued occupational therapy services.    Subjective    Pt alert, pleasant, and cooperative with OT session.      Objective       11/30/20 1535   Vitals   Vitals Comments no overt increase in WOB/SOB during activity   Cognition    Cognition / Consciousness WDL   Level of Consciousness Alert   Comments pleasant, cooperative, receptive to education   Other Treatments   Other Treatments Provided Educated pt to take 2 walks per day with staff, ambulate to bathroom to toilet instead of BSC, and sit EOB for all meals. Pt verbalized understanding.   Balance   Comments standing with FWW, no overt LOB   Bed Mobility    Supine to Sit Minimal Assist   Comments pulled on therapists hand to move from supine to seated EOB   Activities of Daily  Living   Grooming Supervision;Standing  (washed hands)   Lower Body Dressing Supervision  (socks)   Toileting Supervision  (able to complete pericare and clothing management)   Functional Mobility   Sit to Stand Supervised   Toilet Transfers Supervised   Mobility in unit with FWW, close spv for safety   Activity Tolerance   Comments no reports or signs/symptoms of fatigue   Patient / Family Goals   Patient / Family Goal #1 To get better and go home

## 2020-12-02 PROCEDURE — 700105 HCHG RX REV CODE 258: Performed by: EMERGENCY MEDICINE

## 2020-12-02 PROCEDURE — 94760 N-INVAS EAR/PLS OXIMETRY 1: CPT

## 2020-12-02 PROCEDURE — 700101 HCHG RX REV CODE 250: Performed by: EMERGENCY MEDICINE

## 2020-12-02 PROCEDURE — 700111 HCHG RX REV CODE 636 W/ 250 OVERRIDE (IP): Performed by: HOSPITALIST

## 2020-12-02 PROCEDURE — A9270 NON-COVERED ITEM OR SERVICE: HCPCS | Performed by: HOSPITALIST

## 2020-12-02 PROCEDURE — 94640 AIRWAY INHALATION TREATMENT: CPT

## 2020-12-02 PROCEDURE — 770014 HCHG ROOM/CARE - WARD (150)

## 2020-12-02 PROCEDURE — 99232 SBSQ HOSP IP/OBS MODERATE 35: CPT | Performed by: STUDENT IN AN ORGANIZED HEALTH CARE EDUCATION/TRAINING PROGRAM

## 2020-12-02 PROCEDURE — 700102 HCHG RX REV CODE 250 W/ 637 OVERRIDE(OP): Performed by: HOSPITALIST

## 2020-12-02 RX ADMIN — LORAZEPAM 1 MG: 1 TABLET ORAL at 20:56

## 2020-12-02 RX ADMIN — GABAPENTIN 200 MG: 100 CAPSULE ORAL at 04:51

## 2020-12-02 RX ADMIN — ENOXAPARIN SODIUM 40 MG: 100 INJECTION SUBCUTANEOUS at 04:51

## 2020-12-02 RX ADMIN — REMDESIVIR 100 MG: 100 INJECTION, POWDER, LYOPHILIZED, FOR SOLUTION INTRAVENOUS at 18:59

## 2020-12-02 RX ADMIN — ISOSORBIDE MONONITRATE 30 MG: 60 TABLET, EXTENDED RELEASE ORAL at 19:06

## 2020-12-02 RX ADMIN — LORAZEPAM 0.5 MG: 1 TABLET ORAL at 16:03

## 2020-12-02 RX ADMIN — GABAPENTIN 200 MG: 100 CAPSULE ORAL at 20:56

## 2020-12-02 RX ADMIN — ASPIRIN 81 MG CHEWABLE TABLET 81 MG: 81 TABLET CHEWABLE at 09:33

## 2020-12-02 RX ADMIN — CARVEDILOL 6.25 MG: 6.25 TABLET, FILM COATED ORAL at 09:31

## 2020-12-02 RX ADMIN — FLUTICASONE PROPIONATE 220 MCG: 110 AEROSOL, METERED RESPIRATORY (INHALATION) at 06:22

## 2020-12-02 RX ADMIN — GABAPENTIN 200 MG: 100 CAPSULE ORAL at 15:30

## 2020-12-02 RX ADMIN — MONTELUKAST 10 MG: 10 TABLET, FILM COATED ORAL at 22:21

## 2020-12-02 ASSESSMENT — ENCOUNTER SYMPTOMS
VOMITING: 0
COUGH: 0
TREMORS: 0
FOCAL WEAKNESS: 0
SORE THROAT: 0
ABDOMINAL PAIN: 0
WEAKNESS: 0
FEVER: 0
ORTHOPNEA: 0
CHILLS: 0
EYE REDNESS: 0
MYALGIAS: 0
NAUSEA: 0
EYE DISCHARGE: 0
STRIDOR: 0
HEADACHES: 0
HEMOPTYSIS: 0
SINUS PAIN: 0
NERVOUS/ANXIOUS: 0
DEPRESSION: 0

## 2020-12-02 ASSESSMENT — PAIN DESCRIPTION - PAIN TYPE: TYPE: ACUTE PAIN

## 2020-12-02 NOTE — PROGRESS NOTES
Hospital Medicine Daily Progress Note    Date of Service  12/2/2020    Chief Complaint  89 y.o. female admitted 11/20/2020 with vomiting    Hospital Course  89 y.o. female medical history of asthma, coronary artery disease, peripheral vascular disease, osteoporosis who presented 11/20/2020 with vomiting this morning.  She notes a viral prodrome of cough with myalgias and weakness over the past several days.  She has had normal taste and smell.  She reports a general lack of any appetite for the past several days.  She was found to be Covid positive here in our emergency department.  She was further found to be hypoxic requiring supplemental oxygenation to maintain saturation.    Patient was started on dexamethasone.  Her labs were significant for hyponatremia sodium 121 improving, currently at 131.  White count noted to be 3.7.  EKG no acute ST or T wave changes.    Interval Problem Update  11/25: The patient is afebrile currently sitting at the side of the bed eating breakfast. The patient mentions she still feels SOB. She had to go back up to 5L of O2. Otherwise the patient is afebrile and as per nursing no other over night events were reported.  11/29: The patient is currently using 6L of O2, we have consulted intensivist for remdesivir treatment, which they have started. We will monitor closely. The patient is not complaining of any new symptom at the moment. As per nursing no other over night events were reported.  11/30: The patient was seen at bedside this morning. She is afebrile, she mention she is feeling better since yestereday. Remdesivir was started yesterday. Currently the patient is using 4-5L of O2.  12/1/2020 patient had no acute events overnight she remains afebrile saturating 94% on 4 L.  Continue to wait for placement needs.  Continue remdesivir.  12/2/2020: No acute events overnight continue remdesivir at this time.  Last dose 12/3/2020    We had reordered PT evaluation and treatment due to the  fact that patient has deteriorated since they last saw the patient on the 23rd. We appreciate their further recommendations.     Consultants/Specialty  None    Code Status  Full Code    Disposition  Patient currently on 4-5L of oxygen. She will continue to be monitored in the ACS for now.    Review of Systems  Review of Systems   Constitutional: Negative for chills and fever.   HENT: Negative for sinus pain and sore throat.    Eyes: Negative for discharge and redness.   Respiratory: Negative for cough, hemoptysis and stridor.    Cardiovascular: Negative for chest pain and orthopnea.   Gastrointestinal: Negative for abdominal pain, nausea and vomiting.   Genitourinary: Negative for dysuria and hematuria.   Musculoskeletal: Negative for myalgias.   Skin: Negative for itching.   Neurological: Negative for tremors, focal weakness, weakness and headaches.   Psychiatric/Behavioral: Negative for depression. The patient is not nervous/anxious.         Physical Exam  Temp:  [36.1 °C (97 °F)-36.8 °C (98.2 °F)] 36.1 °C (97 °F)  Pulse:  [68-78] 74  Resp:  [17-20] 18  BP: (103-126)/(44-57) 126/57  SpO2:  [89 %-94 %] 94 %    Physical Exam  Vitals signs and nursing note reviewed.   Constitutional:       General: She is not in acute distress.     Appearance: She is not toxic-appearing.   HENT:      Head: Normocephalic and atraumatic.      Mouth/Throat:      Mouth: Mucous membranes are moist.   Eyes:      General: No scleral icterus.     Conjunctiva/sclera: Conjunctivae normal.   Neck:      Musculoskeletal: Normal range of motion and neck supple.   Cardiovascular:      Rate and Rhythm: Normal rate and regular rhythm.      Pulses: Normal pulses.      Heart sounds: Normal heart sounds. No murmur.   Pulmonary:      Effort: Pulmonary effort is normal. No respiratory distress.      Breath sounds: Normal breath sounds. No wheezing or rales.   Abdominal:      General: Bowel sounds are normal.      Palpations: Abdomen is soft.       Tenderness: There is no abdominal tenderness.   Musculoskeletal: Normal range of motion.   Skin:     General: Skin is warm.      Capillary Refill: Capillary refill takes less than 2 seconds.   Neurological:      Mental Status: She is alert and oriented to person, place, and time. Mental status is at baseline.   Psychiatric:         Mood and Affect: Mood normal.         Behavior: Behavior normal.         Fluids    Intake/Output Summary (Last 24 hours) at 12/2/2020 1324  Last data filed at 12/1/2020 1412  Gross per 24 hour   Intake 100 ml   Output --   Net 100 ml       Laboratory  Recent Labs     11/30/20  0751   WBC 13.2*   RBC 3.64*   HEMOGLOBIN 12.2   HEMATOCRIT 35.9*   MCV 98.6*   MCH 33.5*   MCHC 34.0   RDW 43.6   PLATELETCT 326   MPV 9.1     Recent Labs     11/30/20  0751   SODIUM 131*   POTASSIUM 4.3   CHLORIDE 100   CO2 25   GLUCOSE 87   BUN 20   CREATININE 0.41*   CALCIUM 9.2                   Imaging  DX-CHEST-PORTABLE (1 VIEW)   Final Result      1.  Chronic blunting of LEFT costophrenic angle, minimal pleural fluid versus pleural reactive change.   2.  No pneumonia or pulmonary edema.           Assessment/Plan  Acute hypoxemic respiratory failure due to COVID-19 (HCC)- (present on admission)  Assessment & Plan  - Secondary to Covid pneumonia.   - Oxygen as needed; wean as tolerated.   -decadron 6 mg daily and lovenox.  -We will start the patient ISS due to decadron, as well as hypoglycemia protocol.  The patient was started on remdesivir on 11/29/2020 by the intensivist due to the patient being on 6L of NC.  -Patient's present oxygenation needs 4 L nasal cannula to maintain 94% continue remdesivir at this time.  We will encourage self proning and incentive spirometer use.    CAD (coronary artery disease)- (present on admission)  Assessment & Plan  Continue home dose aspirin, statin, beta blocker.      Hyponatremia  Assessment & Plan  Improving.  Asymptomatic.   Monitor BMP.     Essential hypertension-  (present on admission)  Assessment & Plan  Continue home medications of amlodipine, carvedilol, isosorbide mononitrate, and lisinopril with hold parameters.     Peripheral neuropathy- (present on admission)  Assessment & Plan  Continue home dose gabapentin.    Debility- (present on admission)  Assessment & Plan  Appears to be slightly exacerbated by her acute viral illness.    Follow up PT/OT recs.     Benzodiazepine dependence (HCC)- (present on admission)  Assessment & Plan  Per chart review as admitting physician: While I am generally reluctant to give benzodiazepines to the elderly, the risks of withdrawal probably outweigh the risk of continuation at this juncture.  Home dose benzos will be continued.    Mild intermittent asthma with acute exacerbation- (present on admission)  Assessment & Plan  Does not appear currently exacerbated however given her viral infection she is at risk for exacerbation, will maintain a close clinical eye, continue her home medications.       VTE prophylaxis: Lovenox 40 mg subcu daily

## 2020-12-02 NOTE — PROGRESS NOTES
Assessment completed. Pt A&Ox 4. Respirations are even and unlabored on 3L n/c. Pt denies pain at this time. VS stable, call light and belongings within reach. POC updated (Placement negative test). Pt educated on room and call light, pt verbalized understanding. Communication board updated. Needs met.

## 2020-12-03 PROCEDURE — 770014 HCHG ROOM/CARE - WARD (150)

## 2020-12-03 PROCEDURE — 99232 SBSQ HOSP IP/OBS MODERATE 35: CPT | Performed by: INTERNAL MEDICINE

## 2020-12-03 PROCEDURE — 94760 N-INVAS EAR/PLS OXIMETRY 1: CPT

## 2020-12-03 PROCEDURE — 97164 PT RE-EVAL EST PLAN CARE: CPT

## 2020-12-03 PROCEDURE — 700111 HCHG RX REV CODE 636 W/ 250 OVERRIDE (IP): Performed by: HOSPITALIST

## 2020-12-03 PROCEDURE — A9270 NON-COVERED ITEM OR SERVICE: HCPCS | Performed by: HOSPITALIST

## 2020-12-03 PROCEDURE — 700101 HCHG RX REV CODE 250: Performed by: EMERGENCY MEDICINE

## 2020-12-03 PROCEDURE — 94640 AIRWAY INHALATION TREATMENT: CPT

## 2020-12-03 PROCEDURE — 700105 HCHG RX REV CODE 258: Performed by: EMERGENCY MEDICINE

## 2020-12-03 PROCEDURE — 700102 HCHG RX REV CODE 250 W/ 637 OVERRIDE(OP): Performed by: HOSPITALIST

## 2020-12-03 RX ADMIN — GABAPENTIN 200 MG: 100 CAPSULE ORAL at 12:51

## 2020-12-03 RX ADMIN — LORAZEPAM 0.5 MG: 1 TABLET ORAL at 15:14

## 2020-12-03 RX ADMIN — MONTELUKAST 10 MG: 10 TABLET, FILM COATED ORAL at 18:21

## 2020-12-03 RX ADMIN — FLUTICASONE PROPIONATE 220 MCG: 110 AEROSOL, METERED RESPIRATORY (INHALATION) at 05:17

## 2020-12-03 RX ADMIN — LORAZEPAM 1 MG: 1 TABLET ORAL at 20:11

## 2020-12-03 RX ADMIN — ENOXAPARIN SODIUM 40 MG: 100 INJECTION SUBCUTANEOUS at 05:17

## 2020-12-03 RX ADMIN — ASPIRIN 81 MG CHEWABLE TABLET 81 MG: 81 TABLET CHEWABLE at 08:43

## 2020-12-03 RX ADMIN — GABAPENTIN 200 MG: 100 CAPSULE ORAL at 05:17

## 2020-12-03 RX ADMIN — REMDESIVIR 100 MG: 100 INJECTION, POWDER, LYOPHILIZED, FOR SOLUTION INTRAVENOUS at 18:20

## 2020-12-03 RX ADMIN — CARVEDILOL 6.25 MG: 6.25 TABLET, FILM COATED ORAL at 18:21

## 2020-12-03 RX ADMIN — CARVEDILOL 6.25 MG: 6.25 TABLET, FILM COATED ORAL at 08:43

## 2020-12-03 RX ADMIN — ISOSORBIDE MONONITRATE 30 MG: 60 TABLET, EXTENDED RELEASE ORAL at 18:21

## 2020-12-03 RX ADMIN — GABAPENTIN 200 MG: 100 CAPSULE ORAL at 18:21

## 2020-12-03 RX ADMIN — FLUTICASONE PROPIONATE 220 MCG: 110 AEROSOL, METERED RESPIRATORY (INHALATION) at 18:20

## 2020-12-03 ASSESSMENT — ENCOUNTER SYMPTOMS
ORTHOPNEA: 0
NAUSEA: 0
EYE REDNESS: 0
NERVOUS/ANXIOUS: 0
TREMORS: 0
COUGH: 0
ABDOMINAL PAIN: 0
SORE THROAT: 0
SINUS PAIN: 0
EYE DISCHARGE: 0
STRIDOR: 0
HEADACHES: 0
HEMOPTYSIS: 0
CHILLS: 0
FEVER: 0
MYALGIAS: 0
WEAKNESS: 0
FOCAL WEAKNESS: 0
VOMITING: 0
DEPRESSION: 0

## 2020-12-03 ASSESSMENT — COGNITIVE AND FUNCTIONAL STATUS - GENERAL
SUGGESTED CMS G CODE MODIFIER MOBILITY: CI
MOBILITY SCORE: 23
CLIMB 3 TO 5 STEPS WITH RAILING: A LITTLE

## 2020-12-03 ASSESSMENT — GAIT ASSESSMENTS
GAIT LEVEL OF ASSIST: SUPERVISED
DISTANCE (FEET): 180
ASSISTIVE DEVICE: FRONT WHEEL WALKER

## 2020-12-03 ASSESSMENT — PATIENT HEALTH QUESTIONNAIRE - PHQ9
2. FEELING DOWN, DEPRESSED, IRRITABLE, OR HOPELESS: NOT AT ALL
1. LITTLE INTEREST OR PLEASURE IN DOING THINGS: NOT AT ALL
SUM OF ALL RESPONSES TO PHQ9 QUESTIONS 1 AND 2: 0

## 2020-12-03 ASSESSMENT — PAIN DESCRIPTION - PAIN TYPE: TYPE: ACUTE PAIN

## 2020-12-03 NOTE — PROGRESS NOTES
Report received from JASVIR Hanson.  Patient resting in bed.  POC gone over with pt and all questions answered at this time.  Pt is on 2L NC and hanging out around 90% SpO2.  Patient A & O x 4.  No apparent signs of distress.  Safety precautions in place.  Patient educated to call for assistance.  Will continue to monitor.

## 2020-12-03 NOTE — THERAPY
Physical Therapy   Re-Evaluation     Patient Name: Dayana Lechuga  Age:  89 y.o., Sex:  female  Medical Record #: [id number]  Today's Date: 12/3/2020     Precautions: Desaturates with Activity    Assessment  Patient is 89 y.o. female admitted COVID+ previously evaluated on 11/23 and DC'd from PT services as she had no skilled need. Today, pt able to perform bed mobility, transfer, and gait x180' with FWW at spv and no overt LOB. She did desaturate from 93% at rest to 85% post-gait and required 2mins to re-saturate to 90% on 2L via nasal cannula. Pt continues to not require skilled intervention. She is functionally capable of return home with HHPT. Discussed this with her daughter, Naila, over the phone. Nursing to continue to mobilize pt to prevent loss of function.   Plan    Recommend Physical Therapy for Evaluation only     DC Equipment Recommendations: None  Discharge Recommendations: Recommend home health for continued physical therapy services          Objective       12/03/20 1124   Balance   Comments seated at indep; standing at spv with FWW   Gait Analysis   Gait Level Of Assist Supervised   Assistive Device Front Wheel Walker   Distance (Feet) 180   Comments pt reporting she has been going up/down the 2 stairs to enter bathroom on unit. Today, pt desaturated to 85% on 2L via nasal cannula. 2min seated rest to re-saturate to 90%.    Bed Mobility    Supine to Sit Supervised   Sit to Supine Supervised   Scooting Supervised   Rolling Supervised   Comments no cues required; flat bed, no railing available.    Functional Mobility   Sit to Stand Supervised   Bed, Chair, Wheelchair Transfer Supervised   Transfer Method Stand Step   Anticipated Discharge Equipment and Recommendations   DC Equipment Recommendations None   Discharge Recommendations Recommend home health for continued physical therapy services

## 2020-12-03 NOTE — PROGRESS NOTES
[facility] Medicine Daily Progress Note    Date of Service  12/3/2020    Chief Complaint  89 y.o. female admitted 11/20/2020 with vomiting    Hospital Course  89 y.o. female medical history of asthma, coronary artery disease, peripheral vascular disease, osteoporosis who presented 11/20/2020 with vomiting this morning.  She notes a viral prodrome of cough with myalgias and weakness over the past several days.  She has had normal taste and smell.  She reports a general lack of any appetite for the past several days.  She was found to be Covid positive here in our emergency department.  She was further found to be hypoxic requiring supplemental oxygenation to maintain saturation.    Patient was started on dexamethasone.  Her labs were significant for hyponatremia sodium 121 improving, currently at 131.  White count noted to be 3.7.  EKG no acute ST or T wave changes.    Interval Problem Update  11/25: The patient is afebrile currently sitting at the side of the bed eating breakfast. The patient mentions she still feels SOB. She had to go back up to 5L of O2. Otherwise the patient is afebrile and as per nursing no other over night events were reported.  11/29: The patient is currently using 6L of O2, we have consulted intensivist for remdesivir treatment, which they have started. We will monitor closely. The patient is not complaining of any new symptom at the moment. As per nursing no other over night events were reported.  11/30: The patient was seen at bedside this morning. She is afebrile, she mention she is feeling better since yestereday. Remdesivir was started yesterday. Currently the patient is using 4-5L of O2.  12/1/2020 patient had no acute events overnight she remains afebrile saturating 94% on 4 L.  Continue to wait for placement needs.  Continue remdesivir.  12/3/2020: The patient mentions she is feeling much better, Today is the last dose of remdesivir currently using 2-3L of oxygen. We will try to move  her out of bed as much as possible. As per nursing no other over night events were reported.    We had reordered PT evaluation and treatment due to the fact that patient has deteriorated since they last saw the patient on the 23rd. We appreciate their further recommendations.     Consultants/Specialty  None    Code Status  Full Code    Disposition  Patient currently on 4-5L of oxygen. She will continue to be monitored in the ACS for now.    Review of Systems  Review of Systems   Constitutional: Negative for chills and fever.   HENT: Negative for sinus pain and sore throat.    Eyes: Negative for discharge and redness.   Respiratory: Negative for cough, hemoptysis and stridor.    Cardiovascular: Negative for chest pain and orthopnea.   Gastrointestinal: Negative for abdominal pain, nausea and vomiting.   Genitourinary: Negative for dysuria and hematuria.   Musculoskeletal: Negative for myalgias.   Skin: Negative for itching.   Neurological: Negative for tremors, focal weakness, weakness and headaches.   Psychiatric/Behavioral: Negative for depression. The patient is not nervous/anxious.         Physical Exam  Temp:  [36.4 °C (97.6 °F)-37.1 °C (98.7 °F)] 36.4 °C (97.6 °F)  Pulse:  [58-82] 77  Resp:  [18-20] 18  BP: (107-146)/(47-76) 124/53  SpO2:  [89 %-94 %] 89 %    Physical Exam  Vitals signs and nursing note reviewed.   Constitutional:       General: She is not in acute distress.     Appearance: She is not toxic-appearing.   HENT:      Head: Normocephalic and atraumatic.      Mouth/Throat:      Mouth: Mucous membranes are moist.   Eyes:      General: No scleral icterus.     Conjunctiva/sclera: Conjunctivae normal.   Neck:      Musculoskeletal: Normal range of motion and neck supple.   Cardiovascular:      Rate and Rhythm: Normal rate and regular rhythm.      Pulses: Normal pulses.      Heart sounds: Normal heart sounds. No murmur.   Pulmonary:      Effort: Pulmonary effort is normal. No respiratory distress.       Breath sounds: Normal breath sounds. No wheezing or rales.   Abdominal:      General: Bowel sounds are normal.      Palpations: Abdomen is soft.      Tenderness: There is no abdominal tenderness.   Musculoskeletal: Normal range of motion.   Skin:     General: Skin is warm.      Capillary Refill: Capillary refill takes less than 2 seconds.   Neurological:      Mental Status: She is alert and oriented to person, place, and time. Mental status is at baseline.   Psychiatric:         Mood and Affect: Mood normal.         Behavior: Behavior normal.         Fluids    Intake/Output Summary (Last 24 hours) at 12/3/2020 1057  Last data filed at 12/2/2020 1730  Gross per 24 hour   Intake 240 ml   Output --   Net 240 ml       Laboratory                        Imaging  DX-CHEST-PORTABLE (1 VIEW)   Final Result      1.  Chronic blunting of LEFT costophrenic angle, minimal pleural fluid versus pleural reactive change.   2.  No pneumonia or pulmonary edema.           Assessment/Plan  Acute hypoxemic respiratory failure due to COVID-19 (HCC)- (present on admission)  Assessment & Plan  - Secondary to Covid pneumonia.   - Oxygen as needed; wean as tolerated.   -decadron 6 mg daily and lovenox.  -We will start the patient ISS due to decadron, as well as hypoglycemia protocol.  The patient was started on remdesivir on 11/29/2020 by the intensivist due to the patient being on 6L of NC.  -Patient's present oxygenation needs 2-3 L nasal cannula to maintain 94% continue remdesivir at this time.  We will encourage self proning and incentive spirometer use.    CAD (coronary artery disease)- (present on admission)  Assessment & Plan  Continue home dose aspirin, statin, beta blocker.      Hyponatremia  Assessment & Plan  Improving.  Asymptomatic.   Monitor BMP.     Essential hypertension- (present on admission)  Assessment & Plan  Continue home medications of amlodipine, carvedilol, isosorbide mononitrate, and lisinopril with hold parameters.      Peripheral neuropathy- (present on admission)  Assessment & Plan  Continue home dose gabapentin.    Debility- (present on admission)  Assessment & Plan  Appears to be slightly exacerbated by her acute viral illness.    Follow up PT/OT recs.     Benzodiazepine dependence (HCC)- (present on admission)  Assessment & Plan  Per chart review as admitting physician: While I am generally reluctant to give benzodiazepines to the elderly, the risks of withdrawal probably outweigh the risk of continuation at this juncture.  Home dose benzos will be continued.    Mild intermittent asthma with acute exacerbation- (present on admission)  Assessment & Plan  Does not appear currently exacerbated however given her viral infection she is at risk for exacerbation, will maintain a close clinical eye, continue her home medications.       VTE prophylaxis: Lovenox 40 mg subcu daily

## 2020-12-03 NOTE — PROGRESS NOTES
Report received from JASVIR Marmolejo.  Patient resting in bed.  POC gone over with pt and all questions answered.  Patient A & O  X 4.  No apparent signs of distress.  Safety precautions in place.  Patient educated to call for assistance.  Will continue to monitor.

## 2020-12-04 PROCEDURE — 700111 HCHG RX REV CODE 636 W/ 250 OVERRIDE (IP): Performed by: HOSPITALIST

## 2020-12-04 PROCEDURE — 770014 HCHG ROOM/CARE - WARD (150)

## 2020-12-04 PROCEDURE — A9270 NON-COVERED ITEM OR SERVICE: HCPCS | Performed by: HOSPITALIST

## 2020-12-04 PROCEDURE — 99232 SBSQ HOSP IP/OBS MODERATE 35: CPT | Performed by: INTERNAL MEDICINE

## 2020-12-04 PROCEDURE — 97530 THERAPEUTIC ACTIVITIES: CPT

## 2020-12-04 PROCEDURE — 700102 HCHG RX REV CODE 250 W/ 637 OVERRIDE(OP): Performed by: HOSPITALIST

## 2020-12-04 RX ADMIN — CARVEDILOL 6.25 MG: 6.25 TABLET, FILM COATED ORAL at 17:16

## 2020-12-04 RX ADMIN — ISOSORBIDE MONONITRATE 30 MG: 60 TABLET, EXTENDED RELEASE ORAL at 17:16

## 2020-12-04 RX ADMIN — GABAPENTIN 200 MG: 100 CAPSULE ORAL at 05:13

## 2020-12-04 RX ADMIN — MONTELUKAST 10 MG: 10 TABLET, FILM COATED ORAL at 17:17

## 2020-12-04 RX ADMIN — FLUTICASONE PROPIONATE 220 MCG: 110 AEROSOL, METERED RESPIRATORY (INHALATION) at 17:16

## 2020-12-04 RX ADMIN — GABAPENTIN 200 MG: 100 CAPSULE ORAL at 12:42

## 2020-12-04 RX ADMIN — FLUTICASONE PROPIONATE 220 MCG: 110 AEROSOL, METERED RESPIRATORY (INHALATION) at 05:12

## 2020-12-04 RX ADMIN — ASPIRIN 81 MG CHEWABLE TABLET 81 MG: 81 TABLET CHEWABLE at 07:30

## 2020-12-04 RX ADMIN — ENOXAPARIN SODIUM 40 MG: 100 INJECTION SUBCUTANEOUS at 05:12

## 2020-12-04 RX ADMIN — LORAZEPAM 1 MG: 1 TABLET ORAL at 20:13

## 2020-12-04 RX ADMIN — LORAZEPAM 0.5 MG: 1 TABLET ORAL at 15:56

## 2020-12-04 RX ADMIN — GABAPENTIN 200 MG: 100 CAPSULE ORAL at 17:17

## 2020-12-04 ASSESSMENT — ENCOUNTER SYMPTOMS
SORE THROAT: 0
COUGH: 0
ABDOMINAL PAIN: 0
MYALGIAS: 0
WEAKNESS: 0
EYE REDNESS: 0
NERVOUS/ANXIOUS: 0
FOCAL WEAKNESS: 0
HEMOPTYSIS: 0
TREMORS: 0
STRIDOR: 0
NAUSEA: 0
HEADACHES: 0
SINUS PAIN: 0
VOMITING: 0
EYE DISCHARGE: 0
ORTHOPNEA: 0
FEVER: 0
CHILLS: 0
DEPRESSION: 0

## 2020-12-04 ASSESSMENT — GAIT ASSESSMENTS
GAIT LEVEL OF ASSIST: SUPERVISED
DISTANCE (FEET): 250
ASSISTIVE DEVICE: FRONT WHEEL WALKER

## 2020-12-04 NOTE — PROGRESS NOTES
Hospital Medicine Daily Progress Note    Date of Service  12/4/2020    Chief Complaint  89 y.o. female admitted 11/20/2020 with vomiting    Hospital Course  89 y.o. female medical history of asthma, coronary artery disease, peripheral vascular disease, osteoporosis who presented 11/20/2020 with vomiting this morning.  She notes a viral prodrome of cough with myalgias and weakness over the past several days.  She has had normal taste and smell.  She reports a general lack of any appetite for the past several days.  She was found to be Covid positive here in our emergency department.  She was further found to be hypoxic requiring supplemental oxygenation to maintain saturation.    Patient was started on dexamethasone.  Her labs were significant for hyponatremia sodium 121 improving, currently at 131.  White count noted to be 3.7.  EKG no acute ST or T wave changes.    Interval Problem Update  11/25: The patient is afebrile currently sitting at the side of the bed eating breakfast. The patient mentions she still feels SOB. She had to go back up to 5L of O2. Otherwise the patient is afebrile and as per nursing no other over night events were reported.  11/29: The patient is currently using 6L of O2, we have consulted intensivist for remdesivir treatment, which they have started. We will monitor closely. The patient is not complaining of any new symptom at the moment. As per nursing no other over night events were reported.  11/30: The patient was seen at bedside this morning. She is afebrile, she mention she is feeling better since yestereday. Remdesivir was started yesterday. Currently the patient is using 4-5L of O2.  12/1/2020 patient had no acute events overnight she remains afebrile saturating 94% on 4 L.  Continue to wait for placement needs.  Continue remdesivir.  12/4: The patient is tolerating 2 L of NC. As per PT the patient would warrant home health with PT. I believe the patient would benefit from SNF. I  asked the patient this morning to see if she could stand up from bed, and she was not able to. The patient lives by herself and I am worried that home health might not be enough. I have talked to Ms Miguelina Mooney who is a PT supervisor, and we will evaluate the patient together. As per nursing no other over night events were reported.      Consultants/Specialty  None    Code Status  Full Code    Disposition  Patient currently on 2L of oxygen. She will continue to be monitored in the ACS for now.    Review of Systems  Review of Systems   Constitutional: Negative for chills and fever.   HENT: Negative for sinus pain and sore throat.    Eyes: Negative for discharge and redness.   Respiratory: Negative for cough, hemoptysis and stridor.    Cardiovascular: Negative for chest pain and orthopnea.   Gastrointestinal: Negative for abdominal pain, nausea and vomiting.   Genitourinary: Negative for dysuria and hematuria.   Musculoskeletal: Negative for myalgias.   Skin: Negative for itching.   Neurological: Negative for tremors, focal weakness, weakness and headaches.   Psychiatric/Behavioral: Negative for depression. The patient is not nervous/anxious.         Physical Exam  Temp:  [36.4 °C (97.5 °F)-37.2 °C (98.9 °F)] 36.8 °C (98.2 °F)  Pulse:  [79-90] 82  Resp:  [16-20] 19  BP: (110-142)/(54-71) 110/57  SpO2:  [90 %-94 %] 91 %    Physical Exam  Vitals signs and nursing note reviewed.   Constitutional:       General: She is not in acute distress.     Appearance: She is not toxic-appearing.   HENT:      Head: Normocephalic and atraumatic.      Mouth/Throat:      Mouth: Mucous membranes are moist.   Eyes:      General: No scleral icterus.     Conjunctiva/sclera: Conjunctivae normal.   Neck:      Musculoskeletal: Normal range of motion and neck supple.   Cardiovascular:      Rate and Rhythm: Normal rate and regular rhythm.      Pulses: Normal pulses.      Heart sounds: Normal heart sounds. No murmur.   Pulmonary:      Effort:  Pulmonary effort is normal. No respiratory distress.      Breath sounds: Normal breath sounds. No wheezing or rales.   Abdominal:      General: Bowel sounds are normal.      Palpations: Abdomen is soft.      Tenderness: There is no abdominal tenderness.   Musculoskeletal: Normal range of motion.   Skin:     General: Skin is warm.      Capillary Refill: Capillary refill takes less than 2 seconds.   Neurological:      Mental Status: She is alert and oriented to person, place, and time. Mental status is at baseline.   Psychiatric:         Mood and Affect: Mood normal.         Behavior: Behavior normal.         Fluids  No intake or output data in the 24 hours ending 12/04/20 1251    Laboratory                        Imaging  DX-CHEST-PORTABLE (1 VIEW)   Final Result      1.  Chronic blunting of LEFT costophrenic angle, minimal pleural fluid versus pleural reactive change.   2.  No pneumonia or pulmonary edema.           Assessment/Plan  Acute hypoxemic respiratory failure due to COVID-19 (HCC)- (present on admission)  Assessment & Plan  - Secondary to Covid pneumonia.   - Oxygen as needed; wean as tolerated.   -decadron 6 mg daily and lovenox.  -We will start the patient ISS due to decadron, as well as hypoglycemia protocol.  The patient was started on remdesivir on 11/29/2020 by the intensivist due to the patient being on 6L of NC.  -Patient's present oxygenation needs 2-3 L nasal cannula to maintain 94% continue remdesivir at this time.  We will encourage self proning and incentive spirometer use.    CAD (coronary artery disease)- (present on admission)  Assessment & Plan  Continue home dose aspirin, statin, beta blocker.      Hyponatremia  Assessment & Plan  Improving.  Asymptomatic.   Monitor BMP.     Essential hypertension- (present on admission)  Assessment & Plan  Continue home medications of amlodipine, carvedilol, isosorbide mononitrate, and lisinopril with hold parameters.     Peripheral neuropathy- (present  on admission)  Assessment & Plan  Continue home dose gabapentin.    Debility- (present on admission)  Assessment & Plan  Appears to be slightly exacerbated by her acute viral illness.    Follow up PT/OT recs.     Benzodiazepine dependence (HCC)- (present on admission)  Assessment & Plan  Per chart review as admitting physician: While I am generally reluctant to give benzodiazepines to the elderly, the risks of withdrawal probably outweigh the risk of continuation at this juncture.  Home dose benzos will be continued.    Mild intermittent asthma with acute exacerbation- (present on admission)  Assessment & Plan  Does not appear currently exacerbated however given her viral infection she is at risk for exacerbation, will maintain a close clinical eye, continue her home medications.       VTE prophylaxis: Lovenox 40 mg subcu daily

## 2020-12-04 NOTE — PROGRESS NOTES
Patient slow to stand up with hands on assist x 1. Unsteady on feet. Patient able to pivot with 1 person assist to BSC. Patient c/o weakness and SOB. 2 L02 via NC with sats >90%.

## 2020-12-04 NOTE — THERAPY
Physical Therapy   Daily Treatment     Patient Name: Dayana Lechuga  Age:  89 y.o., Sex:  female  Medical Record #: [id number]  Today's Date: 12/4/2020     Precautions: Fall Risk(due to current medical condition)    Assessment    Lengthy discussion with MD regarding this patient who has had several PT orders. On admission up until this AM she was mobilizing in the unit with a FWW, however this morning she woke up with buttocks pain which made ambulation difficult. PT was reconsulted to assess pt's mobility given this new pain. Pt sitting EOB on arrival wanting to use the commode. Easily took a stand step transfer to the commode, was able to provide her own self care for cleaning and stand step back to EOB. She reported she has new buttock pain which she attributes to being positional from last night, however with mobility this pain subsided. She was able to demonstrate safe gait activity around the unit and in tight spaces. Her vitals remained w/in parameters throughout (see grid below) and she did not have any increase in WOB. She states she does not have to manage steps at home so this was not assessed. Given the above, she is functionally capable of discharging home with home health.     Lengthy discussion with MD regarding PT findings as well as her son over the phone. Her son was grateful for the communication and states he, his wife (both in [place]) or his sister (in [place]) can be with his mom periodically. The facility will provide her all meals and housekeeping.     Plan    DC Equipment Recommendations: commode; pt has a FWW, but could use a commode at the bedside for night use.  Discharge Recommendations: Recommend home health for continued physical therapy services      Subjective    Motivated to go home     Objective       12/04/20 1427   Vitals   Vitals Comments O2 sats remained between 85-91% throughout session; HR remained at 91-92 bpm   Other Treatments   Other Treatments Provided small space  negotiation to simulate home environment; sidestepping and backward stepping, tight turns   Balance   Sitting Balance (Static) Good   Sitting Balance (Dynamic) Good   Standing Balance (Static) Good   Standing Balance (Dynamic) Fair +   Comments with FWW outside of room; w/o FWW to practice small space inside her room   Gait Analysis   Gait Level Of Assist Supervised   Assistive Device Front Wheel Walker   Distance (Feet) 250   # of Times Distance was Traveled 1   Deviation   (decreased pace)   Comments pt states she doesn't have to negotiate steps at home   Bed Mobility    Supine to Sit Modified Independent   Sit to Supine Modified Independent   Scooting Modified Independent   Rolling Modified Independent   Functional Mobility   Sit to Stand Supervised   Bed, Chair, Wheelchair Transfer Supervised   Toilet Transfers Supervised   Transfer Method Stand Step  (to and from commode)   Mobility around unit, in room    Comments pt has good safety awareness   How much help from another person does the patient currently need...   6 clicks Mobility Score 23   Activity Tolerance   Comments sats remained w/in parameters; HR reamained w/in parameters; no increase in WOB; wet cough, but no changes with exertion   Education Group   Additional Comments educated provided to MD and son (Jl) regarding role of PT and pt's progress during this session       Miguelina Mooney, PT

## 2020-12-05 ENCOUNTER — HOME HEALTH ADMISSION (OUTPATIENT)
Dept: HOME HEALTH SERVICES | Facility: HOME HEALTHCARE | Age: 85
End: 2020-12-05
Payer: MEDICARE

## 2020-12-05 ENCOUNTER — PHARMACY VISIT (OUTPATIENT)
Dept: PHARMACY | Facility: MEDICAL CENTER | Age: 85
End: 2020-12-05
Payer: COMMERCIAL

## 2020-12-05 VITALS
RESPIRATION RATE: 17 BRPM | DIASTOLIC BLOOD PRESSURE: 69 MMHG | OXYGEN SATURATION: 96 % | HEIGHT: 63 IN | SYSTOLIC BLOOD PRESSURE: 142 MMHG | TEMPERATURE: 97.2 F | WEIGHT: 116.84 LBS | HEART RATE: 78 BPM | BODY MASS INDEX: 20.7 KG/M2

## 2020-12-05 LAB
ALBUMIN SERPL BCP-MCNC: 2.4 G/DL (ref 3.2–4.9)
ALBUMIN/GLOB SERPL: 0.8 G/DL
ALP SERPL-CCNC: 70 U/L (ref 30–99)
ALT SERPL-CCNC: 30 U/L (ref 2–50)
ANION GAP SERPL CALC-SCNC: 6 MMOL/L (ref 7–16)
AST SERPL-CCNC: 15 U/L (ref 12–45)
BASOPHILS # BLD AUTO: 0.5 % (ref 0–1.8)
BASOPHILS # BLD: 0.05 K/UL (ref 0–0.12)
BILIRUB SERPL-MCNC: 0.5 MG/DL (ref 0.1–1.5)
BUN SERPL-MCNC: 14 MG/DL (ref 8–22)
CALCIUM SERPL-MCNC: 8.7 MG/DL (ref 8.5–10.5)
CHLORIDE SERPL-SCNC: 97 MMOL/L (ref 96–112)
CO2 SERPL-SCNC: 25 MMOL/L (ref 20–33)
CREAT SERPL-MCNC: 0.42 MG/DL (ref 0.5–1.4)
EOSINOPHIL # BLD AUTO: 0.11 K/UL (ref 0–0.51)
EOSINOPHIL NFR BLD: 1 % (ref 0–6.9)
ERYTHROCYTE [DISTWIDTH] IN BLOOD BY AUTOMATED COUNT: 45.1 FL (ref 35.9–50)
GLOBULIN SER CALC-MCNC: 3 G/DL (ref 1.9–3.5)
GLUCOSE BLD-MCNC: 115 MG/DL (ref 65–99)
GLUCOSE SERPL-MCNC: 190 MG/DL (ref 65–99)
HCT VFR BLD AUTO: 32.6 % (ref 37–47)
HGB BLD-MCNC: 10.6 G/DL (ref 12–16)
IMM GRANULOCYTES # BLD AUTO: 0.08 K/UL (ref 0–0.11)
IMM GRANULOCYTES NFR BLD AUTO: 0.7 % (ref 0–0.9)
LYMPHOCYTES # BLD AUTO: 0.78 K/UL (ref 1–4.8)
LYMPHOCYTES NFR BLD: 7 % (ref 22–41)
MCH RBC QN AUTO: 32.1 PG (ref 27–33)
MCHC RBC AUTO-ENTMCNC: 32.5 G/DL (ref 33.6–35)
MCV RBC AUTO: 98.8 FL (ref 81.4–97.8)
MONOCYTES # BLD AUTO: 0.92 K/UL (ref 0–0.85)
MONOCYTES NFR BLD AUTO: 8.3 % (ref 0–13.4)
NEUTROPHILS # BLD AUTO: 9.17 K/UL (ref 2–7.15)
NEUTROPHILS NFR BLD: 82.5 % (ref 44–72)
NRBC # BLD AUTO: 0 K/UL
NRBC BLD-RTO: 0 /100 WBC
PLATELET # BLD AUTO: 300 K/UL (ref 164–446)
PMV BLD AUTO: 9.1 FL (ref 9–12.9)
POTASSIUM SERPL-SCNC: 3.9 MMOL/L (ref 3.6–5.5)
PROT SERPL-MCNC: 5.4 G/DL (ref 6–8.2)
RBC # BLD AUTO: 3.3 M/UL (ref 4.2–5.4)
SODIUM SERPL-SCNC: 128 MMOL/L (ref 135–145)
WBC # BLD AUTO: 11.1 K/UL (ref 4.8–10.8)

## 2020-12-05 PROCEDURE — 700102 HCHG RX REV CODE 250 W/ 637 OVERRIDE(OP): Performed by: INTERNAL MEDICINE

## 2020-12-05 PROCEDURE — RXMED WILLOW AMBULATORY MEDICATION CHARGE: Performed by: INTERNAL MEDICINE

## 2020-12-05 PROCEDURE — 85025 COMPLETE CBC W/AUTO DIFF WBC: CPT

## 2020-12-05 PROCEDURE — 94760 N-INVAS EAR/PLS OXIMETRY 1: CPT

## 2020-12-05 PROCEDURE — 700102 HCHG RX REV CODE 250 W/ 637 OVERRIDE(OP): Performed by: HOSPITALIST

## 2020-12-05 PROCEDURE — A9270 NON-COVERED ITEM OR SERVICE: HCPCS | Performed by: HOSPITALIST

## 2020-12-05 PROCEDURE — 82962 GLUCOSE BLOOD TEST: CPT

## 2020-12-05 PROCEDURE — A9270 NON-COVERED ITEM OR SERVICE: HCPCS | Performed by: INTERNAL MEDICINE

## 2020-12-05 PROCEDURE — 99239 HOSP IP/OBS DSCHRG MGMT >30: CPT | Performed by: INTERNAL MEDICINE

## 2020-12-05 PROCEDURE — 700111 HCHG RX REV CODE 636 W/ 250 OVERRIDE (IP): Performed by: HOSPITALIST

## 2020-12-05 PROCEDURE — 36415 COLL VENOUS BLD VENIPUNCTURE: CPT

## 2020-12-05 PROCEDURE — 80053 COMPREHEN METABOLIC PANEL: CPT

## 2020-12-05 RX ORDER — BENZONATATE 100 MG/1
100 CAPSULE ORAL 3 TIMES DAILY PRN
Qty: 60 CAP | Refills: 0 | Status: SHIPPED | OUTPATIENT
Start: 2020-12-05 | End: 2021-03-17

## 2020-12-05 RX ORDER — ACETAMINOPHEN 325 MG/1
650 TABLET ORAL EVERY 6 HOURS PRN
Qty: 30 TAB | Refills: 0 | Status: SHIPPED | OUTPATIENT
Start: 2020-12-05 | End: 2020-12-24

## 2020-12-05 RX ORDER — ACETAMINOPHEN 325 MG/1
650 TABLET ORAL EVERY 6 HOURS PRN
Status: DISCONTINUED | OUTPATIENT
Start: 2020-12-05 | End: 2020-12-05 | Stop reason: HOSPADM

## 2020-12-05 RX ORDER — CARVEDILOL 6.25 MG/1
6.25 TABLET ORAL 2 TIMES DAILY WITH MEALS
Qty: 60 TAB | Refills: 0 | Status: SHIPPED | OUTPATIENT
Start: 2020-12-05 | End: 2021-02-25 | Stop reason: SDUPTHER

## 2020-12-05 RX ADMIN — FLUTICASONE PROPIONATE 220 MCG: 110 AEROSOL, METERED RESPIRATORY (INHALATION) at 04:39

## 2020-12-05 RX ADMIN — GABAPENTIN 200 MG: 100 CAPSULE ORAL at 04:39

## 2020-12-05 RX ADMIN — ENOXAPARIN SODIUM 40 MG: 100 INJECTION SUBCUTANEOUS at 04:39

## 2020-12-05 RX ADMIN — ASPIRIN 81 MG CHEWABLE TABLET 81 MG: 81 TABLET CHEWABLE at 08:01

## 2020-12-05 RX ADMIN — CARVEDILOL 6.25 MG: 6.25 TABLET, FILM COATED ORAL at 08:01

## 2020-12-05 RX ADMIN — GABAPENTIN 200 MG: 100 CAPSULE ORAL at 12:29

## 2020-12-05 RX ADMIN — LORAZEPAM 0.5 MG: 1 TABLET ORAL at 15:29

## 2020-12-05 RX ADMIN — ACETAMINOPHEN 650 MG: 325 TABLET, FILM COATED ORAL at 10:18

## 2020-12-05 ASSESSMENT — PAIN DESCRIPTION - PAIN TYPE: TYPE: ACUTE PAIN

## 2020-12-05 NOTE — DISCHARGE PLANNING
Received Transport Form @ 1400  Spoke to Shira @ [facility]    Transport is scheduled for Today 12/5/2020 @1500 going to Home.

## 2020-12-05 NOTE — FACE TO FACE
Face to Face Note  -  Durable Medical Equipment    Moses Watts M.D. - NPI: [id number]  I certify that this patient is under my care and that they have had a durable medical equipment(DME)face to face encounter by myself that meets the physician DME face-to-face encounter requirements with this patient on:    Date of encounter:   Patient:                    MRN:                       YOB: 2020  Dayana Lechuga  [id number]  2/18/1929     The encounter with the patient was in whole, or in part, for the following medical condition, which is the primary reason for durable medical equipment:  Other - COVID-19 infection    I certify that, based on my findings, the following durable medical equipment is medically necessary:  Oxygen.    HOME O2 Saturation Measurements:(Values must be present for Home Oxygen orders)  Room air sat at rest: 87  Room air sat with amb: 85  With liters of O2: 2, O2 sat at rest with O2: 94  With Liters of O2: 2, O2 sat with amb with O2 : 93  Is the patient mobile?: Yes(with front wheel walker.)    My Clinical findings support the need for the above equipment due to:  Hypoxia    Supporting Symptoms: Patient with acute hypoxemic respiratory failure due to covid-19, would need home oxygen.     ------------------------------------------------------------------------------------------------------------------    Face to Face Supporting Documentation - Home Health    The encounter with this patient was in whole or in part the primary reason for home health admission.    Date of encounter:   Patient:                    MRN:                       YOB: 2020  Dayana Lechuga  [id number]  2/18/1929     Home health to see patient for:  Physical Therapy evaluation and treatment    Skilled need for:  Recent Deterioration of Health Status Patient debility due to current covid 19 infection    Skilled nursing interventions to  include:  Comment: Physical Therapy    Homebound evidenced status by:  Needs the assistance of another person in order to leave the home. Leaving home must require a considerable and taxing effort. There must exist a normal inability to leave the home.    Community Physician to provide follow up care: Milena Stone P.A.-C.     Optional Interventions    Wound information & treatment:    Home Infusion Therapy orders:    Line/Drain/Airway:    I certify the face to face encounter for this home care referral meets the CMS requirements and the encounter/clinical assessment with the patient was, in whole, or in part, for the medical condition(s) listed above, which is the primary reason for home health care. Based on my clinical findings: the service(s) are medically necessary, support the need for home health care, and the homebound criteria are met.  I certify that this patient has had a face to face encounter by myself.  Moses Watts M.D. - NPI: [id number]    *Debility, frailty and advanced age in the absence of an acute deterioration or exacerbation of a condition do not qualify a patient for home health.

## 2020-12-05 NOTE — DISCHARGE PLANNING
Anticipated Discharge Disposition: Home    Action: Lsw faxed TCF to [facility] to set up transport with [facility]. Lsw called family and spoke with Tonie. Tonie was updated and is agreeable to dc plan.     Barriers to Discharge: transport set up    Plan: Lsw to assist as needed

## 2020-12-05 NOTE — DISCHARGE SUMMARY
Discharge Summary    CHIEF COMPLAINT ON ADMISSION  Chief Complaint   Patient presents with   • Weakness     since last week   • N/V   • Sore Throat   • Shortness of Breath   • Cough       Reason for Admission  Chills; Cough; Sore Throat     Admission Date  11/20/2020    CODE STATUS  Full Code    HPI & HOSPITAL COURSE  89 y.o. female medical history of asthma, coronary artery disease, peripheral vascular disease, osteoporosis who presented 11/20/2020 with vomiting this morning.  She notes a viral prodrome of cough with myalgias and weakness over the past several days.  She has had normal taste and smell.  She reports a general lack of any appetite for the past several days.  She was found to be Covid positive here in our emergency department.  She was further found to be hypoxic requiring supplemental oxygenation to maintain saturation.    Patient was started on dexamethasone and finished the 10 day course while hospitalized.    Patient was transferred to the [facility] where she continueed supplemental oxygen, at one point she was requiring up to 6L and remdesivir was started and finished the course of treatment. The patient is 89 years old, and due to the current covid infection, she developed debility. After PT/OT evaluation, they recommended the patient go back with both PT and OT home health. The patient will be discharged today with home health and oxygen at home.      Therefore, she is discharged in fair and stable condition to home with organized home healthcare and close outpatient follow-up.    The patient met 2-midnight criteria for an inpatient stay at the time of discharge.    Discharge Date  12/5/2020    FOLLOW UP ITEMS POST DISCHARGE  The patient will be discharged home today with home health and follow up with PCP.    DISCHARGE DIAGNOSES  Active Problems:    CAD (coronary artery disease) POA: Yes    Acute hypoxemic respiratory failure due to COVID-19 (HCC) POA: Yes    Essential hypertension POA: Yes     Hyponatremia POA: Unknown    Debility POA: Yes    Peripheral neuropathy POA: Yes    Mild intermittent asthma with acute exacerbation POA: Yes    Benzodiazepine dependence (HCC) POA: Yes  Resolved Problems:    * No resolved hospital problems. *      FOLLOW UP  Future Appointments   Date Time Provider Department Center   1/7/2021 11:00 AM Milnea Stone P.A.-C. [facility] P.A.-C.  [address]  [facility] [address]  [phone]    In 2 weeks        MEDICATIONS ON DISCHARGE     Medication List      CHANGE how you take these medications      Instructions   acetaminophen 325 MG Tabs  What changed:   · medication strength  · how much to take  · when to take this  · reasons to take this  · additional instructions  Commonly known as: Tylenol   Take 2 Tabs by mouth every 6 hours as needed.  Dose: 650 mg     carvedilol 6.25 MG Tabs  What changed:   · medication strength  · when to take this  Commonly known as: COREG   Take 1 Tab by mouth 2 times a day, with meals for 30 days.  Dose: 6.25 mg     fluticasone 50 MCG/ACT nasal spray  What changed: additional instructions  Commonly known as: FLONASE   USE 1 SPRAY INTO EACH NOSTRIL DAILY     LORazepam 0.5 MG Tabs  Start taking on: February 28, 2021  What changed:   · how much to take  · how to take this  · when to take this  · additional instructions  Commonly known as: ATIVAN   Take 1-2 Tabs by mouth 2 Times a Day for 30 days.     montelukast 10 MG Tabs  What changed: when to take this  Commonly known as: SINGULAIR   TAKE ONE TABLET BY MOUTH ONE TIME DAILY     nitroglycerin 0.4 MG Subl  What changed:   · how much to take  · how to take this  · when to take this  · reasons to take this  Commonly known as: NITROSTAT   Place 1 tab under tongue every 5 min as needed for chest pain max 3 doses     Qvar RediHaler 80 MCG/ACT inhaler  What changed:   · how much to take  · how to take this  · when to take this  Generic drug: beclomethasone HFA   INHALE 1 PUFF BY  MOUTH TWICE A DAY        CONTINUE taking these medications      Instructions   albuterol 108 (90 Base) MCG/ACT Aers inhalation aerosol   Inhale 2 Puffs by mouth every 6 hours as needed for Shortness of Breath.  Dose: 2 Puff     amLODIPine 5 MG Tabs  Commonly known as: NORVASC   Take 5 mg by mouth every morning with breakfast.  Dose: 5 mg     aspirin 81 MG Chew chewable tablet  Commonly known as: ASA   Chew 81 mg every morning with breakfast.  Dose: 81 mg     atorvastatin 80 MG tablet  Commonly known as: LIPITOR   Take 80 mg by mouth every evening.  Dose: 80 mg     benzonatate 100 MG Caps  Commonly known as: TESSALON   Take 1 Cap by mouth 3 times a day as needed for Cough.  Dose: 100 mg     CENTRUM SILVER PO   Take 1 Tab by mouth every morning.  Dose: 1 Tab     gabapentin 100 MG Caps  Commonly known as: NEURONTIN   Take 200 mg by mouth 3 times a day. 2 capsules = 200 mg.  Dose: 200 mg     guaiFENesin  MG Tb12  Commonly known as: MUCINEX   Take 600 mg by mouth every morning.  Dose: 600 mg     Hair/Skin/Nails 1250-7.5-7.5 MCG-MG-UNT Chew  Generic drug: Biotin w/ Vitamins C & E   Chew 1 Tab every morning.  Dose: 1 Tab     isosorbide mononitrate SR 30 MG Tb24  Commonly known as: IMDUR   Take 30 mg by mouth every evening.  Dose: 30 mg     lisinopril 20 MG Tabs  Commonly known as: PRINIVIL   Take 20 mg by mouth every morning with breakfast.  Dose: 20 mg     loratadine 10 MG Tabs  Commonly known as: CLARITIN   Take 10 mg by mouth every morning.  Dose: 10 mg     omeprazole 20 MG delayed-release capsule  Commonly known as: PRILOSEC   Take 20 mg by mouth every morning with breakfast.  Dose: 20 mg     Vitamin D 50 MCG (2000 UT) Caps   Take 2,000 Units by mouth every morning.  Dose: 2,000 Units     VITAMIN E PO   Take 1 Cap by mouth every morning.  Dose: 1 Cap        STOP taking these medications    DAYQUIL PO     potassium chloride SA 20 MEQ Tbcr  Commonly known as: Kdur        ASK your doctor about these medications       Instructions   Vitamin C 1000 MG Tabs   Take 1,000 mg by mouth every morning.  Dose: 1,000 mg            Allergies  Allergies   Allergen Reactions   • Bactrim [Sulfamethoxazole W-Trimethoprim] Hives   • Pcn [Penicillins] Hives     About 70 years   • Codeine Unspecified     Unknown reaction         DIET  Orders Placed This Encounter   Procedures   • Diet Order Diet: Regular (no milk)     Standing Status:   Standing     Number of Occurrences:   1     Order Specific Question:   Diet:     Answer:   Regular [1]     Comments:   no milk       ACTIVITY  As tolerated and directed by skilled nursing. (PT)  Weight bearing as tolerated    CONSULTATIONS  ID for remdesivir    PROCEDURES  None    LABORATORY  Lab Results   Component Value Date    SODIUM 131 (L) 11/30/2020    POTASSIUM 4.3 11/30/2020    CHLORIDE 100 11/30/2020    CO2 25 11/30/2020    GLUCOSE 87 11/30/2020    BUN 20 11/30/2020    CREATININE 0.41 (L) 11/30/2020        Lab Results   Component Value Date    WBC 13.2 (H) 11/30/2020    HEMOGLOBIN 12.2 11/30/2020    HEMATOCRIT 35.9 (L) 11/30/2020    PLATELETCT 326 11/30/2020        Total time of the discharge process exceeds 35 minutes.

## 2020-12-05 NOTE — DISCHARGE PLANNING
Received Choice form at 7231  Agency/Facility Name: [facility]  Referral sent per Choice form @ 2131

## 2020-12-05 NOTE — DISCHARGE PLANNING
ATTN: Case Management  RE: Referral for Home Health    As of 12/5/20 we have accepted the Home Health referral for the patient listed above.    A [facility] clinician will be out to see the patient within 48 hours. If you have any questions or concerns regarding the patient's transition to Home Health, please do not hesitate to contact us at [phone].      We look forward to collaborating with you,  [facility] Home Health Team

## 2020-12-05 NOTE — DISCHARGE PLANNING
Anticipated Discharge Disposition: Home    Action: Lsw spoke with [facility] pharmacy. Prescriptions were received and will be delivered prior to dc with meds to beds [id number]. Pt will have a copay of $13.52 which will be billed to pt.    Barriers to Discharge: O2 delivery    Plan: Lsw to assist as needed

## 2020-12-05 NOTE — DISCHARGE PLANNING
Anticipated Discharge Disposition: Home    Action: Lsw received call from [facility], O2 will be delivered within an hour.    Barriers to Discharge: delivery    Plan: Lsw to assist as needed

## 2020-12-05 NOTE — DISCHARGE PLANNING
Meds-to-Beds: Discharge prescription orders listed below delivered to floor nurse.     Dayana Lechuga Gayatri   Home Medication Instructions MINERVA:[id number]    Printed on:12/05/20 1450   Medication Information                      acetaminophen (TYLENOL) 325 MG Tab  Take 2 Tabs by mouth every 6 hours as needed.             benzonatate (TESSALON) 100 MG Cap  Take 1 Cap by mouth 3 times a day as needed for Cough.             carvedilol (COREG) 6.25 MG Tab  Take 1 Tab by mouth 2 times a day, with meals for 30 days.                 Lukas Lainez, Pharmacy Intern

## 2020-12-05 NOTE — DISCHARGE PLANNING
Anticipated Discharge Disposition: Home with O2 and HHC    Action: Lsw spoke with [facility] and gave pt's demographic info. Per [facility], O2 should be delivered shortly.    Barriers to Discharge: Delivery    Plan: Lsw to update MD

## 2020-12-05 NOTE — DISCHARGE PLANNING
Anticipated Discharge Disposition: HHC and O2    Action: Lsw spoke with pt at bedside to discuss dc planning. Lsw explained that [facility] is only contracted with [facility] and she is agreeable. Lsw explained O2 and pt is agreeable to a blanket to open agencies.     Pt confirmed already having a bedside commode.    Barriers to Discharge: acceptance and transportation    Plan: Varinderw to f/u with Naila [phone]

## 2020-12-05 NOTE — DISCHARGE PLANNING
Received Choice form at 2929  Agency/Facility Name: [facility]  Referral sent per Choice form @ 2129

## 2020-12-05 NOTE — FACE TO FACE
Face to Face Note  -  Durable Medical Equipment    Moses Watts M.D. - NPI: [id number]  I certify that this patient is under my care and that they have had a durable medical equipment(DME)face to face encounter by myself that meets the physician DME face-to-face encounter requirements with this patient on:    Date of encounter:   Patient:                    MRN:                       YOB: 2020  Dayana Lechuga  [id number]  2/18/1929     The encounter with the patient was in whole, or in part, for the following medical condition, which is the primary reason for durable medical equipment:  Other - debility due to covid-19 infection    I certify that, based on my findings, the following durable medical equipment is medically necessary:  3 in 1 Bedside [place].    HOME O2 Saturation Measurements:(Values must be present for Home Oxygen orders)  Room air sat at rest: 87  Room air sat with amb: 85  With liters of O2: 2, O2 sat at rest with O2: 94  With Liters of O2: 2, O2 sat with amb with O2 : 93  Is the patient mobile?: Yes(with front wheel walker.)    My Clinical findings support the need for the above equipment due to:  Other - Debility due to covid-19 infection    Supporting Symptoms: Patient has developed debility due to current covid 19 infection, and would need help to return to baseline.     ------------------------------------------------------------------------------------------------------------------    Face to Face Supporting Documentation - Home Health    The encounter with this patient was in whole or in part the primary reason for home health admission.    Date of encounter:   Patient:                    MRN:                       YOB: 2020  Dayana Lechuga  [id number]  2/18/1929     Home health to see patient for:  Occupational therapy evaluation and treatment    Skilled need for:  Comment: recent debility of COVID-19 infection  warranted OT home health    Skilled nursing interventions to include:  Comment: OT evaluation and treatment at home, she lives by herself.    Homebound evidenced status by:  Needs the assistance of another person in order to leave the home. Leaving home must require a considerable and taxing effort. There must exist a normal inability to leave the home.    Community Physician to provide follow up care: Milena Stone P.A.-C.     Optional Interventions    Wound information & treatment:    Home Infusion Therapy orders:    Line/Drain/Airway:    I certify the face to face encounter for this home care referral meets the CMS requirements and the encounter/clinical assessment with the patient was, in whole, or in part, for the medical condition(s) listed above, which is the primary reason for home health care. Based on my clinical findings: the service(s) are medically necessary, support the need for home health care, and the homebound criteria are met.  I certify that this patient has had a face to face encounter by myself.  Moses Watts M.D. - REBAI: [phone]    *Debility, frailty and advanced age in the absence of an acute deterioration or exacerbation of a condition do not qualify a patient for home health.

## 2020-12-06 NOTE — DISCHARGE PLANNING
Lsw notified [facility] about pt still here. [facility] called [facility] who stated they were 2- minutes away from [facility].

## 2020-12-06 NOTE — DISCHARGE INSTRUCTIONS
Discharge Instructions    Discharged to home by medical transportation with escort. Discharged via wheelchair, hospital escort: Yes.  Special equipment needed: Oxygen    Be sure to schedule a follow-up appointment with your primary care doctor or any specialists as instructed.     Discharge Plan:   Diet Plan: Discussed  Activity Level: Discussed  Confirmed Follow up Appointment: Patient to Call and Schedule Appointment  Confirmed Symptoms Management: Discussed  Medication Reconciliation Updated: Yes  Influenza Vaccine Indication: Not indicated: Previously immunized this influenza season and > 8 years of age    I understand that a diet low in cholesterol, fat, and sodium is recommended for good health. Unless I have been given specific instructions below for another diet, I accept this instruction as my diet prescription.   Other diet: reg    Special Instructions: None    · Is patient discharged on Warfarin / Coumadin?   No     Depression / Suicide Risk    As you are discharged from this [facility] facility, it is important to learn how to keep safe from harming yourself.    Recognize the warning signs:  · Abrupt changes in personality, positive or negative- including increase in energy   · Giving away possessions  · Change in eating patterns- significant weight changes-  positive or negative  · Change in sleeping patterns- unable to sleep or sleeping all the time   · Unwillingness or inability to communicate  · Depression  · Unusual sadness, discouragement and loneliness  · Talk of wanting to die  · Neglect of personal appearance   · Rebelliousness- reckless behavior  · Withdrawal from people/activities they love  · Confusion- inability to concentrate     If you or a loved one observes any of these behaviors or has concerns about self-harm, here's what you can do:  · Talk about it- your feelings and reasons for harming yourself  · Remove any means that you might use to hurt yourself (examples: pills, rope,  extension cords, firearm)  · Get professional help from the community (Mental Health, Substance Abuse, psychological counseling)  · Do not be alone:Call your Safe Contact- someone whom you trust who will be there for you.  · Call your local CRISIS HOTLINE 108-0170 or 314-250-2618  · Call your local Children's Mobile Crisis Response Team [place] (700) 344-3032 or www.HEALTH [employer]ARE [employer]KS  · Call the toll free National Suicide Prevention Hotlines   · National Suicide Prevention Lifeline 260-419-JLUS (4077)  · [facility] Hope Line Network 800-SUICIDE (160-7524)    - Discharge Instructions to Pt:  · Follow up with your Primary Care Physician in 2 weeks  · Be compliant with your follow up appointments.  · Please be compliant with your medications, including new ones.  · A new medication has been given please take as advised.  · Keep blood pressure controlled and be compliant to keep systolic blood pressure <140.  · Please adhere to a healthy diet, that consist of low fat, low salt, low calorie.  · Weight loss and physical activity as tolerated is encouraged.   · Ambulate with assistance.  ·  If there any signs or symptoms of worsening or symptoms recurring, please call your Primary Care Physician or return to Emergency Department for further assessment.  · Avoid sudden changes in position, like standing up quickly.  · Do not drive until cleared by PCP.  · Refrain from drinking alcohol and smoking.     Recommendation to PCP:  · Your pt will need close monitoring.  -She is going home on home health, please follow up  · If  failure to respond to therapy, we suggest having patient return for further care, or if cannot be treated as an outpatient, return to ED if worsening of symptoms.  · Please make certain your pt follows up with specialist appointments  · Ensure patient adheres to diet and lifestyle modifications and reconcile.  · Please note the change to medication and reconcile.  · Patient without progression of  symptoms. Prognosis and risk factors discussed in detail with patient and she understands.        COVID-19  COVID-19 is a respiratory infection that is caused by a virus called severe acute respiratory syndrome coronavirus 2 (SARS-CoV-2). The disease is also known as coronavirus disease or novel coronavirus. In some people, the virus may not cause any symptoms. In others, it may cause a serious infection. The infection can get worse quickly and can lead to complications, such as:  · Pneumonia, or infection of the lungs.  · Acute respiratory distress syndrome or ARDS. This is fluid build-up in the lungs.  · Acute respiratory failure. This is a condition in which there is not enough oxygen passing from the lungs to the body.  · Sepsis or septic shock. This is a serious bodily reaction to an infection.  · Blood clotting problems.  · Secondary infections due to bacteria or fungus.  The virus that causes COVID-19 is contagious. This means that it can spread from person to person through droplets from coughs and sneezes (respiratory secretions).  What are the causes?  This illness is caused by a virus. You may catch the virus by:  · Breathing in droplets from an infected person's cough or sneeze.  · Touching something, like a table or a doorknob, that was exposed to the virus (contaminated) and then touching your mouth, nose, or eyes.  What increases the risk?  Risk for infection  You are more likely to be infected with this virus if you:  · Live in or travel to an area with a COVID-19 outbreak.  · Come in contact with a sick person who recently traveled to an area with a COVID-19 outbreak.  · Provide care for or live with a person who is infected with COVID-19.  Risk for serious illness  You are more likely to become seriously ill from the virus if you:  · Are 65 years of age or older.  · Have a long-term disease that lowers your body's ability to fight infection (immunocompromised).  · Live in a nursing home or  long-term care facility.  · Have a long-term (chronic) disease such as:  ? Chronic lung disease, including chronic obstructive pulmonary disease or asthma  ? Heart disease.  ? Diabetes.  ? Chronic kidney disease.  ? Liver disease.  · Are obese.  What are the signs or symptoms?  Symptoms of this condition can range from mild to severe. Symptoms may appear any time from 2 to 14 days after being exposed to the virus. They include:  · A fever.  · A cough.  · Difficulty breathing.  · Chills.  · Muscle pains.  · A sore throat.  · Loss of taste or smell.  Some people may also have stomach problems, such as nausea, vomiting, or diarrhea.  Other people may not have any symptoms of COVID-19.  How is this diagnosed?  This condition may be diagnosed based on:  · Your signs and symptoms, especially if:  ? You live in an area with a COVID-19 outbreak.  ? You recently traveled to or from an area where the virus is common.  ? You provide care for or live with a person who was diagnosed with COVID-19.  · A physical exam.  · Lab tests, which may include:  ? A nasal swab to take a sample of fluid from your nose.  ? A throat swab to take a sample of fluid from your throat.  ? A sample of mucus from your lungs (sputum).  ? Blood tests.  · Imaging tests, which may include, X-rays, CT scan, or ultrasound.  How is this treated?  At present, there is no medicine to treat COVID-19. Medicines that treat other diseases are being used on a trial basis to see if they are effective against COVID-19.  Your health care provider will talk with you about ways to treat your symptoms. For most people, the infection is mild and can be managed at home with rest, fluids, and over-the-counter medicines.  Treatment for a serious infection usually takes places in a hospital intensive care unit (ICU). It may include one or more of the following treatments. These treatments are given until your symptoms improve.  · Receiving fluids and medicines through an  IV.  · Supplemental oxygen. Extra oxygen is given through a tube in the nose, a face mask, or a hernandez.  · Positioning you to lie on your stomach (prone position). This makes it easier for oxygen to get into the lungs.  · Continuous positive airway pressure (CPAP) or bi-level positive airway pressure (BPAP) machine. This treatment uses mild air pressure to keep the airways open. A tube that is connected to a motor delivers oxygen to the body.  · Ventilator. This treatment moves air into and out of the lungs by using a tube that is placed in your windpipe.  · Tracheostomy. This is a procedure to create a hole in the neck so that a breathing tube can be inserted.  · Extracorporeal membrane oxygenation (ECMO). This procedure gives the lungs a chance to recover by taking over the functions of the heart and lungs. It supplies oxygen to the body and removes carbon dioxide.  Follow these instructions at home:  Lifestyle  · If you are sick, stay home except to get medical care. Your health care provider will tell you how long to stay home. Call your health care provider before you go for medical care.  · Rest at home as told by your health care provider.  · Do not use any products that contain nicotine or tobacco, such as cigarettes, e-cigarettes, and chewing tobacco. If you need help quitting, ask your health care provider.  · Return to your normal activities as told by your health care provider. Ask your health care provider what activities are safe for you.  General instructions  · Take over-the-counter and prescription medicines only as told by your health care provider.  · Drink enough fluid to keep your urine pale yellow.  · Keep all follow-up visits as told by your health care provider. This is important.  How is this prevented?    There is no vaccine to help prevent COVID-19 infection. However, there are steps you can take to protect yourself and others from this virus.  To protect yourself:   · Do not travel to areas  where COVID-19 is a risk. The areas where COVID-19 is reported change often. To identify high-risk areas and travel restrictions, check the CDC travel website: wwwnc.cdc.gov/travel/notices  · If you live in, or must travel to, an area where COVID-19 is a risk, take precautions to avoid infection.  ? Stay away from people who are sick.  ? Wash your hands often with soap and water for 20 seconds. If soap and water are not available, use an alcohol-based hand .  ? Avoid touching your mouth, face, eyes, or nose.  ? Avoid going out in public, follow guidance from your state and local health authorities.  ? If you must go out in public, wear a cloth face covering or face mask.  ? Disinfect objects and surfaces that are frequently touched every day. This may include:  § Counters and tables.  § Doorknobs and light switches.  § Sinks and faucets.  § Electronics, such as phones, remote controls, keyboards, computers, and tablets.  To protect others:  If you have symptoms of COVID-19, take steps to prevent the virus from spreading to others.  · If you think you have a COVID-19 infection, contact your health care provider right away. Tell your health care team that you think you may have a COVID-19 infection.  · Stay home. Leave your house only to seek medical care. Do not use public transport.  · Do not travel while you are sick.  · Wash your hands often with soap and water for 20 seconds. If soap and water are not available, use alcohol-based hand .  · Stay away from other members of your household. Let healthy household members care for children and pets, if possible. If you have to care for children or pets, wash your hands often and wear a mask. If possible, stay in your own room, separate from others. Use a different bathroom.  · Make sure that all people in your household wash their hands well and often.  · Cough or sneeze into a tissue or your sleeve or elbow. Do not cough or sneeze into your hand or  into the air.  · Wear a cloth face covering or face mask.  Where to find more information  · Centers for Disease Control and Prevention: www.cdc.gov/coronavirus/2019-ncov/index.html  · World Health Organization: www.who.int/health-topics/coronavirus  Contact a health care provider if:  · You live in or have traveled to an area where COVID-19 is a risk and you have symptoms of the infection.  · You have had contact with someone who has COVID-19 and you have symptoms of the infection.  Get help right away if:  · You have trouble breathing.  · You have pain or pressure in your chest.  · You have confusion.  · You have bluish lips and fingernails.  · You have difficulty waking from sleep.  · You have symptoms that get worse.  These symptoms may represent a serious problem that is an emergency. Do not wait to see if the symptoms will go away. Get medical help right away. Call your local emergency services (911 in the [place] Do not drive yourself to the hospital. Let the emergency medical personnel know if you think you have COVID-19.  Summary  · COVID-19 is a respiratory infection that is caused by a virus. It is also known as coronavirus disease or novel coronavirus. It can cause serious infections, such as pneumonia, acute respiratory distress syndrome, acute respiratory failure, or sepsis.  · The virus that causes COVID-19 is contagious. This means that it can spread from person to person through droplets from coughs and sneezes.  · You are more likely to develop a serious illness if you are 65 years of age or older, have a weak immunity, live in a nursing home, or have chronic disease.  · There is no medicine to treat COVID-19. Your health care provider will talk with you about ways to treat your symptoms.  · Take steps to protect yourself and others from infection. Wash your hands often and disinfect objects and surfaces that are frequently touched every day. Stay away from people who are sick and wear a mask if you  are sick.  This information is not intended to replace advice given to you by your health care provider. Make sure you discuss any questions you have with your health care provider.  Document Released: 01/23/2020 Document Revised: 05/14/2020 Document Reviewed: 01/23/2020  Elsevier Patient Education © 2020 [employer]    COVID-19: How to Protect Yourself and Others  Know how it spreads  · There is currently no vaccine to prevent coronavirus disease 2019 (COVID-19).  · The best way to prevent illness is to avoid being exposed to this virus.  · The virus is thought to spread mainly from person-to-person.  ? Between people who are in close contact with one another (within about 6 feet).  ? Through respiratory droplets produced when an infected person coughs, sneezes or talks.  ? These droplets can land in the mouths or noses of people who are nearby or possibly be inhaled into the lungs.  ? Some recent studies have suggested that COVID-19 may be spread by people who are not showing symptoms.  Everyone should  Clean your hands often  · Wash your hands often with soap and water for at least 20 seconds especially after you have been in a public place, or after blowing your nose, coughing, or sneezing.  · If soap and water are not readily available, use a hand  that contains at least 60% alcohol. Cover all surfaces of your hands and rub them together until they feel dry.  · Avoid touching your eyes, nose, and mouth with unwashed hands.  Avoid close contact  · Stay home if you are sick.  · Avoid close contact with people who are sick.  · Put distance between yourself and other people.  ? Remember that some people without symptoms may be able to spread virus.  ? This is especially important for people who are at higher risk of getting very sick.www.cdc.gov/coronavirus/2019-ncov/need-extra-precautions/people-at-higher-risk.html  Cover your mouth and nose with a cloth face cover when around others  · You could spread  COVID-19 to others even if you do not feel sick.  · Everyone should wear a cloth face cover when they have to go out in public, for example to the grocery store or to  other necessities.  ? Cloth face coverings should not be placed on young children under age 2, anyone who has trouble breathing, or is unconscious, incapacitated or otherwise unable to remove the mask without assistance.  · The cloth face cover is meant to protect other people in case you are infected.  · Do NOT use a facemask meant for a healthcare worker.  · Continue to keep about 6 feet between yourself and others. The cloth face cover is not a substitute for social distancing.  Cover coughs and sneezes  · If you are in a private setting and do not have on your cloth face covering, remember to always cover your mouth and nose with a tissue when you cough or sneeze or use the inside of your elbow.  · Throw used tissues in the trash.  · Immediately wash your hands with soap and water for at least 20 seconds. If soap and water are not readily available, clean your hands with a hand  that contains at least 60% alcohol.  Clean and disinfect  · Clean AND disinfect frequently touched surfaces daily. This includes tables, doorknobs, light switches, countertops, handles, desks, phones, keyboards, toilets, faucets, and sinks. www.cdc.gov/coronavirus/2019-ncov/prevent-getting-sick/disinfecting-your-home.html  · If surfaces are dirty, clean them: Use detergent or soap and water prior to disinfection.  · Then, use a household disinfectant. You can see a list of EPA-registered household disinfectants here.  cdc.gov/coronavirus  05/05/2020  This information is not intended to replace advice given to you by your health care provider. Make sure you discuss any questions you have with your health care provider.  Document Released: 04/14/2020 Document Revised: 05/13/2020 Document Reviewed: 04/14/2020  [employer] Patient Education © 2020 [employer]      Deconditioning  Deconditioning refers to the changes in your body that occur during a period of inactivity. The changes happen in your heart, lungs, and muscles. They decrease your ability to be active, and they make you feel tired and weak.  There are three stages of deconditioning:  · Mild deconditioning. At this stage, you will notice a change in your ability to do your usual exercise activities, such as running, biking, or swimming.  · Moderate deconditioning. At this stage, you will notice a change in your ability to do normal everyday activities, such as walking, grocery shopping, and doing chores.  · Severe deconditioning. At this stage, you will notice a change in your ability to do minimal activity or normal self-care.  Deconditioning can occur after only a few days of inactivity. The longer the period of inactivity, the more severe the deconditioning will be, and the longer it will take to return to your previous level of functioning.  What are the causes?  Deconditioning is often caused by inactivity due to:  · Illnesses, such as cancer, stroke, heart attack, fibromyalgia, and chronic fatigue syndrome.  · Injuries, especially back injuries, broken bones, and ligament and tendon injuries.  · A long stay in the hospital.  · Pregnancy, especially if long periods of bed rest are needed.  What increases the risk?  This condition is more likely to develop in:  · People who are hospitalized.  · People on bed rest.  · People who are obese.  · People with poor nutrition.  · Elderly adults.  · People with injuries or illnesses that interfere with movement and activity.  What are the signs or symptoms?  Symptoms of deconditioning include:  · Weakness.  · Tiredness.  · Shortness of breath with minor exertion.  · A faster-than-normal heartbeat. You may not notice this without taking your pulse.  · Pain or discomfort with activity.  · Decreased strength.  · Decreased sense of balance.  · Decreased  endurance.  · Difficulty doing your usual forms of exercise.  · Difficulty doing activities of daily living, such as grocery shopping or chores.  · Difficulty walking around the house and doing basic self-care, such as getting to the bathroom, preparing meals, or doing laundry.  How is this diagnosed?  Deconditioning is diagnosed based on your medical history and a physical exam. During the physical exam, your health care provider will check for signs of deconditioning, such as:  · Decreased size of muscles.  · Decreased strength.  · Trouble with balance.  · Shortness of breath or abnormally increased heart rate after minor exertion.  How is this treated?  Treatment for deconditioning usually involves following a structured exercise program in which activity is increased gradually. Your health care provider will determine which exercises are right for you. The exercise program will likely include aerobic exercise and strength training:  · Aerobic exercise helps improve the functioning of the heart and lungs as well as the muscles.  · Strength training helps improve muscle size and strength.  Both of these types of exercise will improve your endurance. You may be referred to a physical therapist who can create a safe strengthening program for you to follow.  Follow these instructions at home:  · Follow the exercise program that is recommended by your health care provider or physical therapist.  · Do not increase your exercise any faster than directed.  · Eat a healthy diet.  · Do not use any products that contain nicotine or tobacco, such as cigarettes and e-cigarettes. If you need help quitting, ask your health care provider.  · Take over-the-counter and prescription medicines only as told by your health care provider.  · Keep all follow-up visits as told by your health care provider. This is important.  Contact a health care provider if:  · You are not able to carry out the prescribed exercise program.  · You are  becoming more and more fatigued and weak.  · You become light-headed when rising to a sitting or standing position.  · Your level of endurance decreases after it has improved.  Get help right away if:  · You have chest pain.  · You are very short of breath.  · You have any episodes of passing out.  This information is not intended to replace advice given to you by your health care provider. Make sure you discuss any questions you have with your health care provider.  Document Released: 05/04/2015 Document Revised: 11/30/2018 Document Reviewed: 03/18/2017  [employer] Patient Education © 2020 [employer]      COVID-19 Frequently Asked Questions  COVID-19 (coronavirus disease) is an infection that is caused by a large family of viruses. Some viruses cause illness in people and others cause illness in animals like camels, cats, and bats. In some cases, the viruses that cause illness in animals can spread to humans.  Where did the coronavirus come from?  In December 2019, [place] told the World Health Organization (WHO) of several cases of lung disease (human respiratory illness). These cases were linked to an open seafood and livestock market in the city of [facility]. The link to the seafood and livestock market suggests that the virus may have spread from animals to humans. However, since that first outbreak in December, the virus has also been shown to spread from person to person.  What is the name of the disease and the virus?  Disease name  Early on, this disease was called novel coronavirus. This is because scientists determined that the disease was caused by a new (novel) respiratory virus. The World Health Organization (WHO) has now named the disease COVID-19, or coronavirus disease.  Virus name  The virus that causes the disease is called severe acute respiratory syndrome coronavirus 2 (SARS-CoV-2).  More information on disease and virus naming  World Health Organization (WHO):  www.who.int/emergencies/diseases/novel-coronavirus-2019/technical-guidance/naming-the-coronavirus-disease-(covid-2019)-and-the-virus-that-causes-it  Who is at risk for complications from coronavirus disease?  Some people may be at higher risk for complications from coronavirus disease. This includes older adults and people who have chronic diseases, such as heart disease, diabetes, and lung disease.  If you are at higher risk for complications, take these extra precautions:  · Avoid close contact with people who are sick or have a fever or cough. Stay at least 3-6 ft (1-2 m) away from them, if possible.  · Wash your hands often with soap and water for at least 20 seconds.  · Avoid touching your face, mouth, nose, or eyes.  · Keep supplies on hand at home, such as food, medicine, and cleaning supplies.  · Stay home as much as possible.  · Avoid social gatherings and travel.  How does coronavirus disease spread?  The virus that causes coronavirus disease spreads easily from person to person (is contagious). There are also cases of community-spread disease. This means the disease has spread to:  · People who have no known contact with other infected people.  · People who have not traveled to areas where there are known cases.  It appears to spread from one person to another through droplets from coughing or sneezing.  Can I get the virus from touching surfaces or objects?  There is still a lot that we do not know about the virus that causes coronavirus disease. Scientists are basing a lot of information on what they know about similar viruses, such as:  · Viruses cannot generally survive on surfaces for long. They need a human body (host) to survive.  · It is more likely that the virus is spread by close contact with people who are sick (direct contact), such as through:  ? Shaking hands or hugging.  ? Breathing in respiratory droplets that travel through the air. This can happen when an infected person coughs or  sneezes on or near other people.  · It is less likely that the virus is spread when a person touches a surface or object that has the virus on it (indirect contact). The virus may be able to enter the body if the person touches a surface or object and then touches his or her face, eyes, nose, or mouth.  Can a person spread the virus without having symptoms of the disease?  It may be possible for the virus to spread before a person has symptoms of the disease, but this is most likely not the main way the virus is spreading. It is more likely for the virus to spread by being in close contact with people who are sick and breathing in the respiratory droplets of a sick person's cough or sneeze.  What are the symptoms of coronavirus disease?  Symptoms vary from person to person and can range from mild to severe. Symptoms may include:  · Fever.  · Cough.  · Tiredness, weakness, or fatigue.  · Fast breathing or feeling short of breath.  These symptoms can appear anywhere from 2 to 14 days after you have been exposed to the virus. If you develop symptoms, call your health care provider. People with severe symptoms may need hospital care.  If I am exposed to the virus, how long does it take before symptoms start?  Symptoms of coronavirus disease may appear anywhere from 2 to 14 days after a person has been exposed to the virus. If you develop symptoms, call your health care provider.  Should I be tested for this virus?  Your health care provider will decide whether to test you based on your symptoms, history of exposure, and your risk factors.  How does a health care provider test for this virus?  Health care providers will collect samples to send for testing. Samples may include:  · Taking a swab of fluid from the nose.  · Taking fluid from the lungs by having you cough up mucus (sputum) into a sterile cup.  · Taking a blood sample.  · Taking a stool or urine sample.  Is there a treatment or vaccine for this  virus?  Currently, there is no vaccine to prevent coronavirus disease. Also, there are no medicines like antibiotics or antivirals to treat the virus. A person who becomes sick is given supportive care, which means rest and fluids. A person may also relieve his or her symptoms by using over-the-counter medicines that treat sneezing, coughing, and runny nose. These are the same medicines that a person takes for the common cold.  If you develop symptoms, call your health care provider. People with severe symptoms may need hospital care.  What can I do to protect myself and my family from this virus?         You can protect yourself and your family by taking the same actions that you would take to prevent the spread of other viruses. Take the following actions:  · Wash your hands often with soap and water for at least 20 seconds. If soap and water are not available, use alcohol-based hand .  · Avoid touching your face, mouth, nose, or eyes.  · Cough or sneeze into a tissue, sleeve, or elbow. Do not cough or sneeze into your hand or the air.  ? If you cough or sneeze into a tissue, throw it away immediately and wash your hands.  · Disinfect objects and surfaces that you frequently touch every day.  · Avoid close contact with people who are sick or have a fever or cough. Stay at least 3-6 ft (1-2 m) away from them, if possible.  · Stay home if you are sick, except to get medical care. Call your health care provider before you get medical care.  · Make sure your vaccines are up to date. Ask your health care provider what vaccines you need.  What should I do if I need to travel?  Follow travel recommendations from your local health authority, the CDC, and WHO.  Travel information and advice  · Centers for Disease Control and Prevention (CDC): www.cdc.gov/coronavirus/2019-ncov/travelers/index.html  · World Health Organization (WHO): www.who.int/emergencies/diseases/novel-coronavirus-2019/travel-advice  Know the  risks and take action to protect your health  · You are at higher risk of getting coronavirus disease if you are traveling to areas with an outbreak or if you are exposed to travelers from areas with an outbreak.  · Wash your hands often and practice good hygiene to lower the risk of catching or spreading the virus.  What should I do if I am sick?  General instructions to stop the spread of infection  · Wash your hands often with soap and water for at least 20 seconds. If soap and water are not available, use alcohol-based hand .  · Cough or sneeze into a tissue, sleeve, or elbow. Do not cough or sneeze into your hand or the air.  · If you cough or sneeze into a tissue, throw it away immediately and wash your hands.  · Stay home unless you must get medical care. Call your health care provider or local health authority before you get medical care.  · Avoid public areas. Do not take public transportation, if possible.  · If you can, wear a mask if you must go out of the house or if you are in close contact with someone who is not sick.  Keep your home clean  · Disinfect objects and surfaces that are frequently touched every day. This may include:  ? Counters and tables.  ? Doorknobs and light switches.  ? Sinks and faucets.  ? Electronics such as phones, remote controls, keyboards, computers, and tablets.  · Wash dishes in hot, soapy water or use a . Air-dry your dishes.  · Wash laundry in hot water.  Prevent infecting other household members  · Let healthy household members care for children and pets, if possible. If you have to care for children or pets, wash your hands often and wear a mask.  · Sleep in a different bedroom or bed, if possible.  · Do not share personal items, such as razors, toothbrushes, deodorant, spring, brushes, towels, and washcloths.  Where to find more information  Centers for Disease Control and Prevention (CDC)  · Information and news updates:  www.cdc.gov/coronavirus/2019-ncov  World Health Organization (WHO)  · Information and news updates: www.who.int/emergencies/diseases/novel-coronavirus-2019  · Coronavirus health topic: www.who.int/health-topics/coronavirus  · Questions and answers on COVID-19: www.who.int/news-room/q-a-detail/n-b-jndnkhgoteqrm  · Global tracker: adsquare  American Academy of Pediatrics (AAP)  · Information for families: www.healthychildren.org/English/health-issues/conditions/chest-lungs/Pages/2019-Novel-Coronavirus.aspx  The coronavirus situation is changing rapidly. Check your local health authority website or the CDC and WHO websites for updates and news.  When should I contact a health care provider?  · Contact your health care provider if you have symptoms of an infection, such as fever or cough, and you:  ? Have been near anyone who is known to have coronavirus disease.  ? Have come into contact with a person who is suspected to have coronavirus disease.  ? Have traveled outside of the country.  When should I get emergency medical care?  · Get help right away by calling your local emergency services (911 in the [place] if you have:  ? Trouble breathing.  ? Pain or pressure in your chest.  ? Confusion.  ? Blue-tinged lips and fingernails.  ? Difficulty waking from sleep.  ? Symptoms that get worse.  Let the emergency medical personnel know if you think you have coronavirus disease.  Summary  · A new respiratory virus is spreading from person to person and causing COVID-19 (coronavirus disease).  · The virus that causes COVID-19 appears to spread easily. It spreads from one person to another through droplets from coughing or sneezing.  · Older adults and those with chronic diseases are at higher risk of disease. If you are at higher risk for complications, take extra precautions.  · There is currently no vaccine to prevent coronavirus disease. There are no medicines, such as antibiotics or antivirals, to treat the  virus.  · You can protect yourself and your family by washing your hands often, avoiding touching your face, and covering your coughs and sneezes.  This information is not intended to replace advice given to you by your health care provider. Make sure you discuss any questions you have with your health care provider.  Document Released: 04/14/2020 Document Revised: 04/14/2020 Document Reviewed: 04/14/2020  Elsevier Patient Education © 2020 [employer]    Critical Illness, Suggestions for Family and Friends  While your family member or friend is in the hospital, you may feel a lot of stress. One day the patient may seem to be recovering, and the next day things may take a turn for the worse. It can be a scary experience to see someone you care about on a breathing machine.   Several caregivers work together to give care to your family member. These healthcare professionals are often specialists who are treating different concerns of the patient. For example, an infectious disease specialist might be involved to help make sure that the initial infection or an infectious complication is properly treated. A lung specialist may be adjusting the settings on the breathing machine and a kidney specialist may be required if kidney failure occurs. These caregivers talk to each other regularly to make sure that care is given in a coordinated manner.  Even though you may feel helpless, here are some things you can do to help:  · Talk to the caregivers, nurses, and other health care providers. Ask questions about the patient's condition and care, and ask how you can support your loved one.   · Talk to the patient, even if he or she is in a drug-induced sleep. Talk about fun things you did together and laugh with them. Many survivors say they were, at some level, aware of the people and things around them. They also recall dreams they had while in the sleep-induced state. The dreams can be calming or frightening. Talking to the  "patient about happy things may help make the dreams more positive.   · Ask the hospital staff if you can put family photos near the patient, play music at low volume, or rub lotion onto the patient. This may help to trigger their senses of hearing, touch, and seeing.   · Leave fears and worries at the door. Always go outside the patient's room to talk with the doctor or nurse about the patient's condition. Make sure everyone is encouraging and hopeful while with the patient. The patient may sense stress in their presence.   · Keep a journal or record of events for the patient to read after leaving the hospital. Some survivors want to know every detail of what happened while they were asleep.   · Remember to take care of yourself, too. Try to get rest and sleep, eat well, and get some exercise. Call on other family members and friends to sit with the patient so you can have a break. Your very sick loved one will need your strength and support over what may be a long recovery period.   · The family members and friends of people with a critical illness often are deeply affected by the experience. The \"roller-coaster\" ride of emotions while the patient is in the hospital can be exhausting and stressful. Caring for the survivor at home can also be stressful and tiring. Ask for help from others or from your health care providers if you need it. Most hospitals have individuals such as social workers who can help family member cope with these issues.   Document Released: 08/30/2005 Document Revised: 03/11/2013 Document Reviewed: 10/09/2009  [employer]® Patient Information ©2013 [employer].  "

## 2020-12-09 ENCOUNTER — HOME CARE VISIT (OUTPATIENT)
Dept: HOME HEALTH SERVICES | Facility: HOME HEALTHCARE | Age: 85
End: 2020-12-09
Payer: MEDICARE

## 2020-12-09 VITALS
RESPIRATION RATE: 19 BRPM | HEIGHT: 63 IN | BODY MASS INDEX: 20.93 KG/M2 | TEMPERATURE: 98.1 F | OXYGEN SATURATION: 98 % | DIASTOLIC BLOOD PRESSURE: 52 MMHG | HEART RATE: 75 BPM | WEIGHT: 118.13 LBS | SYSTOLIC BLOOD PRESSURE: 106 MMHG

## 2020-12-09 PROCEDURE — 665001 SOC-HOME HEALTH

## 2020-12-09 PROCEDURE — G0493 RN CARE EA 15 MIN HH/HOSPICE: HCPCS

## 2020-12-09 SDOH — ECONOMIC STABILITY: HOUSING INSECURITY: EVIDENCE OF SMOKING MATERIAL: 0

## 2020-12-09 SDOH — ECONOMIC STABILITY: HOUSING INSECURITY
HOME SAFETY: FIRE ESCAPE PLAN DEVELOPED. PATIENT DOES NOT HAVE FLAMMABLE MATERIALS PRESENT IN THE HOME PRESENTING A FIRE HAZARD. NO EVIDENCE FOUND OF SMOKING MATERIALS PRESENT IN THE HOME.

## 2020-12-09 SDOH — ECONOMIC STABILITY: HOUSING INSECURITY
HOME SAFETY: OXYGEN SAFETY RISK ASSESSMENT PERFORMED. PATIENT DOES HAVE A NO SMOKING SIGN POSTED IN THE HOME. PATIENT DOES HAVE A WORKING FIRE EXTINGUISHER PRESENT IN THE HOME. SMOKE ALARMS ARE PRESENT AND FUNCTIONAL ON EACH LEVEL OF THE HOME. PATIENT DOES HAVE A

## 2020-12-09 ASSESSMENT — ACTIVITIES OF DAILY LIVING (ADL): OASIS_M1830: 03

## 2020-12-09 ASSESSMENT — PATIENT HEALTH QUESTIONNAIRE - PHQ9
1. LITTLE INTEREST OR PLEASURE IN DOING THINGS: 00
2. FEELING DOWN, DEPRESSED, IRRITABLE, OR HOPELESS: 00
CLINICAL INTERPRETATION OF PHQ2 SCORE: 0

## 2020-12-09 ASSESSMENT — ENCOUNTER SYMPTOMS
SHORTNESS OF BREATH: T
MUSCLE WEAKNESS: 1

## 2020-12-09 ASSESSMENT — FIBROSIS 4 INDEX: FIB4 SCORE: 0.83

## 2020-12-10 ENCOUNTER — HOME CARE VISIT (OUTPATIENT)
Dept: HOME HEALTH SERVICES | Facility: HOME HEALTHCARE | Age: 85
End: 2020-12-10
Payer: MEDICARE

## 2020-12-10 VITALS
OXYGEN SATURATION: 94 % | DIASTOLIC BLOOD PRESSURE: 50 MMHG | HEART RATE: 85 BPM | TEMPERATURE: 99.7 F | RESPIRATION RATE: 17 BRPM | SYSTOLIC BLOOD PRESSURE: 118 MMHG

## 2020-12-10 PROCEDURE — G0151 HHCP-SERV OF PT,EA 15 MIN: HCPCS

## 2020-12-10 SDOH — ECONOMIC STABILITY: HOUSING INSECURITY
HOME SAFETY: EDUC FOR THROW RUG REMOVAL  TANKS STORED ON SIDE UNDER BED AND IN HOLDER    OXYGEN SAFETY RISK ASSESSMENT PERFORMED. PATIENT DOES HAVE A NO SMOKING SIGN POSTED IN THE HOME. PATIENT DOES HAVE A WORKING FIRE EXTINGUISHER PRESENT IN THE HOME. SMOKE ALAR

## 2020-12-10 SDOH — ECONOMIC STABILITY: HOUSING INSECURITY: HOME SAFETY: E. - PER FACILITY

## 2020-12-10 SDOH — ECONOMIC STABILITY: HOUSING INSECURITY
HOME SAFETY: MS ARE PRESENT AND FUNCTIONAL ON EACH LEVEL OF THE HOME. PATIENT DOES HAVE A FIRE ESCAPE PLAN DEVELOPED. PATIENT DOES NOT HAVE FLAMMABLE MATERIALS PRESENT IN THE HOME PRESENTING A FIRE HAZARD. NO EVIDENCE FOUND OF SMOKING MATERIALS PRESENT IN THE HOM

## 2020-12-10 SDOH — ECONOMIC STABILITY: HOUSING INSECURITY: EVIDENCE OF SMOKING MATERIAL: 0

## 2020-12-10 ASSESSMENT — ACTIVITIES OF DAILY LIVING (ADL): ADLS_COMMENTS: PEND OT EVAL

## 2020-12-11 ENCOUNTER — ANTICOAGULATION MONITORING (OUTPATIENT)
Dept: MEDICAL GROUP | Facility: PHYSICIAN GROUP | Age: 85
End: 2020-12-11

## 2020-12-11 ENCOUNTER — HOME CARE VISIT (OUTPATIENT)
Dept: HOME HEALTH SERVICES | Facility: HOME HEALTHCARE | Age: 85
End: 2020-12-11
Payer: MEDICARE

## 2020-12-11 VITALS
OXYGEN SATURATION: 98 % | DIASTOLIC BLOOD PRESSURE: 56 MMHG | RESPIRATION RATE: 17 BRPM | TEMPERATURE: 99.1 F | HEART RATE: 82 BPM | SYSTOLIC BLOOD PRESSURE: 122 MMHG

## 2020-12-11 PROCEDURE — G0495 RN CARE TRAIN/EDU IN HH: HCPCS

## 2020-12-11 SDOH — ECONOMIC STABILITY: HOUSING INSECURITY: EVIDENCE OF SMOKING MATERIAL: 0

## 2020-12-11 ASSESSMENT — ENCOUNTER SYMPTOMS: MUSCLE WEAKNESS: 1

## 2020-12-11 NOTE — PROGRESS NOTES
Received referral from [facility]. Medications reviewed. No clinically significant interactions noted.     Wali Machado, PharmD, MS, BCACP, [facility]  Phone [phone] fax [phone]    This note was created using voice recognition software (Dragon). The accuracy of the dictation is limited by the abilities of the software. I have reviewed the note prior to signing, however some errors in grammar and context are still possible. If you have any questions related to this note please do not hesitate to contact our office.

## 2020-12-14 ENCOUNTER — HOME CARE VISIT (OUTPATIENT)
Dept: HOME HEALTH SERVICES | Facility: HOME HEALTHCARE | Age: 85
End: 2020-12-14
Payer: MEDICARE

## 2020-12-14 VITALS
TEMPERATURE: 97.7 F | OXYGEN SATURATION: 96 % | DIASTOLIC BLOOD PRESSURE: 60 MMHG | SYSTOLIC BLOOD PRESSURE: 106 MMHG | RESPIRATION RATE: 16 BRPM | HEART RATE: 78 BPM

## 2020-12-14 PROCEDURE — G0493 RN CARE EA 15 MIN HH/HOSPICE: HCPCS

## 2020-12-14 ASSESSMENT — ACTIVITIES OF DAILY LIVING (ADL)
AMBULATION ASSISTANCE: STAND BY ASSIST
CURRENT_FUNCTION: STAND BY ASSIST

## 2020-12-14 ASSESSMENT — ENCOUNTER SYMPTOMS
VOMITING: DENIES
SHORTNESS OF BREATH: T
NAUSEA: DENIES

## 2020-12-15 ENCOUNTER — HOME CARE VISIT (OUTPATIENT)
Dept: HOME HEALTH SERVICES | Facility: HOME HEALTHCARE | Age: 85
End: 2020-12-15
Payer: MEDICARE

## 2020-12-15 VITALS
OXYGEN SATURATION: 92 % | DIASTOLIC BLOOD PRESSURE: 60 MMHG | SYSTOLIC BLOOD PRESSURE: 110 MMHG | HEART RATE: 82 BPM | TEMPERATURE: 98.6 F

## 2020-12-15 PROCEDURE — G0157 HHC PT ASSISTANT EA 15: HCPCS | Mod: CQ

## 2020-12-15 PROCEDURE — G0180 MD CERTIFICATION HHA PATIENT: HCPCS | Performed by: FAMILY MEDICINE

## 2020-12-15 NOTE — Clinical Note
noted, thank you    ----- Message -----  From: Giulia Galen, PTA  Sent: 12/16/2020   7:29 AM PST  To: Renae Whipple, PT      Dayana is cont to progress but this AM she felt discouraged and was teared eye as she feels like she is not getting better. Worked on ther ex and increase amb and she felt better after her session as she did not hae any increase fatigue with task and mario well. She was fearful with using her portable O2 tank so this therapist pushed it today and she will initate on next visit. Will cont with POC to increase her functional mob and ind to prevent further decline in IND. S/B Renae Whipple PT

## 2020-12-16 ENCOUNTER — HOME CARE VISIT (OUTPATIENT)
Dept: HOME HEALTH SERVICES | Facility: HOME HEALTHCARE | Age: 85
End: 2020-12-16
Payer: MEDICARE

## 2020-12-16 VITALS
TEMPERATURE: 99 F | SYSTOLIC BLOOD PRESSURE: 104 MMHG | RESPIRATION RATE: 17 BRPM | OXYGEN SATURATION: 94 % | DIASTOLIC BLOOD PRESSURE: 60 MMHG | HEART RATE: 82 BPM

## 2020-12-16 PROCEDURE — G0493 RN CARE EA 15 MIN HH/HOSPICE: HCPCS

## 2020-12-16 ASSESSMENT — ACTIVITIES OF DAILY LIVING (ADL)
CURRENT_FUNCTION: STAND BY ASSIST
AMBULATION ASSISTANCE: STAND BY ASSIST

## 2020-12-16 ASSESSMENT — ENCOUNTER SYMPTOMS
VOMITING: DENIES
MUSCLE WEAKNESS: 1
NAUSEA: DENIES
SHORTNESS OF BREATH: T

## 2020-12-16 NOTE — PROGRESS NOTES
Dayana is cont to progress but this AM she felt discouraged and was teared eye as she feels like she is not getting better. Worked on ther ex and increase amb and she felt better after her session as she did not hae any increase fatigue with task and mairo well. She was fearful with using her portable O2 tank so this therapist pushed it today and she will initate on next visit. Will cont with POC to increase her functional mob and ind to prevent further decline in IND. S/B Renae Whipple PT

## 2020-12-17 ENCOUNTER — HOME CARE VISIT (OUTPATIENT)
Dept: HOME HEALTH SERVICES | Facility: HOME HEALTHCARE | Age: 85
End: 2020-12-17
Payer: MEDICARE

## 2020-12-17 VITALS
TEMPERATURE: 98.8 F | HEART RATE: 77 BPM | OXYGEN SATURATION: 95 % | DIASTOLIC BLOOD PRESSURE: 58 MMHG | SYSTOLIC BLOOD PRESSURE: 100 MMHG | RESPIRATION RATE: 17 BRPM

## 2020-12-17 PROCEDURE — G0157 HHC PT ASSISTANT EA 15: HCPCS | Mod: CQ

## 2020-12-18 ENCOUNTER — HOME CARE VISIT (OUTPATIENT)
Dept: HOME HEALTH SERVICES | Facility: HOME HEALTHCARE | Age: 85
End: 2020-12-18
Payer: MEDICARE

## 2020-12-18 PROCEDURE — G0493 RN CARE EA 15 MIN HH/HOSPICE: HCPCS

## 2020-12-18 PROCEDURE — G0152 HHCP-SERV OF OT,EA 15 MIN: HCPCS

## 2020-12-19 VITALS
OXYGEN SATURATION: 95 % | TEMPERATURE: 98.5 F | DIASTOLIC BLOOD PRESSURE: 60 MMHG | RESPIRATION RATE: 18 BRPM | SYSTOLIC BLOOD PRESSURE: 119 MMHG | HEART RATE: 85 BPM

## 2020-12-19 ASSESSMENT — ENCOUNTER SYMPTOMS
SHORTNESS OF BREATH: T
MUSCLE WEAKNESS: 1
NAUSEA: DENIES
VOMITING: DENIES

## 2020-12-19 ASSESSMENT — ACTIVITIES OF DAILY LIVING (ADL)
AMBULATION ASSISTANCE: STAND BY ASSIST
CURRENT_FUNCTION: STAND BY ASSIST

## 2020-12-20 VITALS
OXYGEN SATURATION: 94 % | HEART RATE: 85 BPM | DIASTOLIC BLOOD PRESSURE: 60 MMHG | SYSTOLIC BLOOD PRESSURE: 119 MMHG | TEMPERATURE: 98.5 F | RESPIRATION RATE: 18 BRPM

## 2020-12-20 ASSESSMENT — ACTIVITIES OF DAILY LIVING (ADL)
GROOMING_CURRENT_FUNCTION: INDEPENDENT
TRANSPORTATION ASSESSED: 1
PREPARING MEALS: DEPENDENT
GROOMING ASSESSED: 1
TOILETING: INDEPENDENT
ORAL_CARE_CURRENT_FUNCTION: INDEPENDENT
BATHING ASSESSED: 1
FEEDING: INDEPENDENT
SHOPPING ASSESSED: 1
USING THE TELPHONE: INDEPENDENT
DRESSING_UB_CURRENT_FUNCTION: INDEPENDENT
LIGHT HOUSEKEEPING: DEPENDENT
TELEPHONE USE ASSESSED: 1
AMBULATION ASSISTANCE: 1
SHOPPING: DEPENDENT
BATHING_CURRENT_FUNCTION: INDEPENDENT
FEEDING ASSESSED: 1
ORAL_CARE_ASSESSED: 1
LAUNDRY ASSESSED: 1
TRANSPORTATION: DEPENDENT
AMBULATION ASSISTANCE: INDEPENDENT
HOUSEKEEPING ASSESSED: 1
LAUNDRY: DEPENDENT
DRESSING_LB_CURRENT_FUNCTION: INDEPENDENT
TOILETING: 1

## 2020-12-20 ASSESSMENT — ENCOUNTER SYMPTOMS
POOR JUDGMENT: 1
DIFFICULTY THINKING: 1

## 2020-12-20 NOTE — PROGRESS NOTES
Nisreen is cont to progress and reports that she is finally feeling stronger and better. She mario amb in [place] WNL but requires cuiing to slow down and pace herself as she reports that she is a fast walker. It does't appear that she has a carry over with HEP but she is up and doing more during the day. She did very well with pulling her O2 tank and was more confident. She would benefit from bag O2 if she is going to stay on O2 long term. PT will f/u next week. Will cont with POC to increase her functional mob and ind to prevent further decline in IND. S/B Tracy Santos PT

## 2020-12-21 ENCOUNTER — HOME CARE VISIT (OUTPATIENT)
Dept: HOME HEALTH SERVICES | Facility: HOME HEALTHCARE | Age: 85
End: 2020-12-21
Payer: MEDICARE

## 2020-12-21 ENCOUNTER — TELEMEDICINE (OUTPATIENT)
Dept: MEDICAL GROUP | Facility: PHYSICIAN GROUP | Age: 85
End: 2020-12-21
Payer: MEDICARE

## 2020-12-21 VITALS
SYSTOLIC BLOOD PRESSURE: 115 MMHG | BODY MASS INDEX: 19.67 KG/M2 | HEIGHT: 63 IN | HEART RATE: 85 BPM | DIASTOLIC BLOOD PRESSURE: 59 MMHG | RESPIRATION RATE: 16 BRPM | OXYGEN SATURATION: 98 % | WEIGHT: 111 LBS | TEMPERATURE: 98.8 F

## 2020-12-21 VITALS
OXYGEN SATURATION: 99 % | SYSTOLIC BLOOD PRESSURE: 115 MMHG | DIASTOLIC BLOOD PRESSURE: 59 MMHG | TEMPERATURE: 98.8 F | HEART RATE: 85 BPM | RESPIRATION RATE: 16 BRPM

## 2020-12-21 DIAGNOSIS — F13.20 BENZODIAZEPINE DEPENDENCE (HCC): ICD-10-CM

## 2020-12-21 DIAGNOSIS — U07.1 ACUTE HYPOXEMIC RESPIRATORY FAILURE DUE TO COVID-19 (HCC): ICD-10-CM

## 2020-12-21 DIAGNOSIS — Z86.16 HISTORY OF 2019 NOVEL CORONAVIRUS DISEASE (COVID-19): ICD-10-CM

## 2020-12-21 DIAGNOSIS — J96.01 ACUTE HYPOXEMIC RESPIRATORY FAILURE DUE TO COVID-19 (HCC): ICD-10-CM

## 2020-12-21 PROCEDURE — G0151 HHCP-SERV OF PT,EA 15 MIN: HCPCS

## 2020-12-21 PROCEDURE — 99213 OFFICE O/P EST LOW 20 MIN: CPT | Mod: 95,CR | Performed by: PHYSICIAN ASSISTANT

## 2020-12-21 PROCEDURE — G0493 RN CARE EA 15 MIN HH/HOSPICE: HCPCS

## 2020-12-21 SDOH — ECONOMIC STABILITY: HOUSING INSECURITY
HOME SAFETY: EDUCATION FOR 02 TANK ON/OFF AND PORTABILITY W/ ACTIVITY MANAGEMENT - PT AND DTR DEMO UNDERSTANDING  PT UNAWARE OF HOW TO TURN CONCENTRATOR ON/OFF - EDUC PROVIDED  PT TO HAVE FAMILY OR PT ASSIST W/ 02 TANK MANAGEMENT AT THIS TIME PENDING FURTHER EDUC

## 2020-12-21 SDOH — ECONOMIC STABILITY: HOUSING INSECURITY
HOME SAFETY: VE A FIRE ESCAPE PLAN DEVELOPED. PATIENT DOES NOT HAVE FLAMMABLE MATERIALS PRESENT IN THE HOME PRESENTING A FIRE HAZARD. NO EVIDENCE FOUND OF SMOKING MATERIALS PRESENT IN THE HOME. - PER FACILITY

## 2020-12-21 SDOH — ECONOMIC STABILITY: HOUSING INSECURITY: EVIDENCE OF SMOKING MATERIAL: 0

## 2020-12-21 SDOH — ECONOMIC STABILITY: HOUSING INSECURITY
HOME SAFETY: OXYGEN SAFETY RISK ASSESSMENT PERFORMED. PATIENT DOES HAVE A NO SMOKING SIGN POSTED IN THE HOME. PATIENT DOES HAVE A WORKING FIRE EXTINGUISHER PRESENT IN THE HOME. SMOKE ALARMS ARE PRESENT AND FUNCTIONAL ON EACH LEVEL OF THE HOME. PATIENT DOES HA

## 2020-12-21 ASSESSMENT — ENCOUNTER SYMPTOMS
NAUSEA: DENIES
VOMITING: DENIES
MUSCLE WEAKNESS: 1
SHORTNESS OF BREATH: T

## 2020-12-21 ASSESSMENT — FIBROSIS 4 INDEX: FIB4 SCORE: 0.83

## 2020-12-23 ENCOUNTER — HOME CARE VISIT (OUTPATIENT)
Dept: HOME HEALTH SERVICES | Facility: HOME HEALTHCARE | Age: 85
End: 2020-12-23
Payer: MEDICARE

## 2020-12-23 VITALS
TEMPERATURE: 98.7 F | SYSTOLIC BLOOD PRESSURE: 130 MMHG | HEART RATE: 76 BPM | RESPIRATION RATE: 16 BRPM | OXYGEN SATURATION: 97 % | DIASTOLIC BLOOD PRESSURE: 70 MMHG

## 2020-12-23 PROCEDURE — G0151 HHCP-SERV OF PT,EA 15 MIN: HCPCS

## 2020-12-23 PROCEDURE — G0493 RN CARE EA 15 MIN HH/HOSPICE: HCPCS

## 2020-12-23 SDOH — ECONOMIC STABILITY: HOUSING INSECURITY
HOME SAFETY: FIRE ESCAPE PLAN DEVELOPED. PATIENT DOES NOT HAVE FLAMMABLE MATERIALS PRESENT IN THE HOME PRESENTING A FIRE HAZARD. NO EVIDENCE FOUND OF SMOKING MATERIALS PRESENT IN THE HOME. - PER SENIOR FACILITY    RECOMMEND RELOCATION OF STOOL IN WALKING PATH BE

## 2020-12-23 SDOH — ECONOMIC STABILITY: HOUSING INSECURITY: HOME SAFETY: TWEEN FRONT DOOR AND LIVING ROOM AREA, HIGH RISK TRIP - PT STRONGLY DECLINED DESPITE RISK/BENEFIT EDUC

## 2020-12-23 SDOH — ECONOMIC STABILITY: HOUSING INSECURITY: EVIDENCE OF SMOKING MATERIAL: 0

## 2020-12-23 ASSESSMENT — ENCOUNTER SYMPTOMS
VOMITING: DENIES
NAUSEA: DENIES
SEVERE DYSPNEA: 1

## 2020-12-24 NOTE — PROGRESS NOTES
Virtual Visit: Established Patient   This visit was conducted via Zoom using secure and encrypted videoconferencing technology. The patient was in a private location in the state of [place].    The patient's identity was confirmed and verbal consent was obtained for this virtual visit.    Subjective:   CC: Hospital follow-up  Chief Complaint   Patient presents with   • Follow-Up     hospital       Dayanamorro Lechuga is a 89 y.o. female presenting for evaluation and management of:    Patient is a pleasant 89-year-old female who recently had COVID-19 infection.  Patient has been discharged home and home health is following patient closely.  She is on continuous supplemental oxygen 2 L/min.  States working closely with physical therapy and is hopeful that she will not have to be on oxygen continuously.  States she has been without her oxygen for 30-40 minutes per time but has not been monitoring her pulse ox when she is not using her oxygen.  States she has been also doing lung exercises in the spirometer.  Patient states physical therapy 2 times a week.  States she is feeling great.  Patient still taking Ativan for anxiety/insomnia.  Patient is aware of the risk associated with Ativan use.  Patient wants to continue Ativan use.       ROS   Denies any recent fevers or chills. No nausea or vomiting. No chest pains or shortness of breath.     Allergies   Allergen Reactions   • Bactrim [Sulfamethoxazole W-Trimethoprim] Hives   • Pcn [Penicillins] Hives     About 70 years   • Codeine Unspecified     Unknown reaction         Current medicines (including changes today)  Current Outpatient Medications   Medication Sig Dispense Refill   • Home Care Oxygen Inhale 2-3 L/min continuous. Oxygen dose range: 2 L/min  Respiratory route via: Nasal Cannula   Oxygen supplier: Preferred  3L/min at night/while sleeping         • Acetaminophen 500 MG Cap Take 1 Cap by mouth 3 times a day.     • melatonin 5 mg Tab Take 1 Tab by mouth  every bedtime.     • PREPARATION H 0.25-3-12-18 % Cream Insert 1 Application into the rectum as needed (pain).     • benzonatate (TESSALON) 100 MG Cap Take 1 Cap by mouth 3 times a day as needed for Cough. 60 Cap 0   • carvedilol (COREG) 6.25 MG Tab Take 1 Tab by mouth 2 times a day, with meals for 30 days. 60 Tab 0   • gabapentin (NEURONTIN) 100 MG Cap Take 200 mg by mouth 3 times a day. 2 capsules = 200 mg.     • [START ON 2/28/2021] LORazepam (ATIVAN) 0.5 MG Tab Take 1-2 Tabs by mouth 2 Times a Day for 30 days. (Patient taking differently: Take 0.5-1 mg by mouth See Admin Instructions. 2 tablets = 1 mg. Pt takes 0.5 mg in afternoon and 1 mg in the evening for sleep) 90 Tab 0   • QVAR REDIHALER 80 MCG/ACT inhaler INHALE 1 PUFF BY MOUTH TWICE A DAY  (Patient taking differently: Inhale 1 Puff 2 times a day.) 10.6 g 0   • montelukast (SINGULAIR) 10 MG Tab TAKE ONE TABLET BY MOUTH ONE TIME DAILY  (Patient taking differently: Take 10 mg by mouth every evening.) 90 Tab 0   • atorvastatin (LIPITOR) 80 MG tablet Take 80 mg by mouth every evening.     • isosorbide mononitrate SR (IMDUR) 30 MG TABLET SR 24 HR Take 30 mg by mouth every evening.     • lisinopril (PRINIVIL) 20 MG Tab Take 20 mg by mouth every morning with breakfast.     • Multiple Vitamins-Minerals (CENTRUM SILVER PO) Take 1 Tab by mouth every morning.     • loratadine (CLARITIN) 10 MG Tab Take 10 mg by mouth every morning.     • albuterol 108 (90 Base) MCG/ACT Aero Soln inhalation aerosol Inhale 2 Puffs by mouth every 6 hours as needed for Shortness of Breath. 3 Inhaler 0   • nitroglycerin (NITROSTAT) 0.4 MG SL Tab Place 1 tab under tongue every 5 min as needed for chest pain max 3 doses (Patient taking differently: Place 0.4 mg under the tongue as needed. Place 1 tab under tongue every 5 min as needed for chest pain max 3 doses) 100 Tab 0   • Biotin w/ Vitamins C & E (HAIR/SKIN/NAILS) 1250-7.5-7.5 MCG-MG-UNT Chew Tab Chew 1 Tab every morning.     •  guaifenesin LA (MUCINEX) 600 MG TABLET SR 12 HR Take 600 mg by mouth every morning.     • Ascorbic Acid (VITAMIN C) 1000 MG Tab Take 1,000 mg by mouth every morning.     • VITAMIN E PO Take 1 Cap by mouth every morning.     • aspirin (ASA) 81 MG Chew Tab chewable tablet Chew 81 mg every morning with breakfast. 100 Tab 11   • Cholecalciferol (VITAMIN D) 50 MCG (2000 UT) Cap Take 2,000 Units by mouth every morning.     • omeprazole (PRILOSEC) 20 MG delayed-release capsule Take 20 mg by mouth every morning with breakfast.     • amLODIPine (NORVASC) 5 MG Tab TAKE ONE TABLET BY MOUTH ONE TIME DAILY  90 Tab 1   • fluticasone (FLONASE) 50 MCG/ACT nasal spray USE 1 SPRAY IN EACH NOSTRIL EVERY DAY 16 g 2     No current facility-administered medications for this visit.        Patient Active Problem List    Diagnosis Date Noted   • Acute hypoxemic respiratory failure due to COVID-19 ([facility]) 11/20/2020     Priority: High   • CAD (coronary artery disease) 09/11/2020     Priority: High   • Chest pain due to myocardial ischemia 09/10/2016     Priority: High   • Hyponatremia 11/20/2020     Priority: Medium   • Popliteal artery stenosis, right ([facility]) 11/28/2018     Priority: Medium   • Claudication of right lower extremity ([facility]) 11/07/2018     Priority: Medium   • Coronary artery disease involving native coronary artery of native heart with angina pectoris ([facility]) 08/10/2016     Priority: Medium   • Dyslipidemia 10/31/2009     Priority: Medium   • Essential hypertension 10/02/2009     Priority: Medium   • Acute bilateral low back pain with bilateral sciatica 08/29/2019     Priority: Low   • Dysuria 06/27/2019     Priority: Low   • Diarrhea 06/27/2019     Priority: Low   • Slow transit constipation 08/29/2018     Priority: Low   • S/P bilateral cataract extraction 02/27/2018     Priority: Low   • Benzodiazepine dependence ([facility]) 11/13/2017     Priority: Low   • Anxiety 06/22/2016     Priority: Low   • GERD (gastroesophageal reflux disease)  "10/31/2009     Priority: Low   • Osteopenia 10/31/2009     Priority: Low   • Mild intermittent asthma with acute exacerbation 10/02/2009     Priority: Low   • Debility 11/20/2020   • Peripheral neuropathy 11/20/2020   • Bradycardia 09/11/2020   • Post concussion syndrome 09/11/2020   • Mild peripheral edema 07/31/2020   • PVD (peripheral vascular disease) ([facility]) 03/04/2020   • Pleural effusion 12/20/2019   • Incomplete emptying of bladder 10/08/2019   • Urinary retention 09/11/2019       Family History   Problem Relation Age of Onset   • Heart Disease Neg Hx    • Heart Failure Neg Hx    • Hyperlipidemia Neg Hx        She  has a past medical history of Allergy, Anxiety, ASTHMA, CAD (coronary artery disease), Hyperlipidemia, Hypertension, Lumbar back pain (9/10/2016), OSTEOPOROSIS, and PVD (peripheral vascular disease) ([facility]).  She  has a past surgical history that includes appendectomy; abdominal hysterectomy total; hernia repair; tonsillectomy; and zzz cardiac cath.       Objective:   /59 (BP Location: Left arm, Patient Position: Sitting) Comment: pt reported  Pulse 85 Comment: pt reported  Temp 37.1 °C (98.8 °F) (Temporal) Comment: pt reported  Resp 16 Comment: pt reported  Ht 1.6 m (5' 3\") Comment: pt reported  Wt 50.3 kg (111 lb) Comment: pt reported  LMP  (LMP Unknown)   SpO2 98% Comment: on 2L  BMI 19.66 kg/m²     Physical Exam:  Constitutional: Alert, no distress, well-groomed.  Skin: No rashes in visible areas.  Eye: Round. Conjunctiva clear, lids normal. No icterus.   ENMT: Lips pink without lesions, good dentition, moist mucous membranes. Phonation normal.  Neck: No masses, no thyromegaly. Moves freely without pain.  Respiratory: Unlabored respiratory effort, no cough or audible wheeze  Psych: Alert and oriented x3, normal affect and mood.       Assessment and Plan:   The following treatment plan was discussed:     1. History of 2019 novel coronavirus disease (COVID-19)  2. Acute hypoxemic " respiratory failure due to COVID-19 (HCC)  3. Benzodiazepine dependence (HCC)  Patient has been discharged home and home health is following patient closely.  Patient is currently using 2 L/min of supplemental oxygen for hypoxia.  Patient follows up closely with physical therapy.  Long-term goal is for patient to be off of supplemental oxygen.  Advised patient continue breathing exercises and staying active.  If she does not use her oxygen to make sure she is monitoring her pulse ox closely.  Patient will follow-up in 3-4 weeks for evaluation.  Patient has benzodiazepine dependence.  Discussed risk associated with benzodiazepine use.  Discussed with patient benzodiazepine can depress the respiratory system.  Patient is aware of risk but wants to continue benzodiazepine.  Continue monitor closely.        Follow-up: Return in about 1 month (around 1/21/2021).

## 2020-12-25 ENCOUNTER — HOME CARE VISIT (OUTPATIENT)
Dept: HOME HEALTH SERVICES | Facility: HOME HEALTHCARE | Age: 85
End: 2020-12-25
Payer: MEDICARE

## 2020-12-28 ENCOUNTER — HOME CARE VISIT (OUTPATIENT)
Dept: HOME HEALTH SERVICES | Facility: HOME HEALTHCARE | Age: 85
End: 2020-12-28
Payer: MEDICARE

## 2020-12-28 VITALS
DIASTOLIC BLOOD PRESSURE: 60 MMHG | SYSTOLIC BLOOD PRESSURE: 118 MMHG | RESPIRATION RATE: 17 BRPM | OXYGEN SATURATION: 95 % | HEART RATE: 88 BPM | TEMPERATURE: 98.7 F

## 2020-12-28 VITALS
HEART RATE: 88 BPM | OXYGEN SATURATION: 95 % | SYSTOLIC BLOOD PRESSURE: 118 MMHG | TEMPERATURE: 98.7 F | DIASTOLIC BLOOD PRESSURE: 60 MMHG | RESPIRATION RATE: 17 BRPM

## 2020-12-28 PROCEDURE — G0157 HHC PT ASSISTANT EA 15: HCPCS | Mod: CQ

## 2020-12-28 PROCEDURE — G0493 RN CARE EA 15 MIN HH/HOSPICE: HCPCS

## 2020-12-28 ASSESSMENT — ENCOUNTER SYMPTOMS
VOMITING: DENIES
NAUSEA: DENIES
SHORTNESS OF BREATH: T
MUSCLE WEAKNESS: 1

## 2020-12-28 ASSESSMENT — ACTIVITIES OF DAILY LIVING (ADL)
CURRENT_FUNCTION: STAND BY ASSIST
AMBULATION ASSISTANCE: STAND BY ASSIST

## 2020-12-29 NOTE — PROGRESS NOTES
Dayana did very well today and would like to start walking the halls INd. Educated her that if she feels safe and is able to set up her portable O2 then she could amb ind in halls. Educated her on turning on and setting up her portable tank. She is not sure if she boby remember how to do it. She did very well with increase amb today and has less noted fatigue. Will cont with POC to increase her functional mob and ind to prevent further decline in IND. S/B Tracy Santos PT

## 2020-12-31 ENCOUNTER — HOME CARE VISIT (OUTPATIENT)
Dept: HOME HEALTH SERVICES | Facility: HOME HEALTHCARE | Age: 85
End: 2020-12-31
Payer: MEDICARE

## 2020-12-31 VITALS
TEMPERATURE: 99 F | OXYGEN SATURATION: 99 % | SYSTOLIC BLOOD PRESSURE: 130 MMHG | HEART RATE: 72 BPM | DIASTOLIC BLOOD PRESSURE: 62 MMHG | RESPIRATION RATE: 18 BRPM

## 2020-12-31 PROCEDURE — G0493 RN CARE EA 15 MIN HH/HOSPICE: HCPCS

## 2020-12-31 SDOH — ECONOMIC STABILITY: HOUSING INSECURITY
HOME SAFETY: OXYGEN SAFETY RISK ASSESSMENT PERFORMED. PATIENT DOES HAVE A NO SMOKING SIGN POSTED IN THE HOME. PATIENT DOES NOT HAVE A WORKING FIRE EXTINGUISHER PRESENT IN THE HOME. SMOKE ALARMS ARE PRESENT AND FUNCTIONAL ON EACH LEVEL OF THE HOME. PATIENT DOES HA

## 2020-12-31 SDOH — ECONOMIC STABILITY: HOUSING INSECURITY: EVIDENCE OF SMOKING MATERIAL: 0

## 2020-12-31 SDOH — ECONOMIC STABILITY: HOUSING INSECURITY
HOME SAFETY: VE A FIRE ESCAPE PLAN DEVELOPED. PATIENT DOES NOT HAVE FLAMMABLE MATERIALS PRESENT IN THE HOME PRESENTING A FIRE HAZARD. NO EVIDENCE FOUND OF SMOKING MATERIALS PRESENT IN THE HOME.

## 2021-01-04 ENCOUNTER — TELEPHONE (OUTPATIENT)
Dept: MEDICAL GROUP | Facility: PHYSICIAN GROUP | Age: 86
End: 2021-01-04

## 2021-01-04 ENCOUNTER — HOME CARE VISIT (OUTPATIENT)
Dept: HOME HEALTH SERVICES | Facility: HOME HEALTHCARE | Age: 86
End: 2021-01-04
Payer: MEDICARE

## 2021-01-04 VITALS
OXYGEN SATURATION: 96 % | TEMPERATURE: 98.9 F | DIASTOLIC BLOOD PRESSURE: 62 MMHG | HEART RATE: 72 BPM | SYSTOLIC BLOOD PRESSURE: 110 MMHG | RESPIRATION RATE: 17 BRPM

## 2021-01-04 PROCEDURE — G0493 RN CARE EA 15 MIN HH/HOSPICE: HCPCS

## 2021-01-04 SDOH — ECONOMIC STABILITY: HOUSING INSECURITY: EVIDENCE OF SMOKING MATERIAL: 0

## 2021-01-04 NOTE — TELEPHONE ENCOUNTER
Pt called and left VM stating the she has been experiencing left arm px on and off, she states the she expressed this to two different home health nurses who states gave her completely different answers as to why she is having this, she did not say what they thought the cause of the pain was.    She also stated that she is currently having this arm px along with a headache, and feels like she wants to pass out.  She stated her BP was 149/89.    Please advise

## 2021-01-05 ENCOUNTER — NURSE TRIAGE (OUTPATIENT)
Dept: HEALTH INFORMATION MANAGEMENT | Facility: OTHER | Age: 86
End: 2021-01-05

## 2021-01-05 ENCOUNTER — HOSPITAL ENCOUNTER (EMERGENCY)
Facility: MEDICAL CENTER | Age: 86
End: 2021-01-05
Attending: EMERGENCY MEDICINE
Payer: MEDICARE

## 2021-01-05 ENCOUNTER — APPOINTMENT (OUTPATIENT)
Dept: RADIOLOGY | Facility: MEDICAL CENTER | Age: 86
End: 2021-01-05
Attending: EMERGENCY MEDICINE
Payer: MEDICARE

## 2021-01-05 ENCOUNTER — OFFICE VISIT (OUTPATIENT)
Dept: URGENT CARE | Facility: CLINIC | Age: 86
End: 2021-01-05
Payer: MEDICARE

## 2021-01-05 ENCOUNTER — HOME CARE VISIT (OUTPATIENT)
Dept: HOME HEALTH SERVICES | Facility: HOME HEALTHCARE | Age: 86
End: 2021-01-05
Payer: MEDICARE

## 2021-01-05 ENCOUNTER — TELEPHONE (OUTPATIENT)
Dept: MEDICAL GROUP | Facility: PHYSICIAN GROUP | Age: 86
End: 2021-01-05

## 2021-01-05 VITALS
HEART RATE: 80 BPM | OXYGEN SATURATION: 99 % | SYSTOLIC BLOOD PRESSURE: 137 MMHG | DIASTOLIC BLOOD PRESSURE: 70 MMHG | TEMPERATURE: 98.8 F | RESPIRATION RATE: 17 BRPM

## 2021-01-05 VITALS
WEIGHT: 116.84 LBS | HEART RATE: 78 BPM | DIASTOLIC BLOOD PRESSURE: 79 MMHG | HEIGHT: 63 IN | SYSTOLIC BLOOD PRESSURE: 178 MMHG | BODY MASS INDEX: 20.7 KG/M2 | TEMPERATURE: 98.3 F | OXYGEN SATURATION: 98 % | RESPIRATION RATE: 18 BRPM

## 2021-01-05 VITALS
SYSTOLIC BLOOD PRESSURE: 158 MMHG | HEART RATE: 80 BPM | TEMPERATURE: 98.6 F | RESPIRATION RATE: 14 BRPM | OXYGEN SATURATION: 97 % | DIASTOLIC BLOOD PRESSURE: 80 MMHG

## 2021-01-05 DIAGNOSIS — I16.0 HYPERTENSIVE URGENCY: ICD-10-CM

## 2021-01-05 DIAGNOSIS — R20.0 LEFT ARM NUMBNESS: ICD-10-CM

## 2021-01-05 DIAGNOSIS — R20.2 PARESTHESIA: ICD-10-CM

## 2021-01-05 DIAGNOSIS — M50.30 DDD (DEGENERATIVE DISC DISEASE), CERVICAL: ICD-10-CM

## 2021-01-05 DIAGNOSIS — R51.9 ACUTE NONINTRACTABLE HEADACHE, UNSPECIFIED HEADACHE TYPE: ICD-10-CM

## 2021-01-05 LAB
ALBUMIN SERPL BCP-MCNC: 4.2 G/DL (ref 3.2–4.9)
ALBUMIN/GLOB SERPL: 1.4 G/DL
ALP SERPL-CCNC: 64 U/L (ref 30–99)
ALT SERPL-CCNC: 17 U/L (ref 2–50)
ANION GAP SERPL CALC-SCNC: 9 MMOL/L (ref 7–16)
APPEARANCE UR: CLEAR
APTT PPP: 26 SEC (ref 24.7–36)
AST SERPL-CCNC: 18 U/L (ref 12–45)
BACTERIA #/AREA URNS HPF: NEGATIVE /HPF
BASOPHILS # BLD AUTO: 1.4 % (ref 0–1.8)
BASOPHILS # BLD: 0.1 K/UL (ref 0–0.12)
BILIRUB SERPL-MCNC: 0.4 MG/DL (ref 0.1–1.5)
BILIRUB UR QL STRIP.AUTO: NEGATIVE
BUN SERPL-MCNC: 8 MG/DL (ref 8–22)
CALCIUM SERPL-MCNC: 10.4 MG/DL (ref 8.5–10.5)
CHLORIDE SERPL-SCNC: 102 MMOL/L (ref 96–112)
CO2 SERPL-SCNC: 26 MMOL/L (ref 20–33)
COLOR UR: YELLOW
CREAT SERPL-MCNC: 0.39 MG/DL (ref 0.5–1.4)
EKG IMPRESSION: NORMAL
EOSINOPHIL # BLD AUTO: 0.23 K/UL (ref 0–0.51)
EOSINOPHIL NFR BLD: 3.2 % (ref 0–6.9)
EPI CELLS #/AREA URNS HPF: NEGATIVE /HPF
ERYTHROCYTE [DISTWIDTH] IN BLOOD BY AUTOMATED COUNT: 49.3 FL (ref 35.9–50)
GLOBULIN SER CALC-MCNC: 3.1 G/DL (ref 1.9–3.5)
GLUCOSE SERPL-MCNC: 110 MG/DL (ref 65–99)
GLUCOSE UR STRIP.AUTO-MCNC: NEGATIVE MG/DL
HCT VFR BLD AUTO: 38.9 % (ref 37–47)
HGB BLD-MCNC: 12.7 G/DL (ref 12–16)
HYALINE CASTS #/AREA URNS LPF: ABNORMAL /LPF
IMM GRANULOCYTES # BLD AUTO: 0.03 K/UL (ref 0–0.11)
IMM GRANULOCYTES NFR BLD AUTO: 0.4 % (ref 0–0.9)
INR PPP: 0.89 (ref 0.87–1.13)
KETONES UR STRIP.AUTO-MCNC: NEGATIVE MG/DL
LEUKOCYTE ESTERASE UR QL STRIP.AUTO: ABNORMAL
LYMPHOCYTES # BLD AUTO: 1.95 K/UL (ref 1–4.8)
LYMPHOCYTES NFR BLD: 26.8 % (ref 22–41)
MCH RBC QN AUTO: 32.7 PG (ref 27–33)
MCHC RBC AUTO-ENTMCNC: 32.6 G/DL (ref 33.6–35)
MCV RBC AUTO: 100.3 FL (ref 81.4–97.8)
MICRO URNS: ABNORMAL
MONOCYTES # BLD AUTO: 0.71 K/UL (ref 0–0.85)
MONOCYTES NFR BLD AUTO: 9.8 % (ref 0–13.4)
NEUTROPHILS # BLD AUTO: 4.25 K/UL (ref 2–7.15)
NEUTROPHILS NFR BLD: 58.4 % (ref 44–72)
NITRITE UR QL STRIP.AUTO: NEGATIVE
NRBC # BLD AUTO: 0 K/UL
NRBC BLD-RTO: 0 /100 WBC
PH UR STRIP.AUTO: 7.5 [PH] (ref 5–8)
PLATELET # BLD AUTO: 261 K/UL (ref 164–446)
PMV BLD AUTO: 10.5 FL (ref 9–12.9)
POTASSIUM SERPL-SCNC: 3.8 MMOL/L (ref 3.6–5.5)
PROT SERPL-MCNC: 7.3 G/DL (ref 6–8.2)
PROT UR QL STRIP: NEGATIVE MG/DL
PROTHROMBIN TIME: 12.4 SEC (ref 12–14.6)
RBC # BLD AUTO: 3.88 M/UL (ref 4.2–5.4)
RBC # URNS HPF: ABNORMAL /HPF
RBC UR QL AUTO: NEGATIVE
SODIUM SERPL-SCNC: 137 MMOL/L (ref 135–145)
SP GR UR STRIP.AUTO: 1.01
TROPONIN T SERPL-MCNC: 16 NG/L (ref 6–19)
UROBILINOGEN UR STRIP.AUTO-MCNC: 0.2 MG/DL
WBC # BLD AUTO: 7.3 K/UL (ref 4.8–10.8)
WBC #/AREA URNS HPF: ABNORMAL /HPF

## 2021-01-05 PROCEDURE — 93005 ELECTROCARDIOGRAM TRACING: CPT | Performed by: EMERGENCY MEDICINE

## 2021-01-05 PROCEDURE — 70450 CT HEAD/BRAIN W/O DYE: CPT

## 2021-01-05 PROCEDURE — 81001 URINALYSIS AUTO W/SCOPE: CPT

## 2021-01-05 PROCEDURE — 700102 HCHG RX REV CODE 250 W/ 637 OVERRIDE(OP): Performed by: EMERGENCY MEDICINE

## 2021-01-05 PROCEDURE — 85730 THROMBOPLASTIN TIME PARTIAL: CPT

## 2021-01-05 PROCEDURE — 99285 EMERGENCY DEPT VISIT HI MDM: CPT

## 2021-01-05 PROCEDURE — 93000 ELECTROCARDIOGRAM COMPLETE: CPT | Performed by: PHYSICIAN ASSISTANT

## 2021-01-05 PROCEDURE — 96374 THER/PROPH/DIAG INJ IV PUSH: CPT

## 2021-01-05 PROCEDURE — 72125 CT NECK SPINE W/O DYE: CPT

## 2021-01-05 PROCEDURE — 700101 HCHG RX REV CODE 250: Performed by: EMERGENCY MEDICINE

## 2021-01-05 PROCEDURE — 99213 OFFICE O/P EST LOW 20 MIN: CPT | Mod: 25 | Performed by: PHYSICIAN ASSISTANT

## 2021-01-05 PROCEDURE — 84484 ASSAY OF TROPONIN QUANT: CPT

## 2021-01-05 PROCEDURE — A9270 NON-COVERED ITEM OR SERVICE: HCPCS | Performed by: EMERGENCY MEDICINE

## 2021-01-05 PROCEDURE — 85610 PROTHROMBIN TIME: CPT

## 2021-01-05 PROCEDURE — 80053 COMPREHEN METABOLIC PANEL: CPT

## 2021-01-05 PROCEDURE — G0151 HHCP-SERV OF PT,EA 15 MIN: HCPCS

## 2021-01-05 PROCEDURE — 85025 COMPLETE CBC W/AUTO DIFF WBC: CPT

## 2021-01-05 RX ORDER — LABETALOL HYDROCHLORIDE 5 MG/ML
10 INJECTION, SOLUTION INTRAVENOUS ONCE
Status: COMPLETED | OUTPATIENT
Start: 2021-01-05 | End: 2021-01-05

## 2021-01-05 RX ORDER — AMLODIPINE BESYLATE 5 MG/1
5 TABLET ORAL ONCE
Status: COMPLETED | OUTPATIENT
Start: 2021-01-05 | End: 2021-01-05

## 2021-01-05 RX ADMIN — AMLODIPINE BESYLATE 5 MG: 5 TABLET ORAL at 18:47

## 2021-01-05 RX ADMIN — LABETALOL HYDROCHLORIDE 10 MG: 5 INJECTION INTRAVENOUS at 18:03

## 2021-01-05 SDOH — ECONOMIC STABILITY: HOUSING INSECURITY: EVIDENCE OF SMOKING MATERIAL: 0

## 2021-01-05 SDOH — ECONOMIC STABILITY: HOUSING INSECURITY
HOME SAFETY: RESENT AND FUNCTIONAL ON EACH LEVEL OF THE HOME. PATIENT DOES HAVE A FIRE ESCAPE PLAN DEVELOPED. PATIENT DOES NOT HAVE FLAMMABLE MATERIALS PRESENT IN THE HOME PRESENTING A FIRE HAZARD. NO EVIDENCE FOUND OF SMOKING MATERIALS PRESENT IN THE HOME. - PER

## 2021-01-05 SDOH — ECONOMIC STABILITY: HOUSING INSECURITY
HOME SAFETY: RE-EDUC USE OF AGENCY 24/7 NUMBER FOR CONTACT/QUESTIONS/CONCERNS    OXYGEN SAFETY RISK ASSESSMENT PERFORMED. PATIENT DOES HAVE A NO SMOKING SIGN POSTED IN THE HOME. PATIENT DOES HAVE A WORKING FIRE EXTINGUISHER PRESENT IN THE HOME. SMOKE ALARMS ARE P

## 2021-01-05 SDOH — ECONOMIC STABILITY: HOUSING INSECURITY
HOME SAFETY: SENIOR FACILITY    RE-EDUC RECOMMEND RELOCATE SMALL STOOL IN WALKING PATH OF APT AND KEEP DOG TOYS OFF FLOOR (DOG REMAINS AT SON'S HOME) TO AVOID TRIP/FALL - PT DISMISSED DESPITE RISK/BENEFIT EDUC WE ALL KNOW WHERE THEY ARE"  "

## 2021-01-05 ASSESSMENT — ENCOUNTER SYMPTOMS
NUMBNESS: 1
WEAKNESS: 0
TINGLING: 1
SEIZURES: 0
HEADACHES: 1
MUSCLE WEAKNESS: 0
FOCAL WEAKNESS: 0

## 2021-01-05 ASSESSMENT — FIBROSIS 4 INDEX: FIB4 SCORE: 0.83

## 2021-01-05 NOTE — PROGRESS NOTES
"  Subjective:   Dayana Lechuga is a 89 y.o. female who presents today with   Chief Complaint   Patient presents with   • Arm Problem     (L) arm numbness x 5 weeks on and off       Arm Pain   The incident occurred at home. There was no injury mechanism. The quality of the pain is described as shooting. The pain radiates to the left arm. Associated symptoms include numbness and tingling. Pertinent negatives include no chest pain or muscle weakness. Nothing aggravates the symptoms. She has tried nothing for the symptoms. The treatment provided no relief.     Of note patient also reports that she had significant headache that felt like \"her head was going to pop off\" yesterday.  Patient notes randomly she will have numbness down into her fingers with radiating pain from her left shoulder/neck.  Patient states this does not have any specific triggering factors.  She does state that she fell September and it seems to have been hurting around that time but she cannot remember.  Denies any arm, jaw pain today. Mild neck pain.  PMH:  has a past medical history of Allergy, Anxiety, ASTHMA, CAD (coronary artery disease), Hyperlipidemia, Hypertension, Lumbar back pain (9/10/2016), OSTEOPOROSIS, and PVD (peripheral vascular disease) ([facility]).  MEDS:   Current Outpatient Medications:   •  amLODIPine (NORVASC) 5 MG Tab, TAKE ONE TABLET BY MOUTH ONE TIME DAILY , Disp: 90 Tab, Rfl: 1  •  fluticasone (FLONASE) 50 MCG/ACT nasal spray, USE 1 SPRAY IN EACH NOSTRIL EVERY DAY, Disp: 16 g, Rfl: 2  •  Home Care Oxygen, Inhale 2-3 L/min continuous. Oxygen dose range: 2 L/min Respiratory route via: Nasal Cannula  Oxygen supplier: Preferred 3L/min at night/while sleeping  , Disp: , Rfl:   •  Acetaminophen 500 MG Cap, Take 1 Cap by mouth 3 times a day., Disp: , Rfl:   •  melatonin 5 mg Tab, Take 1 Tab by mouth every bedtime., Disp: , Rfl:   •  PREPARATION H 0.25-3-12-18 % Cream, Insert 1 Application into the rectum as needed (pain)., " Disp: , Rfl:   •  benzonatate (TESSALON) 100 MG Cap, Take 1 Cap by mouth 3 times a day as needed for Cough., Disp: 60 Cap, Rfl: 0  •  gabapentin (NEURONTIN) 100 MG Cap, Take 200 mg by mouth 3 times a day. 2 capsules = 200 mg., Disp: , Rfl:   •  [START ON 2/28/2021] LORazepam (ATIVAN) 0.5 MG Tab, Take 1-2 Tabs by mouth 2 Times a Day for 30 days. (Patient taking differently: Take 0.5-1 mg by mouth See Admin Instructions. 2 tablets = 1 mg. Pt takes 0.5 mg in afternoon and 1 mg in the evening for sleep), Disp: 90 Tab, Rfl: 0  •  QVAR REDIHALER 80 MCG/ACT inhaler, INHALE 1 PUFF BY MOUTH TWICE A DAY  (Patient taking differently: Inhale 1 Puff 2 times a day.), Disp: 10.6 g, Rfl: 0  •  montelukast (SINGULAIR) 10 MG Tab, TAKE ONE TABLET BY MOUTH ONE TIME DAILY  (Patient taking differently: Take 10 mg by mouth every evening.), Disp: 90 Tab, Rfl: 0  •  atorvastatin (LIPITOR) 80 MG tablet, Take 80 mg by mouth every evening., Disp: , Rfl:   •  isosorbide mononitrate SR (IMDUR) 30 MG TABLET SR 24 HR, Take 30 mg by mouth every evening., Disp: , Rfl:   •  lisinopril (PRINIVIL) 20 MG Tab, Take 20 mg by mouth every morning with breakfast., Disp: , Rfl:   •  Multiple Vitamins-Minerals (CENTRUM SILVER PO), Take 1 Tab by mouth every morning., Disp: , Rfl:   •  loratadine (CLARITIN) 10 MG Tab, Take 10 mg by mouth every morning., Disp: , Rfl:   •  albuterol 108 (90 Base) MCG/ACT Aero Soln inhalation aerosol, Inhale 2 Puffs by mouth every 6 hours as needed for Shortness of Breath., Disp: 3 Inhaler, Rfl: 0  •  nitroglycerin (NITROSTAT) 0.4 MG SL Tab, Place 1 tab under tongue every 5 min as needed for chest pain max 3 doses (Patient taking differently: Place 0.4 mg under the tongue as needed. Place 1 tab under tongue every 5 min as needed for chest pain max 3 doses), Disp: 100 Tab, Rfl: 0  •  Biotin w/ Vitamins C & E (HAIR/SKIN/NAILS) 1250-7.5-7.5 MCG-MG-UNT Chew Tab, Chew 1 Tab every morning., Disp: , Rfl:   •  guaifenesin LA (MUCINEX) 600  MG TABLET SR 12 HR, Take 600 mg by mouth every morning., Disp: , Rfl:   •  Ascorbic Acid (VITAMIN C) 1000 MG Tab, Take 1,000 mg by mouth every morning., Disp: , Rfl:   •  VITAMIN E PO, Take 1 Cap by mouth every morning., Disp: , Rfl:   •  aspirin (ASA) 81 MG Chew Tab chewable tablet, Chew 81 mg every morning with breakfast., Disp: 100 Tab, Rfl: 11  •  Cholecalciferol (VITAMIN D) 50 MCG (2000 UT) Cap, Take 2,000 Units by mouth every morning., Disp: , Rfl:   •  omeprazole (PRILOSEC) 20 MG delayed-release capsule, Take 20 mg by mouth every morning with breakfast., Disp: , Rfl:   ALLERGIES:   Allergies   Allergen Reactions   • Bactrim [Sulfamethoxazole W-Trimethoprim] Hives   • Pcn [Penicillins] Hives     About 70 years   • Codeine Unspecified     Unknown reaction       SURGHX:   Past Surgical History:   Procedure Laterality Date   • ABDOMINAL HYSTERECTOMY TOTAL     • APPENDECTOMY     • HERNIA REPAIR     • TONSILLECTOMY     • ZZZ CARDIAC CATH       SOCHX:  reports that she has never smoked. She has never used smokeless tobacco. She reports that she does not drink alcohol or use drugs.  FH: Reviewed with patient, not pertinent to this visit.       Review of Systems   Cardiovascular: Negative for chest pain.   Neurological: Positive for tingling, numbness and headaches. Negative for focal weakness, seizures and weakness.   All other systems reviewed and are negative.       Objective:   /80 (BP Location: Left arm, Patient Position: Sitting, BP Cuff Size: Adult)   Pulse 80   Temp 37 °C (98.6 °F) (Temporal)   Resp 14   LMP  (LMP Unknown)   SpO2 97%   Physical Exam  Vitals signs and nursing note reviewed.   Constitutional:       General: She is not in acute distress.     Appearance: Normal appearance. She is well-developed. She is not ill-appearing or toxic-appearing.   HENT:      Head: Normocephalic and atraumatic.      Right Ear: Hearing normal.      Left Ear: Hearing normal.   Eyes:      Pupils: Pupils are  equal, round, and reactive to light.   Cardiovascular:      Rate and Rhythm: Normal rate and regular rhythm.      Heart sounds: Normal heart sounds.   Pulmonary:      Effort: Pulmonary effort is normal.      Breath sounds: Normal breath sounds.   Musculoskeletal:      Comments: Symptoms are not reproducible on exam.  Patient has full range of motion of the left upper extremity.  Patient to left shoulder.  Neurovascularly intact distally.   Skin:     General: Skin is warm and dry.   Neurological:      General: No focal deficit present.      Mental Status: She is alert and oriented to person, place, and time.      GCS: GCS eye subscore is 4. GCS verbal subscore is 5. GCS motor subscore is 6.      Cranial Nerves: No cranial nerve deficit or facial asymmetry.      Motor: Motor function is intact. No weakness.      Coordination: Coordination normal.      Gait: Gait normal.   Psychiatric:         Mood and Affect: Mood normal.       Patient is on 2 L nasal cannula.  EKG showed normal sinus rhythm with a rate of 76.  No acute ST wave changes at this time.  Compared to previous EKG from November no acute changes.  Assessment/Plan:   Assessment    1. Left arm numbness  - EKG - Clinic Performed  Concern with left arm numbness although not acutely happening on exam for potential cardiac or stroke etiology.  Given significant onset of headache yesterday recommend to patient and her son that they go to the ER for higher level of care and evaluation than what we can perform in urgent care setting at this time.  They are understanding and agreeable with this plan.  Discussed she may need CT scan of the head for rule out as well as some lab work.  No acute neuro findings on exam today.  Patient would like to have her son take her to the ER at this time.  Patient is stable to be taken by private vehicle.    Please note that this dictation was created using voice recognition software. I have made every reasonable attempt to correct  obvious errors, but I expect that there are errors of grammar and possibly content that I did not discover before finalizing the note.    Jose Palacio PA-C

## 2021-01-05 NOTE — ED NOTES
Pt to R10 family at bedside. Pt states she was sent by u/c for further evaluation of off/on headache and left arm/hand numbness since September

## 2021-01-05 NOTE — TELEPHONE ENCOUNTER
Recovering for COVID, has been seeing   Speaking to daughter in law Tonie.      Complaining of Tingling in LEFT arm and  HA's for weeks, takes Tylenol it helps.    Fell 9/6/2020 hit her head.  C/O  HA, dizziness, and tingling at that time.  CT done, was negative at that time.      Pt wants to see PCP today ASAP, no available appointments today.  Will go to  as walk in or go to the ED.          Reason for Disposition  • Patient wants to be seen    Additional Information  • Negative: Difficult to awaken or acting confused (e.g., disoriented, slurred speech)  • Negative: Weakness of the face, arm or leg on one side of the body and new onset  • Negative: Numbness of the face, arm or leg on one side of the body and new onset  • Negative: Loss of speech or garbled speech and new onset  • Negative: Passed out (i.e., fainted, collapsed and was not responding)  • Negative: Sounds like a life-threatening emergency to the triager  • Negative: Followed a head injury within last 3 days  • Negative: Traumatic Brain Injury (TBI) is suspected  • Negative: Sinus pain of forehead and yellow or green nasal discharge  • Negative: Pregnant  • Negative: Unable to walk without falling  • Negative: Stiff neck (can't touch chin to chest)  • Negative: Possibility of carbon monoxide exposure  • Negative: SEVERE headache, states 'worst headache' of life  • Negative: SEVERE headache, sudden onset (i.e., reaching maximum intensity within 30 seconds)  • Negative: Severe pain in one eye  • Negative: Loss of vision or double vision  • Negative: Patient sounds very sick or weak to the triager  • Negative: Fever > 103 F (39.4 C)  • Negative: Fever > 100.0 F (37.8 C) and has diabetes mellitus or a weak immune system (e.g., HIV positive, cancer chemotherapy, organ transplant, splenectomy, chronic steroids)  • Negative: SEVERE headache (e.g., excruciating) and has had severe headaches before  • Negative: SEVERE headache and not relieved by pain  "meds  • Negative: SEVERE headache and vomiting  • Negative: SEVERE headache and fever    Answer Assessment - Initial Assessment Questions  1. LOCATION: \"Where does it hurt?\"       LEFT arm tingling and HA for weeks  2. ONSET: \"When did the headache start?\" (Minutes, hours or days)       Weeks ago, comes and goes, Tylenol takes the HA away for a while.  3. PATTERN: \"Does the pain come and go, or has it been constant since it started?\"      Comes and goes  4. SEVERITY: \"How bad is the pain?\" and \"What does it keep you from doing?\"  (e.g., Scale 1-10; mild, moderate, or severe)    - MILD (1-3): doesn't interfere with normal activities     - MODERATE (4-7): interferes with normal activities or awakens from sleep     - SEVERE (8-10): excruciating pain, unable to do any normal activities         Mild  5. RECURRENT SYMPTOM: \"Have you ever had headaches before?\" If so, ask: \"When was the last time?\" and \"What happened that time?\"       Yes, has been happening for weeks  6. CAUSE: \"What do you think is causing the headache?\"      Not sure  7. MIGRAINE: \"Have you been diagnosed with migraine headaches?\" If so, ask: \"Is this headache similar?\"       No  8. HEAD INJURY: \"Has there been any recent injury to the head?\"       9/6/2020 tripped over dog hit head and shoulder.    9. OTHER SYMPTOMS: \"Do you have any other symptoms?\" (fever, stiff neck, eye pain, sore throat, cold symptoms)      No  10. PREGNANCY: \"Is there any chance you are pregnant?\" \"When was your last menstrual period?\"        N/A    Protocols used: HEADACHE-A-[place]      "

## 2021-01-05 NOTE — TELEPHONE ENCOUNTER
VOICEMAIL  1. Caller Name: Patient's son Betty                   Call Back Number: ph. [phone] Message: patient's son sia stating that belgica was at physical therapy and the physical therapist told them that she needed to go to urgent care, because she had tingling , numbness feeling in hands. I called him back and they did not want for her to go to ER or urgent care she just wanted to get transferred to nurse triage.     3. Patient approves office to leave a detailed voicemail/MyChart message: yes

## 2021-01-05 NOTE — ED TRIAGE NOTES
Chief Complaint   Patient presents with   • Headache   • Numbness     left arm     States symptoms X 5 weeks.  Denies slurred speech, no unilateral weakness.  covid positive 11/20/20.  Triage process explained to patient.  Pt instructed to inform RN if any changes or questions arise.  Pt back to waiting room.

## 2021-01-06 NOTE — ED PROVIDER NOTES
ED Provider Note    CHIEF COMPLAINT  Chief Complaint   Patient presents with   • Headache   • Numbness     left arm       HPI  Dayana Gayatri Lechuga is a 89 y.o. female with a history of coronary artery disease, status post stent placement, hyperlipidemia, hypertension, asthma, anxiety who presents with complaints of a headache and head pressure along with some numbness and tingling to her left hand for the past 5 weeks.   The patient denies any recent fall or trauma in the last several days, but states she had a bad fall in September of last year.  She was admitted because of persistent nausea, vomiting, and dizziness.  CT scan of the head was negative for any acute hemorrhage or intracranial findings.  Since then the patient notes she has had a recurrence of a headache and head pressure along with some numbness and tingling to her left hand.  She said mainly affects her fourth and fifth fingers.  She has had no new falls or trauma.  She also did test positive for Covid in November and since then says she just has not felt quite right.  She denies any fever, shaking chills, sore throat, cough, congestion, any difficulty breathing.  She has had no nausea, vomiting, or diarrhea.  She denies any vertiginous type symptoms.    REVIEW OF SYSTEMS  See HPI for further details. All other systems are negative.     PAST MEDICAL HISTORY  Past Medical History:   Diagnosis Date   • Allergy    • Anxiety    • ASTHMA    • CAD (coronary artery disease)     JT to RCA; 70% stenosis in LAD   • Hyperlipidemia    • Hypertension    • Lumbar back pain 9/10/2016   • OSTEOPOROSIS    • PVD (peripheral vascular disease) (HCC)     70% PAD-followed by [facility]       FAMILY HISTORY  Family History   Problem Relation Age of Onset   • Heart Disease Neg Hx    • Heart Failure Neg Hx    • Hyperlipidemia Neg Hx        SOCIAL HISTORY  Social History     Tobacco Use   • Smoking status: Never Smoker   • Smokeless tobacco: Never Used   Substance Use  "Topics   • Alcohol use: No     Alcohol/week: 0.0 oz   • Drug use: No      Social History     Substance and Sexual Activity   Drug Use No       SURGICAL HISTORY  Past Surgical History:   Procedure Laterality Date   • ABDOMINAL HYSTERECTOMY TOTAL     • APPENDECTOMY     • HERNIA REPAIR     • TONSILLECTOMY     • ZZZ CARDIAC CATH         CURRENT MEDICATIONS  Home Medications    **Home medications have not yet been reviewed for this encounter**         ALLERGIES  Allergies   Allergen Reactions   • Bactrim [Sulfamethoxazole W-Trimethoprim] Hives   • Pcn [Penicillins] Hives     About 70 years   • Codeine Unspecified     Unknown reaction         PHYSICAL EXAM0  VITAL SIGNS: Blood Pressure (Abnormal) 188/83   Pulse 78   Temperature 36.9 °C (98.5 °F) (Temporal)   Respiration 19   Height 1.6 m (5' 3\")   Weight 53 kg (116 lb 13.5 oz)   Last Menstrual Period  (LMP Unknown)   Oxygen Saturation 99%   Body Mass Index 20.70 kg/m²   Constitutional: Awake, alert, in no acute distress, Non-toxic appearance.   HENT: Atraumatic. Bilateral external ears normal, mucous membranes moist, throat nonerythematous without exudates, nose is normal.  Eyes: PERRL, EOMI, conjunctiva moist, noninjected.  Neck: Nontender, Normal range of motion, No nuchal rigidity, No stridor.   Lymphatic: No lymphadenopathy noted.   Cardiovascular: Regular rate and rhythm, no murmurs, rubs, gallops.  Thorax & Lungs:  Good breath sounds bilaterally, no wheezes, rales, or retractions.  No chest tenderness.  Abdomen: Bowel sounds normal, Soft, nontender, nondistended, no rebound, guarding, masses.  Back: No CVA or spinal tenderness.  Extremities: Intact distal pulses, No edema, No tenderness.   Skin: Warm, Dry, No rashes.   Musculoskeletal: No joint swelling or tenderness.  Neurologic: Alert & oriented x 3, sensory and motor function normal. No focal deficits.   Psychiatric: Affect normal, Judgment normal, Mood normal.     EKG  EKG Interpretation:  Interpreted by " me    Rhythm:  Normal sinus rhythm   Rate: 74  Intervals: Normal  Axis: Left axis deviation  Ectopy: none  Conduction: Left anterior fascicular block  ST Segments: no evidence of elevation, minimal ST depressions in leads I, aVL  T Waves: no acute change  Q Waves: Anteriorly in V1 through V3  Clinical Impression: No acute injury or ischemic pattern.   Comparison to previous EKG from November 2020 shows no acute changes.      LABS  Labs Reviewed   CBC WITH DIFFERENTIAL - Abnormal; Notable for the following components:       Result Value    RBC 3.88 (*)     .3 (*)     MCHC 32.6 (*)     All other components within normal limits    Narrative:     Indicate which anticoagulants the patient is on:->NONE   COMP METABOLIC PANEL - Abnormal; Notable for the following components:    Glucose 110 (*)     Creatinine 0.39 (*)     All other components within normal limits    Narrative:     Indicate which anticoagulants the patient is on:->NONE   URINALYSIS,CULTURE IF INDICATED - Abnormal; Notable for the following components:    Leukocyte Esterase Small (*)     All other components within normal limits    Narrative:     Indication for culture:->Patient WITHOUT an indwelling English  catheter in place with new onset of Dysuria, Frequency,  Urgency, and/or Suprapubic pain   URINE MICROSCOPIC (W/UA) - Abnormal; Notable for the following components:    WBC 5-10 (*)     All other components within normal limits    Narrative:     Indication for culture:->Patient WITHOUT an indwelling English  catheter in place with new onset of Dysuria, Frequency,  Urgency, and/or Suprapubic pain   APTT    Narrative:     Indicate which anticoagulants the patient is on:->NONE   PROTHROMBIN TIME    Narrative:     Indicate which anticoagulants the patient is on:->NONE   TROPONIN    Narrative:     Indicate which anticoagulants the patient is on:->NONE   ESTIMATED GFR    Narrative:     Indicate which anticoagulants the patient is on:->NONE       All labs  reviewed by me.      RADIOLOGY/PROCEDURES  CT-CSPINE WITHOUT PLUS RECONS   Final Result      No acute fracture or dislocation seen in the CT scan of the cervical spine.      CT-HEAD W/O   Final Result         NO ACUTE ABNORMALITIES ARE NOTED ON CT SCAN OF THE HEAD.      Findings are consistent with atrophy.  Decreased attenuation in the periventricular white matter likely indicates microvascular ischemic disease.          The radiologist's interpretations of all radiological studies have been reviewed by me.        COURSE & MEDICAL DECISION MAKING  Pertinent Labs & Imaging studies reviewed. (See chart for details)  The patient presents with the above complaints.  She has been having symptoms for the past 5 weeks.  On examination she appears to be neurologically intact except for subjective numbness to the fourth and fifth fingers on the left.  She declined any pain medication.    CT scan head without contrast shows no acute intracranial findings.  CT scan cervical spine shows no acute fracture dislocation, but does show degenerative disc disease and facet arthropathy.  CBC showed a white count 7300, hemoglobin 12.7, normal differential, chemistry shows a glucose of 110, otherwise normal, troponin 16.  At the patient was quite hypertensive with initial blood pressure 189/93 which continued in 180s to 190s range.  She is given labetalol 10 mg IV.  The patient does states she has been taking her blood pressure medications including amlodipine, lisinopril, but forgot to take her carvedilol today.  The [place] had little effect on her blood pressure and she was given an additional dose of amlodipine 5 mg orally.  The patient declined to stay any longer to have her blood pressure rechecked.   I have asked her to follow-up with her PCP and she says she has an appointment in 2 days.  She is also take her carvedilol when she returns home.  I have asked her to keep a log of her blood pressure.  She is to return to the ER for  any worsening pain, difficulty breathing, worsening numbness or weakness, or any other problems.      FINAL IMPRESSION  1. Acute nonintractable headache, unspecified headache type    2. Hypertensive urgency    3. Paresthesia    4. DDD (degenerative disc disease), cervical          Electronically signed by: Rashid Callahan M.D., 1/5/2021 4:30 PM

## 2021-01-06 NOTE — DISCHARGE INSTRUCTIONS
Take your carvedilol when you get home.  Return to the ER for any worsening headache, dizziness, numbness, weakness, or any other problems.

## 2021-01-07 ENCOUNTER — HOSPITAL ENCOUNTER (OUTPATIENT)
Dept: LAB | Facility: MEDICAL CENTER | Age: 86
End: 2021-01-07
Attending: PHYSICIAN ASSISTANT
Payer: MEDICARE

## 2021-01-07 ENCOUNTER — HOME CARE VISIT (OUTPATIENT)
Dept: HOME HEALTH SERVICES | Facility: HOME HEALTHCARE | Age: 86
End: 2021-01-07
Payer: MEDICARE

## 2021-01-07 ENCOUNTER — OFFICE VISIT (OUTPATIENT)
Dept: MEDICAL GROUP | Facility: PHYSICIAN GROUP | Age: 86
End: 2021-01-07
Payer: MEDICARE

## 2021-01-07 VITALS
DIASTOLIC BLOOD PRESSURE: 79 MMHG | BODY MASS INDEX: 20.38 KG/M2 | WEIGHT: 115 LBS | OXYGEN SATURATION: 99 % | SYSTOLIC BLOOD PRESSURE: 125 MMHG | RESPIRATION RATE: 15 BRPM | TEMPERATURE: 99.1 F | HEIGHT: 63 IN | HEART RATE: 76 BPM

## 2021-01-07 VITALS
RESPIRATION RATE: 18 BRPM | SYSTOLIC BLOOD PRESSURE: 110 MMHG | OXYGEN SATURATION: 98 % | HEART RATE: 78 BPM | TEMPERATURE: 97.7 F | DIASTOLIC BLOOD PRESSURE: 60 MMHG

## 2021-01-07 DIAGNOSIS — E87.6 HYPOKALEMIA: ICD-10-CM

## 2021-01-07 DIAGNOSIS — F41.9 ANXIETY: ICD-10-CM

## 2021-01-07 DIAGNOSIS — E87.1 HYPONATREMIA: ICD-10-CM

## 2021-01-07 DIAGNOSIS — I25.119 CORONARY ARTERY DISEASE INVOLVING NATIVE CORONARY ARTERY OF NATIVE HEART WITH ANGINA PECTORIS (HCC): ICD-10-CM

## 2021-01-07 DIAGNOSIS — Z79.899 CHRONIC USE OF BENZODIAZEPINE FOR THERAPEUTIC PURPOSE: ICD-10-CM

## 2021-01-07 DIAGNOSIS — Z86.16 HISTORY OF 2019 NOVEL CORONAVIRUS DISEASE (COVID-19): ICD-10-CM

## 2021-01-07 DIAGNOSIS — I73.9 PVD (PERIPHERAL VASCULAR DISEASE) (HCC): ICD-10-CM

## 2021-01-07 DIAGNOSIS — J96.11 CHRONIC RESPIRATORY FAILURE WITH HYPOXIA (HCC): ICD-10-CM

## 2021-01-07 LAB
ANION GAP SERPL CALC-SCNC: 10 MMOL/L (ref 7–16)
BUN SERPL-MCNC: 15 MG/DL (ref 8–22)
CALCIUM SERPL-MCNC: 9.9 MG/DL (ref 8.5–10.5)
CHLORIDE SERPL-SCNC: 99 MMOL/L (ref 96–112)
CO2 SERPL-SCNC: 25 MMOL/L (ref 20–33)
CREAT SERPL-MCNC: 0.48 MG/DL (ref 0.5–1.4)
GLUCOSE SERPL-MCNC: 95 MG/DL (ref 65–99)
POTASSIUM SERPL-SCNC: 4 MMOL/L (ref 3.6–5.5)
SODIUM SERPL-SCNC: 134 MMOL/L (ref 135–145)

## 2021-01-07 PROCEDURE — 36415 COLL VENOUS BLD VENIPUNCTURE: CPT

## 2021-01-07 PROCEDURE — G0495 RN CARE TRAIN/EDU IN HH: HCPCS

## 2021-01-07 PROCEDURE — 80048 BASIC METABOLIC PNL TOTAL CA: CPT

## 2021-01-07 PROCEDURE — 8041 PR SCP AHA: Performed by: PHYSICIAN ASSISTANT

## 2021-01-07 PROCEDURE — 99214 OFFICE O/P EST MOD 30 MIN: CPT | Performed by: PHYSICIAN ASSISTANT

## 2021-01-07 SDOH — ECONOMIC STABILITY: HOUSING INSECURITY: EVIDENCE OF SMOKING MATERIAL: 0

## 2021-01-07 ASSESSMENT — FIBROSIS 4 INDEX: FIB4 SCORE: 1.52

## 2021-01-07 ASSESSMENT — PATIENT HEALTH QUESTIONNAIRE - PHQ9: CLINICAL INTERPRETATION OF PHQ2 SCORE: 0

## 2021-01-07 ASSESSMENT — ENCOUNTER SYMPTOMS
NAUSEA: DENIED
VOMITING: DENIED

## 2021-01-07 ASSESSMENT — ACTIVITIES OF DAILY LIVING (ADL): TRANSPORTATION COMMENTS: YES

## 2021-01-08 NOTE — PROGRESS NOTES
PT. A*OX4. THIS SN REQUESTED PT. APPLY MASK. SOCIAL DISTANCING OBSERVED.THIS SN SWITCHED OUT NEW O2 TANK FOR PT. TEACHING DONE ON PROCESS TO CHANGE TANKS FOR FUTURE. UNABLE TO STOP O2 ESCAPING FROM NEW TANK. [employer] CALLED AND TECH EXPLAINED HOW TO TURN WASHER OVER. NO PROBLEMS AFTER WASHER TURNED. PT. DENIED ANY PAIN. LUNGS CLEAR T/O. TEACHING DONE ON O2 PRECAUTIONS-NO OPEN FLAMES, NO SMOKING, NO PETROLEUM PRODUCTS IN NOSE, HAVE TUBING COILED BY SIDE WHEN SITTING, BEHIND HER WHEN AMBULATING, HAVE E TANKS LYING ON FLOOR OR UPRIGHT IN RACK, MAY USE SALINE SPRAY IF NOSTILS DRY.  PT. STATED SHE CHECKS B/P DAILY. TEACHING DONE ON NEED TO NOTIFY HHRN/MD IF B/P ABOVE 180 SYTOLIC OR BELOW 50 DIASTOLIC.TEACHING DONE ON NEED TO WASH HANDS BEFORE MEALS, AFTER TOUCHING OBJECTS IN HOME, PETTING DOG, USING BR. INSTRUCTED TO USE SOAP/WATER AFTER USING BR. TEACHING DONE ON NEED TO DECREASE SODIUM IN DIET-AVOID HAM, H[place], SAUSAGE, DO NOT ADD SALT TO FACILITY MEALS, AVOID HIGH SODIUM SNACKS.MED RECONCILIATION DONE WITH PT. DENIED ANY NEW/CHANGED MEDS.

## 2021-01-11 ENCOUNTER — HOME CARE VISIT (OUTPATIENT)
Dept: HOME HEALTH SERVICES | Facility: HOME HEALTHCARE | Age: 86
End: 2021-01-11
Payer: MEDICARE

## 2021-01-11 ENCOUNTER — TELEPHONE (OUTPATIENT)
Dept: MEDICAL GROUP | Facility: PHYSICIAN GROUP | Age: 86
End: 2021-01-11

## 2021-01-11 VITALS
OXYGEN SATURATION: 99 % | DIASTOLIC BLOOD PRESSURE: 58 MMHG | SYSTOLIC BLOOD PRESSURE: 108 MMHG | TEMPERATURE: 98.8 F | RESPIRATION RATE: 18 BRPM | HEART RATE: 71 BPM

## 2021-01-11 DIAGNOSIS — Z23 NEED FOR VACCINATION: ICD-10-CM

## 2021-01-11 PROCEDURE — 665001 SOC-HOME HEALTH

## 2021-01-11 PROCEDURE — G0493 RN CARE EA 15 MIN HH/HOSPICE: HCPCS

## 2021-01-11 SDOH — ECONOMIC STABILITY: HOUSING INSECURITY: EVIDENCE OF SMOKING MATERIAL: 0

## 2021-01-11 ASSESSMENT — ENCOUNTER SYMPTOMS: SHORTNESS OF BREATH: T

## 2021-01-11 NOTE — TELEPHONE ENCOUNTER
Pt called and left a VM requesting PCP's opinion on if she should receive the covid vaccine, and she may have the option to receive this from [facility] next Sunday.

## 2021-01-13 RX ORDER — LORAZEPAM 0.5 MG/1
TABLET ORAL
Qty: 90 TAB | Refills: 0 | Status: SHIPPED | OUTPATIENT
Start: 2021-02-28 | End: 2021-02-16 | Stop reason: SDUPTHER

## 2021-01-15 ENCOUNTER — TELEPHONE (OUTPATIENT)
Dept: MEDICAL GROUP | Facility: PHYSICIAN GROUP | Age: 86
End: 2021-01-15

## 2021-01-15 ENCOUNTER — HOME CARE VISIT (OUTPATIENT)
Dept: HOME HEALTH SERVICES | Facility: HOME HEALTHCARE | Age: 86
End: 2021-01-15
Payer: MEDICARE

## 2021-01-15 PROCEDURE — G0493 RN CARE EA 15 MIN HH/HOSPICE: HCPCS

## 2021-01-15 NOTE — TELEPHONE ENCOUNTER
Phone Number Called: [phone] (home)       Call outcome: Spoke to patient regarding message below.    Message: spoke to Dayana in regards to covid vaccine she want to know if you think it is ok for her to get it done with recently testing positive for covid the community she live in are gout to give it out to the residents and just wants to make sure its ok for her to get it.

## 2021-01-16 VITALS
OXYGEN SATURATION: 99 % | DIASTOLIC BLOOD PRESSURE: 63 MMHG | RESPIRATION RATE: 16 BRPM | TEMPERATURE: 97.6 F | HEART RATE: 77 BPM | SYSTOLIC BLOOD PRESSURE: 122 MMHG

## 2021-01-16 ASSESSMENT — ENCOUNTER SYMPTOMS
SHORTNESS OF BREATH: T
NAUSEA: DENIES
VOMITING: DENIES

## 2021-01-18 ENCOUNTER — HOME CARE VISIT (OUTPATIENT)
Dept: HOME HEALTH SERVICES | Facility: HOME HEALTHCARE | Age: 86
End: 2021-01-18
Payer: MEDICARE

## 2021-01-19 ENCOUNTER — TELEPHONE (OUTPATIENT)
Dept: MEDICAL GROUP | Facility: PHYSICIAN GROUP | Age: 86
End: 2021-01-19

## 2021-01-19 NOTE — TELEPHONE ENCOUNTER
VOICEMAIL  1. Caller Name: Dayana Lechuga                       Call Back Number: [phone] (home)       2. Message: patient lvm kannan about referral to pulm.     3. Patient approves office to leave a detailed voicemail/MyChart message: yes

## 2021-01-20 ENCOUNTER — HOME CARE VISIT (OUTPATIENT)
Dept: HOME HEALTH SERVICES | Facility: HOME HEALTHCARE | Age: 86
End: 2021-01-20
Payer: MEDICARE

## 2021-01-20 PROCEDURE — G0493 RN CARE EA 15 MIN HH/HOSPICE: HCPCS

## 2021-01-21 VITALS
TEMPERATURE: 98.5 F | HEART RATE: 73 BPM | DIASTOLIC BLOOD PRESSURE: 60 MMHG | RESPIRATION RATE: 17 BRPM | OXYGEN SATURATION: 96 % | SYSTOLIC BLOOD PRESSURE: 128 MMHG

## 2021-01-21 ASSESSMENT — ENCOUNTER SYMPTOMS
VOMITING: DENIES
SHORTNESS OF BREATH: T
NAUSEA: DENIES

## 2021-01-21 NOTE — PROGRESS NOTES
Chief Complaint   Patient presents with   • Follow-Up     covid follow up        HISTORY OF PRESENT ILLNESS: Dayana Lechuga is an established 89 y.o. female here to discuss the evaluation and management of:        Chronic respiratory failure with hypoxia (HCC)  History of 2019 novel coronavirus disease (COVID-19)  Patient is a pleasant 89-year-old female here today to follow-up on recent COVID-19 novel coronavirus disease and chronic use of supplemental oxygen since diagnosed with COVID-19. She was hospitalized from 11/20/2020-12/5/2020 for COVID-19 novel coronavirus disease.  Patient is currently using supplemental oxygen 2 L/min continuously at night and uses it throughout her day.  States she may go up to 45 minutes without the oxygen but checks her pulse ox levels and if they are low she will place the oxygen back on.  Pulse ox is below 88 patient uses supplemental oxygen.  Patient states she is doing her breathing exercises at least once a day with the spirometer.  Patient would like to get off of supplemental oxygen.  States she still feels fatigue since being diagnosed with COVID-19.  She tells me that she is trying to stay active in her home.  Home health comes to patient's house and she is getting physical therapy twice weekly.  Patient takes Ativan for anxiety/insomnia.  Patient is aware of the risk associated with Ativan use.  We discussed decreasing Ativan dosage down.  Patient will follow-up in 1 month to do this.     Anxiety  Chronic use of benzodiazepine for therapeutic purpose  Chronic but stable problem.  Patient is requesting Ativan refill.  She tells me that on average she takes 1.5 tablets of Ativan 0.5 mg once daily.  When reviewing patient's PDMP patient is prescribed 90 Ativan 0.5 mg tabs per month and she gets prescription monthly.  Patient is sure that she only takes 1.5 tablets of Ativan per day.  Patient is agreed to follow-up in 1 month and bring in her prescription so we can  DTC Progress Note    Recommendations/ Comments: Chart reviewed due to hyperglycemia likely due to TPN. Pt is receiving 14 units of Regular insulin per liter for a total of 28 units delivered. She has required an additional 18 units of correction insulin over the last 24 hours. If appropriate, please consider increasing insulin in the TPN. DTC to follow. Current hospital DM medication: correction scale Humalog, resistant scale and insulin in TPN 14 units of Regular insulin per liter for a total of 28 units delivered. Chart reviewed on [address] Patient is a 36 y.o. female with known diabetes on Humalog correction scale at home. A1c:   No results found for: HBA1C, HGBE8, SUK7WYCP, NYC9KOUV    Recent Glucose Results:   Lab Results   Component Value Date/Time     (H) 03/19/2018 03:34 AM    GLUCPOC 184 (H) 03/19/2018 11:13 AM    GLUCPOC 202 (H) 03/19/2018 05:40 AM    GLUCPOC 202 (H) 03/19/2018 12:28 AM        Lab Results   Component Value Date/Time    Creatinine 0.64 03/19/2018 03:34 AM     Estimated Creatinine Clearance: 175.6 mL/min (based on Cr of 0.64). Active Orders   Diet    DIET NPO With Ice Chips        PO intake: No data found. Will continue to follow as needed.     Thank you  Laura Salinas RD, CDE reevaluate together.  She has agreed to decrease Ativan dosage per month and to  completely weaned off of medication.     Patient is a pleasant 89-year-old female here with her daughter-in-law today.  Patient is requesting a refill of Ativan.  Patient's PCP has been monitoring medication and patient will be weaning off of medication in the near future under the supervision of her provider.  Patient denies misuse or abuse of medication.  She denies alcohol use or illicit drug use.  Denies operating her machine while taking prescribe medication.  She tells me medication was initially prescribed for anxiety and secondary to anxiety she was experiencing sleep reparation.  States she uses medication to treat both anxiety and sleep deprivation.  Patient denies side effects or complications of medication.    - LORazepam (ATIVAN) 0.5 MG Tab; Take 1-2 Tabs by mouth 2 Times a Day for 30 days.  Dispense: 90 Tab; Refill: 0      Hyponatremia  Chronic but stable problem.  Will repeat lab work for further evaluation.  Patient is asymptomatic.    Hypokalemia  Chronic problem.  Unknown stable.  Patient states potassium patient was discontinued during her hospitalization.  She is asymptomatic.  Will repeat lab work.    PVD (peripheral vascular disease) (HCC)  Chronic but stable problem.  Patient states she follows up with cardiology regularly.  Symptoms are managed with compression stockings, Lipitor 80 mg tablet once daily, and 81 mg aspirin once daily.  Patient denies side effects or complications of medication.  Patient denied lower extremity pain.  States she is feeling well.    Coronary artery disease involving native coronary artery of native heart with angina pectoris (HCC)  Chronic but Stable problem.  Patient states she follows with cardiology annually.  Patient takes 81 mg aspirin once daily, 80 mg of Lipitor once daily, Imdur 30 mg tablet once daily, and nitroglycerin as needed.  Patient states not been having to take  nitroglycerin.  She tells me that she is feeling well.  Denies chest pain, heart palpitations, dizziness, syncope, sweating, vision changes.      Patient Active Problem List    Diagnosis Date Noted   • Acute hypoxemic respiratory failure due to COVID-19 ([facility]) 11/20/2020     Priority: High   • CAD (coronary artery disease) 09/11/2020     Priority: High   • Chest pain due to myocardial ischemia 09/10/2016     Priority: High   • Hyponatremia 11/20/2020     Priority: Medium   • Popliteal artery stenosis, right ([facility]) 11/28/2018     Priority: Medium   • Claudication of right lower extremity ([facility]) 11/07/2018     Priority: Medium   • Coronary artery disease involving native coronary artery of native heart with angina pectoris ([facility]) 08/10/2016     Priority: Medium   • Dyslipidemia 10/31/2009     Priority: Medium   • Essential hypertension 10/02/2009     Priority: Medium   • Acute bilateral low back pain with bilateral sciatica 08/29/2019     Priority: Low   • Dysuria 06/27/2019     Priority: Low   • Diarrhea 06/27/2019     Priority: Low   • Slow transit constipation 08/29/2018     Priority: Low   • S/P bilateral cataract extraction 02/27/2018     Priority: Low   • Benzodiazepine dependence ([facility]) 11/13/2017     Priority: Low   • Anxiety 06/22/2016     Priority: Low   • GERD (gastroesophageal reflux disease) 10/31/2009     Priority: Low   • Osteopenia 10/31/2009     Priority: Low   • Mild intermittent asthma with acute exacerbation 10/02/2009     Priority: Low   • Debility 11/20/2020   • Peripheral neuropathy 11/20/2020   • Bradycardia 09/11/2020   • Post concussion syndrome 09/11/2020   • Mild peripheral edema 07/31/2020   • PVD (peripheral vascular disease) ([facility]) 03/04/2020   • Pleural effusion 12/20/2019   • Incomplete emptying of bladder 10/08/2019   • Urinary retention 09/11/2019       Allergies:Bactrim [sulfamethoxazole w-trimethoprim], Pcn [penicillins], and Codeine    Current Outpatient Medications   Medication Sig  Dispense Refill   • [START ON 2/28/2021] LORazepam (ATIVAN) 0.5 MG Tab Take 1-2 Tabs by mouth 2 Times a Day for 30 days. 90 Tab 0   • amLODIPine (NORVASC) 5 MG Tab TAKE ONE TABLET BY MOUTH ONE TIME DAILY  90 Tab 1   • fluticasone (FLONASE) 50 MCG/ACT nasal spray USE 1 SPRAY IN EACH NOSTRIL EVERY DAY 16 g 2   • Home Care Oxygen Inhale 2-3 L/min continuous. Oxygen dose range: 2 L/min  Respiratory route via: Nasal Cannula   Oxygen supplier: Preferred  3L/min at night/while sleeping         • Acetaminophen 500 MG Cap Take 1 Cap by mouth 3 times a day.     • melatonin 5 mg Tab Take 1 Tab by mouth every bedtime.     • PREPARATION H 0.25-3-12-18 % Cream Insert 1 Application into the rectum as needed (pain).     • benzonatate (TESSALON) 100 MG Cap Take 1 Cap by mouth 3 times a day as needed for Cough. 60 Cap 0   • gabapentin (NEURONTIN) 100 MG Cap Take 200 mg by mouth 3 times a day. 2 capsules = 200 mg.     • QVAR REDIHALER 80 MCG/ACT inhaler INHALE 1 PUFF BY MOUTH TWICE A DAY  (Patient taking differently: Inhale 1 Puff 2 times a day.) 10.6 g 0   • montelukast (SINGULAIR) 10 MG Tab TAKE ONE TABLET BY MOUTH ONE TIME DAILY  (Patient taking differently: Take 10 mg by mouth every evening.) 90 Tab 0   • atorvastatin (LIPITOR) 80 MG tablet Take 80 mg by mouth every evening.     • isosorbide mononitrate SR (IMDUR) 30 MG TABLET SR 24 HR Take 30 mg by mouth every evening.     • lisinopril (PRINIVIL) 20 MG Tab Take 20 mg by mouth every morning with breakfast.     • Multiple Vitamins-Minerals (CENTRUM SILVER PO) Take 1 Tab by mouth every morning.     • loratadine (CLARITIN) 10 MG Tab Take 10 mg by mouth every morning.     • albuterol 108 (90 Base) MCG/ACT Aero Soln inhalation aerosol Inhale 2 Puffs by mouth every 6 hours as needed for Shortness of Breath. 3 Inhaler 0   • nitroglycerin (NITROSTAT) 0.4 MG SL Tab Place 1 tab under tongue every 5 min as needed for chest pain max 3 doses (Patient taking differently: Place 0.4 mg under the  tongue as needed. Place 1 tab under tongue every 5 min as needed for chest pain max 3 doses) 100 Tab 0   • Biotin w/ Vitamins C & E (HAIR/SKIN/NAILS) 1250-7.5-7.5 MCG-MG-UNT Chew Tab Chew 1 Tab every morning.     • guaifenesin LA (MUCINEX) 600 MG TABLET SR 12 HR Take 600 mg by mouth every morning.     • Ascorbic Acid (VITAMIN C) 1000 MG Tab Take 1,000 mg by mouth every morning.     • VITAMIN E PO Take 1 Cap by mouth every morning.     • aspirin (ASA) 81 MG Chew Tab chewable tablet Chew 81 mg every morning with breakfast. 100 Tab 11   • Cholecalciferol (VITAMIN D) 50 MCG (2000 UT) Cap Take 2,000 Units by mouth every morning.     • omeprazole (PRILOSEC) 20 MG delayed-release capsule Take 20 mg by mouth every morning with breakfast.       No current facility-administered medications for this visit.        Social History     Tobacco Use   • Smoking status: Never Smoker   • Smokeless tobacco: Never Used   Substance Use Topics   • Alcohol use: No     Alcohol/week: 0.0 oz   • Drug use: No       Family Status   Relation Name Status   • Neg Hx  (Not Specified)     Family History   Problem Relation Age of Onset   • Heart Disease Neg Hx    • Heart Failure Neg Hx    • Hyperlipidemia Neg Hx        ROS:  Review of Systems   Constitutional: Negative for fever, chills, weight loss and malaise/fatigue.   HENT: Negative for ear pain, nosebleeds, congestion, sore throat and neck pain.    Eyes: Negative for blurred vision.   Respiratory: Negative for cough, sputum production, and wheezing.  + for Shortness of breath.   Cardiovascular: Negative for chest pain, palpitations, orthopnea and leg swelling.   Gastrointestinal: Negative for heartburn, nausea, vomiting and abdominal pain.   Genitourinary: Negative for dysuria, urgency and frequency.   Musculoskeletal: Negative for myalgias, back pain and joint pain.   Skin: Negative for rash and itching.   Neurological: Negative for dizziness, tingling, tremors, sensory change, focal weakness  "and headaches.   Endo/Heme/Allergies: Does not bruise/bleed easily.   Psychiatric/Behavioral: Negative for depression, suicidal ideas and memory loss.  The patient is not nervous/anxious and does not have insomnia.    All other systems reviewed and are negative except as in HPI.    Exam: /79 (BP Location: Left arm, Patient Position: Sitting, BP Cuff Size: Adult)   Pulse 76   Temp 37.3 °C (99.1 °F) (Temporal)   Resp 15   Ht 1.6 m (5' 3\")   Wt 52.2 kg (115 lb)   SpO2 99%  Body mass index is 20.37 kg/m².  General: Normal appearing. No distress.  HEENT: Normocephalic. Eyes conjunctiva clear lids without ptosis, ears normal shape and contour.  Neck: Supple without JVD. Thyroid is not enlarged.  Pulmonary: Clear to ausculation.  Normal effort. No rales, ronchi, or wheezing.  Patient is wearing supplemental oxygen.  Cardiovascular: Regular rate and rhythm without murmur.  Abdomen: Soft, nontender, nondistended. Normal bowel sounds.   Neurologic: Grossly nonfocal.  Cranial nerves are normal.   Skin: Warm and dry.  No rashes or suspicious skin lesions.  Musculoskeletal: Normal gait. No extremity cyanosis, clubbing, or edema.  Psych: Normal mood and affect. Alert and oriented x3. Judgment and insight is normal.    Medical decision-making and discussion:  1. Chronic respiratory failure with hypoxia (HCC)  2. History of 2019 novel coronavirus disease (COVID-19)  Patient has referred to pulmonary and sleep medicine for further evaluation and supplemental oxygen.  Patient has a chronic respiratory failure with hypoxia since being COVID-19.  Home health is following up with patient closely.  Advised patient to do the exercises with spirometer at least 3 times a day.  Advised patient to continue using oxygen continuously at night that throughout the day to not use the oxygen if her pulse oximetry is within normal limits.  Patient agreed to plan.  Continue monitoring pulse ox with activity and when at rest.  Discussed " "emergency room precautions.    - REFERRAL TO PULMONARY AND SLEEP MEDICINE    3. Anxiety  4. Chronic use of benzodiazepine for therapeutic purpose  PDM P reviewed.  No red flags.  Patient is adamant that she takes 1.5 tablets of Ativan 0.5 mg once daily.  Per PDMP patient is getting 90 tablets/month.  Patient has agreed to start weaning off of medication.  She will follow-up in 1 month we will discuss weaning off of medication more detail.  Patient is going to bring in at home prescription bottle and show me how much she is taking per day to clear up the confusion.  Patient agreed to plan.    Thus risk, benefits, and alternative treatment options with patient.  Discussed with patient benzodiazepine can depress the respiratory system.  Patient is aware of risk but wants to continue benzodiazepine.  Continue monitor closely.    Patient understands this prescription is a controlled substance which is potentially habit-forming and its use is regulated by the [employer]. We also discussed the new \"black box\" warning regarding the lethal combination of opioids and benzodiazepines. Most recent UDS is appropriate. Any refill requires an office visit. Narcotics have may adverse effects and the risks of addiction, accidental overdose and death were emphasized. Provided prescriptions for the next 1 month.    She will sign a controlled substance agreement form during follow-up appointment.    - LORazepam (ATIVAN) 0.5 MG Tab; Take 1-2 Tabs by mouth 2 Times a Day for 30 days.  Dispense: 90 Tab; Refill: 0    5. Hyponatremia  Chronic problem.  Stable.  Lab work has been ordered.  Patient be contacted with results.  Continue to monitor.    6. Hypokalemia  Problem.  Unknown is stable.  Potassium supplement was discontinued during patient's hospitalization.  Lab work has been ordered.  Patient will be contacted with results.  - Basic Metabolic Panel; Future    7. PVD (peripheral vascular disease) (HCC)  Chronic but stable problem.  Continue " following up with cardiology.  Continue aspirin and atorvastatin as prescribed.  Continue wearing compression stockings.  Continue staying active.  Continue to monitor.    8. Coronary artery disease involving native coronary artery of native heart with angina pectoris (HCC)  Chronic but stable problem.  Continue current medication regimen.  Continue following up with cardiology.  Continue living an active lifestyle.  Continue monitor closely.  Discussed emergency room precautions with patient.      Please note that this dictation was created using voice recognition software. I have made every reasonable attempt to correct obvious errors, but I expect that there are errors of grammar and possibly content that I did not discover before finalizing the note.    Assessment/Plan:  1. Chronic respiratory failure with hypoxia (HCC)  REFERRAL TO PULMONARY AND SLEEP MEDICINE   2. History of 2019 novel coronavirus disease (COVID-19)  REFERRAL TO PULMONARY AND SLEEP MEDICINE   3. Anxiety  LORazepam (ATIVAN) 0.5 MG Tab   4. Chronic use of benzodiazepine for therapeutic purpose  LORazepam (ATIVAN) 0.5 MG Tab   5. Hyponatremia     6. Hypokalemia  Basic Metabolic Panel   7. PVD (peripheral vascular disease) (HCC)     8. Coronary artery disease involving native coronary artery of native heart with angina pectoris (HCC)         Return in about 1 month (around 2/7/2021).

## 2021-01-25 ENCOUNTER — HOME CARE VISIT (OUTPATIENT)
Dept: HOME HEALTH SERVICES | Facility: HOME HEALTHCARE | Age: 86
End: 2021-01-25
Payer: MEDICARE

## 2021-01-25 PROCEDURE — G0493 RN CARE EA 15 MIN HH/HOSPICE: HCPCS

## 2021-01-26 VITALS
DIASTOLIC BLOOD PRESSURE: 68 MMHG | OXYGEN SATURATION: 98 % | RESPIRATION RATE: 17 BRPM | SYSTOLIC BLOOD PRESSURE: 118 MMHG | TEMPERATURE: 98.9 F | HEART RATE: 70 BPM

## 2021-01-26 ASSESSMENT — ENCOUNTER SYMPTOMS
VOMITING: DENIES
NAUSEA: DENIES
MUSCLE WEAKNESS: 1
SHORTNESS OF BREATH: T

## 2021-01-27 ENCOUNTER — HOME CARE VISIT (OUTPATIENT)
Dept: HOME HEALTH SERVICES | Facility: HOME HEALTHCARE | Age: 86
End: 2021-01-27
Payer: MEDICARE

## 2021-01-27 PROCEDURE — G0493 RN CARE EA 15 MIN HH/HOSPICE: HCPCS

## 2021-01-28 VITALS
SYSTOLIC BLOOD PRESSURE: 134 MMHG | DIASTOLIC BLOOD PRESSURE: 70 MMHG | OXYGEN SATURATION: 98 % | HEART RATE: 80 BPM | TEMPERATURE: 99.2 F | RESPIRATION RATE: 16 BRPM

## 2021-01-28 ASSESSMENT — ENCOUNTER SYMPTOMS
NAUSEA: DENIES
SHORTNESS OF BREATH: T
VOMITING: DENIES

## 2021-02-01 ENCOUNTER — HOME CARE VISIT (OUTPATIENT)
Dept: HOME HEALTH SERVICES | Facility: HOME HEALTHCARE | Age: 86
End: 2021-02-01
Payer: MEDICARE

## 2021-02-01 VITALS
SYSTOLIC BLOOD PRESSURE: 120 MMHG | HEART RATE: 85 BPM | DIASTOLIC BLOOD PRESSURE: 68 MMHG | RESPIRATION RATE: 16 BRPM | OXYGEN SATURATION: 96 % | TEMPERATURE: 99 F

## 2021-02-01 PROCEDURE — G0493 RN CARE EA 15 MIN HH/HOSPICE: HCPCS

## 2021-02-01 SDOH — ECONOMIC STABILITY: HOUSING INSECURITY: EVIDENCE OF SMOKING MATERIAL: 0

## 2021-02-01 ASSESSMENT — ACTIVITIES OF DAILY LIVING (ADL)
HOME_HEALTH_OASIS: 00
OASIS_M1830: 00

## 2021-02-01 ASSESSMENT — PATIENT HEALTH QUESTIONNAIRE - PHQ9: CLINICAL INTERPRETATION OF PHQ2 SCORE: 0

## 2021-02-03 ENCOUNTER — TELEPHONE (OUTPATIENT)
Dept: MEDICAL GROUP | Facility: PHYSICIAN GROUP | Age: 86
End: 2021-02-03

## 2021-02-03 NOTE — TELEPHONE ENCOUNTER
ESTABLISHED PATIENT PRE-VISIT PLANNING     Patient was NOT contacted to complete PVP.     Note: Patient will not be contacted if there is no indication to call.     1.  Reviewed notes from the last few office visits within the medical group: Yes    2.  If any orders were placed at last visit or intended to be done for this visit (i.e. 6 mos follow-up), do we have Results/Consult Notes?         •  Labs - Labs ordered, completed on 01/07/2021 and results are in chart.  Note: If patient appointment is for lab review and patient did not complete labs, check with provider if OK to reschedule patient until labs completed.       •  Imaging - Imaging was not ordered at last office visit.       •  Referrals - Referral ordered, patient has NOT been seen.    3. Is this appointment scheduled as a Hospital Follow-Up? No    4.  Immunizations were updated in Epic using Reconcile Outside Information activity? Yes    5.  Patient is due for the following Health Maintenance Topics:   Health Maintenance Due   Topic Date Due   • COVID-19 Vaccine (1 of 2) 02/18/1945   • IMM HEP B VACCINE (1 of 3 - Risk 3-dose series) 02/18/1948   • IMM ZOSTER VACCINES (1 of 2) 02/18/1979   • Annual Wellness Visit  06/21/2017       - Patient plans to schedule appointment for Annual Wellness Visit (AWV).    6.  AHA (Pulse8) form printed for Provider? Email sent to [facility] [place] requesting form

## 2021-02-16 ENCOUNTER — OFFICE VISIT (OUTPATIENT)
Dept: MEDICAL GROUP | Facility: PHYSICIAN GROUP | Age: 86
End: 2021-02-16
Payer: MEDICARE

## 2021-02-16 ENCOUNTER — HOSPITAL ENCOUNTER (OUTPATIENT)
Dept: LAB | Facility: MEDICAL CENTER | Age: 86
End: 2021-02-16
Attending: PHYSICIAN ASSISTANT
Payer: MEDICARE

## 2021-02-16 VITALS
BODY MASS INDEX: 21.33 KG/M2 | WEIGHT: 120.4 LBS | HEART RATE: 67 BPM | HEIGHT: 63 IN | SYSTOLIC BLOOD PRESSURE: 130 MMHG | DIASTOLIC BLOOD PRESSURE: 58 MMHG | TEMPERATURE: 98.3 F | RESPIRATION RATE: 16 BRPM | OXYGEN SATURATION: 95 %

## 2021-02-16 DIAGNOSIS — R61 NIGHT SWEATS: ICD-10-CM

## 2021-02-16 DIAGNOSIS — J96.11 CHRONIC RESPIRATORY FAILURE WITH HYPOXIA (HCC): ICD-10-CM

## 2021-02-16 DIAGNOSIS — F41.9 ANXIETY: ICD-10-CM

## 2021-02-16 DIAGNOSIS — Z79.899 CHRONIC USE OF BENZODIAZEPINE FOR THERAPEUTIC PURPOSE: ICD-10-CM

## 2021-02-16 PROBLEM — J96.01 ACUTE HYPOXEMIC RESPIRATORY FAILURE DUE TO COVID-19 (HCC): Status: RESOLVED | Noted: 2020-11-20 | Resolved: 2021-02-16

## 2021-02-16 PROBLEM — U07.1 ACUTE HYPOXEMIC RESPIRATORY FAILURE DUE TO COVID-19 (HCC): Status: RESOLVED | Noted: 2020-11-20 | Resolved: 2021-02-16

## 2021-02-16 LAB
ALBUMIN SERPL BCP-MCNC: 4.1 G/DL (ref 3.2–4.9)
ALBUMIN/GLOB SERPL: 1.4 G/DL
ALP SERPL-CCNC: 70 U/L (ref 30–99)
ALT SERPL-CCNC: 19 U/L (ref 2–50)
ANION GAP SERPL CALC-SCNC: 9 MMOL/L (ref 7–16)
AST SERPL-CCNC: 26 U/L (ref 12–45)
BASOPHILS # BLD AUTO: 1.4 % (ref 0–1.8)
BASOPHILS # BLD: 0.09 K/UL (ref 0–0.12)
BILIRUB SERPL-MCNC: 0.4 MG/DL (ref 0.1–1.5)
BUN SERPL-MCNC: 11 MG/DL (ref 8–22)
CALCIUM SERPL-MCNC: 10 MG/DL (ref 8.5–10.5)
CHLORIDE SERPL-SCNC: 104 MMOL/L (ref 96–112)
CO2 SERPL-SCNC: 25 MMOL/L (ref 20–33)
CREAT SERPL-MCNC: 0.47 MG/DL (ref 0.5–1.4)
EOSINOPHIL # BLD AUTO: 0.22 K/UL (ref 0–0.51)
EOSINOPHIL NFR BLD: 3.4 % (ref 0–6.9)
ERYTHROCYTE [DISTWIDTH] IN BLOOD BY AUTOMATED COUNT: 47.8 FL (ref 35.9–50)
GLOBULIN SER CALC-MCNC: 2.9 G/DL (ref 1.9–3.5)
GLUCOSE SERPL-MCNC: 95 MG/DL (ref 65–99)
HCT VFR BLD AUTO: 40.5 % (ref 37–47)
HGB BLD-MCNC: 13 G/DL (ref 12–16)
IMM GRANULOCYTES # BLD AUTO: 0.01 K/UL (ref 0–0.11)
IMM GRANULOCYTES NFR BLD AUTO: 0.2 % (ref 0–0.9)
LYMPHOCYTES # BLD AUTO: 1.82 K/UL (ref 1–4.8)
LYMPHOCYTES NFR BLD: 28.2 % (ref 22–41)
MCH RBC QN AUTO: 32.2 PG (ref 27–33)
MCHC RBC AUTO-ENTMCNC: 32.1 G/DL (ref 33.6–35)
MCV RBC AUTO: 100.2 FL (ref 81.4–97.8)
MONOCYTES # BLD AUTO: 0.71 K/UL (ref 0–0.85)
MONOCYTES NFR BLD AUTO: 11 % (ref 0–13.4)
NEUTROPHILS # BLD AUTO: 3.61 K/UL (ref 2–7.15)
NEUTROPHILS NFR BLD: 55.8 % (ref 44–72)
NRBC # BLD AUTO: 0 K/UL
NRBC BLD-RTO: 0 /100 WBC
PLATELET # BLD AUTO: 207 K/UL (ref 164–446)
PMV BLD AUTO: 10.5 FL (ref 9–12.9)
POTASSIUM SERPL-SCNC: 4.2 MMOL/L (ref 3.6–5.5)
PROT SERPL-MCNC: 7 G/DL (ref 6–8.2)
RBC # BLD AUTO: 4.04 M/UL (ref 4.2–5.4)
SODIUM SERPL-SCNC: 138 MMOL/L (ref 135–145)
TSH SERPL DL<=0.005 MIU/L-ACNC: 3.44 UIU/ML (ref 0.38–5.33)
WBC # BLD AUTO: 6.5 K/UL (ref 4.8–10.8)

## 2021-02-16 PROCEDURE — 80053 COMPREHEN METABOLIC PANEL: CPT

## 2021-02-16 PROCEDURE — 99214 OFFICE O/P EST MOD 30 MIN: CPT | Performed by: PHYSICIAN ASSISTANT

## 2021-02-16 PROCEDURE — 85025 COMPLETE CBC W/AUTO DIFF WBC: CPT

## 2021-02-16 PROCEDURE — 36415 COLL VENOUS BLD VENIPUNCTURE: CPT

## 2021-02-16 PROCEDURE — 84443 ASSAY THYROID STIM HORMONE: CPT

## 2021-02-16 RX ORDER — TRIAMTERENE AND HYDROCHLOROTHIAZIDE 37.5; 25 MG/1; MG/1
CAPSULE ORAL
COMMUNITY
End: 2021-06-08

## 2021-02-16 RX ORDER — LORAZEPAM 0.5 MG/1
TABLET ORAL
Qty: 30 TABLET | Refills: 0 | Status: SHIPPED | OUTPATIENT
Start: 2021-03-05 | End: 2021-03-12

## 2021-02-16 ASSESSMENT — FIBROSIS 4 INDEX: FIB4 SCORE: 1.52

## 2021-02-16 NOTE — PROGRESS NOTES
Chief Complaint   Patient presents with   • Follow-Up     Oxygen needs, and Vaccines, night time sweats        HISTORY OF PRESENT ILLNESS: Dayana Lechuga is an established 89 y.o. female here to discuss the evaluation and management of:    Anxiety  Chronic use of benzodiazepine for therapeutic purpose  Chronic but stable problem.  Patient has agreed to start weaning off of medication.  During her last appointment patient was advised to bring in at home prescription bottle to see how she is taking the medication.  During her last appointment on 1/7/2021 patient states she was taking 1.5 tablets of Ativan 0.5 mg tab once daily.  Today's appointment she tells me that she was confused on medications and in fact is taking 3 tablets of Ativan 0.5 mg once daily.  States she takes 1 tablet by mouth every day at 2:00 PM and 2 tablets by mouth once nightly.  Denies side effects or complications from medication.    Chronic respiratory failure with hypoxia (HCC)  She recently had COVID-19 infection.  She was on chronic supplemental oxygen.  Patient has weaned off daily use of oxygen.  States periodically during the day her pulse ox may drop to 89-90 while at rest that she will take deep breaths and practice breathing techniques will pulse ox returned to normal limits.  States she is using 2 L/min of oxygen continuously at night.  Patient follows up with pulmonology on 2/19/2021.    Night sweats  Patient states for several months she has been experiencing intermittent episodes of night sweats.  States she feels that night sweats developed before getting COVID-19 infection.  Intermittently feels hot during the day but does not sweat.  Patient states she does not feel that she keeps her house to warm but she does sleep with pillows at night.  Daughter is with patient during today's appointment and daughter states that patient's home might be too warm.  Denies fever, chills, nausea, vomiting, unintentional weight loss,  lymphadenopathy, abnormal vaginal bleeding, abnormal hair loss, increased irritability, hoarseness of voice, heart palpitations, temperature intolerance, changes in bowel habits, increased irritability.      Patient Active Problem List    Diagnosis Date Noted   • CAD (coronary artery disease) 09/11/2020   • Chest pain due to myocardial ischemia 09/10/2016   • Hyponatremia 11/20/2020   • Popliteal artery stenosis, right ([facility]) 11/28/2018   • Claudication of right lower extremity ([facility]) 11/07/2018   • Coronary artery disease involving native coronary artery of native heart with angina pectoris ([facility]) 08/10/2016   • Dyslipidemia 10/31/2009   • Essential hypertension 10/02/2009   • Acute bilateral low back pain with bilateral sciatica 08/29/2019   • Dysuria 06/27/2019   • Diarrhea 06/27/2019   • Slow transit constipation 08/29/2018   • S/P bilateral cataract extraction 02/27/2018   • Benzodiazepine dependence ([facility]) 11/13/2017   • Anxiety 06/22/2016   • GERD (gastroesophageal reflux disease) 10/31/2009   • Osteopenia 10/31/2009   • Mild intermittent asthma with acute exacerbation 10/02/2009   • Debility 11/20/2020   • Peripheral neuropathy 11/20/2020   • Bradycardia 09/11/2020   • Post concussion syndrome 09/11/2020   • Mild peripheral edema 07/31/2020   • PVD (peripheral vascular disease) ([facility]) 03/04/2020   • Pleural effusion 12/20/2019   • Incomplete emptying of bladder 10/08/2019   • Urinary retention 09/11/2019       Allergies:Bactrim [sulfamethoxazole w-trimethoprim], Pcn [penicillins], and Codeine    Current Outpatient Medications   Medication Sig Dispense Refill   • [START ON 3/5/2021] LORazepam (ATIVAN) 0.5 MG Tab Take 2 Tabs by mouth once nightly for 30 days. 30 tablet 0   • gabapentin (NEURONTIN) 100 MG Cap TAKE 2 CAPSULES BY MOUTH THREE TIMES DAILY 540 capsule 1   • amLODIPine (NORVASC) 5 MG Tab TAKE ONE TABLET BY MOUTH ONE TIME DAILY  90 Tab 1   • fluticasone (FLONASE) 50 MCG/ACT nasal spray USE 1 SPRAY IN EACH  NOSTRIL EVERY DAY 16 g 2   • Home Care Oxygen Inhale 2-3 L/min continuous. Oxygen dose range: 2 L/min  Respiratory route via: Nasal Cannula   Oxygen supplier: Preferred  3L/min at night/while sleeping         • Acetaminophen 500 MG Cap Take 1 Cap by mouth 3 times a day.     • melatonin 5 mg Tab Take 1 Tab by mouth every bedtime.     • PREPARATION H 0.25-3-12-18 % Cream Insert 1 Application into the rectum as needed (pain).     • QVAR REDIHALER 80 MCG/ACT inhaler INHALE 1 PUFF BY MOUTH TWICE A DAY  (Patient taking differently: Inhale 1 Puff 2 times a day.) 10.6 g 0   • montelukast (SINGULAIR) 10 MG Tab TAKE ONE TABLET BY MOUTH ONE TIME DAILY  (Patient taking differently: Take 10 mg by mouth every evening.) 90 Tab 0   • atorvastatin (LIPITOR) 80 MG tablet Take 80 mg by mouth every evening.     • isosorbide mononitrate SR (IMDUR) 30 MG TABLET SR 24 HR Take 30 mg by mouth every evening.     • lisinopril (PRINIVIL) 20 MG Tab Take 20 mg by mouth every morning with breakfast.     • Multiple Vitamins-Minerals (CENTRUM SILVER PO) Take 1 Tab by mouth every morning.     • loratadine (CLARITIN) 10 MG Tab Take 10 mg by mouth every morning.     • albuterol 108 (90 Base) MCG/ACT Aero Soln inhalation aerosol Inhale 2 Puffs by mouth every 6 hours as needed for Shortness of Breath. 3 Inhaler 0   • nitroglycerin (NITROSTAT) 0.4 MG SL Tab Place 1 tab under tongue every 5 min as needed for chest pain max 3 doses (Patient taking differently: Place 0.4 mg under the tongue as needed. Place 1 tab under tongue every 5 min as needed for chest pain max 3 doses) 100 Tab 0   • Biotin w/ Vitamins C & E (HAIR/SKIN/NAILS) 1250-7.5-7.5 MCG-MG-UNT Chew Tab Chew 1 Tab every morning.     • guaifenesin LA (MUCINEX) 600 MG TABLET SR 12 HR Take 600 mg by mouth every morning.     • Ascorbic Acid (VITAMIN C) 1000 MG Tab Take 1,000 mg by mouth every morning.     • VITAMIN E PO Take 1 Cap by mouth every morning.     • aspirin (ASA) 81 MG Chew Tab chewable  tablet Chew 81 mg every morning with breakfast. 100 Tab 11   • Cholecalciferol (VITAMIN D) 50 MCG (2000 UT) Cap Take 2,000 Units by mouth every morning.     • omeprazole (PRILOSEC) 20 MG delayed-release capsule Take 20 mg by mouth every morning with breakfast.     • triamterene/hctz (DYAZIDE) 37.5-25 MG Cap DYAZIDE 37.5-25 MG ORAL CAPSULE     • benzonatate (TESSALON) 100 MG Cap Take 1 Cap by mouth 3 times a day as needed for Cough. (Patient not taking: Reported on 2/16/2021) 60 Cap 0     No current facility-administered medications for this visit.       Social History     Tobacco Use   • Smoking status: Never Smoker   • Smokeless tobacco: Never Used   Substance Use Topics   • Alcohol use: No     Alcohol/week: 0.0 oz   • Drug use: No       Family Status   Relation Name Status   • Neg Hx  (Not Specified)     Family History   Problem Relation Age of Onset   • Heart Disease Neg Hx    • Heart Failure Neg Hx    • Hyperlipidemia Neg Hx        ROS:  Review of Systems   Constitutional: Negative for fever, chills, weight loss and malaise/fatigue.   HENT: Negative for ear pain, nosebleeds, congestion, sore throat and neck pain.    Eyes: Negative for blurred vision.   Respiratory: Negative for cough, sputum production, shortness of breath and wheezing.    Cardiovascular: Negative for chest pain, palpitations, orthopnea and leg swelling.   Gastrointestinal: Negative for heartburn, nausea, vomiting and abdominal pain.   Genitourinary: Negative for dysuria, urgency and frequency.   Musculoskeletal: Negative for myalgias, back pain and joint pain.   Skin: Negative for rash and itching.   Neurological: Negative for dizziness, tingling, tremors, sensory change, focal weakness and headaches.   Endo/Heme/Allergies: Does not bruise/bleed easily.  Positive for night sweats.  Psychiatric/Behavioral: Negative for depression, suicidal ideas and memory loss.  The patient is not nervous/anxious and does not have insomnia.    All other systems  "reviewed and are negative except as in HPI.    Exam: /58 (BP Location: Left arm, Patient Position: Sitting, BP Cuff Size: Adult)   Pulse 67   Temp 36.8 °C (98.3 °F) (Temporal)   Resp 16   Ht 1.6 m (5' 3\")   Wt 54.6 kg (120 lb 6.4 oz)   SpO2 95%  Body mass index is 21.33 kg/m².  General: Normal appearing. No distress.  HEENT: Normocephalic. Eyes conjunctiva clear lids without ptosis, ears normal shape and contour.   Neck: Supple without JVD. Thyroid is not enlarged.  Pulmonary: Clear to ausculation.  Normal effort. No rales, ronchi, or wheezing.  Cardiovascular: Regular rate and rhythm with murmur.   Abdomen: Nondistended.   Neurologic: Grossly nonfocal.  Cranial nerves are normal.   Skin: Warm and dry.  No rashes or suspicious skin lesions.  Musculoskeletal: Normal gait. No extremity cyanosis, clubbing, or edema.  Psych: Normal mood and affect. Alert and oriented x3. Judgment and insight is normal.    Medical decision-making and discussion:  1. Anxiety  2. Chronic use of benzodiazepine for therapeutic purpose  PDMP reviewed.  No red flags.  Patient tells me that she picked up her prescription for Ativan 0.5 mg tab on 2/5/2021.  States she started using her prescription on 2/10/2021.  During today's appointment discussed waiting patient off of medication.  Patient read the plan.  Advised patient to no longer take Ativan 0.5 mg tab at 2 PM.  Continue taking 1 mg of Ativan at night.  Patient read the plan.  Refilled Ativan for patient.  Discussed risk, benefits, and alternative treatment options with patient.  To like to continue Ativan.  Patient will follow up on April 2, 2021 for reevaluation.     Patient understands this prescription is a controlled substance which is potentially habit-forming and its use is regulated by the [employer]. We also discussed the new \"black box\" warning regarding the lethal combination of opioids and benzodiazepines. Most recent UDS is appropriate. Any refill requires an office " visit. Narcotics have may adverse effects and the risks of addiction, accidental overdose and death were emphasized. Provided prescriptions for the next 1 month.    - LORazepam (ATIVAN) 0.5 MG Tab; Take 2 Tabs by mouth once nightly for 30 days.  Dispense: 30 tablet; Refill: 0    3. Chronic respiratory failure with hypoxia (HCC)  Continues supplemental oxygen at night.  Patient is establishing care with pulmonology on 2/19/2021.  Continue breathing techniques.  Continue living an active lifestyle.  Continue to monitor.    4. Night sweats  Lab work has been ordered.  Patient will be contacted with results.  Patient will follow up on 4/2/2021 for reevaluation.  Advised patient to wear clothing, use less pillows at night, and make sure that her thermostat is not set to high.    Follow-up for worsening symptoms,lack of expected recovery, or should new symptoms or problems arise.      - CBC WITH DIFFERENTIAL; Future  - TSH WITH REFLEX TO FT4; Future  - Comp Metabolic Panel; Future      Please note that this dictation was created using voice recognition software. I have made every reasonable attempt to correct obvious errors, but I expect that there are errors of grammar and possibly content that I did not discover before finalizing the note.    Assessment/Plan:  1. Anxiety  LORazepam (ATIVAN) 0.5 MG Tab   2. Chronic use of benzodiazepine for therapeutic purpose  LORazepam (ATIVAN) 0.5 MG Tab   3. Chronic respiratory failure with hypoxia (HCC)     4. Night sweats  CBC WITH DIFFERENTIAL    TSH WITH REFLEX TO FT4    Comp Metabolic Panel    CANCELED: Comp Metabolic Panel       Return in about 6 weeks (around 3/30/2021).

## 2021-02-19 ENCOUNTER — OFFICE VISIT (OUTPATIENT)
Dept: SLEEP MEDICINE | Facility: MEDICAL CENTER | Age: 86
End: 2021-02-19
Payer: MEDICARE

## 2021-02-19 VITALS
DIASTOLIC BLOOD PRESSURE: 72 MMHG | HEIGHT: 63 IN | TEMPERATURE: 97.6 F | RESPIRATION RATE: 14 BRPM | BODY MASS INDEX: 20.38 KG/M2 | SYSTOLIC BLOOD PRESSURE: 140 MMHG | WEIGHT: 115 LBS | HEART RATE: 68 BPM | OXYGEN SATURATION: 97 %

## 2021-02-19 DIAGNOSIS — J90 PLEURAL EFFUSION: ICD-10-CM

## 2021-02-19 DIAGNOSIS — U07.1 COVID-19: ICD-10-CM

## 2021-02-19 PROCEDURE — 99204 OFFICE O/P NEW MOD 45 MIN: CPT | Mod: 25 | Performed by: INTERNAL MEDICINE

## 2021-02-19 PROCEDURE — 94761 N-INVAS EAR/PLS OXIMETRY MLT: CPT | Performed by: INTERNAL MEDICINE

## 2021-02-19 ASSESSMENT — ENCOUNTER SYMPTOMS: HEMOPTYSIS: 0

## 2021-02-19 ASSESSMENT — FIBROSIS 4 INDEX: FIB4 SCORE: 2.65

## 2021-02-19 ASSESSMENT — PAIN SCALES - GENERAL: PAINLEVEL: NO PAIN

## 2021-02-19 NOTE — PROCEDURES
Multi-Ox Readings  Multi Ox #1 Room air   O2 sat % at rest 96   O2 sat % on exertion 90   O2 sat average on exertion 93   Multi Ox #2     O2 sat % at rest     O2 sat % on exertion     O2 sat average on exertion       Oxygen Use 2   Oxygen Frequency Nocturnal   Duration of need     Is the patient mobile within the home?     CPAP Use?     BIPAP Use?     Servo Titration

## 2021-02-19 NOTE — PROGRESS NOTES
Dayana Lechuga is a 89 y.o. female here for  COVID-19 positive test (U07.1, COVID-19) with Acute Pneumonia (J12.89, Other viral pneumonia)  (If respiratory failure or sepsis present, add as separate assessment)    And hypoxemia. Patient was referred by primary care.    History of Present Illness: Follows  Dayana comes in today with her son Jl, she had Covid pneumonia and was hospitalized 16 days.  Ultimately she recovered, and has gone back to her usual state lungs clear today walking oximetry excellent and she can safely discontinue oxygen.  She is slender, and does have underlying mild reactive airways  I suspect that she could probably discontinue the Qvar, as a trial, utilize the albuterol if she was developed coughing or wheezing but her lungs are remarkably clear and this is been in place for extended period of time without symptoms.    Her last x-ray was clear her current walking oximetry is excellent and we can safely discontinue the oxygen, I will place that order.    The last concern is that she had protracted Covid pneumonia, certainly developed antibody, when she got her first vaccination for the Covid she had not only local pain and discomfort but heat and warmth, when she went for the second dose they were not willing to give it to her.  I think that she has antibody based on the Covid pneumonia, that probably produced a significant prolonged reaction to the first dose of the vaccine and I would recommend against getting the second dose given the circumstances.  I think she is adequately protected with her prior infection and the first dose vaccine in combination given her response.    We can see her on an as-needed basis, if her situation changes but she is doing remarkably well at age 89 even with the protracted illness last fall    Answers for HPI/ROS submitted by the patient on 2/19/2021   What is the reason for your visit today?: check lungs and oxygen level since recovering from  Covid.  Released from [facility] on (est) 12/9/2020  Do you cough up blood from your chest?: No  Have you ever been hospitalized?: Yes  Reason, year, and hospital in which you were hospitalized:: 2020, [facility] for Covid  Have you ever needed to be intubated or placed on a ventilator? : Yes  If yes, when and for how long?: 3 months ago 2020, noventilator, oxygen through nose  Have you ever had problems with anesthesia?: No  Have you experienced post-operative delirium?: No  Any complications with surgery?: No  What year did you receive your last Flu shot?: 2020  What year did you receive you last Pneumonia shot?: recently, i believe  Have you had a TB skin test? If so, please list the year and result:: not sure  Have you had Allergy skin testing? If so, please list the year and result:: not sure  Please list all your occupations from your first job to your current job. 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 +  a elisabeth Job: no  a Mine or Quarry: no  a Foundry: no  with Pottery: no  Cot[employer] Mill: no  Flax Mill: no  Hemp Mill: no  Brick Plant: no  with Asbestos: no  as a Sandblaster: no  manufacturing of glass, ceramics, or abrasives: no  Acids: no  Arsenics: no  Cadmium: no  Chromium: no  Fibrogenic Dust: no  Lead: no  Nickel: no  Plastic: no  Solvents: no  TDI: no  Uranium: no  Please list the places you have lived, the year, and Country or State in the [place]:: [place], since about 1951 (moved from [place], [place])  Birds?: No  Dogs?: Yes  Cats?: Yes  Mice and/or Deer Mice?: No  Reptiles?: No  Blood Chemistries: see My Chart for recent blood panels and tests  Blood Count: not sure  Cardiac Ultrasound: not sure  Chest X-ray: not sure  Pulmonary Function Test: not sure  CT Scan, Sinus: not sure  Electrocardiogram: not sure  Sleep Study: no  Coffee: one  Decaffeinated coffee: none  Energy drinks: none  Tea: none  Carbonated soft drinks:  none    Constitutional ROS: No unexpected change in weight, No unexplained fevers  Eyes: No change in vision or blurring or double vision  Mouth/Throat ROS: No sore throat, No recent change in voice or hoarseness  Pulmonary ROS: See present history for pertinent positives  Cardiovascular ROS: No chest pain to suggest acute coronary syndrome  Gastrointestinal ROS: No abdominal pain to suggest peptic disease  Musculoskeletal/Extremities ROS: no acute artritis or unusual swelling  Hematologic/Lymphatic ROS: No easy bleeding or unusual lymph node swelling  Neurologic ROS: No new or unusual weakness  Psychiatric ROS: No hallucinations  Allergic/Immunologic: No  urticaria or allergic rash      Current Outpatient Medications   Medication Sig Dispense Refill   • triamterene/hctz (DYAZIDE) 37.5-25 MG Cap DYAZIDE 37.5-25 MG ORAL CAPSULE     • [START ON 3/5/2021] LORazepam (ATIVAN) 0.5 MG Tab Take 2 Tabs by mouth once nightly for 30 days. 30 tablet 0   • gabapentin (NEURONTIN) 100 MG Cap TAKE 2 CAPSULES BY MOUTH THREE TIMES DAILY 540 capsule 1   • amLODIPine (NORVASC) 5 MG Tab TAKE ONE TABLET BY MOUTH ONE TIME DAILY  90 Tab 1   • fluticasone (FLONASE) 50 MCG/ACT nasal spray USE 1 SPRAY IN EACH NOSTRIL EVERY DAY 16 g 2   • Acetaminophen 500 MG Cap Take 1 Cap by mouth 3 times a day.     • QVAR REDIHALER 80 MCG/ACT inhaler INHALE 1 PUFF BY MOUTH TWICE A DAY  (Patient taking differently: Inhale 1 Puff 2 times a day.) 10.6 g 0   • montelukast (SINGULAIR) 10 MG Tab TAKE ONE TABLET BY MOUTH ONE TIME DAILY  (Patient taking differently: Take 10 mg by mouth every evening.) 90 Tab 0   • atorvastatin (LIPITOR) 80 MG tablet Take 80 mg by mouth every evening.     • isosorbide mononitrate SR (IMDUR) 30 MG TABLET SR 24 HR Take 30 mg by mouth every evening.     • lisinopril (PRINIVIL) 20 MG Tab Take 20 mg by mouth every morning with breakfast.     • Multiple Vitamins-Minerals (CENTRUM SILVER PO) Take 1 Tab by mouth every morning.     •  loratadine (CLARITIN) 10 MG Tab Take 10 mg by mouth every morning.     • albuterol 108 (90 Base) MCG/ACT Aero Soln inhalation aerosol Inhale 2 Puffs by mouth every 6 hours as needed for Shortness of Breath. 3 Inhaler 0   • Biotin w/ Vitamins C & E (HAIR/SKIN/NAILS) 1250-7.5-7.5 MCG-MG-UNT Chew Tab Chew 1 Tab every morning.     • guaifenesin LA (MUCINEX) 600 MG TABLET SR 12 HR Take 600 mg by mouth every morning.     • VITAMIN E PO Take 1 Cap by mouth every morning.     • aspirin (ASA) 81 MG Chew Tab chewable tablet Chew 81 mg every morning with breakfast. 100 Tab 11   • Cholecalciferol (VITAMIN D) 50 MCG (2000 UT) Cap Take 2,000 Units by mouth every morning.     • omeprazole (PRILOSEC) 20 MG delayed-release capsule Take 20 mg by mouth every morning with breakfast.     • Home Care Oxygen Inhale 2-3 L/min continuous. Oxygen dose range: 2 L/min  Respiratory route via: Nasal Cannula   Oxygen supplier: Preferred  3L/min at night/while sleeping         • melatonin 5 mg Tab Take 1 Tab by mouth every bedtime.     • PREPARATION H 0.25-3-12-18 % Cream Insert 1 Application into the rectum as needed (pain).     • benzonatate (TESSALON) 100 MG Cap Take 1 Cap by mouth 3 times a day as needed for Cough. (Patient not taking: Reported on 2/16/2021) 60 Cap 0   • nitroglycerin (NITROSTAT) 0.4 MG SL Tab Place 1 tab under tongue every 5 min as needed for chest pain max 3 doses (Patient taking differently: Place 0.4 mg under the tongue as needed. Place 1 tab under tongue every 5 min as needed for chest pain max 3 doses) 100 Tab 0   • Ascorbic Acid (VITAMIN C) 1000 MG Tab Take 1,000 mg by mouth every morning.       No current facility-administered medications for this visit.       Social History     Tobacco Use   • Smoking status: Never Smoker   • Smokeless tobacco: Never Used   Substance Use Topics   • Alcohol use: No     Alcohol/week: 0.0 oz   • Drug use: No        Past Medical History:   Diagnosis Date   • Allergy    • Anxiety    •  "ASTHMA    • Bronchitis    • CAD (coronary artery disease)     JT to RCA; 70% stenosis in LAD   • Chills    • Constipation    • Difficulty breathing    • GERD (gastroesophageal reflux disease)    • Heart attack (HCC)    • Heartburn    • Hyperlipidemia    • Hypertension    • Influenza    • Insomnia    • Lumbar back pain 9/10/2016   • Mumps    • OSTEOPOROSIS    • Palpitations    • Pneumonia    • PVD (peripheral vascular disease) (HCC)     70% PAD-followed by [facility]   • Sweat, sweating, excessive    • Tonsillitis        Past Surgical History:   Procedure Laterality Date   • ABDOMINAL HYSTERECTOMY TOTAL     • APPENDECTOMY     • HERNIA REPAIR     • HYSTERECTOMY LAPAROSCOPY     • TONSILLECTOMY     • ZZZ CARDIAC CATH         Allergies: Bactrim [sulfamethoxazole w-trimethoprim], Pcn [penicillins], and Codeine    Family History   Problem Relation Age of Onset   • Heart Attack Father    • Cancer Brother    • Cancer Brother    • Heart Disease Neg Hx    • Heart Failure Neg Hx    • Hyperlipidemia Neg Hx        Physical Examination    Vitals:    02/19/21 1228   Height: 1.6 m (5' 3\")   Weight: 52.2 kg (115 lb)   Weight % change since last entry.: 0 %   BP: 140/72   Pulse: 68   BMI (Calculated): 20.37   Resp: 14   Temp: 36.4 °C (97.6 °F)   TempSrc: Temporal       General Appearance: alert, no distress  Skin: Skin color, texture, turgor normal. No rashes or lesions.  Eyes: negative  Oropharynx: Lips, mucosa, and tongue normal. Teeth and gums normal. Oropharynx moist and without lesion  Lungs: positive findings: Remarkably quiet and clear  Heart: negative. RRR without murmur, gallop, or rubs.  No ectopy.  Abdomen: Abdomen soft, non-tender. . No masses,  No organomegaly  Extremities:  No deformities, edema, or skin discoloration  Joints: No acute arthritis  Peripheral Pulses:perfused  Neurologic: intact grossly  No clubbing or cyanosis    I (soft palate, uvula, fauces, tonsillar pillars visible)    Imaging: Last x-ray was " clear    PFTS: Not needed      Assessment and Plan  1. COVID-19, pneumonia, 11/21  Resolved  - Multiple Oximetry; Future    2. Pleural effusion  No longer present  - Multiple Oximetry; Future      Followup Return in about 6 months (around 8/19/2021).

## 2021-02-19 NOTE — PATIENT INSTRUCTIONS
Dayana comes in today with her son Jl, she had Covid pneumonia and was hospitalized 16 days.  Ultimately she recovered, and has gone back to her usual state lungs clear today walking oximetry excellent and she can safely discontinue oxygen.  She is slender, and does have underlying mild reactive airways  I suspect that she could probably discontinue the Qvar, as a trial, utilize the albuterol if she was developed coughing or wheezing but her lungs are remarkably clear and this is been in place for extended period of time without symptoms.    Her last x-ray was clear her current walking oximetry is excellent and we can safely discontinue the oxygen, I will place that order.    The last concern is that she had protracted Covid pneumonia, certainly developed antibody, when she got her first vaccination for the Covid she had not only local pain and discomfort but heat and warmth, when she went for the second dose they were not willing to give it to her.  I think that she has antibody based on the Covid pneumonia, that probably produced a significant prolonged reaction to the first dose of the vaccine and I would recommend against getting the second dose given the circumstances.  I think she is adequately protected with her prior infection and the first dose vaccine in combination given her response.    We can see her on an as-needed basis, if her situation changes but she is doing remarkably well at age 89 even with the protracted illness last fall

## 2021-02-25 RX ORDER — CARVEDILOL 6.25 MG/1
6.25 TABLET ORAL 2 TIMES DAILY WITH MEALS
Qty: 180 TABLET | Refills: 3 | Status: SHIPPED | OUTPATIENT
Start: 2021-02-25 | End: 2021-11-05 | Stop reason: SDUPTHER

## 2021-03-12 DIAGNOSIS — F41.9 ANXIETY: ICD-10-CM

## 2021-03-12 DIAGNOSIS — Z79.899 CHRONIC USE OF BENZODIAZEPINE FOR THERAPEUTIC PURPOSE: ICD-10-CM

## 2021-03-12 RX ORDER — LORAZEPAM 0.5 MG/1
TABLET ORAL
Qty: 30 TABLET | Refills: 0 | Status: SHIPPED | OUTPATIENT
Start: 2021-03-12 | End: 2021-04-02 | Stop reason: SDUPTHER

## 2021-03-17 ENCOUNTER — OFFICE VISIT (OUTPATIENT)
Dept: CARDIOLOGY | Facility: MEDICAL CENTER | Age: 86
End: 2021-03-17
Payer: MEDICARE

## 2021-03-17 VITALS
RESPIRATION RATE: 16 BRPM | HEART RATE: 67 BPM | BODY MASS INDEX: 20.23 KG/M2 | OXYGEN SATURATION: 93 % | SYSTOLIC BLOOD PRESSURE: 140 MMHG | DIASTOLIC BLOOD PRESSURE: 80 MMHG | WEIGHT: 114.2 LBS | HEIGHT: 63 IN

## 2021-03-17 DIAGNOSIS — I73.9 CLAUDICATION OF RIGHT LOWER EXTREMITY (HCC): ICD-10-CM

## 2021-03-17 DIAGNOSIS — Z98.41 S/P BILATERAL CATARACT EXTRACTION: ICD-10-CM

## 2021-03-17 DIAGNOSIS — R19.7 DIARRHEA, UNSPECIFIED TYPE: ICD-10-CM

## 2021-03-17 DIAGNOSIS — I10 ESSENTIAL HYPERTENSION: ICD-10-CM

## 2021-03-17 DIAGNOSIS — J90 PLEURAL EFFUSION: ICD-10-CM

## 2021-03-17 DIAGNOSIS — G62.89 OTHER POLYNEUROPATHY: ICD-10-CM

## 2021-03-17 DIAGNOSIS — E78.5 DYSLIPIDEMIA: Chronic | ICD-10-CM

## 2021-03-17 DIAGNOSIS — R60.0 MILD PERIPHERAL EDEMA: ICD-10-CM

## 2021-03-17 DIAGNOSIS — E87.1 HYPONATREMIA: ICD-10-CM

## 2021-03-17 DIAGNOSIS — I25.119 CORONARY ARTERY DISEASE INVOLVING NATIVE CORONARY ARTERY OF NATIVE HEART WITH ANGINA PECTORIS (HCC): ICD-10-CM

## 2021-03-17 DIAGNOSIS — I73.9 PVD (PERIPHERAL VASCULAR DISEASE) (HCC): ICD-10-CM

## 2021-03-17 DIAGNOSIS — Z98.42 S/P BILATERAL CATARACT EXTRACTION: ICD-10-CM

## 2021-03-17 DIAGNOSIS — I25.10 CORONARY ARTERY DISEASE INVOLVING NATIVE HEART WITHOUT ANGINA PECTORIS, UNSPECIFIED VESSEL OR LESION TYPE: ICD-10-CM

## 2021-03-17 DIAGNOSIS — I20.89 STABLE ANGINA PECTORIS (HCC): ICD-10-CM

## 2021-03-17 DIAGNOSIS — R00.1 BRADYCARDIA: ICD-10-CM

## 2021-03-17 DIAGNOSIS — I70.201 POPLITEAL ARTERY STENOSIS, RIGHT (HCC): ICD-10-CM

## 2021-03-17 DIAGNOSIS — Z79.899 CHRONIC USE OF BENZODIAZEPINE FOR THERAPEUTIC PURPOSE: ICD-10-CM

## 2021-03-17 DIAGNOSIS — F07.81 POST CONCUSSION SYNDROME: ICD-10-CM

## 2021-03-17 PROCEDURE — 99214 OFFICE O/P EST MOD 30 MIN: CPT | Performed by: INTERNAL MEDICINE

## 2021-03-17 RX ORDER — AMLODIPINE BESYLATE 5 MG/1
5 TABLET ORAL DAILY
Qty: 90 TABLET | Refills: 3 | Status: SHIPPED | OUTPATIENT
Start: 2021-03-17 | End: 2021-05-26 | Stop reason: SDUPTHER

## 2021-03-17 RX ORDER — ISOSORBIDE MONONITRATE 30 MG/1
30 TABLET, EXTENDED RELEASE ORAL EVERY EVENING
Qty: 90 TABLET | Refills: 3 | Status: SHIPPED | OUTPATIENT
Start: 2021-03-17 | End: 2021-05-26 | Stop reason: SDUPTHER

## 2021-03-17 RX ORDER — ATORVASTATIN CALCIUM 80 MG/1
80 TABLET, FILM COATED ORAL EVERY EVENING
Qty: 90 TABLET | Refills: 3 | Status: SHIPPED | OUTPATIENT
Start: 2021-03-17 | End: 2021-05-26 | Stop reason: SDUPTHER

## 2021-03-17 RX ORDER — LISINOPRIL 20 MG/1
20 TABLET ORAL
Qty: 90 TABLET | Refills: 3 | Status: SHIPPED | OUTPATIENT
Start: 2021-03-17 | End: 2021-05-26 | Stop reason: SDUPTHER

## 2021-03-17 ASSESSMENT — ENCOUNTER SYMPTOMS
LOSS OF CONSCIOUSNESS: 0
COUGH: 0
STRIDOR: 0
SHORTNESS OF BREATH: 0
WEAKNESS: 0
CARDIOVASCULAR NEGATIVE: 1
PALPITATIONS: 0
RESPIRATORY NEGATIVE: 1
ORTHOPNEA: 0
BRUISES/BLEEDS EASILY: 0
CONSTITUTIONAL NEGATIVE: 1
NEUROLOGICAL NEGATIVE: 1
BACK PAIN: 1
CHILLS: 0
FEVER: 0
DIZZINESS: 0
WHEEZING: 0
SPUTUM PRODUCTION: 0
HEMOPTYSIS: 0
EYES NEGATIVE: 1
SORE THROAT: 0
GASTROINTESTINAL NEGATIVE: 1
PND: 0
CLAUDICATION: 0

## 2021-03-17 ASSESSMENT — FIBROSIS 4 INDEX: FIB4 SCORE: 2.65

## 2021-03-17 NOTE — PROGRESS NOTES
Chief Complaint   Patient presents with   • Dyslipidemia     F/V   • Coronary Artery Disease     F/V Dx: Coronary artery disease involving native coronary artery of native heart with angina pectoris (HCC)       Subjective:   Dayana Lechuga is a 89 y.o. female who presents today as a follow-up for her CAD peripheral vascular disease hypertension hyperlipidemia and chest pain.  She was last seen she has had no further chest pain.  Her blood pressure is controlled.  She did survive Covid and was in the hospital for a long period time.  She is otherwise doing well.      Past Medical History:   Diagnosis Date   • Allergy    • Anxiety    • ASTHMA    • Bronchitis    • CAD (coronary artery disease)     JT to RCA; 70% stenosis in LAD   • Chills    • Constipation    • Difficulty breathing    • GERD (gastroesophageal reflux disease)    • Heart attack (HCC)    • Heartburn    • Hyperlipidemia    • Hypertension    • Influenza    • Insomnia    • Lumbar back pain 9/10/2016   • Mumps    • OSTEOPOROSIS    • Palpitations    • Pneumonia    • PVD (peripheral vascular disease) ([facility])     70% PAD-followed by Lary AMBROCIO   • Sweat, sweating, excessive    • Tonsillitis      Past Surgical History:   Procedure Laterality Date   • ABDOMINAL HYSTERECTOMY TOTAL     • APPENDECTOMY     • HERNIA REPAIR     • HYSTERECTOMY LAPAROSCOPY     • TONSILLECTOMY     • ZZZ CARDIAC CATH       Family History   Problem Relation Age of Onset   • Heart Attack Father    • Cancer Brother    • Cancer Brother    • Heart Disease Neg Hx    • Heart Failure Neg Hx    • Hyperlipidemia Neg Hx      Social History     Socioeconomic History   • Marital status:      Spouse name: Not on file   • Number of children: Not on file   • Years of education: Not on file   • Highest education level: Not on file   Occupational History   • Not on file   Tobacco Use   • Smoking status: Never Smoker   • Smokeless tobacco: Never Used   Substance and Sexual Activity   • Alcohol use:  No     Alcohol/week: 0.0 oz   • Drug use: No   • Sexual activity: Not Currently   Other Topics Concern   •  Service No   • Blood Transfusions Yes   • Caffeine Concern No   • Occupational Exposure No   • Hobby Hazards No   • Sleep Concern Yes   • Stress Concern Yes   • Weight Concern No   • Special Diet No   • Back Care No   • Exercise No   • Bike Helmet No   • Seat Belt Yes   • Self-Exams No   Social History Narrative   • Not on file     Social Determinants of Health     Financial Resource Strain:    • Difficulty of Paying Living Expenses:    Food Insecurity:    • Worried About Running Out of Food in the Last Year:    • Ran Out of Food in the Last Year:    Transportation Needs:    • Lack of Transportation (Medical):    • Lack of Transportation (Non-Medical):    Physical Activity:    • Days of Exercise per Week:    • Minutes of Exercise per Session:    Stress:    • Feeling of Stress :    Social Connections:    • Frequency of Communication with Friends and Family:    • Frequency of Social Gatherings with Friends and Family:    • Attends Taoist Services:    • Active Member of Clubs or Organizations:    • Attends Club or Organization Meetings:    • Marital Status:    Intimate Partner Violence:    • Fear of Current or Ex-Partner:    • Emotionally Abused:    • Physically Abused:    • Sexually Abused:      Allergies   Allergen Reactions   • Bactrim [Sulfamethoxazole W-Trimethoprim] Hives   • Pcn [Penicillins] Hives     About 70 years   • Codeine Unspecified     Unknown reaction       Outpatient Encounter Medications as of 3/17/2021   Medication Sig Dispense Refill   • lisinopril (PRINIVIL) 20 MG Tab Take 1 tablet by mouth every morning with breakfast. 90 tablet 3   • isosorbide mononitrate SR (IMDUR) 30 MG TABLET SR 24 HR Take 1 tablet by mouth every evening. 90 tablet 3   • atorvastatin (LIPITOR) 80 MG tablet Take 1 tablet by mouth every evening. 90 tablet 3   • amLODIPine (NORVASC) 5 MG Tab Take 1 tablet by  mouth every day. 90 tablet 3   • LORazepam (ATIVAN) 0.5 MG Tab TAKE 2 TABLETS BY MOUTH EVERY NIGHT 30 tablet 0   • carvedilol (COREG) 6.25 MG Tab Take 1 tablet by mouth 2 times a day, with meals for 360 days. 180 tablet 3   • montelukast (SINGULAIR) 10 MG Tab Take 1 tablet by mouth every evening. 90 tablet 2   • albuterol 108 (90 Base) MCG/ACT Aero Soln inhalation aerosol INHALE TWO PUFFS BY MOUTH EVERY SIX HOURS AS NEEDED FOR SHORTNESS OF BREATH 25.5 g 1   • triamterene/hctz (DYAZIDE) 37.5-25 MG Cap DYAZIDE 37.5-25 MG ORAL CAPSULE     • gabapentin (NEURONTIN) 100 MG Cap TAKE 2 CAPSULES BY MOUTH THREE TIMES DAILY 540 capsule 1   • fluticasone (FLONASE) 50 MCG/ACT nasal spray USE 1 SPRAY IN EACH NOSTRIL EVERY DAY 16 g 2   • Home Care Oxygen Inhale 2-3 L/min continuous. Oxygen dose range: 2 L/min  Respiratory route via: Nasal Cannula   Oxygen supplier: Preferred  3L/min at night/while sleeping         • Acetaminophen 500 MG Cap Take 1 Cap by mouth 3 times a day.     • melatonin 5 mg Tab Take 1 Tab by mouth every bedtime.     • PREPARATION H 0.25-3-12-18 % Cream Insert 1 Application into the rectum as needed (pain).     • QVAR REDIHALER 80 MCG/ACT inhaler INHALE 1 PUFF BY MOUTH TWICE A DAY  (Patient taking differently: Inhale 1 Puff 2 times a day.) 10.6 g 0   • Multiple Vitamins-Minerals (CENTRUM SILVER PO) Take 1 Tab by mouth every morning.     • loratadine (CLARITIN) 10 MG Tab Take 10 mg by mouth every morning.     • nitroglycerin (NITROSTAT) 0.4 MG SL Tab Place 1 tab under tongue every 5 min as needed for chest pain max 3 doses (Patient taking differently: Place 0.4 mg under the tongue as needed. Place 1 tab under tongue every 5 min as needed for chest pain max 3 doses) 100 Tab 0   • Biotin w/ Vitamins C & E (HAIR/SKIN/NAILS) 1250-7.5-7.5 MCG-MG-UNT Chew Tab Chew 1 Tab every morning.     • guaifenesin LA (MUCINEX) 600 MG TABLET SR 12 HR Take 600 mg by mouth every morning.     • Ascorbic Acid (VITAMIN C) 1000 MG Tab  "Take 1,000 mg by mouth every morning.     • VITAMIN E PO Take 1 Cap by mouth every morning.     • aspirin (ASA) 81 MG Chew Tab chewable tablet Chew 81 mg every morning with breakfast. 100 Tab 11   • Cholecalciferol (VITAMIN D) 50 MCG (2000 UT) Cap Take 2,000 Units by mouth every morning.     • omeprazole (PRILOSEC) 20 MG delayed-release capsule Take 20 mg by mouth every morning with breakfast.     • [DISCONTINUED] amLODIPine (NORVASC) 5 MG Tab TAKE ONE TABLET BY MOUTH ONE TIME DAILY  90 Tab 1   • benzonatate (TESSALON) 100 MG Cap Take 1 Cap by mouth 3 times a day as needed for Cough. (Patient not taking: Reported on 2/16/2021) 60 Cap 0   • [DISCONTINUED] atorvastatin (LIPITOR) 80 MG tablet Take 80 mg by mouth every evening.     • [DISCONTINUED] isosorbide mononitrate SR (IMDUR) 30 MG TABLET SR 24 HR Take 30 mg by mouth every evening.     • [DISCONTINUED] lisinopril (PRINIVIL) 20 MG Tab Take 20 mg by mouth every morning with breakfast.       No facility-administered encounter medications on file as of 3/17/2021.     Review of Systems   Constitutional: Negative.  Negative for chills, fever and malaise/fatigue.   HENT: Negative.  Negative for sore throat.    Eyes: Negative.    Respiratory: Negative.  Negative for cough, hemoptysis, sputum production, shortness of breath, wheezing and stridor.    Cardiovascular: Negative.  Negative for chest pain, palpitations, orthopnea, claudication, leg swelling and PND.   Gastrointestinal: Negative.    Genitourinary: Negative.    Musculoskeletal: Positive for back pain and joint pain.   Skin: Negative.    Neurological: Negative.  Negative for dizziness, loss of consciousness and weakness.   Endo/Heme/Allergies: Negative.  Does not bruise/bleed easily.   All other systems reviewed and are negative.       Objective:   /80 (BP Location: Left arm, Patient Position: Sitting, BP Cuff Size: Adult)   Pulse 67   Resp 16   Ht 1.6 m (5' 3\")   Wt 51.8 kg (114 lb 3.2 oz)   LMP  (LMP " Unknown)   SpO2 93%   BMI 20.23 kg/m²     Physical Exam   Constitutional: She appears well-developed and well-nourished. No distress.   HENT:   Head: Normocephalic and atraumatic.   Right Ear: External ear normal.   Left Ear: External ear normal.   Nose: Nose normal.   Mouth/Throat: No oropharyngeal exudate.   Eyes: Pupils are equal, round, and reactive to light. Conjunctivae and EOM are normal. Right eye exhibits no discharge. Left eye exhibits no discharge. No scleral icterus.   Neck: No JVD present.   Cardiovascular: Normal rate, regular rhythm and intact distal pulses. Exam reveals no gallop and no friction rub.   No murmur heard.  Pulmonary/Chest: Effort normal. No stridor. No respiratory distress. She has no wheezes. She has no rales. She exhibits no tenderness.   Abdominal: Soft. She exhibits no distension. There is no guarding.   Musculoskeletal:         General: No tenderness, deformity or edema. Normal range of motion.      Cervical back: Neck supple.   Neurological: She is alert. She has normal reflexes. She displays normal reflexes. No cranial nerve deficit. She exhibits normal muscle tone. Coordination normal.   Skin: Skin is warm and dry. No rash noted. She is not diaphoretic. No erythema. No pallor.   Psychiatric: She has a normal mood and affect. Her behavior is normal. Judgment and thought content normal.   Nursing note and vitals reviewed.      Assessment:     1. Coronary artery disease involving native coronary artery of native heart with angina pectoris ([facility])     2. Claudication of right lower extremity ([facility])     3. Chronic use of benzodiazepine for therapeutic purpose     4. S/P bilateral cataract extraction  atorvastatin (LIPITOR) 80 MG tablet   5. PVD (peripheral vascular disease) ([facility])  lisinopril (PRINIVIL) 20 MG Tab    isosorbide mononitrate SR (IMDUR) 30 MG TABLET SR 24 HR    atorvastatin (LIPITOR) 80 MG tablet    amLODIPine (NORVASC) 5 MG Tab   6. Post concussion syndrome  isosorbide  mononitrate SR (IMDUR) 30 MG TABLET SR 24 HR    atorvastatin (LIPITOR) 80 MG tablet    amLODIPine (NORVASC) 5 MG Tab   7. Popliteal artery stenosis, right (HCC)  lisinopril (PRINIVIL) 20 MG Tab    atorvastatin (LIPITOR) 80 MG tablet    amLODIPine (NORVASC) 5 MG Tab   8. Pleural effusion  lisinopril (PRINIVIL) 20 MG Tab    isosorbide mononitrate SR (IMDUR) 30 MG TABLET SR 24 HR   9. Other polyneuropathy  lisinopril (PRINIVIL) 20 MG Tab    isosorbide mononitrate SR (IMDUR) 30 MG TABLET SR 24 HR    amLODIPine (NORVASC) 5 MG Tab   10. Mild peripheral edema     11. Hyponatremia     12. Essential hypertension     13. Dyslipidemia     14. Diarrhea, unspecified type     15. Stable angina pectoris (HCC)     16. Coronary artery disease involving native heart without angina pectoris, unspecified vessel or lesion type     17. Bradycardia         Medical Decision Making:  Today's Assessment / Status / Plan:     89-year-old female peripheral vascular disease coronary disease hypertension hyperlipidemia.  Her lipids are goal.  Her blood pressures goal.  I refilled her medications.  I think this point she is doing well.  We can see her back in 6 months.

## 2021-04-02 ENCOUNTER — OFFICE VISIT (OUTPATIENT)
Dept: MEDICAL GROUP | Facility: PHYSICIAN GROUP | Age: 86
End: 2021-04-02
Payer: MEDICARE

## 2021-04-02 VITALS
HEART RATE: 67 BPM | DIASTOLIC BLOOD PRESSURE: 60 MMHG | OXYGEN SATURATION: 95 % | BODY MASS INDEX: 20.91 KG/M2 | TEMPERATURE: 98.8 F | WEIGHT: 118 LBS | RESPIRATION RATE: 16 BRPM | SYSTOLIC BLOOD PRESSURE: 130 MMHG | HEIGHT: 63 IN

## 2021-04-02 DIAGNOSIS — F41.9 ANXIETY: ICD-10-CM

## 2021-04-02 DIAGNOSIS — Z79.899 CHRONIC USE OF BENZODIAZEPINE FOR THERAPEUTIC PURPOSE: ICD-10-CM

## 2021-04-02 PROCEDURE — 99214 OFFICE O/P EST MOD 30 MIN: CPT | Performed by: PHYSICIAN ASSISTANT

## 2021-04-02 RX ORDER — LORAZEPAM 0.5 MG/1
TABLET ORAL
Qty: 60 TABLET | Refills: 0 | Status: SHIPPED | OUTPATIENT
Start: 2021-04-02 | End: 2021-06-08 | Stop reason: SDUPTHER

## 2021-04-02 ASSESSMENT — FIBROSIS 4 INDEX: FIB4 SCORE: 2.65

## 2021-04-02 NOTE — PROGRESS NOTES
Chief Complaint   Patient presents with   • Medication Refill     lorazepam, carvedilol, gabapentin   • GI Problem     pt states issue for weeks, became better Monday       HISTORY OF PRESENT ILLNESS: Dayana Lechuga is an established 89 y.o. female here to discuss the evaluation and management of:    Patient is a pleasant 89-year-old female here today for medication management.  Patient is working closely with me to wean down off of lorazepam.  During her last appointment patient was advised to only take 1 mg of lorazepam at night.  States she has been taking lorazepam 1 mg at night and since doing so she is not experienced sleep deprivation symptoms.  On average he is sleeping 7-8 hours per night.  Denies difficulty falling or staying asleep.  During her last appointment patient was advised to discontinue taking 0.5 mg of lorazepam during the day.  She tells me she did stop doing this and did not notice any changes in overall mood.  She mentions she did experience GI symptoms around the same time she stopped taking 0.5 mg of lorazepam around 2 PM.  States GI symptoms have since resolved.  Patient denies misuse or abuse of medication.  Denies alcohol abuse or illicit drug use.  UDS is up-to-date.    Patient Active Problem List    Diagnosis Date Noted   • COVID-19, pneumonia, 11/21 11/20/2020   • CAD (coronary artery disease) 09/11/2020   • Chest pain due to myocardial ischemia 09/10/2016   • Hyponatremia 11/20/2020   • Popliteal artery stenosis, right ([facility]) 11/28/2018   • Claudication of right lower extremity ([facility]) 11/07/2018   • Coronary artery disease involving native coronary artery of native heart with angina pectoris ([facility]) 08/10/2016   • Dyslipidemia 10/31/2009   • Essential hypertension 10/02/2009   • Acute bilateral low back pain with bilateral sciatica 08/29/2019   • Dysuria 06/27/2019   • Diarrhea 06/27/2019   • Slow transit constipation 08/29/2018   • S/P bilateral cataract extraction 02/27/2018    • Benzodiazepine dependence ([facility]) 11/13/2017   • Anxiety 06/22/2016   • GERD (gastroesophageal reflux disease) 10/31/2009   • Osteopenia 10/31/2009   • Mild intermittent asthma with acute exacerbation 10/02/2009   • Chronic use of benzodiazepine for therapeutic purpose 02/16/2021   • Debility 11/20/2020   • Peripheral neuropathy 11/20/2020   • Bradycardia 09/11/2020   • Post concussion syndrome 09/11/2020   • Mild peripheral edema 07/31/2020   • PVD (peripheral vascular disease) ([facility]) 03/04/2020   • Pleural effusion 12/20/2019   • Incomplete emptying of bladder 10/08/2019   • Urinary retention 09/11/2019       Allergies:Bactrim [sulfamethoxazole w-trimethoprim], Pcn [penicillins], and Codeine    Current Outpatient Medications   Medication Sig Dispense Refill   • LORazepam (ATIVAN) 0.5 MG Tab TAKE 2 TABLETS BY MOUTH EVERY NIGHT 60 tablet 0   • lisinopril (PRINIVIL) 20 MG Tab Take 1 tablet by mouth every morning with breakfast. 90 tablet 3   • isosorbide mononitrate SR (IMDUR) 30 MG TABLET SR 24 HR Take 1 tablet by mouth every evening. 90 tablet 3   • atorvastatin (LIPITOR) 80 MG tablet Take 1 tablet by mouth every evening. 90 tablet 3   • amLODIPine (NORVASC) 5 MG Tab Take 1 tablet by mouth every day. 90 tablet 3   • carvedilol (COREG) 6.25 MG Tab Take 1 tablet by mouth 2 times a day, with meals for 360 days. 180 tablet 3   • montelukast (SINGULAIR) 10 MG Tab Take 1 tablet by mouth every evening. 90 tablet 2   • albuterol 108 (90 Base) MCG/ACT Aero Soln inhalation aerosol INHALE TWO PUFFS BY MOUTH EVERY SIX HOURS AS NEEDED FOR SHORTNESS OF BREATH 25.5 g 1   • triamterene/hctz (DYAZIDE) 37.5-25 MG Cap DYAZIDE 37.5-25 MG ORAL CAPSULE     • gabapentin (NEURONTIN) 100 MG Cap TAKE 2 CAPSULES BY MOUTH THREE TIMES DAILY 540 capsule 1   • fluticasone (FLONASE) 50 MCG/ACT nasal spray USE 1 SPRAY IN EACH NOSTRIL EVERY DAY 16 g 2   • Home Care Oxygen Inhale 2-3 L/min continuous. Oxygen dose range: 2 L/min  Respiratory route  via: Nasal Cannula   Oxygen supplier: Preferred  3L/min at night/while sleeping         • Acetaminophen 500 MG Cap Take 1 Cap by mouth 3 times a day.     • melatonin 5 mg Tab Take 1 Tab by mouth every bedtime.     • PREPARATION H 0.25-3-12-18 % Cream Insert 1 Application into the rectum as needed (pain).     • Multiple Vitamins-Minerals (CENTRUM SILVER PO) Take 1 Tab by mouth every morning.     • loratadine (CLARITIN) 10 MG Tab Take 10 mg by mouth every morning.     • Biotin w/ Vitamins C & E (HAIR/SKIN/NAILS) 1250-7.5-7.5 MCG-MG-UNT Chew Tab Chew 1 Tab every morning.     • guaifenesin LA (MUCINEX) 600 MG TABLET SR 12 HR Take 600 mg by mouth every morning.     • Ascorbic Acid (VITAMIN C) 1000 MG Tab Take 1,000 mg by mouth every morning.     • VITAMIN E PO Take 1 Cap by mouth every morning.     • aspirin (ASA) 81 MG Chew Tab chewable tablet Chew 81 mg every morning with breakfast. 100 Tab 11   • Cholecalciferol (VITAMIN D) 50 MCG (2000 UT) Cap Take 2,000 Units by mouth every morning.     • omeprazole (PRILOSEC) 20 MG delayed-release capsule Take 20 mg by mouth every morning with breakfast.       No current facility-administered medications for this visit.       Social History     Tobacco Use   • Smoking status: Never Smoker   • Smokeless tobacco: Never Used   Substance Use Topics   • Alcohol use: No     Alcohol/week: 0.0 oz   • Drug use: No       Family Status   Relation Name Status   • Mo     • Fa     • Bro     • Bro     • Neg Hx  (Not Specified)     Family History   Problem Relation Age of Onset   • Heart Attack Father    • Cancer Brother    • Cancer Brother    • Heart Disease Neg Hx    • Heart Failure Neg Hx    • Hyperlipidemia Neg Hx        ROS:  Review of Systems   Constitutional: Negative for fever, chills, weight loss and malaise/fatigue.   HENT: Negative for ear pain, nosebleeds, congestion, sore throat and neck pain.    Eyes: Negative for blurred vision.   Respiratory:  "Negative for cough, sputum production, shortness of breath and wheezing.    Cardiovascular: Negative for chest pain, palpitations, orthopnea and leg swelling.   Gastrointestinal: Negative for heartburn, nausea, vomiting and abdominal pain.   Genitourinary: Negative for dysuria, urgency and frequency.   Musculoskeletal: Negative for myalgias, back pain and joint pain.   Skin: Negative for rash and itching.   Neurological: Negative for dizziness, tingling, tremors, sensory change, focal weakness and headaches.   Endo/Heme/Allergies: Does not bruise/bleed easily.   Psychiatric/Behavioral: Negative for depression, suicidal ideas and memory loss.  The patient is not nervous/anxious and does not have insomnia.    All other systems reviewed and are negative except as in HPI.    Exam: /60 (BP Location: Left arm, Patient Position: Sitting, BP Cuff Size: Adult)   Pulse 67   Temp 37.1 °C (98.8 °F) (Temporal)   Resp 16   Ht 1.6 m (5' 3\")   Wt 53.5 kg (118 lb)   SpO2 95%  Body mass index is 20.9 kg/m².  General: Normal appearing. No distress.  HEENT: Normocephalic. Eyes conjunctiva clear lids without ptosis, ears normal shape and contour.  Neck: Supple without JVD. Thyroid is not enlarged.  Pulmonary: Clear to ausculation.  Normal effort. No rales, ronchi, or wheezing.  Cardiovascular: Regular rate and rhythm without murmur.   Abdomen: Nondistended.   Neurologic: Grossly nonfocal.  Cranial nerves are normal.   Skin: Warm and dry.  No rashes or suspicious skin lesions.  Musculoskeletal: Normal gait. No extremity cyanosis, clubbing, or edema.  Psych: Normal mood and affect. Alert and oriented x3. Judgment and insight is normal.    Medical decision-making and discussion:  1. Anxiety  2. Chronic use of benzodiazepine for therapeutic purpose  Patient is working closely with me to wean down and potentially off the lorazepam.  Patient has been advised to decrease from 1 mg of lorazepam at night to 0.5 mg at night.  Advised " "patient if she experiences sleep deprivation symptoms to increase melatonin.  If she is experiencing sleep deprivation symptoms after 7 days of taking 0.5 mg tab once daily she can take 1 mg tab once nightly if needed.  Discussed the importance of weaning down off of medication with patient.  Refill has been sent to patient's pharmacy.  Patient will follow up in 1 month.    Discussed with patient GI symptoms may have been related to discontinuing the 0.5 mg tab of lorazepam at 2 PM.  Patient's GI symptoms have since resolved.  States she is asymptomatic.  Continue to monitor.    Patient understands this prescription is a controlled substance which is potentially habit-forming and its use is regulated by the [employer]. We also discussed the new \"black box\" warning regarding the lethal combination of opioids and benzodiazepines. Most recent UDS is appropriate. Any refill requires an office visit. Narcotics have may adverse effects and the risks of addiction, accidental overdose and death were emphasized. Provided prescriptions for the next 1 month.    Controlled substance on file.    Follow-up for worsening symptoms,lack of expected recovery, or should new symptoms or problems arise.      - LORazepam (ATIVAN) 0.5 MG Tab; TAKE 2 TABLETS BY MOUTH EVERY NIGHT  Dispense: 60 tablet; Refill: 0      Please note that this dictation was created using voice recognition software. I have made every reasonable attempt to correct obvious errors, but I expect that there are errors of grammar and possibly content that I did not discover before finalizing the note.    Assessment/Plan:  1. Anxiety  LORazepam (ATIVAN) 0.5 MG Tab   2. Chronic use of benzodiazepine for therapeutic purpose  LORazepam (ATIVAN) 0.5 MG Tab       Return in about 1 month (around 5/2/2021), or if symptoms worsen or fail to improve.  "

## 2021-04-29 ENCOUNTER — TELEPHONE (OUTPATIENT)
Dept: MEDICAL GROUP | Facility: PHYSICIAN GROUP | Age: 86
End: 2021-04-29

## 2021-04-29 NOTE — TELEPHONE ENCOUNTER
ESTABLISHED PATIENT PRE-VISIT PLANNING     Patient was NOT contacted to complete PVP.     Note: Patient will not be contacted if there is no indication to call.     1.  Reviewed notes from the last few office visits within the medical group: Yes    2.  If any orders were placed at last visit or intended to be done for this visit (i.e. 6 mos follow-up), do we have Results/Consult Notes?         •  Labs - Labs were not ordered at last office visit.  Note: If patient appointment is for lab review and patient did not complete labs, check with provider if OK to reschedule patient until labs completed.       •  Imaging - Imaging was not ordered at last office visit.       •  Referrals - NO referrals ordered at last office visit    3. Is this appointment scheduled as a Hospital Follow-Up? No    4.  Immunizations were updated in Epic using Reconcile Outside Information activity? Yes    5.  Patient is due for the following Health Maintenance Topics:   Health Maintenance Due   Topic Date Due   • IMM ZOSTER VACCINES (1 of 2) Never done   • Annual Wellness Visit  06/21/2017   • COVID-19 Vaccine (2 - Pfizer 2-dose series) 02/07/2021       6.  AHA (Pulse8) form printed for Provider? No, patient does not have any open alerts

## 2021-05-05 ENCOUNTER — OFFICE VISIT (OUTPATIENT)
Dept: MEDICAL GROUP | Facility: PHYSICIAN GROUP | Age: 86
End: 2021-05-05
Payer: MEDICARE

## 2021-05-05 VITALS
RESPIRATION RATE: 16 BRPM | HEART RATE: 64 BPM | HEIGHT: 63 IN | SYSTOLIC BLOOD PRESSURE: 124 MMHG | OXYGEN SATURATION: 93 % | DIASTOLIC BLOOD PRESSURE: 50 MMHG | TEMPERATURE: 98.1 F | BODY MASS INDEX: 21.16 KG/M2 | WEIGHT: 119.4 LBS

## 2021-05-05 DIAGNOSIS — Z79.899 CHRONIC USE OF BENZODIAZEPINE FOR THERAPEUTIC PURPOSE: ICD-10-CM

## 2021-05-05 DIAGNOSIS — F13.20 BENZODIAZEPINE DEPENDENCE (HCC): ICD-10-CM

## 2021-05-05 DIAGNOSIS — F41.9 ANXIETY: ICD-10-CM

## 2021-05-05 DIAGNOSIS — R07.9 CHEST PAIN, UNSPECIFIED TYPE: ICD-10-CM

## 2021-05-05 DIAGNOSIS — I25.119 CORONARY ARTERY DISEASE INVOLVING NATIVE CORONARY ARTERY OF NATIVE HEART WITH ANGINA PECTORIS (HCC): ICD-10-CM

## 2021-05-05 PROCEDURE — 99214 OFFICE O/P EST MOD 30 MIN: CPT | Performed by: PHYSICIAN ASSISTANT

## 2021-05-05 ASSESSMENT — FIBROSIS 4 INDEX: FIB4 SCORE: 2.65

## 2021-05-05 NOTE — PROGRESS NOTES
Chief Complaint   Patient presents with   • Medication Management     follow up        HISTORY OF PRESENT ILLNESS: Dayana Lechuga is an established 89 y.o. female here to discuss the evaluation and management of:    Anxiety  Chronic use of benzodiazepine for therapeutic purpose  Benzodiazepine dependence (HCC)  Chronic stable problem. Patient is working with me closely to wean down and possibly off of lorazepam. During her last appointment on 4/2/2021 patient was advised to decrease from 1 mg of lorazepam at night to 0.5 mg at night. She tells me she has been taking 0.5 mg at night as well as melatonin 10 mg at once nightly and is doing well on this regimen. On average sleeps 6-7 hours of sleep per night. Denies difficulty falling asleep or staying asleep. States she does toss and turn during the night but this is not abnormal for her. Since weaning from 1.5 mg of lorazepam to 0.5 mg of lorazepam patient has been experiencing looser stools.  States prior to waiting down medication she would have a soft bowel movement every day but now experiences a looser bowel movement every other day or every few days. She does not feel constipated. She tells me when she does have a bowel movement it is looser and if she continues to sit there long enough she will continue having a bowel movement. States she has been treating it with occasional Pepto-Bismol and 1 tablet of over-the-counter Imodium. Denies side effects or complications or medications.    Coronary artery disease involving native coronary artery of native heart with angina pectoris (HCC)  Chest pain, unspecified type  Patient states 5 days ago she experienced a squeezing sensation over her heart that lasted a few minutes in duration. Pain did not radiate. Denies other associated symptoms. Denies experiencing nausea, vomiting, heart palpitations, dizziness, syncope, severe headache, vision changes, neurological deficits. Patient does have a positive medical  history for coronary artery disease involving native coronary artery of native heart with angina pectoris. She has been prescribed nitroglycerin by her cardiologist. States when she had chest pain symptoms she did not take nitroglycerin. She tells me this chest pain since then felt different than past chest pain symptoms. She denies feeling anxious.  Did not take nitroglycerin at time of chest pain symptoms.  Denies monitoring her blood pressure when she experienced chest pain symptoms.  Patient is currently not having chest pain symptoms.    Patient Active Problem List    Diagnosis Date Noted   • COVID-19, pneumonia, 11/21 11/20/2020   • CAD (coronary artery disease) 09/11/2020   • Chest pain due to myocardial ischemia 09/10/2016   • Hyponatremia 11/20/2020   • Popliteal artery stenosis, right ([facility]) 11/28/2018   • Claudication of right lower extremity ([facility]) 11/07/2018   • Coronary artery disease involving native coronary artery of native heart with angina pectoris ([facility]) 08/10/2016   • Dyslipidemia 10/31/2009   • Essential hypertension 10/02/2009   • Acute bilateral low back pain with bilateral sciatica 08/29/2019   • Dysuria 06/27/2019   • Diarrhea 06/27/2019   • Slow transit constipation 08/29/2018   • S/P bilateral cataract extraction 02/27/2018   • Benzodiazepine dependence ([facility]) 11/13/2017   • Anxiety 06/22/2016   • GERD (gastroesophageal reflux disease) 10/31/2009   • Osteopenia 10/31/2009   • Mild intermittent asthma with acute exacerbation 10/02/2009   • Chronic use of benzodiazepine for therapeutic purpose 02/16/2021   • Debility 11/20/2020   • Peripheral neuropathy 11/20/2020   • Bradycardia 09/11/2020   • Post concussion syndrome 09/11/2020   • Mild peripheral edema 07/31/2020   • PVD (peripheral vascular disease) ([facility]) 03/04/2020   • Pleural effusion 12/20/2019   • Incomplete emptying of bladder 10/08/2019   • Urinary retention 09/11/2019       Allergies:Bactrim [sulfamethoxazole w-trimethoprim], Pcn  [penicillins], and Codeine    Current Outpatient Medications   Medication Sig Dispense Refill   • lisinopril (PRINIVIL) 20 MG Tab Take 1 tablet by mouth every morning with breakfast. 90 tablet 3   • isosorbide mononitrate SR (IMDUR) 30 MG TABLET SR 24 HR Take 1 tablet by mouth every evening. 90 tablet 3   • atorvastatin (LIPITOR) 80 MG tablet Take 1 tablet by mouth every evening. 90 tablet 3   • amLODIPine (NORVASC) 5 MG Tab Take 1 tablet by mouth every day. 90 tablet 3   • carvedilol (COREG) 6.25 MG Tab Take 1 tablet by mouth 2 times a day, with meals for 360 days. 180 tablet 3   • montelukast (SINGULAIR) 10 MG Tab Take 1 tablet by mouth every evening. 90 tablet 2   • albuterol 108 (90 Base) MCG/ACT Aero Soln inhalation aerosol INHALE TWO PUFFS BY MOUTH EVERY SIX HOURS AS NEEDED FOR SHORTNESS OF BREATH 25.5 g 1   • triamterene/hctz (DYAZIDE) 37.5-25 MG Cap DYAZIDE 37.5-25 MG ORAL CAPSULE     • gabapentin (NEURONTIN) 100 MG Cap TAKE 2 CAPSULES BY MOUTH THREE TIMES DAILY 540 capsule 1   • fluticasone (FLONASE) 50 MCG/ACT nasal spray USE 1 SPRAY IN EACH NOSTRIL EVERY DAY 16 g 2   • Home Care Oxygen Inhale 2-3 L/min continuous. Oxygen dose range: 2 L/min  Respiratory route via: Nasal Cannula   Oxygen supplier: Preferred  3L/min at night/while sleeping         • Acetaminophen 500 MG Cap Take 1 Cap by mouth 3 times a day.     • melatonin 5 mg Tab Take 1 Tab by mouth every bedtime.     • PREPARATION H 0.25-3-12-18 % Cream Insert 1 Application into the rectum as needed (pain).     • Multiple Vitamins-Minerals (CENTRUM SILVER PO) Take 1 Tab by mouth every morning.     • loratadine (CLARITIN) 10 MG Tab Take 10 mg by mouth every morning.     • Biotin w/ Vitamins C & E (HAIR/SKIN/NAILS) 1250-7.5-7.5 MCG-MG-UNT Chew Tab Chew 1 Tab every morning.     • guaifenesin LA (MUCINEX) 600 MG TABLET SR 12 HR Take 600 mg by mouth every morning.     • Ascorbic Acid (VITAMIN C) 1000 MG Tab Take 1,000 mg by mouth every morning.     • VITAMIN  E PO Take 1 Cap by mouth every morning.     • aspirin (ASA) 81 MG Chew Tab chewable tablet Chew 81 mg every morning with breakfast. 100 Tab 11   • Cholecalciferol (VITAMIN D) 50 MCG (2000 UT) Cap Take 2,000 Units by mouth every morning.     • omeprazole (PRILOSEC) 20 MG delayed-release capsule Take 20 mg by mouth every morning with breakfast.       No current facility-administered medications for this visit.       Social History     Tobacco Use   • Smoking status: Never Smoker   • Smokeless tobacco: Never Used   Substance Use Topics   • Alcohol use: No     Alcohol/week: 0.0 oz   • Drug use: No       Family Status   Relation Name Status   • Mo     • Fa     • Bro     • Bro     • Neg Hx  (Not Specified)     Family History   Problem Relation Age of Onset   • Heart Attack Father    • Cancer Brother    • Cancer Brother    • Heart Disease Neg Hx    • Heart Failure Neg Hx    • Hyperlipidemia Neg Hx        ROS:  Review of Systems   Constitutional: Negative for fever, chills, weight loss and malaise/fatigue.   HENT: Negative for ear pain, nosebleeds, congestion, sore throat and neck pain.    Eyes: Negative for blurred vision.   Respiratory: Negative for cough, sputum production, shortness of breath and wheezing.    Cardiovascular: Negative for chest pain, palpitations, orthopnea and leg swelling.   Gastrointestinal: Negative for heartburn, nausea, vomiting and abdominal pain.   Genitourinary: Negative for dysuria, urgency and frequency.   Musculoskeletal: Negative for myalgias, back pain and joint pain.   Skin: Negative for rash and itching.   Neurological: Negative for dizziness, tingling, tremors, sensory change, focal weakness and headaches.   Endo/Heme/Allergies: Does not bruise/bleed easily.   Psychiatric/Behavioral: Negative for depression, suicidal ideas and memory loss.    All other systems reviewed and are negative except as in HPI.    Exam: /50 (BP Location: Left arm, Patient  "Position: Sitting, BP Cuff Size: Adult)   Pulse 64   Temp 36.7 °C (98.1 °F) (Temporal)   Resp 16   Ht 1.6 m (5' 3\")   Wt 54.2 kg (119 lb 6.4 oz)   SpO2 93%  Body mass index is 21.15 kg/m².  General: Normal appearing. No distress.  HEENT: Normocephalic. Eyes conjunctiva clear lids without ptosis, ears normal shape and contour.  Neck: Supple without JVD. Thyroid is not enlarged.  Pulmonary: Clear to ausculation.  Normal effort. No rales, ronchi, or wheezing.  Cardiovascular: Regular rate and rhythm without murmur.   Abdomen: nondistended.   Neurologic: Grossly nonfocal.  Cranial nerves are normal.   Skin: Warm and dry.  No rashes or suspicious skin lesions.  Musculoskeletal: Normal gait. No extremity cyanosis, clubbing, or edema.  Psych: Normal mood and affect. Alert and oriented x3. Judgment and insight is normal.    Medical decision-making and discussion:  1. Anxiety  2. Chronic use of benzodiazepine for therapeutic purpose  3. Benzodiazepine dependence (HCC)    Patient is working closely with me to wean down and potentially off the lorazepam.  Patient has been advised to continue taking lorazepam 0.5 mg tab once nightly.    Patient follow-up in 1 month for evaluation. I will have my medical assistant call the pharmacy to see if they can cut tablets in half so patient can start weaning down to 0.25 mg once nightly. If so, we will discuss this more detail with patient during follow-up appointment. Continue taking melatonin. Suggested for patient to decrease melatonin but patient does not want to decrease melatonin at this time.    Discussed the importance of weaning down off of medication with patient.       Discussed with patient GI symptoms may have been related to weaning off of medication.  Continue to monitor.     Patient understands this prescription is a controlled substance which is potentially habit-forming and its use is regulated by the [employer]. We also discussed the new \"black box\" warning regarding the " lethal combination of opioids and benzodiazepines. Most recent UDS is appropriate. Any refill requires an office visit. Narcotics have may adverse effects and the risks of addiction, accidental overdose and death were emphasized. Provided prescriptions for the next 1 month    4. Coronary artery disease involving native coronary artery of native heart with angina pectoris (HCC)  5. Chest pain, unspecified type  Nuclear medicine cardiac stress test has been ordered. Patient be contacted with results. Discussed importance of taking nitroglycerin when chest pain symptoms develop. Advised patient to make a sooner follow-up appointment with her cardiologist. Discussed ED precautions in great detail with patient.  Continue cardiac medications as prescribed. Continue working on diet and living an active lifestyle.    - NM-CARDIAC STRESS TEST; Standing  - NM-CARDIAC STRESS TEST      Please note that this dictation was created using voice recognition software. I have made every reasonable attempt to correct obvious errors, but I expect that there are errors of grammar and possibly content that I did not discover before finalizing the note.    Assessment/Plan:  1. Anxiety     2. Chronic use of benzodiazepine for therapeutic purpose     3. Benzodiazepine dependence (HCC)     4. Coronary artery disease involving native coronary artery of native heart with angina pectoris (HCC)  NM-CARDIAC STRESS TEST    NM-CARDIAC STRESS TEST   5. Chest pain, unspecified type  NM-CARDIAC STRESS TEST    NM-CARDIAC STRESS TEST       Return in about 1 month (around 6/5/2021) for Medication Management.

## 2021-05-25 DIAGNOSIS — G62.89 OTHER POLYNEUROPATHY: ICD-10-CM

## 2021-05-25 DIAGNOSIS — I70.201 POPLITEAL ARTERY STENOSIS, RIGHT (HCC): ICD-10-CM

## 2021-05-25 DIAGNOSIS — F07.81 POST CONCUSSION SYNDROME: ICD-10-CM

## 2021-05-25 DIAGNOSIS — Z98.41 S/P BILATERAL CATARACT EXTRACTION: ICD-10-CM

## 2021-05-25 DIAGNOSIS — J90 PLEURAL EFFUSION: ICD-10-CM

## 2021-05-25 DIAGNOSIS — Z98.42 S/P BILATERAL CATARACT EXTRACTION: ICD-10-CM

## 2021-05-25 DIAGNOSIS — I73.9 PVD (PERIPHERAL VASCULAR DISEASE) (HCC): ICD-10-CM

## 2021-05-26 ENCOUNTER — TELEPHONE (OUTPATIENT)
Dept: CARDIOLOGY | Facility: MEDICAL CENTER | Age: 86
End: 2021-05-26

## 2021-05-26 DIAGNOSIS — F07.81 POST CONCUSSION SYNDROME: ICD-10-CM

## 2021-05-26 DIAGNOSIS — Z98.41 S/P BILATERAL CATARACT EXTRACTION: ICD-10-CM

## 2021-05-26 DIAGNOSIS — I70.201 POPLITEAL ARTERY STENOSIS, RIGHT (HCC): ICD-10-CM

## 2021-05-26 DIAGNOSIS — G62.89 OTHER POLYNEUROPATHY: ICD-10-CM

## 2021-05-26 DIAGNOSIS — J90 PLEURAL EFFUSION: ICD-10-CM

## 2021-05-26 DIAGNOSIS — Z98.42 S/P BILATERAL CATARACT EXTRACTION: ICD-10-CM

## 2021-05-26 DIAGNOSIS — I73.9 PVD (PERIPHERAL VASCULAR DISEASE) (HCC): ICD-10-CM

## 2021-05-26 RX ORDER — AMLODIPINE BESYLATE 5 MG/1
5 TABLET ORAL DAILY
Qty: 100 TABLET | Refills: 3 | Status: SHIPPED | OUTPATIENT
Start: 2021-05-26 | End: 2022-07-29

## 2021-05-26 RX ORDER — ISOSORBIDE MONONITRATE 30 MG/1
30 TABLET, EXTENDED RELEASE ORAL EVERY EVENING
Qty: 100 TABLET | Refills: 3 | Status: SHIPPED | OUTPATIENT
Start: 2021-05-26 | End: 2022-05-09

## 2021-05-26 RX ORDER — ATORVASTATIN CALCIUM 80 MG/1
80 TABLET, FILM COATED ORAL EVERY EVENING
Qty: 100 TABLET | Refills: 3 | OUTPATIENT
Start: 2021-05-26

## 2021-05-26 RX ORDER — ATORVASTATIN CALCIUM 80 MG/1
80 TABLET, FILM COATED ORAL EVERY EVENING
Qty: 100 TABLET | Refills: 3 | Status: SHIPPED | OUTPATIENT
Start: 2021-05-26 | End: 2022-05-04

## 2021-05-26 RX ORDER — LISINOPRIL 20 MG/1
20 TABLET ORAL
Qty: 100 TABLET | Refills: 3 | OUTPATIENT
Start: 2021-05-26

## 2021-05-26 RX ORDER — LISINOPRIL 20 MG/1
20 TABLET ORAL
Qty: 100 TABLET | Refills: 3 | Status: SHIPPED | OUTPATIENT
Start: 2021-05-26 | End: 2022-05-04

## 2021-05-26 NOTE — TELEPHONE ENCOUNTER
SHEA Mckeon called from the [facility] mail order to advise the RX for atorvastatin (LIPITOR) 80 MG tablet, isosorbide mononitrate SR (IMDUR) 30 MG TABLET SR 24 HR and lisinopril (PRINIVIL) 20 MG Tab need a new RX as the insurance will not cover 90 day they will cover 100 days w/ refills. Please call them back at [phone].    Thank you,  Sabina BOGGS

## 2021-06-08 ENCOUNTER — OFFICE VISIT (OUTPATIENT)
Dept: MEDICAL GROUP | Facility: PHYSICIAN GROUP | Age: 86
End: 2021-06-08
Payer: MEDICARE

## 2021-06-08 VITALS
OXYGEN SATURATION: 93 % | RESPIRATION RATE: 16 BRPM | TEMPERATURE: 98.7 F | DIASTOLIC BLOOD PRESSURE: 68 MMHG | WEIGHT: 117 LBS | BODY MASS INDEX: 20.73 KG/M2 | SYSTOLIC BLOOD PRESSURE: 144 MMHG | HEIGHT: 63 IN | HEART RATE: 64 BPM

## 2021-06-08 DIAGNOSIS — F13.20 BENZODIAZEPINE DEPENDENCE (HCC): ICD-10-CM

## 2021-06-08 DIAGNOSIS — F41.9 ANXIETY: ICD-10-CM

## 2021-06-08 DIAGNOSIS — Z79.899 CHRONIC USE OF BENZODIAZEPINE FOR THERAPEUTIC PURPOSE: ICD-10-CM

## 2021-06-08 PROCEDURE — 99214 OFFICE O/P EST MOD 30 MIN: CPT | Performed by: PHYSICIAN ASSISTANT

## 2021-06-08 RX ORDER — LORAZEPAM 0.5 MG/1
TABLET ORAL
Qty: 30 TABLET | Refills: 0 | Status: SHIPPED | OUTPATIENT
Start: 2021-07-01 | End: 2021-07-09 | Stop reason: SDUPTHER

## 2021-06-08 RX ORDER — CETIRIZINE HYDROCHLORIDE 10 MG/1
10 TABLET ORAL DAILY
COMMUNITY
End: 2021-11-05

## 2021-06-08 ASSESSMENT — FIBROSIS 4 INDEX: FIB4 SCORE: 2.65

## 2021-06-08 NOTE — PROGRESS NOTES
Chief Complaint   Patient presents with   • Medication Management       HISTORY OF PRESENT ILLNESS: Dayana Lechuga is an established 89 y.o. female here to discuss the evaluation and management of:    Anxiety  Benzodiazepine dependence (HCC)  Chronic use of benzodiazepine for therapeutic purpose  Patient is here today to follow-up on Ativan.  Patient is been working closely with me over the past several months weaning down on Ativan dosage.  Patient is currently taking 0.5 mg tab once nightly.  Denies side effects or complications of medication.  She also takes over-the-counter 10 mg of melatonin once nightly.  States she sleeps well on this regimen.  Patient would like to continue Ativan as a treatment option.  Patient is aware of risk, benefits, and alternative treatment options.  Patient denies alcohol use or illicit drug use.  UDS is up-to-date.  PDMP reviewed and negative for red flags.      Patient Active Problem List    Diagnosis Date Noted   • Chronic use of benzodiazepine for therapeutic purpose 02/16/2021   • COVID-19, pneumonia, 11/21 11/20/2020   • Debility 11/20/2020   • Hyponatremia 11/20/2020   • Peripheral neuropathy 11/20/2020   • Bradycardia 09/11/2020   • CAD (coronary artery disease) 09/11/2020   • Post concussion syndrome 09/11/2020   • Mild peripheral edema 07/31/2020   • PVD (peripheral vascular disease) ([facility]) 03/04/2020   • Pleural effusion 12/20/2019   • Incomplete emptying of bladder 10/08/2019   • Urinary retention 09/11/2019   • Acute bilateral low back pain with bilateral sciatica 08/29/2019   • Dysuria 06/27/2019   • Diarrhea 06/27/2019   • Popliteal artery stenosis, right ([facility]) 11/28/2018   • Claudication of right lower extremity ([facility]) 11/07/2018   • Slow transit constipation 08/29/2018   • S/P bilateral cataract extraction 02/27/2018   • Benzodiazepine dependence ([facility]) 11/13/2017   • Chest pain due to myocardial ischemia 09/10/2016   • Coronary artery disease involving  native coronary artery of native heart with angina pectoris (HCC) 08/10/2016   • Anxiety 06/22/2016   • GERD (gastroesophageal reflux disease) 10/31/2009   • Dyslipidemia 10/31/2009   • Osteopenia 10/31/2009   • Mild intermittent asthma with acute exacerbation 10/02/2009   • Essential hypertension 10/02/2009       Allergies:Bactrim [sulfamethoxazole w-trimethoprim], Pcn [penicillins], and Codeine    Current Outpatient Medications   Medication Sig Dispense Refill   • cetirizine (ZYRTEC ALLERGY) 10 MG Tab Take 10 mg by mouth every day.     • [START ON 7/1/2021] LORazepam (ATIVAN) 0.5 MG Tab TAKE 1 TABLET BY MOUTH EVERY NIGHT 30 tablet 0   • lisinopril (PRINIVIL) 20 MG Tab Take 1 tablet by mouth every morning with breakfast. 100 tablet 3   • isosorbide mononitrate SR (IMDUR) 30 MG TABLET SR 24 HR Take 1 tablet by mouth every evening. 100 tablet 3   • atorvastatin (LIPITOR) 80 MG tablet Take 1 tablet by mouth every evening. 100 tablet 3   • amLODIPine (NORVASC) 5 MG Tab Take 1 tablet by mouth every day. 100 tablet 3   • carvedilol (COREG) 6.25 MG Tab Take 1 tablet by mouth 2 times a day, with meals for 360 days. 180 tablet 3   • montelukast (SINGULAIR) 10 MG Tab Take 1 tablet by mouth every evening. 90 tablet 2   • albuterol 108 (90 Base) MCG/ACT Aero Soln inhalation aerosol INHALE TWO PUFFS BY MOUTH EVERY SIX HOURS AS NEEDED FOR SHORTNESS OF BREATH 25.5 g 1   • gabapentin (NEURONTIN) 100 MG Cap TAKE 2 CAPSULES BY MOUTH THREE TIMES DAILY 540 capsule 1   • fluticasone (FLONASE) 50 MCG/ACT nasal spray USE 1 SPRAY IN EACH NOSTRIL EVERY DAY 16 g 2   • Home Care Oxygen Inhale 2-3 L/min continuous. Oxygen dose range: 2 L/min  Respiratory route via: Nasal Cannula   Oxygen supplier: Preferred  3L/min at night/while sleeping         • Acetaminophen 500 MG Cap Take 1 Cap by mouth 3 times a day.     • Melatonin 10 MG Tab Take 10 Tablets by mouth at bedtime.     • PREPARATION H 0.25-3-12-18 % Cream Insert 1 Application into the  rectum as needed (pain).     • Multiple Vitamins-Minerals (CENTRUM SILVER PO) Take 1 Tab by mouth every morning.     • Biotin w/ Vitamins C & E (HAIR/SKIN/NAILS) 1250-7.5-7.5 MCG-MG-UNT Chew Tab Chew 1 Tab every morning.     • guaifenesin LA (MUCINEX) 600 MG TABLET SR 12 HR Take 600 mg by mouth every morning.     • Ascorbic Acid (VITAMIN C) 1000 MG Tab Take 1,000 mg by mouth every morning.     • VITAMIN E PO Take 1 Cap by mouth every morning.     • aspirin (ASA) 81 MG Chew Tab chewable tablet Chew 81 mg every morning with breakfast. 100 Tab 11   • Cholecalciferol (VITAMIN D) 50 MCG (2000 UT) Cap Take 2,000 Units by mouth every morning.     • omeprazole (PRILOSEC) 20 MG delayed-release capsule Take 20 mg by mouth every morning with breakfast.       No current facility-administered medications for this visit.       Social History     Tobacco Use   • Smoking status: Never Smoker   • Smokeless tobacco: Never Used   Vaping Use   • Vaping Use: Never used   Substance Use Topics   • Alcohol use: No     Alcohol/week: 0.0 oz   • Drug use: No       Family Status   Relation Name Status   • Mo     • Fa     • Bro     • Bro     • Neg Hx  (Not Specified)     Family History   Problem Relation Age of Onset   • Heart Attack Father    • Cancer Brother    • Cancer Brother    • Heart Disease Neg Hx    • Heart Failure Neg Hx    • Hyperlipidemia Neg Hx        ROS:  Review of Systems   Constitutional: Negative for fever, chills, weight loss and malaise/fatigue.   HENT: Negative for ear pain, nosebleeds, congestion, sore throat and neck pain.    Eyes: Negative for blurred vision.   Respiratory: Negative for cough, sputum production, shortness of breath and wheezing.    Cardiovascular: Negative for chest pain, palpitations, orthopnea and leg swelling.   Gastrointestinal: Negative for heartburn, nausea, vomiting and abdominal pain.   Genitourinary: Negative for dysuria, urgency and frequency.   Musculoskeletal:  "Negative for myalgias, back pain and joint pain.   Skin: Negative for rash and itching.   Neurological: Negative for dizziness, tingling, tremors, sensory change, focal weakness and headaches.   Endo/Heme/Allergies: Does not bruise/bleed easily.   Psychiatric/Behavioral: Negative for depression, suicidal ideas and memory loss.    All other systems reviewed and are negative except as in HPI.    Exam: /68   Pulse 64   Temp 37.1 °C (98.7 °F) (Temporal)   Resp 16   Ht 1.6 m (5' 3\")   Wt 53.1 kg (117 lb)   SpO2 93%  Body mass index is 20.73 kg/m².  General: Normal appearing. No distress.  HEENT: Normocephalic. Eyes conjunctiva clear lids without ptosis, ears normal shape and contour.  Neck: Supple without JVD. Thyroid is not enlarged.  Pulmonary: Clear to ausculation.  Normal effort. No rales, ronchi, or wheezing.  Cardiovascular: Regular rate and rhythm without murmur.   Abdomen: Nondistended.  Neurologic: Grossly nonfocal.  Cranial nerves are normal.   Skin: Warm and dry.  No rashes or suspicious skin lesions.  Musculoskeletal: Normal gait. No extremity cyanosis, clubbing, or edema.  Psych: Normal mood and affect. Alert and oriented x3. Judgment and insight is normal.    Medical decision-making and discussion:  1. Anxiety  2. Benzodiazepine dependence (HCC)  3. Chronic use of benzodiazepine for therapeutic purpose  Chronic problem.  Stable.  Patient has been working closely with me to wean down and potentially off of Ativan.  She is currently taking 0.5 mg tab once nightly with melatonin 10 mg tab once daily.  States this regimen works well for her.  Patient does not want to decrease melatonin dosage.  UDS up-to-date.  PDMP reviewed.  No red flags.  Patient recently had lorazepam filled on 6/1/2021.  Provided patient with 1 more refill starting on 7/1/2021 for 30 days.  Patient has been referred to psychiatry for medication management.  Discussed risk, benefits, and alternative treatment options and patient " "wants to continue Ativan.    Patient understands this prescription is a controlled substance which is potentially habit-forming and its use is regulated by the [employer]. We also discussed the new \"black box\" warning regarding the lethal combination of opioids and benzodiazepines. Refills are subject to terms of a controlled substance agreement and patient has an updated one on file. Most recent UDS is appropriate. Any refill requires an office visit. Narcotics have may adverse effects and the risks of addiction, accidental overdose and death were emphasized.    - REFERRAL TO BEHAVIORAL HEALTH  - LORazepam (ATIVAN) 0.5 MG Tab; TAKE 1 TABLET BY MOUTH EVERY NIGHT  Dispense: 30 tablet; Refill: 0    Patient blood pressure was slightly elevated during today's point.  She has been taking over-the-counter Mucinex DM for environmental and seasonal allergy symptoms.  Discussed with patient Mucinex DM can elevate blood pressure.    Please note that this dictation was created using voice recognition software. I have made every reasonable attempt to correct obvious errors, but I expect that there are errors of grammar and possibly content that I did not discover before finalizing the note.    Assessment/Plan:  1. Anxiety  REFERRAL TO BEHAVIORAL HEALTH    LORazepam (ATIVAN) 0.5 MG Tab   2. Benzodiazepine dependence (HCC)  REFERRAL TO BEHAVIORAL HEALTH    LORazepam (ATIVAN) 0.5 MG Tab   3. Chronic use of benzodiazepine for therapeutic purpose  REFERRAL TO BEHAVIORAL HEALTH    LORazepam (ATIVAN) 0.5 MG Tab       Return in about 5 months (around 11/8/2021).  "

## 2021-06-22 ENCOUNTER — OFFICE VISIT (OUTPATIENT)
Dept: MEDICAL GROUP | Facility: PHYSICIAN GROUP | Age: 86
End: 2021-06-22
Payer: MEDICARE

## 2021-06-22 VITALS
OXYGEN SATURATION: 95 % | WEIGHT: 116 LBS | TEMPERATURE: 98.9 F | RESPIRATION RATE: 16 BRPM | SYSTOLIC BLOOD PRESSURE: 128 MMHG | HEIGHT: 63 IN | HEART RATE: 70 BPM | BODY MASS INDEX: 20.55 KG/M2 | DIASTOLIC BLOOD PRESSURE: 70 MMHG

## 2021-06-22 DIAGNOSIS — R19.4 CHANGE IN BOWEL HABIT: ICD-10-CM

## 2021-06-22 PROCEDURE — 99213 OFFICE O/P EST LOW 20 MIN: CPT | Performed by: PHYSICIAN ASSISTANT

## 2021-06-22 ASSESSMENT — FIBROSIS 4 INDEX: FIB4 SCORE: 2.65

## 2021-07-05 PROBLEM — R19.4 CHANGE IN BOWEL HABIT: Status: ACTIVE | Noted: 2021-07-05

## 2021-07-05 NOTE — PROGRESS NOTES
Chief Complaint   Patient presents with   • GI Problem   • Diarrhea     x3days       HISTORY OF PRESENT ILLNESS: Dayana Lechuga is an established 89 y.o. female here to discuss the evaluation and management of:    Patient is a 89-year-old female here today to discuss loose stools.  States intermittently over the past several months she has been experiencing intermittent episodes of loose stools.  States she may have anywhere from 2-3 bowel movements in the morning and 1-2 in the afternoon.  States when this occurs her stools are loose.  She tells me she feels she is staying hydrated.  She denies changes in her diet.  She mentions that she drinks orange juice every morning and eats tangerines and walnuts every morning.  States a few days ago she stopped drinking coffee, drinking orange juice, eating tangerines.  Patient also takes over-the-counter vitamin C daily.  She denies taking a magnesium supplement.  Denies fever, chills, nausea, vomiting, abdominal pain, melena, hematochezia, unintentional weight loss.  Admits to an occasional abdominal bloating.      Patient Active Problem List    Diagnosis Date Noted   • Change in bowel habit 07/05/2021   • Chronic use of benzodiazepine for therapeutic purpose 02/16/2021   • COVID-19, pneumonia, 11/21 11/20/2020   • Debility 11/20/2020   • Hyponatremia 11/20/2020   • Peripheral neuropathy 11/20/2020   • Bradycardia 09/11/2020   • CAD (coronary artery disease) 09/11/2020   • Post concussion syndrome 09/11/2020   • Mild peripheral edema 07/31/2020   • PVD (peripheral vascular disease) ([facility]) 03/04/2020   • Pleural effusion 12/20/2019   • Incomplete emptying of bladder 10/08/2019   • Urinary retention 09/11/2019   • Acute bilateral low back pain with bilateral sciatica 08/29/2019   • Dysuria 06/27/2019   • Diarrhea 06/27/2019   • Popliteal artery stenosis, right ([facility]) 11/28/2018   • Claudication of right lower extremity ([facility]) 11/07/2018   • Slow transit constipation  08/29/2018   • S/P bilateral cataract extraction 02/27/2018   • Benzodiazepine dependence ([facility]) 11/13/2017   • Chest pain due to myocardial ischemia 09/10/2016   • Coronary artery disease involving native coronary artery of native heart with angina pectoris ([facility]) 08/10/2016   • Anxiety 06/22/2016   • GERD (gastroesophageal reflux disease) 10/31/2009   • Dyslipidemia 10/31/2009   • Osteopenia 10/31/2009   • Mild intermittent asthma with acute exacerbation 10/02/2009   • Essential hypertension 10/02/2009       Allergies:Bactrim [sulfamethoxazole w-trimethoprim], Pcn [penicillins], and Codeine    Current Outpatient Medications   Medication Sig Dispense Refill   • cetirizine (ZYRTEC ALLERGY) 10 MG Tab Take 10 mg by mouth every day.     • LORazepam (ATIVAN) 0.5 MG Tab TAKE 1 TABLET BY MOUTH EVERY NIGHT 30 tablet 0   • lisinopril (PRINIVIL) 20 MG Tab Take 1 tablet by mouth every morning with breakfast. 100 tablet 3   • isosorbide mononitrate SR (IMDUR) 30 MG TABLET SR 24 HR Take 1 tablet by mouth every evening. 100 tablet 3   • atorvastatin (LIPITOR) 80 MG tablet Take 1 tablet by mouth every evening. 100 tablet 3   • amLODIPine (NORVASC) 5 MG Tab Take 1 tablet by mouth every day. 100 tablet 3   • carvedilol (COREG) 6.25 MG Tab Take 1 tablet by mouth 2 times a day, with meals for 360 days. 180 tablet 3   • montelukast (SINGULAIR) 10 MG Tab Take 1 tablet by mouth every evening. 90 tablet 2   • albuterol 108 (90 Base) MCG/ACT Aero Soln inhalation aerosol INHALE TWO PUFFS BY MOUTH EVERY SIX HOURS AS NEEDED FOR SHORTNESS OF BREATH 25.5 g 1   • gabapentin (NEURONTIN) 100 MG Cap TAKE 2 CAPSULES BY MOUTH THREE TIMES DAILY 540 capsule 1   • fluticasone (FLONASE) 50 MCG/ACT nasal spray USE 1 SPRAY IN EACH NOSTRIL EVERY DAY 16 g 2   • Home Care Oxygen Inhale 2-3 L/min continuous. Oxygen dose range: 2 L/min  Respiratory route via: Nasal Cannula   Oxygen supplier: Preferred  3L/min at night/while sleeping         • Acetaminophen 500  MG Cap Take 1 Cap by mouth 3 times a day.     • Melatonin 10 MG Tab Take 10 Tablets by mouth at bedtime.     • PREPARATION H 0.25-3-12-18 % Cream Insert 1 Application into the rectum as needed (pain).     • Multiple Vitamins-Minerals (CENTRUM SILVER PO) Take 1 Tab by mouth every morning.     • Biotin w/ Vitamins C & E (HAIR/SKIN/NAILS) 1250-7.5-7.5 MCG-MG-UNT Chew Tab Chew 1 Tab every morning.     • guaifenesin LA (MUCINEX) 600 MG TABLET SR 12 HR Take 600 mg by mouth every morning.     • Ascorbic Acid (VITAMIN C) 1000 MG Tab Take 1,000 mg by mouth every morning.     • VITAMIN E PO Take 1 Cap by mouth every morning.     • aspirin (ASA) 81 MG Chew Tab chewable tablet Chew 81 mg every morning with breakfast. 100 Tab 11   • Cholecalciferol (VITAMIN D) 50 MCG (2000 UT) Cap Take 2,000 Units by mouth every morning.     • omeprazole (PRILOSEC) 20 MG delayed-release capsule Take 20 mg by mouth every morning with breakfast.       No current facility-administered medications for this visit.       Social History     Tobacco Use   • Smoking status: Never Smoker   • Smokeless tobacco: Never Used   Vaping Use   • Vaping Use: Never used   Substance Use Topics   • Alcohol use: No     Alcohol/week: 0.0 oz   • Drug use: No       Family Status   Relation Name Status   • Mo     • Fa     • Bro     • Bro     • Neg Hx  (Not Specified)     Family History   Problem Relation Age of Onset   • Heart Attack Father    • Cancer Brother    • Cancer Brother    • Heart Disease Neg Hx    • Heart Failure Neg Hx    • Hyperlipidemia Neg Hx        ROS:  Review of Systems   Constitutional: Negative for fever, chills, weight loss and malaise/fatigue.   HENT: Negative for ear pain, nosebleeds, congestion, sore throat and neck pain.    Eyes: Negative for blurred vision.   Respiratory: Negative for cough, sputum production, shortness of breath and wheezing.    Cardiovascular: Negative for chest pain, palpitations, orthopnea and  "leg swelling.   Gastrointestinal: Negative for heartburn, nausea, vomiting and abdominal pain.   Genitourinary: Negative for dysuria, urgency and frequency.   Musculoskeletal: Negative for myalgias, back pain and joint pain.   Skin: Negative for rash and itching.   Neurological: Negative for dizziness, tingling, tremors, sensory change, focal weakness and headaches.   Endo/Heme/Allergies: Does not bruise/bleed easily.   Psychiatric/Behavioral: Negative for depression, suicidal ideas and memory loss.  The patient is not nervous/anxious and does not have insomnia.    All other systems reviewed and are negative except as in HPI.    Exam: /70 (BP Location: Left arm, Patient Position: Sitting, BP Cuff Size: Adult)   Pulse 70   Temp 37.2 °C (98.9 °F) (Temporal)   Resp 16   Ht 1.6 m (5' 3\")   Wt 52.6 kg (116 lb)   SpO2 95%  Body mass index is 20.55 kg/m².  General: Normal appearing. No distress.  HEENT: Normocephalic. Eyes conjunctiva clear lids without ptosis, ears normal shape and contour.  Neck: Supple without JVD. Thyroid is not enlarged.  Pulmonary: Clear to ausculation.  Normal effort. No rales, ronchi, or wheezing.  Cardiovascular: Regular rate and rhythm without murmur.   Abdomen: Soft, nontender, nondistended. Normal bowel sounds. Liver and spleen are not palpable  Neurologic: Grossly nonfocal.  Cranial nerves are normal.  Lymph: No cervical or supraclavicular  lymph nodes are palpable  Skin: Warm and dry.  No rashes or suspicious skin lesions.  Musculoskeletal: Normal gait. No extremity cyanosis, clubbing, or edema.  Psych: Normal mood and affect. Alert and oriented x3. Judgment and insight is normal.    Medical decision-making and discussion:  1. Change in bowel habit  During today's appointment advised patient to discontinue vitamin C.  Advised patient to avoid orange juice and tangerines in the a.m.  Patient states she will not stop taking supplemental vitamin C.  Advised patient to keep a food " diary.  Suggested patient take over-the-counter Metamucil supplements.   Continue working on hydration.    Patient does not want to make a follow-up appointment at this time.    Follow-up for worsening symptoms,lack of expected recovery, or should new symptoms or problems arise.        Please note that this dictation was created using voice recognition software. I have made every reasonable attempt to correct obvious errors, but I expect that there are errors of grammar and possibly content that I did not discover before finalizing the note.    Assessment/Plan:  1. Change in bowel habit         No follow-ups on file.

## 2021-07-09 ENCOUNTER — OFFICE VISIT (OUTPATIENT)
Dept: BEHAVIORAL HEALTH | Facility: CLINIC | Age: 86
End: 2021-07-09
Payer: MEDICARE

## 2021-07-09 DIAGNOSIS — F41.9 ANXIETY: ICD-10-CM

## 2021-07-09 DIAGNOSIS — F51.01 PRIMARY INSOMNIA: ICD-10-CM

## 2021-07-09 DIAGNOSIS — Z79.899 CHRONIC USE OF BENZODIAZEPINE FOR THERAPEUTIC PURPOSE: ICD-10-CM

## 2021-07-09 DIAGNOSIS — F13.20 BENZODIAZEPINE DEPENDENCE (HCC): ICD-10-CM

## 2021-07-09 PROCEDURE — 90792 PSYCH DIAG EVAL W/MED SRVCS: CPT | Performed by: PSYCHIATRY & NEUROLOGY

## 2021-07-09 PROCEDURE — 99204 OFFICE O/P NEW MOD 45 MIN: CPT | Mod: GC | Performed by: PSYCHIATRY & NEUROLOGY

## 2021-07-09 RX ORDER — LORAZEPAM 0.5 MG/1
0.5 TABLET ORAL
Qty: 30 TABLET | Refills: 0 | Status: SHIPPED | OUTPATIENT
Start: 2021-09-02 | End: 2021-09-10 | Stop reason: SDUPTHER

## 2021-07-09 RX ORDER — LORAZEPAM 0.5 MG/1
TABLET ORAL
Qty: 30 TABLET | Refills: 0 | Status: SHIPPED | OUTPATIENT
Start: 2021-08-02 | End: 2021-09-02

## 2021-07-09 ASSESSMENT — ANXIETY QUESTIONNAIRES
1. FEELING NERVOUS, ANXIOUS, OR ON EDGE: SEVERAL DAYS
3. WORRYING TOO MUCH ABOUT DIFFERENT THINGS: SEVERAL DAYS
4. TROUBLE RELAXING: NOT AT ALL
7. FEELING AFRAID AS IF SOMETHING AWFUL MIGHT HAPPEN: NOT AT ALL
5. BEING SO RESTLESS THAT IT IS HARD TO SIT STILL: NOT AT ALL
2. NOT BEING ABLE TO STOP OR CONTROL WORRYING: NOT AT ALL
6. BECOMING EASILY ANNOYED OR IRRITABLE: SEVERAL DAYS
GAD7 TOTAL SCORE: 3

## 2021-07-09 ASSESSMENT — PATIENT HEALTH QUESTIONNAIRE - PHQ9
5. POOR APPETITE OR OVEREATING: 0 - NOT AT ALL
CLINICAL INTERPRETATION OF PHQ2 SCORE: 0

## 2021-07-09 NOTE — PROGRESS NOTES
"  INITIAL PSYCHIATRIC EVALUATION      This provider informed the patient their medical records are totally confidential except for the use by other providers involved in their care, or if the patient signs a release, or to report instances of child or elder abuse, or if it is determined they are an immediate risk to harm themselves or others.      CHIEF COMPLAINT  \"I was asked to come here because I take Lorazepam\"      HISTORY OF PRESENT ILLNESS  Dayana Lechuga is a 89 y.o. old female comes in today to establish care and for evaluation of benzodiazepine use. Patient presents with her son and requested for her son to be present during course of appointment.   Patient states she initially started using Lorazepam to aid with anxiety in 2006. In 2004, patient moved from [place] to [place]. She was in a long term relationship and noticed chest pain and increased distress approximately a week before her significant other was supposed to have cardiac surgery. She states she went to the doctor at that time and was started on Lorazepam to aid with anxiety in relation to worry about his well being and adjust to new move. Over time, patient states she increased dose to 2mg throughout the day and was taking a dose in the afternoon and the rest at night for sleep. She felt she had difficulty falling asleep due to excessive worry prior to bedtime. Patient states approximately 10 years ago, she was discontinued off Lorazepam abruptly and had increased anxiety and intolerance to discontinuation. Over the last several months, she has been working closely with primary care to taper down on medication and progressively decreased from 1.5mg PO daily to 0.5mg PO at bedtime. She has been utilizing this in combination with Melatonin 10mg PO at bedtime and feels she has been sleeping well. She denies excessive worry during the day. She feels she has had a good mood overall and has good energy most days. She notes worsening " energy with increased heat.   Patient was diagnosed with COVID in November and states continued physical symptoms in relation to this. Her son states patient worries about health and well being. Patient continues to engage in bridge and bingo when these are available to her. She has a dog that is a strong support with her and she walks him three times daily for exercise. She does not drive and has her son and daughter help her with tasks and errands. She denies anhedonia, changes in appetite, changes in energy, changes in concentration. She denies forgetfulness, difficulty staying on task, difficulty organizing medications, or feelings of cognitive dulling. She denies dizziness or lightheadedness.     PSYCHIATRIC REVIEW OF SYSTEMS: denies depressive symptoms, denies manic symptoms and denies psychotic symptoms including  /       MEDICAL REVIEW OF SYSTEMS:   Constitutional Negative   Eyes Negative   Ears/Nose/Mouth/Throat Negative   Cardiovascular CAD, denies current palpitations or chest pain   Respiratory S/p COVID, denies dyspnea, cough   Gastrointestinal Recent diarrhea, resolved    Genitourinary Recent urinary retention, resolved    Muscular States muscle aches when waking up in the morning   Integumentary Notes patches of skin with redness without pain or inflammation   Neurological States shooting pain down right upper thigh resolved with medication, denies dizziness, headaches   Endocrine denies   Hematologic/Lymphatic denies     CURRENT MEDICATIONS:  Current Outpatient Medications   Medication Sig Dispense Refill   • cetirizine (ZYRTEC ALLERGY) 10 MG Tab Take 10 mg by mouth every day.     • LORazepam (ATIVAN) 0.5 MG Tab TAKE 1 TABLET BY MOUTH EVERY NIGHT 30 tablet 0   • lisinopril (PRINIVIL) 20 MG Tab Take 1 tablet by mouth every morning with breakfast. 100 tablet 3   • isosorbide mononitrate SR (IMDUR) 30 MG TABLET SR 24 HR Take 1 tablet by mouth every evening. 100 tablet 3   • atorvastatin (LIPITOR) 80  MG tablet Take 1 tablet by mouth every evening. 100 tablet 3   • amLODIPine (NORVASC) 5 MG Tab Take 1 tablet by mouth every day. 100 tablet 3   • carvedilol (COREG) 6.25 MG Tab Take 1 tablet by mouth 2 times a day, with meals for 360 days. 180 tablet 3   • montelukast (SINGULAIR) 10 MG Tab Take 1 tablet by mouth every evening. 90 tablet 2   • albuterol 108 (90 Base) MCG/ACT Aero Soln inhalation aerosol INHALE TWO PUFFS BY MOUTH EVERY SIX HOURS AS NEEDED FOR SHORTNESS OF BREATH 25.5 g 1   • gabapentin (NEURONTIN) 100 MG Cap TAKE 2 CAPSULES BY MOUTH THREE TIMES DAILY 540 capsule 1   • fluticasone (FLONASE) 50 MCG/ACT nasal spray USE 1 SPRAY IN EACH NOSTRIL EVERY DAY 16 g 2   • Home Care Oxygen Inhale 2-3 L/min continuous. Oxygen dose range: 2 L/min  Respiratory route via: Nasal Cannula   Oxygen supplier: Preferred  3L/min at night/while sleeping         • Acetaminophen 500 MG Cap Take 1 Cap by mouth 3 times a day.     • Melatonin 10 MG Tab Take 10 Tablets by mouth at bedtime.     • PREPARATION H 0.25-3-12-18 % Cream Insert 1 Application into the rectum as needed (pain).     • Multiple Vitamins-Minerals (CENTRUM SILVER PO) Take 1 Tab by mouth every morning.     • Biotin w/ Vitamins C & E (HAIR/SKIN/NAILS) 1250-7.5-7.5 MCG-MG-UNT Chew Tab Chew 1 Tab every morning.     • guaifenesin LA (MUCINEX) 600 MG TABLET SR 12 HR Take 600 mg by mouth every morning.     • Ascorbic Acid (VITAMIN C) 1000 MG Tab Take 1,000 mg by mouth every morning.     • VITAMIN E PO Take 1 Cap by mouth every morning.     • aspirin (ASA) 81 MG Chew Tab chewable tablet Chew 81 mg every morning with breakfast. 100 Tab 11   • Cholecalciferol (VITAMIN D) 50 MCG (2000 UT) Cap Take 2,000 Units by mouth every morning.     • omeprazole (PRILOSEC) 20 MG delayed-release capsule Take 20 mg by mouth every morning with breakfast.       No current facility-administered medications for this visit.       ALLERGIES:  Bactrim [sulfamethoxazole w-trimethoprim], Pcn  [penicillins], and Codeine    PAST PSYCHIATRIC HISTORY  Prior psychiatric hospitalization: denies  Prior Self harm/suicide attempt: denies  Prior Diagnosis: denies    PAST PSYCHIATRIC MEDICATIONS  • Lorazepam, denies other medication trials     FAMILY HISTORY  Psychiatric diagnosis:  denies    SUBSTANCE USE HISTORY:  ALCOHOL: denies use, states she may have a half glass of wine every few months  TOBACCO: denies  CANNABIS: denies  OPIOIDS: denies  PRESCRIPTION MEDICATIONS: denies  OTHERS: denies  History of inpatient/outpatient rehab treatment: N/A    SOCIAL HISTORY  Lived in [place] until 2004 at which time she moved to [place]  Employment: Was working as  and  through most of life, 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  Relationship: single  Kids: son lives in [place], daughter lives in [place]; 1 grand child and 2 great grand children in [place]  Current living situation: lives in a senior home center, lives independently with her dog, has support available if needed       MEDICAL HISTORY  Past Medical History:   Diagnosis Date   • Allergy    • Anxiety    • ASTHMA    • Bronchitis    • CAD (coronary artery disease)     JT to RCA; 70% stenosis in LAD   • Chills    • Constipation    • Difficulty breathing    • GERD (gastroesophageal reflux disease)    • Heart attack (HCC)    • Heartburn    • Hyperlipidemia    • Hypertension    • Influenza    • Insomnia    • Lumbar back pain 9/10/2016   • Mumps    • OSTEOPOROSIS    • Palpitations    • Pneumonia    • PVD (peripheral vascular disease) (HCC)     70% PAD-followed by [facility]   • Sweat, sweating, excessive    • Tonsillitis      Past Surgical History:   Procedure Laterality Date   • ABDOMINAL HYSTERECTOMY TOTAL     • APPENDECTOMY     • HERNIA REPAIR     • HYSTERECTOMY LAPAROSCOPY     • TONSILLECTOMY     • ZZZ CARDIAC CATH           PHYSICAL EXAMINAION:  Vital signs: /79   Pulse 70  Musculoskeletal: Normal  gait.   Abnormal movements: no banormal movements noted, no supportive walking devices required    MENTAL STATUS EXAMINATION      General:   - Grooming and hygiene: Casual and Neat,   - Apparent distress: no apparent distress,   - Behavior: Calm  - Eye Contact:  Good,   - no psychomotor agitation or retardation    - Participation: Active verbal participation and Engaged  Orientation: Alert and Fully Oriented to person, place and time  Mood: Euthymic  Affect: Full range and Congruent with content,  Thought Process: Logical and Goal-directed  Thought Content: Denies suicidal or homicidal ideations, intent or plan Within normal limits  Perception: Denies auditory or visual hallucinations. No delusions noted Within normal limits  Attention span and concentration: Intact   Speech:Rate within normal limits and Volume within normal limits  Language: Appropriate   Insight: Good  Judgment: Good  Recent and remote memory: No gross evidence of memory deficits      DEPRESSION SCREENING:  Depression Screen (PHQ-2/PHQ-9) 12/9/2020 1/7/2021 2/1/2021   PHQ-2 Total Score - - -   PHQ-2 Total Score - - -   PHQ-2 Total Score 0 0 0   PHQ-9 Total Score - - -       Interpretation of PHQ-9 Total Score   Score Severity   1-4 No Depression   5-9 Mild Depression   10-14 Moderate Depression   15-19 Moderately Severe Depression   20-27 Severe Depression      SAFETY ASSESSMENT - SELF:    Does patient acknowledge current or past symptoms of dangerousness to self? No  Recent change in amount/specificity/intensity of suicidal thoughts or self-harm behavior? No         SAFETY ASSESSMENT - OTHERS:    Does patient acknowledge current or past symptoms of aggressive behavior or risk to others? No  Recent change in amount/specificity/intensity of thoughts or threats to harm others? No      CURRENT RISK:       Suicidal: Low       Homicidal: Low       Self-Harm: Low       Relapse: Not applicable       Crisis Safety Plan Reviewed Not Indicated    MEDICAL  RECORDS/LABS/DIAGNOSTIC TESTS REVIEWED:  All recent labs, CT Head, EKG, recent outpatient notes with Milena Stone PA-C and hospitalization reviewed      [place]  records -    records reviewed, review of records indicates tapering down from 90 pills for 30 days in 02/21 to 60 for 30 days in 04/02 and most recent fill of 30 for 30 days 07/02/21      ASSESSMENT  Dayana Lechuga is a 89 y.o. old female comes in today for benzodiazepine use. Patient has been taking Lorazepam since 2006 and was initially prescribed medication to aid with anxiety and sleep. Over the last 5 months, patient has tapered down from 3 pills daily to 1 pill at bedtime in conjunction with use of Melatonin for sleep. She notes primary concern with sleeping throughout the night with resolution of daytime anxiety. She has had increased recent stressors due to medical illness and states she has been doing well with this and has had physical improvement. She denies depressed mood with associated neurovegetative symptoms.  Spoke with patient extensively about options for discontinuation of medication and risks/benefits of alternative medications to aid with sleep. Discussed particularly concern about cognition and fall risk as well as sequelae of falls that can be detrimental to patient's well being. Following thorough conversation about risks, patient states desire to continue current dose with willingness to consider taper in the future. Patient has appropriately decreased dose significantly and has followed protocol and prescription fills appropriately. As medication is primarily used for sleep with overall resolution of anxiety symptoms, alternative medication to target anxiety is not required at this time.       DIFFERENTIAL DIAGNOSES  1. Primary Insomnia    2. R/O Generalized Anxiety Disorder      PLAN:    • Continue Lorazepam 0.5mg PO at bedtime and Melatonin 10mg PO at bedtime  • Controlled Substance agreement reviewed with patient and  signed   • I reviewed clinical lab tests done in last 1 year.   • I reviewed the imaging done in last year  • Medication options, alternatives (including no medications) and medication risks/benefits/side effects were discussed in detail.  • The patient was advised to call, message provider on MyChart, or come in to the clinic if symptoms worsen or if any future questions/issues regarding their medications arise; the patient verbalized understanding and agreement.    • The patient was educated to call 911, call the suicide hotline, or go to local ER if having thoughts of suicide or homicide; verbalized understanding.  • Case Discussed with Dr. Faustin, present for critical components of appointment         Return to clinic in 8 weeks or sooner if symptoms worsen.      The proposed treatment plan was discussed with the patient who was provided the opportunity to ask questions and make suggestions regarding alternative treatment. Patient verbalized understanding and expressed agreement with the plan.     Thank you for allowing me to participate in the care of this patient.    Silvia Lomeli D.O.  07/09/21    CC:   Milena Stone P.A.-C.    This note was created using voice recognition software (Dragon). The accuracy of the dictation is limited by the abilities of the software. I have reviewed the note prior to signing, however some errors in grammar and context are still possible. If you have any questions related to this note please do not hesitate to contact our office.

## 2021-08-16 RX ORDER — FLUTICASONE PROPIONATE 50 MCG
SPRAY, SUSPENSION (ML) NASAL
Qty: 16 G | Refills: 0 | Status: SHIPPED | OUTPATIENT
Start: 2021-08-16 | End: 2021-11-04

## 2021-08-28 ENCOUNTER — PATIENT MESSAGE (OUTPATIENT)
Dept: HEALTH INFORMATION MANAGEMENT | Facility: OTHER | Age: 86
End: 2021-08-28

## 2021-08-30 ENCOUNTER — TELEPHONE (OUTPATIENT)
Dept: HEALTH INFORMATION MANAGEMENT | Facility: OTHER | Age: 86
End: 2021-08-30

## 2021-08-30 NOTE — TELEPHONE ENCOUNTER
Outcome: SCHEDULED VERA     Please transfer to Patient Outreach Team at [phone] when patient returns call.      Attempt #1

## 2021-08-31 RX ORDER — GABAPENTIN 100 MG/1
CAPSULE ORAL
Qty: 540 CAPSULE | Refills: 1 | Status: SHIPPED | OUTPATIENT
Start: 2021-08-31 | End: 2022-02-28 | Stop reason: SDUPTHER

## 2021-08-31 NOTE — TELEPHONE ENCOUNTER
Received request via: Patient    Was the patient seen in the last year in this department? Yes    Does the patient have an active prescription (recently filled or refills available) for medication(s) requested? No     VOICEMAIL  1. Caller Name: Dayana Lechuga                        Call Back Number: [phone] (home)     2. Message: patient insisting Stone refill rx for gabapentin

## 2021-09-03 ENCOUNTER — OFFICE VISIT (OUTPATIENT)
Dept: PHYSICAL MEDICINE AND REHAB | Facility: MEDICAL CENTER | Age: 86
End: 2021-09-03
Payer: MEDICARE

## 2021-09-03 VITALS
WEIGHT: 114.86 LBS | HEIGHT: 60 IN | BODY MASS INDEX: 22.55 KG/M2 | DIASTOLIC BLOOD PRESSURE: 70 MMHG | TEMPERATURE: 97.8 F | OXYGEN SATURATION: 92 % | HEART RATE: 77 BPM | SYSTOLIC BLOOD PRESSURE: 134 MMHG

## 2021-09-03 DIAGNOSIS — M48.061 SPINAL STENOSIS OF LUMBAR REGION, UNSPECIFIED WHETHER NEUROGENIC CLAUDICATION PRESENT: ICD-10-CM

## 2021-09-03 DIAGNOSIS — M47.816 LUMBAR SPONDYLOSIS: ICD-10-CM

## 2021-09-03 DIAGNOSIS — M43.16 SPONDYLOLISTHESIS OF LUMBAR REGION: ICD-10-CM

## 2021-09-03 DIAGNOSIS — M51.36 DDD (DEGENERATIVE DISC DISEASE), LUMBAR: ICD-10-CM

## 2021-09-03 PROCEDURE — 99214 OFFICE O/P EST MOD 30 MIN: CPT | Performed by: PHYSICAL MEDICINE & REHABILITATION

## 2021-09-03 ASSESSMENT — FIBROSIS 4 INDEX: FIB4 SCORE: 2.65

## 2021-09-03 ASSESSMENT — PATIENT HEALTH QUESTIONNAIRE - PHQ9: CLINICAL INTERPRETATION OF PHQ2 SCORE: 0

## 2021-09-03 ASSESSMENT — PAIN SCALES - GENERAL: PAINLEVEL: NO PAIN

## 2021-09-03 NOTE — PROGRESS NOTES
Follow up patient note  Pain Medicine, Interventional spine and sports physiatry, Physical medicine rehabilitation      Chief complaint:   Chief Complaint   Patient presents with   • Follow-Up     Back pain         HISTORY    Please see new patient note dated 09/10/2019 by Dr Fall,  for more details.     HPI  Patient identification: Dayana Lechuga 89 y.o. female  presents for follow-up.    Interval history:   Dayana reports that she has been doing well after last visit which was 11/5/2019.  She reports that she has been taking gabapentin 200mg po tid since our last visit.  No report of side effects  No side effects.  Balance is good.  She has been trying to stay active, walks her dog.  No radicular symptoms and reports that she is sleeping well.    No numbness or tingling in her legs.  Bowel changes, she feels like she has been somewhat constipated at times, usually goes every day or every other day at most..    She lives in an independent living location.  She reports that she had COVID-19 and had a reaction to the first vaccine dose.    Advised not to take the second shot after having been advised to avoid the vaccine by her pulmonologist, after having side effects from the first short.    From what she reports, her PCP has asked her to follow-up with me.       ROS Red Flags :  Fever, Chills, Sweats: Denies  Involuntary Weight Loss: Denies  Bowel/Bladder Incontinence: Denies  Saddle Anesthesia: Denies    PMHx:   Past Medical History:   Diagnosis Date   • Allergy    • Anxiety    • ASTHMA    • Bronchitis    • CAD (coronary artery disease)     JT to RCA; 70% stenosis in LAD   • Chills    • Constipation    • Difficulty breathing    • GERD (gastroesophageal reflux disease)    • Heart attack (HCC)    • Heartburn    • Hyperlipidemia    • Hypertension    • Influenza    • Insomnia    • Lumbar back pain 9/10/2016   • Mumps    • OSTEOPOROSIS    • Palpitations    • Pneumonia    • PVD (peripheral vascular  disease) (HCC)     70% PAD-followed by [facility]   • Sweat, sweating, excessive    • Tonsillitis        PSHx:   Past Surgical History:   Procedure Laterality Date   • ABDOMINAL HYSTERECTOMY TOTAL     • APPENDECTOMY     • HERNIA REPAIR     • HYSTERECTOMY LAPAROSCOPY     • TONSILLECTOMY     • ZZZ CARDIAC CATH         Family history   Denies neuromuscular disease  Family History   Problem Relation Age of Onset   • Heart Attack Father    • Cancer Brother    • Cancer Brother    • Heart Disease Neg Hx    • Heart Failure Neg Hx    • Hyperlipidemia Neg Hx        Medications:   Current Outpatient Medications   Medication   • gabapentin (NEURONTIN) 100 MG Cap   • fluticasone (FLONASE) 50 MCG/ACT nasal spray   • LORazepam (ATIVAN) 0.5 MG Tab   • cetirizine (ZYRTEC ALLERGY) 10 MG Tab   • lisinopril (PRINIVIL) 20 MG Tab   • isosorbide mononitrate SR (IMDUR) 30 MG TABLET SR 24 HR   • atorvastatin (LIPITOR) 80 MG tablet   • amLODIPine (NORVASC) 5 MG Tab   • carvedilol (COREG) 6.25 MG Tab   • montelukast (SINGULAIR) 10 MG Tab   • albuterol 108 (90 Base) MCG/ACT Aero Soln inhalation aerosol   • Acetaminophen 500 MG Cap   • Melatonin 10 MG Tab   • PREPARATION H 0.25-3-12-18 % Cream   • Multiple Vitamins-Minerals (CENTRUM SILVER PO)   • Biotin w/ Vitamins C & E (HAIR/SKIN/NAILS) 1250-7.5-7.5 MCG-MG-UNT Chew Tab   • guaifenesin LA (MUCINEX) 600 MG TABLET SR 12 HR   • Ascorbic Acid (VITAMIN C) 1000 MG Tab   • VITAMIN E PO   • aspirin (ASA) 81 MG Chew Tab chewable tablet   • Cholecalciferol (VITAMIN D) 50 MCG (2000 UT) Cap   • omeprazole (PRILOSEC) 20 MG delayed-release capsule   • Home Care Oxygen     No current facility-administered medications for this visit.       Allergies:   Allergies   Allergen Reactions   • Bactrim [Sulfamethoxazole W-Trimethoprim] Hives   • Pcn [Penicillins] Hives     About 70 years   • Codeine Unspecified     Unknown reaction         Social Hx:   Social History     Socioeconomic History   • Marital status:       Spouse name: Not on file   • Number of children: Not on file   • Years of education: Not on file   • Highest education level: Not on file   Occupational History   • Not on file   Tobacco Use   • Smoking status: Never Smoker   • Smokeless tobacco: Never Used   Vaping Use   • Vaping Use: Never used   Substance and Sexual Activity   • Alcohol use: No     Alcohol/week: 0.0 oz   • Drug use: No   • Sexual activity: Not Currently   Other Topics Concern   •  Service No   • Blood Transfusions Yes   • Caffeine Concern No   • Occupational Exposure No   • Hobby Hazards No   • Sleep Concern Yes   • Stress Concern Yes   • Weight Concern No   • Special Diet No   • Back Care No   • Exercise No   • Bike Helmet No   • Seat Belt Yes   • Self-Exams No   Social History Narrative   • Not on file     Social Determinants of Health     Financial Resource Strain:    • Difficulty of Paying Living Expenses:    Food Insecurity:    • Worried About Running Out of Food in the Last Year:    • Ran Out of Food in the Last Year:    Transportation Needs:    • Lack of Transportation (Medical):    • Lack of Transportation (Non-Medical):    Physical Activity:    • Days of Exercise per Week:    • Minutes of Exercise per Session:    Stress:    • Feeling of Stress :    Social Connections:    • Frequency of Communication with Friends and Family:    • Frequency of Social Gatherings with Friends and Family:    • Attends Holiness Services:    • Active Member of Clubs or Organizations:    • Attends Club or Organization Meetings:    • Marital Status:    Intimate Partner Violence:    • Fear of Current or Ex-Partner:    • Emotionally Abused:    • Physically Abused:    • Sexually Abused:          EXAMINATION     Physical Exam:   Vitals: /70 (BP Location: Right arm, Patient Position: Sitting, BP Cuff Size: Small adult)   Pulse 77   Temp 36.6 °C (97.8 °F) (Temporal)   Ht 1.524 m (5')   Wt 52.1 kg (114 lb 13.8 oz)   SpO2 92%      Constitutional:   Body Habitus: Body mass index is 22.43 kg/m².  Cooperation: Fully cooperates with exam  Appearance: Well-groomed no disheveled, in no acute distress    Respiratory-  breathing comfortable on room air, no audible wheezing  Cardiovascular- capillary refills less than 2 seconds. No lower extremity edema is noted.   Psychiatric- alert and oriented ×3. Normal affect.   Spine:   Functional mobility in her lumbar spine.  No focal motor or sensory deficits on exam.  Reflexes are 2+ patella and achilles bilaterally  Gait is unassisted without loss of balance, adequate toe clearance bilaterally      MEDICAL DECISION MAKING    DATA    Labs:   Lab Results   Component Value Date/Time    SODIUM 138 02/16/2021 11:54 AM    POTASSIUM 4.2 02/16/2021 11:54 AM    CHLORIDE 104 02/16/2021 11:54 AM    CO2 25 02/16/2021 11:54 AM    GLUCOSE 95 02/16/2021 11:54 AM    BUN 11 02/16/2021 11:54 AM    CREATININE 0.47 (L) 02/16/2021 11:54 AM        Lab Results   Component Value Date/Time    PROTHROMBTM 12.4 01/05/2021 04:23 PM    INR 0.89 01/05/2021 04:23 PM        Lab Results   Component Value Date/Time    WBC 6.5 02/16/2021 11:54 AM    RBC 4.04 (L) 02/16/2021 11:54 AM    HEMOGLOBIN 13.0 02/16/2021 11:54 AM    HEMATOCRIT 40.5 02/16/2021 11:54 AM    .2 (H) 02/16/2021 11:54 AM    MCH 32.2 02/16/2021 11:54 AM    MCHC 32.1 (L) 02/16/2021 11:54 AM    MPV 10.5 02/16/2021 11:54 AM    NEUTSPOLYS 55.80 02/16/2021 11:54 AM    LYMPHOCYTES 28.20 02/16/2021 11:54 AM    MONOCYTES 11.00 02/16/2021 11:54 AM    EOSINOPHILS 3.40 02/16/2021 11:54 AM    BASOPHILS 1.40 02/16/2021 11:54 AM        No results found for: HBA1C       Imaging: I personally reviewed following images    X-ray lumbar spine 09/10/2019  There is note of grade I anterolisthesis at L4-5 and degenerative disc disease, facet arthropathy at L5-S1.  Minimal anterolisthesis at L3-4    I reviewed the following radiology reports                     Results for orders placed  during the hospital encounter of 09/18/19    MR-LUMBAR SPINE-W/O  Impression  Severe L4/5 facet arthropathy and degenerative these results in right lateral listhesis and nearly grade 2 anterolisthesis    Moderate-severe central stenosis L4/5. Lesser stenoses at other levels as detailed above    Greatest foraminal stenosis is moderate-severe on the left at L5/S1               DIAGNOSIS   Visit Diagnoses     ICD-10-CM   1. Spondylolisthesis of lumbar region  M43.16   2. Spinal stenosis of lumbar region, unspecified whether neurogenic claudication present  M48.061   3. Lumbar spondylosis  M47.816   4. DDD (degenerative disc disease), lumbar  M51.36         ASSESSMENT and PLAN:     Dayana Lechuga 89 y.o. female seen for above    Dayana was seen today for follow-up.    Diagnoses and all orders for this visit:    Spondylolisthesis of lumbar region    Spinal stenosis of lumbar region, unspecified whether neurogenic claudication present    Lumbar spondylosis    DDD (degenerative disc disease), lumbar      1. Discussed that she is doing very well. Her dose of gabapentin has been stable and she does not have significant decline in renal function at this time to consider adjusting the dose.  As long as her renal function is stable, she seems to be doing well on gabapentin 200mg po tid.  Reviewed that this has been recently renewed for 90 days with 1 refill at her pharmacy on file.  2. She will continue activity as tolerated.  No need to get new imaging at this time.  Her symptoms are stable.  Similarly, she is stable and we have elected not to wean her medication.  Her exam is neurologically stable.  3. She is not taking opioids, does take a low dose of lorazepam 0.5mg daily.  She is under the care of behavioral health now.  Her ORT is 0.  She is compliant with her medications.  She will follow-up under their care.        Follow up: prn, worsening pain.    Thank you for allowing me to participate in the care of  this patient. If you have any questions please not hesitate to contact me.      Please note that this dictation was created using voice recognition software. I have made every reasonable attempt to correct obvious errors but there may be errors of grammar and content that I may have overlooked prior to finalization of this note.      Torres Fall MD  Interventional Spine and Sports Physiatry  Physical Medicine and Rehabilitation  [facility]

## 2021-09-10 ENCOUNTER — OFFICE VISIT (OUTPATIENT)
Dept: BEHAVIORAL HEALTH | Facility: CLINIC | Age: 86
End: 2021-09-10
Payer: MEDICARE

## 2021-09-10 DIAGNOSIS — F41.9 ANXIETY: ICD-10-CM

## 2021-09-10 DIAGNOSIS — F51.01 PRIMARY INSOMNIA: ICD-10-CM

## 2021-09-10 PROCEDURE — 99214 OFFICE O/P EST MOD 30 MIN: CPT | Mod: GC | Performed by: PSYCHIATRY & NEUROLOGY

## 2021-09-10 RX ORDER — LORAZEPAM 0.5 MG/1
0.5 TABLET ORAL
Qty: 30 TABLET | Refills: 2 | Status: SHIPPED | OUTPATIENT
Start: 2021-09-10 | End: 2021-10-10

## 2021-09-10 NOTE — PROGRESS NOTES
"[facility]  PSYCHIATRIC FOLLOW-UP NOTE    Persons in attendance: Patient and Children(Son)      Reason for visit / chief complaint:   89 year old female presenting for medication management. Patient was continued on Lorazepam 0.5mg at bedtime for primary insomnia at previous visit.     SUBJECTIVE / HPI:  Patient states she has been doing well since last appointment. She notes difficulty sleeping for approximately a week following appointment with frequent nighttime awakenings. She has been sleeping well throughout the night for the last few weeks. She does not get out of bed if she does wake up during the night. She notes she takes time in the morning to get out of bed and denies dizziness or headaches. She states she has overall had a good mood and has been walking her dog and reading as well as watching movies. She's engaged in her facility and spends time with others. She notes some worry about her children's well being and their happiness. She states she primarily prays before bedtime about this, but does not think about it throughout the day. She denies worry with related difficulty relaxing or feeling on edge.         OBJECTIVE:    MSE :   Appearance: well groomed, appropriate in casual attire    Behavior: calm, cooperative, pleasant, engaged. good eye contact.  No tics. No mannerisms.   Speech : normal rate, normal volume, normal tone   Language: normal vocabulary   Mood: \"good\"   Affect: euthymic   Thought Process: linear, coherent, goal-directed. No flight of ideas.  No loose associations   Thought Content: no suicidal or homicidal ideation and no auditory or visual hallucinations    Attention/Concentration: appropriate   Memory: no gross deficits   Orientation: oriented to person, place, situation   Neurological: Deferred   Fund of Knowledge: appropriate   Insight/Judgment: good / good             PAST PSYCHIATRIC HISTORY  Prior psychiatric hospitalization: denies  Prior Self harm/suicide " attempt: denies  Prior Diagnosis: denies     PAST PSYCHIATRIC MEDICATIONS  · Lorazepam, denies other medication trials   SUBSTANCE USE HISTORY:  ALCOHOL: denies use, states she may have a half glass of wine every few months  TOBACCO: denies  CANNABIS: denies  OPIOIDS: denies  PRESCRIPTION MEDICATIONS: denies  OTHERS: denies  History of inpatient/outpatient rehab treatment: N/A     SOCIAL HISTORY  Lived in [place] until 2004 at which time she moved to [place]  Employment: Was working as  and  through most of life, 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 [employer]  Relationship: single  Kids: son lives in [place], daughter lives in [place]; 1 grand child and 2 great grand children in [place]  Current living situation: lives in a senior home center, lives independently with her dog, has support available if needed         Review of systems:        Constitutional negative   Eyes negative   Ears/Nose/Mouth/Throat negative   Cardiovascular negative   Respiratory negative   Gastrointestinal negative   Genitourinary negative   Muscular negative   Integumentary negative   Neurological negative   Endocrine negative   Hematologic/Lymphatic negative       Medical Records/Labs/Diagnostic Tests Reviewed:  Reviewed, reviewed note from Physiatry      Current Outpatient Medications:   •  gabapentin, TAKE 2 CAPSULES BY MOUTH THREE TIMES DAILY  •  fluticasone, USE 1 SPRAY IN EACH NOSTRIL ONCE DAILY  •  LORazepam, 0.5 mg, Oral, QHS  •  cetirizine, 10 mg, Oral, DAILY  •  lisinopril, 20 mg, Oral, QDAY with Breakfast  •  isosorbide mononitrate SR, 30 mg, Oral, Q EVENING  •  atorvastatin, 80 mg, Oral, Q EVENING  •  amLODIPine, 5 mg, Oral, DAILY  •  carvedilol, 6.25 mg, Oral, BID WITH MEALS  •  montelukast, 10 mg, Oral, Q EVENING  •  albuterol, INHALE TWO PUFFS BY MOUTH EVERY SIX HOURS AS NEEDED FOR SHORTNESS OF BREATH  •  Home Care Oxygen, 2-3 L/min, Inhalation, Continuous (Patient not taking:  Reported on 9/3/2021)  •  Acetaminophen, 1 Capsule, Oral, TID  •  Melatonin, 10 Tablet, Oral, QHS  •  PREPARATION H, 1 Application, Rectal, PRN  •  Multiple Vitamins-Minerals (CENTRUM SILVER PO), 1 Tablet, Oral, QAM  •  Biotin w/ Vitamins C & E, 1 Tablet, Oral, QAM  •  guaiFENesin ER, 600 mg, Oral, QAM  •  Vitamin C, 1,000 mg, Oral, QAM  •  VITAMIN E PO, 1 Capsule, Oral, QAM  •  aspirin, 81 mg, Oral, QDAY with Breakfast  •  Vitamin D, 2,000 Units, Oral, QAM  •  omeprazole, 20 mg, Oral, QDAY with Breakfast            ASSESSMENT:  89 year old female presenting for medication management. Patient has been taking Lorazepam since 2006 with taper down to 0.5mg at bedtime. She has declined further taper expressing concerns about sleep and impact of poor sleep on mood and functionality throughout the day. Patient has been using medication appropriately and denies dizziness, headaches. She has history of fall following tripping on her dog and has been mindful about his location and her safety while walking.   Spoke with patient extensively about options for discontinuation of medication and risks/benefits of alternative medications to aid with sleep. Discussed particularly concern about cognition and fall risk as well as sequelae of falls that can be detrimental to patient's well being. Following thorough conversation about risks, patient states desire to continue current dose with willingness to consider taper in the future. Patient has appropriately decreased dose significantly and has followed protocol and prescription fills appropriately.    DDX:  1. Primary Insomnia      PLAN:  - Continue Lorazepam 0.5mg PO at bedtime for insomnia   - reviewed  -I reviewed clinical lab tests done in last 1 year.   -I reviewed the imaging done in last year  -Medication options, alternatives (including no medications) and medication risks/benefits/side effects were discussed in detail.-  -The patient was advised to call, message  provider on MyChart, or come in to the clinic if symptoms worsen or if any future questions/issues regarding their medications arise; the patient verbalized understanding and agreement.    -The patient was educated to call 911, call the suicide hotline, or go to local ER if having thoughts of suicide or homicide; verbalized understanding.  -Case Discussed with Dr. Faustin, present for critical components of appointment   -RTC 3 months       - Medical Records/Labs/Diagnostic Tests Ordered: None      Silvia Lomeli DO

## 2021-09-14 PROBLEM — I73.9 PERIPHERAL VASCULAR DISEASE, UNSPECIFIED (HCC): Status: ACTIVE | Noted: 2021-09-14

## 2021-10-05 ENCOUNTER — OFFICE VISIT (OUTPATIENT)
Dept: URGENT CARE | Facility: CLINIC | Age: 86
End: 2021-10-05
Payer: MEDICARE

## 2021-10-05 VITALS
WEIGHT: 116 LBS | DIASTOLIC BLOOD PRESSURE: 78 MMHG | HEIGHT: 63 IN | OXYGEN SATURATION: 95 % | SYSTOLIC BLOOD PRESSURE: 140 MMHG | TEMPERATURE: 99.1 F | HEART RATE: 66 BPM | RESPIRATION RATE: 12 BRPM | BODY MASS INDEX: 20.55 KG/M2

## 2021-10-05 DIAGNOSIS — J32.9 SINUSITIS, UNSPECIFIED CHRONICITY, UNSPECIFIED LOCATION: ICD-10-CM

## 2021-10-05 PROCEDURE — 99214 OFFICE O/P EST MOD 30 MIN: CPT | Performed by: FAMILY MEDICINE

## 2021-10-05 RX ORDER — DOXYCYCLINE HYCLATE 100 MG
100 TABLET ORAL 2 TIMES DAILY
Qty: 10 TABLET | Refills: 0 | Status: SHIPPED | OUTPATIENT
Start: 2021-10-05 | End: 2021-10-10

## 2021-10-05 ASSESSMENT — FIBROSIS 4 INDEX: FIB4 SCORE: 2.65

## 2021-10-05 NOTE — PROGRESS NOTES
"Subjective     Dayana Lechuga is a 89 y.o. female who presents with Sinus Problem (headache, upset stomach, diarrhea, phlegm with tint of blood yesterday, x2days had sensative on face, left ear pressure)            89-year-old presenting for evaluation of left-sided sinus tenderness and also some sinus congestion and intermittent headache.  Review of system is positive for left ear pressure and pain on the left side of the cheek.  Denies any sinus surgery.  She had Covid before.  She also had for short of Covid.  No other Covid exposure.  She does not be tested.  Symptoms going on for the past few weeks.  Review of system is negative.      ROS           Objective     /78 (BP Location: Left arm, Patient Position: Sitting, BP Cuff Size: Small adult)   Pulse 66   Temp 37.3 °C (99.1 °F) (Temporal)   Resp 12   Ht 1.6 m (5' 3\")   Wt 52.6 kg (116 lb)   LMP  (LMP Unknown)   SpO2 95%   BMI 20.55 kg/m²      Physical Exam  Constitutional:       General: She is not in acute distress.     Appearance: She is not ill-appearing, toxic-appearing or diaphoretic.   HENT:      Right Ear: Tympanic membrane, ear canal and external ear normal.      Left Ear: Tympanic membrane, ear canal and external ear normal.      Nose: Congestion present.      Right Sinus: No maxillary sinus tenderness or frontal sinus tenderness.      Left Sinus: Maxillary sinus tenderness present. No frontal sinus tenderness.      Mouth/Throat:      Mouth: Mucous membranes are moist.      Pharynx: Oropharynx is clear. No oropharyngeal exudate or posterior oropharyngeal erythema.   Eyes:      Conjunctiva/sclera: Conjunctivae normal.   Cardiovascular:      Rate and Rhythm: Normal rate and regular rhythm.      Heart sounds: No friction rub. No gallop.    Pulmonary:      Effort: Pulmonary effort is normal. No respiratory distress.      Breath sounds: No stridor. No wheezing, rhonchi or rales.   Musculoskeletal:      Cervical back: Neck supple. "   Lymphadenopathy:      Cervical: No cervical adenopathy.   Skin:     General: Skin is warm.      Coloration: Skin is not jaundiced or pale.   Neurological:      Mental Status: She is alert and oriented to person, place, and time.   Psychiatric:         Behavior: Behavior normal.             Assessment & Plan       ASSESSMENT:PLAN:  1. Sinusitis, unspecified chronicity, unspecified location  - doxycycline (VIBRAMYCIN) 100 MG Tab; Take 1 Tablet by mouth 2 times a day for 5 days.  Dispense: 10 Tablet; Refill: 0    Continue symptomatic care  Start Doxycycline twice daily as directed for 5 days. Make sure you stay up right for at least 2 hours after each oral antibiotic use  Plan per orders and instructions  Warning signs reviewed  Follow up if not significantly improved as expected in 7 days, sooner if any worsening or new symptoms

## 2021-10-05 NOTE — PATIENT INSTRUCTIONS
Start Doxycycline twice daily as directed for 5 days. Make sure you stay up right for at least 2 hours after each oral antibiotic use  Follow up if not significantly improved as expected in 5-7 days, sooner if any worsening or new symptoms      Sinusitis, Adult  Sinusitis is inflammation of your sinuses. Sinuses are hollow spaces in the bones around your face. Your sinuses are located:  · Around your eyes.  · In the middle of your forehead.  · Behind your nose.  · In your cheekbones.  Mucus normally drains out of your sinuses. When your nasal tissues become inflamed or swollen, mucus can become trapped or blocked. This allows bacteria, viruses, and fungi to grow, which leads to infection. Most infections of the sinuses are caused by a virus.  Sinusitis can develop quickly. It can last for up to 4 weeks (acute) or for more than 12 weeks (chronic). Sinusitis often develops after a cold.  What are the causes?  This condition is caused by anything that creates swelling in the sinuses or stops mucus from draining. This includes:  · Allergies.  · Asthma.  · Infection from bacteria or viruses.  · Deformities or blockages in your nose or sinuses.  · Abnormal growths in the nose (nasal polyps).  · Pollutants, such as chemicals or irritants in the air.  · Infection from fungi (rare).  What increases the risk?  You are more likely to develop this condition if you:  · Have a weak body defense system (immune system).  · Do a lot of swimming or diving.  · Overuse nasal sprays.  · Smoke.  What are the signs or symptoms?  The main symptoms of this condition are pain and a feeling of pressure around the affected sinuses. Other symptoms include:  · Stuffy nose or congestion.  · Thick drainage from your nose.  · Swelling and warmth over the affected sinuses.  · Headache.  · Upper toothache.  · A cough that may get worse at night.  · Extra mucus that collects in the throat or the back of the nose (postnasal drip).  · Decreased sense of  smell and taste.  · Fatigue.  · A fever.  · Sore throat.  · Bad breath.  How is this diagnosed?  This condition is diagnosed based on:  · Your symptoms.  · Your medical history.  · A physical exam.  · Tests to find out if your condition is acute or chronic. This may include:  ? Checking your nose for nasal polyps.  ? Viewing your sinuses using a device that has a light (endoscope).  ? Testing for allergies or bacteria.  ? Imaging tests, such as an MRI or CT scan.  In rare cases, a bone biopsy may be done to rule out more serious types of fungal sinus disease.  How is this treated?  Treatment for sinusitis depends on the cause and whether your condition is chronic or acute.  · If caused by a virus, your symptoms should go away on their own within 10 days. You may be given medicines to relieve symptoms. They include:  ? Medicines that shrink swollen nasal passages (topical intranasal decongestants).  ? Medicines that treat allergies (antihistamines).  ? A spray that eases inflammation of the nostrils (topical intranasal corticosteroids).  ? Rinses that help get rid of thick mucus in your nose (nasal saline washes).  · If caused by bacteria, your health care provider may recommend waiting to see if your symptoms improve. Most bacterial infections will get better without antibiotic medicine. You may be given antibiotics if you have:  ? A severe infection.  ? A weak immune system.  · If caused by narrow nasal passages or nasal polyps, you may need to have surgery.  Follow these instructions at home:  Medicines  · Take, use, or apply over-the-counter and prescription medicines only as told by your health care provider. These may include nasal sprays.  · If you were prescribed an antibiotic medicine, take it as told by your health care provider. Do not stop taking the antibiotic even if you start to feel better.  Hydrate and humidify    · Drink enough fluid to keep your urine pale yellow. Staying hydrated will help to thin  your mucus.  · Use a cool mist humidifier to keep the humidity level in your home above 50%.  · Inhale steam for 10-15 minutes, 3-4 times a day, or as told by your health care provider. You can do this in the bathroom while a hot shower is running.  · Limit your exposure to cool or dry air.  Rest  · Rest as much as possible.  · Sleep with your head raised (elevated).  · Make sure you get enough sleep each night.  General instructions    · Apply a warm, moist washcloth to your face 3-4 times a day or as told by your health care provider. This will help with discomfort.  · Wash your hands often with soap and water to reduce your exposure to germs. If soap and water are not available, use hand .  · Do not smoke. Avoid being around people who are smoking (secondhand smoke).  · Keep all follow-up visits as told by your health care provider. This is important.  Contact a health care provider if:  · You have a fever.  · Your symptoms get worse.  · Your symptoms do not improve within 10 days.  Get help right away if:  · You have a severe headache.  · You have persistent vomiting.  · You have severe pain or swelling around your face or eyes.  · You have vision problems.  · You develop confusion.  · Your neck is stiff.  · You have trouble breathing.  Summary  · Sinusitis is soreness and inflammation of your sinuses. Sinuses are hollow spaces in the bones around your face.  · This condition is caused by nasal tissues that become inflamed or swollen. The swelling traps or blocks the flow of mucus. This allows bacteria, viruses, and fungi to grow, which leads to infection.  · If you were prescribed an antibiotic medicine, take it as told by your health care provider. Do not stop taking the antibiotic even if you start to feel better.  · Keep all follow-up visits as told by your health care provider. This is important.  This information is not intended to replace advice given to you by your health care provider. Make  sure you discuss any questions you have with your health care provider.  Document Released: 12/18/2006 Document Revised: 05/20/2019 Document Reviewed: 05/20/2019  Elsevier Patient Education © 2020 [employer]

## 2021-10-07 ENCOUNTER — OFFICE VISIT (OUTPATIENT)
Dept: MEDICAL GROUP | Facility: PHYSICIAN GROUP | Age: 86
End: 2021-10-07
Payer: MEDICARE

## 2021-10-07 VITALS
HEART RATE: 72 BPM | WEIGHT: 115.8 LBS | SYSTOLIC BLOOD PRESSURE: 100 MMHG | TEMPERATURE: 98.8 F | DIASTOLIC BLOOD PRESSURE: 58 MMHG | HEIGHT: 63 IN | OXYGEN SATURATION: 96 % | BODY MASS INDEX: 20.52 KG/M2

## 2021-10-07 DIAGNOSIS — F41.9 ANXIETY: ICD-10-CM

## 2021-10-07 DIAGNOSIS — G62.89 OTHER POLYNEUROPATHY: ICD-10-CM

## 2021-10-07 DIAGNOSIS — I10 ESSENTIAL HYPERTENSION: ICD-10-CM

## 2021-10-07 DIAGNOSIS — I25.119 CORONARY ARTERY DISEASE INVOLVING NATIVE CORONARY ARTERY OF NATIVE HEART WITH ANGINA PECTORIS (HCC): ICD-10-CM

## 2021-10-07 PROBLEM — Z98.41 S/P BILATERAL CATARACT EXTRACTION: Status: RESOLVED | Noted: 2018-02-27 | Resolved: 2021-10-07

## 2021-10-07 PROBLEM — E87.1 HYPONATREMIA: Status: RESOLVED | Noted: 2020-11-20 | Resolved: 2021-10-07

## 2021-10-07 PROBLEM — Z98.42 S/P BILATERAL CATARACT EXTRACTION: Status: RESOLVED | Noted: 2018-02-27 | Resolved: 2021-10-07

## 2021-10-07 PROBLEM — F13.20 BENZODIAZEPINE DEPENDENCE (HCC): Status: RESOLVED | Noted: 2017-11-13 | Resolved: 2021-10-07

## 2021-10-07 PROBLEM — U07.1 COVID-19: Status: RESOLVED | Noted: 2020-11-20 | Resolved: 2021-10-07

## 2021-10-07 PROBLEM — R19.4 CHANGE IN BOWEL HABIT: Status: RESOLVED | Noted: 2021-07-05 | Resolved: 2021-10-07

## 2021-10-07 PROBLEM — K59.01 SLOW TRANSIT CONSTIPATION: Status: RESOLVED | Noted: 2018-08-29 | Resolved: 2021-10-07

## 2021-10-07 PROBLEM — I25.10 CAD (CORONARY ARTERY DISEASE): Status: RESOLVED | Noted: 2020-09-11 | Resolved: 2021-10-07

## 2021-10-07 PROCEDURE — 99214 OFFICE O/P EST MOD 30 MIN: CPT | Performed by: FAMILY MEDICINE

## 2021-10-07 ASSESSMENT — FIBROSIS 4 INDEX: FIB4 SCORE: 2.65

## 2021-10-07 NOTE — PROGRESS NOTES
Subjective:     Chief Complaint   Patient presents with   • Establish Care   • Immunizations     flu and discuss booster covid   • ED Follow-Up       HPI:   Dayana presents today to discuss the following.  Prior PCP: Milena Stone PA-C    Anxiety  Chronic issue  The patient is following up with psychiatry   On lorazepam 0.5mg qHS    Coronary artery disease involving native coronary artery of native heart with angina pectoris  Chronic issue  Seeing Dr Jones  On BB and statin   S/p stent placed 7/16    Essential hypertension  Stable. Monitoring BP at home. Currently taking amlodipine and lisinopril and coreg as directed. Also taking baby aspirin. Denies lightheadedness, vision changes, headache, palpitations or leg swelling.      Peripheral neuropathy  Chronic issue  Has had neuropathy to her left leg 2/2 low back pain  On gabapentin helps       Past Medical History:   Diagnosis Date   • Allergy    • Anxiety    • ASTHMA    • Bronchitis    • CAD (coronary artery disease)     JT to RCA; 70% stenosis in LAD   • Chills    • Constipation    • Difficulty breathing    • GERD (gastroesophageal reflux disease)    • Heart attack (HCC)    • Heartburn    • Hyperlipidemia    • Hypertension    • Influenza    • Insomnia    • Lumbar back pain 9/10/2016   • Mumps    • OSTEOPOROSIS    • Palpitations    • Pneumonia    • PVD (peripheral vascular disease) ([facility])     70% PAD-followed by [facility]IO   • Sweat, sweating, excessive    • Tonsillitis        Current Outpatient Medications Ordered in Epic   Medication Sig Dispense Refill   • doxycycline (VIBRAMYCIN) 100 MG Tab Take 1 Tablet by mouth 2 times a day for 5 days. 10 Tablet 0   • LORazepam (ATIVAN) 0.5 MG Tab Take 1 Tablet by mouth at bedtime for 30 days. 30 Tablet 2   • gabapentin (NEURONTIN) 100 MG Cap TAKE 2 CAPSULES BY MOUTH THREE TIMES DAILY 540 Capsule 1   • fluticasone (FLONASE) 50 MCG/ACT nasal spray USE 1 SPRAY IN EACH NOSTRIL ONCE DAILY 16 g 0   • cetirizine (ZYRTEC ALLERGY)  10 MG Tab Take 10 mg by mouth every day.     • lisinopril (PRINIVIL) 20 MG Tab Take 1 tablet by mouth every morning with breakfast. 100 tablet 3   • isosorbide mononitrate SR (IMDUR) 30 MG TABLET SR 24 HR Take 1 tablet by mouth every evening. 100 tablet 3   • atorvastatin (LIPITOR) 80 MG tablet Take 1 tablet by mouth every evening. 100 tablet 3   • amLODIPine (NORVASC) 5 MG Tab Take 1 tablet by mouth every day. 100 tablet 3   • carvedilol (COREG) 6.25 MG Tab Take 1 tablet by mouth 2 times a day, with meals for 360 days. 180 tablet 3   • montelukast (SINGULAIR) 10 MG Tab Take 1 tablet by mouth every evening. 90 tablet 2   • albuterol 108 (90 Base) MCG/ACT Aero Soln inhalation aerosol INHALE TWO PUFFS BY MOUTH EVERY SIX HOURS AS NEEDED FOR SHORTNESS OF BREATH 25.5 g 1   • Acetaminophen 500 MG Cap Take 1 Cap by mouth 3 times a day.     • Melatonin 10 MG Tab Take 10 Tablets by mouth at bedtime.     • PREPARATION H 0.25-3-12-18 % Cream Insert 1 Application into the rectum as needed (pain).     • Multiple Vitamins-Minerals (CENTRUM SILVER PO) Take 1 Tab by mouth every morning.     • Biotin w/ Vitamins C & E (HAIR/SKIN/NAILS) 1250-7.5-7.5 MCG-MG-UNT Chew Tab Chew 1 Tab every morning.     • guaifenesin LA (MUCINEX) 600 MG TABLET SR 12 HR Take 600 mg by mouth every morning.     • Ascorbic Acid (VITAMIN C) 1000 MG Tab Take 1,000 mg by mouth every morning.     • VITAMIN E PO Take 1 Cap by mouth every morning.     • aspirin (ASA) 81 MG Chew Tab chewable tablet Chew 81 mg every morning with breakfast. 100 Tab 11   • Cholecalciferol (VITAMIN D) 50 MCG (2000 UT) Cap Take 2,000 Units by mouth every morning.     • omeprazole (PRILOSEC) 20 MG delayed-release capsule Take 20 mg by mouth every morning with breakfast.       No current Epic-ordered facility-administered medications on file.       Allergies:  Bactrim [sulfamethoxazole w-trimethoprim], Pcn [penicillins], and Codeine    Health Maintenance: Completed    ROS:  Gen: no  "fevers/chills, no changes in weight  Eyes: no changes in vision  Pulm: no sob, no cough  CV: no chest pain, no palpitations  GI: no nausea/vomiting, no diarrhea      Objective:     Exam:  /58 (BP Location: Right arm, Patient Position: Sitting, BP Cuff Size: Adult)   Pulse 72   Temp 37.1 °C (98.8 °F) (Temporal)   Ht 1.6 m (5' 3\")   Wt 52.5 kg (115 lb 12.8 oz)   LMP  (LMP Unknown)   SpO2 96%   BMI 20.51 kg/m²  Body mass index is 20.51 kg/m².    Gen: Alert and oriented, No apparent distress.  HENT: TMs are clear. Oropharynx is w/o erythema or exudates. Neck is supple without cervical lymphadenopathy. No JVD.   Eyes: PERRLA  Lungs: Normal effort, CTA bilaterally, no wheezes, rhonchi, or rales  CV: Regular rate and rhythm. No murmurs, rubs, or gallops.  Abdomen: Soft, nontender, nondistended. Normal bowel sounds. Liver and spleen are not palpable  Ext: No clubbing, cyanosis, edema.  Skin: no rash, lesions or ulcers  Neuro: Moves all extremities.  Psych: AAOx3      Assessment & Plan:     89 y.o. female with the following -     1. Anxiety  Chronic, stable condition.  The patient is on lorazepam to help her sleep better and relieve anxiety.  At this time I recommend she continues to follow-up with psychiatry.  Continue to wean off lorazepam as needed.    2. Coronary artery disease involving native coronary artery of native heart with angina pectoris (HCC)  Chronic, stable condition.  The patient follows up with cardiology for this.  She is medically optimized on ACE, beta-blocker, high-dose statin.  Status post stent placement in 2016.  I recommend she continues to follow-up with cardiology.    3. Essential hypertension  Chronic, stable condition.  Well-controlled with current medication.    4. Other polyneuropathy  Chronic, stable condition.  Well-controlled with gabapentin.        No follow-ups on file.    Please note that this dictation was created using voice recognition software. I have made every " reasonable attempt to correct obvious errors, but I expect that there are errors of grammar and possibly content that I did not discover before finalizing the note.

## 2021-10-07 NOTE — ASSESSMENT & PLAN NOTE
Stable. Monitoring BP at home. Currently taking amlodipine and lisinopril and coreg as directed. Also taking baby aspirin. Denies lightheadedness, vision changes, headache, palpitations or leg swelling.

## 2021-10-11 ENCOUNTER — TELEPHONE (OUTPATIENT)
Dept: MEDICAL GROUP | Facility: PHYSICIAN GROUP | Age: 86
End: 2021-10-11

## 2021-10-12 ENCOUNTER — OFFICE VISIT (OUTPATIENT)
Dept: URGENT CARE | Facility: CLINIC | Age: 86
End: 2021-10-12
Payer: MEDICARE

## 2021-10-12 VITALS
WEIGHT: 115 LBS | HEART RATE: 68 BPM | DIASTOLIC BLOOD PRESSURE: 68 MMHG | BODY MASS INDEX: 20.38 KG/M2 | OXYGEN SATURATION: 96 % | TEMPERATURE: 99 F | SYSTOLIC BLOOD PRESSURE: 104 MMHG | HEIGHT: 63 IN | RESPIRATION RATE: 16 BRPM

## 2021-10-12 DIAGNOSIS — J32.9 SINUSITIS, UNSPECIFIED CHRONICITY, UNSPECIFIED LOCATION: ICD-10-CM

## 2021-10-12 PROCEDURE — 99213 OFFICE O/P EST LOW 20 MIN: CPT | Performed by: PHYSICIAN ASSISTANT

## 2021-10-12 RX ORDER — PREDNISONE 10 MG/1
20 TABLET ORAL DAILY
Qty: 6 TABLET | Refills: 0 | Status: SHIPPED | OUTPATIENT
Start: 2021-10-12 | End: 2021-10-18

## 2021-10-12 RX ORDER — AZELASTINE 1 MG/ML
1 SPRAY, METERED NASAL 2 TIMES DAILY
Qty: 30 ML | Refills: 0 | Status: SHIPPED | OUTPATIENT
Start: 2021-10-12 | End: 2022-04-12

## 2021-10-12 RX ORDER — DOXYCYCLINE HYCLATE 100 MG
100 TABLET ORAL 2 TIMES DAILY
Qty: 10 TABLET | Refills: 0 | Status: ON HOLD | OUTPATIENT
Start: 2021-10-12 | End: 2021-10-18

## 2021-10-12 ASSESSMENT — ENCOUNTER SYMPTOMS
MYALGIAS: 0
DIARRHEA: 0
NAUSEA: 0
FEVER: 0
CHILLS: 0
HEADACHES: 0
EYE PAIN: 0
ABDOMINAL PAIN: 0
SORE THROAT: 0
COUGH: 0
SHORTNESS OF BREATH: 0
CONSTIPATION: 0
VOMITING: 0
SINUS PAIN: 1

## 2021-10-12 ASSESSMENT — FIBROSIS 4 INDEX: FIB4 SCORE: 2.65

## 2021-10-12 NOTE — TELEPHONE ENCOUNTER
Phone Number Called: [phone]     Call outcome: Did not leave a detailed message. Requested patient to call back.    Message: LVM to please call back

## 2021-10-12 NOTE — PROGRESS NOTES
Subjective:   Dayana Lechuga is a 89 y.o. female who presents for Follow-Up (sinus follow up ,, headaches , sinus drainage and feeling nauseas ,  not feeling better after finishing medication )      HPI:  89-year-old female presents reporting incomplete resolution of sinus infection after 5 days of doxycycline.  She is optimized on antihistamine, intranasal steroid as well as montelukast.  No worsening, reports mild improvement however with ongoing left-sided facial pressure, nasal drainage.    Review of Systems   Constitutional: Negative for chills and fever.   HENT: Positive for congestion and sinus pain. Negative for ear pain and sore throat.    Eyes: Negative for pain.   Respiratory: Negative for cough and shortness of breath.    Cardiovascular: Negative for chest pain.   Gastrointestinal: Negative for abdominal pain, constipation, diarrhea, nausea and vomiting.   Genitourinary: Negative for dysuria.   Musculoskeletal: Negative for myalgias.   Skin: Negative for rash.   Neurological: Negative for headaches.       Medications:    • Acetaminophen Caps  • albuterol Aers  • amLODIPine Tabs  • aspirin Chew  • atorvastatin  • Biotin w/ Vitamins C & E Chew  • carvedilol Tabs  • CENTRUM SILVER PO  • cetirizine Tabs  • fluticasone  • gabapentin Caps  • guaiFENesin ER Tb12  • isosorbide mononitrate SR Tb24  • lisinopril Tabs  • Melatonin Tabs  • montelukast Tabs  • omeprazole  • PREPARATION H Crea  • Vitamin C Tabs  • Vitamin D Caps  • VITAMIN E PO    Allergies: Bactrim [sulfamethoxazole w-trimethoprim], Other drug, Pcn [penicillins], and Codeine    Problem List: Dayana Lechuga does not have any pertinent problems on file.    Surgical History:  Past Surgical History:   Procedure Laterality Date   • ABDOMINAL HYSTERECTOMY TOTAL     • APPENDECTOMY     • HERNIA REPAIR     • HYSTERECTOMY LAPAROSCOPY     • TONSILLECTOMY     • ZZZ CARDIAC CATH         Past Social Hx: Dayana Lechuga  reports that  "she has never smoked. She has never used smokeless tobacco. She reports that she does not drink alcohol and does not use drugs.     Past Family Hx:  Dayana Lechuga family history includes Cancer in her brother and brother; Heart Attack in her father.     Problem list, medications, and allergies reviewed by myself today in Epic.     Objective:     /68 (BP Location: Left arm, Patient Position: Sitting, BP Cuff Size: Adult)   Pulse 68   Temp 37.2 °C (99 °F) (Temporal)   Resp 16   Ht 1.6 m (5' 3\")   Wt 52.2 kg (115 lb)   LMP  (LMP Unknown)   SpO2 96%   BMI 20.37 kg/m²     Physical Exam  Vitals reviewed.   Constitutional:       Appearance: Normal appearance.   HENT:      Head: Normocephalic and atraumatic.      Right Ear: Tympanic membrane, ear canal and external ear normal.      Left Ear: Tympanic membrane, ear canal and external ear normal.      Nose: Rhinorrhea present. No congestion.      Mouth/Throat:      Mouth: Mucous membranes are moist.   Eyes:      Conjunctiva/sclera: Conjunctivae normal.   Cardiovascular:      Rate and Rhythm: Normal rate.   Pulmonary:      Effort: Pulmonary effort is normal.   Skin:     General: Skin is warm and dry.      Capillary Refill: Capillary refill takes less than 2 seconds.   Neurological:      Mental Status: She is alert and oriented to person, place, and time.         Assessment/Plan:     Diagnosis and associated orders:     1. Sinusitis, unspecified chronicity, unspecified location  predniSONE (DELTASONE) 10 MG Tab    doxycycline (VIBRAMYCIN) 100 MG Tab    azelastine (ASTELIN) 137 MCG/SPRAY nasal spray      Comments/MDM:     • 20 mg of prednisone for 3 days, doxycycline, Durga style nasal spray.  Patient is likely in her worst allergy season however it does appear that she may have some lingering sinus issues and would benefit from a more prolonged course of doxycycline.  Recommend continuation of above treatment.  No sign of venous sinus thrombosis.   "       Differential diagnosis, natural history, supportive care, and indications for immediate follow-up discussed.    Advised the patient to follow-up with the primary care physician for recheck, reevaluation, and consideration of further management.    Please note that this dictation was created using voice recognition software. I have made a reasonable attempt to correct obvious errors, but I expect that there are errors of grammar and possibly content that I did not discover before finalizing the note.    This note was electronically signed by Byron Heredia PA-C

## 2021-10-12 NOTE — TELEPHONE ENCOUNTER
VOICEMAIL  1. Caller Name: Dayana Lechuga                   Call Back Number: [phone]      2. Message: Patient left VM requesting med refill for Doxycycline    Please advise    3. Patient approves office to leave a detailed voicemail/MyChart message: N\A

## 2021-10-14 NOTE — TELEPHONE ENCOUNTER
Phone Number Called: [phone]      Call outcome: Spoke to patient regarding message below.    Message: Spoke to patient regarding Doxycycline, she stated she needed it for sinus infection but has already been treated by [facility] who placed her on Doxycycline ,prednisone and nasal spray (name not mentioned)  .

## 2021-10-14 NOTE — TELEPHONE ENCOUNTER
Phone Number Called: [phone]     Call outcome: Did not leave a detailed message. Requested patient to call back.    Message: LVM to please call back regarding Medication

## 2021-10-15 ENCOUNTER — HOSPITAL ENCOUNTER (INPATIENT)
Facility: MEDICAL CENTER | Age: 86
LOS: 2 days | DRG: 392 | End: 2021-10-18
Attending: EMERGENCY MEDICINE | Admitting: INTERNAL MEDICINE
Payer: MEDICARE

## 2021-10-15 DIAGNOSIS — A09 INFECTIOUS DIARRHEA IN ADULT PATIENT: ICD-10-CM

## 2021-10-15 DIAGNOSIS — E86.0 DEHYDRATION: ICD-10-CM

## 2021-10-15 DIAGNOSIS — R11.2 NAUSEA AND VOMITING, INTRACTABILITY OF VOMITING NOT SPECIFIED, UNSPECIFIED VOMITING TYPE: ICD-10-CM

## 2021-10-15 DIAGNOSIS — R19.7 ACUTE DIARRHEA: ICD-10-CM

## 2021-10-15 DIAGNOSIS — E87.6 HYPOKALEMIA: ICD-10-CM

## 2021-10-15 DIAGNOSIS — E83.51 HYPOCALCEMIA: ICD-10-CM

## 2021-10-15 LAB
ALBUMIN SERPL BCP-MCNC: 3.7 G/DL (ref 3.2–4.9)
ALBUMIN/GLOB SERPL: 1.3 G/DL
ALP SERPL-CCNC: 60 U/L (ref 30–99)
ALT SERPL-CCNC: 12 U/L (ref 2–50)
ANION GAP SERPL CALC-SCNC: 16 MMOL/L (ref 7–16)
AST SERPL-CCNC: 17 U/L (ref 12–45)
BASOPHILS # BLD AUTO: 0.6 % (ref 0–1.8)
BASOPHILS # BLD: 0.07 K/UL (ref 0–0.12)
BILIRUB SERPL-MCNC: 0.5 MG/DL (ref 0.1–1.5)
BUN SERPL-MCNC: 18 MG/DL (ref 8–22)
CALCIUM SERPL-MCNC: 9.4 MG/DL (ref 8.4–10.2)
CHLORIDE SERPL-SCNC: 106 MMOL/L (ref 96–112)
CO2 SERPL-SCNC: 16 MMOL/L (ref 20–33)
CREAT SERPL-MCNC: 0.52 MG/DL (ref 0.5–1.4)
EOSINOPHIL # BLD AUTO: 0.17 K/UL (ref 0–0.51)
EOSINOPHIL NFR BLD: 1.5 % (ref 0–6.9)
ERYTHROCYTE [DISTWIDTH] IN BLOOD BY AUTOMATED COUNT: 50.8 FL (ref 35.9–50)
GLOBULIN SER CALC-MCNC: 2.9 G/DL (ref 1.9–3.5)
GLUCOSE SERPL-MCNC: 126 MG/DL (ref 65–99)
HCT VFR BLD AUTO: 49 % (ref 37–47)
HGB BLD-MCNC: 14.9 G/DL (ref 12–16)
IMM GRANULOCYTES # BLD AUTO: 0.03 K/UL (ref 0–0.11)
IMM GRANULOCYTES NFR BLD AUTO: 0.3 % (ref 0–0.9)
LYMPHOCYTES # BLD AUTO: 0.87 K/UL (ref 1–4.8)
LYMPHOCYTES NFR BLD: 7.5 % (ref 22–41)
MCH RBC QN AUTO: 32.7 PG (ref 27–33)
MCHC RBC AUTO-ENTMCNC: 30.4 G/DL (ref 33.6–35)
MCV RBC AUTO: 107.7 FL (ref 81.4–97.8)
MONOCYTES # BLD AUTO: 0.7 K/UL (ref 0–0.85)
MONOCYTES NFR BLD AUTO: 6.1 % (ref 0–13.4)
NEUTROPHILS # BLD AUTO: 9.71 K/UL (ref 2–7.15)
NEUTROPHILS NFR BLD: 84 % (ref 44–72)
NRBC # BLD AUTO: 0 K/UL
NRBC BLD-RTO: 0 /100 WBC
PLATELET # BLD AUTO: 131 K/UL (ref 164–446)
PMV BLD AUTO: 10.8 FL (ref 9–12.9)
POTASSIUM SERPL-SCNC: 4.1 MMOL/L (ref 3.6–5.5)
PROT SERPL-MCNC: 6.6 G/DL (ref 6–8.2)
RBC # BLD AUTO: 4.55 M/UL (ref 4.2–5.4)
SODIUM SERPL-SCNC: 138 MMOL/L (ref 135–145)
WBC # BLD AUTO: 11.6 K/UL (ref 4.8–10.8)

## 2021-10-15 PROCEDURE — 700111 HCHG RX REV CODE 636 W/ 250 OVERRIDE (IP): Performed by: EMERGENCY MEDICINE

## 2021-10-15 PROCEDURE — 87493 C DIFF AMPLIFIED PROBE: CPT

## 2021-10-15 PROCEDURE — 94760 N-INVAS EAR/PLS OXIMETRY 1: CPT

## 2021-10-15 PROCEDURE — 87899 AGENT NOS ASSAY W/OPTIC: CPT

## 2021-10-15 PROCEDURE — 80053 COMPREHEN METABOLIC PANEL: CPT

## 2021-10-15 PROCEDURE — 96374 THER/PROPH/DIAG INJ IV PUSH: CPT

## 2021-10-15 PROCEDURE — 99285 EMERGENCY DEPT VISIT HI MDM: CPT

## 2021-10-15 PROCEDURE — 700105 HCHG RX REV CODE 258: Performed by: EMERGENCY MEDICINE

## 2021-10-15 PROCEDURE — 81003 URINALYSIS AUTO W/O SCOPE: CPT

## 2021-10-15 PROCEDURE — 83630 LACTOFERRIN FECAL (QUAL): CPT

## 2021-10-15 PROCEDURE — 87045 FECES CULTURE AEROBIC BACT: CPT

## 2021-10-15 PROCEDURE — 85025 COMPLETE CBC W/AUTO DIFF WBC: CPT

## 2021-10-15 RX ORDER — ONDANSETRON 2 MG/ML
4 INJECTION INTRAMUSCULAR; INTRAVENOUS ONCE
Status: COMPLETED | OUTPATIENT
Start: 2021-10-15 | End: 2021-10-15

## 2021-10-15 RX ORDER — SODIUM CHLORIDE 9 MG/ML
INJECTION, SOLUTION INTRAVENOUS CONTINUOUS
Status: DISCONTINUED | OUTPATIENT
Start: 2021-10-15 | End: 2021-10-18

## 2021-10-15 RX ADMIN — SODIUM CHLORIDE 1000 ML: 9 INJECTION, SOLUTION INTRAVENOUS at 22:41

## 2021-10-15 RX ADMIN — ONDANSETRON 4 MG: 2 INJECTION INTRAMUSCULAR; INTRAVENOUS at 22:41

## 2021-10-16 ENCOUNTER — APPOINTMENT (OUTPATIENT)
Dept: RADIOLOGY | Facility: MEDICAL CENTER | Age: 86
DRG: 392 | End: 2021-10-16
Attending: EMERGENCY MEDICINE
Payer: MEDICARE

## 2021-10-16 LAB
ANION GAP SERPL CALC-SCNC: 12 MMOL/L (ref 7–16)
APPEARANCE UR: CLEAR
BILIRUB UR QL STRIP.AUTO: NEGATIVE
BUN SERPL-MCNC: 17 MG/DL (ref 8–22)
C DIFF DNA SPEC QL NAA+PROBE: NEGATIVE
C DIFF TOX GENS STL QL NAA+PROBE: NEGATIVE
CALCIUM SERPL-MCNC: 8.3 MG/DL (ref 8.4–10.2)
CHLORIDE SERPL-SCNC: 107 MMOL/L (ref 96–112)
CO2 SERPL-SCNC: 19 MMOL/L (ref 20–33)
COLOR UR: YELLOW
CREAT SERPL-MCNC: 0.4 MG/DL (ref 0.5–1.4)
CRP SERPL HS-MCNC: 1.04 MG/DL (ref 0–0.75)
EKG IMPRESSION: NORMAL
EKG IMPRESSION: NORMAL
ERYTHROCYTE [DISTWIDTH] IN BLOOD BY AUTOMATED COUNT: 47.3 FL (ref 35.9–50)
FLUAV RNA SPEC QL NAA+PROBE: NEGATIVE
FLUBV RNA SPEC QL NAA+PROBE: NEGATIVE
GLUCOSE SERPL-MCNC: 120 MG/DL (ref 65–99)
GLUCOSE UR STRIP.AUTO-MCNC: NEGATIVE MG/DL
HCT VFR BLD AUTO: 42.5 % (ref 37–47)
HGB BLD-MCNC: 13.9 G/DL (ref 12–16)
KETONES UR STRIP.AUTO-MCNC: NEGATIVE MG/DL
LACTATE BLD-SCNC: 2 MMOL/L (ref 0.5–2)
LEUKOCYTE ESTERASE UR QL STRIP.AUTO: NEGATIVE
MCH RBC QN AUTO: 32.9 PG (ref 27–33)
MCHC RBC AUTO-ENTMCNC: 32.7 G/DL (ref 33.6–35)
MCV RBC AUTO: 100.7 FL (ref 81.4–97.8)
MICRO URNS: NORMAL
NITRITE UR QL STRIP.AUTO: NEGATIVE
PH UR STRIP.AUTO: 6.5 [PH] (ref 5–8)
PLATELET # BLD AUTO: 185 K/UL (ref 164–446)
PMV BLD AUTO: 9.9 FL (ref 9–12.9)
POTASSIUM SERPL-SCNC: 3.7 MMOL/L (ref 3.6–5.5)
PROCALCITONIN SERPL-MCNC: 0.47 NG/ML
PROT UR QL STRIP: NEGATIVE MG/DL
RBC # BLD AUTO: 4.22 M/UL (ref 4.2–5.4)
RBC UR QL AUTO: NEGATIVE
RSV RNA SPEC QL NAA+PROBE: NEGATIVE
SARS-COV-2 RNA RESP QL NAA+PROBE: NOTDETECTED
SODIUM SERPL-SCNC: 138 MMOL/L (ref 135–145)
SP GR UR STRIP.AUTO: 1.01
SPECIMEN SOURCE: NORMAL
TROPONIN T SERPL-MCNC: 10 NG/L (ref 6–19)
TROPONIN T SERPL-MCNC: 11 NG/L (ref 6–19)
TROPONIN T SERPL-MCNC: 12 NG/L (ref 6–19)
WBC # BLD AUTO: 9.7 K/UL (ref 4.8–10.8)

## 2021-10-16 PROCEDURE — C9803 HOPD COVID-19 SPEC COLLECT: HCPCS | Performed by: HOSPITALIST

## 2021-10-16 PROCEDURE — 700105 HCHG RX REV CODE 258: Performed by: HOSPITALIST

## 2021-10-16 PROCEDURE — 700105 HCHG RX REV CODE 258: Performed by: EMERGENCY MEDICINE

## 2021-10-16 PROCEDURE — 83605 ASSAY OF LACTIC ACID: CPT

## 2021-10-16 PROCEDURE — 85027 COMPLETE CBC AUTOMATED: CPT

## 2021-10-16 PROCEDURE — 700101 HCHG RX REV CODE 250: Performed by: HOSPITALIST

## 2021-10-16 PROCEDURE — 86140 C-REACTIVE PROTEIN: CPT

## 2021-10-16 PROCEDURE — 99220 PR INITIAL OBSERVATION CARE,LEVL III: CPT | Performed by: HOSPITALIST

## 2021-10-16 PROCEDURE — 700102 HCHG RX REV CODE 250 W/ 637 OVERRIDE(OP)

## 2021-10-16 PROCEDURE — 700111 HCHG RX REV CODE 636 W/ 250 OVERRIDE (IP): Performed by: HOSPITALIST

## 2021-10-16 PROCEDURE — A9270 NON-COVERED ITEM OR SERVICE: HCPCS

## 2021-10-16 PROCEDURE — A9270 NON-COVERED ITEM OR SERVICE: HCPCS | Performed by: HOSPITALIST

## 2021-10-16 PROCEDURE — 87798 DETECT AGENT NOS DNA AMP: CPT

## 2021-10-16 PROCEDURE — 700117 HCHG RX CONTRAST REV CODE 255: Performed by: EMERGENCY MEDICINE

## 2021-10-16 PROCEDURE — 84145 PROCALCITONIN (PCT): CPT

## 2021-10-16 PROCEDURE — 93010 ELECTROCARDIOGRAM REPORT: CPT | Performed by: INTERNAL MEDICINE

## 2021-10-16 PROCEDURE — 93005 ELECTROCARDIOGRAM TRACING: CPT | Performed by: HOSPITALIST

## 2021-10-16 PROCEDURE — 770020 HCHG ROOM/CARE - TELE (206)

## 2021-10-16 PROCEDURE — 74177 CT ABD & PELVIS W/CONTRAST: CPT | Mod: ME

## 2021-10-16 PROCEDURE — 96365 THER/PROPH/DIAG IV INF INIT: CPT

## 2021-10-16 PROCEDURE — 0241U HCHG SARS-COV-2 COVID-19 NFCT DS RESP RNA 4 TRGT MIC: CPT

## 2021-10-16 PROCEDURE — 700102 HCHG RX REV CODE 250 W/ 637 OVERRIDE(OP): Performed by: HOSPITALIST

## 2021-10-16 PROCEDURE — 96367 TX/PROPH/DG ADDL SEQ IV INF: CPT

## 2021-10-16 PROCEDURE — 84484 ASSAY OF TROPONIN QUANT: CPT | Mod: 91

## 2021-10-16 PROCEDURE — 80048 BASIC METABOLIC PNL TOTAL CA: CPT

## 2021-10-16 RX ORDER — ASPIRIN 325 MG
325 TABLET ORAL DAILY
Status: DISCONTINUED | OUTPATIENT
Start: 2021-10-16 | End: 2021-10-17

## 2021-10-16 RX ORDER — AMLODIPINE BESYLATE 5 MG/1
5 TABLET ORAL DAILY
Status: DISCONTINUED | OUTPATIENT
Start: 2021-10-16 | End: 2021-10-18 | Stop reason: HOSPADM

## 2021-10-16 RX ORDER — POLYETHYLENE GLYCOL 3350 17 G/17G
1 POWDER, FOR SOLUTION ORAL
Status: DISCONTINUED | OUTPATIENT
Start: 2021-10-16 | End: 2021-10-18 | Stop reason: HOSPADM

## 2021-10-16 RX ORDER — ASPIRIN 81 MG/1
81 TABLET, CHEWABLE ORAL
Status: DISCONTINUED | OUTPATIENT
Start: 2021-10-16 | End: 2021-10-16

## 2021-10-16 RX ORDER — LORAZEPAM 0.5 MG/1
0.5 TABLET ORAL
COMMUNITY
End: 2022-01-13 | Stop reason: SDUPTHER

## 2021-10-16 RX ORDER — MONTELUKAST SODIUM 10 MG/1
10 TABLET ORAL EVERY EVENING
Status: DISCONTINUED | OUTPATIENT
Start: 2021-10-16 | End: 2021-10-18 | Stop reason: HOSPADM

## 2021-10-16 RX ORDER — ACETAMINOPHEN 325 MG/1
650 TABLET ORAL EVERY 6 HOURS PRN
Status: DISCONTINUED | OUTPATIENT
Start: 2021-10-16 | End: 2021-10-18 | Stop reason: HOSPADM

## 2021-10-16 RX ORDER — ONDANSETRON 2 MG/ML
4 INJECTION INTRAMUSCULAR; INTRAVENOUS EVERY 4 HOURS PRN
Status: DISCONTINUED | OUTPATIENT
Start: 2021-10-16 | End: 2021-10-18 | Stop reason: HOSPADM

## 2021-10-16 RX ORDER — GABAPENTIN 100 MG/1
200 CAPSULE ORAL 3 TIMES DAILY
Status: DISCONTINUED | OUTPATIENT
Start: 2021-10-16 | End: 2021-10-18 | Stop reason: HOSPADM

## 2021-10-16 RX ORDER — ATORVASTATIN CALCIUM 40 MG/1
80 TABLET, FILM COATED ORAL EVERY EVENING
Status: DISCONTINUED | OUTPATIENT
Start: 2021-10-16 | End: 2021-10-18 | Stop reason: HOSPADM

## 2021-10-16 RX ORDER — LISINOPRIL 20 MG/1
20 TABLET ORAL
Status: DISCONTINUED | OUTPATIENT
Start: 2021-10-16 | End: 2021-10-18 | Stop reason: HOSPADM

## 2021-10-16 RX ORDER — ASPIRIN 81 MG/1
324 TABLET, CHEWABLE ORAL DAILY
Status: DISCONTINUED | OUTPATIENT
Start: 2021-10-16 | End: 2021-10-17

## 2021-10-16 RX ORDER — ECHINACEA PURPUREA EXTRACT 125 MG
1 TABLET ORAL 2 TIMES DAILY
COMMUNITY
End: 2022-11-10

## 2021-10-16 RX ORDER — CHOLECALCIFEROL (VITAMIN D3) 125 MCG
10 CAPSULE ORAL
Status: DISCONTINUED | OUTPATIENT
Start: 2021-10-16 | End: 2021-10-18 | Stop reason: HOSPADM

## 2021-10-16 RX ORDER — CARVEDILOL 6.25 MG/1
6.25 TABLET ORAL 2 TIMES DAILY WITH MEALS
Status: DISCONTINUED | OUTPATIENT
Start: 2021-10-16 | End: 2021-10-18 | Stop reason: HOSPADM

## 2021-10-16 RX ORDER — BISACODYL 10 MG
10 SUPPOSITORY, RECTAL RECTAL
Status: DISCONTINUED | OUTPATIENT
Start: 2021-10-16 | End: 2021-10-18 | Stop reason: HOSPADM

## 2021-10-16 RX ORDER — FLUTICASONE PROPIONATE 50 MCG
1 SPRAY, SUSPENSION (ML) NASAL DAILY
Status: DISCONTINUED | OUTPATIENT
Start: 2021-10-16 | End: 2021-10-18 | Stop reason: HOSPADM

## 2021-10-16 RX ORDER — VITAMIN B COMPLEX
2000 TABLET ORAL EVERY MORNING
Status: DISCONTINUED | OUTPATIENT
Start: 2021-10-16 | End: 2021-10-18 | Stop reason: HOSPADM

## 2021-10-16 RX ORDER — ONDANSETRON 4 MG/1
4 TABLET, ORALLY DISINTEGRATING ORAL EVERY 4 HOURS PRN
Status: DISCONTINUED | OUTPATIENT
Start: 2021-10-16 | End: 2021-10-18 | Stop reason: HOSPADM

## 2021-10-16 RX ORDER — AMOXICILLIN 250 MG
2 CAPSULE ORAL 2 TIMES DAILY
Status: DISCONTINUED | OUTPATIENT
Start: 2021-10-16 | End: 2021-10-18 | Stop reason: HOSPADM

## 2021-10-16 RX ORDER — CETIRIZINE HYDROCHLORIDE 10 MG/1
10 TABLET ORAL DAILY
Status: DISCONTINUED | OUTPATIENT
Start: 2021-10-16 | End: 2021-10-18 | Stop reason: HOSPADM

## 2021-10-16 RX ORDER — ASPIRIN 300 MG/1
300 SUPPOSITORY RECTAL DAILY
Status: DISCONTINUED | OUTPATIENT
Start: 2021-10-16 | End: 2021-10-17

## 2021-10-16 RX ADMIN — SODIUM CHLORIDE: 9 INJECTION, SOLUTION INTRAVENOUS at 12:40

## 2021-10-16 RX ADMIN — MONTELUKAST 10 MG: 10 TABLET, FILM COATED ORAL at 17:52

## 2021-10-16 RX ADMIN — CARVEDILOL 6.25 MG: 6.25 TABLET, FILM COATED ORAL at 17:52

## 2021-10-16 RX ADMIN — Medication 2000 UNITS: at 10:20

## 2021-10-16 RX ADMIN — CARVEDILOL 6.25 MG: 6.25 TABLET, FILM COATED ORAL at 10:22

## 2021-10-16 RX ADMIN — Medication 125 MG: at 01:30

## 2021-10-16 RX ADMIN — METRONIDAZOLE 500 MG: 500 INJECTION, SOLUTION INTRAVENOUS at 22:07

## 2021-10-16 RX ADMIN — GABAPENTIN 200 MG: 100 CAPSULE ORAL at 17:52

## 2021-10-16 RX ADMIN — ATORVASTATIN CALCIUM 80 MG: 40 TABLET, FILM COATED ORAL at 17:52

## 2021-10-16 RX ADMIN — Medication 10 MG: at 20:35

## 2021-10-16 RX ADMIN — METRONIDAZOLE 500 MG: 500 INJECTION, SOLUTION INTRAVENOUS at 12:33

## 2021-10-16 RX ADMIN — CEFTRIAXONE SODIUM 1 G: 1 INJECTION, POWDER, FOR SOLUTION INTRAMUSCULAR; INTRAVENOUS at 12:33

## 2021-10-16 RX ADMIN — FLUTICASONE PROPIONATE 50 MCG: 50 SPRAY, METERED NASAL at 10:25

## 2021-10-16 RX ADMIN — VANCOMYCIN HYDROCHLORIDE 125 MG: 500 INJECTION, POWDER, LYOPHILIZED, FOR SOLUTION INTRAVENOUS at 01:30

## 2021-10-16 RX ADMIN — ASPIRIN 325 MG ORAL TABLET 325 MG: 325 PILL ORAL at 10:22

## 2021-10-16 RX ADMIN — IOHEXOL 100 ML: 350 INJECTION, SOLUTION INTRAVENOUS at 05:21

## 2021-10-16 RX ADMIN — SODIUM CHLORIDE: 9 INJECTION, SOLUTION INTRAVENOUS at 22:37

## 2021-10-16 RX ADMIN — AMLODIPINE BESYLATE 5 MG: 5 TABLET ORAL at 10:17

## 2021-10-16 RX ADMIN — GABAPENTIN 200 MG: 100 CAPSULE ORAL at 10:22

## 2021-10-16 RX ADMIN — CETIRIZINE HYDROCHLORIDE 10 MG: 10 TABLET, FILM COATED ORAL at 10:21

## 2021-10-16 RX ADMIN — LISINOPRIL 20 MG: 20 TABLET ORAL at 10:23

## 2021-10-16 RX ADMIN — SODIUM CHLORIDE: 9 INJECTION, SOLUTION INTRAVENOUS at 18:31

## 2021-10-16 ASSESSMENT — LIFESTYLE VARIABLES
EVER HAD A DRINK FIRST THING IN THE MORNING TO STEADY YOUR NERVES TO GET RID OF A HANGOVER: NO
CONSUMPTION TOTAL: NEGATIVE
HOW MANY TIMES IN THE PAST YEAR HAVE YOU HAD 5 OR MORE DRINKS IN A DAY: 0
TOTAL SCORE: 0
TOTAL SCORE: 0
HAVE PEOPLE ANNOYED YOU BY CRITICIZING YOUR DRINKING: NO
TOTAL SCORE: 0
EVER FELT BAD OR GUILTY ABOUT YOUR DRINKING: NO
ON A TYPICAL DAY WHEN YOU DRINK ALCOHOL HOW MANY DRINKS DO YOU HAVE: 1
HAVE YOU EVER FELT YOU SHOULD CUT DOWN ON YOUR DRINKING: NO
ALCOHOL_USE: YES
AVERAGE NUMBER OF DAYS PER WEEK YOU HAVE A DRINK CONTAINING ALCOHOL: 1

## 2021-10-16 ASSESSMENT — ENCOUNTER SYMPTOMS
COUGH: 0
WEAKNESS: 0
MYALGIAS: 0
SHORTNESS OF BREATH: 0
CHILLS: 0
DIARRHEA: 1
DEPRESSION: 0
FEVER: 1
VOMITING: 1
SORE THROAT: 0
BRUISES/BLEEDS EASILY: 0
BLURRED VISION: 0
DIZZINESS: 0
HEADACHES: 0
INSOMNIA: 0
FEVER: 0
NAUSEA: 1
DOUBLE VISION: 0
SHORTNESS OF BREATH: 1
PALPITATIONS: 0
NECK PAIN: 0
VOMITING: 0
ABDOMINAL PAIN: 0

## 2021-10-16 ASSESSMENT — COGNITIVE AND FUNCTIONAL STATUS - GENERAL
SUGGESTED CMS G CODE MODIFIER MOBILITY: CH
SUGGESTED CMS G CODE MODIFIER DAILY ACTIVITY: CH
DAILY ACTIVITIY SCORE: 24
MOBILITY SCORE: 24

## 2021-10-16 ASSESSMENT — FIBROSIS 4 INDEX
FIB4 SCORE: 2.44

## 2021-10-16 ASSESSMENT — PATIENT HEALTH QUESTIONNAIRE - PHQ9
SUM OF ALL RESPONSES TO PHQ9 QUESTIONS 1 AND 2: 0
2. FEELING DOWN, DEPRESSED, IRRITABLE, OR HOPELESS: NOT AT ALL
1. LITTLE INTEREST OR PLEASURE IN DOING THINGS: NOT AT ALL

## 2021-10-16 NOTE — PROGRESS NOTES
4 Eyes Skin Assessment Completed by FAWN Chiu, RN and DIANA Treviño, RN.    Head WDL  Ears WDL  Nose WDL  Mouth WDL  Neck WDL  Breast/Chest WDL  Shoulder Blades WDL  Spine WDL  (R) Arm/Elbow/Hand WDL  (L) Arm/Elbow/Hand WDL  Abdomen WDL  Groin WDL  Scrotum/Coccyx/Buttocks WDL  (R) Leg WDL  (L) Leg WDL  (R) Heel/Foot/Toe WDL  (L) Heel/Foot/Toe WDL          Devices In Places Nasal Cannula      Interventions In Place Gray Ear Foams    Possible Skin Injury No    Pictures Uploaded Into Epic N/A  Wound Consult Placed N/A  RN Wound Prevention Protocol Ordered No

## 2021-10-16 NOTE — ED NOTES
"Pt back from CT- C/O her heart hurts.  Left sided chest pain \"over my heart\".  Dr. Love notified.  EKG done.  "

## 2021-10-16 NOTE — ASSESSMENT & PLAN NOTE
-Observation status to telemetry unit.  -History of CAD in RCA stent in 2016, negative stress test in 2019.  Follows with Dr. Jones.  -Chest pain under the context of ongoing diarrhea, nausea and vomiting.  But she is now on 2 L of supplemental oxygen.  -Initial troponin is normal.  EKG not showing acute ischemia.  -Full dose aspirin now, serial cardiac enzymes every 4 hours x3.  -GERD pain, gi cocktail

## 2021-10-16 NOTE — ED PROVIDER NOTES
ED Provider Note     Scribed for Kori Obrien D.O. by Darlin Mendez. 10/15/2021, 9:58 PM.     Primary care provider: Justin Haley M.D.  Means of arrival: EMS         History obtained from: Patient  History limited by: None    CHIEF COMPLAINT  Chief Complaint   Patient presents with   • Nausea/Vomiting/Diarrhea     HPI  Dayana Lechuga is a 89 y.o. female, with an extensive medical history noted below, who presents to the emergency Department for evaluation of acute nausea, emesis, and diarrhea that initially onset earlier this afternoon. The patient is currently compliant with a Doxycycline regimen. She lives in an assisted care facility, and she notes that Norovirus is currently circulating. She denies any associated fever.    REVIEW OF SYSTEMS  Pertinent positives include nausea, emesis, diarrhea. Pertinent negatives include no fever.   See HPI for further details. All other systems are negative.    PAST MEDICAL HISTORY  Past Medical History:   Diagnosis Date   • Allergy    • Anxiety    • ASTHMA    • Bronchitis    • CAD (coronary artery disease)     JT to RCA; 70% stenosis in LAD   • Chills    • Constipation    • Difficulty breathing    • GERD (gastroesophageal reflux disease)    • Heart attack (HCC)    • Heartburn    • Hyperlipidemia    • Hypertension    • Influenza    • Insomnia    • Lumbar back pain 9/10/2016   • Mumps    • OSTEOPOROSIS    • Palpitations    • Pneumonia    • PVD (peripheral vascular disease) ([facility])     70% PAD-followed by Lary AMBROCIO   • Sweat, sweating, excessive    • Tonsillitis      FAMILY HISTORY  Family History   Problem Relation Age of Onset   • Heart Attack Father    • Cancer Brother    • Cancer Brother    • Heart Disease Neg Hx    • Heart Failure Neg Hx    • Hyperlipidemia Neg Hx        SOCIAL HISTORY  Social History     Tobacco Use   • Smoking status: Never Smoker   • Smokeless tobacco: Never Used   Vaping Use   • Vaping Use: Never used   Substance Use Topics   •  Alcohol use: No     Alcohol/week: 0.0 oz   • Drug use: No      Social History     Substance and Sexual Activity   Drug Use No     SURGICAL HISTORY  Past Surgical History:   Procedure Laterality Date   • ABDOMINAL HYSTERECTOMY TOTAL     • APPENDECTOMY     • HERNIA REPAIR     • HYSTERECTOMY LAPAROSCOPY     • TONSILLECTOMY     • ZZZ CARDIAC CATH         CURRENT MEDICATIONS    Current Facility-Administered Medications:   •  VANCOMYCIN 50 MG/ML PO SOLN, , , ,   •  Special Contact Isolation, , , CONTINUOUS **AND** [COMPLETED] C Diff by PCR rflx Toxin, , , Once **AND** Pharmacy Consult Request, 1 Each, Other, PHARMACY TO DOSE, Kori Obrien D.O.  •  NS infusion, , Intravenous, Continuous, Kori Obrien D.O., Last Rate: 250 mL/hr at 10/15/21 2241, 1,000 mL at 10/15/21 2241    Current Outpatient Medications:   •  doxycycline (VIBRAMYCIN) 100 MG Tab, Take 1 Tablet by mouth 2 times a day for 5 days., Disp: 10 Tablet, Rfl: 0  •  azelastine (ASTELIN) 137 MCG/SPRAY nasal spray, Administer 1 Spray into affected nostril(S) 2 times a day., Disp: 30 mL, Rfl: 0  •  gabapentin (NEURONTIN) 100 MG Cap, TAKE 2 CAPSULES BY MOUTH THREE TIMES DAILY, Disp: 540 Capsule, Rfl: 1  •  fluticasone (FLONASE) 50 MCG/ACT nasal spray, USE 1 SPRAY IN EACH NOSTRIL ONCE DAILY, Disp: 16 g, Rfl: 0  •  cetirizine (ZYRTEC ALLERGY) 10 MG Tab, Take 10 mg by mouth every day., Disp: , Rfl:   •  lisinopril (PRINIVIL) 20 MG Tab, Take 1 tablet by mouth every morning with breakfast., Disp: 100 tablet, Rfl: 3  •  isosorbide mononitrate SR (IMDUR) 30 MG TABLET SR 24 HR, Take 1 tablet by mouth every evening., Disp: 100 tablet, Rfl: 3  •  atorvastatin (LIPITOR) 80 MG tablet, Take 1 tablet by mouth every evening., Disp: 100 tablet, Rfl: 3  •  amLODIPine (NORVASC) 5 MG Tab, Take 1 tablet by mouth every day., Disp: 100 tablet, Rfl: 3  •  carvedilol (COREG) 6.25 MG Tab, Take 1 tablet by mouth 2 times a day, with meals for 360 days., Disp: 180 tablet, Rfl: 3  •  montelukast  (SINGULAIR) 10 MG Tab, Take 1 tablet by mouth every evening., Disp: 90 tablet, Rfl: 2  •  albuterol 108 (90 Base) MCG/ACT Aero Soln inhalation aerosol, INHALE TWO PUFFS BY MOUTH EVERY SIX HOURS AS NEEDED FOR SHORTNESS OF BREATH, Disp: 25.5 g, Rfl: 1  •  Acetaminophen 500 MG Cap, Take 1 Cap by mouth 3 times a day., Disp: , Rfl:   •  Melatonin 10 MG Tab, Take 10 Tablets by mouth at bedtime., Disp: , Rfl:   •  PREPARATION H 0.25-3-12-18 % Cream, Insert 1 Application into the rectum as needed (pain)., Disp: , Rfl:   •  Multiple Vitamins-Minerals (CENTRUM SILVER PO), Take 1 Tab by mouth every morning., Disp: , Rfl:   •  Biotin w/ Vitamins C & E (HAIR/SKIN/NAILS) 1250-7.5-7.5 MCG-MG-UNT Chew Tab, Chew 1 Tab every morning., Disp: , Rfl:   •  guaifenesin LA (MUCINEX) 600 MG TABLET SR 12 HR, Take 600 mg by mouth every morning., Disp: , Rfl:   •  Ascorbic Acid (VITAMIN C) 1000 MG Tab, Take 1,000 mg by mouth every morning., Disp: , Rfl:   •  VITAMIN E PO, Take 1 Cap by mouth every morning., Disp: , Rfl:   •  aspirin (ASA) 81 MG Chew Tab chewable tablet, Chew 81 mg every morning with breakfast., Disp: 100 Tab, Rfl: 11  •  Cholecalciferol (VITAMIN D) 50 MCG (2000 UT) Cap, Take 2,000 Units by mouth every morning., Disp: , Rfl:   •  omeprazole (PRILOSEC) 20 MG delayed-release capsule, Take 20 mg by mouth every morning with breakfast., Disp: , Rfl:     ALLERGIES  Allergies   Allergen Reactions   • Bactrim [Sulfamethoxazole W-Trimethoprim] Hives   • Other Drug    • Pcn [Penicillins] Hives     About 70 years   • Codeine Unspecified     Unknown reaction         PHYSICAL EXAM  VITAL SIGNS: /71   Pulse 74   Temp 36.9 °C (98.5 °F) (Temporal)   Resp 16   LMP  (LMP Unknown)   SpO2 96%   Constitutional: Thin, Petite elderly female. Mild distress. Copious amounts of diarrhea.  Vomiting actively.  HENT: Normocephalic, atraumatic.  Dry oral mucosa.  Eyes: PERRL, EOMI  Neck: Supple   Lymphatic: No lymphadenopathy noted.    Cardiovascular: Normal heart rate and Regular rhythm. No murmur  Thorax & Lungs: Clear and equal breath sounds with good excursion. No respiratory distress, no rhonchi, wheezing or rales.  Abdomen: Bowel sounds normal in all four quadrants. Soft,nontender, no rebound , guarding, no flank tenderness  Skin: Increased skin turgor. Pale. Warm, Dry, No erythema, No rashes.   Back: No cervical, thoracic, or lumbosacral tenderness.  Extremities: Peripheral pulses 4/4 No edema, No tenderness  Musculoskeletal: Normal range of motion in all major joints. No tenderness to palpation or major deformities noted.   Neurologic: Alert & oriented x 3, Normal motor function, Normal sensory function, No lateralizing or focal deficits noted. DTR's 4/4 bilaterally.  Psychiatric: Affect normal, Judgment normal, Mood normal.     DIAGNOSTICS/PROCEDURES    LABS  Results for orders placed or performed during the hospital encounter of 10/15/21   CBC WITH DIFFERENTIAL   Result Value Ref Range    WBC 11.6 (H) 4.8 - 10.8 K/uL    RBC 4.55 4.20 - 5.40 M/uL    Hemoglobin 14.9 12.0 - 16.0 g/dL    Hematocrit 49.0 (H) 37.0 - 47.0 %    .7 (H) 81.4 - 97.8 fL    MCH 32.7 27.0 - 33.0 pg    MCHC 30.4 (L) 33.6 - 35.0 g/dL    RDW 50.8 (H) 35.9 - 50.0 fL    Platelet Count 131 (L) 164 - 446 K/uL    MPV 10.8 9.0 - 12.9 fL    Neutrophils-Polys 84.00 (H) 44.00 - 72.00 %    Lymphocytes 7.50 (L) 22.00 - 41.00 %    Monocytes 6.10 0.00 - 13.40 %    Eosinophils 1.50 0.00 - 6.90 %    Basophils 0.60 0.00 - 1.80 %    Immature Granulocytes 0.30 0.00 - 0.90 %    Nucleated RBC 0.00 /100 WBC    Neutrophils (Absolute) 9.71 (H) 2.00 - 7.15 K/uL    Lymphs (Absolute) 0.87 (L) 1.00 - 4.80 K/uL    Monos (Absolute) 0.70 0.00 - 0.85 K/uL    Eos (Absolute) 0.17 0.00 - 0.51 K/uL    Baso (Absolute) 0.07 0.00 - 0.12 K/uL    Immature Granulocytes (abs) 0.03 0.00 - 0.11 K/uL    NRBC (Absolute) 0.00 K/uL   COMP METABOLIC PANEL   Result Value Ref Range    Sodium 138 135 - 145 mmol/L     Potassium 4.1 3.6 - 5.5 mmol/L    Chloride 106 96 - 112 mmol/L    Co2 16 (L) 20 - 33 mmol/L    Anion Gap 16.0 7.0 - 16.0    Glucose 126 (H) 65 - 99 mg/dL    Bun 18 8 - 22 mg/dL    Creatinine 0.52 0.50 - 1.40 mg/dL    Calcium 9.4 8.4 - 10.2 mg/dL    AST(SGOT) 17 12 - 45 U/L    ALT(SGPT) 12 2 - 50 U/L    Alkaline Phosphatase 60 30 - 99 U/L    Total Bilirubin 0.5 0.1 - 1.5 mg/dL    Albumin 3.7 3.2 - 4.9 g/dL    Total Protein 6.6 6.0 - 8.2 g/dL    Globulin 2.9 1.9 - 3.5 g/dL    A-G Ratio 1.3 g/dL   URINALYSIS (UA)    Specimen: Stool   Result Value Ref Range    Color Yellow     Character Clear     Specific Gravity 1.010 <1.035    Ph 6.5 5.0 - 8.0    Glucose Negative Negative mg/dL    Ketones Negative Negative mg/dL    Protein Negative Negative mg/dL    Bilirubin Negative Negative    Nitrite Negative Negative    Leukocyte Esterase Negative Negative    Occult Blood Negative Negative    Micro Urine Req see below    ESTIMATED GFR   Result Value Ref Range    GFR If African American >60 >60 mL/min/1.73 m 2    GFR If Non African American >60 >60 mL/min/1.73 m 2   Labs reviewed by me    COURSE & MEDICAL DECISION MAKING  Pertinent Labs & Imaging studies reviewed. (See chart for details)    9:58 PM - Patient seen and evaluated at bedside. I verified that the patient was wearing a mask and I was wearing appropriate PPE every time I entered the room. The patient's mask was on the patient at all times during my encounter except for a brief view of the oropharynx. Ordered for UA, C diff by PCR, Decal lactoferrin, Culture stool, CBC with differential, CMP to evaluate. Patient will be treated with NS infusion, Zofran for her symptoms. Differential diagnoses include, but are not limited to, C. Diff v Norovirus v viral gastroenteritis     11:27 PM I discussed the patient's case and the above findings with Dr. Cerrato (hospitalist) who agrees to hospitalize the patient and take over the plan of care moving forward.     HYDRATION:  Based on the patient's presentation of Acute Diarrhea the patient was given IV fluids. IV Hydration was used because oral hydration was not adequate alone. Upon recheck following hydration, the patient was mildly improved.      DISPOSITION:  Patient will be hospitalized by Dr. Cerrato in guarded condition.    FINAL IMPRESSION  1. Infectious diarrhea in adult patient    2. Nausea and vomiting, intractability of vomiting not specified, unspecified vomiting type    3. Dehydration         Darlin PETTIT (Macarena), am scribing for, and in the presence of, Kori Obrien D.O..    Electronically signed by: Darlin Mendez (Macarena), 10/15/2021    IKori D.O. personally performed the services described in this documentation, as scribed by Darlin Mendez in my presence, and it is both accurate and complete.    The note accurately reflects work and decisions made by me.  Kori Obrien D.O.  10/16/2021  3:28 AM

## 2021-10-16 NOTE — ED NOTES
Pharmacy Medication Reconciliation    ~Med rec updated and complete per pt at bedside  ~Allergies have been verified and updated  ~[facility]      ~Patient reports that she is on a 5 day course of Doxycycline that was started on 10/12/2021      ~Patient reports that she finished a 3 day course of Prednisone that was started on 10/12/2021

## 2021-10-16 NOTE — H&P
Hospital Medicine History & Physical Note    Date of Service  10/16/2021    Primary Care Physician  Justin Haley M.D.    Consultants  None    Code Status  Full Code    Chief Complaint  Chief Complaint   Patient presents with   • Nausea/Vomiting/Diarrhea       History of Presenting Illness  Dayana Lechuga is a 89 y.o. female, who is coming from [facility] on 10/15/2021 for evaluating of profuse Diarrhea and Nausea/ Vomiting that started around 8 pm last night. Had 7 episodes of watery Diarrhea in the ED and several episodes of Vomiting here, but not after receiving Zofran. She has been on second round oral Doxycycline for treatment of Sinusitis having only 2 more days of treatment left. Also reports that are other neighbors/ residents where she lives with Diarrhea and similar symptoms. Initial Laboratory was unremarkable and CT not showing acute findings. Patient has generalized weakness and due to ongoing Diarrhea was held in the ED. CDiff testing has been sent and she was started on empiric Oral Vancomycin.      She has h/o HTN, HLD, CAD, Peripheral neuropathy, Urinary retention and GERD.    While in the ED, developed Chest pain and became Hypoxic, now on 2 L of Supplemental O2. EKG did not showed acute ischemia. Troponin is 10.     Procalcitonin was added later and is 0.47. CRP 1.04    I discussed the plan of care with patient.    Review of Systems  Review of Systems   Constitutional: Positive for malaise/fatigue. Negative for fever.   HENT: Negative for congestion and sore throat.    Eyes: Negative for blurred vision and double vision.   Respiratory: Positive for shortness of breath. Negative for cough.    Cardiovascular: Positive for chest pain. Negative for palpitations.   Gastrointestinal: Positive for diarrhea, nausea and vomiting.   Genitourinary: Negative for dysuria and urgency.   Musculoskeletal: Negative for myalgias and neck pain.   Skin: Negative for itching  and rash.   Neurological: Negative for dizziness, weakness and headaches.   Endo/Heme/Allergies: Does not bruise/bleed easily.   Psychiatric/Behavioral: Negative for depression. The patient does not have insomnia.        Past Medical History   has a past medical history of Allergy, Anxiety, ASTHMA, Bronchitis, CAD (coronary artery disease), Chills, Constipation, Difficulty breathing, GERD (gastroesophageal reflux disease), Heart attack (HCC), Heartburn, Hyperlipidemia, Hypertension, Influenza, Insomnia, Lumbar back pain (9/10/2016), Mumps, OSTEOPOROSIS, Palpitations, Pneumonia, PVD (peripheral vascular disease) ([facility]), Sweat, sweating, excessive, and Tonsillitis.    Surgical History   has a past surgical history that includes appendectomy; abdominal hysterectomy total; hernia repair; tonsillectomy; zzz cardiac cath; and hysterectomy laparoscopy.     Family History  family history includes Cancer in her brother and brother; Heart Attack in her father.   Family history reviewed with patient. There is no family history that is pertinent to the chief complaint.     Social History   reports that she has never smoked. She has never used smokeless tobacco. She reports that she does not drink alcohol and does not use drugs.    Allergies  Allergies   Allergen Reactions   • Bactrim [Sulfamethoxazole W-Trimethoprim] Hives   • Other Drug    • Pcn [Penicillins] Hives     About 70 years   • Codeine Unspecified     Unknown reaction         Medications  Prior to Admission Medications   Prescriptions Last Dose Informant Patient Reported? Taking?   Acetaminophen 500 MG Cap   Yes No   Sig: Take 1 Cap by mouth 3 times a day.   Ascorbic Acid (VITAMIN C) 1000 MG Tab  Patient Yes No   Sig: Take 1,000 mg by mouth every morning.   Biotin w/ Vitamins C & E (HAIR/SKIN/NAILS) 1250-7.5-7.5 MCG-MG-UNT Chew Tab  Patient Yes No   Sig: Chew 1 Tab every morning.   Cholecalciferol (VITAMIN D) 50 MCG (2000 UT) Cap  Patient Yes No   Sig: Take 2,000  Units by mouth every morning.   Melatonin 10 MG Tab   Yes No   Sig: Take 10 Tablets by mouth at bedtime.   Multiple Vitamins-Minerals (CENTRUM SILVER PO)  Patient Yes No   Sig: Take 1 Tab by mouth every morning.   PREPARATION H 0.25-3-12-18 % Cream   Yes No   Sig: Insert 1 Application into the rectum as needed (pain).   VITAMIN E PO  Patient Yes No   Sig: Take 1 Cap by mouth every morning.   albuterol 108 (90 Base) MCG/ACT Aero Soln inhalation aerosol   No No   Sig: INHALE TWO PUFFS BY MOUTH EVERY SIX HOURS AS NEEDED FOR SHORTNESS OF BREATH   amLODIPine (NORVASC) 5 MG Tab   No No   Sig: Take 1 tablet by mouth every day.   aspirin (ASA) 81 MG Chew Tab chewable tablet  Patient Yes No   Sig: Chew 81 mg every morning with breakfast.   atorvastatin (LIPITOR) 80 MG tablet   No No   Sig: Take 1 tablet by mouth every evening.   azelastine (ASTELIN) 137 MCG/SPRAY nasal spray   No No   Sig: Administer 1 Spray into affected nostril(S) 2 times a day.   carvedilol (COREG) 6.25 MG Tab   No No   Sig: Take 1 tablet by mouth 2 times a day, with meals for 360 days.   cetirizine (ZYRTEC ALLERGY) 10 MG Tab   Yes No   Sig: Take 10 mg by mouth every day.   doxycycline (VIBRAMYCIN) 100 MG Tab   No No   Sig: Take 1 Tablet by mouth 2 times a day for 5 days.   fluticasone (FLONASE) 50 MCG/ACT nasal spray   No No   Sig: USE 1 SPRAY IN EACH NOSTRIL ONCE DAILY   gabapentin (NEURONTIN) 100 MG Cap   No No   Sig: TAKE 2 CAPSULES BY MOUTH THREE TIMES DAILY   guaifenesin LA (MUCINEX) 600 MG TABLET SR 12 HR  Patient Yes No   Sig: Take 600 mg by mouth every morning.   isosorbide mononitrate SR (IMDUR) 30 MG TABLET SR 24 HR   No No   Sig: Take 1 tablet by mouth every evening.   lisinopril (PRINIVIL) 20 MG Tab   No No   Sig: Take 1 tablet by mouth every morning with breakfast.   montelukast (SINGULAIR) 10 MG Tab   No No   Sig: Take 1 tablet by mouth every evening.   omeprazole (PRILOSEC) 20 MG delayed-release capsule  Patient Yes No   Sig: Take 20 mg by  mouth every morning with breakfast.   predniSONE (DELTASONE) 10 MG Tab   No No   Sig: Take 2 Tablets by mouth every day for 3 days.      Facility-Administered Medications: None       Physical Exam  Temp:  [36.9 °C (98.5 °F)] 36.9 °C (98.5 °F)  Pulse:  [74-94] 79  Resp:  [12-23] 15  BP: (130-181)/(52-83) 152/78  SpO2:  [87 %-96 %] 87 %  Blood Pressure : 152/78   Temperature: 36.9 °C (98.5 °F)   Pulse: 79   Respiration: 15   Pulse Oximetry: (!) 87 %       Physical Exam  Constitutional:       Appearance: Normal appearance.   HENT:      Head: Normocephalic and atraumatic.      Mouth/Throat:      Mouth: Mucous membranes are moist.      Pharynx: Oropharynx is clear.   Eyes:      Extraocular Movements: Extraocular movements intact.      Pupils: Pupils are equal, round, and reactive to light.   Cardiovascular:      Rate and Rhythm: Normal rate and regular rhythm.      Heart sounds: Normal heart sounds.   Pulmonary:      Effort: Pulmonary effort is normal.      Breath sounds: Normal breath sounds.   Abdominal:      General: Abdomen is flat. Bowel sounds are normal. There is no distension.      Palpations: Abdomen is soft.      Tenderness: There is abdominal tenderness (Mild diffuse tenderness to palpation).   Musculoskeletal:      Cervical back: Normal range of motion and neck supple.   Skin:     General: Skin is warm and dry.   Neurological:      General: No focal deficit present.      Mental Status: She is alert and oriented to person, place, and time.   Psychiatric:         Mood and Affect: Mood normal.         Behavior: Behavior normal.         Laboratory:  Recent Labs     10/15/21  2236 10/16/21  0445   WBC 11.6* 9.7   RBC 4.55 4.22   HEMOGLOBIN 14.9 13.9   HEMATOCRIT 49.0* 42.5   .7* 100.7*   MCH 32.7 32.9   MCHC 30.4* 32.7*   RDW 50.8* 47.3   PLATELETCT 131* 185   MPV 10.8 9.9     Recent Labs     10/15/21  2236 10/16/21  0445   SODIUM 138 138   POTASSIUM 4.1 3.7   CHLORIDE 106 107   CO2 16* 19*   GLUCOSE 126*  120*   BUN 18 17   CREATININE 0.52 0.40*   CALCIUM 9.4 8.3*     Recent Labs     10/15/21  2236 10/16/21  0445   ALTSGPT 12  --    ASTSGOT 17  --    ALKPHOSPHAT 60  --    TBILIRUBIN 0.5  --    GLUCOSE 126* 120*         No results for input(s): NTPROBNP in the last 72 hours.      Recent Labs     10/16/21  0445   TROPONINT 10       Imaging:  CT-ABDOMEN-PELVIS WITH   Final Result      1.  Fluid within the colon may suggest diarrheal illness.   2.  Colonic diverticulosis without acute diverticulitis.   3.  Moderate hiatal hernia.          EKG:  I have personally reviewed the images and compared with prior images. and My impression is: NSR @ 91 bpm, Left axis devation. No acute ST elevation. Q waves on V1-V3 that are chronic findings. Flattened T wave on V5 and V6    Assessment/Plan:  I anticipate this patient is appropriate for observation status at this time.    * Atypical chest pain  Assessment & Plan  -Observation status to telemetry unit.  -History of CAD in RCA stent in 2016, negative stress test in 2019.  Follows with Dr. Jones.  -Chest pain under the context of ongoing diarrhea, nausea and vomiting.  But she is now on 2 L of supplemental oxygen.  -Initial troponin is normal.  EKG not showing acute ischemia.  -Full dose aspirin now, serial cardiac enzymes every 4 hours x3.    Acute diarrhea  Assessment & Plan  -Patient with debilitating diarrhea, had 7 episodes of watery profuse diarrhea in the ED.  -Recent antibiotic use for sinusitis, treated with doxycycline outpatient, second round therefore high suspicious for C. difficile colitis.  She does mention that there are other residents at the facility where she lives will also have diarrhea.  -C. difficile test has been sent and she also has elevated procalcitonin at 0.47 therefore I am starting her on Rocephin and Flagyl until C. difficile colitis results are back.  -She is not showing signs of dehydration on blood work done this morning.    Primary insomnia-  (present on admission)  Assessment & Plan  -Continue melatonin.    Essential hypertension- (present on admission)  Assessment & Plan  -Continue lisinopril, carvedilol, amlodipine.  Her blood pressure is 178/74 right now    Peripheral neuropathy- (present on admission)  Assessment & Plan  -Continue gabapentin.      VTE prophylaxis: SCDs/TEDs

## 2021-10-16 NOTE — ED NOTES
Pt vomiting and diarrhea on arrival- cleaned up - pt had watery diarrhea 2 more times.  Small amount of stool sent to lab.  Lab called; needed more stool but said they could do the C-diff test with sample in lab.  Pt medicated with Zofran for N/V

## 2021-10-16 NOTE — ED NOTES
Pt had another bout of diarrhea- COVID swab collected and sent.  Repeat calll to lab (last call 1.5 hrs ago) to have them draw a lactic acid as pt is an extremely difficult stick.

## 2021-10-16 NOTE — ASSESSMENT & PLAN NOTE
-Patient with debilitating diarrhea, had 7 episodes of watery profuse diarrhea in the ED.  -Recent antibiotic use for sinusitis, treated with doxycycline outpatient, second round therefore high suspicious for C. difficile colitis. She does mention that there are other residents at the facility where she lives will also have diarrhea from norovirus.  -C. difficile negative  -elevated procalcitonin at 0.47, continue Rocephin and Flagyl   -norovirus pcr pending   -IV fluids

## 2021-10-16 NOTE — ED TRIAGE NOTES
Pt [facility] from  with c/o    Chief Complaint   Patient presents with   • Nausea/Vomiting/Diarrhea       /71   Pulse 74   Temp 36.9 °C (98.5 °F) (Temporal)   Resp 16   LMP  (LMP Unknown)   SpO2 96%

## 2021-10-16 NOTE — PROGRESS NOTES
[facility] Medicine Daily Progress Note    Date of Service  10/16/2021    Chief Complaint  Dayana Lechuga is a 89 y.o. female admitted 10/15/2021 with diarrhea, nausea and vomiting.     Hospital Course  89 y.o. female, who is coming from [facility]isted [facility] on 10/15/2021 for evaluating of profuse Diarrhea and Nausea/ Vomiting that started the night prior to admission. Had 7 episodes of watery Diarrhea in the ED and several episodes of Vomiting here, but not after receiving Zofran. She has been on second round oral Doxycycline for treatment of Sinusitis having only 2 more days of treatment left.  Of note her assisted living facility had an outbreak of norovirus about a week ago.  In the ER labs showed WBC 11.6, lactic acid negative, procalcitonin elevated and patient started on IV antibiotics for possible infectious colitis.  CT of the abdomen showed fluid within the colon suggesting diarrheal illness, diverticulosis without acute diverticulitis.     Interval Problem Update  Vital signs significant for fever 100.4, mildly hypertensive. C.diff pending. She reports that her living facility had an outbreak of norovirus, will check norovirus pcr. Continue IVF.  Patient reports her nausea and vomiting have resolved but she continues to have diarrhea.  Denies abdominal pain.    I have personally seen and examined the patient at bedside. I discussed the plan of care with patient, bedside RN and .     Consultants/Specialty  N/A    Code Status  Full Code    Disposition  Patient is not medically cleared.   Anticipate discharge to to home with close outpatient follow-up.  I have placed the appropriate orders for post-discharge needs.    Review of Systems  Review of Systems   Constitutional: Positive for fever and malaise/fatigue. Negative for chills.   Respiratory: Negative for cough and shortness of breath.    Cardiovascular: Negative for chest pain and leg swelling.   Gastrointestinal:  Positive for diarrhea and nausea. Negative for abdominal pain and vomiting.   Genitourinary: Negative for dysuria.   Musculoskeletal: Negative for myalgias.   Skin: Negative for rash.   Neurological: Negative for dizziness and headaches.   Psychiatric/Behavioral: Negative for depression.   All other systems reviewed and are negative.       Physical Exam  Temp:  [36.8 °C (98.2 °F)-38 °C (100.4 °F)] 36.8 °C (98.2 °F)  Pulse:  [74-94] 84  Resp:  [12-28] 20  BP: (130-181)/(52-83) 153/62  SpO2:  [87 %-96 %] 95 %    Physical Exam  Vitals and nursing note reviewed.   Constitutional:       General: She is not in acute distress.  HENT:      Head: Normocephalic and atraumatic.   Eyes:      Conjunctiva/sclera: Conjunctivae normal.   Cardiovascular:      Rate and Rhythm: Normal rate and regular rhythm.      Heart sounds: Normal heart sounds.   Pulmonary:      Effort: Pulmonary effort is normal. No respiratory distress.      Breath sounds: No wheezing.   Abdominal:      General: Abdomen is flat. There is no distension.      Palpations: Abdomen is soft.      Tenderness: There is abdominal tenderness. There is no guarding.   Musculoskeletal:         General: Normal range of motion.      Cervical back: Normal range of motion and neck supple.      Right lower leg: No edema.      Left lower leg: No edema.   Skin:     General: Skin is warm and dry.   Neurological:      General: No focal deficit present.      Mental Status: She is alert and oriented to person, place, and time.      Cranial Nerves: No cranial nerve deficit.      Motor: No weakness.   Psychiatric:         Mood and Affect: Mood normal.         Behavior: Behavior normal.         Fluids    Intake/Output Summary (Last 24 hours) at 10/16/2021 1222  Last data filed at 10/16/2021 0441  Gross per 24 hour   Intake 1000 ml   Output --   Net 1000 ml       Laboratory  Recent Labs     10/15/21  2236 10/16/21  0445   WBC 11.6* 9.7   RBC 4.55 4.22   HEMOGLOBIN 14.9 13.9   HEMATOCRIT  49.0* 42.5   .7* 100.7*   MCH 32.7 32.9   MCHC 30.4* 32.7*   RDW 50.8* 47.3   PLATELETCT 131* 185   MPV 10.8 9.9     Recent Labs     10/15/21  2236 10/16/21  0445   SODIUM 138 138   POTASSIUM 4.1 3.7   CHLORIDE 106 107   CO2 16* 19*   GLUCOSE 126* 120*   BUN 18 17   CREATININE 0.52 0.40*   CALCIUM 9.4 8.3*                   Imaging  CT-ABDOMEN-PELVIS WITH   Final Result      1.  Fluid within the colon may suggest diarrheal illness.   2.  Colonic diverticulosis without acute diverticulitis.   3.  Moderate hiatal hernia.           Assessment/Plan  * Atypical chest pain  Assessment & Plan  -Observation status to telemetry unit.  -History of CAD in RCA stent in 2016, negative stress test in 2019.  Follows with Dr. Jones.  -Chest pain under the context of ongoing diarrhea, nausea and vomiting.  But she is now on 2 L of supplemental oxygen.  -Initial troponin is normal.  EKG not showing acute ischemia.  -Full dose aspirin now, serial cardiac enzymes every 4 hours x3.    Acute diarrhea- (present on admission)  Assessment & Plan  -Patient with debilitating diarrhea, had 7 episodes of watery profuse diarrhea in the ED.  -Recent antibiotic use for sinusitis, treated with doxycycline outpatient, second round therefore high suspicious for C. difficile colitis.  She does mention that there are other residents at the facility where she lives will also have diarrhea from norovirus.  -C. difficile test has been sent and she also has elevated procalcitonin at 0.47 therefore I am starting her on Rocephin and Flagyl until C. difficile colitis results are back.  -norovirus pcr pending     Primary insomnia- (present on admission)  Assessment & Plan  -Continue melatonin.    Peripheral neuropathy- (present on admission)  Assessment & Plan  -Continue gabapentin.    Essential hypertension- (present on admission)  Assessment & Plan  Continue lisinopril, carvedilol, amlodipine.    Prn antihypertensives       VTE prophylaxis:  SCDs/TEDs    I have performed a physical exam and reviewed and updated ROS and Plan today (10/16/2021). In review of yesterday's note (10/15/2021), there are no changes except as documented above.

## 2021-10-16 NOTE — ED NOTES
Pt taking PO fluids - no further vomiting.  Did have another episode of diarrhea - stool specimen sent to lab.

## 2021-10-17 PROBLEM — E83.51 HYPOCALCEMIA: Status: ACTIVE | Noted: 2021-10-17

## 2021-10-17 LAB
ANION GAP SERPL CALC-SCNC: 10 MMOL/L (ref 7–16)
BUN SERPL-MCNC: 7 MG/DL (ref 8–22)
CALCIUM SERPL-MCNC: 7.6 MG/DL (ref 8.4–10.2)
CHLORIDE SERPL-SCNC: 108 MMOL/L (ref 96–112)
CO2 SERPL-SCNC: 19 MMOL/L (ref 20–33)
CREAT SERPL-MCNC: 0.41 MG/DL (ref 0.5–1.4)
ERYTHROCYTE [DISTWIDTH] IN BLOOD BY AUTOMATED COUNT: 48.6 FL (ref 35.9–50)
GLUCOSE SERPL-MCNC: 112 MG/DL (ref 65–99)
HCT VFR BLD AUTO: 37.6 % (ref 37–47)
HGB BLD-MCNC: 12.1 G/DL (ref 12–16)
MCH RBC QN AUTO: 32.6 PG (ref 27–33)
MCHC RBC AUTO-ENTMCNC: 32.2 G/DL (ref 33.6–35)
MCV RBC AUTO: 101.3 FL (ref 81.4–97.8)
PLATELET # BLD AUTO: 162 K/UL (ref 164–446)
PMV BLD AUTO: 10.1 FL (ref 9–12.9)
POTASSIUM SERPL-SCNC: 3 MMOL/L (ref 3.6–5.5)
RBC # BLD AUTO: 3.71 M/UL (ref 4.2–5.4)
SODIUM SERPL-SCNC: 137 MMOL/L (ref 135–145)
WBC # BLD AUTO: 7 K/UL (ref 4.8–10.8)

## 2021-10-17 PROCEDURE — 700101 HCHG RX REV CODE 250: Performed by: HOSPITALIST

## 2021-10-17 PROCEDURE — A9270 NON-COVERED ITEM OR SERVICE: HCPCS | Performed by: HOSPITALIST

## 2021-10-17 PROCEDURE — 700102 HCHG RX REV CODE 250 W/ 637 OVERRIDE(OP): Performed by: HOSPITALIST

## 2021-10-17 PROCEDURE — 700105 HCHG RX REV CODE 258: Performed by: HOSPITALIST

## 2021-10-17 PROCEDURE — 700111 HCHG RX REV CODE 636 W/ 250 OVERRIDE (IP): Performed by: HOSPITALIST

## 2021-10-17 PROCEDURE — 99232 SBSQ HOSP IP/OBS MODERATE 35: CPT | Performed by: INTERNAL MEDICINE

## 2021-10-17 PROCEDURE — 700105 HCHG RX REV CODE 258: Performed by: EMERGENCY MEDICINE

## 2021-10-17 PROCEDURE — A9270 NON-COVERED ITEM OR SERVICE: HCPCS | Performed by: INTERNAL MEDICINE

## 2021-10-17 PROCEDURE — 80048 BASIC METABOLIC PNL TOTAL CA: CPT

## 2021-10-17 PROCEDURE — 770020 HCHG ROOM/CARE - TELE (206)

## 2021-10-17 PROCEDURE — 85027 COMPLETE CBC AUTOMATED: CPT

## 2021-10-17 PROCEDURE — 700102 HCHG RX REV CODE 250 W/ 637 OVERRIDE(OP): Performed by: INTERNAL MEDICINE

## 2021-10-17 RX ORDER — METRONIDAZOLE 500 MG/1
500 TABLET ORAL EVERY 8 HOURS
Status: DISCONTINUED | OUTPATIENT
Start: 2021-10-17 | End: 2021-10-18 | Stop reason: HOSPADM

## 2021-10-17 RX ORDER — ASPIRIN 81 MG/1
81 TABLET, CHEWABLE ORAL DAILY
Status: DISCONTINUED | OUTPATIENT
Start: 2021-10-18 | End: 2021-10-18 | Stop reason: HOSPADM

## 2021-10-17 RX ORDER — POTASSIUM CHLORIDE 20 MEQ/1
40 TABLET, EXTENDED RELEASE ORAL ONCE
Status: COMPLETED | OUTPATIENT
Start: 2021-10-17 | End: 2021-10-17

## 2021-10-17 RX ORDER — CALCIUM CARBONATE 500 MG/1
500 TABLET, CHEWABLE ORAL 3 TIMES DAILY
Status: DISCONTINUED | OUTPATIENT
Start: 2021-10-17 | End: 2021-10-18 | Stop reason: HOSPADM

## 2021-10-17 RX ORDER — METRONIDAZOLE 500 MG/1
500 TABLET ORAL EVERY 8 HOURS
Status: DISCONTINUED | OUTPATIENT
Start: 2021-10-17 | End: 2021-10-17

## 2021-10-17 RX ADMIN — AMLODIPINE BESYLATE 5 MG: 5 TABLET ORAL at 06:19

## 2021-10-17 RX ADMIN — Medication 2000 UNITS: at 06:20

## 2021-10-17 RX ADMIN — ASPIRIN 81 MG CHEWABLE TABLET 324 MG: 81 TABLET CHEWABLE at 06:19

## 2021-10-17 RX ADMIN — CARVEDILOL 6.25 MG: 6.25 TABLET, FILM COATED ORAL at 16:59

## 2021-10-17 RX ADMIN — LISINOPRIL 20 MG: 20 TABLET ORAL at 07:46

## 2021-10-17 RX ADMIN — GABAPENTIN 200 MG: 100 CAPSULE ORAL at 17:00

## 2021-10-17 RX ADMIN — CALCIUM CARBONATE (ANTACID) CHEW TAB 500 MG 500 MG: 500 CHEW TAB at 17:00

## 2021-10-17 RX ADMIN — METRONIDAZOLE 500 MG: 500 INJECTION, SOLUTION INTRAVENOUS at 06:25

## 2021-10-17 RX ADMIN — GABAPENTIN 200 MG: 100 CAPSULE ORAL at 12:09

## 2021-10-17 RX ADMIN — Medication 10 MG: at 20:33

## 2021-10-17 RX ADMIN — POTASSIUM CHLORIDE 40 MEQ: 1500 TABLET, EXTENDED RELEASE ORAL at 09:49

## 2021-10-17 RX ADMIN — CALCIUM CARBONATE (ANTACID) CHEW TAB 500 MG 500 MG: 500 CHEW TAB at 12:09

## 2021-10-17 RX ADMIN — LIDOCAINE HYDROCHLORIDE 15 ML: 20 SOLUTION OROPHARYNGEAL at 12:09

## 2021-10-17 RX ADMIN — CARVEDILOL 6.25 MG: 6.25 TABLET, FILM COATED ORAL at 07:46

## 2021-10-17 RX ADMIN — CEFTRIAXONE SODIUM 1 G: 1 INJECTION, POWDER, FOR SOLUTION INTRAMUSCULAR; INTRAVENOUS at 06:31

## 2021-10-17 RX ADMIN — SODIUM CHLORIDE: 9 INJECTION, SOLUTION INTRAVENOUS at 02:33

## 2021-10-17 RX ADMIN — METRONIDAZOLE 500 MG: 500 TABLET ORAL at 21:53

## 2021-10-17 RX ADMIN — SODIUM CHLORIDE: 9 INJECTION, SOLUTION INTRAVENOUS at 20:33

## 2021-10-17 RX ADMIN — CETIRIZINE HYDROCHLORIDE 10 MG: 10 TABLET, FILM COATED ORAL at 06:19

## 2021-10-17 RX ADMIN — METRONIDAZOLE 500 MG: 500 TABLET ORAL at 14:31

## 2021-10-17 RX ADMIN — ATORVASTATIN CALCIUM 80 MG: 40 TABLET, FILM COATED ORAL at 17:00

## 2021-10-17 RX ADMIN — MONTELUKAST 10 MG: 10 TABLET, FILM COATED ORAL at 17:00

## 2021-10-17 RX ADMIN — GABAPENTIN 200 MG: 100 CAPSULE ORAL at 06:19

## 2021-10-17 ASSESSMENT — ENCOUNTER SYMPTOMS
HEADACHES: 0
HEARTBURN: 1
SHORTNESS OF BREATH: 0
FEVER: 0
MYALGIAS: 0
ABDOMINAL PAIN: 0
CHILLS: 0
DIARRHEA: 1
VOMITING: 0
NAUSEA: 0
DEPRESSION: 0
COUGH: 0
DIZZINESS: 0

## 2021-10-17 ASSESSMENT — PAIN DESCRIPTION - PAIN TYPE: TYPE: ACUTE PAIN

## 2021-10-17 NOTE — PROGRESS NOTES
Telemetry Shift Summary     RHYTHM: SR  HR RANGE: 80-90  ECTOPY: r PVC, r PAC  MEASUREMENTS: 0.20/0.10/0.36    Normal Measurements  Rhythm: SR  HR RANGE:   Measurements: 0.12-0.20/0.06-0.10/0.30-0.52      Tele strips reviewed and placed in chart

## 2021-10-17 NOTE — PROGRESS NOTES
[facility] Medicine Daily Progress Note    Date of Service  10/17/2021    Chief Complaint  Dayana Lechuga is a 89 y.o. female admitted 10/15/2021 with diarrhea, nausea and vomiting.     Hospital Course  89 y.o. female, who is coming from [facility] on 10/15/2021 for evaluating of profuse Diarrhea and Nausea/ Vomiting that started the night prior to admission. Had 7 episodes of watery Diarrhea in the ED and several episodes of Vomiting here, but not after receiving Zofran. She has been on second round oral Doxycycline for treatment of Sinusitis having only 2 more days of treatment left.  Of note her assisted living facility had an outbreak of norovirus about a week ago.  In the ER labs showed WBC 11.6, lactic acid negative, procalcitonin elevated and patient started on IV antibiotics for possible infectious colitis.  CT of the abdomen showed fluid within the colon suggesting diarrheal illness, diverticulosis without acute diverticulitis.     Interval Problem Update  Vital signs stable on 1 L nasal cannula. C. difficile and Covid negative. Neurovirus PCR pending. Potassium and calcium low replacement ordered. Patient reports diarrhea has improved, still having intermittent abdominal pain. Also complaining of GERD, gi cocktail ordered. Spoke with her daughter on the phone and updated on plan of care.    I have personally seen and examined the patient at bedside. I discussed the plan of care with patient, family, bedside RN and .     Consultants/Specialty  N/A    Code Status  Full Code    Disposition  Patient is not medically cleared.   Anticipate discharge to to home with close outpatient follow-up.  I have placed the appropriate orders for post-discharge needs.    Review of Systems  Review of Systems   Constitutional: Positive for malaise/fatigue. Negative for chills and fever.   Respiratory: Negative for cough and shortness of breath.    Cardiovascular: Negative for chest  pain and leg swelling.   Gastrointestinal: Positive for diarrhea and heartburn. Negative for abdominal pain, nausea and vomiting.   Genitourinary: Negative for dysuria.   Musculoskeletal: Negative for myalgias.   Skin: Negative for rash.   Neurological: Negative for dizziness and headaches.   Psychiatric/Behavioral: Negative for depression.   All other systems reviewed and are negative.       Physical Exam  Temp:  [36.5 °C (97.7 °F)-37.3 °C (99.2 °F)] 36.5 °C (97.7 °F)  Pulse:  [72-92] 76  Resp:  [16-20] 16  BP: (126-153)/(46-62) 126/46  SpO2:  [89 %-98 %] 89 %    Physical Exam  Vitals and nursing note reviewed.   Constitutional:       General: She is not in acute distress.  HENT:      Head: Normocephalic and atraumatic.   Eyes:      Conjunctiva/sclera: Conjunctivae normal.   Cardiovascular:      Rate and Rhythm: Normal rate and regular rhythm.      Heart sounds: Normal heart sounds.   Pulmonary:      Effort: Pulmonary effort is normal. No respiratory distress.      Breath sounds: No wheezing.   Abdominal:      General: Abdomen is flat. There is no distension.      Palpations: Abdomen is soft.      Tenderness: There is no abdominal tenderness. There is no guarding.   Musculoskeletal:         General: Normal range of motion.      Cervical back: Normal range of motion and neck supple.      Right lower leg: No edema.      Left lower leg: No edema.   Skin:     General: Skin is warm and dry.   Neurological:      General: No focal deficit present.      Mental Status: She is alert and oriented to person, place, and time.      Cranial Nerves: No cranial nerve deficit.      Motor: No weakness.   Psychiatric:         Mood and Affect: Mood normal.         Behavior: Behavior normal.         Fluids    Intake/Output Summary (Last 24 hours) at 10/17/2021 1131  Last data filed at 10/17/2021 0800  Gross per 24 hour   Intake 2323.33 ml   Output --   Net 2323.33 ml       Laboratory  Recent Labs     10/15/21  7082 10/16/21  7596  10/17/21  0227   WBC 11.6* 9.7 7.0   RBC 4.55 4.22 3.71*   HEMOGLOBIN 14.9 13.9 12.1   HEMATOCRIT 49.0* 42.5 37.6   .7* 100.7* 101.3*   MCH 32.7 32.9 32.6   MCHC 30.4* 32.7* 32.2*   RDW 50.8* 47.3 48.6   PLATELETCT 131* 185 162*   MPV 10.8 9.9 10.1     Recent Labs     10/15/21  2236 10/16/21  0445 10/17/21  0227   SODIUM 138 138 137   POTASSIUM 4.1 3.7 3.0*   CHLORIDE 106 107 108   CO2 16* 19* 19*   GLUCOSE 126* 120* 112*   BUN 18 17 7*   CREATININE 0.52 0.40* 0.41*   CALCIUM 9.4 8.3* 7.6*                   Imaging  CT-ABDOMEN-PELVIS WITH   Final Result      1.  Fluid within the colon may suggest diarrheal illness.   2.  Colonic diverticulosis without acute diverticulitis.   3.  Moderate hiatal hernia.           Assessment/Plan  * Atypical chest pain  Assessment & Plan  -Observation status to telemetry unit.  -History of CAD in RCA stent in 2016, negative stress test in 2019.  Follows with Dr. Jones.  -Chest pain under the context of ongoing diarrhea, nausea and vomiting.  But she is now on 2 L of supplemental oxygen.  -Initial troponin is normal.  EKG not showing acute ischemia.  -Full dose aspirin now, serial cardiac enzymes every 4 hours x3.  -GERD pain, gi cocktail    Acute diarrhea- (present on admission)  Assessment & Plan  -Patient with debilitating diarrhea, had 7 episodes of watery profuse diarrhea in the ED.  -Recent antibiotic use for sinusitis, treated with doxycycline outpatient, second round therefore high suspicious for C. difficile colitis. She does mention that there are other residents at the facility where she lives will also have diarrhea from norovirus.  -C. difficile negative  -elevated procalcitonin at 0.47, continue Rocephin and Flagyl   -norovirus pcr pending   -IV fluids     Hypocalcemia  Assessment & Plan  tums TID   Recheck in am with albumin    Primary insomnia- (present on admission)  Assessment & Plan  -Continue melatonin.    Peripheral neuropathy- (present on  admission)  Assessment & Plan  -Continue gabapentin.    Hypokalemia  Assessment & Plan  2/2 GI losses   Replace as needed    Essential hypertension- (present on admission)  Assessment & Plan  Continue lisinopril, carvedilol, amlodipine.    Prn antihypertensives       VTE prophylaxis: SCDs/TEDs    I have performed a physical exam and reviewed and updated ROS and Plan today (10/17/2021). In review of yesterday's note (10/16/2021), there are no changes except as documented above.

## 2021-10-17 NOTE — CARE PLAN
The patient is Stable - Low risk of patient condition declining or worsening    Shift Goals  Clinical Goals: monitor nausea/vomiting/ diarrhea  Patient Goals: rest    Progress made toward(s) clinical / shift goals:    Problem: Knowledge Deficit - Standard  Goal: Patient and family/care givers will demonstrate understanding of plan of care, disease process/condition, diagnostic tests and medications  Outcome: Progressing     Problem: Nutrition  Goal: Patient's nutritional and fluid intake will be adequate or improve  Outcome: Progressing     Problem: Gastrointestinal Irritability  Goal: Nausea and vomiting will be absent or improve  Outcome: Progressing     Problem: Pain - Standard  Goal: Alleviation of pain or a reduction in pain to the patient’s comfort goal  Outcome: Progressing       Patient is not progressing towards the following goals:

## 2021-10-17 NOTE — CARE PLAN
The patient is Stable - Low risk of patient condition declining or worsening    Shift Goals  Clinical Goals: maintain hydration, reduce GI symptoms    Progress made toward(s) clinical / shift goals:      Pt able to verbalize needs to nursing staff.     Patient is not progressing towards the following goals:

## 2021-10-17 NOTE — CARE PLAN
The patient is Stable - Low risk of patient condition declining or worsening    Shift Goals  Clinical Goals: reduce GI symptoms, remain hydrated  Patient Goals: sleep    Progress made toward(s) clinical / shift goals:      Problem: Fluid Volume  Goal: Fluid volume balance will be maintained  Outcome: Progressing     Problem: Gastrointestinal Irritability  Goal: Diarrhea will be absent or improved  Outcome: Progressing     Patient has not had a loose stool as of yet during my shift. Patient is currently receiving IV fluids to assist with hydration.

## 2021-10-17 NOTE — PROGRESS NOTES
Report received from Leesa TAY. Pt awake and watching TV at the time of report. Plan of care discussed outside of room. White board has been updated. Safety precautions in place and call light within reach.

## 2021-10-17 NOTE — CARE PLAN
The patient is Stable - Low risk of patient condition declining or worsening       Shift Goals  Clinical Goals: maintain hydration, reduce GI symptoms    Progress made toward(s) clinical / shift goals:    Problem: Fluid Volume  Goal: Fluid volume balance will be maintained  Outcome: Progressing    Hydration maintained via continuous IV fluids      Problem: Gastrointestinal Irritability  Goal: Diarrhea will be absent or improved  Outcome: Progressing  Pt continuing to have diarrhea, samples sent to lab        Patient is not progressing towards the following goals:

## 2021-10-17 NOTE — PROGRESS NOTES
Assumed care of patient at bedside report from NOC RN. Updated on POC. Patient currently A & O x 4; on 1 L O2 nasal cannula; up standby assist; with complaints of flank discomfort with deep inhalation. Assessment completed. Call light within reach. Whiteboard updated. Fall precautions in place. Bed locked and in lowest position. All questions answered. No other needs indicated at this time.
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
## 2021-10-18 ENCOUNTER — PATIENT OUTREACH (OUTPATIENT)
Dept: HEALTH INFORMATION MANAGEMENT | Facility: OTHER | Age: 86
End: 2021-10-18

## 2021-10-18 VITALS
RESPIRATION RATE: 17 BRPM | HEIGHT: 62 IN | DIASTOLIC BLOOD PRESSURE: 57 MMHG | OXYGEN SATURATION: 93 % | BODY MASS INDEX: 22.39 KG/M2 | WEIGHT: 121.69 LBS | TEMPERATURE: 97.9 F | HEART RATE: 75 BPM | SYSTOLIC BLOOD PRESSURE: 143 MMHG

## 2021-10-18 PROBLEM — R19.7 ACUTE DIARRHEA: Status: RESOLVED | Noted: 2019-06-27 | Resolved: 2021-10-18

## 2021-10-18 PROBLEM — E83.51 HYPOCALCEMIA: Status: RESOLVED | Noted: 2021-10-17 | Resolved: 2021-10-18

## 2021-10-18 LAB
ALBUMIN SERPL BCP-MCNC: 3.2 G/DL (ref 3.2–4.9)
ANION GAP SERPL CALC-SCNC: 11 MMOL/L (ref 7–16)
BACTERIA STL CULT: NORMAL
BUN SERPL-MCNC: 4 MG/DL (ref 8–22)
BUN SERPL-MCNC: 5 MG/DL (ref 8–22)
C JEJUNI+C COLI AG STL QL: NORMAL
CALCIUM SERPL-MCNC: 8.1 MG/DL (ref 8.4–10.2)
CALCIUM SERPL-MCNC: 8.4 MG/DL (ref 8.4–10.2)
CHLORIDE SERPL-SCNC: 107 MMOL/L (ref 96–112)
CHLORIDE SERPL-SCNC: 111 MMOL/L (ref 96–112)
CO2 SERPL-SCNC: 20 MMOL/L (ref 20–33)
CO2 SERPL-SCNC: 22 MMOL/L (ref 20–33)
CREAT SERPL-MCNC: 0.36 MG/DL (ref 0.5–1.4)
CREAT SERPL-MCNC: 0.39 MG/DL (ref 0.5–1.4)
GLUCOSE SERPL-MCNC: 124 MG/DL (ref 65–99)
GLUCOSE SERPL-MCNC: 92 MG/DL (ref 65–99)
MAGNESIUM SERPL-MCNC: 1.6 MG/DL (ref 1.5–2.5)
PHOSPHATE SERPL-MCNC: 1.4 MG/DL (ref 2.5–4.5)
POTASSIUM SERPL-SCNC: 2.9 MMOL/L (ref 3.6–5.5)
POTASSIUM SERPL-SCNC: 3.2 MMOL/L (ref 3.6–5.5)
PROCALCITONIN SERPL-MCNC: 0.11 NG/ML
SIGNIFICANT IND 70042: NORMAL
SITE SITE: NORMAL
SODIUM SERPL-SCNC: 140 MMOL/L (ref 135–145)
SODIUM SERPL-SCNC: 142 MMOL/L (ref 135–145)
SOURCE SOURCE: NORMAL

## 2021-10-18 PROCEDURE — A9270 NON-COVERED ITEM OR SERVICE: HCPCS | Performed by: INTERNAL MEDICINE

## 2021-10-18 PROCEDURE — 80069 RENAL FUNCTION PANEL: CPT

## 2021-10-18 PROCEDURE — 700111 HCHG RX REV CODE 636 W/ 250 OVERRIDE (IP): Performed by: HOSPITALIST

## 2021-10-18 PROCEDURE — 700102 HCHG RX REV CODE 250 W/ 637 OVERRIDE(OP): Performed by: INTERNAL MEDICINE

## 2021-10-18 PROCEDURE — 700105 HCHG RX REV CODE 258: Performed by: HOSPITALIST

## 2021-10-18 PROCEDURE — 83735 ASSAY OF MAGNESIUM: CPT

## 2021-10-18 PROCEDURE — 84145 PROCALCITONIN (PCT): CPT

## 2021-10-18 PROCEDURE — A9270 NON-COVERED ITEM OR SERVICE: HCPCS | Performed by: HOSPITALIST

## 2021-10-18 PROCEDURE — 700105 HCHG RX REV CODE 258: Performed by: EMERGENCY MEDICINE

## 2021-10-18 PROCEDURE — 700101 HCHG RX REV CODE 250: Performed by: INTERNAL MEDICINE

## 2021-10-18 PROCEDURE — 80048 BASIC METABOLIC PNL TOTAL CA: CPT

## 2021-10-18 PROCEDURE — 700102 HCHG RX REV CODE 250 W/ 637 OVERRIDE(OP): Performed by: HOSPITALIST

## 2021-10-18 PROCEDURE — 99239 HOSP IP/OBS DSCHRG MGMT >30: CPT | Performed by: INTERNAL MEDICINE

## 2021-10-18 RX ORDER — CIPROFLOXACIN 500 MG/1
500 TABLET, FILM COATED ORAL 2 TIMES DAILY
Qty: 4 TABLET | Refills: 0 | Status: SHIPPED | OUTPATIENT
Start: 2021-10-18 | End: 2021-10-20

## 2021-10-18 RX ORDER — POTASSIUM CHLORIDE 20 MEQ/1
20 TABLET, EXTENDED RELEASE ORAL DAILY
Qty: 30 TABLET | Refills: 0 | Status: SHIPPED | OUTPATIENT
Start: 2021-10-18 | End: 2022-04-12

## 2021-10-18 RX ORDER — CALCIUM CARBONATE 500 MG/1
500 TABLET, CHEWABLE ORAL 2 TIMES DAILY
Qty: 60 TABLET | Refills: 0 | Status: SHIPPED | OUTPATIENT
Start: 2021-10-18 | End: 2022-11-10

## 2021-10-18 RX ORDER — POTASSIUM CHLORIDE 20 MEQ/1
40 TABLET, EXTENDED RELEASE ORAL ONCE
Status: COMPLETED | OUTPATIENT
Start: 2021-10-18 | End: 2021-10-18

## 2021-10-18 RX ORDER — METRONIDAZOLE 500 MG/1
500 TABLET ORAL EVERY 8 HOURS
Qty: 6 TABLET | Refills: 0 | Status: SHIPPED | OUTPATIENT
Start: 2021-10-18 | End: 2021-10-20

## 2021-10-18 RX ADMIN — POTASSIUM PHOSPHATE, MONOBASIC AND POTASSIUM PHOSPHATE, DIBASIC 30 MMOL: 224; 236 INJECTION, SOLUTION, CONCENTRATE INTRAVENOUS at 10:10

## 2021-10-18 RX ADMIN — CETIRIZINE HYDROCHLORIDE 10 MG: 10 TABLET, FILM COATED ORAL at 05:33

## 2021-10-18 RX ADMIN — AMLODIPINE BESYLATE 5 MG: 5 TABLET ORAL at 05:33

## 2021-10-18 RX ADMIN — METRONIDAZOLE 500 MG: 500 TABLET ORAL at 14:07

## 2021-10-18 RX ADMIN — ASPIRIN 81 MG CHEWABLE TABLET 81 MG: 81 TABLET CHEWABLE at 05:32

## 2021-10-18 RX ADMIN — CALCIUM CARBONATE (ANTACID) CHEW TAB 500 MG 500 MG: 500 CHEW TAB at 05:32

## 2021-10-18 RX ADMIN — METRONIDAZOLE 500 MG: 500 TABLET ORAL at 05:33

## 2021-10-18 RX ADMIN — CARVEDILOL 6.25 MG: 6.25 TABLET, FILM COATED ORAL at 08:30

## 2021-10-18 RX ADMIN — LISINOPRIL 20 MG: 20 TABLET ORAL at 08:30

## 2021-10-18 RX ADMIN — Medication 2000 UNITS: at 05:33

## 2021-10-18 RX ADMIN — SODIUM CHLORIDE: 9 INJECTION, SOLUTION INTRAVENOUS at 04:37

## 2021-10-18 RX ADMIN — GABAPENTIN 200 MG: 100 CAPSULE ORAL at 05:33

## 2021-10-18 RX ADMIN — SODIUM CHLORIDE: 9 INJECTION, SOLUTION INTRAVENOUS at 00:34

## 2021-10-18 RX ADMIN — GABAPENTIN 200 MG: 100 CAPSULE ORAL at 13:05

## 2021-10-18 RX ADMIN — CEFTRIAXONE SODIUM 1 G: 1 INJECTION, POWDER, FOR SOLUTION INTRAMUSCULAR; INTRAVENOUS at 05:34

## 2021-10-18 RX ADMIN — CALCIUM CARBONATE (ANTACID) CHEW TAB 500 MG 500 MG: 500 CHEW TAB at 13:05

## 2021-10-18 RX ADMIN — POTASSIUM CHLORIDE 40 MEQ: 1500 TABLET, EXTENDED RELEASE ORAL at 14:07

## 2021-10-18 RX ADMIN — POTASSIUM CHLORIDE 40 MEQ: 1500 TABLET, EXTENDED RELEASE ORAL at 08:30

## 2021-10-18 NOTE — DISCHARGE PLANNING
Anticipated Discharge Disposition:   Home to [facility] independent living when medically cleared    Action:   Chart review complete.     1019: Discharge order in place for patient pending lab results. JASVIR HASKINS to call [facility] once patient is cleared to go to see if any documentation is needed.     Patient's son will be patient's transport back to [facility].     JASVIR HASKINS called [facility], [phone]. RN CM made [facility] aware that patient will be returning today. No documentation requested.     Barriers to Discharge:   None     Plan:   Hospital care management will continue to assist with discharge planning needs.

## 2021-10-18 NOTE — DISCHARGE SUMMARY
Discharge Summary    CHIEF COMPLAINT ON ADMISSION  Chief Complaint   Patient presents with   • Nausea/Vomiting/Diarrhea       Reason for Admission  EMS     Admission Date  10/15/2021    CODE STATUS  Full Code    HPI & HOSPITAL COURSE  This is a 89 y.o. female here with diarrhea, nausea, vomiting.    89 y.o. female, who is [facility] on 10/15/2021 for evaluating of profuse Diarrhea and Nausea/ Vomiting that started the night prior to admission. Had 7 episodes of watery Diarrhea in the ED and several episodes of Vomiting here, but not after receiving Zofran. She has been on second round oral Doxycycline for treatment of Sinusitis having only 2 more days of treatment left.  Of note her assisted living facility had an outbreak of norovirus about a week ago.  In the ER labs showed WBC 11.6, lactic acid negative, procalcitonin elevated and patient started on IV antibiotics for possible infectious colitis.  CT of the abdomen showed fluid within the colon suggesting diarrheal illness, diverticulosis without acute diverticulitis.  C. difficile and Covid testing negative.  Norovirus still pending.  Patient's diarrhea has resolved and she is no longer nauseous or vomiting.  Of note due to her GI losses she had significant electrolyte abnormalities, these have been repleted.  Patient's vital signs are stable and she is ready for discharge home.  She will be given 2 more days of antibiotics and daily potassium and Tums for low calcium.  Patient has been educated to follow-up with her primary care physician for repeat lab work in 1 week.  Patient is to return to the ER if her condition worsens.    Therefore, she is discharged in fair and stable condition to home with close outpatient follow-up.    The patient met 2-midnight criteria for an inpatient stay at the time of discharge.    Discharge Date  10/18/2021    FOLLOW UP ITEMS POST DISCHARGE  Follow-up with primary care physician    DISCHARGE  DIAGNOSES  Principal Problem (Resolved):    Atypical chest pain POA: Unknown  Active Problems:    Essential hypertension POA: Yes    Peripheral neuropathy POA: Yes    Primary insomnia POA: Yes  Resolved Problems:    Acute diarrhea POA: Yes    Hypokalemia POA: Yes    Hypocalcemia POA: Yes      FOLLOW UP  Future Appointments   Date Time Provider Department Center   11/5/2021 10:00 AM Jeffery Jones M.D. [facility] None   12/10/2021 10:30 AM Silvia Lomeli D.O. [facility] [address]   4/12/2022 11:00 AM Justin Haley M.D. [facility] [address] Justin Haley M.D.  [address]  [facility] [id number] [phone]      Follow up with primary care physician in 1 week for repeat labs to check electrolyte levels      MEDICATIONS ON DISCHARGE     Medication List      START taking these medications      Instructions   calcium carbonate 500 MG Chew  Commonly known as: TUMS   Chew 1 Tablet 2 times a day.  Dose: 500 mg     ciprofloxacin 500 MG Tabs  Commonly known as: CIPRO   Take 1 Tablet by mouth 2 times a day for 2 days.  Dose: 500 mg     metroNIDAZOLE 500 MG Tabs  Commonly known as: FLAGYL   Take 1 Tablet by mouth every 8 hours for 2 days.  Dose: 500 mg     potassium chloride SA 20 MEQ Tbcr  Commonly known as: Kdur   Take 1 Tablet by mouth every day.  Dose: 20 mEq        CONTINUE taking these medications      Instructions   Acetaminophen 500 MG Caps   Take 1 Capsule by mouth 2 times a day. Morning & Afternoon  Dose: 1 Capsule     albuterol 108 (90 Base) MCG/ACT Aers inhalation aerosol   INHALE TWO PUFFS BY MOUTH EVERY SIX HOURS AS NEEDED FOR SHORTNESS OF BREATH     amLODIPine 5 MG Tabs  Commonly known as: NORVASC   Take 1 tablet by mouth every day.  Dose: 5 mg     aspirin 81 MG Chew chewable tablet  Commonly known as: ASA   Chew 81 mg every morning with breakfast.  Dose: 81 mg     atorvastatin 80 MG tablet  Commonly known as: LIPITOR   Take 1 tablet by mouth every evening.  Dose: 80 mg     azelastine 137  MCG/SPRAY nasal spray  Commonly known as: ASTELIN   Administer 1 Spray into affected nostril(S) 2 times a day.  Dose: 1 Spray     carvedilol 6.25 MG Tabs  Commonly known as: COREG   Take 1 tablet by mouth 2 times a day, with meals for 360 days.  Dose: 6.25 mg     CENTRUM SILVER PO   Take 1 Tab by mouth every morning.  Dose: 1 Tablet     fluticasone 50 MCG/ACT nasal spray  Commonly known as: FLONASE   USE 1 SPRAY IN EACH NOSTRIL ONCE DAILY     gabapentin 100 MG Caps  Commonly known as: NEURONTIN   TAKE 2 CAPSULES BY MOUTH THREE TIMES DAILY     guaiFENesin  MG Tb12  Commonly known as: MUCINEX   Take 600 mg by mouth every morning.  Dose: 600 mg     Hair/Skin/Nails 1250-7.5-7.5 MCG-MG-UNT Chew  Generic drug: Biotin w/ Vitamins C & E   Chew 1 Tab every morning.  Dose: 1 Tablet     isosorbide mononitrate SR 30 MG Tb24  Commonly known as: IMDUR   Take 1 tablet by mouth every evening.  Dose: 30 mg     lisinopril 20 MG Tabs  Commonly known as: PRINIVIL   Take 1 tablet by mouth every morning with breakfast.  Dose: 20 mg     LORazepam 0.5 MG Tabs  Commonly known as: ATIVAN   Take 0.5 mg by mouth at bedtime.  Dose: 0.5 mg     Melatonin 10 MG Tabs   Take 20 mg by mouth at bedtime.  Dose: 20 mg     montelukast 10 MG Tabs  Commonly known as: SINGULAIR   Take 1 tablet by mouth every evening.  Dose: 10 mg     omeprazole 20 MG delayed-release capsule  Commonly known as: PRILOSEC   Take 20 mg by mouth every morning with breakfast.  Dose: 20 mg     PREPARATION H 0.25-3-12-18 % Crea   Insert 1 Application into the rectum as needed (pain).  Dose: 1 Application     sodium chloride 0.65 % Soln  Commonly known as: OCEAN   Administer 1 Spray into affected nostril(S) 2 times a day.  Dose: 1 Spray     Vitamin C 1000 MG Tabs   Take 1,000 mg by mouth every morning.  Dose: 1,000 mg     Vitamin D 50 MCG (2000 UT) Caps   Take 2,000 Units by mouth every morning.  Dose: 2,000 Units     VITAMIN E PO   Take 1 Cap by mouth every morning.  Dose: 1  Capsule     ZyrTEC Allergy 10 MG Tabs  Generic drug: cetirizine   Take 10 mg by mouth every day.  Dose: 10 mg        STOP taking these medications    doxycycline 100 MG Tabs  Commonly known as: VIBRAMYCIN     predniSONE 10 MG Tabs  Commonly known as: DELTASONE            Allergies  Allergies   Allergen Reactions   • Bactrim [Sulfamethoxazole W-Trimethoprim] Hives   • Pcn [Penicillins] Hives     About 70 years   • Codeine Unspecified     Unknown reaction         DIET  Orders Placed This Encounter   Procedures   • Diet Order Diet: Cardiac; Miscellaneous modifications: (optional): No Decaf, No Caffeine(for test)     Standing Status:   Standing     Number of Occurrences:   1     Order Specific Question:   Diet:     Answer:   Cardiac [6]     Order Specific Question:   Miscellaneous modifications: (optional)     Answer:   No Decaf, No Caffeine(for test) [11]       ACTIVITY  As tolerated.  Weight bearing as tolerated    CONSULTATIONS  N/A    PROCEDURES  N/A    LABORATORY  Lab Results   Component Value Date    SODIUM 140 10/18/2021    POTASSIUM 3.2 (L) 10/18/2021    CHLORIDE 107 10/18/2021    CO2 22 10/18/2021    GLUCOSE 124 (H) 10/18/2021    BUN 5 (L) 10/18/2021    CREATININE 0.36 (L) 10/18/2021        Lab Results   Component Value Date    WBC 7.0 10/17/2021    HEMOGLOBIN 12.1 10/17/2021    HEMATOCRIT 37.6 10/17/2021    PLATELETCT 162 (L) 10/17/2021        Total time of the discharge process exceeds 38 minutes.

## 2021-10-18 NOTE — PROGRESS NOTES
Report received from Kiana TAY. Pt awake and watching TV at the time of report. Plan of care discussed in doorway. White board has been updated. Safety precautions in place and call light within reach.

## 2021-10-18 NOTE — DISCHARGE INSTRUCTIONS
Discharge Instructions    Discharged to home by car with relative. Discharged via wheelchair, hospital escort: Yes.  Special equipment needed: Not Applicable    Be sure to schedule a follow-up appointment with your primary care doctor or any specialists as instructed.     Discharge Plan:   Diet Plan: Discussed  Activity Level: Discussed  Confirmed Follow up Appointment: Appointment Scheduled  Confirmed Symptoms Management: Discussed  Medication Reconciliation Updated: Yes  Influenza Vaccine Indication: Indicated: 65 years and older (this RN spoke to pharmbaileyy, flu shot held due to pt having current fever)    I understand that a diet low in cholesterol, fat, and sodium is recommended for good health. Unless I have been given specific instructions below for another diet, I accept this instruction as my diet prescription.   Other diet: cardiac    Special Instructions: None    · Is patient discharged on Warfarin / Coumadin?   No     Depression / Suicide Risk    As you are discharged from this [facility] facility, it is important to learn how to keep safe from harming yourself.    Recognize the warning signs:  · Abrupt changes in personality, positive or negative- including increase in energy   · Giving away possessions  · Change in eating patterns- significant weight changes-  positive or negative  · Change in sleeping patterns- unable to sleep or sleeping all the time   · Unwillingness or inability to communicate  · Depression  · Unusual sadness, discouragement and loneliness  · Talk of wanting to die  · Neglect of personal appearance   · Rebelliousness- reckless behavior  · Withdrawal from people/activities they love  · Confusion- inability to concentrate     If you or a loved one observes any of these behaviors or has concerns about self-harm, here's what you can do:  · Talk about it- your feelings and reasons for harming yourself  · Remove any means that you might use to hurt yourself (examples: pills, rope,  extension cords, firearm)  · Get professional help from the community (Mental Health, Substance Abuse, psychological counseling)  · Do not be alone:Call your Safe Contact- someone whom you trust who will be there for you.  · Call your local CRISIS HOTLINE 682-4208 or 533-656-5066  · Call your local Children's Mobile Crisis Response Team [place] (807) 447-5134 or www.[employer]  · Call the toll free National Suicide Prevention Hotlines   · National Suicide Prevention Lifeline 687-170-HUOB (3911)  · [employer] [facility] Line Network 800-SUICIDE (580-1230)    Norovirus Infection    Norovirus infection causes inflammation in the stomach and intestines (gastroenteritis) and food poisoning. It is caused by exposure to a virus in a group of similar viruses called noroviruses.  Norovirus spreads very easily from person to person (is very contagious). It often occurs in places where people are in close contact, such as schools, nursing homes, and cruise ships. You can get it from food, water, surfaces, or other people who have the virus (are contaminated). Norovirus is also found in the stool (feces) or vomit of infected people. You can spread the infection as soon as you feel sick, and you may continue to be contagious after you recover.  What are the causes?  This condition is caused by contact with norovirus. You can catch norovirus if you:  · Eat or drink something that is contaminated with norovirus.  · Touch surfaces or objects that are contaminated with norovirus and then put your hand in or by your mouth or nose.  · Have direct contact with an infected person who has symptoms.  · Share food, drink, or utensils with someone who is sick with norovirus.  What are the signs or symptoms?  Symptoms usually begin within 12 hours to 2 days after you become infected. Most norovirus symptoms affect the digestive system.Symptoms may include:  · Nausea.  · Vomiting.  · Diarrhea.  · Stomach  cramps.  · Fever.  · Chills.  · Headache.  · Muscle aches.  · Tiredness.  How is this diagnosed?  This condition may be diagnosed based on:  · Your symptoms.  · A physical exam.  · A stool test.  How is this treated?  There is no specific treatment for norovirus. Most people get better without treatment in about 2 days. Young children, the elderly, and people who are already sick may take up to 6 days to recover.  Follow these instructions at home:  Eating and drinking  · Drink plenty of water to replace fluids that are lost through diarrhea and vomiting. This prevents dehydration. Drink enough fluid to keep your urine clear or pale yellow.  · Drink clear fluids in small amounts as you are able. Clear fluids include water, ice chips, fruit juice with water added (diluted fruit juice), and low-calorie sports drinks.  ? Avoid fluids that contain a lot of sugar or caffeine, such as energy drinks, sports drinks, and soda.  ? Avoid alcohol.  · If instructed by your health care provider, drink an oral rehydration solution (ORS). This is a drink that is sold at pharmacies and retail stores. An ORS contains minerals (electrolytes) that you can lose through diarrhea and vomiting.  · Eat bland, easy-to-digest foods in small amounts as you are able. These foods include bananas, applesauce, rice, lean meats, toast, and crackers.  ? Avoid spicy or fatty foods.  General instructions    · Rest at home while you recover.  · Do not prepare food for others while you are infected. Wait at least 3 days after you recover from the illness to do this.  · Take over-the-counter and prescription medicines only as told by your health care provider.  · Wash your hands frequently with soap and water. If soap and water are not available, use hand .  · Make sure that all people in your household wash their hands well and often.  · Keep all follow-up visits as told by your health care provider. This is important.  How is this  prevented?  To help prevent the spread of norovirus:  · Stay at home if you are feeling sick. This will reduce the risk of spreading the virus to others.  · Wash your hands often with soap and water for at least 20 seconds, especially after using the toilet or changing a diaper.  · Wash fruits and vegetables thoroughly before peeling, preparing, or serving them.  · Throw out any food that a sick person may have touched.  · Disinfect contaminated surfaces immediately after someone in the household has been sick. Use a bleach-based household .  · Immediately remove and wash soiled clothes or sheets.  Contact a health care provider if:  · You have vomiting, diarrhea, or stomach pain that gets worse.  · You have symptoms that do not go away after 2-6 days.  · You have a fever.  · You cannot drink without vomiting.  · You feel light-headed or dizzy.  · Your symptoms get worse.  Get help right away if:  You develop symptoms of dehydration that do not improve with fluid replacement, such as:  · Excessive sleepiness.  · Lack of tears.  · Very little urine production.  · Dry mouth.  · Muscle cramps.  · Weak pulse.  · Confusion.  Summary  · Norovirus infection is common and often occurs in places where people are in close contact, such as schools, nursing homes, and cruise ships.  · To help prevent the spread of this infection, wash hands with soap and water for at least 20 seconds before handling food or after having contact with stool or body fluids.  · There is no specific treatment for norovirus, but most people get better without treatment in about 2 days. People who are healthy when infected often recover sooner than those who are elderly, young, or already sick.  · Replace lost fluids by drinking plenty of water, or by drinking oral rehydration solution (ORS), which contains important minerals called electrolytes. This prevents dehydration.  This information is not intended to replace advice given to you by your  health care provider. Make sure you discuss any questions you have with your health care provider.  Document Released: 03/09/2004 Document Revised: 04/10/2020 Document Reviewed: 01/24/2018  Elsevier Patient Education © 2020 [employer]

## 2021-10-18 NOTE — PROGRESS NOTES
Telemetry Shift Summary     RHYTHM: SR  HR RANGE: 71-83  ECTOPY: r PVC  MEASUREMENTS: 0.20/0.08/0.40    Normal Measurements  Rhythm: SR  HR RANGE:   Measurements: 0.12-0.20/0.06-0.10/0.30-0.52      Tele strips reviewed and placed in chart

## 2021-10-18 NOTE — CARE PLAN
The patient is Stable - Low risk of patient condition declining or worsening    Shift Goals  Clinical Goals: monitor diarrhea, nausea, and vomiting  Patient Goals: go home  Family Goals: no family present    Progress made toward(s) clinical / shift goals:        Problem: Fluid Volume  Goal: Fluid volume balance will be maintained  Outcome: Progressing     Problem: Gastrointestinal Irritability  Goal: Nausea and vomiting will be absent or improve  Outcome: Progressing  Goal: Diarrhea will be absent or improved  Outcome: Progressing     Patient is receiving IV fluids as ordered by MD. PO intake of fluids have been encouraged. Patient remains free of nausea/vomiting/diarrhea at this time. Will continue to monitor patient for these symptoms throughout shift and provide appropriate interventions as needed. Call light in reach and safety precautions are in place.

## 2021-10-18 NOTE — PROGRESS NOTES
CHW scheduled patient an appointment on 11/4/21 at 1:30pm with Dr. Haley. CHW called patient at bedside.  CHW informed patient of date, time, and location via phone call and via AVS. Patient will have transportation to appointment. Patient has not other questions or needs at this time. CHW will not follow.

## 2021-10-18 NOTE — DIETARY
Nutrition Services:    MST of 3 on Nutrition Admit Screen due to report of unsure weight loss and poor PO PTA. Pt is currently on a cardiac, not decaf, no caffeine diet and per chart pt PO % at breakfast this morning. Prior to this PO 25-50% of most meals. Pt with N/V/D PTA which most likely impacted pt's PO intake PTA. Appetite appears to be improving with adequate intake noted today at breakfast     Ht: 157.5 cm, Wt: 55.2 kg (bed scale), BMI 22.26 (slightly low for age). Admit wt on 10/6/21 was 54.6 kg. Wts reviewed in Epic. Pt's weight is a few kg > than prior weights since 4/2/21. No wt loss noted recently.     RD will re-screen early to verify PO intake continues to be adequate.

## 2021-10-19 ENCOUNTER — PATIENT OUTREACH (OUTPATIENT)
Dept: MEDICAL GROUP | Facility: PHYSICIAN GROUP | Age: 86
End: 2021-10-19

## 2021-10-19 LAB — NORWALK VIRUS PCR NORWK1: ABNORMAL

## 2021-10-19 NOTE — PROGRESS NOTES
Patient education provided, AVS signed, questions answered, and patient escorted out by CNA. All belongings with patient, accompanied by family.

## 2021-10-19 NOTE — PROGRESS NOTES
Patient outreach for TCM.  Reviewed discharge instructions and medications.  Verbalized understanding.  Rescheduled appointment for follow up with PCP to 10/28 at .

## 2021-10-20 LAB — LACTOFERRIN STL QL IA: POSITIVE

## 2021-10-25 SDOH — ECONOMIC STABILITY: TRANSPORTATION INSECURITY
IN THE PAST 12 MONTHS, HAS THE LACK OF TRANSPORTATION KEPT YOU FROM MEDICAL APPOINTMENTS OR FROM GETTING MEDICATIONS?: NO

## 2021-10-25 SDOH — ECONOMIC STABILITY: FOOD INSECURITY: WITHIN THE PAST 12 MONTHS, YOU WORRIED THAT YOUR FOOD WOULD RUN OUT BEFORE YOU GOT MONEY TO BUY MORE.: NEVER TRUE

## 2021-10-25 SDOH — ECONOMIC STABILITY: FOOD INSECURITY: WITHIN THE PAST 12 MONTHS, THE FOOD YOU BOUGHT JUST DIDN'T LAST AND YOU DIDN'T HAVE MONEY TO GET MORE.: NEVER TRUE

## 2021-10-25 SDOH — ECONOMIC STABILITY: INCOME INSECURITY: HOW HARD IS IT FOR YOU TO PAY FOR THE VERY BASICS LIKE FOOD, HOUSING, MEDICAL CARE, AND HEATING?: NOT HARD AT ALL

## 2021-10-25 SDOH — ECONOMIC STABILITY: TRANSPORTATION INSECURITY
IN THE PAST 12 MONTHS, HAS LACK OF TRANSPORTATION KEPT YOU FROM MEETINGS, WORK, OR FROM GETTING THINGS NEEDED FOR DAILY LIVING?: NO

## 2021-10-25 NOTE — PROGRESS NOTES
10/25/21- GANESH Cotton contacted pt via TC post d/c to introduce CCM services. Completed SDOH screening and outpatient assessment. Pt is established with PCP and follow up visit scheduled. Pt declined assistance with scheduling, CCM services and referral to [facility]. Pt mentions good support from children. Pt lives alone in [facility]. No medical equipment used at home. Pt is confident in ability to manage care post d/c. No issues keeping appointments or financial barriers to care. Completed AVS review/ medication/ questions. Pt denies need for resources such as food, transportation or housing. CCM contact info left with pt. Encouraged pt to contact if needed.     Community Health Worker Intake  • Social determinates of health intake completed.   • Identified barriers to none.   • Contact information provided to Dayana Lechuga. Yes   • Has PCP appointment scheduled for 10/28/21  • Scheduled Food Delivery/Home Visit/Outpatient Visit: No   • Accepted/Declined Med's-To-Beds. No   • Inpatient/Outpatient assessment completed. Outpatient   • Did the patient receive medications post discharge: Pt to  medications at local pharmacy.     Plan: D/c pt from CCM services as all needs met.

## 2021-10-28 ENCOUNTER — OFFICE VISIT (OUTPATIENT)
Dept: MEDICAL GROUP | Facility: PHYSICIAN GROUP | Age: 86
End: 2021-10-28
Payer: MEDICARE

## 2021-10-28 ENCOUNTER — HOSPITAL ENCOUNTER (OUTPATIENT)
Dept: LAB | Facility: MEDICAL CENTER | Age: 86
End: 2021-10-28
Attending: FAMILY MEDICINE
Payer: MEDICARE

## 2021-10-28 VITALS
DIASTOLIC BLOOD PRESSURE: 54 MMHG | OXYGEN SATURATION: 95 % | HEART RATE: 77 BPM | TEMPERATURE: 98.3 F | BODY MASS INDEX: 20.84 KG/M2 | HEIGHT: 63 IN | SYSTOLIC BLOOD PRESSURE: 122 MMHG | WEIGHT: 117.6 LBS

## 2021-10-28 DIAGNOSIS — Z09 HOSPITAL DISCHARGE FOLLOW-UP: Primary | ICD-10-CM

## 2021-10-28 DIAGNOSIS — K52.9 GASTROENTERITIS: ICD-10-CM

## 2021-10-28 LAB
ANION GAP SERPL CALC-SCNC: 8 MMOL/L (ref 7–16)
BUN SERPL-MCNC: 17 MG/DL (ref 8–22)
CALCIUM SERPL-MCNC: 9.8 MG/DL (ref 8.5–10.5)
CHLORIDE SERPL-SCNC: 104 MMOL/L (ref 96–112)
CO2 SERPL-SCNC: 24 MMOL/L (ref 20–33)
CREAT SERPL-MCNC: 0.74 MG/DL (ref 0.5–1.4)
GLUCOSE SERPL-MCNC: 108 MG/DL (ref 65–99)
POTASSIUM SERPL-SCNC: 4.7 MMOL/L (ref 3.6–5.5)
SODIUM SERPL-SCNC: 136 MMOL/L (ref 135–145)

## 2021-10-28 PROCEDURE — 99213 OFFICE O/P EST LOW 20 MIN: CPT | Performed by: FAMILY MEDICINE

## 2021-10-28 PROCEDURE — 36415 COLL VENOUS BLD VENIPUNCTURE: CPT

## 2021-10-28 PROCEDURE — 80048 BASIC METABOLIC PNL TOTAL CA: CPT

## 2021-10-28 ASSESSMENT — FIBROSIS 4 INDEX: FIB4 SCORE: 2.79

## 2021-10-28 NOTE — PROGRESS NOTES
Subjective:     Dayana Lechuga is a 89 y.o. female who presents for Hospital Follow-up.    POST DISCHARGE CALL:  Discharge Date:10/18/2021   Date of Outreach Call: 12/23/2019  4:47 PM  Now that you're home, how are you doing? Good  Do you have questions about your medications? No  Did you fill your medications? Yes  Do you have a follow-up appointment scheduled?Yes  Discharging Department: [facility]  Number of Attempts: 1  Current or previous attempts completed within two business days of discharge? Yes  Provided education regarding treatment plan, medication, self-management, ADLs? Yes  Has patient completed Advance Directive? If yes, advise them to bring to appointment. No  Care Manager phone number provided? Yes  Is there anything else I can help you with? No    HPI:   Recently hospitalized for gastroenteritis    The patient lives at an assisted living facility.  There was a recent norovirus outbreak in the facility.  Patient presented to the emergency room with GI losses.  Testing via PCR was positive for norovirus.  Ultimately discharged in safe condition.  BMs are still a little loose  Now tolerating PO    Current medicines (including reconciliation performed today)  Current Outpatient Medications   Medication Sig Dispense Refill   • calcium carbonate (TUMS) 500 MG Chew Tab Chew 1 Tablet 2 times a day. 60 Tablet 0   • potassium chloride SA (KDUR) 20 MEQ Tab CR Take 1 Tablet by mouth every day. 30 Tablet 0   • LORazepam (ATIVAN) 0.5 MG Tab Take 0.5 mg by mouth at bedtime.     • sodium chloride (OCEAN) 0.65 % Solution Administer 1 Spray into affected nostril(S) 2 times a day.     • azelastine (ASTELIN) 137 MCG/SPRAY nasal spray Administer 1 Spray into affected nostril(S) 2 times a day. 30 mL 0   • gabapentin (NEURONTIN) 100 MG Cap TAKE 2 CAPSULES BY MOUTH THREE TIMES DAILY 540 Capsule 1   • fluticasone (FLONASE) 50 MCG/ACT nasal spray USE 1 SPRAY IN EACH NOSTRIL ONCE DAILY 16 g 0   •  lisinopril (PRINIVIL) 20 MG Tab Take 1 tablet by mouth every morning with breakfast. 100 tablet 3   • isosorbide mononitrate SR (IMDUR) 30 MG TABLET SR 24 HR Take 1 tablet by mouth every evening. 100 tablet 3   • atorvastatin (LIPITOR) 80 MG tablet Take 1 tablet by mouth every evening. 100 tablet 3   • amLODIPine (NORVASC) 5 MG Tab Take 1 tablet by mouth every day. 100 tablet 3   • carvedilol (COREG) 6.25 MG Tab Take 1 tablet by mouth 2 times a day, with meals for 360 days. 180 tablet 3   • montelukast (SINGULAIR) 10 MG Tab Take 1 tablet by mouth every evening. 90 tablet 2   • albuterol 108 (90 Base) MCG/ACT Aero Soln inhalation aerosol INHALE TWO PUFFS BY MOUTH EVERY SIX HOURS AS NEEDED FOR SHORTNESS OF BREATH 25.5 g 1   • Acetaminophen 500 MG Cap Take 1 Capsule by mouth 2 times a day. Morning & Afternoon     • Melatonin 10 MG Tab Take 20 mg by mouth at bedtime.     • PREPARATION H 0.25-3-12-18 % Cream Insert 1 Application into the rectum as needed (pain).     • Multiple Vitamins-Minerals (CENTRUM SILVER PO) Take 1 Tab by mouth every morning.     • Biotin w/ Vitamins C & E (HAIR/SKIN/NAILS) 1250-7.5-7.5 MCG-MG-UNT Chew Tab Chew 1 Tab every morning.     • guaifenesin LA (MUCINEX) 600 MG TABLET SR 12 HR Take 600 mg by mouth every morning.     • Ascorbic Acid (VITAMIN C) 1000 MG Tab Take 1,000 mg by mouth every morning.     • VITAMIN E PO Take 1 Cap by mouth every morning.     • aspirin (ASA) 81 MG Chew Tab chewable tablet Chew 81 mg every morning with breakfast. 100 Tab 11   • Cholecalciferol (VITAMIN D) 50 MCG (2000 UT) Cap Take 2,000 Units by mouth every morning.     • omeprazole (PRILOSEC) 20 MG delayed-release capsule Take 20 mg by mouth every morning with breakfast.     • cetirizine (ZYRTEC ALLERGY) 10 MG Tab Take 10 mg by mouth every day. (Patient not taking: Reported on 10/28/2021)       No current facility-administered medications for this visit.       Allergies:   Bactrim [sulfamethoxazole w-trimethoprim],  "Pcn [penicillins], and Codeine    Social History     Tobacco Use   • Smoking status: Never Smoker   • Smokeless tobacco: Never Used   Vaping Use   • Vaping Use: Never used   Substance Use Topics   • Alcohol use: No     Alcohol/week: 0.0 oz   • Drug use: No       ROS:  negative    Objective:     Vitals:    10/28/21 1406   BP: 122/54   BP Location: Right arm   Patient Position: Sitting   BP Cuff Size: Adult   Pulse: 77   Temp: 36.8 °C (98.3 °F)   TempSrc: Temporal   SpO2: 95%   Weight: 53.3 kg (117 lb 9.6 oz)   Height: 1.6 m (5' 3\")     Body mass index is 20.83 kg/m².    Physical Exam:  Constitutional: Alert, no distress, well-groomed.  Skin: Warm, dry, good turgor, no rashes in visible areas.  Eye: Equal, round and reactive, conjunctiva clear, lids normal.  ENMT: Lips without lesions, good dentition, moist mucous membranes.  Neck: Trachea midline, no masses, no thyromegaly.  Respiratory: Unlabored respiratory effort, no cough.  MSK: Normal gait, moves all extremities.  Neuro: Grossly non-focal.   Psych: Alert and oriented x3, normal affect and mood.        Assessment and Plan:   1. Gastroenteritis  - Basic Metabolic Panel; Future  2. Hospital discharge follow-up  The patient presents for hospital follow-up.  Doing better post discharge.  Tolerating p.o. without a problem.  Denies need for Zofran at this time.  I do recommend probiotics.  I will also order a new BMP to ensure electrolyte stability.  She is on potassium supplements.    - Chart and discharge summary were reviewed.   - Hospitalization and results reviewed with patient.   - Medications reviewed including instructions regarding high risk medications, dosing and side effects.  - Recommended Services: No services needed at this time  - Advance directive/POLST on file?  No     Follow-up:No follow-ups on file.    Face-to-face transitional care management services with HIGH (today's visit is at least 7 days post discharge & LACE+ score 59+) medical decision " complexity were provided.     LACE+ Historical Score Over Time (0-28: Low, 29-58: Medium, 59+: High): 76

## 2021-11-05 ENCOUNTER — OFFICE VISIT (OUTPATIENT)
Dept: CARDIOLOGY | Facility: MEDICAL CENTER | Age: 86
End: 2021-11-05
Payer: MEDICARE

## 2021-11-05 VITALS
DIASTOLIC BLOOD PRESSURE: 60 MMHG | SYSTOLIC BLOOD PRESSURE: 130 MMHG | HEIGHT: 63 IN | HEART RATE: 72 BPM | OXYGEN SATURATION: 95 % | WEIGHT: 118 LBS | BODY MASS INDEX: 20.91 KG/M2

## 2021-11-05 DIAGNOSIS — I73.9 PVD (PERIPHERAL VASCULAR DISEASE) (HCC): ICD-10-CM

## 2021-11-05 DIAGNOSIS — I25.119 CORONARY ARTERY DISEASE INVOLVING NATIVE CORONARY ARTERY OF NATIVE HEART WITH ANGINA PECTORIS (HCC): ICD-10-CM

## 2021-11-05 DIAGNOSIS — I20.89 STABLE ANGINA PECTORIS (HCC): ICD-10-CM

## 2021-11-05 DIAGNOSIS — I73.9 PERIPHERAL VASCULAR DISEASE, UNSPECIFIED (HCC): ICD-10-CM

## 2021-11-05 DIAGNOSIS — I70.201 POPLITEAL ARTERY STENOSIS, RIGHT (HCC): ICD-10-CM

## 2021-11-05 DIAGNOSIS — E78.5 DYSLIPIDEMIA: Chronic | ICD-10-CM

## 2021-11-05 DIAGNOSIS — I73.9 CLAUDICATION OF RIGHT LOWER EXTREMITY (HCC): ICD-10-CM

## 2021-11-05 DIAGNOSIS — F07.81 POST CONCUSSION SYNDROME: ICD-10-CM

## 2021-11-05 DIAGNOSIS — J90 PLEURAL EFFUSION: ICD-10-CM

## 2021-11-05 DIAGNOSIS — R00.1 BRADYCARDIA: ICD-10-CM

## 2021-11-05 DIAGNOSIS — I10 ESSENTIAL HYPERTENSION: ICD-10-CM

## 2021-11-05 PROCEDURE — 99214 OFFICE O/P EST MOD 30 MIN: CPT | Performed by: INTERNAL MEDICINE

## 2021-11-05 RX ORDER — MONTELUKAST SODIUM 10 MG/1
TABLET ORAL
Qty: 90 TABLET | Refills: 3 | Status: SHIPPED | OUTPATIENT
Start: 2021-11-05 | End: 2022-12-06

## 2021-11-05 RX ORDER — LORATADINE 10 MG/1
10 TABLET ORAL DAILY
COMMUNITY
End: 2023-11-21

## 2021-11-05 RX ORDER — CARVEDILOL 6.25 MG/1
6.25 TABLET ORAL 2 TIMES DAILY WITH MEALS
Qty: 180 TABLET | Refills: 3 | Status: SHIPPED | OUTPATIENT
Start: 2021-11-05 | End: 2022-08-09 | Stop reason: SDUPTHER

## 2021-11-05 RX ORDER — FLUTICASONE PROPIONATE 50 MCG
SPRAY, SUSPENSION (ML) NASAL
Qty: 16 G | Refills: 3 | Status: SHIPPED | OUTPATIENT
Start: 2021-11-05 | End: 2022-10-31 | Stop reason: SDUPTHER

## 2021-11-05 ASSESSMENT — ENCOUNTER SYMPTOMS
CONSTITUTIONAL NEGATIVE: 1
BRUISES/BLEEDS EASILY: 0
COUGH: 0
ORTHOPNEA: 0
DIZZINESS: 0
PND: 0
LOSS OF CONSCIOUSNESS: 0
STRIDOR: 0
SORE THROAT: 0
SPUTUM PRODUCTION: 0
CLAUDICATION: 0
SHORTNESS OF BREATH: 0
WHEEZING: 0
EYES NEGATIVE: 1
RESPIRATORY NEGATIVE: 1
GASTROINTESTINAL NEGATIVE: 1
FEVER: 0
WEAKNESS: 0
CHILLS: 0
PALPITATIONS: 0
HEMOPTYSIS: 0
BACK PAIN: 0
CARDIOVASCULAR NEGATIVE: 1
NEUROLOGICAL NEGATIVE: 1

## 2021-11-05 ASSESSMENT — FIBROSIS 4 INDEX: FIB4 SCORE: 2.79

## 2021-11-05 NOTE — PROGRESS NOTES
Chief Complaint   Patient presents with   • Coronary Artery Disease     follow up       Subjective:   Dayana Lechuga is a 89 y.o. female who presents today as a follow-up for her CAD peripheral vascular disease hypertension hyperlipidemia and chest pain.    Since he was last seen she continues to do well.  She is having no chest pain palpitations or PND.  She is been compliant with her medications.      Past Medical History:   Diagnosis Date   • Allergy    • Anxiety    • ASTHMA    • Bronchitis    • CAD (coronary artery disease)     TJ to RCA; 70% stenosis in LAD   • Chills    • Constipation    • Difficulty breathing    • GERD (gastroesophageal reflux disease)    • Heart attack (HCC)    • Heartburn    • Hyperlipidemia    • Hypertension    • Influenza    • Insomnia    • Lumbar back pain 9/10/2016   • Mumps    • OSTEOPOROSIS    • Palpitations    • Pneumonia    • PVD (peripheral vascular disease) ([facility])     70% PAD-followed by Lary AMBROCIO   • Sweat, sweating, excessive    • Tonsillitis      Past Surgical History:   Procedure Laterality Date   • ABDOMINAL HYSTERECTOMY TOTAL     • APPENDECTOMY     • HERNIA REPAIR     • HYSTERECTOMY LAPAROSCOPY     • TONSILLECTOMY     • ZZZ CARDIAC CATH       Family History   Problem Relation Age of Onset   • Heart Attack Father    • Cancer Brother    • Cancer Brother    • Heart Disease Neg Hx    • Heart Failure Neg Hx    • Hyperlipidemia Neg Hx      Social History     Socioeconomic History   • Marital status:      Spouse name: Not on file   • Number of children: Not on file   • Years of education: Not on file   • Highest education level: Not on file   Occupational History   • Not on file   Tobacco Use   • Smoking status: Never Smoker   • Smokeless tobacco: Never Used   Vaping Use   • Vaping Use: Never used   Substance and Sexual Activity   • Alcohol use: No     Alcohol/week: 0.0 oz   • Drug use: No   • Sexual activity: Not Currently   Other Topics Concern   •  Service No    • Blood Transfusions Yes   • Caffeine Concern No   • Occupational Exposure No   • Hobby Hazards No   • Sleep Concern Yes   • Stress Concern Yes   • Weight Concern No   • Special Diet No   • Back Care No   • Exercise No   • Bike Helmet No   • Seat Belt Yes   • Self-Exams No   Social History Narrative   • Not on file     Social Determinants of Health     Financial Resource Strain: Low Risk    • Difficulty of Paying Living Expenses: Not hard at all   Food Insecurity: No Food Insecurity   • Worried About Running Out of Food in the Last Year: Never true   • Ran Out of Food in the Last Year: Never true   Transportation Needs: No Transportation Needs   • Lack of Transportation (Medical): No   • Lack of Transportation (Non-Medical): No   Physical Activity:    • Days of Exercise per Week:    • Minutes of Exercise per Session:    Stress:    • Feeling of Stress :    Social Connections:    • Frequency of Communication with Friends and Family:    • Frequency of Social Gatherings with Friends and Family:    • Attends [employer] Services:    • Active Member of Clubs or Organizations:    • Attends Club or Organization Meetings:    • Marital Status:    Intimate Partner Violence:    • Fear of Current or Ex-Partner:    • Emotionally Abused:    • Physically Abused:    • Sexually Abused:      Allergies   Allergen Reactions   • Bactrim [Sulfamethoxazole W-Trimethoprim] Hives   • Pcn [Penicillins] Hives     About 70 years   • Codeine Unspecified     Unknown reaction       Outpatient Encounter Medications as of 11/5/2021   Medication Sig Dispense Refill   • montelukast (SINGULAIR) 10 MG Tab TAKE ONE TABLET BY MOUTH IN THE EVENING 90 Tablet 3   • fluticasone (FLONASE) 50 MCG/ACT nasal spray USE 1 SPRAY IN EACH NOSTRIL ONCE DAILY 16 g 3   • loratadine (CLARITIN) 10 MG Tab Take 10 mg by mouth every day.     • carvedilol (COREG) 6.25 MG Tab Take 1 Tablet by mouth 2 times a day with meals for 360 days. 180 Tablet 3   • calcium carbonate (TUMS)  500 MG Chew Tab Chew 1 Tablet 2 times a day. 60 Tablet 0   • potassium chloride SA (KDUR) 20 MEQ Tab CR Take 1 Tablet by mouth every day. 30 Tablet 0   • LORazepam (ATIVAN) 0.5 MG Tab Take 0.5 mg by mouth at bedtime.     • sodium chloride (OCEAN) 0.65 % Solution Administer 1 Spray into affected nostril(S) 2 times a day.     • azelastine (ASTELIN) 137 MCG/SPRAY nasal spray Administer 1 Spray into affected nostril(S) 2 times a day. 30 mL 0   • gabapentin (NEURONTIN) 100 MG Cap TAKE 2 CAPSULES BY MOUTH THREE TIMES DAILY 540 Capsule 1   • lisinopril (PRINIVIL) 20 MG Tab Take 1 tablet by mouth every morning with breakfast. 100 tablet 3   • isosorbide mononitrate SR (IMDUR) 30 MG TABLET SR 24 HR Take 1 tablet by mouth every evening. 100 tablet 3   • atorvastatin (LIPITOR) 80 MG tablet Take 1 tablet by mouth every evening. 100 tablet 3   • amLODIPine (NORVASC) 5 MG Tab Take 1 tablet by mouth every day. 100 tablet 3   • albuterol 108 (90 Base) MCG/ACT Aero Soln inhalation aerosol INHALE TWO PUFFS BY MOUTH EVERY SIX HOURS AS NEEDED FOR SHORTNESS OF BREATH 25.5 g 1   • Acetaminophen 500 MG Cap Take 1 Capsule by mouth 2 times a day. Morning & Afternoon     • Melatonin 10 MG Tab Take 20 mg by mouth at bedtime.     • PREPARATION H 0.25-3-12-18 % Cream Insert 1 Application into the rectum as needed (pain).     • Multiple Vitamins-Minerals (CENTRUM SILVER PO) Take 1 Tab by mouth every morning.     • Biotin w/ Vitamins C & E (HAIR/SKIN/NAILS) 1250-7.5-7.5 MCG-MG-UNT Chew Tab Chew 1 Tab every morning.     • guaifenesin LA (MUCINEX) 600 MG TABLET SR 12 HR Take 600 mg by mouth every morning.     • Ascorbic Acid (VITAMIN C) 1000 MG Tab Take 1,000 mg by mouth every morning.     • VITAMIN E PO Take 1 Cap by mouth every morning.     • aspirin (ASA) 81 MG Chew Tab chewable tablet Chew 81 mg every morning with breakfast. 100 Tab 11   • Cholecalciferol (VITAMIN D) 50 MCG (2000 UT) Cap Take 2,000 Units by mouth every morning.     • omeprazole  "(PRILOSEC) 20 MG delayed-release capsule Take 20 mg by mouth every morning with breakfast.     • cetirizine (ZYRTEC ALLERGY) 10 MG Tab Take 10 mg by mouth every day. (Patient not taking: Reported on 10/28/2021)     • [DISCONTINUED] carvedilol (COREG) 6.25 MG Tab Take 1 tablet by mouth 2 times a day, with meals for 360 days. 180 tablet 3     No facility-administered encounter medications on file as of 11/5/2021.     Review of Systems   Constitutional: Negative.  Negative for chills, fever and malaise/fatigue.   HENT: Negative.  Negative for sore throat.    Eyes: Negative.    Respiratory: Negative.  Negative for cough, hemoptysis, sputum production, shortness of breath, wheezing and stridor.    Cardiovascular: Negative.  Negative for chest pain, palpitations, orthopnea, claudication, leg swelling and PND.   Gastrointestinal: Negative.    Genitourinary: Negative.    Musculoskeletal: Negative for back pain and joint pain.   Skin: Negative.    Neurological: Negative.  Negative for dizziness, loss of consciousness and weakness.   Endo/Heme/Allergies: Negative.  Does not bruise/bleed easily.   All other systems reviewed and are negative.       Objective:   /60 (BP Location: Right arm, Patient Position: Sitting)   Pulse 72   Ht 1.6 m (5' 3\")   Wt 53.5 kg (118 lb)   LMP  (LMP Unknown)   SpO2 95%   BMI 20.90 kg/m²     Physical Exam  Vitals and nursing note reviewed.   Constitutional:       General: She is not in acute distress.     Appearance: She is well-developed. She is not diaphoretic.   HENT:      Head: Normocephalic and atraumatic.      Right Ear: External ear normal.      Left Ear: External ear normal.      Nose: Nose normal.      Mouth/Throat:      Pharynx: No oropharyngeal exudate.   Eyes:      General: No scleral icterus.        Right eye: No discharge.         Left eye: No discharge.      Conjunctiva/sclera: Conjunctivae normal.      Pupils: Pupils are equal, round, and reactive to light.   Neck:      " Vascular: No JVD.   Cardiovascular:      Rate and Rhythm: Normal rate and regular rhythm.      Heart sounds: No murmur heard.   No friction rub. No gallop.    Pulmonary:      Effort: Pulmonary effort is normal. No respiratory distress.      Breath sounds: No stridor. No wheezing or rales.   Chest:      Chest wall: No tenderness.   Abdominal:      General: There is no distension.      Palpations: Abdomen is soft.      Tenderness: There is no guarding.   Musculoskeletal:         General: No tenderness or deformity. Normal range of motion.      Cervical back: Neck supple.   Skin:     General: Skin is warm and dry.      Coloration: Skin is not pale.      Findings: No erythema or rash.   Neurological:      Mental Status: She is alert.      Cranial Nerves: No cranial nerve deficit.      Motor: No abnormal muscle tone.      Coordination: Coordination normal.      Deep Tendon Reflexes: Reflexes are normal and symmetric. Reflexes normal.   Psychiatric:         Behavior: Behavior normal.         Thought Content: Thought content normal.         Judgment: Judgment normal.         Assessment:     1. Bradycardia     2. Stable angina pectoris ([facility])     3. Claudication of right lower extremity ([facility])     4. Coronary artery disease involving native coronary artery of native heart with angina pectoris ([facility])  carvedilol (COREG) 6.25 MG Tab   5. Dyslipidemia  carvedilol (COREG) 6.25 MG Tab   6. Essential hypertension  carvedilol (COREG) 6.25 MG Tab   7. Peripheral vascular disease, unspecified ([facility])  carvedilol (COREG) 6.25 MG Tab   8. Pleural effusion  carvedilol (COREG) 6.25 MG Tab   9. Popliteal artery stenosis, right ([facility])     10. Post concussion syndrome     11. PVD (peripheral vascular disease) ([facility])         Medical Decision Making:  Today's Assessment / Status / Plan:     89-year-old female peripheral vascular disease coronary disease hypertension hyperlipidemia.  At this point she is doing stably well.  I will refill her  carvedilol.  Otherwise I will see her back in 6 months.

## 2022-01-12 ENCOUNTER — APPOINTMENT (OUTPATIENT)
Dept: BEHAVIORAL HEALTH | Facility: CLINIC | Age: 87
End: 2022-01-12
Payer: MEDICARE

## 2022-01-12 RX ORDER — LORAZEPAM 0.5 MG/1
0.5 TABLET ORAL
Qty: 30 TABLET | Status: CANCELLED | OUTPATIENT
Start: 2022-01-12

## 2022-01-13 DIAGNOSIS — F51.01 PRIMARY INSOMNIA: ICD-10-CM

## 2022-01-13 DIAGNOSIS — Z79.899 CHRONIC USE OF BENZODIAZEPINE FOR THERAPEUTIC PURPOSE: ICD-10-CM

## 2022-01-13 DIAGNOSIS — F41.9 ANXIETY: ICD-10-CM

## 2022-01-13 RX ORDER — LORAZEPAM 0.5 MG/1
0.5 TABLET ORAL
Qty: 30 TABLET | Status: CANCELLED | OUTPATIENT
Start: 2022-01-13

## 2022-01-13 RX ORDER — LORAZEPAM 0.5 MG/1
0.5 TABLET ORAL
Qty: 30 TABLET | Refills: 0 | Status: SHIPPED | OUTPATIENT
Start: 2022-01-13 | End: 2022-02-11 | Stop reason: SDUPTHER

## 2022-02-11 ENCOUNTER — OFFICE VISIT (OUTPATIENT)
Dept: BEHAVIORAL HEALTH | Facility: CLINIC | Age: 87
End: 2022-02-11
Payer: MEDICARE

## 2022-02-11 DIAGNOSIS — F51.01 PRIMARY INSOMNIA: ICD-10-CM

## 2022-02-11 DIAGNOSIS — Z79.899 CHRONIC USE OF BENZODIAZEPINE FOR THERAPEUTIC PURPOSE: ICD-10-CM

## 2022-02-11 DIAGNOSIS — F41.9 ANXIETY: ICD-10-CM

## 2022-02-11 PROCEDURE — 99214 OFFICE O/P EST MOD 30 MIN: CPT | Mod: GC | Performed by: PSYCHIATRY & NEUROLOGY

## 2022-02-11 RX ORDER — LORAZEPAM 0.5 MG/1
0.5 TABLET ORAL
Qty: 30 TABLET | Refills: 2 | Status: SHIPPED | OUTPATIENT
Start: 2022-02-11 | End: 2022-03-13

## 2022-02-11 ASSESSMENT — PATIENT HEALTH QUESTIONNAIRE - PHQ9
5. POOR APPETITE OR OVEREATING: 0 - NOT AT ALL
CLINICAL INTERPRETATION OF PHQ2 SCORE: 0

## 2022-02-11 NOTE — PROGRESS NOTES
"[facility]  PSYCHIATRIC FOLLOW-UP NOTE    Persons in attendance: Patient and Children(Son)      Reason for visit / chief complaint:   89 year old female presenting for medication management. Patient was continued on Lorazepam 0.5mg at bedtime for primary insomnia at previous visit.     \"I've been doing well\"    SUBJECTIVE / HPI:  Patient states she had Norovirus in September and required hospitalization. She states some continued GI distress in relation to this. She feels that her mood has overall remained good and she has been engaging in activities she enjoys. She has been socializing around football and taking her dog on walks. She denies changes in sleep, appetite, or energy. She denies anxiety or excessive worry. She denies dizziness, lightheadedness or falls. She denies changes in cognition or concerns about memory and her son is in agreement in relation to this.       OBJECTIVE:    MSE :   Appearance: well groomed, appropriate in casual attire    Behavior: calm, cooperative, pleasant, engaged. good eye contact.  No tics. No mannerisms.   Speech : normal rate, normal volume, normal tone   Language: normal vocabulary   Mood: \"good\"   Affect: euthymic   Thought Process: linear, coherent, goal-directed. No flight of ideas.  No loose associations   Thought Content: no suicidal or homicidal ideation and no auditory or visual hallucinations    Attention/Concentration: appropriate   Memory: no gross deficits   Orientation: oriented to person, place, situation   Neurological: Deferred   Fund of Knowledge: appropriate   Insight/Judgment: good / good             PAST PSYCHIATRIC HISTORY  Prior psychiatric hospitalization: denies  Prior Self harm/suicide attempt: denies  Prior Diagnosis: denies     PAST PSYCHIATRIC MEDICATIONS  · Lorazepam, denies other medication trials   SUBSTANCE USE HISTORY:  ALCOHOL: denies use, states she may have a half glass of wine every few months  TOBACCO: denies  CANNABIS: " denies  OPIOIDS: denies  PRESCRIPTION MEDICATIONS: denies  OTHERS: denies  History of inpatient/outpatient rehab treatment: N/A     SOCIAL HISTORY  Lived in [place] until 2004 at which time she moved to [place]  Employment: Was working as  and  through most of life, 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  Relationship: single  Kids: son lives in [place], daughter lives in [place]; 1 grand child and 2 great grand children in [place]  Current living situation: lives in a senior home center, lives independently with her dog, has support available if needed         Review of systems:        Constitutional negative   Eyes negative   Ears/Nose/Mouth/Throat negative   Cardiovascular negative   Respiratory negative   Gastrointestinal negative   Genitourinary negative   Muscular negative   Integumentary negative   Neurological negative   Endocrine negative   Hematologic/Lymphatic negative       Medical Records/Labs/Diagnostic Tests Reviewed:  Reviewed, reviewed note from Physiatry      Current Outpatient Medications:   •  LORazepam, 0.5 mg, Oral, QHS  •  montelukast, TAKE ONE TABLET BY MOUTH IN THE EVENING  •  fluticasone, USE 1 SPRAY IN EACH NOSTRIL ONCE DAILY  •  loratadine, 10 mg, Oral, DAILY  •  carvedilol, 6.25 mg, Oral, BID WITH MEALS  •  calcium carbonate, 500 mg, Oral, BID  •  potassium chloride SA, 20 mEq, Oral, DAILY  •  sodium chloride, 1 Spray, Nasal, BID  •  azelastine, 1 Spray, Nasal, BID  •  gabapentin, TAKE 2 CAPSULES BY MOUTH THREE TIMES DAILY  •  lisinopril, 20 mg, Oral, QDAY with Breakfast  •  isosorbide mononitrate SR, 30 mg, Oral, Q EVENING  •  atorvastatin, 80 mg, Oral, Q EVENING  •  amLODIPine, 5 mg, Oral, DAILY  •  albuterol, INHALE TWO PUFFS BY MOUTH EVERY SIX HOURS AS NEEDED FOR SHORTNESS OF BREATH  •  Acetaminophen, 1 Capsule, Oral, BID  •  Melatonin, 20 mg, Oral, QHS  •  PREPARATION H, 1 Application, Rectal, PRN  •  Multiple  Vitamins-Minerals (CENTRUM SILVER PO), 1 Tablet, Oral, QAM  •  Biotin w/ Vitamins C & E, 1 Tablet, Oral, QAM  •  guaiFENesin ER, 600 mg, Oral, QAM  •  Vitamin C, 1,000 mg, Oral, QAM  •  VITAMIN E PO, 1 Capsule, Oral, QAM  •  aspirin, 81 mg, Oral, QDAY with Breakfast  •  Vitamin D, 2,000 Units, Oral, QAM  •  omeprazole, 20 mg, Oral, QDAY with Breakfast            ASSESSMENT:  89 year old female presenting for medication management. Patient has been taking Lorazepam since 2006 with taper down to 0.5mg at bedtime. She has declined further taper expressing concerns about sleep and impact of poor sleep on mood and functionality throughout the day. Patient has been using medication appropriately and denies dizziness, headaches. She has continued to tolerate medication well.   Spoke with patient extensively about options for discontinuation of medication and risks/benefits of alternative medications to aid with sleep. Discussed particularly concern about cognition and fall risk as well as sequelae of falls that can be detrimental to patient's well being. Following thorough conversation about risks, patient states desire to continue current dose with willingness to consider taper in the future. Patient has appropriately decreased dose significantly and has followed protocol and prescription fills appropriately.    DDX:  1. Primary Insomnia      PLAN:  - Continue Lorazepam 0.5mg PO at bedtime for insomnia   - reviewed  -I reviewed clinical lab tests done in last 1 year.   -I reviewed the imaging done in last year  -Medication options, alternatives (including no medications) and medication risks/benefits/side effects were discussed in detail.-  -The patient was advised to call, message provider on [employer], or come in to the clinic if symptoms worsen or if any future questions/issues regarding their medications arise; the patient verbalized understanding and agreement.    -The patient was educated to call 911, call the  suicide hotline, or go to local ER if having thoughts of suicide or homicide; verbalized understanding.  -RTC 3 months       - Medical Records/Labs/Diagnostic Tests Ordered: Jerry Lomeli DO

## 2022-02-28 RX ORDER — GABAPENTIN 100 MG/1
CAPSULE ORAL
Qty: 540 CAPSULE | Refills: 1 | Status: SHIPPED | OUTPATIENT
Start: 2022-02-28 | End: 2022-05-18 | Stop reason: SDUPTHER

## 2022-03-15 ENCOUNTER — OFFICE VISIT (OUTPATIENT)
Dept: PHYSICAL MEDICINE AND REHAB | Facility: MEDICAL CENTER | Age: 87
End: 2022-03-15
Payer: MEDICARE

## 2022-03-15 VITALS
HEART RATE: 71 BPM | BODY MASS INDEX: 21.09 KG/M2 | WEIGHT: 119.05 LBS | SYSTOLIC BLOOD PRESSURE: 130 MMHG | OXYGEN SATURATION: 93 % | HEIGHT: 63 IN | DIASTOLIC BLOOD PRESSURE: 62 MMHG | TEMPERATURE: 98.2 F

## 2022-03-15 DIAGNOSIS — M43.16 SPONDYLOLISTHESIS OF LUMBAR REGION: ICD-10-CM

## 2022-03-15 DIAGNOSIS — M48.061 SPINAL STENOSIS OF LUMBAR REGION, UNSPECIFIED WHETHER NEUROGENIC CLAUDICATION PRESENT: ICD-10-CM

## 2022-03-15 DIAGNOSIS — M47.816 LUMBAR SPONDYLOSIS: ICD-10-CM

## 2022-03-15 DIAGNOSIS — M51.36 DDD (DEGENERATIVE DISC DISEASE), LUMBAR: ICD-10-CM

## 2022-03-15 PROCEDURE — 99214 OFFICE O/P EST MOD 30 MIN: CPT | Performed by: PHYSICAL MEDICINE & REHABILITATION

## 2022-03-15 ASSESSMENT — PATIENT HEALTH QUESTIONNAIRE - PHQ9: CLINICAL INTERPRETATION OF PHQ2 SCORE: 0

## 2022-03-15 ASSESSMENT — PAIN SCALES - GENERAL: PAINLEVEL: NO PAIN

## 2022-03-15 ASSESSMENT — FIBROSIS 4 INDEX: FIB4 SCORE: 2.82

## 2022-03-15 NOTE — PROGRESS NOTES
"Follow up patient note  Pain Medicine, Interventional spine and sports physiatry, Physical medicine rehabilitation      Chief complaint:   Chief Complaint   Patient presents with   • Follow-Up     Back pain         HISTORY    Please see new patient note dated 09/10/2019 by Dr Fall,  for more details.     HPI  Patient identification: Dayana Lechuga 89 y.o. female  presents for follow-up.    Interval history:   Dayana reports that she was very ill with norovirus since our last visit.  Doing better now.    She reports that she has been doing well with regard to her back pain.  Her gabapentin helps with her pain, dose is stable.  No bowel or bladder changes.  Sometimes her legs feel \"tired\" but gabapentin 200mg po tid seems to help that.  She has been staying active and walking her dog.    No numbness or tingling in her legs.      She lives in an independent living location.       ROS Red Flags :  Fever, Chills, Sweats: Denies  Involuntary Weight Loss: Denies  Bowel/Bladder Incontinence: Denies  Saddle Anesthesia: Denies    PMHx:   Past Medical History:   Diagnosis Date   • Allergy    • Anxiety    • ASTHMA    • Bronchitis    • CAD (coronary artery disease)     JT to RCA; 70% stenosis in LAD   • Chills    • Constipation    • Difficulty breathing    • GERD (gastroesophageal reflux disease)    • Heart attack (HCC)    • Heartburn    • Hyperlipidemia    • Hypertension    • Influenza    • Insomnia    • Lumbar back pain 9/10/2016   • Mumps    • OSTEOPOROSIS    • Palpitations    • Pneumonia    • PVD (peripheral vascular disease) (HCC)     70% PAD-followed by Lary AMBROCIO   • Sweat, sweating, excessive    • Tonsillitis        PSHx:   Past Surgical History:   Procedure Laterality Date   • ABDOMINAL HYSTERECTOMY TOTAL     • APPENDECTOMY     • HERNIA REPAIR     • HYSTERECTOMY LAPAROSCOPY     • TONSILLECTOMY     • ZZZ CARDIAC CATH         Family history   Denies neuromuscular disease  Family History   Problem Relation " Age of Onset   • Heart Attack Father    • Cancer Brother    • Cancer Brother    • Heart Disease Neg Hx    • Heart Failure Neg Hx    • Hyperlipidemia Neg Hx        Medications:   Current Outpatient Medications   Medication   • gabapentin (NEURONTIN) 100 MG Cap   • montelukast (SINGULAIR) 10 MG Tab   • fluticasone (FLONASE) 50 MCG/ACT nasal spray   • loratadine (CLARITIN) 10 MG Tab   • carvedilol (COREG) 6.25 MG Tab   • calcium carbonate (TUMS) 500 MG Chew Tab   • sodium chloride (OCEAN) 0.65 % Solution   • azelastine (ASTELIN) 137 MCG/SPRAY nasal spray   • lisinopril (PRINIVIL) 20 MG Tab   • isosorbide mononitrate SR (IMDUR) 30 MG TABLET SR 24 HR   • atorvastatin (LIPITOR) 80 MG tablet   • amLODIPine (NORVASC) 5 MG Tab   • albuterol 108 (90 Base) MCG/ACT Aero Soln inhalation aerosol   • Acetaminophen 500 MG Cap   • Melatonin 10 MG Tab   • PREPARATION H 0.25-3-12-18 % Cream   • Multiple Vitamins-Minerals (CENTRUM SILVER PO)   • Biotin w/ Vitamins C & E 1250-7.5-7.5 MCG-MG-UNT Chew Tab   • guaifenesin LA (MUCINEX) 600 MG TABLET SR 12 HR   • Ascorbic Acid (VITAMIN C) 1000 MG Tab   • VITAMIN E PO   • aspirin (ASA) 81 MG Chew Tab chewable tablet   • Cholecalciferol (VITAMIN D) 50 MCG (2000 UT) Cap   • omeprazole (PRILOSEC) 20 MG delayed-release capsule   • potassium chloride SA (KDUR) 20 MEQ Tab CR     No current facility-administered medications for this visit.       Allergies:   Allergies   Allergen Reactions   • Bactrim [Sulfamethoxazole W-Trimethoprim] Hives   • Pcn [Penicillins] Hives     About 70 years   • Codeine Unspecified     Unknown reaction         Social Hx:   Social History     Socioeconomic History   • Marital status:      Spouse name: Not on file   • Number of children: Not on file   • Years of education: Not on file   • Highest education level: Not on file   Occupational History   • Not on file   Tobacco Use   • Smoking status: Never Smoker   • Smokeless tobacco: Never Used   Vaping Use   • Vaping  "Use: Never used   Substance and Sexual Activity   • Alcohol use: No     Alcohol/week: 0.0 oz   • Drug use: No   • Sexual activity: Not Currently   Other Topics Concern   •  Service No   • Blood Transfusions Yes   • Caffeine Concern No   • Occupational Exposure No   • Hobby Hazards No   • Sleep Concern Yes   • Stress Concern Yes   • Weight Concern No   • Special Diet No   • Back Care No   • Exercise No   • Bike Helmet No   • Seat Belt Yes   • Self-Exams No   Social History Narrative   • Not on file     Social Determinants of Health     Financial Resource Strain: Low Risk    • Difficulty of Paying Living Expenses: Not hard at all   Food Insecurity: No Food Insecurity   • Worried About Running Out of Food in the Last Year: Never true   • Ran Out of Food in the Last Year: Never true   Transportation Needs: No Transportation Needs   • Lack of Transportation (Medical): No   • Lack of Transportation (Non-Medical): No   Physical Activity: Not on file   Stress: Not on file   Social Connections: Not on file   Intimate Partner Violence: Not on file   Housing Stability: Not on file         EXAMINATION     Physical Exam:   Vitals: /62 (BP Location: Right arm, Patient Position: Sitting, BP Cuff Size: Small adult)   Pulse 71   Temp 36.8 °C (98.2 °F) (Temporal)   Ht 1.6 m (5' 3\")   Wt 54 kg (119 lb 0.8 oz)   SpO2 93%     Constitutional:   Body Habitus: Body mass index is 21.09 kg/m².  Cooperation: Fully cooperates with exam  Appearance: Well-groomed no disheveled, in no acute distress    Respiratory-  breathing comfortable on room air, no audible wheezing  Cardiovascular- capillary refills less than 2 seconds. No lower extremity edema is noted.   Psychiatric- alert and oriented ×3. Normal affect.   Spine:   No focal motor or sensory deficits on exam.  Reflexes are 2+ patella and achilles bilaterally  Seated SLR is negative bilaterally  Gait is unassisted without loss of balance, adequate toe clearance " bilaterally      MEDICAL DECISION MAKING    DATA    Labs:   Lab Results   Component Value Date/Time    SODIUM 136 10/28/2021 03:03 PM    POTASSIUM 4.7 10/28/2021 03:03 PM    CHLORIDE 104 10/28/2021 03:03 PM    CO2 24 10/28/2021 03:03 PM    GLUCOSE 108 (H) 10/28/2021 03:03 PM    BUN 17 10/28/2021 03:03 PM    CREATININE 0.74 10/28/2021 03:03 PM        Lab Results   Component Value Date/Time    PROTHROMBTM 12.4 01/05/2021 04:23 PM    INR 0.89 01/05/2021 04:23 PM        Lab Results   Component Value Date/Time    WBC 7.0 10/17/2021 02:27 AM    RBC 3.71 (L) 10/17/2021 02:27 AM    HEMOGLOBIN 12.1 10/17/2021 02:27 AM    HEMATOCRIT 37.6 10/17/2021 02:27 AM    .3 (H) 10/17/2021 02:27 AM    MCH 32.6 10/17/2021 02:27 AM    MCHC 32.2 (L) 10/17/2021 02:27 AM    MPV 10.1 10/17/2021 02:27 AM    NEUTSPOLYS 84.00 (H) 10/15/2021 10:36 PM    LYMPHOCYTES 7.50 (L) 10/15/2021 10:36 PM    MONOCYTES 6.10 10/15/2021 10:36 PM    EOSINOPHILS 1.50 10/15/2021 10:36 PM    BASOPHILS 0.60 10/15/2021 10:36 PM        No results found for: HBA1C       Imaging: I personally reviewed following images    X-ray lumbar spine 09/10/2019  There is note of grade I anterolisthesis at L4-5 and degenerative disc disease, facet arthropathy at L5-S1.  Minimal anterolisthesis at L3-4    I reviewed the following radiology reports                     Results for orders placed during the hospital encounter of 09/18/19    MR-LUMBAR SPINE-W/O  Impression  Severe L4/5 facet arthropathy and degenerative these results in right lateral listhesis and nearly grade 2 anterolisthesis    Moderate-severe central stenosis L4/5. Lesser stenoses at other levels as detailed above    Greatest foraminal stenosis is moderate-severe on the left at L5/S1               DIAGNOSIS   Visit Diagnoses     ICD-10-CM   1. Spondylolisthesis of lumbar region  M43.16   2. Spinal stenosis of lumbar region, unspecified whether neurogenic claudication present  M48.061   3. Lumbar spondylosis   M47.816   4. DDD (degenerative disc disease), lumbar  M51.36         ASSESSMENT and PLAN:     Dayana Lechuga 89 y.o. female seen for above    Dayana was seen today for follow-up.    Diagnoses and all orders for this visit:    Spondylolisthesis of lumbar region    Spinal stenosis of lumbar region, unspecified whether neurogenic claudication present    Lumbar spondylosis    DDD (degenerative disc disease), lumbar       1. It seems that she continues to do well with gabapentin 200mg po tid.  Renal function is stable.  No side effects. Recommend continuing this dose, consider adjustments if there is significant decline in renal function.  2. Continue activity as tolerated.  3. Discussed that she continue with her PCP, refilling gabapentin.  If she develops new or worsening symptoms, advised that she contact my office for evaluation.    Follow up: prn, worsening pain    Thank you for allowing me to participate in the care of this patient. If you have any questions please not hesitate to contact me.      Please note that this dictation was created using voice recognition software. I have made every reasonable attempt to correct obvious errors but there may be errors of grammar and content that I may have overlooked prior to finalization of this note.      Torres Fall MD  Interventional Spine and Sports Physiatry  Physical Medicine and Rehabilitation  [facility]

## 2022-04-12 ENCOUNTER — OFFICE VISIT (OUTPATIENT)
Dept: MEDICAL GROUP | Facility: PHYSICIAN GROUP | Age: 87
End: 2022-04-12
Payer: MEDICARE

## 2022-04-12 VITALS
HEIGHT: 63 IN | HEART RATE: 74 BPM | TEMPERATURE: 98.8 F | WEIGHT: 119 LBS | BODY MASS INDEX: 21.09 KG/M2 | SYSTOLIC BLOOD PRESSURE: 128 MMHG | OXYGEN SATURATION: 94 % | DIASTOLIC BLOOD PRESSURE: 66 MMHG

## 2022-04-12 DIAGNOSIS — G62.89 OTHER POLYNEUROPATHY: ICD-10-CM

## 2022-04-12 DIAGNOSIS — I10 ESSENTIAL HYPERTENSION: ICD-10-CM

## 2022-04-12 DIAGNOSIS — F51.01 PRIMARY INSOMNIA: ICD-10-CM

## 2022-04-12 PROBLEM — R30.0 DYSURIA: Status: RESOLVED | Noted: 2019-06-27 | Resolved: 2022-04-12

## 2022-04-12 PROBLEM — K52.9 GASTROENTERITIS: Status: RESOLVED | Noted: 2021-10-28 | Resolved: 2022-04-12

## 2022-04-12 PROBLEM — R00.1 BRADYCARDIA: Status: RESOLVED | Noted: 2020-09-11 | Resolved: 2022-04-12

## 2022-04-12 PROCEDURE — 99214 OFFICE O/P EST MOD 30 MIN: CPT | Performed by: FAMILY MEDICINE

## 2022-04-12 ASSESSMENT — FIBROSIS 4 INDEX: FIB4 SCORE: 2.82

## 2022-04-12 NOTE — PROGRESS NOTES
Subjective:     Chief Complaint   Patient presents with   • Follow-Up     Noro virus        HPI:   Dayana presents today to discuss the following.    Peripheral neuropathy  Chronic issue   Taking gabapentin 200mg TID  Feels like she can go up on it.     Primary insomnia  Chronic issue  Following up with behavioral on lorazepam     Essential hypertension  Chronic issue  Takes lisinopril,, corgeg, and amlodipine.       Past Medical History:   Diagnosis Date   • Allergy    • Anxiety    • ASTHMA    • Bronchitis    • CAD (coronary artery disease)     JT to RCA; 70% stenosis in LAD   • Chills    • Constipation    • Difficulty breathing    • GERD (gastroesophageal reflux disease)    • Heart attack (HCC)    • Heartburn    • Hyperlipidemia    • Hypertension    • Influenza    • Insomnia    • Lumbar back pain 9/10/2016   • Mumps    • OSTEOPOROSIS    • Palpitations    • Pneumonia    • PVD (peripheral vascular disease) ([facility])     70% PAD-followed by Lary AMBROCIO   • Sweat, sweating, excessive    • Tonsillitis        Current Outpatient Medications Ordered in Epic   Medication Sig Dispense Refill   • gabapentin (NEURONTIN) 100 MG Cap TAKE 2 CAPSULES BY MOUTH THREE TIMES DAILY 540 Capsule 1   • montelukast (SINGULAIR) 10 MG Tab TAKE ONE TABLET BY MOUTH IN THE EVENING 90 Tablet 3   • fluticasone (FLONASE) 50 MCG/ACT nasal spray USE 1 SPRAY IN EACH NOSTRIL ONCE DAILY 16 g 3   • loratadine (CLARITIN) 10 MG Tab Take 10 mg by mouth every day.     • carvedilol (COREG) 6.25 MG Tab Take 1 Tablet by mouth 2 times a day with meals for 360 days. 180 Tablet 3   • calcium carbonate (TUMS) 500 MG Chew Tab Chew 1 Tablet 2 times a day. 60 Tablet 0   • sodium chloride (OCEAN) 0.65 % Solution Administer 1 Spray into affected nostril(S) 2 times a day.     • lisinopril (PRINIVIL) 20 MG Tab Take 1 tablet by mouth every morning with breakfast. 100 tablet 3   • isosorbide mononitrate SR (IMDUR) 30 MG TABLET SR 24 HR Take 1 tablet by mouth every evening.  "100 tablet 3   • atorvastatin (LIPITOR) 80 MG tablet Take 1 tablet by mouth every evening. 100 tablet 3   • amLODIPine (NORVASC) 5 MG Tab Take 1 tablet by mouth every day. 100 tablet 3   • albuterol 108 (90 Base) MCG/ACT Aero Soln inhalation aerosol INHALE TWO PUFFS BY MOUTH EVERY SIX HOURS AS NEEDED FOR SHORTNESS OF BREATH 25.5 g 1   • Acetaminophen 500 MG Cap Take 1 Capsule by mouth 2 times a day. Morning & Afternoon     • Melatonin 10 MG Tab Take 20 mg by mouth at bedtime.     • PREPARATION H 0.25-3-12-18 % Cream Insert 1 Application into the rectum as needed (pain).     • Multiple Vitamins-Minerals (CENTRUM SILVER PO) Take 1 Tab by mouth every morning.     • Biotin w/ Vitamins C & E 1250-7.5-7.5 MCG-MG-UNT Chew Tab Chew 1 Tab every morning.     • guaifenesin LA (MUCINEX) 600 MG TABLET SR 12 HR Take 600 mg by mouth every morning.     • Ascorbic Acid (VITAMIN C) 1000 MG Tab Take 1,000 mg by mouth every morning.     • VITAMIN E PO Take 1 Cap by mouth every morning.     • aspirin (ASA) 81 MG Chew Tab chewable tablet Chew 81 mg every morning with breakfast. 100 Tab 11   • Cholecalciferol (VITAMIN D) 50 MCG (2000 UT) Cap Take 2,000 Units by mouth every morning.     • omeprazole (PRILOSEC) 20 MG delayed-release capsule Take 20 mg by mouth every morning with breakfast.       No current Epic-ordered facility-administered medications on file.       Allergies:  Bactrim [sulfamethoxazole w-trimethoprim], Pcn [penicillins], and Codeine    Health Maintenance: Completed    ROS:  Gen: no fevers/chills, no changes in weight  Eyes: no changes in vision  Pulm: no sob, no cough  CV: no chest pain, no palpitations  GI: no nausea/vomiting, no diarrhea      Objective:     Exam:  /66 (BP Location: Left arm, Patient Position: Sitting, BP Cuff Size: Adult)   Pulse 74   Temp 37.1 °C (98.8 °F) (Temporal)   Ht 1.6 m (5' 3\")   Wt 54 kg (119 lb)   LMP  (LMP Unknown)   SpO2 94%   BMI 21.08 kg/m²  Body mass index is 21.08 " kg/m².      Constitutional: Alert, no distress, well-groomed.  Skin: Warm, dry, good turgor, no rashes in visible areas.  Eye: Equal, round and reactive, conjunctiva clear, lids normal.  ENMT: Lips without lesions, good dentition, moist mucous membranes.  Neck: Trachea midline, no masses, no thyromegaly.  Respiratory: Unlabored respiratory effort, no cough.  MSK: Normal gait, moves all extremities.  Neuro: Grossly non-focal.   Psych: Alert and oriented x3, normal affect and mood.        Assessment & Plan:     89 y.o. female with the following -     1. Other polyneuropathy  Chronic, worsening condition.  The patient has had worsening neuropathy to her feet and legs.  We will go up on gabapentin to 300 mg 3 times daily.    2. Primary insomnia  Chronic, stable condition.  Continue to follow-up with behavioral health for this problem.    3. Essential hypertension  Chronic, stable condition.  Well-controlled at this time.  Continue with current medication regimen.      Return in about 6 months (around 10/12/2022).    Please note that this dictation was created using voice recognition software. I have made every reasonable attempt to correct obvious errors, but I expect that there are errors of grammar and possibly content that I did not discover before finalizing the note.

## 2022-04-15 ENCOUNTER — OFFICE VISIT (OUTPATIENT)
Dept: BEHAVIORAL HEALTH | Facility: CLINIC | Age: 87
End: 2022-04-15
Payer: MEDICARE

## 2022-04-15 DIAGNOSIS — F51.01 PRIMARY INSOMNIA: ICD-10-CM

## 2022-04-15 DIAGNOSIS — Z79.899 CHRONIC USE OF BENZODIAZEPINE FOR THERAPEUTIC PURPOSE: ICD-10-CM

## 2022-04-15 PROCEDURE — 99214 OFFICE O/P EST MOD 30 MIN: CPT | Mod: GC | Performed by: STUDENT IN AN ORGANIZED HEALTH CARE EDUCATION/TRAINING PROGRAM

## 2022-04-15 RX ORDER — LORAZEPAM 0.5 MG/1
0.5 TABLET ORAL
Qty: 30 TABLET | Refills: 2 | Status: SHIPPED | OUTPATIENT
Start: 2022-04-15 | End: 2022-05-15

## 2022-04-22 NOTE — PROGRESS NOTES
"[facility]  PSYCHIATRIC FOLLOW-UP NOTE    Persons in attendance: Patient and Children Daughter in law      Reason for visit / chief complaint:   89 year old female presenting for medication management. Patient was continued on Lorazepam 0.5mg at bedtime for primary insomnia at previous visit.     \"I've been okay\"    SUBJECTIVE / HPI:  Patient states she has overall been doing well. She continues to have periods of increased diarrhea and has been taking electrolyte supplements when this occurs. She denies recent falls or dizziness. She continues to eat and sleep well. Discussed risks and benefits of alternative medications for sleep in comparison to current dose. Patient has been stable on low dose for extended period of time after tapering down to 0.5mg from 2mg.       OBJECTIVE:    MSE :   Appearance: well groomed, appropriate in casual attire    Behavior: calm, cooperative, pleasant, engaged. good eye contact.  No tics. No mannerisms.   Speech : normal rate, normal volume, normal tone   Language: normal vocabulary   Mood: \"pretty good\"   Affect: euthymic   Thought Process: linear, coherent, goal-directed. No flight of ideas.  No loose associations   Thought Content: no suicidal or homicidal ideation and no auditory or visual hallucinations    Attention/Concentration: appropriate   Memory: no gross deficits   Orientation: oriented to person, place, situation   Neurological: Deferred   Fund of Knowledge: appropriate   Insight/Judgment: good / good             PAST PSYCHIATRIC HISTORY  Prior psychiatric hospitalization: denies  Prior Self harm/suicide attempt: denies  Prior Diagnosis: denies     PAST PSYCHIATRIC MEDICATIONS  · Lorazepam, denies other medication trials   SUBSTANCE USE HISTORY:  ALCOHOL: denies use, states she may have a half glass of wine every few months  TOBACCO: denies  CANNABIS: denies  OPIOIDS: denies  PRESCRIPTION MEDICATIONS: denies  OTHERS: denies  History of inpatient/outpatient " rehab treatment: N/A     SOCIAL HISTORY  Lived in [place] until 2004 at which time she moved to [place]  Employment: Was working as  and  through most of life, 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  Relationship: single  Kids: son lives in [place], daughter lives in [place]; 1 grand child and 2 great grand children in [place]  Current living situation: lives in a senior home center, lives independently with her dog, has support available if needed         Review of systems:        Constitutional negative   Eyes negative   Ears/Nose/Mouth/Throat negative   Cardiovascular negative   Respiratory negative   Gastrointestinal Diarrhea intermittently    Genitourinary negative   Muscular negative   Integumentary negative   Neurological negative   Endocrine negative   Hematologic/Lymphatic negative       Medical Records/Labs/Diagnostic Tests Reviewed:  Reviewed, reviewed note from Physiatry      Current Outpatient Medications:   •  LORazepam, 0.5 mg, Oral, QHS  •  gabapentin, TAKE 2 CAPSULES BY MOUTH THREE TIMES DAILY  •  montelukast, TAKE ONE TABLET BY MOUTH IN THE EVENING  •  fluticasone, USE 1 SPRAY IN EACH NOSTRIL ONCE DAILY  •  loratadine, 10 mg, Oral, DAILY  •  carvedilol, 6.25 mg, Oral, BID WITH MEALS  •  calcium carbonate, 500 mg, Oral, BID  •  sodium chloride, 1 Spray, Nasal, BID  •  lisinopril, 20 mg, Oral, QDAY with Breakfast  •  isosorbide mononitrate SR, 30 mg, Oral, Q EVENING  •  atorvastatin, 80 mg, Oral, Q EVENING  •  amLODIPine, 5 mg, Oral, DAILY  •  albuterol, INHALE TWO PUFFS BY MOUTH EVERY SIX HOURS AS NEEDED FOR SHORTNESS OF BREATH  •  Acetaminophen, 1 Capsule, Oral, BID  •  Melatonin, 20 mg, Oral, QHS  •  PREPARATION H, 1 Application, Rectal, PRN  •  Multiple Vitamins-Minerals (CENTRUM SILVER PO), 1 Tablet, Oral, QAM  •  Biotin w/ Vitamins C & E, 1 Tablet, Oral, QAM  •  guaiFENesin ER, 600 mg, Oral, QAM  •  Vitamin C, 1,000 mg, Oral, QAM  •   VITAMIN E PO, 1 Capsule, Oral, QAM  •  aspirin, 81 mg, Oral, QDAY with Breakfast  •  Vitamin D, 2,000 Units, Oral, QAM  •  omeprazole, 20 mg, Oral, QDAY with Breakfast            ASSESSMENT:  89 year old female presenting for medication management. Patient has been taking Lorazepam since 2006 with taper down to 0.5mg at bedtime. She has declined further taper expressing concerns about sleep and impact of poor sleep on mood and functionality throughout the day. Patient has been using medication appropriately and denies dizziness, headaches. She has continued to tolerate medication well.   Spoke with patient extensively about options for discontinuation of medication and risks/benefits of alternative medications to aid with sleep. Discussed particularly concern about cognition and fall risk as well as sequelae of falls that can be detrimental to patient's well being. Following thorough conversation about risks, patient states desire to continue current dose with willingness to consider taper in the future. Patient has appropriately decreased dose significantly and has followed protocol and prescription fills appropriately. Will continue current medication regimen. Discussed with patient transition to new resident.     DDX:  1. Primary Insomnia      PLAN:  - Continue Lorazepam 0.5mg PO at bedtime for insomnia   - reviewed  -I reviewed clinical lab tests done in last 1 year.   -I reviewed the imaging done in last year  -Medication options, alternatives (including no medications) and medication risks/benefits/side effects were discussed in detail.-  -The patient was advised to call, message provider on [facility], or come in to the clinic if symptoms worsen or if any future questions/issues regarding their medications arise; the patient verbalized understanding and agreement.    -The patient was educated to call 911, call the suicide hotline, or go to local ER if having thoughts of suicide or homicide; verbalized  understanding.  -RTC 3 months       - Medical Records/Labs/Diagnostic Tests Ordered: None      Silvia Lomeli DO

## 2022-05-04 DIAGNOSIS — G62.89 OTHER POLYNEUROPATHY: ICD-10-CM

## 2022-05-04 DIAGNOSIS — I73.9 PVD (PERIPHERAL VASCULAR DISEASE) (HCC): ICD-10-CM

## 2022-05-04 DIAGNOSIS — I70.201 POPLITEAL ARTERY STENOSIS, RIGHT (HCC): ICD-10-CM

## 2022-05-04 DIAGNOSIS — F07.81 POST CONCUSSION SYNDROME: ICD-10-CM

## 2022-05-04 DIAGNOSIS — Z98.42 S/P BILATERAL CATARACT EXTRACTION: ICD-10-CM

## 2022-05-04 DIAGNOSIS — J90 PLEURAL EFFUSION: ICD-10-CM

## 2022-05-04 DIAGNOSIS — Z98.41 S/P BILATERAL CATARACT EXTRACTION: ICD-10-CM

## 2022-05-06 RX ORDER — ATORVASTATIN CALCIUM 80 MG/1
TABLET, FILM COATED ORAL
Qty: 100 TABLET | Refills: 1 | Status: SHIPPED | OUTPATIENT
Start: 2022-05-06 | End: 2022-10-26 | Stop reason: SDUPTHER

## 2022-05-06 RX ORDER — LISINOPRIL 20 MG/1
TABLET ORAL
Qty: 100 TABLET | Refills: 1 | Status: SHIPPED | OUTPATIENT
Start: 2022-05-06 | End: 2022-10-26 | Stop reason: SDUPTHER

## 2022-05-09 ENCOUNTER — TELEPHONE (OUTPATIENT)
Dept: CARDIOLOGY | Facility: MEDICAL CENTER | Age: 87
End: 2022-05-09
Payer: MEDICARE

## 2022-05-09 DIAGNOSIS — I73.9 PVD (PERIPHERAL VASCULAR DISEASE) (HCC): ICD-10-CM

## 2022-05-09 DIAGNOSIS — J90 PLEURAL EFFUSION: ICD-10-CM

## 2022-05-09 DIAGNOSIS — F07.81 POST CONCUSSION SYNDROME: ICD-10-CM

## 2022-05-09 DIAGNOSIS — G62.89 OTHER POLYNEUROPATHY: ICD-10-CM

## 2022-05-09 RX ORDER — ISOSORBIDE MONONITRATE 30 MG/1
TABLET, EXTENDED RELEASE ORAL
Qty: 100 TABLET | Refills: 0 | Status: SHIPPED | OUTPATIENT
Start: 2022-05-09 | End: 2022-06-29

## 2022-05-09 NOTE — TELEPHONE ENCOUNTER
SHEA    Caller: Hayley from [facility]  Medication Name and Dosage: isosorbide mononitrate SR (IMDUR) 30 MG TABLET SR 24 HR   Did patient contact pharmacy: Yes  Medication amount left: Not known  Preferred Pharmacy: [facility]  Other questions (Topic): Pt needs quantity of 100 due to insurance  Callback Number (Will only call for issues): [phone]

## 2022-05-11 PROBLEM — I73.9 PVD (PERIPHERAL VASCULAR DISEASE) (HCC): Status: RESOLVED | Noted: 2020-03-04 | Resolved: 2022-05-11

## 2022-05-11 PROBLEM — R60.0 MILD PERIPHERAL EDEMA: Status: RESOLVED | Noted: 2020-07-31 | Resolved: 2022-05-11

## 2022-05-11 PROBLEM — I73.9 PERIPHERAL VASCULAR DISEASE, UNSPECIFIED (HCC): Status: RESOLVED | Noted: 2021-09-14 | Resolved: 2022-05-11

## 2022-05-11 PROBLEM — F07.81 POST CONCUSSION SYNDROME: Status: RESOLVED | Noted: 2020-09-11 | Resolved: 2022-05-11

## 2022-05-18 ENCOUNTER — TELEPHONE (OUTPATIENT)
Dept: MEDICAL GROUP | Facility: PHYSICIAN GROUP | Age: 87
End: 2022-05-18
Payer: MEDICARE

## 2022-05-18 NOTE — TELEPHONE ENCOUNTER
Caller Name: Dayana Lechuga    Call Back Number: [phone] (home) [phone] (work)      How would the patient prefer to be contacted with a response: Phone call do NOT leave a detailed message    2. SPECIFIC Action To Be Taken: Referral pending, please sign.    3. Diagnosis/Clinical Reason for Request: unknown    4. Specialty & Provider Name/Lab/Imaging Location: Podiatry    5. Is appointment scheduled for requested order/referral: no    Patient was not informed they will receive a return phone call from the office ONLY if there are any questions before processing their request. Advised to call back if they haven't received a call from the referral department in 5 days.

## 2022-05-19 RX ORDER — GABAPENTIN 100 MG/1
CAPSULE ORAL
Qty: 540 CAPSULE | Refills: 0 | Status: SHIPPED | OUTPATIENT
Start: 2022-05-19 | End: 2022-07-25 | Stop reason: SDUPTHER

## 2022-05-19 NOTE — TELEPHONE ENCOUNTER
Phone Number Called: [phone] (home) [phone] (work)      Call outcome: Did not leave a detailed message. Requested patient to call back.    Message: LVM to CB with reason

## 2022-05-23 ENCOUNTER — TELEPHONE (OUTPATIENT)
Dept: MEDICAL GROUP | Facility: PHYSICIAN GROUP | Age: 87
End: 2022-05-23
Payer: MEDICARE

## 2022-05-23 NOTE — TELEPHONE ENCOUNTER
VOICEMAIL  1. Caller Name: Dayana Lechuga                      Call Back Number: [phone] or [phone]    2. Message: Patient LVM stating she had requested a referral for podiatry and would like any further updates. Patient would also like to state She is Unable to access [facility] [employer].    3. Patient approves office to leave a detailed voicemail/MyChart message: N\A

## 2022-06-02 ENCOUNTER — APPOINTMENT (OUTPATIENT)
Dept: URGENT CARE | Facility: CLINIC | Age: 87
End: 2022-06-02
Payer: MEDICARE

## 2022-06-02 ENCOUNTER — OFFICE VISIT (OUTPATIENT)
Dept: URGENT CARE | Facility: CLINIC | Age: 87
End: 2022-06-02
Payer: MEDICARE

## 2022-06-02 VITALS
WEIGHT: 114 LBS | SYSTOLIC BLOOD PRESSURE: 104 MMHG | OXYGEN SATURATION: 96 % | HEIGHT: 61 IN | HEART RATE: 84 BPM | BODY MASS INDEX: 21.52 KG/M2 | TEMPERATURE: 99.1 F | DIASTOLIC BLOOD PRESSURE: 70 MMHG | RESPIRATION RATE: 16 BRPM

## 2022-06-02 DIAGNOSIS — R09.81 NASAL CONGESTION: ICD-10-CM

## 2022-06-02 DIAGNOSIS — R05.9 COUGH: ICD-10-CM

## 2022-06-02 DIAGNOSIS — U07.1 COVID-19: ICD-10-CM

## 2022-06-02 DIAGNOSIS — Z86.16 HISTORY OF COVID-19: ICD-10-CM

## 2022-06-02 LAB
EXTERNAL QUALITY CONTROL: ABNORMAL
SARS-COV+SARS-COV-2 AG RESP QL IA.RAPID: POSITIVE

## 2022-06-02 PROCEDURE — 87426 SARSCOV CORONAVIRUS AG IA: CPT | Performed by: FAMILY MEDICINE

## 2022-06-02 PROCEDURE — 99214 OFFICE O/P EST MOD 30 MIN: CPT | Mod: CS | Performed by: FAMILY MEDICINE

## 2022-06-02 ASSESSMENT — ENCOUNTER SYMPTOMS
DIARRHEA: 0
VOMITING: 0
EYE DISCHARGE: 0
EYE REDNESS: 0
WEIGHT LOSS: 0

## 2022-06-02 ASSESSMENT — FIBROSIS 4 INDEX: FIB4 SCORE: 2.82

## 2022-06-02 NOTE — PROGRESS NOTES
"Subjective     Dayana Lechuga is a 89 y.o. female who presents with Cough ( X 4 days, cough, runny nose and bodyaches)            4 days productive cough without blood in sputum. Rhinorrhea and sneezing. No fever/chills. No SOB/wheeze.   [facility] C19 11/2020 requiring hospitalization. Vaccinated x2. Myalgia. No other aggravating or alleviating factors.        Review of Systems   Constitutional: Negative for malaise/fatigue and weight loss.   Eyes: Negative for discharge and redness.   Gastrointestinal: Negative for diarrhea and vomiting.   Musculoskeletal: Negative for joint pain.   Skin: Negative for itching and rash.              Objective     /70 (BP Location: Left arm, Patient Position: Sitting, BP Cuff Size: Adult)   Pulse 84   Temp 37.3 °C (99.1 °F) (Temporal)   Resp 16   Ht 1.549 m (5' 1\")   Wt 51.7 kg (114 lb)   LMP  (LMP Unknown)   SpO2 96%   BMI 21.54 kg/m²      Physical Exam  Constitutional:       General: She is not in acute distress.     Appearance: She is well-developed.   HENT:      Head: Normocephalic and atraumatic.      Nose: Congestion present.      Mouth/Throat:      Pharynx: No posterior oropharyngeal erythema.   Eyes:      Conjunctiva/sclera: Conjunctivae normal.   Cardiovascular:      Rate and Rhythm: Normal rate and regular rhythm.      Heart sounds: Normal heart sounds. No murmur heard.  Pulmonary:      Effort: Pulmonary effort is normal.      Breath sounds: Normal breath sounds. No wheezing.   Musculoskeletal:      Cervical back: Neck supple.   Skin:     General: Skin is warm and dry.      Findings: No rash.   Neurological:      Mental Status: She is alert and oriented to person, place, and time.                             Assessment & Plan       POCT COVID 19 positive    Hospital discharge summary 12/5/2020 reviewed    1. Cough  POCT SARS-COV Antigen NANCY (Symptomatic only)   2. Nasal congestion  POCT SARS-COV Antigen NANCY (Symptomatic only)   3. COVID-19  Nirmatrelvir & " Ritonavir 20 x 150 MG & 10 x 100MG Tablet Therapy Pack   4. History of COVID-19         Differential diagnosis, natural history, supportive care, and indications for immediate follow-up discussed at length.     Creatinine clearance 40 calculated from labs drawn 10/2021. Paxlovid dosing adjusted accordingly for decreased renal function.

## 2022-06-21 ENCOUNTER — OFFICE VISIT (OUTPATIENT)
Dept: MEDICAL GROUP | Facility: PHYSICIAN GROUP | Age: 87
End: 2022-06-21
Payer: MEDICARE

## 2022-06-21 VITALS
OXYGEN SATURATION: 95 % | TEMPERATURE: 98.2 F | HEIGHT: 61 IN | DIASTOLIC BLOOD PRESSURE: 58 MMHG | SYSTOLIC BLOOD PRESSURE: 112 MMHG | HEART RATE: 70 BPM | WEIGHT: 117 LBS | BODY MASS INDEX: 22.09 KG/M2

## 2022-06-21 DIAGNOSIS — G62.89 OTHER POLYNEUROPATHY: ICD-10-CM

## 2022-06-21 DIAGNOSIS — F41.9 ANXIETY: ICD-10-CM

## 2022-06-21 DIAGNOSIS — J45.21 MILD INTERMITTENT ASTHMA WITH ACUTE EXACERBATION: ICD-10-CM

## 2022-06-21 DIAGNOSIS — Z00.00 MEDICARE ANNUAL WELLNESS VISIT, SUBSEQUENT: Primary | ICD-10-CM

## 2022-06-21 DIAGNOSIS — M54.41 ACUTE BILATERAL LOW BACK PAIN WITH BILATERAL SCIATICA: ICD-10-CM

## 2022-06-21 DIAGNOSIS — I70.201 POPLITEAL ARTERY STENOSIS, RIGHT (HCC): ICD-10-CM

## 2022-06-21 DIAGNOSIS — M54.42 ACUTE BILATERAL LOW BACK PAIN WITH BILATERAL SCIATICA: ICD-10-CM

## 2022-06-21 DIAGNOSIS — I10 ESSENTIAL HYPERTENSION: ICD-10-CM

## 2022-06-21 DIAGNOSIS — I73.9 CLAUDICATION OF RIGHT LOWER EXTREMITY (HCC): ICD-10-CM

## 2022-06-21 PROCEDURE — G0439 PPPS, SUBSEQ VISIT: HCPCS | Performed by: FAMILY MEDICINE

## 2022-06-21 RX ORDER — LORAZEPAM 0.5 MG/1
0.5 TABLET ORAL
COMMUNITY
Start: 2022-06-14 | End: 2022-07-11 | Stop reason: SDUPTHER

## 2022-06-21 ASSESSMENT — FIBROSIS 4 INDEX: FIB4 SCORE: 2.82

## 2022-06-21 ASSESSMENT — ACTIVITIES OF DAILY LIVING (ADL): BATHING_REQUIRES_ASSISTANCE: 0

## 2022-06-21 ASSESSMENT — ENCOUNTER SYMPTOMS: GENERAL WELL-BEING: GOOD

## 2022-06-21 ASSESSMENT — PATIENT HEALTH QUESTIONNAIRE - PHQ9: CLINICAL INTERPRETATION OF PHQ2 SCORE: 0

## 2022-06-21 NOTE — PROGRESS NOTES
Chief Complaint   Patient presents with   • Annual Wellness Visit     With [facility]         HPI:  Dayana is a 89 y.o. here for Medicare Annual Wellness Visit    Yes    Patient Active Problem List    Diagnosis Date Noted   • BMI 22.0-22.9, adult 09/14/2021   • Primary insomnia 07/09/2021   • Chronic use of benzodiazepine for therapeutic purpose 02/16/2021   • Debility 11/20/2020   • Peripheral neuropathy 11/20/2020   • Pleural effusion 12/20/2019   • Incomplete emptying of bladder 10/08/2019   • Urinary retention 09/11/2019   • Acute bilateral low back pain with bilateral sciatica 08/29/2019   • Popliteal artery stenosis, right ([facility]) 11/28/2018   • Claudication of right lower extremity ([facility]) 11/07/2018   • Benzodiazepine dependence ([facility]) 11/13/2017   • Coronary artery disease involving native coronary artery of native heart with angina pectoris ([facility]) 08/10/2016   • Anxiety 06/22/2016   • GERD (gastroesophageal reflux disease) 10/31/2009   • Dyslipidemia 10/31/2009   • Osteopenia 10/31/2009   • Essential hypertension 10/02/2009       Current Outpatient Medications   Medication Sig Dispense Refill   • LORazepam (ATIVAN) 0.5 MG Tab Take 0.5 mg by mouth at bedtime.     • gabapentin (NEURONTIN) 100 MG Cap TAKE 2 CAPSULES BY MOUTH THREE TIMES DAILY 540 Capsule 0   • isosorbide mononitrate SR (IMDUR) 30 MG TABLET SR 24 HR TAKE ONE TABLET BY MOUTH EVERY EVENING 100 Tablet 0   • lisinopril (PRINIVIL) 20 MG Tab TAKE ONE TABLET BY MOUTH EVERY MORNING WITH BREAKFAST 100 Tablet 1   • atorvastatin (LIPITOR) 80 MG tablet TAKE ONE TABLET BY MOUTH EVERY EVENING 100 Tablet 1   • montelukast (SINGULAIR) 10 MG Tab TAKE ONE TABLET BY MOUTH IN THE EVENING 90 Tablet 3   • fluticasone (FLONASE) 50 MCG/ACT nasal spray USE 1 SPRAY IN EACH NOSTRIL ONCE DAILY 16 g 3   • loratadine (CLARITIN) 10 MG Tab Take 10 mg by mouth every day.     • carvedilol (COREG) 6.25 MG Tab Take 1 Tablet by mouth 2 times a day with meals for 360 days. 180 Tablet 3   •  calcium carbonate (TUMS) 500 MG Chew Tab Chew 1 Tablet 2 times a day. 60 Tablet 0   • sodium chloride (OCEAN) 0.65 % Solution Administer 1 Spray into affected nostril(S) 2 times a day.     • amLODIPine (NORVASC) 5 MG Tab Take 1 tablet by mouth every day. 100 tablet 3   • albuterol 108 (90 Base) MCG/ACT Aero Soln inhalation aerosol INHALE TWO PUFFS BY MOUTH EVERY SIX HOURS AS NEEDED FOR SHORTNESS OF BREATH 25.5 g 1   • Acetaminophen 500 MG Cap Take 1 Capsule by mouth 2 times a day. Morning & Afternoon     • Melatonin 10 MG Tab Take 20 mg by mouth at bedtime.     • PREPARATION H 0.25-3-12-18 % Cream Insert 1 Application into the rectum as needed (pain).     • Multiple Vitamins-Minerals (CENTRUM SILVER PO) Take 1 Tab by mouth every morning.     • Biotin w/ Vitamins C & E 1250-7.5-7.5 MCG-MG-UNT Chew Tab Chew 1 Tab every morning.     • guaifenesin LA (MUCINEX) 600 MG TABLET SR 12 HR Take 600 mg by mouth every morning.     • Ascorbic Acid (VITAMIN C) 1000 MG Tab Take 1,000 mg by mouth every morning.     • VITAMIN E PO Take 1 Cap by mouth every morning.     • aspirin (ASA) 81 MG Chew Tab chewable tablet Chew 81 mg every morning with breakfast. 100 Tab 11   • Cholecalciferol (VITAMIN D) 50 MCG (2000 UT) Cap Take 2,000 Units by mouth every morning.     • omeprazole (PRILOSEC) 20 MG delayed-release capsule Take 20 mg by mouth every morning with breakfast.       No current facility-administered medications for this visit.        Patient is taking medications as noted in medication list.  Current supplements as per medication list.     Allergies: Bactrim [sulfamethoxazole w-trimethoprim], Pcn [penicillins], and Codeine    Current social contact/activities: Yes Lives in [facility].    Is patient current with immunizations? Yes.    She  reports that she has never smoked. She has never used smokeless tobacco. She reports that she does not drink alcohol and does not use drugs.  Counseling given: Not  Answered        DPA/Advanced directive: Patient has Advanced Directive and Living Will, but it is not on file. Instructed to bring in a copy to scan into their chart.    ROS:    Gait: Uses no assistive device   Ostomy: No   Other tubes: No   Amputations: No   Chronic oxygen use No   Last eye exam pt dosnt know   Wears hearing aids: No   : Reports urinary leakage during the last 6 months that has somewhat interfered with their daily activities or sleep.      Screening:        Depression Screening  Little interest or pleasure in doing things?  0 - not at all  Feeling down, depressed, or hopeless? 0 - not at all  Patient Health Questionnaire Score: 0    If depressive symptoms identified deferred to follow up visit unless specifically addressed in assessment and plan.    Interpretation of PHQ-9 Total Score   Score Severity   1-4 No Depression   5-9 Mild Depression   10-14 Moderate Depression   15-19 Moderately Severe Depression   20-27 Severe Depression    Screening for Cognitive Impairment  Three Minute Recall (daughter, heaven, mountain)  3/3    Antolin clock face with all 12 numbers and set the hands to show 10 past 11.  Yes    If cognitive concerns identified, deferred for follow up unless specifically addressed in assessment and plan.    Fall Risk Assessment  Has the patient had two or more falls in the last year or any fall with injury in the last year?  No  If fall risk identified, deferred for follow up unless specifically addressed in assessment and plan.    Safety Assessment  Throw rugs on floor.  Yes  Handrails on all stairs.  Yes  Good lighting in all hallways.  Yes  Difficulty hearing.  Yes  Patient counseled about all safety risks that were identified.    Functional Assessment ADLs  Are there any barriers preventing you from cooking for yourself or meeting nutritional needs?  No.    Are there any barriers preventing you from driving safely or obtaining transportation?  No. Perez Juliann brings her places  Are  there any barriers preventing you from using a telephone or calling for help?  No.    Are there any barriers preventing you from shopping?  No.    Are there any barriers preventing you from taking care of your own finances?  No. Son and Daughters take care of finances   Are there any barriers preventing you from managing your medications?  No.    Are there any barriers preventing you from showering, bathing or dressing yourself?  No.    Are you currently engaging in any exercise or physical activity?  Yes.  Daily   What is your perception of your health?  Good.    Health Maintenance Summary          Overdue - IMM ZOSTER VACCINES (1 of 2) Overdue - never done    No completion history exists for this topic.          Overdue - URINE DRUG SCREEN (Every 360 Days) Overdue since 7/25/2021 07/30/2020  Pain Management Screen    05/08/2017  [facility] PAIN MANAGEMENT SCREEN    07/11/2016  PAIN MANAGEMENT PANEL, SCRN W/ RFLX TO QNT    05/09/2016  URINE DRUG SCREEN    02/08/2016  URINE DRUG SCREEN          Overdue - COVID-19 Vaccine (3 - Booster for Pfizer series) Overdue since 4/12/2022 11/12/2021  Imm Admin: PFIZER PURPLE CAP SARS-COV-2 VACCINATION (12+)    01/17/2021  Imm Admin: PFIZER PURPLE CAP SARS-COV-2 VACCINATION (12+)          IMM INFLUENZA (Season Ended) Next due on 9/1/2022 09/04/2020  Imm Admin: Influenza Vaccine Adult     09/11/2019  Imm Admin: Influenza Vaccine Adult     09/28/2018  Imm Admin: Influenza Vaccine Adult     10/13/2017  Imm Admin: Influenza Vaccine Adult     09/19/2016  Imm Admin: INFLUENZA TIV (IM)    Only the first 5 history entries have been loaded, but more history exists.          BONE DENSITY (Every 5 Years) Next due on 6/13/2023 06/13/2018  DS-BONE DENSITY STUDY (DEXA)    11/20/2014  Postponed          Annual Wellness Visit (Every 366 Days) Next due on 6/22/2023 06/21/2022  Visit Dx: Medicare annual wellness visit, subsequent    06/21/2022  Level of Service: ANNUAL  WELLNESS VISIT-INCLUDES PPPS SUBSEQUE*    05/11/2022  Level of Service: [place] ANNUAL WELLNESS VISIT-INCLUDES PPPS SUBSEQUE*    09/14/2021  Level of Service: [place] ANNUAL WELLNESS VISIT-INCLUDES PPPS SUBSEQUE*    06/20/2016  Visit Dx: Medicare annual wellness visit, subsequent    Only the first 5 history entries have been loaded, but more history exists.          IMM DTaP/Tdap/Td Vaccine (3 - Td or Tdap) Next due on 6/26/2026 06/26/2016  Imm Admin: Dtap Vaccine    06/21/2016  Imm Admin: Tdap Vaccine          IMM PNEUMOCOCCAL VACCINE: 65+ Years (Series Information) Completed    10/13/2017  Imm Admin: Pneumococcal polysaccharide vaccine (PPSV-23)    06/21/2016  Imm Admin: Pneumococcal Conjugate Vaccine (Prevnar/PCV-13)    06/21/2016  Imm Admin: Pneumococcal Conjugate Vaccine (Prevnar/PCV-13)    06/01/2016  Imm Admin: Pneumococcal Conjugate Vaccine (Prevnar/PCV-13)    06/01/2016  Imm Admin: Pneumococcal Conjugate Vaccine (Prevnar/PCV-13)          IMM MENINGOCOCCAL VACCINE (MCV4) (Series Information) Aged Out    No completion history exists for this topic.          Discontinued - MAMMOGRAM  Discontinued    02/06/2018  MA-MAMMO SCREENING BILAT W/XIOMARA W/CAD    10/23/2014  Previously completed    08/20/2014  Previously completed          Discontinued - PAP SMEAR  Discontinued    No completion history exists for this topic.          Discontinued - COLORECTAL CANCER SCREENING  Discontinued    02/23/2008  COLONOSCOPY (Previously completed)    11/20/2007  COLONOSCOPY (Previously completed)          Discontinued - IMM HEP B VACCINE  Discontinued    No completion history exists for this topic.                Patient Care Team:  Justin Haley M.D. as PCP - General (Family Medicine)  ZOHREH Vallejo.P.RSUSIE as PCP - [facility] Paneled  Torres Fall M.D. as Consulting Physician (Phys Med and Rehab)  Bharat Restrepo Ass't as    [facility] as Home Health Provider  Jeffery Jones,  "M.D. (Cardiovascular Disease (Cardiology))    Social History     Tobacco Use   • Smoking status: Never Smoker   • Smokeless tobacco: Never Used   Vaping Use   • Vaping Use: Never used   Substance Use Topics   • Alcohol use: No     Alcohol/week: 0.0 oz   • Drug use: No     Family History   Problem Relation Age of Onset   • Heart Attack Father    • Cancer Brother    • Cancer Brother    • Heart Disease Neg Hx    • Heart Failure Neg Hx    • Hyperlipidemia Neg Hx      She  has a past medical history of Allergy, Anxiety, ASTHMA, Bronchitis, CAD (coronary artery disease), Chills, Constipation, Difficulty breathing, GERD (gastroesophageal reflux disease), Heart attack (HCC), Heartburn, Hyperlipidemia, Hypertension, Influenza, Insomnia, Lumbar back pain (9/10/2016), Mild peripheral edema (7/31/2020), Mumps, OSTEOPOROSIS, Palpitations, Peripheral vascular disease, unspecified (HCC) (9/14/2021), Pneumonia, Post concussion syndrome (9/11/2020), PVD (peripheral vascular disease) ([facility]), Sweat, sweating, excessive, and Tonsillitis.   Past Surgical History:   Procedure Laterality Date   • ABDOMINAL HYSTERECTOMY TOTAL     • APPENDECTOMY     • HERNIA REPAIR     • HYSTERECTOMY LAPAROSCOPY     • TONSILLECTOMY     • ZZZ CARDIAC CATH             Exam:     /58 (BP Location: Left arm, Patient Position: Sitting, BP Cuff Size: Adult)   Pulse 70   Temp 36.8 °C (98.2 °F) (Temporal)   Ht 1.549 m (5' 1\")   Wt 53.1 kg (117 lb)   SpO2 95%  Body mass index is 22.11 kg/m².    Hearing excellent.    Dentition good  Alert, oriented in no acute distress.  Eye contact is good, speech goal directed, affect calm      Assessment and Plan. The following treatment and monitoring plan is recommended:    1. Medicare annual wellness visit, subsequent     2. Popliteal artery stenosis, right (HCC)     3. Claudication of right lower extremity (HCC)     4. Other polyneuropathy     5. Essential hypertension     6. Mild intermittent asthma with acute " exacerbation     7. Anxiety     8. Acute bilateral low back pain with bilateral sciatica           Services suggested: No services needed at this time  Health Care Screening recommendations as per orders if indicated.  Referrals offered: PT/OT/Nutrition counseling/Behavioral Health/Smoking cessation as per orders if indicated.    Discussion today about general wellness and lifestyle habits:    · Prevent falls and reduce trip hazards; Cautioned about securing or removing rugs.  · Have a working fire alarm and carbon monoxide detector;   · Engage in regular physical activity and social activities       Follow-up: No follow-ups on file.

## 2022-07-01 ENCOUNTER — TELEPHONE (OUTPATIENT)
Dept: MEDICAL GROUP | Facility: PHYSICIAN GROUP | Age: 87
End: 2022-07-01
Payer: MEDICARE

## 2022-07-01 ENCOUNTER — HOSPITAL ENCOUNTER (OUTPATIENT)
Facility: MEDICAL CENTER | Age: 87
End: 2022-07-01
Attending: PHYSICIAN ASSISTANT
Payer: MEDICARE

## 2022-07-01 PROCEDURE — 87086 URINE CULTURE/COLONY COUNT: CPT

## 2022-07-01 PROCEDURE — 87186 SC STD MICRODIL/AGAR DIL: CPT | Mod: 91

## 2022-07-01 PROCEDURE — 87077 CULTURE AEROBIC IDENTIFY: CPT

## 2022-07-02 NOTE — TELEPHONE ENCOUNTER
VOICEMAIL  1. Caller Name: Dayana Lechuga                        Call Back Number: [phone] (home) [phone] (work)      2. Message: pt left voicemail stating she has uti causing high blood pressure pt seeked  Care through dispatch and they stated pt need to follow up with pcp over high blood pressure caused by uti. Please advise    3. Patient approves office to leave a detailed voicemail/MyChart message: N\A

## 2022-07-05 LAB
BACTERIA UR CULT: ABNORMAL
SIGNIFICANT IND 70042: ABNORMAL
SITE SITE: ABNORMAL
SOURCE SOURCE: ABNORMAL

## 2022-07-07 ENCOUNTER — OFFICE VISIT (OUTPATIENT)
Dept: MEDICAL GROUP | Facility: PHYSICIAN GROUP | Age: 87
End: 2022-07-07
Payer: MEDICARE

## 2022-07-07 VITALS
TEMPERATURE: 98.3 F | WEIGHT: 119.93 LBS | HEIGHT: 61 IN | OXYGEN SATURATION: 94 % | BODY MASS INDEX: 22.64 KG/M2 | SYSTOLIC BLOOD PRESSURE: 130 MMHG | DIASTOLIC BLOOD PRESSURE: 50 MMHG | HEART RATE: 68 BPM

## 2022-07-07 DIAGNOSIS — N39.0 URINARY TRACT INFECTION WITHOUT HEMATURIA, SITE UNSPECIFIED: ICD-10-CM

## 2022-07-07 LAB
APPEARANCE UR: NORMAL
BILIRUB UR STRIP-MCNC: NORMAL MG/DL
COLOR UR AUTO: NORMAL
GLUCOSE UR STRIP.AUTO-MCNC: NORMAL MG/DL
KETONES UR STRIP.AUTO-MCNC: NORMAL MG/DL
LEUKOCYTE ESTERASE UR QL STRIP.AUTO: NORMAL
NITRITE UR QL STRIP.AUTO: NORMAL
PH UR STRIP.AUTO: 6 [PH] (ref 5–8)
PROT UR QL STRIP: NORMAL MG/DL
RBC UR QL AUTO: NORMAL
SP GR UR STRIP.AUTO: 1.01
UROBILINOGEN UR STRIP-MCNC: 0.2 MG/DL

## 2022-07-07 PROCEDURE — 81002 URINALYSIS NONAUTO W/O SCOPE: CPT | Performed by: FAMILY MEDICINE

## 2022-07-07 PROCEDURE — 99213 OFFICE O/P EST LOW 20 MIN: CPT | Performed by: FAMILY MEDICINE

## 2022-07-07 RX ORDER — ALBUTEROL SULFATE 90 UG/1
AEROSOL, METERED RESPIRATORY (INHALATION)
COMMUNITY
End: 2022-08-02

## 2022-07-07 RX ORDER — AZELASTINE 1 MG/ML
SPRAY, METERED NASAL
COMMUNITY
End: 2022-11-10

## 2022-07-07 RX ORDER — CEPHALEXIN 500 MG/1
CAPSULE ORAL
COMMUNITY
End: 2022-11-10

## 2022-07-07 RX ORDER — CIPROFLOXACIN 250 MG/1
250 TABLET, FILM COATED ORAL 2 TIMES DAILY
Qty: 10 TABLET | Refills: 0 | Status: SHIPPED | OUTPATIENT
Start: 2022-07-07 | End: 2022-11-10

## 2022-07-07 RX ORDER — UREA 10 %
LOTION (ML) TOPICAL
COMMUNITY
End: 2022-11-10

## 2022-07-07 RX ORDER — PHENAZOPYRIDINE HYDROCHLORIDE 100 MG/1
TABLET, FILM COATED ORAL
COMMUNITY
End: 2022-11-10

## 2022-07-07 ASSESSMENT — FIBROSIS 4 INDEX: FIB4 SCORE: 2.82

## 2022-07-07 NOTE — PROGRESS NOTES
Annual Health Assessment Questions:    1.  Are you currently engaging in any exercise or physical activity? Yes    2.  How would you describe your mood or emotional well-being today? good    3.  Have you had any falls in the last year? Yes    4.  Have you noticed any problems with your balance or had difficulty walking? Yes    5.  In the last six months have you experienced any leakage of urine? Yes    6. DPA/Advanced Directive: Patient does not have an Advanced Directive.  A packet and workshop information was given on Advanced Directives.

## 2022-07-08 NOTE — PROGRESS NOTES
Subjective     Dayana Lechuga is a 89 y.o. female who presents with UTI (AHA)            HPI     This is a 89-year-old  female who is a patient of Dr. Halye.    She was seen by [facility] a week ago on 7/1/2022 for UTI symptoms.  She said she was feeling weak, woozy with chills, slight discomfort during urination and frequency of urination.  She was treated with cephalexin 500 mg 1 capsule every 6 hours for a total of 7 days.  She said she will finish the antibiotics tomorrow.  She said that her symptoms are improved but not 100% gone.      I reviewed her urine culture results which came back Pseudomonas aeruginosa and Enterococcus faecalis both are over 100,000 CFU per mL      Component 6 d ago    Significant Indicator POS Positive (POS)    Source UR    Site -    Culture Result - Abnormal     Culture Result  Abnormal   Pseudomonas aeruginosa   >100,000 cfu/mL     Culture Result  Abnormal   Enterococcus faecalis   >100,000 cfu/mL     Resulting Agency M          Susceptibility     Pseudomonas aeruginosa Enterococcus faecalis     KANE KANE     Amikacin <=16 mcg/mL Sensitive       Ampicillin   <=2 mcg/mL Sensitive     Cefepime <=2 mcg/mL Sensitive       Ciprofloxacin <=0.25 mcg/mL Sensitive       Daptomycin   2 mcg/mL Sensitive     Gentamicin <=2 mcg/mL Sensitive       Levofloxacin <=0.5 mcg/mL Sensitive       Meropenem <=1 mcg/mL Sensitive       Nitrofurantoin   <=32 mcg/mL Sensitive     Penicillin   2 mcg/mL Sensitive     Pip/Tazobactam <=8 mcg/mL Sensitive       Tetracycline   >8 mcg/mL Resistant     Tobramycin <=2 mcg/mL Sensitive       Vancomycin   2 mcg/mL Sensitive                         No history of recurrent UTI.  Patient is allergic to Bactrim and penicillin.    Patient lives in an assisted living facility.      She is now on anticoagulant.      ROS       As per HPI, the rest are negative.         Objective     /50 (BP Location: Left arm, Patient Position: Sitting, BP Cuff Size:  "Adult)   Pulse 68   Temp 36.8 °C (98.3 °F) (Temporal)   Ht 1.549 m (5' 1\")   Wt 54.4 kg (119 lb 14.9 oz)   LMP  (LMP Unknown)   SpO2 94%   BMI 22.66 kg/m²      Physical Exam     Examined alert, awake, oriented, not in distress    Neck-supple, no lymphadenopathy, no thyromegaly  Lungs-clear to auscultation, no rales, no wheezes  Heart-regular rate and rhythm, no murmur  Abdomen-good bowel sounds, soft, nontender, no hepatosplenomegaly, no masses, no CVA tenderness  Extremities-no edema, clubbing, cyanosis          Urine dipstick done in the office today     Latest Reference Range & Units 07/07/22 10:19   POC Color Negative  yl   POC Appearance Negative  slightly cloudy   POC Specific Gravity <1.005 - >1.030  1.010   POC Urine PH 5.0 - 8.0  6.0   POC Glucose Negative mg/dL neg   POC Ketones Negative mg/dL neg   POC Protein Negative mg/dL neg   POC Nitrites Negative  neg   POC Leukocyte Esterase Negative  small   POC Blood Negative  neg   POC Bilirubin Negative mg/dL neg   POC Urobiligen Negative (0.2) mg/dL 0.2            Assessment & Plan        1. Urinary tract infection without hematuria, site unspecified  Although patient's UTI symptoms are improved they are not 100% gone.  Her urine culture grew out 2 bacteria as mentioned above.  I reviewed the sensitivity report.  The cephalexin she is taking will cover the Enterococcus faecalis but not the Pseudomonas aeruginosa.  There is no antibiotic that would cover for both.  The Pseudomonas aeruginosa is sensitive to ciprofloxacin and levofloxacin.  The rest of the antibiotics tested are IV antibiotics.  Her urine dipstick here in the office showed small leukocytes.  Most likely her UTI is not completely resolved yet.  We do not have a choice but give her ciprofloxacin.  Discussed with her in detail that this antibiotic is potential for tendon problems including tendon rupture.  She will have to take it easy with avoidance of any strenuous activity while she is on " the antibiotics.  She can take this together with the cephalexin.  Advised increasing p.o. fluids.  Return if there is no improvement or if there is worsening of the problems.  ER precautions given.  - POCT Urinalysis  - ciprofloxacin (CIPRO) 250 MG Tab; Take 1 Tablet by mouth 2 times a day.  Dispense: 10 Tablet; Refill: 0       Please note that this dictation was created using voice recognition software. I have worked with consultants from the vendor as well as technical experts from [facility] to optimize the interface. I have made every reasonable attempt to correct obvious errors, but I expect that there are errors of grammar and possibly content I did not discover before finalizing the note.

## 2022-07-11 DIAGNOSIS — F51.01 PRIMARY INSOMNIA: ICD-10-CM

## 2022-07-11 RX ORDER — LORAZEPAM 0.5 MG/1
0.5 TABLET ORAL
Qty: 30 TABLET | Refills: 0 | Status: SHIPPED | OUTPATIENT
Start: 2022-07-11 | End: 2022-08-10

## 2022-07-11 NOTE — TELEPHONE ENCOUNTER
Patient has appointment with  8/2/22.      Received request via: Patient    Was the patient seen in the last year in this department? Yes    Does the patient have an active prescription (recently filled or refills available) for medication(s) requested? No

## 2022-07-25 NOTE — TELEPHONE ENCOUNTER
VOICEMAIL  1. Caller Name: Dayana Lechuga                      Call Back Number: [phone]    2. Message: Pt left vm stating she was not able to  her Gabapentin as the rx states once a day and she has been taking this medication 3x a day- pt is requesting this be fixed so she can  her medication, pt reports she has been taking Gabapentin 3x a day for 6/7 months now.     3. Patient approves office to leave a detailed voicemail/ContactPointt message: N\AReceived request via: Patient    Was the patient seen in the last year in this department? Yes    Does the patient have an active prescription (recently filled or refills available) for medication(s) requested? No

## 2022-07-26 RX ORDER — GABAPENTIN 100 MG/1
100 CAPSULE ORAL 3 TIMES DAILY
Qty: 540 CAPSULE | Refills: 3 | Status: SHIPPED | OUTPATIENT
Start: 2022-07-26 | End: 2022-07-28 | Stop reason: SDUPTHER

## 2022-07-27 ENCOUNTER — TELEPHONE (OUTPATIENT)
Dept: MEDICAL GROUP | Facility: PHYSICIAN GROUP | Age: 87
End: 2022-07-27
Payer: MEDICARE

## 2022-07-27 NOTE — TELEPHONE ENCOUNTER
VOICEMAIL  1. Caller Name: [facility]                      Call Back Number: [phone]    2. Message: need verification on sig for gabapentin    3. Patient approves office to leave a detailed voicemail/MyChart message: no

## 2022-07-28 DIAGNOSIS — I73.9 PVD (PERIPHERAL VASCULAR DISEASE) (HCC): ICD-10-CM

## 2022-07-28 DIAGNOSIS — G62.89 OTHER POLYNEUROPATHY: ICD-10-CM

## 2022-07-28 DIAGNOSIS — I70.201 POPLITEAL ARTERY STENOSIS, RIGHT (HCC): ICD-10-CM

## 2022-07-28 DIAGNOSIS — F07.81 POST CONCUSSION SYNDROME: ICD-10-CM

## 2022-07-28 RX ORDER — GABAPENTIN 100 MG/1
200 CAPSULE ORAL 3 TIMES DAILY
Qty: 540 CAPSULE | Refills: 3 | Status: SHIPPED | OUTPATIENT
Start: 2022-07-28 | End: 2022-10-26

## 2022-07-29 RX ORDER — AMLODIPINE BESYLATE 5 MG/1
TABLET ORAL
Qty: 100 TABLET | Refills: 1 | Status: SHIPPED | OUTPATIENT
Start: 2022-07-29 | End: 2022-10-26 | Stop reason: SDUPTHER

## 2022-07-29 NOTE — THERAPY
Physical Therapy Contact Note     Patient Name: Dayana Lechuga  Age:  89 y.o., Sex:  female  Medical Record #: [id number]  Today's Date: 12/1/2020    Acknowledge and appreciate new MD order for PT to re-evaluate due to pt being last seen by PT on 11/23 and per Dr. Reese Watts she has deteriorated to the point where she is not able to ambulate w/o assistance. Discussed case with her RN who reports she is up self ambulating across the unit multiple times per day to use the bathroom w/o assistance. He states she is steady in her gait and motivated to ambulate. Given the above, her 6-clicks would be a 24 and she does not have any PT needs at this time.     Miguelina Mooney, PT  [id number]    Type:  Needs Medical Advice    Who Called: self  Symptoms (please be specific): na   How long has patient had these symptoms:  na  Pharmacy name and phone #:  [facility]  Would the patient rather a call back or a response via [facility]? Call back  Best Call Back Number: [phone]  Additional Information: pt requesting medication stating that she would be out this weekend please advise

## 2022-07-29 NOTE — TELEPHONE ENCOUNTER
Is the patient due for a refill? Yes    Was the patient seen the past year? Yes    Date of last office visit: 11/5/21    Does the patient have an upcoming appointment?  Yes   If yes, When? 9/14/22    Provider to refill:RO    Does the patients insurance require a 100 day supply?  No

## 2022-08-01 ENCOUNTER — TELEPHONE (OUTPATIENT)
Dept: MEDICAL GROUP | Facility: PHYSICIAN GROUP | Age: 87
End: 2022-08-01
Payer: MEDICARE

## 2022-08-01 NOTE — TELEPHONE ENCOUNTER
1. Caller Name: Dayana Lechuga                        Call Back Number: [phone]      How would the patient prefer to be contacted with a response: Phone call OK to leave a detailed message    Medication Managment      Patient wants PCP to clarify to Pharmacy she is taking Gabapentin 100MG 3 tabs 3 times a day NOT 2 tabs 3 times a day.    She is using [email or web address].    Patient is also taking Albuterol for Asthma

## 2022-08-02 ENCOUNTER — OFFICE VISIT (OUTPATIENT)
Dept: BEHAVIORAL HEALTH | Facility: CLINIC | Age: 87
End: 2022-08-02
Payer: MEDICARE

## 2022-08-02 ENCOUNTER — TELEPHONE (OUTPATIENT)
Dept: CARDIOLOGY | Facility: MEDICAL CENTER | Age: 87
End: 2022-08-02
Payer: MEDICARE

## 2022-08-02 DIAGNOSIS — F51.01 PRIMARY INSOMNIA: ICD-10-CM

## 2022-08-02 DIAGNOSIS — Z79.899 CHRONIC USE OF BENZODIAZEPINE FOR THERAPEUTIC PURPOSE: ICD-10-CM

## 2022-08-02 PROCEDURE — 99214 OFFICE O/P EST MOD 30 MIN: CPT | Performed by: PSYCHIATRY & NEUROLOGY

## 2022-08-02 RX ORDER — GABAPENTIN 100 MG/1
300 CAPSULE ORAL 3 TIMES DAILY
Qty: 810 CAPSULE | Refills: 1 | Status: SHIPPED | OUTPATIENT
Start: 2022-08-02 | End: 2022-08-25 | Stop reason: SDUPTHER

## 2022-08-02 RX ORDER — LORAZEPAM 0.5 MG/1
0.5 TABLET ORAL DAILY
Qty: 30 TABLET | Refills: 2 | Status: SHIPPED | OUTPATIENT
Start: 2022-08-02 | End: 2022-08-02

## 2022-08-02 RX ORDER — LORAZEPAM 0.5 MG/1
0.5 TABLET ORAL DAILY
Qty: 30 TABLET | Refills: 2 | Status: SHIPPED | OUTPATIENT
Start: 2022-08-10 | End: 2022-08-09 | Stop reason: SDUPTHER

## 2022-08-02 RX ORDER — ALBUTEROL SULFATE 90 UG/1
AEROSOL, METERED RESPIRATORY (INHALATION)
Qty: 25.5 G | Refills: 0 | Status: SHIPPED
Start: 2022-08-02 | End: 2023-04-29

## 2022-08-02 NOTE — PROGRESS NOTES
"  INITIAL PSYCHIATRIC EVALUATION      This provider informed the patient their medical records are totally confidential except for the use by other providers involved in their care, or if the patient signs a release, or to report instances of child or elder abuse, or if it is determined they are an immediate risk to harm themselves or others.      CHIEF COMPLAINT  \"I was asked to come here because I take Lorazepam\"      HISTORY OF PRESENT ILLNESS  Dayana Lechuga is a 89 y.o. old female comes in today to establish care with new provider and for evaluation of benzodiazepine use. Patient presents with her son and requested for her son to be present during course of appointment.  Patient has been working with Dr. Lomeli at this clinic with most recent appointment on 4/15/2022 with the plan was to continue lorazepam 0.5 mg daily.  Patient states she initially started using Lorazepam to aid with anxiety in 2006.  In 2004, patient moved from [place] to [place]. She was in a long term relationship and noticed chest pain and increased distress approximately a week before her significant other was supposed to have cardiac surgery. She states she went to the doctor at that time and was started on Lorazepam to aid with anxiety in relation to worry about his well being and adjust to new move. Over time, patient states she increased dose to 2mg throughout the day and was taking a dose in the afternoon and the rest at night for sleep. She felt she had difficulty falling asleep due to excessive worry prior to bedtime. Patient states approximately 10 years ago, she was discontinued off Lorazepam abruptly and had increased anxiety and intolerance to discontinuation. Pt worked closely with primary care to taper down on medication from 2mg to current dose of 0.5mg at bedtime, she was motivated to do so and felt that it wasn't too difficult. She has been utilizing this in combination with Melatonin 10mg PO at bedtime " and feels she has been sleeping well. She denies excessive worry during the day. She feels she has had a good mood overall and has good energy most days. She notes worsening energy with increased heat.     Patient denies any issues with dizziness, balance, and stability. She denies all history of falls. She denies any issues with memory and cognition. Her son provides collateral information that he has not noticed any changes or significant decrease in memory or her ability to reason.  Her son states patient worries about health and well being. Patient continues to engage in bridge and bingo when these are available to her. She has a dog that is a strong support with her and she walks him three times daily for exercise. She does not drive and has her son and daughter help her with tasks and errands. She manages her medications independently. She denies anhedonia, changes in appetite, changes in energy, changes in concentration. She denies forgetfulness, difficulty staying on task, difficulty organizing medications, or feelings of cognitive dulling. She denies dizziness or lightheadedness.     PSYCHIATRIC REVIEW OF SYSTEMS: denies depressive symptoms, denies symptoms of anxiety that interfere with functioning or are overwhelming, denies manic symptoms and denies psychotic symptoms including  /       MEDICAL REVIEW OF SYSTEMS:   Constitutional Negative   Eyes Negative   Ears/Nose/Mouth/Throat Negative   Cardiovascular CAD, denies current palpitations or chest pain   Respiratory S/p COVID, denies dyspnea, cough   Gastrointestinal  negative   Genitourinary  negative   Muscular States muscle aches when waking up in the morning   Integumentary Notes patches of skin with redness without pain or inflammation   Neurological States shooting pain down right upper thigh resolved with medication, denies dizziness, headaches   Endocrine denies   Hematologic/Lymphatic denies     CURRENT MEDICATIONS:  Current Outpatient  Medications   Medication Sig Dispense Refill   • [START ON 8/10/2022] LORazepam (ATIVAN) 0.5 MG Tab Take 1 Tablet by mouth every day for 90 days. 30 Tablet 2   • amLODIPine (NORVASC) 5 MG Tab TAKE ONE TABLET BY MOUTH EVERY  Tablet 1   • gabapentin (NEURONTIN) 100 MG Cap Take 2 Capsules by mouth 3 times a day for 90 days. TAKE 2 CAPSULES BY MOUTH THREE TIMES DAILY 540 Capsule 3   • LORazepam (ATIVAN) 0.5 MG Tab Take 1 Tablet by mouth at bedtime for 30 days. 30 Tablet 0   • ciprofloxacin (CIPRO) 250 MG Tab Take 1 Tablet by mouth 2 times a day. 10 Tablet 0   • albuterol 108 (90 Base) MCG/ACT Aero Soln inhalation aerosol albuterol sulfate HFA 90 mcg/actuation aerosol inhaler     • azelastine (ASTELIN) 137 MCG/SPRAY nasal spray azelastine 137 mcg (0.1 %) nasal spray aerosol   SPRAY ONCE IN EACH NOSTRIL TWICE DAILY     • calcium carbonate (OS-MAGDA) 1250 (500 Ca) MG chewable tablet Antacid (calcium carbonate) 200 mg calcium (500 mg) chewable tablet   CHEW AND SWALLOW 1 TABLET BY MOUTH TWICE DAILY     • cephALEXin (KEFLEX) 500 MG Cap cephalexin 500 mg capsule   500 mg PO administered on scene. Time administered: 1536     • phenazopyridine (PYRIDIUM) 100 MG Tab Pyridium 100 mg tablet   Take 1 tablet twice a day by oral route as needed for 2 days.     • isosorbide mononitrate SR (IMDUR) 30 MG TABLET SR 24 HR TAKE ONE TABLET BY MOUTH IN THE EVENING 100 Tablet 0   • lisinopril (PRINIVIL) 20 MG Tab TAKE ONE TABLET BY MOUTH EVERY MORNING WITH BREAKFAST 100 Tablet 1   • atorvastatin (LIPITOR) 80 MG tablet TAKE ONE TABLET BY MOUTH EVERY EVENING 100 Tablet 1   • montelukast (SINGULAIR) 10 MG Tab TAKE ONE TABLET BY MOUTH IN THE EVENING 90 Tablet 3   • fluticasone (FLONASE) 50 MCG/ACT nasal spray USE 1 SPRAY IN EACH NOSTRIL ONCE DAILY 16 g 3   • loratadine (CLARITIN) 10 MG Tab Take 10 mg by mouth every day.     • carvedilol (COREG) 6.25 MG Tab Take 1 Tablet by mouth 2 times a day with meals for 360 days. 180 Tablet 3   • calcium  carbonate (TUMS) 500 MG Chew Tab Chew 1 Tablet 2 times a day. 60 Tablet 0   • sodium chloride (OCEAN) 0.65 % Solution Administer 1 Spray into affected nostril(S) 2 times a day.     • Acetaminophen 500 MG Cap Take 1 Capsule by mouth 2 times a day. Morning & Afternoon     • Melatonin 10 MG Tab Take 20 mg by mouth at bedtime.     • PREPARATION H 0.25-3-12-18 % Cream Insert 1 Application into the rectum as needed (pain).     • Multiple Vitamins-Minerals (CENTRUM SILVER PO) Take 1 Tab by mouth every morning.     • Biotin w/ Vitamins C & E 1250-7.5-7.5 MCG-MG-UNT Chew Tab Chew 1 Tab every morning.     • guaifenesin LA (MUCINEX) 600 MG TABLET SR 12 HR Take 600 mg by mouth every morning.     • Ascorbic Acid (VITAMIN C) 1000 MG Tab Take 1,000 mg by mouth every morning.     • VITAMIN E PO Take 1 Cap by mouth every morning.     • aspirin (ASA) 81 MG Chew Tab chewable tablet Chew 81 mg every morning with breakfast. 100 Tab 11   • Cholecalciferol (VITAMIN D) 50 MCG (2000 UT) Cap Take 2,000 Units by mouth every morning.     • omeprazole (PRILOSEC) 20 MG delayed-release capsule Take 20 mg by mouth every morning with breakfast.       No current facility-administered medications for this visit.       ALLERGIES:  Bactrim [sulfamethoxazole w-trimethoprim], Pcn [penicillins], and Codeine    PAST PSYCHIATRIC HISTORY  Prior psychiatric hospitalization: denies  Prior Self harm/suicide attempt: denies  Prior Diagnosis: denies    PAST PSYCHIATRIC MEDICATIONS  • Lorazepam, denies other medication trials     FAMILY HISTORY  Psychiatric diagnosis:  denies    SUBSTANCE USE HISTORY:  ALCOHOL: denies use, states she may have a half glass of wine every few months  TOBACCO: denies  CANNABIS: denies  OPIOIDS: denies  PRESCRIPTION MEDICATIONS: denies  OTHERS: denies  History of inpatient/outpatient rehab treatment: N/A    SOCIAL HISTORY  Lived in [place] until 2004 at which time she moved to [place]. Has lived in [place] for 18 years.  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 [employer]  Relationship: single  Kids: son Mehnaz) lives in [place], daughter lives in [place]; 1 grand child and 2 great grand children in [place]  Current living situation: lives in a senior home center, lives independently with her dog, has support available if needed       MEDICAL HISTORY  Past Medical History:   Diagnosis Date   • Allergy    • Anxiety    • ASTHMA    • Bronchitis    • CAD (coronary artery disease)     JT to RCA; 70% stenosis in LAD   • Chills    • Constipation    • Difficulty breathing    • GERD (gastroesophageal reflux disease)    • Heart attack ([facility])    • Heartburn    • Hyperlipidemia    • Hypertension    • Influenza    • Insomnia    • Lumbar back pain 9/10/2016   • Mild peripheral edema 7/31/2020   • Mumps    • OSTEOPOROSIS    • Palpitations    • Peripheral vascular disease, unspecified ([facility]) 9/14/2021   • Pneumonia    • Post concussion syndrome 9/11/2020   • PVD (peripheral vascular disease) ([facility])     70% PAD-followed by [facility]   • Sweat, sweating, excessive    • Tonsillitis      Past Surgical History:   Procedure Laterality Date   • ABDOMINAL HYSTERECTOMY TOTAL     • APPENDECTOMY     • HERNIA REPAIR     • HYSTERECTOMY LAPAROSCOPY     • TONSILLECTOMY     • ZZZ CARDIAC CATH           PHYSICAL EXAMINAION:  Musculoskeletal: Normal gait.   Abnormal movements: no banormal movements noted, no supportive walking devices required    MENTAL STATUS EXAMINATION      General:   - Grooming and hygiene: Casual and Neat,   - Apparent distress: no apparent distress,   - Behavior: Calm  - Eye Contact:  Good,   - no psychomotor agitation or retardation    - Participation: Active verbal participation and Engaged  Orientation: Alert and Fully Oriented to person, place and time  Mood: Euthymic  Affect: Full range and Congruent with content,  Thought Process: Logical and  Goal-directed  Thought Content: Denies suicidal or homicidal ideations, intent or plan Within normal limits  Perception: Denies auditory or visual hallucinations. No delusions noted Within normal limits  Attention span and concentration: Intact   Speech:Rate within normal limits and Volume within normal limits  Language: Appropriate   Insight: Good  Judgment: Good  Recent and remote memory: No gross evidence of memory deficits      DEPRESSION SCREENING:  Depression Screen (PHQ-2/PHQ-9) 3/15/2022 5/11/2022 6/21/2022   PHQ-2 Total Score - - -   PHQ-2 Total Score - - -   PHQ-2 Total Score 0 0 0   PHQ-9 Total Score - - -       Interpretation of PHQ-9 Total Score   Score Severity   1-4 No Depression   5-9 Mild Depression   10-14 Moderate Depression   15-19 Moderately Severe Depression   20-27 Severe Depression      SAFETY ASSESSMENT - SELF:    Does patient acknowledge current or past symptoms of dangerousness to self? No  Recent change in amount/specificity/intensity of suicidal thoughts or self-harm behavior? No         SAFETY ASSESSMENT - OTHERS:    Does patient acknowledge current or past symptoms of aggressive behavior or risk to others? No  Recent change in amount/specificity/intensity of thoughts or threats to harm others? No      CURRENT RISK:       Suicidal: Low       Homicidal: Low       Self-Harm: Low       Relapse: Not applicable       Crisis Safety Plan Reviewed Not Indicated    MEDICAL RECORDS/LABS/DIAGNOSTIC TESTS REVIEWED:      Lab Results   Component Value Date/Time    WBC 7.0 10/17/2021 02:27 AM    RBC 3.71 (L) 10/17/2021 02:27 AM    HEMOGLOBIN 12.1 10/17/2021 02:27 AM    HEMATOCRIT 37.6 10/17/2021 02:27 AM    .3 (H) 10/17/2021 02:27 AM    MCH 32.6 10/17/2021 02:27 AM    MCHC 32.2 (L) 10/17/2021 02:27 AM    MPV 10.1 10/17/2021 02:27 AM    NEUTSPOLYS 84.00 (H) 10/15/2021 10:36 PM    LYMPHOCYTES 7.50 (L) 10/15/2021 10:36 PM    MONOCYTES 6.10 10/15/2021 10:36 PM    EOSINOPHILS 1.50 10/15/2021 10:36  PM    BASOPHILS 0.60 10/15/2021 10:36 PM       Lab Results   Component Value Date/Time    SODIUM 136 10/28/2021 03:03 PM    POTASSIUM 4.7 10/28/2021 03:03 PM    CHLORIDE 104 10/28/2021 03:03 PM    CO2 24 10/28/2021 03:03 PM    GLUCOSE 108 (H) 10/28/2021 03:03 PM    BUN 17 10/28/2021 03:03 PM    CREATININE 0.74 10/28/2021 03:03 PM       Lab Results   Component Value Date/Time    CHOLSTRLTOT 142 12/31/2018 10:08 AM    LDL 62 12/31/2018 10:08 AM    HDL 63 12/31/2018 10:08 AM    TRIGLYCERIDE 87 12/31/2018 10:08 AM       No results found for: HBA1C    Lab Results   Component Value Date/Time    ALKPHOSPHAT 60 10/15/2021 10:36 PM    ASTSGOT 17 10/15/2021 10:36 PM    ALTSGPT 12 10/15/2021 10:36 PM    TBILIRUBIN 0.5 10/15/2021 10:36 PM             [place]  records -    records reviewed, review of records indicates tapering down from 90 pills for 30 days in 02/21 to 60 for 30 days in 04/02 and fill of 30 for 30 days 07/02/21. Most recent fill on 30 0.5mg pills on 7/11/22.       ASSESSMENT  Dayana Lechuga is a 89 y.o. old female comes in today for benzodiazepine use. Patient has been taking Lorazepam since 2006 and was initially prescribed medication to aid with anxiety and sleep. Over the last 5 months, patient has tapered down from 3 pills daily to 1 pill at bedtime in conjunction with use of Melatonin for sleep. She notes primary concern with sleeping throughout the night with resolution of daytime anxiety. She has had increased recent stressors due to medical illness and states she has been doing well with this and has had physical improvement. She denies depressed mood with associated neurovegetative symptoms.  Spoke with patient extensively about options for discontinuation of medication and risks/benefits of alternative medications to aid with sleep. Discussed particularly concern about cognition and fall risk as well as sequelae of falls that can be detrimental to patient's well being. Following thorough  conversation about risks, patient states desire to continue current dose with willingness to consider taper in the future. Patient has appropriately decreased dose significantly and has followed protocol and prescription fills appropriately. As medication is primarily used for sleep with overall resolution of anxiety symptoms, alternative medication to target anxiety is not required at this time.       DIFFERENTIAL DIAGNOSES  1. Primary Insomnia    2. Long Term Use of Benzodiazepine, dependence      PLAN:  (1) Continue Lorazepam 0.5mg PO at bedtime and Melatonin 10mg PO at bedtime      • I reviewed clinical lab tests done in last 1 year.   • I reviewed the imaging done in last year  • Medication options, alternatives (including no medications) and medication risks/benefits/side effects were discussed in detail.  • The patient was advised to call, message provider on [facility], or come in to the clinic if symptoms worsen or if any future questions/issues regarding their medications arise; the patient verbalized understanding and agreement.    • The patient was educated to call 911, call the suicide hotline, or go to local ER if having thoughts of suicide or homicide; verbalized understanding.  • Case Discussed with Dr. Faustin, present for critical components of appointment         Return to clinic in 3 months weeks or sooner if symptoms worsen.      The proposed treatment plan was discussed with the patient who was provided the opportunity to ask questions and make suggestions regarding alternative treatment. Patient verbalized understanding and expressed agreement with the plan.     Thank you for allowing me to participate in the care of this patient.    Denys Andrade D.O.  8/2/2022    CC:   Milena Stone P.A.-C.    This note was created using voice recognition software (Dragon). The accuracy of the dictation is limited by the abilities of the software. I have reviewed the note prior to signing, however some errors  in grammar and context are still possible. If you have any questions related to this note please do not hesitate to contact our office.

## 2022-08-02 NOTE — TELEPHONE ENCOUNTER
SHEA      Caller: Neeraj from [facility] mail order pharmacy  Topic/issue: They were calling about the patient's prescription for their nitroglycerin tablets .4 mg and the were calling back to see we received the request from them for this patient  Callback Number: [phone]      Thank you    -Nathan GREENBERG

## 2022-08-09 ENCOUNTER — TELEPHONE (OUTPATIENT)
Dept: CARDIOLOGY | Facility: MEDICAL CENTER | Age: 87
End: 2022-08-09
Payer: MEDICARE

## 2022-08-09 DIAGNOSIS — J90 PLEURAL EFFUSION: ICD-10-CM

## 2022-08-09 DIAGNOSIS — I73.9 PERIPHERAL VASCULAR DISEASE, UNSPECIFIED (HCC): ICD-10-CM

## 2022-08-09 DIAGNOSIS — I25.119 CORONARY ARTERY DISEASE INVOLVING NATIVE CORONARY ARTERY OF NATIVE HEART WITH ANGINA PECTORIS (HCC): ICD-10-CM

## 2022-08-09 DIAGNOSIS — F51.01 PRIMARY INSOMNIA: ICD-10-CM

## 2022-08-09 DIAGNOSIS — E78.5 DYSLIPIDEMIA: Chronic | ICD-10-CM

## 2022-08-09 DIAGNOSIS — I10 ESSENTIAL HYPERTENSION: ICD-10-CM

## 2022-08-09 RX ORDER — LORAZEPAM 0.5 MG/1
0.5 TABLET ORAL DAILY
Qty: 30 TABLET | Refills: 2 | Status: SHIPPED | OUTPATIENT
Start: 2022-08-10 | End: 2022-10-14 | Stop reason: SDUPTHER

## 2022-08-09 RX ORDER — CARVEDILOL 6.25 MG/1
6.25 TABLET ORAL 2 TIMES DAILY WITH MEALS
Qty: 200 TABLET | Refills: 0 | Status: SHIPPED | OUTPATIENT
Start: 2022-08-09 | End: 2022-10-26 | Stop reason: SDUPTHER

## 2022-08-09 NOTE — TELEPHONE ENCOUNTER
MEDICATION REFILL : RO    Caller: Dayana Lechuga    Medication Name and Dosage: CARVEDILOL 6.25MG    Did patient contact pharmacy (If no, please call pharmacy first): YES    Medication amount left: NONE    Preferred Pharmacy:     [id number] - [place], [place] - [address]    Other questions (Topic): NEW PHARMACY    Patient is needing to get this medication filled another pharmacy due to the [facility] being closed. Please advise.    Thank you,  Adrian HERNANDEZ    Callback Number (Will only call for issues): [phone] (home) [phone] (work)

## 2022-08-09 NOTE — TELEPHONE ENCOUNTER
Pt would like prescription resent to updated pharmacy because [facility] is closed  down for the day due to illness.     Received request via: Patient    Was the patient seen in the last year in this department? Yes    Does the patient have an active prescription (recently filled or refills available) for medication(s) requested? No

## 2022-08-16 ENCOUNTER — APPOINTMENT (OUTPATIENT)
Dept: RADIOLOGY | Facility: MEDICAL CENTER | Age: 87
End: 2022-08-16
Attending: EMERGENCY MEDICINE
Payer: MEDICARE

## 2022-08-16 ENCOUNTER — HOSPITAL ENCOUNTER (EMERGENCY)
Facility: MEDICAL CENTER | Age: 87
End: 2022-08-16
Attending: EMERGENCY MEDICINE
Payer: MEDICARE

## 2022-08-16 VITALS
OXYGEN SATURATION: 91 % | DIASTOLIC BLOOD PRESSURE: 91 MMHG | SYSTOLIC BLOOD PRESSURE: 195 MMHG | BODY MASS INDEX: 21.71 KG/M2 | WEIGHT: 115 LBS | HEIGHT: 61 IN | HEART RATE: 63 BPM | RESPIRATION RATE: 15 BRPM | TEMPERATURE: 97.5 F

## 2022-08-16 DIAGNOSIS — I10 HYPERTENSION, UNSPECIFIED TYPE: ICD-10-CM

## 2022-08-16 DIAGNOSIS — I10 ESSENTIAL HYPERTENSION: ICD-10-CM

## 2022-08-16 LAB
ALBUMIN SERPL BCP-MCNC: 4.8 G/DL (ref 3.2–4.9)
ALBUMIN/GLOB SERPL: 1.8 G/DL
ALP SERPL-CCNC: 59 U/L (ref 30–99)
ALT SERPL-CCNC: 18 U/L (ref 2–50)
ANION GAP SERPL CALC-SCNC: 13 MMOL/L (ref 7–16)
APPEARANCE UR: CLEAR
AST SERPL-CCNC: 22 U/L (ref 12–45)
BASOPHILS # BLD AUTO: 1.7 % (ref 0–1.8)
BASOPHILS # BLD: 0.11 K/UL (ref 0–0.12)
BILIRUB SERPL-MCNC: 0.2 MG/DL (ref 0.1–1.5)
BILIRUB UR QL STRIP.AUTO: NEGATIVE
BUN SERPL-MCNC: 15 MG/DL (ref 8–22)
CALCIUM SERPL-MCNC: 10 MG/DL (ref 8.5–10.5)
CHLORIDE SERPL-SCNC: 107 MMOL/L (ref 96–112)
CO2 SERPL-SCNC: 21 MMOL/L (ref 20–33)
COLOR UR: YELLOW
CREAT SERPL-MCNC: 0.49 MG/DL (ref 0.5–1.4)
EKG IMPRESSION: NORMAL
EOSINOPHIL # BLD AUTO: 0.22 K/UL (ref 0–0.51)
EOSINOPHIL NFR BLD: 3.4 % (ref 0–6.9)
ERYTHROCYTE [DISTWIDTH] IN BLOOD BY AUTOMATED COUNT: 44.5 FL (ref 35.9–50)
GFR SERPLBLD CREATININE-BSD FMLA CKD-EPI: 88 ML/MIN/1.73 M 2
GLOBULIN SER CALC-MCNC: 2.7 G/DL (ref 1.9–3.5)
GLUCOSE SERPL-MCNC: 123 MG/DL (ref 65–99)
GLUCOSE UR STRIP.AUTO-MCNC: NEGATIVE MG/DL
HCT VFR BLD AUTO: 38.7 % (ref 37–47)
HGB BLD-MCNC: 13.1 G/DL (ref 12–16)
IMM GRANULOCYTES # BLD AUTO: 0.02 K/UL (ref 0–0.11)
IMM GRANULOCYTES NFR BLD AUTO: 0.3 % (ref 0–0.9)
KETONES UR STRIP.AUTO-MCNC: NEGATIVE MG/DL
LEUKOCYTE ESTERASE UR QL STRIP.AUTO: NEGATIVE
LYMPHOCYTES # BLD AUTO: 2.11 K/UL (ref 1–4.8)
LYMPHOCYTES NFR BLD: 32.6 % (ref 22–41)
MCH RBC QN AUTO: 32.6 PG (ref 27–33)
MCHC RBC AUTO-ENTMCNC: 33.9 G/DL (ref 33.6–35)
MCV RBC AUTO: 96.3 FL (ref 81.4–97.8)
MICRO URNS: NORMAL
MONOCYTES # BLD AUTO: 0.64 K/UL (ref 0–0.85)
MONOCYTES NFR BLD AUTO: 9.9 % (ref 0–13.4)
NEUTROPHILS # BLD AUTO: 3.38 K/UL (ref 2–7.15)
NEUTROPHILS NFR BLD: 52.1 % (ref 44–72)
NITRITE UR QL STRIP.AUTO: NEGATIVE
NRBC # BLD AUTO: 0 K/UL
NRBC BLD-RTO: 0 /100 WBC
PH UR STRIP.AUTO: 7 [PH] (ref 5–8)
PLATELET # BLD AUTO: 228 K/UL (ref 164–446)
PMV BLD AUTO: 9.9 FL (ref 9–12.9)
POTASSIUM SERPL-SCNC: 3.9 MMOL/L (ref 3.6–5.5)
PROT SERPL-MCNC: 7.5 G/DL (ref 6–8.2)
PROT UR QL STRIP: NEGATIVE MG/DL
RBC # BLD AUTO: 4.02 M/UL (ref 4.2–5.4)
RBC UR QL AUTO: NEGATIVE
SODIUM SERPL-SCNC: 141 MMOL/L (ref 135–145)
SP GR UR STRIP.AUTO: 1.01
TROPONIN T SERPL-MCNC: 9 NG/L (ref 6–19)
UROBILINOGEN UR STRIP.AUTO-MCNC: 0.2 MG/DL
WBC # BLD AUTO: 6.5 K/UL (ref 4.8–10.8)

## 2022-08-16 PROCEDURE — 71045 X-RAY EXAM CHEST 1 VIEW: CPT

## 2022-08-16 PROCEDURE — 36415 COLL VENOUS BLD VENIPUNCTURE: CPT

## 2022-08-16 PROCEDURE — 81003 URINALYSIS AUTO W/O SCOPE: CPT

## 2022-08-16 PROCEDURE — 84484 ASSAY OF TROPONIN QUANT: CPT

## 2022-08-16 PROCEDURE — 99284 EMERGENCY DEPT VISIT MOD MDM: CPT

## 2022-08-16 PROCEDURE — 80053 COMPREHEN METABOLIC PANEL: CPT

## 2022-08-16 PROCEDURE — 85025 COMPLETE CBC W/AUTO DIFF WBC: CPT

## 2022-08-16 PROCEDURE — 93005 ELECTROCARDIOGRAM TRACING: CPT | Performed by: EMERGENCY MEDICINE

## 2022-08-16 ASSESSMENT — FIBROSIS 4 INDEX: FIB4 SCORE: 2.82

## 2022-08-17 NOTE — ED PROVIDER NOTES
"ED Provider Note    CHIEF COMPLAINT  Chief Complaint   Patient presents with    Hypertension     Pt BIBA from home. Hypertension x 1 wk. Complaint w/ home cardiac meds. Pressures running up to 180 sys. 160/77 today. Denies CP today but admits to having sudden sharp CP intermittently over last week. HA.        HPI  Dayana Lechuga is a 89 y.o. female who presents for evaluation of elevated blood pressure readings at home.  Patient states she normally does not take her blood pressure but for the past few days has been taking it for some reason.  She notes that it got up into the 150s over 70s at home and she got worried this afternoon.  She notes no particular symptoms today and specifically denies chest pain, shortness of breath, headache, double vision, blurry vision, nausea, vomiting, or abdominal pain.  She does note that last night sometime she felt her heart \"jump\" but denies that it was pain or discomfort.  She notes no recent illnesses but does state that in July she had a urinary tract infection and wonders if that is part of the problem today.  She does take her blood pressure medications as prescribed and does have a cardiologist who she is going to see next month.  She denies ever having sudden sharp chest pain intermittently over the last week despite the triage note.    REVIEW OF SYSTEMS  Constitutional: No fevers or chills  Skin: No rashes  HEENT: No sore throat, runny nose, sores, trouble swallowing, or trouble speaking.  Neck: No neck pain, stiffness, or masses.  Chest: No pain or rashes  Pulm: No shortness of breath, cough, wheezing, stridor, or pain with inspiration/expiration  Gastrointestinal: No nausea, vomiting, diarrhea, constipation, bloating, melena, hematochezia or abdominal pain.  Genitourinary: No dysuria or hematuria  Musculoskeletal: No recent trauma, pain, swelling, weakness  Neurologic: No sensory or motor changes. No confusion or disorientation.  Heme: No bleeding or bruising " "problems.   Immuno: No hx of recurrent infections    PAST FAM HISTORY  Family History   Problem Relation Age of Onset    Heart Attack Father     Cancer Brother     Cancer Brother     Heart Disease Neg Hx     Heart Failure Neg Hx     Hyperlipidemia Neg Hx        PAST MEDICAL HISTORY   has a past medical history of Allergy, Anxiety, ASTHMA, Bronchitis, CAD (coronary artery disease), Chills, Constipation, Difficulty breathing, GERD (gastroesophageal reflux disease), Heart attack (HCC), Heartburn, Hyperlipidemia, Hypertension, Influenza, Insomnia, Lumbar back pain (9/10/2016), Mild peripheral edema (7/31/2020), Mumps, OSTEOPOROSIS, Palpitations, Peripheral vascular disease, unspecified (HCC) (9/14/2021), Pneumonia, Post concussion syndrome (9/11/2020), PVD (peripheral vascular disease) ([facility]), Sweat, sweating, excessive, and Tonsillitis.    SOCIAL HISTORY  Social History     Tobacco Use    Smoking status: Never    Smokeless tobacco: Never   Vaping Use    Vaping Use: Never used   Substance and Sexual Activity    Alcohol use: No     Alcohol/week: 0.0 oz    Drug use: No    Sexual activity: Not Currently       SURGICAL HISTORY   has a past surgical history that includes appendectomy; abdominal hysterectomy total; hernia repair; tonsillectomy; zzz cardiac cath; and hysterectomy laparoscopy.    CURRENT MEDICATIONS  Home Medications    **Home medications have not yet been reviewed for this encounter**         ALLERGIES  Allergies   Allergen Reactions    Bactrim [Sulfamethoxazole W-Trimethoprim] Hives    Pcn [Penicillins] Hives     About 70 years    Codeine Unspecified     Unknown reaction      Sulfamethoxazole-Trimethoprim Rash       PHYSICAL EXAM  VITAL SIGNS: BP (!) 195/91   Pulse 63   Temp 36.4 °C (97.5 °F) (Temporal)   Resp 15   Ht 1.549 m (5' 1\")   Wt 52.2 kg (115 lb)   LMP  (LMP Unknown)   SpO2 91%   BMI 21.73 kg/m²    Gen: Alert in no apparent distress.  HEENT: No signs of trauma, Bilateral external ears normal, " Nose normal. Conjunctiva normal, Non-icteric.   Cardiovascular: Regular rate and rhythm.  Capillary refill less than 3 seconds to all extremities, 2+ distal pulses.  Thorax & Lungs: Normal breath sounds, No respiratory distress, No wheezing bilateral chest rise  Abdomen: Bowel sounds normal, Soft, No tenderness, No masses, No pulsatile masses. No Guarding or rebound  Skin: Warm, Dry, No erythema, No rash noted to exposed areas.   Extremities: Intact distal pulses, No edema  Neurologic: Alert , no facial droop, grossly normal coordination and strength  Psychiatric: Affect pleasant      LABS  Results for orders placed or performed during the hospital encounter of 08/16/22   CBC with Differential   Result Value Ref Range    WBC 6.5 4.8 - 10.8 K/uL    RBC 4.02 (L) 4.20 - 5.40 M/uL    Hemoglobin 13.1 12.0 - 16.0 g/dL    Hematocrit 38.7 37.0 - 47.0 %    MCV 96.3 81.4 - 97.8 fL    MCH 32.6 27.0 - 33.0 pg    MCHC 33.9 33.6 - 35.0 g/dL    RDW 44.5 35.9 - 50.0 fL    Platelet Count 228 164 - 446 K/uL    MPV 9.9 9.0 - 12.9 fL    Neutrophils-Polys 52.10 44.00 - 72.00 %    Lymphocytes 32.60 22.00 - 41.00 %    Monocytes 9.90 0.00 - 13.40 %    Eosinophils 3.40 0.00 - 6.90 %    Basophils 1.70 0.00 - 1.80 %    Immature Granulocytes 0.30 0.00 - 0.90 %    Nucleated RBC 0.00 /100 WBC    Neutrophils (Absolute) 3.38 2.00 - 7.15 K/uL    Lymphs (Absolute) 2.11 1.00 - 4.80 K/uL    Monos (Absolute) 0.64 0.00 - 0.85 K/uL    Eos (Absolute) 0.22 0.00 - 0.51 K/uL    Baso (Absolute) 0.11 0.00 - 0.12 K/uL    Immature Granulocytes (abs) 0.02 0.00 - 0.11 K/uL    NRBC (Absolute) 0.00 K/uL   Complete Metabolic Panel (CMP)   Result Value Ref Range    Sodium 141 135 - 145 mmol/L    Potassium 3.9 3.6 - 5.5 mmol/L    Chloride 107 96 - 112 mmol/L    Co2 21 20 - 33 mmol/L    Anion Gap 13.0 7.0 - 16.0    Glucose 123 (H) 65 - 99 mg/dL    Bun 15 8 - 22 mg/dL    Creatinine 0.49 (L) 0.50 - 1.40 mg/dL    Calcium 10.0 8.5 - 10.5 mg/dL    AST(SGOT) 22 12 - 45 U/L     ALT(SGPT) 18 2 - 50 U/L    Alkaline Phosphatase 59 30 - 99 U/L    Total Bilirubin 0.2 0.1 - 1.5 mg/dL    Albumin 4.8 3.2 - 4.9 g/dL    Total Protein 7.5 6.0 - 8.2 g/dL    Globulin 2.7 1.9 - 3.5 g/dL    A-G Ratio 1.8 g/dL   Troponin   Result Value Ref Range    Troponin T 9 6 - 19 ng/L   URINALYSIS (UA)    Specimen: Urine   Result Value Ref Range    Color Yellow     Character Clear     Specific Gravity 1.006 <1.035    Ph 7.0 5.0 - 8.0    Glucose Negative Negative mg/dL    Ketones Negative Negative mg/dL    Protein Negative Negative mg/dL    Bilirubin Negative Negative    Urobilinogen, Urine 0.2 Negative    Nitrite Negative Negative    Leukocyte Esterase Negative Negative    Occult Blood Negative Negative    Micro Urine Req see below    ESTIMATED GFR   Result Value Ref Range    GFR (CKD-EPI) 88 >60 mL/min/1.73 m 2   EKG   Result Value Ref Range    Report       [facility]    Test Date:  2022  Pt Name:    LAVERN OAKLEY           Department: ER  MRN:        [id number]                      Room:       TRAUMA - EXAM 1  Gender:     Female                       Technician: [id number]  :        [id number]                   Requested By:ER TRIAGE PROTOCOL  Order #:    [id number]                    Reading MD:    Measurements  Intervals                                Axis  Rate:       64                           P:          21  WI:         198                          QRS:        -36  QRSD:       88                           T:          18  QT:         414  QTc:        427    Interpretive Statements  Sinus rhythm  Probable left atrial enlargement  Left axis deviation  Anteroseptal infarct, age indeterminate  Compared to ECG 10/16/2021 14:48:15  Myocardial infarct finding now present  Left ventricular hypertrophy no longer present  Q waves no longer present  ST (T wave) deviation no longer present         RADIOLOGY  DX-CHEST-PORTABLE (1 VIEW)   Final Result      Enlarged caudate  silhouette without evidence of acute disease.            COURSE & MEDICAL DECISION MAKING  Patient arrives for evaluation of what appears to be asymptomatic mild high blood pressure readings at home.  Patient is on 3 different hypertension medications which she was taking as prescribed.  Given her lack of symptoms and reassuring exam, I feel she will be dischargeable as long as we can prove that there is no endorgan dysfunction.  She has no neurologic symptoms and no headaches.  She has not had any recent illnesses and I feel she can safely follow-up with her cardiologist regarding the issue if it persists.  I do not feel aggressively controlling her blood pressure at this point is in her best interest as it would likely drop her too low and the pressure itself is only marginally elevated.  She has excellent pulses and perfusion bilaterally and I do not suspect dissection or aneurysm.    Reevaluated patient at bedside.  She is calm, conversant, and nontoxic-appearing.  Her son has arrived.  They both state understanding that the blood pressure is trending down slowly without any specific treatment in the emergency department and, given the lack of endorgan dysfunction or symptomology, no further inpatient or emergent evaluation is necessary.  I did encourage her to follow-up with her cardiologist to discuss the incident and to track her blood pressure over time so that they can make decisions about any medication changes with this information.  She stated clear understanding that if she develops symptoms she should return for immediate reevaluation.    FINAL IMPRESSION  1. Hypertension, unspecified type        Electronically signed by: Jack Ferreira M.D., 8/16/2022 6:01 PM

## 2022-08-17 NOTE — ED NOTES
All discharge instructions given to.    Pt verbalized understanding of all discharge instructions. All lines removed prior to discharge. All questions answered.

## 2022-08-17 NOTE — ED TRIAGE NOTES
"Chief Complaint   Patient presents with    Hypertension     Pt BIBA from home. Hypertension x 1 wk. Complaint w/ home cardiac meds. Pressures running up to 180 sys. 160/77 today. Denies CP today but admits to having sudden sharp CP intermittently over last week. HA.         BP (!) 160/77   Pulse 67   Temp 36.2 °C (97.2 °F) (Temporal)   Resp 20   Ht 1.549 m (5' 1\")   Wt 52.2 kg (115 lb)   LMP  (LMP Unknown)   SpO2 98%   BMI 21.73 kg/m²    "

## 2022-08-25 ENCOUNTER — TELEPHONE (OUTPATIENT)
Dept: MEDICAL GROUP | Facility: PHYSICIAN GROUP | Age: 87
End: 2022-08-25
Payer: MEDICARE

## 2022-08-25 RX ORDER — GABAPENTIN 100 MG/1
300 CAPSULE ORAL 3 TIMES DAILY
Qty: 810 CAPSULE | Refills: 1 | Status: SHIPPED | OUTPATIENT
Start: 2022-08-25 | End: 2022-11-11 | Stop reason: SDUPTHER

## 2022-08-25 NOTE — TELEPHONE ENCOUNTER
VOICEMAIL  1. Caller Name: Dayana Lechuga                       Call Back Number: [phone]     2. Message: Patient called stating gabapentin medication needs to be 3 TABS 3 times daily.    Please advise.    3. Patient approves office to leave a detailed voicemail/MyChart message: N\A

## 2022-08-26 RX ORDER — GABAPENTIN 100 MG/1
300 CAPSULE ORAL 3 TIMES DAILY
Qty: 810 CAPSULE | Refills: 1 | Status: CANCELLED | OUTPATIENT
Start: 2022-08-26 | End: 2022-11-24

## 2022-08-26 NOTE — TELEPHONE ENCOUNTER
1. Caller Name: Dayana Lechuga                             Call Back Number: [phone]        How would the patient prefer to be contacted with a response: Phone call OK to leave a detailed message    MED MANAGMENT    Patient states she is not sure why Pharmacy was change but Gabapentin needs to go to [facility].     Please advise

## 2022-08-26 NOTE — TELEPHONE ENCOUNTER
Phone Number Called: [phone]     Call outcome: Did not leave a detailed message. Requested patient to call back.    Message: LVM to please call back.

## 2022-09-02 ENCOUNTER — OFFICE VISIT (OUTPATIENT)
Dept: MEDICAL GROUP | Facility: PHYSICIAN GROUP | Age: 87
End: 2022-09-02
Payer: MEDICARE

## 2022-09-02 ENCOUNTER — PATIENT OUTREACH (OUTPATIENT)
Dept: HEALTH INFORMATION MANAGEMENT | Facility: OTHER | Age: 87
End: 2022-09-02

## 2022-09-02 VITALS
WEIGHT: 116 LBS | SYSTOLIC BLOOD PRESSURE: 130 MMHG | HEIGHT: 61 IN | TEMPERATURE: 99.5 F | HEART RATE: 73 BPM | DIASTOLIC BLOOD PRESSURE: 66 MMHG | BODY MASS INDEX: 21.9 KG/M2 | OXYGEN SATURATION: 95 %

## 2022-09-02 DIAGNOSIS — M54.42 CHRONIC BILATERAL LOW BACK PAIN WITH LEFT-SIDED SCIATICA: ICD-10-CM

## 2022-09-02 DIAGNOSIS — Z00.00 WELL WOMAN EXAM (NO GYNECOLOGICAL EXAM): ICD-10-CM

## 2022-09-02 DIAGNOSIS — I10 ESSENTIAL HYPERTENSION: ICD-10-CM

## 2022-09-02 DIAGNOSIS — G89.29 CHRONIC BILATERAL LOW BACK PAIN WITH LEFT-SIDED SCIATICA: ICD-10-CM

## 2022-09-02 PROCEDURE — 99214 OFFICE O/P EST MOD 30 MIN: CPT | Performed by: FAMILY MEDICINE

## 2022-09-02 RX ORDER — METHOCARBAMOL 750 MG/1
750 TABLET, FILM COATED ORAL 4 TIMES DAILY
Qty: 56 TABLET | Refills: 0 | Status: SHIPPED | OUTPATIENT
Start: 2022-09-02 | End: 2022-09-16

## 2022-09-02 ASSESSMENT — FIBROSIS 4 INDEX: FIB4 SCORE: 2.12

## 2022-09-02 NOTE — PROGRESS NOTES
Subjective:     Chief Complaint   Patient presents with    Medication Management    Nerve Pain     Pinched nerve       HPI:   Dayana presents today to discuss the following.    Chronic bilateral low back pain with left-sided sciatica  Has had this problem for the past 2 mo  Left sided sciatica  Takes APAP and gabapentin    No PT    Past Medical History:   Diagnosis Date    Allergy     Anxiety     ASTHMA     Bronchitis     CAD (coronary artery disease)     JT to RCA; 70% stenosis in LAD    Chills     Constipation     Difficulty breathing     GERD (gastroesophageal reflux disease)     Heart attack ([facility])     Heartburn     Hyperlipidemia     Hypertension     Influenza     Insomnia     Lumbar back pain 9/10/2016    Mild peripheral edema 7/31/2020    Mumps     OSTEOPOROSIS     Palpitations     Peripheral vascular disease, unspecified ([facility]) 9/14/2021    Pneumonia     Post concussion syndrome 9/11/2020    PVD (peripheral vascular disease) ([facility])     70% PAD-followed by Lary AMBROCIO    Sweat, sweating, excessive     Tonsillitis        Current Outpatient Medications Ordered in Epic   Medication Sig Dispense Refill    methocarbamol (ROBAXIN) 750 MG Tab Take 1 Tablet by mouth 4 times a day for 14 days. 56 Tablet 0    gabapentin (NEURONTIN) 100 MG Cap Take 3 Capsules by mouth 3 times a day for 90 days. 810 Capsule 1    LORazepam (ATIVAN) 0.5 MG Tab Take 1 Tablet by mouth every day for 90 days. 30 Tablet 2    carvedilol (COREG) 6.25 MG Tab Take 1 Tablet by mouth 2 times a day with meals. 200 Tablet 0    albuterol 108 (90 Base) MCG/ACT Aero Soln inhalation aerosol INHALE 2 PUFFS BY MOUTH EVERY 6 HOURS AS NEEDED FOR SHORTNESS OF BREATH 25.5 g 0    amLODIPine (NORVASC) 5 MG Tab TAKE ONE TABLET BY MOUTH EVERY  Tablet 1    gabapentin (NEURONTIN) 100 MG Cap Take 2 Capsules by mouth 3 times a day for 90 days. TAKE 2 CAPSULES BY MOUTH THREE TIMES DAILY 540 Capsule 3    ciprofloxacin (CIPRO) 250 MG Tab Take 1 Tablet by mouth 2  times a day. 10 Tablet 0    azelastine (ASTELIN) 137 MCG/SPRAY nasal spray azelastine 137 mcg (0.1 %) nasal spray aerosol   SPRAY ONCE IN EACH NOSTRIL TWICE DAILY      calcium carbonate (OS-MAGDA) 1250 (500 Ca) MG chewable tablet Antacid (calcium carbonate) 200 mg calcium (500 mg) chewable tablet   CHEW AND SWALLOW 1 TABLET BY MOUTH TWICE DAILY      cephALEXin (KEFLEX) 500 MG Cap cephalexin 500 mg capsule   500 mg PO administered on scene. Time administered: 1536      phenazopyridine (PYRIDIUM) 100 MG Tab Pyridium 100 mg tablet   Take 1 tablet twice a day by oral route as needed for 2 days.      isosorbide mononitrate SR (IMDUR) 30 MG TABLET SR 24 HR TAKE ONE TABLET BY MOUTH IN THE EVENING 100 Tablet 0    lisinopril (PRINIVIL) 20 MG Tab TAKE ONE TABLET BY MOUTH EVERY MORNING WITH BREAKFAST 100 Tablet 1    atorvastatin (LIPITOR) 80 MG tablet TAKE ONE TABLET BY MOUTH EVERY EVENING 100 Tablet 1    montelukast (SINGULAIR) 10 MG Tab TAKE ONE TABLET BY MOUTH IN THE EVENING 90 Tablet 3    fluticasone (FLONASE) 50 MCG/ACT nasal spray USE 1 SPRAY IN EACH NOSTRIL ONCE DAILY 16 g 3    loratadine (CLARITIN) 10 MG Tab Take 10 mg by mouth every day.      calcium carbonate (TUMS) 500 MG Chew Tab Chew 1 Tablet 2 times a day. 60 Tablet 0    sodium chloride (OCEAN) 0.65 % Solution Administer 1 Spray into affected nostril(S) 2 times a day.      Acetaminophen 500 MG Cap Take 1 Capsule by mouth 2 times a day. Morning & Afternoon      Melatonin 10 MG Tab Take 20 mg by mouth at bedtime.      PREPARATION H 0.25-3-12-18 % Cream Insert 1 Application into the rectum as needed (pain).      Multiple Vitamins-Minerals (CENTRUM SILVER PO) Take 1 Tab by mouth every morning.      Biotin w/ Vitamins C & E 1250-7.5-7.5 MCG-MG-UNT Chew Tab Chew 1 Tab every morning.      guaifenesin LA (MUCINEX) 600 MG TABLET SR 12 HR Take 600 mg by mouth every morning.      Ascorbic Acid (VITAMIN C) 1000 MG Tab Take 1,000 mg by mouth every morning.      VITAMIN E PO  "Take 1 Cap by mouth every morning.      aspirin (ASA) 81 MG Chew Tab chewable tablet Chew 81 mg every morning with breakfast. 100 Tab 11    Cholecalciferol (VITAMIN D) 50 MCG (2000 UT) Cap Take 2,000 Units by mouth every morning.      omeprazole (PRILOSEC) 20 MG delayed-release capsule Take 20 mg by mouth every morning with breakfast.       No current Epic-ordered facility-administered medications on file.       Allergies:  Bactrim [sulfamethoxazole w-trimethoprim], Pcn [penicillins], Codeine, and Sulfamethoxazole-trimethoprim    Health Maintenance: Completed    ROS:  Gen: no fevers/chills, no changes in weight  Eyes: no changes in vision  Pulm: no sob, no cough  CV: no chest pain, no palpitations  GI: no nausea/vomiting, no diarrhea      Objective:     Exam:  /66 (BP Location: Left arm, Patient Position: Sitting, BP Cuff Size: Adult)   Pulse 73   Temp 37.5 °C (99.5 °F) (Temporal)   Ht 1.549 m (5' 1\")   Wt 52.6 kg (116 lb)   LMP  (LMP Unknown)   SpO2 95%   BMI 21.92 kg/m²  Body mass index is 21.92 kg/m².    Constitutional: Alert, no distress, well-groomed.  Skin: Warm, dry, good turgor, no rashes in visible areas.  Eye: Equal, round and reactive, conjunctiva clear, lids normal.  ENMT: Lips without lesions, good dentition, moist mucous membranes.  Neck: Trachea midline, no masses, no thyromegaly.  Respiratory: Unlabored respiratory effort, no cough.  MSK: Normal gait, moves all extremities.  Neuro: Grossly non-focal.   Psych: Alert and oriented x3, normal affect and mood.        Assessment & Plan:     89 y.o. female with the following -     1. Chronic bilateral low back pain with left-sided sciatica  Chronic condition.  Worsening condition.  Patient amenable to a trial of muscle relaxers and physical therapy.  - Referral to Physical Therapy    2. Well woman exam (no gynecological exam)  - Referral to Dermatology      No follow-ups on file.    [facility] Form    Last edited 09/02/22 15:15 PDT by Justin SINGH" SAMY Haley.           Please note that this dictation was created using voice recognition software. I have made every reasonable attempt to correct obvious errors, but I expect that there are errors of grammar and possibly content that I did not discover before finalizing the note.

## 2022-09-06 NOTE — PROGRESS NOTES
Patient returned my call from last Friday, 9/2/22.  Introduced the PCM program, she declined enrollment for now because she has so many upcoming healthcare appointments, to include PT.  Will revisit the PCM program when she sees her PCP on 10//18/22.

## 2022-09-08 ENCOUNTER — PHYSICAL THERAPY (OUTPATIENT)
Dept: PHYSICAL THERAPY | Facility: REHABILITATION | Age: 87
End: 2022-09-08
Attending: FAMILY MEDICINE
Payer: MEDICARE

## 2022-09-08 DIAGNOSIS — G89.29 CHRONIC BILATERAL LOW BACK PAIN WITH LEFT-SIDED SCIATICA: ICD-10-CM

## 2022-09-08 DIAGNOSIS — M54.42 CHRONIC BILATERAL LOW BACK PAIN WITH LEFT-SIDED SCIATICA: ICD-10-CM

## 2022-09-08 PROCEDURE — 97110 THERAPEUTIC EXERCISES: CPT

## 2022-09-08 PROCEDURE — 97162 PT EVAL MOD COMPLEX 30 MIN: CPT

## 2022-09-08 ASSESSMENT — ENCOUNTER SYMPTOMS
PAIN SCALE AT LOWEST: 3
PAIN SCALE: 3
PAIN SCALE AT HIGHEST: 8

## 2022-09-08 NOTE — OP THERAPY EVALUATION
Outpatient Physical Therapy  INITIAL EVALUATION    [facility]  [address], [address]  [place] [address]  Phone:  [phone]    Date of Evaluation: 2022    Patient: Dayana Lechuga  YOB: 1929  MRN: [id number]     Referring Provider: Justin Haley M.D.  [address]  [place] [address]   Referring Diagnosis Chronic bilateral low back pain with left-sided sciatica [M54.42, G89.29]     Time Calculation  Start time: 1345  Stop time: 1430 Time Calculation (min): 45 minutes         Chief Complaint: No chief complaint on file.    Visit Diagnoses     ICD-10-CM   1. Chronic bilateral low back pain with left-sided sciatica  M54.42    G89.29       Date of onset of impairment: 2022    Subjective:   History of Present Illness:     Mechanism of injury:  Chronic low back pain with left radiculopathy x 4 years .  + numbness and tingling in the right foot. Chronic back pain since 22 years old after falling through a chair.  Severe coccyx/sacral pain and bowel and bladder dysfunction. Has had several rounds of PT.  Currently taking Gabapentin TID.  Left leg is slightly short compared with right. Has had no injections  Activity:  1/4-1/2 milewalk a day  Pain location:  left lumbar down to ankle, occassional right sided pain, right foot pain  Intensity:  constant, worsening, 3/10 Vas worse in morning 8/10 VAS,difficulty standing up straight to walk  Aggs:  walking after being in bed all night  PMH:  cardiac stent no orthopedic surgeries      Sleep disturbance:  Interrupted sleep  Pain:     Current pain rating:  3    At best pain rating:  3    At worst pain ratin    Quality: nerve pain.  Treatments:     Previous treatment:  Physical therapy  Patient Goals:     Patient goals for therapy:  Increased strength and return to sport/leisure activities    Other patient goals:  Sleep through the night withour waking in pain    Past Medical History:  *  Another round of treatment for the warts    * yo umay need another round or two.     *  As long as we are making progress then continue to have the liquid nitrogen.     *  If you are not respondsing, then referral to Dermatology referral for more advanced wart removal techniques      --[facility] - [place] [phone]   [email or web address]    --[facility] - [place] [phone]   [email or web address]    --[facility] - [place] [phone]   [email or web address]       Diagnosis Date    Allergy     Anxiety     ASTHMA     Bronchitis     CAD (coronary artery disease)     JT to RCA; 70% stenosis in LAD    Chills     Constipation     Difficulty breathing     GERD (gastroesophageal reflux disease)     Heart attack ([facility])     Heartburn     Hyperlipidemia     Hypertension     Influenza     Insomnia     Lumbar back pain 9/10/2016    Mild peripheral edema 7/31/2020    Mumps     OSTEOPOROSIS     Palpitations     Peripheral vascular disease, unspecified ([facility]) 9/14/2021    Pneumonia     Post concussion syndrome 9/11/2020    PVD (peripheral vascular disease) ([facility])     70% PAD-followed by Lary AMBROCIO    Sweat, sweating, excessive     Tonsillitis      Past Surgical History:   Procedure Laterality Date    ABDOMINAL HYSTERECTOMY TOTAL      APPENDECTOMY      HERNIA REPAIR      HYSTERECTOMY LAPAROSCOPY      TONSILLECTOMY      ZZZ CARDIAC CATH       Social History     Tobacco Use    Smoking status: Never    Smokeless tobacco: Never   Substance Use Topics    Alcohol use: No     Alcohol/week: 0.0 oz     Family and Occupational History     Socioeconomic History    Marital status:      Spouse name: Not on file    Number of children: Not on file    Years of education: Not on file    Highest education level: Not on file   Occupational History    Not on file       Objective     Palpation     Right   Hypertonic in the piriformis and quadratus lumborum.   Muscle spasm in the lumbar paraspinals and quadratus lumborum.   Tenderness of the lumbar paraspinals and piriformis.     Active Range of Motion     Additional Active Range of Motion Details  FF: finger tips to ankles//no effect  Backward BEND:  mod limitation//no effect  SG R: WNL//no effect  SG L: WNL //no effect  Prone Lying:   EIL:  SEIL  REIL       Strength:      Lower extremities   Normal left lower extremity strength  Normal right lower extremity strength      Therapeutic Exercises (CPT 96078):     1. posture re ed    Time-based  treatments/modalities:    Physical Therapy Timed Treatment Charges  Therapeutic exercise minutes (CPT 82811): 10 minutes      Assessment, Response and Plan:   Impairments: lacks appropriate home exercise program and pain with function    Assessment details:  Patient presents with new episode of chronic lumbar radiculopathy with increased left sided lumbar pain which radiates to ankle.  Patient has had chronic pain since coccyx injury in her 20s. Symptoms are becoming more constant.  However, LE symptoms appeared to decrease in lying and in prone and increase with weightbearing during the evaluation.  Patient demonstrates QL and piriformis hypertonicity left and moderate limitation with lumbar extension.  Patient educated in using a lumbar support with sitting and on proper body mechanics with bed mobility.   Patient will benefit from continued PT to meet goals stated below.   Barriers to therapy:  Comorbidities  Prognosis: good    Goals:   Short Term Goals:   Patient able  to complete ADLS with >25% decreased symptoms  Patient able to walk >1/2 mile with >25% decreased symptoms  Short term goal time span:  2-4 weeks      Long Term Goals:    Patient able to complete ADLS with >50% decreased symptoms  Patient able to walk >1/2 mile with >50% decreased symptoms  Long term goal time span:  6-8 weeks    Plan:   Therapy options:  Physical therapy treatment to continue  Planned therapy interventions:  Neuromuscular Re-education (CPT 73102), Manual Therapy (CPT 97022), E Stim Unattended (CPT 08449), Therapeutic Exercise (CPT 65230) and Mechanical Traction (CPT 18315)  Frequency:  2x week  Discussed with:  Patient  Plan details:  1-2 x week x 8 weeks    Functional Assessment Used        Referring provider co-signature:  I have reviewed this plan of care and my co-signature certifies the need for services.    Certification Period: 09/08/2022 to  11/03/22    Physician Signature: ________________________________ Date:  ______________

## 2022-09-12 ENCOUNTER — PHYSICAL THERAPY (OUTPATIENT)
Dept: PHYSICAL THERAPY | Facility: REHABILITATION | Age: 87
End: 2022-09-12
Attending: NURSE PRACTITIONER
Payer: MEDICARE

## 2022-09-12 DIAGNOSIS — M54.42 CHRONIC BILATERAL LOW BACK PAIN WITH LEFT-SIDED SCIATICA: ICD-10-CM

## 2022-09-12 DIAGNOSIS — G89.29 CHRONIC BILATERAL LOW BACK PAIN WITH LEFT-SIDED SCIATICA: ICD-10-CM

## 2022-09-12 PROCEDURE — 97110 THERAPEUTIC EXERCISES: CPT

## 2022-09-12 PROCEDURE — 97140 MANUAL THERAPY 1/> REGIONS: CPT

## 2022-09-12 NOTE — OP THERAPY DAILY TREATMENT
Outpatient Physical Therapy  DAILY TREATMENT     [facility] [address], [address]  [place] [address]  Phone:  [phone]    Date: 09/12/2022    Patient: Dayana Lechuga  YOB: 1929  MRN: [id number]     Time Calculation    Start time: 1105  Stop time: 1150 Time Calculation (min): 45 minutes         Chief Complaint: No chief complaint on file.    Visit #: 2    SUBJECTIVE:  Constant lef leg pain in the morning , intermittent throughout the day.     OBJECTIVE:  Current objective measures: strong/painful right hip abduction, tenderness on the greater trochanter            Therapeutic Exercises (CPT 09598):     1. posture re ed    2. sit to stand hip hinge, 3 x 10, increased right knee valgus    3. clamshells, 3 x 10    4. bridges, 3 x 10      Therapeutic Exercise Summary: Access Code: [email or web address]  URL: [email or web address]  Date: 09/12/2022  Prepared by: Adrienne Gomez    Exercises  Clamshell - 1 x daily - 7 x weekly - 3 sets - 10 reps  Supine Bridge - 1 x daily - 7 x weekly - 3 sets - 10 reps  Sit to Stand Without Arm Support - 1 x daily - 7 x weekly - 3 sets - 10 reps      Therapeutic Treatments and Modalities:     1. Manual Therapy (CPT 69332), MFR left QL, single leg long axis traction prone4 x 15 sec  Time-based treatments/modalities:    Physical Therapy Timed Treatment Charges  Manual therapy minutes (CPT 81867): 10 minutes  Therapeutic exercise minutes (CPT 07780): 30 minutes    ASSESSMENT:   Response to treatment: hypertonic left QL, right hip pain consistent with glute tendinopathy , patient responded well to there ex and manual work.  Continue to progress lumbar stability and posterior chain strength    PLAN/RECOMMENDATIONS:   Plan for treatment: therapy treatment to continue next visit.  Planned interventions for next visit: continue with current treatment.

## 2022-09-15 ENCOUNTER — APPOINTMENT (OUTPATIENT)
Dept: PHYSICAL THERAPY | Facility: REHABILITATION | Age: 87
End: 2022-09-15
Attending: NURSE PRACTITIONER
Payer: MEDICARE

## 2022-09-21 ENCOUNTER — PHYSICAL THERAPY (OUTPATIENT)
Dept: PHYSICAL THERAPY | Facility: REHABILITATION | Age: 87
End: 2022-09-21
Attending: FAMILY MEDICINE
Payer: MEDICARE

## 2022-09-21 DIAGNOSIS — G89.29 CHRONIC BILATERAL LOW BACK PAIN WITH LEFT-SIDED SCIATICA: ICD-10-CM

## 2022-09-21 DIAGNOSIS — M54.42 CHRONIC BILATERAL LOW BACK PAIN WITH LEFT-SIDED SCIATICA: ICD-10-CM

## 2022-09-21 PROCEDURE — 97110 THERAPEUTIC EXERCISES: CPT

## 2022-09-21 PROCEDURE — 97112 NEUROMUSCULAR REEDUCATION: CPT

## 2022-09-21 NOTE — OP THERAPY DAILY TREATMENT
Outpatient Physical Therapy  DAILY TREATMENT     [facility] [address] [facility], [address]  [place] [address]  Phone:  [phone]    Date: 09/21/2022    Patient: Dayana Lechuga  YOB: 1929  MRN: [id number]     Time Calculation    Start time: 1430  Stop time: 1515 Time Calculation (min): 45 minutes         Chief Complaint: No chief complaint on file.    Visit #: 3    SUBJECTIVE:  No change in intermittent leg pain     OBJECTIVE:  Current objective measures: vision dominant balance especially uneven surfaces          Therapeutic Exercises (CPT 16200):     1. posture re ed    2. sit to stand hip hinge, 3 x 10 with pink resist hip ER, increased right knee valgus    3. clamshells, 3 x 10 with pink resistance    4. bridges, with pink resistance      Therapeutic Exercise Summary: Access Code: [email or web address]  URL: [email or web address]  Date: 09/12/2022  Prepared by: Adrienne Gomez    Exercises  Clamshell - 1 x daily - 7 x weekly - 3 sets - 10 reps  Supine Bridge - 1 x daily - 7 x weekly - 3 sets - 10 reps  Sit to Stand Without Arm Support - 1 x daily - 7 x weekly - 3 sets - 10 reps      Therapeutic Treatments and Modalities:     1. Manual Therapy (CPT 42046), MFR left QL, single leg long axis traction prone4 x 15 sec    2. Neuromuscular Re-education (CPT 42369), dynadisc dl standing with head turns vertical/horizontal, tandem in corner eyes open, feet together corner eyes closed 4 x 15 sec each, foam beam tandem gait @ cg assist with hand held assist  Time-based treatments/modalities:    Physical Therapy Timed Treatment Charges  Neuromusc re-ed, balance, coor, post minutes (CPT 04866): 15 minutes  Therapeutic exercise minutes (CPT 74221): 30 minutes        ASSESSMENT:   Response to treatment: added resistance for hip strength/stability with all HEP exercises.Balance impairment observed on uneven surfaces.  Patient remained asymptomatic throughout session .  Next session  continue to focus on core stability and balance.      PLAN/RECOMMENDATIONS:   Plan for treatment: therapy treatment to continue next visit.  Planned interventions for next visit: continue with current treatment.

## 2022-09-22 ENCOUNTER — APPOINTMENT (OUTPATIENT)
Dept: PHYSICAL THERAPY | Facility: REHABILITATION | Age: 87
End: 2022-09-22
Attending: NURSE PRACTITIONER
Payer: MEDICARE

## 2022-09-27 ENCOUNTER — PHYSICAL THERAPY (OUTPATIENT)
Dept: PHYSICAL THERAPY | Facility: REHABILITATION | Age: 87
End: 2022-09-27
Attending: FAMILY MEDICINE
Payer: MEDICARE

## 2022-09-27 DIAGNOSIS — G89.29 CHRONIC BILATERAL LOW BACK PAIN WITH LEFT-SIDED SCIATICA: ICD-10-CM

## 2022-09-27 DIAGNOSIS — M54.42 CHRONIC BILATERAL LOW BACK PAIN WITH LEFT-SIDED SCIATICA: ICD-10-CM

## 2022-09-27 PROCEDURE — 97012 MECHANICAL TRACTION THERAPY: CPT

## 2022-09-27 PROCEDURE — 97140 MANUAL THERAPY 1/> REGIONS: CPT | Mod: XU

## 2022-09-27 PROCEDURE — 97110 THERAPEUTIC EXERCISES: CPT

## 2022-09-27 NOTE — OP THERAPY DAILY TREATMENT
Outpatient Physical Therapy  DAILY TREATMENT     [facility] [address] [facility], [address]  [place] [address]  Phone:  [phone]    Date: 09/27/2022    Patient: Dayana Lechuga  YOB: 1929  MRN: [id number]     Time Calculation    Start time: 1015  Stop time: 1100 Time Calculation (min): 45 minutes         Chief Complaint:  Visit #: 4    SUBJECTIVE:been sick the past few days, sore day after exercise, feels like exercise is making her symptoms worse      OBJECTIVE:  Current objective measures: hypersensitive and hypertonic left QL and lumbar spine with PA mobility testing/GR2-3 mobilization          Therapeutic Exercises (CPT 62074):     1. posture re ed    2. sit to stand hip hinge, 3 x 10 with pink resist hip ER, increased right knee valgus    3. clamshells, 3 x 10 with pink resistance 5 x 20 sec hold    4. bridges, with pink resistance x 10, unable to tolerate today      Therapeutic Exercise Summary: Access Code: [email or web address]  URL: [email or web address]  Date: 09/12/2022  Prepared by: Adrienne Gomez    Exercises  Clamshell - 1 x daily - 7 x weekly - 3 sets - 10 reps  Supine Bridge - 1 x daily - 7 x weekly - 3 sets - 10 reps  Sit to Stand Without Arm Support - 1 x daily - 7 x weekly - 3 sets - 10 reps      Therapeutic Treatments and Modalities:     1. Manual Therapy (CPT 10031), MFR left QL, single leg long axis traction prone4 x 15 sec    2. Mechanical Traction (CPT 07393), 55/40lbs 60 sec on/20 sec off with lumbar /thoracic MHP  Time-based treatments/modalities:    Physical Therapy Timed Treatment Charges  Manual therapy minutes (CPT 14992): 10 minutes  Therapeutic exercise minutes (CPT 45231): 22 minutes    ASSESSMENT:   Response to treatment: patient unable to tolerate there ex today with increased left leg symptoms and bilateral dorsal foot pain post treatment.  No significant change post traction left leg pain.      PLAN/RECOMMENDATIONS:   Plan for  treatment: therapy treatment to continue next visit.  Planned interventions for next visit: continue with current treatment and physical medicine procedure (CPT 14147).

## 2022-09-29 ENCOUNTER — APPOINTMENT (OUTPATIENT)
Dept: PHYSICAL THERAPY | Facility: REHABILITATION | Age: 87
End: 2022-09-29
Attending: NURSE PRACTITIONER
Payer: MEDICARE

## 2022-10-04 ENCOUNTER — PHYSICAL THERAPY (OUTPATIENT)
Dept: PHYSICAL THERAPY | Facility: REHABILITATION | Age: 87
End: 2022-10-04
Attending: FAMILY MEDICINE
Payer: MEDICARE

## 2022-10-04 DIAGNOSIS — M54.42 CHRONIC BILATERAL LOW BACK PAIN WITH LEFT-SIDED SCIATICA: ICD-10-CM

## 2022-10-04 DIAGNOSIS — G89.29 CHRONIC BILATERAL LOW BACK PAIN WITH LEFT-SIDED SCIATICA: ICD-10-CM

## 2022-10-04 PROCEDURE — 97110 THERAPEUTIC EXERCISES: CPT

## 2022-10-04 PROCEDURE — 97140 MANUAL THERAPY 1/> REGIONS: CPT

## 2022-10-04 NOTE — OP THERAPY DAILY TREATMENT
Outpatient Physical Therapy  DAILY TREATMENT     [facility] [address], [address]  [place] [address]  Phone:  [phone]    Date: 10/04/2022    Patient: Dayana Lechuga  YOB: 1929  MRN: [id number]     Time Calculation    Start time: 1015  Stop time: 1100 Time Calculation (min): 45 minutes         Chief Complaint: back and leg problem  Visit #: 5    SUBJECTIVE:  Hard to tell if PT is helping but no current pain left leg or back, not taking muscle relaxant, difficulty getting up first thing in the morning secondary to pain lumbar spine    OBJECTIVE:  Current objective measures: hypertonic lumbar/thoracic paraspinals  and left QL,           Therapeutic Exercises (CPT 74160):     1. posture re ed    2. sit to stand hip hinge, 3 x 10 with pink resist hip ER, increased right knee valgus    3. clamshells, 3 x 10 with pink resistance 5 x 20 sec hold, NT today    4. bridges, with pink resistance x 10, unable to tolerate today    5. ball bridge, x10    6. wall squat, nt today    7. dead bug, 2 min      Therapeutic Exercise Summary: Access Code: [email or web address]  URL: [email or web address]  Date: 10/04/2022  Prepared by: Adrienne Gomez    Exercises  Forward T with Counter Support - 1 x daily - 7 x weekly - 3 sets - 10 reps  Supine Bridge - 1 x daily - 7 x weekly - 3 sets - 10 reps      Access Code: [email or web address]  URL: [email or web address]  Date: 09/12/2022  Prepared by: Adrienne Gomez    Exercises  Clamshell - 1 x daily - 7 x weekly - 3 sets - 10 reps  Supine Bridge - 1 x daily - 7 x weekly - 3 sets - 10 reps  Sit to Stand Without Arm Support - 1 x daily - 7 x weekly - 3 sets - 10 reps      Therapeutic Treatments and Modalities:     1. Manual Therapy (CPT 96470), MFR left QL, single leg long axis traction prone4 x 15 sec, extension mobilization  Time-based treatments/modalities:    Physical Therapy Timed Treatment Charges  Manual therapy minutes (CPT 26252): 10  minutes  Therapeutic exercise minutes (CPT 43470): 30 minutes  ASSESSMENT:   Response to treatment: improved soft tissuemobility post manual and there ex left lumbar paraspinals and QL.  Added lumbar rotation, ball bridge and dead bug stabilization to home program.  Patient responded positively to new exercises and was asymptomatic post treatment    PLAN/RECOMMENDATIONS:   Plan for treatment: therapy treatment to continue next visit.  Planned interventions for next visit: continue with current treatment.

## 2022-10-13 ENCOUNTER — APPOINTMENT (OUTPATIENT)
Dept: PHYSICAL THERAPY | Facility: REHABILITATION | Age: 87
End: 2022-10-13
Attending: NURSE PRACTITIONER
Payer: MEDICARE

## 2022-10-13 NOTE — OP THERAPY DAILY TREATMENT
"  Outpatient Physical Therapy  DAILY TREATMENT     [facility] Out[facility] Therapy [address], [address]  [place] [address]  Phone:  [phone]    Date: 10/13/2022    Patient: Dayana Lechuga  YOB: 1929  MRN: [id number]     Time Calculation                   Chief Complaint: No chief complaint on file.    Visit #: 6    SUBJECTIVE:  ***    OBJECTIVE:  Current objective measures: ***          Therapeutic Exercises (CPT 08670):     1. posture re ed    2. sit to stand hip hinge, 3 x 10 with pink resist hip ER, increased right knee valgus    3. clamshells, 3 x 10 with pink resistance 5 x 20 sec hold, NT today    4. bridges, with pink resistance x 10, unable to tolerate today    5. ball bridge, x10    6. wall squat, nt today    7. dead bug, 2 min      Therapeutic Exercise Summary: Access Code: [email or web address]  URL: [email or web address]  Date: 10/04/2022  Prepared by: Adrienne Gomez    Exercises  Forward T with Counter Support - 1 x daily - 7 x weekly - 3 sets - 10 reps  Supine Bridge - 1 x daily - 7 x weekly - 3 sets - 10 reps      Access Code: [email or web address]  URL: [email or web address]  Date: 09/12/2022  Prepared by: Adrienne Gomez    Exercises  Clamshell - 1 x daily - 7 x weekly - 3 sets - 10 reps  Supine Bridge - 1 x daily - 7 x weekly - 3 sets - 10 reps  Sit to Stand Without Arm Support - 1 x daily - 7 x weekly - 3 sets - 10 reps      Therapeutic Treatments and Modalities:     1. Manual Therapy (CPT 81090), MFR left QL, single leg long axis traction prone4 x 15 sec, extension mobilization  Time-based treatments/modalities:           Pain rating (1-10) before treatment:  {PAIN NUMBERS_1-10:82055}  Pain rating (1-10) after treatment:  {PAIN NUMBERS_1-10:36713}    ASSESSMENT:   Response to treatment: ***    PLAN/RECOMMENDATIONS:   Plan for treatment: {AMB OP THERAPY - THERAPY PLAN:[id number]::\"therapy treatment to continue next visit\"}.  Planned interventions for next visit: {PT " "PLANNED THERAPY INTERVENTIONS:[id number]::\"continue with current treatment\"}.         "

## 2022-10-14 ENCOUNTER — OFFICE VISIT (OUTPATIENT)
Dept: BEHAVIORAL HEALTH | Facility: CLINIC | Age: 87
End: 2022-10-14
Payer: MEDICARE

## 2022-10-14 DIAGNOSIS — F51.01 PRIMARY INSOMNIA: ICD-10-CM

## 2022-10-14 PROCEDURE — 99214 OFFICE O/P EST MOD 30 MIN: CPT | Performed by: PSYCHIATRY & NEUROLOGY

## 2022-10-14 RX ORDER — LORAZEPAM 0.5 MG/1
0.5 TABLET ORAL DAILY
Qty: 30 TABLET | Refills: 2 | Status: SHIPPED | OUTPATIENT
Start: 2022-10-14 | End: 2023-01-12

## 2022-10-14 NOTE — PROGRESS NOTES
PSYCHIATRY FOLLOW-UP NOTE      Name: Dayana Lechuga  MRN: [id number]  : 1929  Age: 89 y.o.  Date of assessment: 10/14/2022  PCP: Justin Haley M.D.  Persons in attendance: Patient      REASON FOR VISIT/CHIEF COMPLAINT (as stated by Patient):  Dayana Lechuga is a 89 y.o., White female, attending follow-up appointment for longterm use of benzodiazepine management.       SUBJECTIVE/HPI  Dayana Lechuga is a 89 y.o. old female with insomnia and benzodiazepine use who comes in today for follow up. Patient was last seen on 22, at which time the plan was to: continue lorazepam 0.5mg at bedtime and melatonin 10mg po at bedtime.      Pt is mentally doing very well. She denies all anxiety, denies low mood. No issues with sleep. Patient has not had any falls, or near falls. She feels steady on her feet. She has some pain in both legs when she wakes up in the morning, that resolves as she takes some of her pain medication and after she's moved around a little. She has started physical therapy to help with pain. She denies issues with memory or recall. Patient is quite engaging throughout the interview discussing new facts about her favorite football teams.       CURRENT MEDICATIONS:  Current Outpatient Medications   Medication Sig Dispense Refill    gabapentin (NEURONTIN) 100 MG Cap Take 3 Capsules by mouth 3 times a day for 90 days. 810 Capsule 1    LORazepam (ATIVAN) 0.5 MG Tab Take 1 Tablet by mouth every day for 90 days. 30 Tablet 2    carvedilol (COREG) 6.25 MG Tab Take 1 Tablet by mouth 2 times a day with meals. 200 Tablet 0    albuterol 108 (90 Base) MCG/ACT Aero Soln inhalation aerosol INHALE 2 PUFFS BY MOUTH EVERY 6 HOURS AS NEEDED FOR SHORTNESS OF BREATH 25.5 g 0    amLODIPine (NORVASC) 5 MG Tab TAKE ONE TABLET BY MOUTH EVERY  Tablet 1    gabapentin (NEURONTIN) 100 MG Cap Take 2 Capsules by mouth 3 times a day for 90 days. TAKE 2 CAPSULES BY MOUTH THREE TIMES  DAILY 540 Capsule 3    ciprofloxacin (CIPRO) 250 MG Tab Take 1 Tablet by mouth 2 times a day. 10 Tablet 0    azelastine (ASTELIN) 137 MCG/SPRAY nasal spray azelastine 137 mcg (0.1 %) nasal spray aerosol   SPRAY ONCE IN EACH NOSTRIL TWICE DAILY      calcium carbonate (OS-MAGDA) 1250 (500 Ca) MG chewable tablet Antacid (calcium carbonate) 200 mg calcium (500 mg) chewable tablet   CHEW AND SWALLOW 1 TABLET BY MOUTH TWICE DAILY      cephALEXin (KEFLEX) 500 MG Cap cephalexin 500 mg capsule   500 mg PO administered on scene. Time administered: 1536      phenazopyridine (PYRIDIUM) 100 MG Tab Pyridium 100 mg tablet   Take 1 tablet twice a day by oral route as needed for 2 days.      isosorbide mononitrate SR (IMDUR) 30 MG TABLET SR 24 HR TAKE ONE TABLET BY MOUTH IN THE EVENING 100 Tablet 0    lisinopril (PRINIVIL) 20 MG Tab TAKE ONE TABLET BY MOUTH EVERY MORNING WITH BREAKFAST 100 Tablet 1    atorvastatin (LIPITOR) 80 MG tablet TAKE ONE TABLET BY MOUTH EVERY EVENING 100 Tablet 1    montelukast (SINGULAIR) 10 MG Tab TAKE ONE TABLET BY MOUTH IN THE EVENING 90 Tablet 3    fluticasone (FLONASE) 50 MCG/ACT nasal spray USE 1 SPRAY IN EACH NOSTRIL ONCE DAILY 16 g 3    loratadine (CLARITIN) 10 MG Tab Take 10 mg by mouth every day.      calcium carbonate (TUMS) 500 MG Chew Tab Chew 1 Tablet 2 times a day. 60 Tablet 0    sodium chloride (OCEAN) 0.65 % Solution Administer 1 Spray into affected nostril(S) 2 times a day.      Acetaminophen 500 MG Cap Take 1 Capsule by mouth 2 times a day. Morning & Afternoon      Melatonin 10 MG Tab Take 20 mg by mouth at bedtime.      PREPARATION H 0.25-3-12-18 % Cream Insert 1 Application into the rectum as needed (pain).      Multiple Vitamins-Minerals (CENTRUM SILVER PO) Take 1 Tab by mouth every morning.      Biotin w/ Vitamins C & E 1250-7.5-7.5 MCG-MG-UNT Chew Tab Chew 1 Tab every morning.      guaifenesin LA (MUCINEX) 600 MG TABLET SR 12 HR Take 600 mg by mouth every morning.      Ascorbic Acid  (VITAMIN C) 1000 MG Tab Take 1,000 mg by mouth every morning.      VITAMIN E PO Take 1 Cap by mouth every morning.      aspirin (ASA) 81 MG Chew Tab chewable tablet Chew 81 mg every morning with breakfast. 100 Tab 11    Cholecalciferol (VITAMIN D) 50 MCG (2000 UT) Cap Take 2,000 Units by mouth every morning.      omeprazole (PRILOSEC) 20 MG delayed-release capsule Take 20 mg by mouth every morning with breakfast.       No current facility-administered medications for this visit.       MEDICAL HISTORY  Past Medical History:   Diagnosis Date    Allergy     Anxiety     ASTHMA     Bronchitis     CAD (coronary artery disease)     JT to RCA; 70% stenosis in LAD    Chills     Constipation     Difficulty breathing     GERD (gastroesophageal reflux disease)     Heart attack ([facility])     Heartburn     Hyperlipidemia     Hypertension     Influenza     Insomnia     Lumbar back pain 9/10/2016    Mild peripheral edema 7/31/2020    Mumps     OSTEOPOROSIS     Palpitations     Peripheral vascular disease, unspecified ([facility]) 9/14/2021    Pneumonia     Post concussion syndrome 9/11/2020    PVD (peripheral vascular disease) ([facility])     70% PAD-followed by L[facility]BROCIO    Sweat, sweating, excessive     Tonsillitis      Past Surgical History:   Procedure Laterality Date    ABDOMINAL HYSTERECTOMY TOTAL      APPENDECTOMY      HERNIA REPAIR      HYSTERECTOMY LAPAROSCOPY      TONSILLECTOMY      Z CARDIAC CATH         PAST PSYCHIATRIC HISTORY  Prior psychiatric hospitalization: denies  Prior Self harm/suicide attempt: denies  Prior Diagnosis: denies     PAST PSYCHIATRIC MEDICATIONS  Lorazepam, denies other medication trials      FAMILY HISTORY  Psychiatric diagnosis:  denies     SUBSTANCE USE HISTORY:  ALCOHOL: denies use, states she may have a half glass of wine every few months  TOBACCO: denies  CANNABIS: denies  OPIOIDS: denies  PRESCRIPTION MEDICATIONS: denies  OTHERS: denies  History of inpatient/outpatient rehab treatment: N/A     SOCIAL  HISTORY  Lived in [place] until 2004 at which time she moved to [place]. Has lived in [place] for 18 years.  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      REVIEW OF SYSTEMS:        Constitutional negative   Eyes negative   Ears/Nose/Mouth/Throat negative   Cardiovascular negative   Respiratory negative   Gastrointestinal negative   Genitourinary negative   Muscular negative   Integumentary negative   Neurological negative   Endocrine negative   Hematologic/Lymphatic negative     PHYSICAL EXAMINATION:  Vital signs: LMP  (LMP Unknown)   Musculoskeletal: Normal gait.   Abnormal movements: no      MENTAL STATUS EXAMINATION      General:   - Grooming and hygiene: Good, Casual, and Neat,   - Apparent distress: no,   - Behavior: Calm  - Eye Contact:  Good,   - no psychomotor agitation or retardation    - Participation: Active verbal participation, Attentive, and Engaged  Orientation: Alert and Fully Oriented to person, place and time  Mood: Euthymic  Affect: Flexible and Full range,  Thought Process: Logical and Goal-directed  Thought Content: Denies suicidal or homicidal ideations, intent or plan Within normal limits  Perception: Denies auditory or visual hallucinations. No delusions noted Within normal limits  Attention span and concentration: Intact   Speech:Rate within normal limits and Volume within normal limits  Language: Appropriate   Insight: Good  Judgment: Good  Recent and remote memory: No gross evidence of memory deficits        DEPRESSION SCREENING:  Depression Screen (PHQ-2/PHQ-9)  3/15/2022 5/11/2022 6/21/2022   PHQ-2 Total Score - - -   PHQ-2 Total Score - - -   PHQ-2 Total Score 0 0 0   PHQ-9 Total Score - - -       Interpretation of PHQ-9 Total Score   Score Severity   1-4 No Depression   5-9 Mild Depression   10-14 Moderate Depression   15-19 Moderately Severe Depression   20-27 Severe Depression    CURRENT RISK:       Suicidal: Low       Homicidal: Low       Self-Harm: Low       Relapse: Not applicable       Crisis Safety Plan Reviewed Not Indicated       If evidence of imminent risk is present, intervention/plan:      MEDICAL RECORDS/LABS/DIAGNOSTIC TESTS REVIEWED:  No new lab since last visit     [place] [facility] records -   Reviewed       ASSESSMENT:  Pt is a 89 year old pleasant female who is following up for chronic benzodiazepine use and insomnia. Patient utilizes medication appropriately, no concerning fill history. No concerns for falls at this time. Mentally she is quite sharp and memory appears robust. As patient has likely gain physiologic dependence on medication at this time but is displaying no adverse consequences of its routine use will continue. Patient verbalized understanding the risks of continued benzodiazepine use.    DDX:  Primary Insomnia                2. Long Term Use of Benzodiazepine, dependence    PLAN:  (1) Continue Lorazepam 0.5mg PO at bedtime and Melatonin 10mg PO at bedtime    Medication options, alternatives (including no medications) and medication risks/benefits/side effects were discussed in detail.  The patient was advised to call, message provider on [facility], or come in to the clinic if symptoms worsen or if any future questions/issues regarding their medications arise; the patient verbalized understanding and agreement.  The patient was educated to call 911, call the suicide hotline, or go to local ER if having thoughts of suicide or homicide; verbalized understanding.      Return to clinic in 3 months or sooner if symptoms worsen.  Next Appointment:  instruction provided on how to make the next appointment.     The proposed treatment plan was discussed with the patient who was provided the opportunity to ask questions and make suggestions regarding alternative treatment. Patient verbalized understanding and expressed agreement with the plan.       Denys Andrade D.O.  10/14/22    This note was created using voice recognition software (Dragon). The accuracy of the dictation is limited by the abilities of the software. I have reviewed the note prior to signing, however some errors in grammar and context are still possible. If you have any questions related to this note please do not hesitate to contact our office.

## 2022-10-18 ENCOUNTER — APPOINTMENT (OUTPATIENT)
Dept: PHYSICAL THERAPY | Facility: REHABILITATION | Age: 87
End: 2022-10-18
Attending: NURSE PRACTITIONER
Payer: MEDICARE

## 2022-10-20 ENCOUNTER — OFFICE VISIT (OUTPATIENT)
Dept: DERMATOLOGY | Facility: IMAGING CENTER | Age: 87
End: 2022-10-20
Payer: MEDICARE

## 2022-10-20 DIAGNOSIS — L57.0 ACTINIC KERATOSIS: ICD-10-CM

## 2022-10-20 DIAGNOSIS — D49.2 NEOPLASM OF SKIN: ICD-10-CM

## 2022-10-20 DIAGNOSIS — Z12.83 SKIN CANCER SCREENING: ICD-10-CM

## 2022-10-20 DIAGNOSIS — L30.9 DERMATITIS: ICD-10-CM

## 2022-10-20 DIAGNOSIS — L81.4 LENTIGO: ICD-10-CM

## 2022-10-20 DIAGNOSIS — L82.1 SEBORRHEIC KERATOSIS: ICD-10-CM

## 2022-10-20 DIAGNOSIS — D22.9 NEVUS: ICD-10-CM

## 2022-10-20 DIAGNOSIS — L90.8 SKIN AGING: ICD-10-CM

## 2022-10-20 PROCEDURE — 11104 PUNCH BX SKIN SINGLE LESION: CPT | Performed by: DERMATOLOGY

## 2022-10-20 PROCEDURE — 11103 TANGNTL BX SKIN EA SEP/ADDL: CPT | Performed by: DERMATOLOGY

## 2022-10-20 PROCEDURE — 99203 OFFICE O/P NEW LOW 30 MIN: CPT | Mod: 25 | Performed by: DERMATOLOGY

## 2022-10-20 PROCEDURE — 17000 DESTRUCT PREMALG LESION: CPT | Mod: 59 | Performed by: DERMATOLOGY

## 2022-10-20 NOTE — PROGRESS NOTES
CC: lesions     Subjective: new patient here for lesions on legs and back.     Reports sites on legs - redness noted.  Denies itching. May be aggravated by stockings/compression socks.  Uses moisturizer and neosporin to treat    Many brown spots on skin. Some on back had been asymptomatic.  One worsening this summer, now with growth at site.     History of skin cancer: no   History of precancers/actinic keratoses: face  History of biopsies:No  History of blistering/severe sunburns:Yes, Details: in 20's  Family history of skin cancer:Yes, Details: Son, unknown type on leg.  Family history of atypical moles:No    ROS: no fevers/chills. No itch.  No cough  Relevant PMH:mult med comorbidities  Social:NS    PE: Gen:WDWN female in NAD. Skin: Face/eyes/lips/neck/chest/back/arms/legs/hands/feet/buttocks - examined. Scalp exam declined.  Genitals exam declined  -thin HK papule on left facial cheek  -cutaneous horn from pink papule on lower left back, approx 6mm  -blue-gray macule on right lower leg - 2mm  -few scattered pink patches on legs, approx stocking elastic height  -scattered hyperpigmented macules/papules, appearing benign on torso and extremities; many waxy papules, plaques on back      A/P: Neoplasm NOS: back. Cutaneous horn  -consent for bx, including R/B/A. Cleaned with EtOH, anesthesia with lidocaine 1% + epinephrine, shave bx, AlCl3 for hemostasis  -vaseline/bandage and wound care reviewed    Neoplasm NOS: right lower leg. Blue nevus vs other  -consent for bx, including R/B/A. Cleaned with EtOH, anesthesia with lidocaine 1% + epinephrine, 3mm punch bx, 4-0 Prolene to close  -vaseline/bandage and wound care reviewed  -s/r in 1-2 weeks; home kit supplied if neighbor can help with stitch removal    dermatitis lower legs: consider irritant.  -moisturizer use encouraged; discouraged use of neosporin  -HCT 2.5% cream BID PRN    AKs: face  -counseled on diagnosis and treatment, including risks/benefits/alternatives  of cyrotherapy  -LN2 25 sec X 2 cycles X 1lesions  -f/u if persists at 1 month    [place]: benign appearing:  -Reviewed skin cancer detection/prevention  -RTC PRN growth/changes/concerning features    Lentigos/SKs: benign  -reassurance  -reviewed skin cancer detection/prevention    F/u HARISH/PRN    I have reviewed medications relevant to my specialty.

## 2022-10-25 ENCOUNTER — APPOINTMENT (OUTPATIENT)
Dept: PHYSICAL THERAPY | Facility: REHABILITATION | Age: 87
End: 2022-10-25
Attending: NURSE PRACTITIONER
Payer: MEDICARE

## 2022-10-26 ENCOUNTER — OFFICE VISIT (OUTPATIENT)
Dept: CARDIOLOGY | Facility: MEDICAL CENTER | Age: 87
End: 2022-10-26
Payer: MEDICARE

## 2022-10-26 VITALS
HEART RATE: 69 BPM | DIASTOLIC BLOOD PRESSURE: 64 MMHG | WEIGHT: 120 LBS | SYSTOLIC BLOOD PRESSURE: 110 MMHG | OXYGEN SATURATION: 94 % | RESPIRATION RATE: 16 BRPM | BODY MASS INDEX: 22.66 KG/M2 | HEIGHT: 61 IN

## 2022-10-26 DIAGNOSIS — J90 PLEURAL EFFUSION: ICD-10-CM

## 2022-10-26 DIAGNOSIS — Z98.41 S/P BILATERAL CATARACT EXTRACTION: ICD-10-CM

## 2022-10-26 DIAGNOSIS — F07.81 POST CONCUSSION SYNDROME: ICD-10-CM

## 2022-10-26 DIAGNOSIS — Z91.89 OTHER SPECIFIED PERSONAL RISK FACTORS, NOT ELSEWHERE CLASSIFIED: ICD-10-CM

## 2022-10-26 DIAGNOSIS — Z98.42 S/P BILATERAL CATARACT EXTRACTION: ICD-10-CM

## 2022-10-26 DIAGNOSIS — I25.119 CORONARY ARTERY DISEASE INVOLVING NATIVE CORONARY ARTERY OF NATIVE HEART WITH ANGINA PECTORIS (HCC): ICD-10-CM

## 2022-10-26 DIAGNOSIS — E78.5 DYSLIPIDEMIA: Chronic | ICD-10-CM

## 2022-10-26 DIAGNOSIS — R94.31 ABNORMAL ELECTROCARDIOGRAM (ECG) (EKG): ICD-10-CM

## 2022-10-26 DIAGNOSIS — I73.9 PVD (PERIPHERAL VASCULAR DISEASE) (HCC): ICD-10-CM

## 2022-10-26 DIAGNOSIS — I70.201 POPLITEAL ARTERY STENOSIS, RIGHT (HCC): ICD-10-CM

## 2022-10-26 DIAGNOSIS — I10 ESSENTIAL HYPERTENSION: ICD-10-CM

## 2022-10-26 DIAGNOSIS — I73.9 CLAUDICATION OF RIGHT LOWER EXTREMITY (HCC): ICD-10-CM

## 2022-10-26 DIAGNOSIS — I73.9 PERIPHERAL VASCULAR DISEASE, UNSPECIFIED (HCC): ICD-10-CM

## 2022-10-26 DIAGNOSIS — G62.89 OTHER POLYNEUROPATHY: ICD-10-CM

## 2022-10-26 PROCEDURE — 99214 OFFICE O/P EST MOD 30 MIN: CPT | Performed by: INTERNAL MEDICINE

## 2022-10-26 RX ORDER — ATORVASTATIN CALCIUM 80 MG/1
80 TABLET, FILM COATED ORAL EVERY EVENING
Qty: 100 TABLET | Refills: 3 | Status: SHIPPED | OUTPATIENT
Start: 2022-10-26 | End: 2023-11-03 | Stop reason: SDUPTHER

## 2022-10-26 RX ORDER — AMLODIPINE BESYLATE 5 MG/1
5 TABLET ORAL
Qty: 100 TABLET | Refills: 3 | Status: ON HOLD
Start: 2022-10-26 | End: 2023-04-30

## 2022-10-26 RX ORDER — ISOSORBIDE MONONITRATE 30 MG/1
30 TABLET, EXTENDED RELEASE ORAL EVERY EVENING
Qty: 100 TABLET | Refills: 3 | Status: SHIPPED
Start: 2022-10-26 | End: 2023-04-26

## 2022-10-26 RX ORDER — CARVEDILOL 6.25 MG/1
6.25 TABLET ORAL 2 TIMES DAILY WITH MEALS
Qty: 200 TABLET | Refills: 3 | Status: SHIPPED | OUTPATIENT
Start: 2022-10-26 | End: 2022-11-10

## 2022-10-26 RX ORDER — LISINOPRIL 20 MG/1
20 TABLET ORAL DAILY
Qty: 100 TABLET | Refills: 3 | Status: ON HOLD | OUTPATIENT
Start: 2022-10-26 | End: 2023-04-30 | Stop reason: SDUPTHER

## 2022-10-26 ASSESSMENT — ENCOUNTER SYMPTOMS
GASTROINTESTINAL NEGATIVE: 1
EYES NEGATIVE: 1
CLAUDICATION: 0
ORTHOPNEA: 0
RESPIRATORY NEGATIVE: 1
PND: 0
NEUROLOGICAL NEGATIVE: 1
SPUTUM PRODUCTION: 0
DIZZINESS: 0
HEMOPTYSIS: 0
STRIDOR: 0
LOSS OF CONSCIOUSNESS: 0
PALPITATIONS: 0
CONSTITUTIONAL NEGATIVE: 1
SORE THROAT: 0
SHORTNESS OF BREATH: 0
BACK PAIN: 0
COUGH: 0
CHILLS: 0
WEAKNESS: 0
FEVER: 0
BRUISES/BLEEDS EASILY: 0
WHEEZING: 0

## 2022-10-26 ASSESSMENT — FIBROSIS 4 INDEX: FIB4 SCORE: 2.12

## 2022-10-26 NOTE — PROGRESS NOTES
Chief Complaint   Patient presents with    Bradycardia    Coronary Artery Disease     F/v dx: Coronary artery disease involving native coronary artery of native heart with angina pectoris (HCC)    Hypertension       Subjective:   Dayana Lechuga is a 89 y.o. female who presents today as a follow-up for her CAD peripheral vascular disease hypertension hyperlipidemia and chest pain.      Since she was last seen she started developing exertional chest pain when she walks her dog.  Feels a Pressure in her chest which wraps around her back and is relieved by rest.  Is been happening more frequently.  Otherwise her blood pressures been controlled and she has been doing well.      Past Medical History:   Diagnosis Date    Allergy     Anxiety     ASTHMA     Bronchitis     CAD (coronary artery disease)     JT to RCA; 70% stenosis in LAD    Chills     Constipation     Difficulty breathing     GERD (gastroesophageal reflux disease)     Heart attack ([facility])     Heartburn     Hyperlipidemia     Hypertension     Influenza     Insomnia     Lumbar back pain 9/10/2016    Mild peripheral edema 7/31/2020    Mumps     OSTEOPOROSIS     Palpitations     Peripheral vascular disease, unspecified ([facility]) 9/14/2021    Pneumonia     Post concussion syndrome 9/11/2020    PVD (peripheral vascular disease) ([facility])     70% PAD-followed by Lary AMBROCIO    Sweat, sweating, excessive     Tonsillitis      Past Surgical History:   Procedure Laterality Date    ABDOMINAL HYSTERECTOMY TOTAL      APPENDECTOMY      HERNIA REPAIR      HYSTERECTOMY LAPAROSCOPY      TONSILLECTOMY      ZZZ CARDIAC CATH       Family History   Problem Relation Age of Onset    Heart Attack Father     Cancer Brother     Cancer Brother     Heart Disease Neg Hx     Heart Failure Neg Hx     Hyperlipidemia Neg Hx      Social History     Socioeconomic History    Marital status:      Spouse name: Not on file    Number of children: Not on file    Years of education: Not on file     Highest education level: Not on file   Occupational History    Not on file   Tobacco Use    Smoking status: Never    Smokeless tobacco: Never   Vaping Use    Vaping Use: Never used   Substance and Sexual Activity    Alcohol use: No     Alcohol/week: 0.0 oz    Drug use: No    Sexual activity: Not Currently   Other Topics Concern     Service No    Blood Transfusions Yes    Caffeine Concern No    Occupational Exposure No    Hobby Hazards No    Sleep Concern Yes    Stress Concern Yes    Weight Concern No    Special Diet No    Back Care No    Exercise No    Bike Helmet No    Seat Belt Yes    Self-Exams No   Social History Narrative    Not on file     Social Determinants of Health     Financial Resource Strain: Not on file   Food Insecurity: Not on file   Transportation Needs: Not on file   Physical Activity: Not on file   Stress: Not on file   Social Connections: Not on file   Intimate Partner Violence: Not on file   Housing Stability: Not on file     Allergies   Allergen Reactions    Bactrim [Sulfamethoxazole W-Trimethoprim] Hives    Pcn [Penicillins] Hives     About 70 years    Codeine Unspecified     Unknown reaction      Sulfamethoxazole-Trimethoprim Rash     Outpatient Encounter Medications as of 10/26/2022   Medication Sig Dispense Refill    carvedilol (COREG) 6.25 MG Tab Take 1 Tablet by mouth 2 times a day with meals. 200 Tablet 3    atorvastatin (LIPITOR) 80 MG tablet Take 1 Tablet by mouth every evening. 100 Tablet 3    amLODIPine (NORVASC) 5 MG Tab Take 1 Tablet by mouth every day. 100 Tablet 3    isosorbide mononitrate SR (IMDUR) 30 MG TABLET SR 24 HR Take 1 Tablet by mouth every evening. 100 Tablet 3    lisinopril (PRINIVIL) 20 MG Tab Take 1 Tablet by mouth every day. 100 Tablet 3    gabapentin (NEURONTIN) 100 MG Cap Take 3 Capsules by mouth 3 times a day for 90 days. TAKE 2 CAPSULES BY MOUTH THREE TIMES DAILY 810 Capsule 3    hydrocortisone 2.5 % Cream topical cream Apply 1 Application topically 2  times a day. Use on legs, if red, raised, itchy or with rash.  Stop if worsens.  Stop when rash / itching clears. 30 g 0    LORazepam (ATIVAN) 0.5 MG Tab Take 1 Tablet by mouth every day for 90 days. 30 Tablet 2    gabapentin (NEURONTIN) 100 MG Cap Take 3 Capsules by mouth 3 times a day for 90 days. 810 Capsule 1    albuterol 108 (90 Base) MCG/ACT Aero Soln inhalation aerosol INHALE 2 PUFFS BY MOUTH EVERY 6 HOURS AS NEEDED FOR SHORTNESS OF BREATH 25.5 g 0    gabapentin (NEURONTIN) 100 MG Cap Take 2 Capsules by mouth 3 times a day for 90 days. TAKE 2 CAPSULES BY MOUTH THREE TIMES DAILY 540 Capsule 3    ciprofloxacin (CIPRO) 250 MG Tab Take 1 Tablet by mouth 2 times a day. 10 Tablet 0    azelastine (ASTELIN) 137 MCG/SPRAY nasal spray azelastine 137 mcg (0.1 %) nasal spray aerosol   SPRAY ONCE IN EACH NOSTRIL TWICE DAILY      calcium carbonate (OS-MAGDA) 1250 (500 Ca) MG chewable tablet Antacid (calcium carbonate) 200 mg calcium (500 mg) chewable tablet   CHEW AND SWALLOW 1 TABLET BY MOUTH TWICE DAILY      cephALEXin (KEFLEX) 500 MG Cap cephalexin 500 mg capsule   500 mg PO administered on scene. Time administered: 1536      phenazopyridine (PYRIDIUM) 100 MG Tab Pyridium 100 mg tablet   Take 1 tablet twice a day by oral route as needed for 2 days.      montelukast (SINGULAIR) 10 MG Tab TAKE ONE TABLET BY MOUTH IN THE EVENING 90 Tablet 3    fluticasone (FLONASE) 50 MCG/ACT nasal spray USE 1 SPRAY IN EACH NOSTRIL ONCE DAILY 16 g 3    loratadine (CLARITIN) 10 MG Tab Take 10 mg by mouth every day.      calcium carbonate (TUMS) 500 MG Chew Tab Chew 1 Tablet 2 times a day. 60 Tablet 0    sodium chloride (OCEAN) 0.65 % Solution Administer 1 Spray into affected nostril(S) 2 times a day.      Acetaminophen 500 MG Cap Take 1 Capsule by mouth 2 times a day. Morning & Afternoon      Melatonin 10 MG Tab Take 20 mg by mouth at bedtime.      PREPARATION H 0.25-3-12-18 % Cream Insert 1 Application into the rectum as needed (pain).       Multiple Vitamins-Minerals (CENTRUM SILVER PO) Take 1 Tab by mouth every morning.      Biotin w/ Vitamins C & E 1250-7.5-7.5 MCG-MG-UNT Chew Tab Chew 1 Tab every morning.      guaifenesin LA (MUCINEX) 600 MG TABLET SR 12 HR Take 600 mg by mouth every morning.      Ascorbic Acid (VITAMIN C) 1000 MG Tab Take 1,000 mg by mouth every morning.      VITAMIN E PO Take 1 Cap by mouth every morning.      aspirin (ASA) 81 MG Chew Tab chewable tablet Chew 81 mg every morning with breakfast. 100 Tab 11    Cholecalciferol (VITAMIN D) 50 MCG (2000 UT) Cap Take 2,000 Units by mouth every morning.      omeprazole (PRILOSEC) 20 MG delayed-release capsule Take 20 mg by mouth every morning with breakfast.      [DISCONTINUED] carvedilol (COREG) 6.25 MG Tab Take 1 Tablet by mouth 2 times a day with meals. 200 Tablet 0    [DISCONTINUED] amLODIPine (NORVASC) 5 MG Tab TAKE ONE TABLET BY MOUTH EVERY  Tablet 1    [DISCONTINUED] isosorbide mononitrate SR (IMDUR) 30 MG TABLET SR 24 HR TAKE ONE TABLET BY MOUTH IN THE EVENING 100 Tablet 0    [DISCONTINUED] lisinopril (PRINIVIL) 20 MG Tab TAKE ONE TABLET BY MOUTH EVERY MORNING WITH BREAKFAST 100 Tablet 1    [DISCONTINUED] atorvastatin (LIPITOR) 80 MG tablet TAKE ONE TABLET BY MOUTH EVERY EVENING 100 Tablet 1     No facility-administered encounter medications on file as of 10/26/2022.     Review of Systems   Constitutional: Negative.  Negative for chills, fever and malaise/fatigue.   HENT: Negative.  Negative for sore throat.    Eyes: Negative.    Respiratory: Negative.  Negative for cough, hemoptysis, sputum production, shortness of breath, wheezing and stridor.    Cardiovascular:  Positive for chest pain. Negative for palpitations, orthopnea, claudication, leg swelling and PND.   Gastrointestinal: Negative.    Genitourinary: Negative.    Musculoskeletal:  Negative for back pain and joint pain.   Skin: Negative.    Neurological: Negative.  Negative for dizziness, loss of consciousness and  "weakness.   Endo/Heme/Allergies: Negative.  Does not bruise/bleed easily.   All other systems reviewed and are negative.     Objective:   /64 (BP Location: Left arm, Patient Position: Sitting, BP Cuff Size: Adult)   Pulse 69   Resp 16   Ht 1.549 m (5' 1\")   Wt 54.4 kg (120 lb)   LMP  (LMP Unknown)   SpO2 94%   BMI 22.67 kg/m²     Physical Exam  Vitals and nursing note reviewed.   Constitutional:       General: She is not in acute distress.     Appearance: She is well-developed. She is not diaphoretic.   HENT:      Head: Normocephalic and atraumatic.      Right Ear: External ear normal.      Left Ear: External ear normal.      Nose: Nose normal.      Mouth/Throat:      Pharynx: No oropharyngeal exudate.   Eyes:      General: No scleral icterus.        Right eye: No discharge.         Left eye: No discharge.      Conjunctiva/sclera: Conjunctivae normal.      Pupils: Pupils are equal, round, and reactive to light.   Neck:      Vascular: No JVD.   Cardiovascular:      Rate and Rhythm: Normal rate and regular rhythm.      Heart sounds: No murmur heard.    No friction rub. No gallop.   Pulmonary:      Effort: Pulmonary effort is normal. No respiratory distress.      Breath sounds: No stridor. No wheezing or rales.   Chest:      Chest wall: No tenderness.   Abdominal:      General: There is no distension.      Palpations: Abdomen is soft.      Tenderness: There is no guarding.   Musculoskeletal:         General: No tenderness or deformity. Normal range of motion.      Cervical back: Neck supple.   Skin:     General: Skin is warm and dry.      Coloration: Skin is not pale.      Findings: No erythema or rash.   Neurological:      Mental Status: She is alert.      Cranial Nerves: No cranial nerve deficit.      Motor: No abnormal muscle tone.      Coordination: Coordination normal.      Deep Tendon Reflexes: Reflexes are normal and symmetric. Reflexes normal.   Psychiatric:         Behavior: Behavior normal.       "   Thought Content: Thought content normal.         Judgment: Judgment normal.       Assessment:     1. Dyslipidemia  carvedilol (COREG) 6.25 MG Tab      2. Essential hypertension  carvedilol (COREG) 6.25 MG Tab      3. Coronary artery disease involving native coronary artery of native heart with angina pectoris (HCC)  carvedilol (COREG) 6.25 MG Tab      4. Claudication of right lower extremity (HCC)        5. Popliteal artery stenosis, right ([facility])  atorvastatin (LIPITOR) 80 MG tablet    amLODIPine (NORVASC) 5 MG Tab    lisinopril (PRINIVIL) 20 MG Tab      6. Pleural effusion  carvedilol (COREG) 6.25 MG Tab    isosorbide mononitrate SR (IMDUR) 30 MG TABLET SR 24 HR    lisinopril (PRINIVIL) 20 MG Tab      7. PVD (peripheral vascular disease) ([facility])  atorvastatin (LIPITOR) 80 MG tablet    amLODIPine (NORVASC) 5 MG Tab    isosorbide mononitrate SR (IMDUR) 30 MG TABLET SR 24 HR    lisinopril (PRINIVIL) 20 MG Tab      8. Other polyneuropathy  amLODIPine (NORVASC) 5 MG Tab    isosorbide mononitrate SR (IMDUR) 30 MG TABLET SR 24 HR    lisinopril (PRINIVIL) 20 MG Tab      9. Peripheral vascular disease, unspecified ([facility])  carvedilol (COREG) 6.25 MG Tab      10. Post concussion syndrome  atorvastatin (LIPITOR) 80 MG tablet    amLODIPine (NORVASC) 5 MG Tab    isosorbide mononitrate SR (IMDUR) 30 MG TABLET SR 24 HR      11. S/P bilateral cataract extraction  atorvastatin (LIPITOR) 80 MG tablet      12. Other specified personal risk factors, not elsewhere classified  NM-CARDIAC STRESS TEST      13. Abnormal electrocardiogram (ECG) (EKG)   NM-CARDIAC STRESS TEST          Medical Decision Making:  Today's Assessment / Status / Plan:     89-year-old female peripheral vascular disease coronary disease hypertension hyperlipidemia.  Given her exertional angina and her history of CAD with peripheral vascular disease we will go ahead and schedule her for a stress test.  I have refilled her medications for now.  I will see her back in 6  months.

## 2022-10-27 ENCOUNTER — TELEPHONE (OUTPATIENT)
Dept: PHYSICAL THERAPY | Facility: REHABILITATION | Age: 87
End: 2022-10-27
Payer: MEDICARE

## 2022-10-27 NOTE — OP THERAPY DISCHARGE SUMMARY
Outpatient Physical Therapy  DISCHARGE SUMMARY NOTE      [facility] [address], [address]  [place] [address]  Phone:  [phone]    Date of Visit: 10/27/2022    Patient: Dayana Lechuga  YOB: 1929  MRN: [id number]     Referring Provider: Justin Haley M.D.   Referring Diagnosis Chronic bilateral low back pain with left-sided sciatica         Functional Assessment Used        Your patient is being discharged from Physical Therapy with the following comments:   Goals not met  Patient has failed to schedule or reschedule follow-up visits    Comments:  Please see daily treatment notes for details     Limitations Remaining:      Recommendations:  Discharge from PT    Adrienne Gomez PT, MSPT    Date: 10/27/2022

## 2022-10-31 ENCOUNTER — TELEPHONE (OUTPATIENT)
Dept: DERMATOLOGY | Facility: IMAGING CENTER | Age: 87
End: 2022-10-31
Payer: MEDICARE

## 2022-10-31 ENCOUNTER — APPOINTMENT (OUTPATIENT)
Dept: PHYSICAL THERAPY | Facility: REHABILITATION | Age: 87
End: 2022-10-31
Attending: NURSE PRACTITIONER
Payer: MEDICARE

## 2022-10-31 RX ORDER — FLUTICASONE PROPIONATE 50 MCG
SPRAY, SUSPENSION (ML) NASAL
Qty: 16 G | Refills: 3 | Status: SHIPPED
Start: 2022-10-31 | End: 2022-11-10

## 2022-10-31 NOTE — TELEPHONE ENCOUNTER
Spoke to pt about bx results - Results benign, no further treatment needed. Pt understood had no further questions.    Dpath results scanned in.

## 2022-11-01 ENCOUNTER — APPOINTMENT (OUTPATIENT)
Dept: PHYSICAL THERAPY | Facility: REHABILITATION | Age: 87
End: 2022-11-01
Attending: NURSE PRACTITIONER

## 2022-11-10 ENCOUNTER — PATIENT OUTREACH (OUTPATIENT)
Dept: HEALTH INFORMATION MANAGEMENT | Facility: OTHER | Age: 87
End: 2022-11-10

## 2022-11-10 ENCOUNTER — OFFICE VISIT (OUTPATIENT)
Dept: MEDICAL GROUP | Facility: PHYSICIAN GROUP | Age: 87
End: 2022-11-10
Payer: MEDICARE

## 2022-11-10 VITALS
DIASTOLIC BLOOD PRESSURE: 60 MMHG | OXYGEN SATURATION: 93 % | HEIGHT: 61 IN | BODY MASS INDEX: 23.22 KG/M2 | HEART RATE: 75 BPM | TEMPERATURE: 98.1 F | WEIGHT: 123 LBS | SYSTOLIC BLOOD PRESSURE: 122 MMHG

## 2022-11-10 DIAGNOSIS — F13.20 BENZODIAZEPINE DEPENDENCE (HCC): ICD-10-CM

## 2022-11-10 DIAGNOSIS — R19.4 FREQUENT BOWEL MOVEMENTS: ICD-10-CM

## 2022-11-10 DIAGNOSIS — I25.119 CORONARY ARTERY DISEASE INVOLVING NATIVE CORONARY ARTERY OF NATIVE HEART WITH ANGINA PECTORIS (HCC): ICD-10-CM

## 2022-11-10 DIAGNOSIS — I10 ESSENTIAL HYPERTENSION: ICD-10-CM

## 2022-11-10 PROCEDURE — 99213 OFFICE O/P EST LOW 20 MIN: CPT | Performed by: FAMILY MEDICINE

## 2022-11-10 ASSESSMENT — FIBROSIS 4 INDEX: FIB4 SCORE: 2.12

## 2022-11-10 NOTE — PROGRESS NOTES
Introduced PCM program to patient following her PCP visit on 11/10/22, provided pamphlet with contact information, she is interested in enrolling, will reach out to her on 11/14/22 for phone in-take.

## 2022-11-10 NOTE — ASSESSMENT & PLAN NOTE
Having BMs more frequently now off/on up to 5x daily  No diarrhea or constipation  Has increased fiber lately  Denies any blood in the stool     Having BMs

## 2022-11-10 NOTE — PROGRESS NOTES
Subjective:     Chief Complaint   Patient presents with    Bowel Problem     Chronic,     Medication Management     Vit E, Cranberry     Other     Stress test,       HPI:   Dayana presents today to discuss the following.    Frequent bowel movements  Having BMs more frequently now off/on up to 5x daily  No diarrhea or constipation  Has increased fiber lately  Denies any blood in the stool     Having BMs    Past Medical History:   Diagnosis Date    Allergy     Anxiety     ASTHMA     Bronchitis     CAD (coronary artery disease)     JT to RCA; 70% stenosis in LAD    Chills     Constipation     Difficulty breathing     GERD (gastroesophageal reflux disease)     Heart attack ([facility])     Heartburn     Hyperlipidemia     Hypertension     Influenza     Insomnia     Lumbar back pain 9/10/2016    Mild peripheral edema 7/31/2020    Mumps     OSTEOPOROSIS     Palpitations     Peripheral vascular disease, unspecified ([facility]) 9/14/2021    Pneumonia     Post concussion syndrome 9/11/2020    PVD (peripheral vascular disease) ([facility])     70% PAD-followed by Lary AMBROCIO    Sweat, sweating, excessive     Tonsillitis        Current Outpatient Medications Ordered in Epic   Medication Sig Dispense Refill    carvedilol (COREG) 6.25 MG Tab TAKE 1 TABLET BY MOUTH TWICE DAILY WITH MEALS 180 Tablet 3    atorvastatin (LIPITOR) 80 MG tablet Take 1 Tablet by mouth every evening. 100 Tablet 3    amLODIPine (NORVASC) 5 MG Tab Take 1 Tablet by mouth every day. 100 Tablet 3    isosorbide mononitrate SR (IMDUR) 30 MG TABLET SR 24 HR Take 1 Tablet by mouth every evening. 100 Tablet 3    lisinopril (PRINIVIL) 20 MG Tab Take 1 Tablet by mouth every day. 100 Tablet 3    gabapentin (NEURONTIN) 100 MG Cap Take 3 Capsules by mouth 3 times a day for 90 days. TAKE 2 CAPSULES BY MOUTH THREE TIMES DAILY 810 Capsule 3    hydrocortisone 2.5 % Cream topical cream Apply 1 Application topically 2 times a day. Use on legs, if red, raised, itchy or with rash.  Stop if  "worsens.  Stop when rash / itching clears. 30 g 0    LORazepam (ATIVAN) 0.5 MG Tab Take 1 Tablet by mouth every day for 90 days. 30 Tablet 2    gabapentin (NEURONTIN) 100 MG Cap Take 3 Capsules by mouth 3 times a day for 90 days. 810 Capsule 1    albuterol 108 (90 Base) MCG/ACT Aero Soln inhalation aerosol INHALE 2 PUFFS BY MOUTH EVERY 6 HOURS AS NEEDED FOR SHORTNESS OF BREATH 25.5 g 0    montelukast (SINGULAIR) 10 MG Tab TAKE ONE TABLET BY MOUTH IN THE EVENING 90 Tablet 3    loratadine (CLARITIN) 10 MG Tab Take 1 Tablet by mouth every day.      Acetaminophen 500 MG Cap Take 1 Capsule by mouth 2 times a day. Morning & Afternoon      Melatonin 10 MG Tab Take 20 mg by mouth at bedtime.      Multiple Vitamins-Minerals (CENTRUM SILVER PO) Take 1 Tab by mouth every morning.      Biotin w/ Vitamins C & E 1250-7.5-7.5 MCG-MG-UNT Chew Tab Chew 1 Tab every morning.      guaifenesin LA (MUCINEX) 600 MG TABLET SR 12 HR Take 1 Tablet by mouth every morning.      Ascorbic Acid (VITAMIN C) 1000 MG Tab Take 1 Tablet by mouth every morning.      VITAMIN E PO Take 1 Cap by mouth every morning.      Cholecalciferol (VITAMIN D) 50 MCG (2000 UT) Cap Take 1 Capsule by mouth every morning.      omeprazole (PRILOSEC) 20 MG delayed-release capsule Take 1 Capsule by mouth every morning with breakfast.       No current Epic-ordered facility-administered medications on file.       Allergies:  Bactrim [sulfamethoxazole w-trimethoprim], Pcn [penicillins], Codeine, and Sulfamethoxazole-trimethoprim    Health Maintenance: Completed    ROS:  Gen: no fevers/chills, no changes in weight  Eyes: no changes in vision  Pulm: no sob, no cough  CV: no chest pain, no palpitations  GI: no nausea/vomiting, no diarrhea      Objective:     Exam:  /60 (BP Location: Left arm, Patient Position: Sitting, BP Cuff Size: Adult)   Pulse 75   Temp 36.7 °C (98.1 °F) (Temporal)   Ht 1.549 m (5' 1\")   Wt 55.8 kg (123 lb)   LMP  (LMP Unknown)   SpO2 93%   BMI " 23.24 kg/m²  Body mass index is 23.24 kg/m².      Constitutional: Alert, no distress, well-groomed.  Skin: Warm, dry, good turgor, no rashes in visible areas.  Eye: Equal, round and reactive, conjunctiva clear, lids normal.  ENMT: Lips without lesions, good dentition, moist mucous membranes.  Neck: Trachea midline, no masses, no thyromegaly.  Respiratory: Unlabored respiratory effort, no cough.  MSK: Normal gait, moves all extremities.  Neuro: Grossly non-focal.   Psych: Alert and oriented x3, normal affect and mood.        Assessment & Plan:     89 y.o. female with the following -     1. Benzodiazepine dependence (HCC)  Chronic, stable condition.  Continue with specialist follow-up.    2. Frequent bowel movements  New problem.  The likely etiology to this is constipation.  Ever since the patient started taking more fiber supplements she has had more frequent bowel movements.  I recommend she continues with fiber supplementation and continue to monitor bowel movements.      Return in about 3 months (around 2/10/2023).    [facility]    Diagnosis to address: F13.20 - Benzodiazepine dependence (HCC)  Assessment and plan: Chronic, stable. Continue with current defined treatment plan: seeing specialist. Follow-up at least annually.  Last edited 11/10/22 10:40 PST by Justin Haley M.D.           Please note that this dictation was created using voice recognition software. I have made every reasonable attempt to correct obvious errors, but I expect that there are errors of grammar and possibly content that I did not discover before finalizing the note.

## 2022-11-11 RX ORDER — GABAPENTIN 100 MG/1
300 CAPSULE ORAL 3 TIMES DAILY
Qty: 810 CAPSULE | Refills: 1 | Status: SHIPPED | OUTPATIENT
Start: 2022-11-11 | End: 2022-11-16 | Stop reason: SDUPTHER

## 2022-11-14 ENCOUNTER — PATIENT OUTREACH (OUTPATIENT)
Dept: HEALTH INFORMATION MANAGEMENT | Facility: OTHER | Age: 87
End: 2022-11-14
Payer: MEDICARE

## 2022-11-14 DIAGNOSIS — I10 ESSENTIAL HYPERTENSION: ICD-10-CM

## 2022-11-14 DIAGNOSIS — I25.119 CORONARY ARTERY DISEASE INVOLVING NATIVE CORONARY ARTERY OF NATIVE HEART WITH ANGINA PECTORIS (HCC): ICD-10-CM

## 2022-11-14 NOTE — PROGRESS NOTES
Outreach by phone with patient to discuss scheduling a time to complete her intake into the PCM program.  I will follow-up w/her next week because she is unable this morning to complete her intake.

## 2022-11-14 NOTE — TELEPHONE ENCOUNTER
Received request via: Pharmacy    Was the patient seen in the last year in this department? Yes    Does the patient have an active prescription (recently filled or refills available) for medication(s) requested?  PHARMACY IS REQUESTING TO CHANGE TO 300MG TID AS THEY ARE HESITANT TO GIVE PATIENT 810 CAPSULES ALL AT ONCE. PLEASE ADVISE.    Does the patient have [facility]PC Plus and need 100 day supply (blood pressure, diabetes and cholesterol meds only)? Medication is not for cholesterol, blood pressure or diabetes

## 2022-11-15 RX ORDER — GABAPENTIN 300 MG/1
300 CAPSULE ORAL 3 TIMES DAILY
Qty: 270 CAPSULE | Refills: 3 | Status: SHIPPED | OUTPATIENT
Start: 2022-11-15 | End: 2022-11-22 | Stop reason: SDUPTHER

## 2022-11-16 ENCOUNTER — HOSPITAL ENCOUNTER (OUTPATIENT)
Dept: RADIOLOGY | Facility: MEDICAL CENTER | Age: 87
End: 2022-11-16
Attending: INTERNAL MEDICINE
Payer: MEDICARE

## 2022-11-16 NOTE — TELEPHONE ENCOUNTER
Received request via: Pharmacy    Was the patient seen in the last year in this department? Yes    Does the patient have an active prescription (recently filled or refills available) for medication(s) requested? No    Does the patient have assisted Plus and need 100 day supply (blood pressure, diabetes and cholesterol meds only)? Yes, quantity updated to 100 days

## 2022-11-17 RX ORDER — GABAPENTIN 300 MG/1
300 CAPSULE ORAL 3 TIMES DAILY
Qty: 300 CAPSULE | Refills: 3 | Status: SHIPPED | OUTPATIENT
Start: 2022-11-17 | End: 2023-02-25

## 2022-11-22 ENCOUNTER — PATIENT OUTREACH (OUTPATIENT)
Dept: HEALTH INFORMATION MANAGEMENT | Facility: OTHER | Age: 87
End: 2022-11-22
Payer: MEDICARE

## 2022-11-22 DIAGNOSIS — I10 ESSENTIAL HYPERTENSION: ICD-10-CM

## 2022-11-22 DIAGNOSIS — I25.119 CORONARY ARTERY DISEASE INVOLVING NATIVE CORONARY ARTERY OF NATIVE HEART WITH ANGINA PECTORIS (HCC): ICD-10-CM

## 2022-11-22 RX ORDER — GABAPENTIN 300 MG/1
300 CAPSULE ORAL 3 TIMES DAILY
Qty: 270 CAPSULE | Refills: 3 | Status: SHIPPED | OUTPATIENT
Start: 2022-11-22 | End: 2023-02-20

## 2022-11-22 NOTE — PROGRESS NOTES
Called home & cell phones. Left voicemail messages w/contact information, trying to reach patient to complete her intake into the PCM program.

## 2022-11-29 ENCOUNTER — PATIENT OUTREACH (OUTPATIENT)
Dept: HEALTH INFORMATION MANAGEMENT | Facility: OTHER | Age: 87
End: 2022-11-29
Payer: MEDICARE

## 2022-11-29 DIAGNOSIS — I25.119 CORONARY ARTERY DISEASE INVOLVING NATIVE CORONARY ARTERY OF NATIVE HEART WITH ANGINA PECTORIS (HCC): ICD-10-CM

## 2022-11-29 DIAGNOSIS — I10 ESSENTIAL HYPERTENSION: ICD-10-CM

## 2022-11-29 PROCEDURE — 99439 CHRNC CARE MGMT STAF EA ADDL: CPT | Performed by: FAMILY MEDICINE

## 2022-11-29 PROCEDURE — 99490 CHRNC CARE MGMT STAFF 1ST 20: CPT | Performed by: FAMILY MEDICINE

## 2022-11-29 ASSESSMENT — PATIENT HEALTH QUESTIONNAIRE - PHQ9: CLINICAL INTERPRETATION OF PHQ2 SCORE: 0

## 2022-11-29 NOTE — PROGRESS NOTES
Patient returned my call & left phone message that she would be available this afternoon to speak with me.  I called patient to confirm I would be calling her around 2:30 p.m. this afternoon to complete her enrollment in the PCM program.

## 2022-12-04 NOTE — PROGRESS NOTES
INITIAL CARE MANAGEMENT CARE PLAN/ASSESSMENT     Completed phone intake into the PCM program.  Dayana provided her verbal consent to participate in the program.  She is a 89-year-old woman who lives in an apartment with her dog Deshawn at [facility], an independent living facility.  Her chronic health conditions include CAD and HTN.  She follows with Cardiology, Dermatology, , and Physiatry.  Her support system includes her son & daughter-in-law who live in [place] and a daughter who lives in [place].  She has fallen a couple of times in the last 12 months.  She tripped over her dog, she did not seek medical care.  She experiences a lot of pain when she gets up in the morning.  [facility] provides transportation to its resident but I gave her the contact number to get free Uber rides through [facility].  Her health goals include keeping on living and fall prevention.      Medication Self-Management Goals:     Reviewed medications listed below with patient.      Current Outpatient Medications:     gabapentin (NEURONTIN) 300 MG Cap, Take 1 Capsule by mouth 3 times a day for 90 days. (Patient taking differently: Take 300 mg by mouth 3 times a day. See above), Disp: 270 Capsule, Rfl: 3    gabapentin (NEURONTIN) 300 MG Cap, Take 1 Capsule by mouth 3 times a day for 100 days. (Patient taking differently: Take 300 mg by mouth 3 times a day. Taking 3 100mg tablets 3 times a day), Disp: 300 Capsule, Rfl: 3    carvedilol (COREG) 6.25 MG Tab, TAKE 1 TABLET BY MOUTH TWICE DAILY WITH MEALS, Disp: 180 Tablet, Rfl: 3    atorvastatin (LIPITOR) 80 MG tablet, Take 1 Tablet by mouth every evening., Disp: 100 Tablet, Rfl: 3    amLODIPine (NORVASC) 5 MG Tab, Take 1 Tablet by mouth every day., Disp: 100 Tablet, Rfl: 3    isosorbide mononitrate SR (IMDUR) 30 MG TABLET SR 24 HR, Take 1 Tablet by mouth every evening., Disp: 100 Tablet, Rfl: 3    lisinopril (PRINIVIL) 20 MG Tab, Take 1 Tablet by mouth every day., Disp: 100 Tablet, Rfl: 3     hydrocortisone 2.5 % Cream topical cream, Apply 1 Application topically 2 times a day. Use on legs, if red, raised, itchy or with rash.  Stop if worsens.  Stop when rash / itching clears. (Patient taking differently: Apply 1 Application topically 2 times a day. Use on legs, if red, raised, itchy or with rash.  Stop if worsens.  Stop when rash / itching clears.  Using as needed), Disp: 30 g, Rfl: 0    LORazepam (ATIVAN) 0.5 MG Tab, Take 1 Tablet by mouth every day for 90 days., Disp: 30 Tablet, Rfl: 2    albuterol 108 (90 Base) MCG/ACT Aero Soln inhalation aerosol, INHALE 2 PUFFS BY MOUTH EVERY 6 HOURS AS NEEDED FOR SHORTNESS OF BREATH, Disp: 25.5 g, Rfl: 0    montelukast (SINGULAIR) 10 MG Tab, TAKE ONE TABLET BY MOUTH IN THE EVENING, Disp: 90 Tablet, Rfl: 3    loratadine (CLARITIN) 10 MG Tab, Take 1 Tablet by mouth every day., Disp: , Rfl:     Melatonin 10 MG Tab, Take 10 mg by mouth at bedtime., Disp: , Rfl:     Multiple Vitamins-Minerals (CENTRUM SILVER PO), Take 1 Tab by mouth every morning., Disp: , Rfl:     Biotin w/ Vitamins C & E 1250-7.5-7.5 MCG-MG-UNT Chew Tab, Chew 1 Tab every morning., Disp: , Rfl:     guaifenesin LA (MUCINEX) 600 MG TABLET SR 12 HR, Take 400 mg by mouth every morning., Disp: , Rfl:     Ascorbic Acid (VITAMIN C) 1000 MG Tab, Take 1 Tablet by mouth every morning., Disp: , Rfl:     VITAMIN E PO, Take 1 Cap by mouth every morning., Disp: , Rfl:     Cholecalciferol (VITAMIN D) 50 MCG (2000 UT) Cap, Take 2,000 Units by mouth every morning. Taking vit D3, Disp: , Rfl:     omeprazole (PRILOSEC) 20 MG delayed-release capsule, Take 1 Capsule by mouth every morning with breakfast., Disp: , Rfl:     Acetaminophen 500 MG Cap, Take 1 Capsule by mouth 2 times a day. Morning & Afternoon, sometimes takes 3 times a day, Disp: , Rfl:          Goal: Maintain status quo, no issues identified       Physical/Functional/Environmental Status:     Activities of Daily Living:  Bathing: independent   Dressing:  independent  Grooming: independent  Mouth Care: independent  Toileting: independent  Climbing a Flight of Stairs: independent    Independent Activities of Daily Living:  Shopping:    Cooking:    Managing Medications: independent  Using the phone and looking up numbers: independent  Driving or using public transportation:    Managing Finances: needs assistance        10/25/2021    11:46 AM 11/29/2022     2:41 PM 11/29/2022     2:55 PM   STEADI Fall Risk   STEADI Risk for Falling Score   2   One or more falls in the last year Yes No No   Advised to use a cane or walker to get around safely   No   Feels unsteady when walking   No   Steadies self on furniture while walking at home   Yes       in the morning when she gets up, she is in a lot of pain when she gts up in the morning   Worried about falling   No   Needs to push with hands when rising from a chair   No   Has trouble stepping up onto a curb / using stairs   No   Often has to rush to the toilet   Yes   Has lost some feeling in feet   No   Takes medicine that makes him/her feel lightheaded or more tired than usual   No   Takes medicine to sleep or improve mood   No   Often feels sad or depressed   No               Goal: Improve strength & stamina     Financial Status:     No issues identified     Goal: Maintain status quo     Transportation Status:    Uses bus at her independent living facility, provided information about free Uber rides through [facility]    Goal: Maintain status quo    Mental/Behavioral/Psychosocial Status:        5/11/2022    11:00 AM 6/21/2022    10:20 AM 11/29/2022     9:09 AM   Depression Screen (PHQ-2/PHQ-9)   PHQ-2 Total Score 0 0 0       Interpretation of PHQ-9 Total Score   Score Severity   1-4 No Depression   5-9 Mild Depression   10-14 Moderate Depression   15-19 Moderately Severe Depression   20-27 Severe Depression       Goal:  Maintain status quo, no issues identified      Chronic Care Management Care Plan         Goal:  Continue  Living  Barriers: Advanced Age, Numerous Health Conditions, Diminished Fitness Level   Interventions: Healthcare Follow-up as Needed, Healthy Diet & Exercise, Goal Setting    Start Date: 11/29/22  End Date:     Goal:  Prevent Falls  Barriers: Age, Diminished Fitness Level, Dog Underfoot  Interventions: PT, Fall Prevention Class @ [employer], Exercise & Strength Training    Start Date: 11/29/22  End Date:       Discussion:  Health goals discussed w/patient    Goals: See narrative above and Care Plan     Next Scheduled patient outreach:  12/29/22, call @ 1100

## 2022-12-07 ENCOUNTER — PATIENT OUTREACH (OUTPATIENT)
Dept: HEALTH INFORMATION MANAGEMENT | Facility: OTHER | Age: 87
End: 2022-12-07
Payer: MEDICARE

## 2022-12-07 DIAGNOSIS — I10 ESSENTIAL HYPERTENSION: ICD-10-CM

## 2022-12-07 SDOH — ECONOMIC STABILITY: FOOD INSECURITY: WITHIN THE PAST 12 MONTHS, THE FOOD YOU BOUGHT JUST DIDN'T LAST AND YOU DIDN'T HAVE MONEY TO GET MORE.: NEVER TRUE

## 2022-12-07 SDOH — HEALTH STABILITY: MENTAL HEALTH: HOW MANY STANDARD DRINKS CONTAINING ALCOHOL DO YOU HAVE ON A TYPICAL DAY?: PATIENT DOES NOT DRINK

## 2022-12-07 SDOH — HEALTH STABILITY: MENTAL HEALTH: HOW OFTEN DO YOU HAVE A DRINK CONTAINING ALCOHOL?: NEVER

## 2022-12-07 SDOH — HEALTH STABILITY: PHYSICAL HEALTH: ON AVERAGE, HOW MANY DAYS PER WEEK DO YOU ENGAGE IN MODERATE TO STRENUOUS EXERCISE (LIKE A BRISK WALK)?: 7 DAYS

## 2022-12-07 SDOH — ECONOMIC STABILITY: HOUSING INSECURITY
IN THE LAST 12 MONTHS, WAS THERE A TIME WHEN YOU DID NOT HAVE A STEADY PLACE TO SLEEP OR SLEPT IN A SHELTER (INCLUDING NOW)?: NO

## 2022-12-07 SDOH — HEALTH STABILITY: MENTAL HEALTH
STRESS IS WHEN SOMEONE FEELS TENSE, NERVOUS, ANXIOUS, OR CAN'T SLEEP AT NIGHT BECAUSE THEIR MIND IS TROUBLED. HOW STRESSED ARE YOU?: NOT AT ALL

## 2022-12-07 SDOH — SOCIAL STABILITY: SOCIAL NETWORK: ARE YOU MARRIED, WIDOWED, DIVORCED, SEPARATED, NEVER MARRIED, OR LIVING WITH A PARTNER?: DIVORCED

## 2022-12-07 SDOH — ECONOMIC STABILITY: INCOME INSECURITY: HOW HARD IS IT FOR YOU TO PAY FOR THE VERY BASICS LIKE FOOD, HOUSING, MEDICAL CARE, AND HEATING?: NOT VERY HARD

## 2022-12-07 SDOH — ECONOMIC STABILITY: INCOME INSECURITY: IN THE LAST 12 MONTHS, WAS THERE A TIME WHEN YOU WERE NOT ABLE TO PAY THE MORTGAGE OR RENT ON TIME?: NO

## 2022-12-07 SDOH — SOCIAL STABILITY: SOCIAL NETWORK: HOW OFTEN DO YOU ATTEND CHURCH OR RELIGIOUS SERVICES?: 1 TO 4 TIMES PER YEAR

## 2022-12-07 SDOH — SOCIAL STABILITY: SOCIAL NETWORK
DO YOU BELONG TO ANY CLUBS OR ORGANIZATIONS SUCH AS CHURCH GROUPS UNIONS, FRATERNAL OR ATHLETIC GROUPS, OR SCHOOL GROUPS?: NO

## 2022-12-07 SDOH — ECONOMIC STABILITY: FOOD INSECURITY: WITHIN THE PAST 12 MONTHS, YOU WORRIED THAT YOUR FOOD WOULD RUN OUT BEFORE YOU GOT MONEY TO BUY MORE.: NEVER TRUE

## 2022-12-07 SDOH — HEALTH STABILITY: MENTAL HEALTH: HOW OFTEN DO YOU HAVE 6 OR MORE DRINKS ON ONE OCCASION?: NEVER

## 2022-12-07 SDOH — SOCIAL STABILITY: SOCIAL NETWORK: HOW OFTEN DO YOU GET TOGETHER WITH FRIENDS OR RELATIVES?: MORE THAN THREE TIMES A WEEK

## 2022-12-07 SDOH — SOCIAL STABILITY: SOCIAL NETWORK: HOW OFTEN DO YOU ATTENT MEETINGS OF THE CLUB OR ORGANIZATION YOU BELONG TO?: NEVER

## 2022-12-07 SDOH — ECONOMIC STABILITY: HOUSING INSECURITY: IN THE LAST 12 MONTHS, HOW MANY PLACES HAVE YOU LIVED?: 1

## 2022-12-07 SDOH — HEALTH STABILITY: PHYSICAL HEALTH: ON AVERAGE, HOW MANY MINUTES DO YOU ENGAGE IN EXERCISE AT THIS LEVEL?: 20 MIN

## 2022-12-07 SDOH — SOCIAL STABILITY: SOCIAL NETWORK
IN A TYPICAL WEEK, HOW MANY TIMES DO YOU TALK ON THE PHONE WITH FAMILY, FRIENDS, OR NEIGHBORS?: MORE THAN THREE TIMES A WEEK

## 2022-12-07 ASSESSMENT — LIFESTYLE VARIABLES
SKIP TO QUESTIONS 9-10: 1
AUDIT-C TOTAL SCORE: 0

## 2022-12-07 NOTE — PROGRESS NOTES
CHW Amy contacted pt to complete [facility] assessment for PCM intake. When introducing self and purpose for the outreach, pt became irritable and stated that this was already completed and that she was upset because she does not like repetition. CHW ensured pt that the assessment was not completed previously and it needed to be completed for enrollment purposes. Throughout the assessment, pt was extremely short and irritable with CHW. CHW reminded the pt of the purpose of the program and mentioned to her that it was not a necessity that she enrolled. She stated that she doesn't understand why she needed to. CHW explained that PCM was an extra step of assistance in reaching healthcare goals and gaining access to resources or information in the community if she were to ever need it. CHW did not identify any barriers to housing, food, transportation, medication or social interactions. Pt was unengaged in conversation towards the end of the call. CHW will not begin working on any resources for pt until asked to do so by PCP or RN. CHW provided contact information to pt in case she had any questions or is in need of assistance.     ..Community Health Worker Intake  Social determinates of health intake Yes.   Identified barriers to NONE.  Contact information provided to Dayana Lechuga Yes  Has PCP appointment scheduled for 2/16/2023 @ 1000.  Outpatient assessment completed.Yes    Plan: No f/u plan.

## 2022-12-29 ENCOUNTER — PATIENT OUTREACH (OUTPATIENT)
Dept: MEDICAL GROUP | Facility: PHYSICIAN GROUP | Age: 87
End: 2022-12-29
Payer: MEDICARE

## 2022-12-29 DIAGNOSIS — I10 ESSENTIAL HYPERTENSION: ICD-10-CM

## 2022-12-29 PROCEDURE — 99999 PR NO CHARGE: CPT | Performed by: FAMILY MEDICINE

## 2022-12-29 NOTE — PROGRESS NOTES
Assessment:    Phone outreach to patient to complete her monthly PCM follow-up. She was playing bridge on her Talia when I called.  She has played bridge most of her life.  Dayana reports she is fine, feels okay.  She had a nice holiday.  She has not had any medical appointments since we last spoke.  She sees her podiatrist in January, he does her nails.  She has not had any falls since we last talked.  She walked her dog all around the park this morning, she has gotten in 2,441 steps so far today.  She stays away from sugar & salt.  She got new weights for her hands for [employer], they weigh 2 pounds a piece, she wanted 3 lb weights.         Education:    Discussed that exercise & healthy diet are keys to quality & quantity of life.      Care Plan:    No updates @ this time    Progress:    Progressing    Next Outreach:  1/27/23, call @ [phone]

## 2023-01-20 ENCOUNTER — OFFICE VISIT (OUTPATIENT)
Dept: BEHAVIORAL HEALTH | Facility: CLINIC | Age: 88
End: 2023-01-20
Payer: MEDICARE

## 2023-01-20 DIAGNOSIS — Z79.899 CHRONIC USE OF BENZODIAZEPINE FOR THERAPEUTIC PURPOSE: ICD-10-CM

## 2023-01-20 DIAGNOSIS — F51.01 PRIMARY INSOMNIA: ICD-10-CM

## 2023-01-20 PROCEDURE — 99214 OFFICE O/P EST MOD 30 MIN: CPT | Performed by: PSYCHIATRY & NEUROLOGY

## 2023-01-20 RX ORDER — LORAZEPAM 0.5 MG/1
0.5 TABLET ORAL DAILY
Qty: 30 TABLET | Refills: 2 | Status: SHIPPED | OUTPATIENT
Start: 2023-02-10 | End: 2023-02-02 | Stop reason: SDUPTHER

## 2023-01-20 NOTE — PROGRESS NOTES
PSYCHIATRY FOLLOW-UP NOTE      Name: Dayana Lechuga  MRN: [id number]  : 1929  Age: 89 y.o.  Date of assessment: 23  PCP: Justin Haley M.D.  Persons in attendance: Patient      REASON FOR VISIT/CHIEF COMPLAINT (as stated by Patient):  Dayana Lechuga is a 89 y.o., White female, attending follow-up appointment for longterm use of benzodiazepine management.       SUBJECTIVE/HPI  Dayana Lechuga is a 89 y.o. old female with insomnia and benzodiazepine use who comes in today for follow up. Patient was last seen on 10/14/22, at which time the plan was to: continue lorazepam and melatonin at bedtime.    Patient reports that she has had a redeye for several days, it is bothering her, has an appointment with an eye doctor coming up, reports this is her only concern or worry.  Otherwise denies all anxiety denies all worry.  Reports that she is typically going to bed at 9 PM, she is falling asleep quickly and sleeping well throughout the night.  She got an apple watch for 2 Minutes which will track her sleep, she would like to share the results with me at next appointment.  Denies gait instability, denies falls, denies changes in memory or cognition.  She is taking 0.5 mg of lorazepam every night before bed.  Has been doing so for many years.  She would be open to trialing to take 0.25 mg before bed to see how she responds.  She is concerned that if she does not fall asleep right away that she will be able to fall asleep, however, she is willing to attempt this.  She denies drinking any alcohol, she does not want to drink any alcohol while taking lorazepam because she noted that can be interactions.      CURRENT MEDICATIONS:  Current Outpatient Medications   Medication Sig Dispense Refill    montelukast (SINGULAIR) 10 MG Tab TAKE ONE TABLET BY MOUTH IN THE EVENING 90 Tablet 3    gabapentin (NEURONTIN) 300 MG Cap Take 1 Capsule by mouth 3 times a day for 90 days. (Patient  taking differently: Take 300 mg by mouth 3 times a day. See above) 270 Capsule 3    gabapentin (NEURONTIN) 300 MG Cap Take 1 Capsule by mouth 3 times a day for 100 days. (Patient taking differently: Take 300 mg by mouth 3 times a day. Taking 3 100mg tablets 3 times a day) 300 Capsule 3    carvedilol (COREG) 6.25 MG Tab TAKE 1 TABLET BY MOUTH TWICE DAILY WITH MEALS 180 Tablet 3    atorvastatin (LIPITOR) 80 MG tablet Take 1 Tablet by mouth every evening. 100 Tablet 3    amLODIPine (NORVASC) 5 MG Tab Take 1 Tablet by mouth every day. 100 Tablet 3    isosorbide mononitrate SR (IMDUR) 30 MG TABLET SR 24 HR Take 1 Tablet by mouth every evening. 100 Tablet 3    lisinopril (PRINIVIL) 20 MG Tab Take 1 Tablet by mouth every day. 100 Tablet 3    hydrocortisone 2.5 % Cream topical cream Apply 1 Application topically 2 times a day. Use on legs, if red, raised, itchy or with rash.  Stop if worsens.  Stop when rash / itching clears. (Patient taking differently: Apply 1 Application topically 2 times a day. Use on legs, if red, raised, itchy or with rash.  Stop if worsens.  Stop when rash / itching clears.    Using as needed) 30 g 0    albuterol 108 (90 Base) MCG/ACT Aero Soln inhalation aerosol INHALE 2 PUFFS BY MOUTH EVERY 6 HOURS AS NEEDED FOR SHORTNESS OF BREATH 25.5 g 0    loratadine (CLARITIN) 10 MG Tab Take 1 Tablet by mouth every day.      Acetaminophen 500 MG Cap Take 1 Capsule by mouth 2 times a day. Morning & Afternoon, sometimes takes 3 times a day      Melatonin 10 MG Tab Take 10 mg by mouth at bedtime.      Multiple Vitamins-Minerals (CENTRUM SILVER PO) Take 1 Tab by mouth every morning.      Biotin w/ Vitamins C & E 1250-7.5-7.5 MCG-MG-UNT Chew Tab Chew 1 Tab every morning.      guaifenesin LA (MUCINEX) 600 MG TABLET SR 12 HR Take 400 mg by mouth every morning.      Ascorbic Acid (VITAMIN C) 1000 MG Tab Take 1 Tablet by mouth every morning.      VITAMIN E PO Take 1 Cap by mouth every morning.      Cholecalciferol  (VITAMIN D) 50 MCG (2000 UT) Cap Take 2,000 Units by mouth every morning. Taking vit D3      omeprazole (PRILOSEC) 20 MG delayed-release capsule Take 1 Capsule by mouth every morning with breakfast.       No current facility-administered medications for this visit.       MEDICAL HISTORY  Past Medical History:   Diagnosis Date    Allergy     Anxiety     ASTHMA     Bronchitis     CAD (coronary artery disease)     JT to RCA; 70% stenosis in LAD    Chills     Constipation     Difficulty breathing     GERD (gastroesophageal reflux disease)     Heart attack ([facility])     Heartburn     Hyperlipidemia     Hypertension     Influenza     Insomnia     Lumbar back pain 9/10/2016    Mild peripheral edema 7/31/2020    Mumps     OSTEOPOROSIS     Palpitations     Peripheral vascular disease, unspecified ([facility]) 9/14/2021    Pneumonia     Post concussion syndrome 9/11/2020    PVD (peripheral vascular disease) ([facility])     70% PAD-followed by Lary AMBROCIO    Sweat, sweating, excessive     Tonsillitis      Past Surgical History:   Procedure Laterality Date    ABDOMINAL HYSTERECTOMY TOTAL      APPENDECTOMY      HERNIA REPAIR      HYSTERECTOMY LAPAROSCOPY      TONSILLECTOMY      ZZZ CARDIAC CATH         PAST PSYCHIATRIC HISTORY  Prior psychiatric hospitalization: denies  Prior Self harm/suicide attempt: denies  Prior Diagnosis: denies     PAST PSYCHIATRIC MEDICATIONS  Lorazepam, denies other medication trials      FAMILY HISTORY  Psychiatric diagnosis:  denies     SUBSTANCE USE HISTORY:  ALCOHOL: denies use, states she may have a half glass of wine every few months  TOBACCO: denies  CANNABIS: denies  OPIOIDS: denies  PRESCRIPTION MEDICATIONS: denies  OTHERS: denies  History of inpatient/outpatient rehab treatment: N/A     SOCIAL HISTORY  Lived in [place] until 2004 at which time she moved to [place]. Has lived in [place] for 18 years.  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      REVIEW OF SYSTEMS:        Constitutional negative   Eyes negative   Ears/Nose/Mouth/Throat negative   Cardiovascular negative   Respiratory negative   Gastrointestinal negative   Genitourinary negative   Muscular negative   Integumentary negative   Neurological negative   Endocrine negative   Hematologic/Lymphatic negative     PHYSICAL EXAMINATION:  Vital signs: LMP  (LMP Unknown)   Musculoskeletal: Normal gait.   Abnormal movements: no      MENTAL STATUS EXAMINATION      General:   - Grooming and hygiene: Good, Casual, and Neat,   - Apparent distress: no,   - Behavior: Calm  - Eye Contact:  Good,   - no psychomotor agitation or retardation    - Participation: Active verbal participation, Attentive, and Engaged  Orientation: Alert and Fully Oriented to person, place and time  Mood: Euthymic  Affect: Flexible and Full range,  Thought Process: Logical and Goal-directed  Thought Content: Denies suicidal or homicidal ideations, intent or plan Within normal limits  Perception: Denies auditory or visual hallucinations. No delusions noted Within normal limits  Attention span and concentration: Intact   Speech:Rate within normal limits and Volume within normal limits  Language: Appropriate   Insight: Good  Judgment: Good  Recent and remote memory: No gross evidence of memory deficits        DEPRESSION SCREENIN2022   Depression Screen (PHQ-2/PHQ-9)   PHQ-2 Total Score 0 0 0       Multiple values from one day are sorted in reverse-chronological order       Interpretation of PHQ-9 Total Score   Score Severity   1-4 No Depression    5-9 Mild Depression   10-14 Moderate Depression   15-19 Moderately Severe Depression   20-27 Severe Depression    CURRENT RISK:       Suicidal: Low       Homicidal: Low       Self-Harm: Low       Relapse: Not applicable       Crisis Safety Plan Reviewed Not Indicated       If evidence of imminent risk is present, intervention/plan:      MEDICAL RECORDS/LABS/DIAGNOSTIC TESTS REVIEWED:  No new lab since last visit     [facility] records -   Reviewed       ASSESSMENT:  Pt is a 89 year old pleasant female who is following up for chronic benzodiazepine use and insomnia. Patient utilizes medication appropriately, no concerning fill history. No concerns for falls at this time. Mentally she is quite sharp and memory appears robust.  Patient has likely gained physiologic dependence on medication at this time but is displaying no adverse consequences of its routine use and will continue.  Patient open to trialing reduced use of lorazepam at bedtime, will take half tablet at regular time, allow her control to determine if this is something that would be manageable.  Patient to report back on effects of decreasing dose.  Patient verbalized understanding the risks of continued benzodiazepine use.    DDX:  Primary Insomnia                2. Long Term Use of Benzodiazepine, dependence    PLAN:  (1) Continue Lorazepam 0.5mg PO at bedtime, patient will try taking 0.25 mg of lorazepam at bedtime.  Attempt to reduce dose.  (2) Melatonin 10mg PO at bedtime    Medication options, alternatives (including no medications) and medication risks/benefits/side effects were discussed in detail.  The patient was advised to call, message provider on [employer], or come in to the clinic if symptoms worsen or if any future questions/issues regarding their medications arise; the patient verbalized understanding and agreement.  The patient was educated to call 911, call the suicide hotline, or go to local ER if having thoughts of suicide or homicide;  verbalized understanding.      Return to clinic in 3 months or sooner if symptoms worsen.  Next Appointment: instruction provided on how to make the next appointment.     The proposed treatment plan was discussed with the patient who was provided the opportunity to ask questions and make suggestions regarding alternative treatment. Patient verbalized understanding and expressed agreement with the plan.       Denys Andrade D.O.  01/20/23    This note was created using voice recognition software (Dragon). The accuracy of the dictation is limited by the abilities of the software. I have reviewed the note prior to signing, however some errors in grammar and context are still possible. If you have any questions related to this note please do not hesitate to contact our office.

## 2023-01-25 ENCOUNTER — PATIENT OUTREACH (OUTPATIENT)
Dept: HEALTH INFORMATION MANAGEMENT | Facility: OTHER | Age: 88
End: 2023-01-25
Payer: MEDICARE

## 2023-01-25 DIAGNOSIS — I10 ESSENTIAL HYPERTENSION: ICD-10-CM

## 2023-01-25 PROCEDURE — 99490 CHRNC CARE MGMT STAFF 1ST 20: CPT | Performed by: FAMILY MEDICINE

## 2023-01-25 NOTE — PROGRESS NOTES
"Assessment:    Phone outreach to patient to complete her monthly PCM follow-up.  Dayana reports she has not been good the last 5 days. She saw Dr. Andrade (psychiatrist) on Friday and \"got down low\" thereafter.  She has not been walking as well since. She thinks it may be related to not being able to get outside and walk her dog because of the weather.  She fell in the snow New Year's Day.  She was able to get up, was not hurt.  The snow was about 8 inches deep.  I encouraged her to walk inside until the weather improves. Has an eye appointment on 2/6/23, her right eye is red & bothering her and her eyes are watering.  Did PT last year but quit [facility] through because she did not like working out on the machines.  It was hurting her back.       Education:    Mailed patient two articles from The New [employer] Times (Mediterranean Diet) and The [employer] (diet swap to lower health risks).    Care Plan:    No updates at this time    Progress:    Slow progress    Next Outreach:  2/22/23 call @ 1500 or sooner  "

## 2023-02-02 DIAGNOSIS — F51.01 PRIMARY INSOMNIA: ICD-10-CM

## 2023-02-02 DIAGNOSIS — Z79.899 CHRONIC USE OF BENZODIAZEPINE FOR THERAPEUTIC PURPOSE: ICD-10-CM

## 2023-02-02 NOTE — TELEPHONE ENCOUNTER
Please re-send prescription to the [facility] pharmacy.      Received request via: Patient    Was the patient seen in the last year in this department? Yes    Does the patient have an active prescription (recently filled or refills available) for medication(s) requested? No    Does the patient have nursing home Plus and need 100 day supply (blood pressure, diabetes and cholesterol meds only)? Medication is not for cholesterol, blood pressure or diabetes and Patient does not have SCP

## 2023-02-03 RX ORDER — LORAZEPAM 0.5 MG/1
0.5 TABLET ORAL DAILY
Qty: 30 TABLET | Refills: 2 | Status: SHIPPED | OUTPATIENT
Start: 2023-02-10 | End: 2023-04-21 | Stop reason: SDUPTHER

## 2023-02-16 ENCOUNTER — OFFICE VISIT (OUTPATIENT)
Dept: MEDICAL GROUP | Facility: PHYSICIAN GROUP | Age: 88
End: 2023-02-16
Payer: MEDICARE

## 2023-02-16 VITALS
OXYGEN SATURATION: 93 % | SYSTOLIC BLOOD PRESSURE: 112 MMHG | DIASTOLIC BLOOD PRESSURE: 60 MMHG | TEMPERATURE: 98.9 F | WEIGHT: 118.6 LBS | HEART RATE: 69 BPM | BODY MASS INDEX: 22.39 KG/M2 | HEIGHT: 61 IN

## 2023-02-16 DIAGNOSIS — R19.4 FREQUENT BOWEL MOVEMENTS: ICD-10-CM

## 2023-02-16 DIAGNOSIS — R26.89 IMBALANCE: ICD-10-CM

## 2023-02-16 PROCEDURE — 99214 OFFICE O/P EST MOD 30 MIN: CPT | Performed by: FAMILY MEDICINE

## 2023-02-16 ASSESSMENT — PATIENT HEALTH QUESTIONNAIRE - PHQ9: CLINICAL INTERPRETATION OF PHQ2 SCORE: 0

## 2023-02-16 ASSESSMENT — FIBROSIS 4 INDEX: FIB4 SCORE: 2.12

## 2023-02-16 NOTE — PROGRESS NOTES
Subjective:     Chief Complaint   Patient presents with    Difficulty Walking    Finger Stiffness    Constipation     Following diarrhea     Diarrhea       HPI:   Dayana presents today to discuss the following.    Imbalance  Chronic issue  Finds it difficult to walk at times  Not using a walker    Frequent bowel movements  Chronic issue  Alternating diarrhea/constipation         Past Medical History:   Diagnosis Date    Allergy     Anxiety     ASTHMA     Bronchitis     CAD (coronary artery disease)     JT to RCA; 70% stenosis in LAD    Chills     Constipation     Difficulty breathing     GERD (gastroesophageal reflux disease)     Heart attack ([facility])     Heartburn     Hyperlipidemia     Hypertension     Influenza     Insomnia     Lumbar back pain 9/10/2016    Mild peripheral edema 7/31/2020    Mumps     OSTEOPOROSIS     Palpitations     Peripheral vascular disease, unspecified ([facility]) 9/14/2021    Pneumonia     Post concussion syndrome 9/11/2020    PVD (peripheral vascular disease) ([facility])     70% PAD-followed by Lary AMBROCIO    Sweat, sweating, excessive     Tonsillitis        Current Outpatient Medications Ordered in Epic   Medication Sig Dispense Refill    LORazepam (ATIVAN) 0.5 MG Tab Take 1 Tablet by mouth every day for 90 days. (Try taking 1/2 tablet before bed, if unable to fall asleep take additional 1/2 tablet) 30 Tablet 2    montelukast (SINGULAIR) 10 MG Tab TAKE ONE TABLET BY MOUTH IN THE EVENING 90 Tablet 3    gabapentin (NEURONTIN) 300 MG Cap Take 1 Capsule by mouth 3 times a day for 90 days. (Patient taking differently: Take 300 mg by mouth 3 times a day. See above) 270 Capsule 3    gabapentin (NEURONTIN) 300 MG Cap Take 1 Capsule by mouth 3 times a day for 100 days. (Patient taking differently: Take 300 mg by mouth 3 times a day. Taking 3 100mg tablets 3 times a day) 300 Capsule 3    carvedilol (COREG) 6.25 MG Tab TAKE 1 TABLET BY MOUTH TWICE DAILY WITH MEALS 180 Tablet 3    atorvastatin (LIPITOR) 80 MG  tablet Take 1 Tablet by mouth every evening. 100 Tablet 3    amLODIPine (NORVASC) 5 MG Tab Take 1 Tablet by mouth every day. 100 Tablet 3    isosorbide mononitrate SR (IMDUR) 30 MG TABLET SR 24 HR Take 1 Tablet by mouth every evening. 100 Tablet 3    lisinopril (PRINIVIL) 20 MG Tab Take 1 Tablet by mouth every day. 100 Tablet 3    hydrocortisone 2.5 % Cream topical cream Apply 1 Application topically 2 times a day. Use on legs, if red, raised, itchy or with rash.  Stop if worsens.  Stop when rash / itching clears. (Patient taking differently: Apply 1 Application topically 2 times a day. Use on legs, if red, raised, itchy or with rash.  Stop if worsens.  Stop when rash / itching clears.    Using as needed) 30 g 0    albuterol 108 (90 Base) MCG/ACT Aero Soln inhalation aerosol INHALE 2 PUFFS BY MOUTH EVERY 6 HOURS AS NEEDED FOR SHORTNESS OF BREATH 25.5 g 0    loratadine (CLARITIN) 10 MG Tab Take 1 Tablet by mouth every day.      Acetaminophen 500 MG Cap Take 1 Capsule by mouth 2 times a day. Morning & Afternoon, sometimes takes 3 times a day      Melatonin 10 MG Tab Take 10 mg by mouth at bedtime.      Multiple Vitamins-Minerals (CENTRUM SILVER PO) Take 1 Tab by mouth every morning.      Biotin w/ Vitamins C & E 1250-7.5-7.5 MCG-MG-UNT Chew Tab Chew 1 Tab every morning.      guaifenesin LA (MUCINEX) 600 MG TABLET SR 12 HR Take 400 mg by mouth every morning.      Ascorbic Acid (VITAMIN C) 1000 MG Tab Take 1 Tablet by mouth every morning.      VITAMIN E PO Take 1 Cap by mouth every morning.      Cholecalciferol (VITAMIN D) 50 MCG (2000 UT) Cap Take 2,000 Units by mouth every morning. Taking vit D3      omeprazole (PRILOSEC) 20 MG delayed-release capsule Take 1 Capsule by mouth every morning with breakfast.       No current Epic-ordered facility-administered medications on file.       Allergies:  Bactrim [sulfamethoxazole w-trimethoprim], Pcn [penicillins], Codeine, and Sulfamethoxazole-trimethoprim    Health  "Maintenance: Completed    ROS:  Gen: no fevers/chills, no changes in weight  Eyes: no changes in vision  Pulm: no sob, no cough  CV: no chest pain, no palpitations  GI: no nausea/vomiting, no diarrhea      Objective:     Exam:  /60 (BP Location: Left arm, Patient Position: Sitting, BP Cuff Size: Small adult)   Pulse 69   Temp 37.2 °C (98.9 °F) (Temporal)   Ht 1.549 m (5' 1\")   Wt 53.8 kg (118 lb 9.6 oz)   LMP  (LMP Unknown)   SpO2 93%   BMI 22.41 kg/m²  Body mass index is 22.41 kg/m².    Gen: Alert and oriented, No apparent distress.  HENT: TMs are clear. Oropharynx is w/o erythema or exudates. Neck is supple without cervical lymphadenopathy. No JVD.   Eyes: PERRLA  Lungs: Normal effort, CTA bilaterally, no wheezes, rhonchi, or rales  CV: Regular rate and rhythm. No murmurs, rubs, or gallops.  Ext: No clubbing, cyanosis, edema.  Skin: no rash, lesions or ulcers  Neuro: Moves all extremities.  Psych: AAOx3    Assessment & Plan:     89 y.o. female with the following -     1. Imbalance  Chronic issue. Unstable. I would like to order physical therapy for gait balance training.    - Referral to Physical Therapy    2. Frequent bowel movements  Chronic, stable condition.  Recommend that she takes daily fiber supplement.      Return in about 3 months (around 5/16/2023).    HCC Gap Form    Last edited 02/16/23 10:25 PST by Justin Haley M.D.           Please note that this dictation was created using voice recognition software. I have made every reasonable attempt to correct obvious errors, but I expect that there are errors of grammar and possibly content that I did not discover before finalizing the note.        "

## 2023-02-22 ENCOUNTER — PATIENT OUTREACH (OUTPATIENT)
Dept: MEDICAL GROUP | Facility: MEDICAL CENTER | Age: 88
End: 2023-02-22
Payer: MEDICARE

## 2023-02-22 DIAGNOSIS — G89.29 CHRONIC BILATERAL LOW BACK PAIN WITH LEFT-SIDED SCIATICA: ICD-10-CM

## 2023-02-22 DIAGNOSIS — I10 ESSENTIAL HYPERTENSION: ICD-10-CM

## 2023-02-22 DIAGNOSIS — M54.42 CHRONIC BILATERAL LOW BACK PAIN WITH LEFT-SIDED SCIATICA: ICD-10-CM

## 2023-02-22 DIAGNOSIS — R26.89 IMBALANCE: ICD-10-CM

## 2023-02-22 PROCEDURE — 99999 PR NO CHARGE: CPT | Performed by: FAMILY MEDICINE

## 2023-02-22 NOTE — PROGRESS NOTES
Assessment:    Phone outreach to patient to complete her monthly PCM follow-up & quarterly review.   Dayana reports she is pretty good today.  She has seen her eye doctor & is being treated with Systane eye drops two times a day & Systane gel at night, goes back for follow-up on 2/28/23.  One eye still hurts sometimes but the treatment is helping.  Saw her PCP on 2/16/23.  Forgot to tell him about a pinched nerve that effects her left leg, it really bothers her really badly sometimes.  Sees PCP again in May and will address issue with him at that time.  PCP ordered PT to work on her balance & she starts same in April.  She was walking her dog & bent over to pick him up & tumbled over yesterday, she was not hurt.  Quarterly review included a review of some of her medications (we were not able to review all her medications because it was causing her to become anxious, she reviewed her medications with her PCP at her last office visit) and Care Plan, no updates to Care Plan @ this time.       Education:    Discussed the importance of her getting the shingles vaccine.  I routed her chart to her PCP to inquire if there were any contraindications for her to receive same.      Care Plan:    No updates at this time    Progress:    Slow progress    Next Outreach:  3/22/23, call @ [phone]

## 2023-02-24 ENCOUNTER — PATIENT OUTREACH (OUTPATIENT)
Dept: HEALTH INFORMATION MANAGEMENT | Facility: OTHER | Age: 88
End: 2023-02-24
Payer: MEDICARE

## 2023-02-24 DIAGNOSIS — I10 ESSENTIAL HYPERTENSION: ICD-10-CM

## 2023-02-28 RX ORDER — FLUTICASONE PROPIONATE 50 MCG
SPRAY, SUSPENSION (ML) NASAL
Qty: 16 G | Refills: 3 | Status: SHIPPED
Start: 2023-02-28 | End: 2023-03-09

## 2023-02-28 NOTE — TELEPHONE ENCOUNTER
Received request via: Pharmacy    Was the patient seen in the last year in this department? Yes    Does the patient have an active prescription (recently filled or refills available) for medication(s) requested? No    Does the patient have correction Plus and need 100 day supply (blood pressure, diabetes and cholesterol meds only)? Medication is not for cholesterol, blood pressure or diabetes

## 2023-03-01 ENCOUNTER — PATIENT OUTREACH (OUTPATIENT)
Dept: HEALTH INFORMATION MANAGEMENT | Facility: OTHER | Age: 88
End: 2023-03-01
Payer: MEDICARE

## 2023-03-01 DIAGNOSIS — I10 ESSENTIAL HYPERTENSION: ICD-10-CM

## 2023-03-01 NOTE — PROGRESS NOTES
CHW Amy called pt per JASVIR Chatman's request. Pt has had 2 falls recently, and RN thought it may be a good idea to initiate a home visit to reduce fall risk. Pt refused home visit to reduce fall risk. CHW will not actively follow pt further.    Allison

## 2023-03-08 ENCOUNTER — TELEPHONE (OUTPATIENT)
Dept: MEDICAL GROUP | Facility: PHYSICIAN GROUP | Age: 88
End: 2023-03-08
Payer: MEDICARE

## 2023-03-08 NOTE — TELEPHONE ENCOUNTER
Phone Number Called: [phone] (home)       Call outcome: Did not leave a detailed message. Requested patient to call back.    Message: LVM 1st attempt

## 2023-03-08 NOTE — TELEPHONE ENCOUNTER
VOICEMAIL  1. Caller Name: Dayana                       Call Back Number: [phone]     2. Message: Pt left vm stating she is in extreme pain in her L leg from her pinched nerve in lower back. Reports she is crying every time she has to walk and is requesting to know what to do.     3. Patient approves office to leave a detailed voicemail/[employer] message: N\A

## 2023-03-08 NOTE — TELEPHONE ENCOUNTER
Called pt and explained Dr. Eddy's message. Pt wanted to be seen here, offered an appt with Messi. Pt declined. Strongly recommended urgent care or ER. Pt was hesitant, but stated she would try.

## 2023-03-08 NOTE — TELEPHONE ENCOUNTER
She needs to get seen ASAP. So that either means urgent care, or if the pain is that severe, go to the ER.

## 2023-03-09 ENCOUNTER — OFFICE VISIT (OUTPATIENT)
Dept: MEDICAL GROUP | Facility: PHYSICIAN GROUP | Age: 88
End: 2023-03-09
Payer: MEDICARE

## 2023-03-09 VITALS
OXYGEN SATURATION: 94 % | DIASTOLIC BLOOD PRESSURE: 58 MMHG | BODY MASS INDEX: 21.71 KG/M2 | HEART RATE: 69 BPM | WEIGHT: 115 LBS | SYSTOLIC BLOOD PRESSURE: 130 MMHG | TEMPERATURE: 97.4 F | HEIGHT: 61 IN

## 2023-03-09 DIAGNOSIS — M54.42 ACUTE BILATERAL LOW BACK PAIN WITH BILATERAL SCIATICA: ICD-10-CM

## 2023-03-09 DIAGNOSIS — M54.41 ACUTE BILATERAL LOW BACK PAIN WITH BILATERAL SCIATICA: ICD-10-CM

## 2023-03-09 PROCEDURE — 99214 OFFICE O/P EST MOD 30 MIN: CPT | Performed by: FAMILY MEDICINE

## 2023-03-09 RX ORDER — PYRITHIONE ZINC 1 G/ML
3 LOTION/SHAMPOO TOPICAL DAILY
COMMUNITY
End: 2023-05-04

## 2023-03-09 RX ORDER — METHOCARBAMOL 500 MG/1
500 TABLET, FILM COATED ORAL 3 TIMES DAILY
Qty: 30 TABLET | Refills: 0 | Status: SHIPPED | OUTPATIENT
Start: 2023-03-09 | End: 2023-03-19

## 2023-03-09 RX ORDER — MELOXICAM 7.5 MG/1
7.5 TABLET ORAL DAILY
Qty: 30 TABLET | Refills: 0 | Status: SHIPPED | OUTPATIENT
Start: 2023-03-09 | End: 2023-04-06

## 2023-03-09 RX ORDER — VITS A,C,E/LUTEIN/MINERALS 300MCG-200
1 TABLET ORAL DAILY
COMMUNITY

## 2023-03-09 RX ORDER — METHYLDOPA/HYDROCHLOROTHIAZIDE 250MG-15MG
TABLET ORAL
COMMUNITY
End: 2023-04-17

## 2023-03-09 ASSESSMENT — FIBROSIS 4 INDEX: FIB4 SCORE: 2.14

## 2023-03-09 NOTE — ASSESSMENT & PLAN NOTE
Acute on chronic   Having left sided sciatica   No trauma   Some numbness     Severe L4/5 facet arthropathy and degenerative these results in right lateral listhesis and nearly grade 2 anterolisthesis     Moderate-severe central stenosis L4/5. Lesser stenoses at other levels as detailed above     Greatest foraminal stenosis is moderate-severe on the left at L5/S1

## 2023-03-09 NOTE — PROGRESS NOTES
Subjective:     Chief Complaint   Patient presents with    Leg Pain     Left leg; pinched nerve       HPI:   Dayana presents today to discuss the following.    Acute bilateral low back pain with bilateral sciatica  Acute on chronic   Having left sided sciatica   No trauma   Some numbness     Severe L4/5 facet arthropathy and degenerative these results in right lateral listhesis and nearly grade 2 anterolisthesis     Moderate-severe central stenosis L4/5. Lesser stenoses at other levels as detailed above     Greatest foraminal stenosis is moderate-severe on the left at L5/S1    Past Medical History:   Diagnosis Date    Allergy     Anxiety     ASTHMA     Bronchitis     CAD (coronary artery disease)     JT to RCA; 70% stenosis in LAD    Chills     Constipation     Difficulty breathing     GERD (gastroesophageal reflux disease)     Heart attack ([facility])     Heartburn     Hyperlipidemia     Hypertension     Influenza     Insomnia     Lumbar back pain 9/10/2016    Mild peripheral edema 7/31/2020    Mumps     OSTEOPOROSIS     Palpitations     Peripheral vascular disease, unspecified ([facility]) 9/14/2021    Pneumonia     Post concussion syndrome 9/11/2020    PVD (peripheral vascular disease) ([facility])     70% PAD-followed by Lary AMBROCIO    Sweat, sweating, excessive     Tonsillitis        Current Outpatient Medications Ordered in Epic   Medication Sig Dispense Refill    Metamucil Fiber Chew Tab Chew.      CRANBERRY PO Take  by mouth.      Multiple Vitamins-Minerals (OCUVITE-LUTEIN) Tab Take 1 Tablet by mouth every day.      Probiotic Chew Tab Chew.      Saline (OCEAN NASAL SPRAY NA) Administer  into affected nostril(S).      methocarbamol (ROBAXIN) 500 MG Tab Take 1 Tablet by mouth 3 times a day for 10 days. 30 Tablet 0    meloxicam (MOBIC) 7.5 MG Tab Take 1 Tablet by mouth every day. 30 Tablet 0    LORazepam (ATIVAN) 0.5 MG Tab Take 1 Tablet by mouth every day for 90 days. (Try taking 1/2 tablet before bed, if unable to fall asleep  take additional 1/2 tablet) (Patient taking differently: Take 0.5 mg by mouth every day. (Try taking 1/2 tablet before bed, if unable to fall asleep take additional 1/2 tablet)    2/22/23 taking 1 pill per night) 30 Tablet 2    montelukast (SINGULAIR) 10 MG Tab TAKE ONE TABLET BY MOUTH IN THE EVENING 90 Tablet 3    carvedilol (COREG) 6.25 MG Tab TAKE 1 TABLET BY MOUTH TWICE DAILY WITH MEALS (Patient taking differently: 2/22/23 Does not take with meals) 180 Tablet 3    atorvastatin (LIPITOR) 80 MG tablet Take 1 Tablet by mouth every evening. 100 Tablet 3    amLODIPine (NORVASC) 5 MG Tab Take 1 Tablet by mouth every day. 100 Tablet 3    isosorbide mononitrate SR (IMDUR) 30 MG TABLET SR 24 HR Take 1 Tablet by mouth every evening. 100 Tablet 3    lisinopril (PRINIVIL) 20 MG Tab Take 1 Tablet by mouth every day. 100 Tablet 3    hydrocortisone 2.5 % Cream topical cream Apply 1 Application topically 2 times a day. Use on legs, if red, raised, itchy or with rash.  Stop if worsens.  Stop when rash / itching clears. (Patient taking differently: Apply 1 Application topically 2 times a day. Use on legs, if red, raised, itchy or with rash.  Stop if worsens.  Stop when rash / itching clears.    Using as needed) 30 g 0    albuterol 108 (90 Base) MCG/ACT Aero Soln inhalation aerosol INHALE 2 PUFFS BY MOUTH EVERY 6 HOURS AS NEEDED FOR SHORTNESS OF BREATH 25.5 g 0    loratadine (CLARITIN) 10 MG Tab Take 1 Tablet by mouth every day.      Acetaminophen 500 MG Cap Take 1 Capsule by mouth 2 times a day. Morning & Afternoon, sometimes takes 3 times a day      Melatonin 10 MG Tab Take 10 mg by mouth at bedtime.      Multiple Vitamins-Minerals (CENTRUM SILVER PO) Take 1 Tab by mouth every morning.      Biotin w/ Vitamins C & E 1250-7.5-7.5 MCG-MG-UNT Chew Tab Chew 1 Tab every morning.      guaifenesin LA (MUCINEX) 600 MG TABLET SR 12 HR Take 400 mg by mouth every morning.      Ascorbic Acid (VITAMIN C) 1000 MG Tab Take 1 Tablet by mouth  "every morning.      VITAMIN E PO Take 1 Cap by mouth every morning.      Cholecalciferol (VITAMIN D) 50 MCG (2000 UT) Cap Take 2,000 Units by mouth every morning. Taking vit D3      omeprazole (PRILOSEC) 20 MG delayed-release capsule Take 1 Capsule by mouth every morning with breakfast.       No current Epic-ordered facility-administered medications on file.       Allergies:  Bactrim [sulfamethoxazole w-trimethoprim], Pcn [penicillins], Codeine, and Sulfamethoxazole-trimethoprim    Health Maintenance: Completed    ROS:  Gen: no fevers/chills, no changes in weight  Eyes: no changes in vision  Pulm: no sob, no cough  CV: no chest pain, no palpitations  GI: no nausea/vomiting, no diarrhea      Objective:     Exam:  /58 (BP Location: Left arm, Patient Position: Sitting, BP Cuff Size: Adult)   Pulse 69   Temp 36.3 °C (97.4 °F) (Temporal)   Ht 1.549 m (5' 1\")   Wt 52.2 kg (115 lb)   LMP  (LMP Unknown)   SpO2 94%   BMI 21.73 kg/m²  Body mass index is 21.73 kg/m².      Constitutional: Alert, no distress, well-groomed.  Skin: Warm, dry, good turgor, no rashes in visible areas.  Eye: Equal, round and reactive, conjunctiva clear, lids normal.  ENMT: Lips without lesions, good dentition, moist mucous membranes.  Neck: Trachea midline, no masses, no thyromegaly.  Respiratory: Unlabored respiratory effort, no cough.  MSK: Normal gait, moves all extremities.  Neuro: Grossly non-focal.   Psych: Alert and oriented x3, normal affect and mood.        Assessment & Plan:     89 y.o. female with the following -     1. Acute bilateral low back pain with bilateral sciatica  Acute on chronic condition.  The patient presents with low back pain with left-sided sciatica.  Reviewed MRI results completed by physiatry in 2019.  There is definitely severe stenosis and arthritis that could be contributing to her pain.  However I will proceed with x-rays to rule out any acute fracture.  We will proceed with muscle relaxer at the lowest " dose and trial of meloxicam with return precautions.  Patient would like to hold off on physical therapy.  If pain persist I would like her to follow-up with Dr. Fall.  - meloxicam (MOBIC) 7.5 MG Tab; Take 1 Tablet by mouth every day.  Dispense: 30 Tablet; Refill: 0  - DX-LUMBAR SPINE-2 OR 3 VIEWS; Future      No follow-ups on file.    HCC Gap Form    Last edited 03/09/23 14:22 PST by Justin Haley M.D.           Please note that this dictation was created using voice recognition software. I have made every reasonable attempt to correct obvious errors, but I expect that there are errors of grammar and possibly content that I did not discover before finalizing the note.

## 2023-03-20 ENCOUNTER — TELEPHONE (OUTPATIENT)
Dept: MEDICAL GROUP | Facility: PHYSICIAN GROUP | Age: 88
End: 2023-03-20
Payer: MEDICARE

## 2023-03-20 NOTE — TELEPHONE ENCOUNTER
Called patient and gave Dr. Eddy's message- Pt states it is not the Methocarbamol it is the Meloxicam. She reports she is done with the Methocarbamol. Patient wants to know if she should stop taking her Meloxicam.

## 2023-03-20 NOTE — TELEPHONE ENCOUNTER
Meloxicam as an anti-inflammatory.  If she thinks this medication is causing diarrhea she can stop the medication.

## 2023-03-20 NOTE — TELEPHONE ENCOUNTER
"VOICEMAIL  1. Caller Name: Dayana                      Call Back Number: [phone]     2. Message: Pt left vm stating she is having Diarrhea and states this started last night. States she believes this is due to her \"muscle relaxer's recently prescribed.\"     3. Patient approves office to leave a detailed voicemail/MyChart message: N\A  "

## 2023-03-20 NOTE — TELEPHONE ENCOUNTER
It appears she was prescribed methocarbamol by Dr. Haley on 3/9/2023. This can cause GI upset. If she thinks it is causing her diarrhea, she should stop using it and see if the diarrhea improves. If there is no blood in her stool, she can use over the counter imodium to manage the diarrhea.

## 2023-03-22 ENCOUNTER — PATIENT OUTREACH (OUTPATIENT)
Dept: HEALTH INFORMATION MANAGEMENT | Facility: OTHER | Age: 88
End: 2023-03-22
Payer: MEDICARE

## 2023-03-22 DIAGNOSIS — R26.89 IMBALANCE: ICD-10-CM

## 2023-03-22 DIAGNOSIS — I25.119 CORONARY ARTERY DISEASE INVOLVING NATIVE CORONARY ARTERY OF NATIVE HEART WITH ANGINA PECTORIS (HCC): ICD-10-CM

## 2023-03-22 DIAGNOSIS — I10 ESSENTIAL HYPERTENSION: ICD-10-CM

## 2023-03-22 PROCEDURE — 99490 CHRNC CARE MGMT STAFF 1ST 20: CPT | Performed by: FAMILY MEDICINE

## 2023-03-22 NOTE — PROGRESS NOTES
Assessment:    1443 on 3/22/23 phone outreach to patient to complete her monthly PCM follow-up, left voicemail message with contact information.  3/23/23 phone outreach with patient, she reports she is not that great, PCP gave her two medications to treat the pinched nerve in her back that causes her leg pain, one was a anti-inflammatory agent (Meloxicam) that she took for 10 days and the second was another medication (Methocarbamol) she was to take for 30 days.  She experienced diarrhea, called the office, & was told to stop the medication (Methocarbamol).  She does not think the medications helped her that much.  Has not been able to get the back x-rays her PCP ordered.   Meloxicam was most helpful in treating her leg pain.  Walked her dog in the park for the last 3 days.  She feels better in the morning but is pretty miserable at night, being in bed helps.  Her leg does not hurt when she is sleeping except when she turns over.  I scheduled her an appointment (5/4/23) with her PCP for follow-up on her leg pain.  She declined seeing another provider in the office on an earlier date.  Leg pain really started bothering her recently, end of February or early March 2023.  She starts PT on 4/4/23.       Education:    Mailed her newspaper article form the [employer] about walking to reduce risk for early death.    Care Plan:    No updates @ this time    Progress:    Progressing    Next Outreach:  4/27/23, call @ [phone]

## 2023-03-25 ENCOUNTER — HOSPITAL ENCOUNTER (EMERGENCY)
Facility: MEDICAL CENTER | Age: 88
End: 2023-03-25
Attending: EMERGENCY MEDICINE
Payer: MEDICARE

## 2023-03-25 VITALS
HEIGHT: 63 IN | RESPIRATION RATE: 15 BRPM | TEMPERATURE: 98.1 F | WEIGHT: 119.71 LBS | HEART RATE: 65 BPM | SYSTOLIC BLOOD PRESSURE: 180 MMHG | BODY MASS INDEX: 21.21 KG/M2 | OXYGEN SATURATION: 94 % | DIASTOLIC BLOOD PRESSURE: 89 MMHG

## 2023-03-25 DIAGNOSIS — M54.32 SCIATICA OF LEFT SIDE: ICD-10-CM

## 2023-03-25 PROCEDURE — 700111 HCHG RX REV CODE 636 W/ 250 OVERRIDE (IP): Performed by: EMERGENCY MEDICINE

## 2023-03-25 PROCEDURE — 700101 HCHG RX REV CODE 250: Performed by: EMERGENCY MEDICINE

## 2023-03-25 PROCEDURE — 99284 EMERGENCY DEPT VISIT MOD MDM: CPT

## 2023-03-25 RX ORDER — GABAPENTIN 400 MG/1
400 CAPSULE ORAL 3 TIMES DAILY
Qty: 90 CAPSULE | Refills: 0 | Status: SHIPPED | OUTPATIENT
Start: 2023-03-25 | End: 2023-04-12 | Stop reason: SDUPTHER

## 2023-03-25 RX ORDER — LIDOCAINE 50 MG/G
1 PATCH TOPICAL EVERY 24 HOURS
Status: DISCONTINUED | OUTPATIENT
Start: 2023-03-25 | End: 2023-03-25 | Stop reason: HOSPADM

## 2023-03-25 RX ORDER — METHYLPREDNISOLONE 4 MG/1
TABLET ORAL
Qty: 1 EACH | Refills: 0 | Status: SHIPPED | OUTPATIENT
Start: 2023-03-25 | End: 2023-04-12

## 2023-03-25 RX ORDER — PREDNISONE 20 MG/1
20 TABLET ORAL ONCE
Status: COMPLETED | OUTPATIENT
Start: 2023-03-25 | End: 2023-03-25

## 2023-03-25 RX ADMIN — PREDNISONE 20 MG: 20 TABLET ORAL at 18:36

## 2023-03-25 RX ADMIN — LIDOCAINE 1 PATCH: 50 PATCH TOPICAL at 18:36

## 2023-03-25 ASSESSMENT — PAIN DESCRIPTION - PAIN TYPE: TYPE: CHRONIC PAIN

## 2023-03-25 ASSESSMENT — FIBROSIS 4 INDEX: FIB4 SCORE: 2.14

## 2023-03-26 NOTE — ED TRIAGE NOTES
Chief Complaint   Patient presents with    Leg Pain     PT reports she has hx of pinched nerve in her lower back, was recently taken off of pain medication and has been experiencing increasing discomfort in her left leg. CMS x 3 in LLE. PT denies swelling or redness in extremity.     PT ambulatory to triage for above complaint. GCS 15.     Pt is alert and oriented, speaking in full sentences, follows commands and responds appropriately to questions. NAD. Resp are even and unlabored.      Pt placed in lobby. Pt educated on triage process. Pt encouraged to alert staff for any changes.     Patient and staff wearing appropriate PPE

## 2023-03-26 NOTE — ED PROVIDER NOTES
ER Provider Note    Scribed for Boy Orlando M.d. by Brenna Hermosillo. 3/25/2023  5:27 PM    Primary Care Provider: Justin Haley M.D.    CHIEF COMPLAINT  Chief Complaint   Patient presents with    Leg Pain     PT reports she has hx of pinched nerve in her lower back, was recently taken off of pain medication and has been experiencing increasing discomfort in her left leg. CMS x 3 in LLE. PT denies swelling or redness in extremity.     EXTERNAL RECORDS REVIEWED  Outpatient Notes reviewed her outpatient notes for dizziness and imbalance as well as insomnia back in January and February of this year    HPI/ROS  LIMITATION TO HISTORY   Select: : None  OUTSIDE HISTORIAN(S):  None    Dayana Lechuga is a 89 y.o. female who presents to the ED complaining of lower back pain. Patient reports having the feeling of a pinched nerve in her lower back that radiates down her left leg. She states the pain began 4-5 years ago and has been managed by Gabapentin 300 mg 3 times a day until recently. She denies any loss of bladder or bowel function but states the pain has made it more difficult for her to walk. Patient also reports taking Meloxicam from her PCP but stopped taking it due to developing diarrhea.    PAST MEDICAL HISTORY  Past Medical History:   Diagnosis Date    Allergy     Anxiety     ASTHMA     Bronchitis     CAD (coronary artery disease)     JT to RCA; 70% stenosis in LAD    Chills     Constipation     Difficulty breathing     GERD (gastroesophageal reflux disease)     Heart attack ([facility])     Heartburn     Hyperlipidemia     Hypertension     Influenza     Insomnia     Lumbar back pain 9/10/2016    Mild peripheral edema 7/31/2020    Mumps     OSTEOPOROSIS     Palpitations     Peripheral vascular disease, unspecified (HCC) 9/14/2021    Pneumonia     Post concussion syndrome 9/11/2020    PVD (peripheral vascular disease) ([facility])     70% PAD-followed by Lary AMBROCIO    Sweat, sweating, excessive     Tonsillitis         SURGICAL HISTORY  Past Surgical History:   Procedure Laterality Date    ABDOMINAL HYSTERECTOMY TOTAL      APPENDECTOMY      HERNIA REPAIR      HYSTERECTOMY LAPAROSCOPY      TONSILLECTOMY      ZZZ CARDIAC CATH         FAMILY HISTORY  Family History   Problem Relation Age of Onset    Heart Attack Father     Cancer Brother     Cancer Brother     Heart Disease Neg Hx     Heart Failure Neg Hx     Hyperlipidemia Neg Hx        SOCIAL HISTORY   reports that she has never smoked. She has never used smokeless tobacco. She reports that she does not drink alcohol and does not use drugs.    CURRENT MEDICATIONS  Previous Medications    ACETAMINOPHEN 500 MG CAP    Take 1 Capsule by mouth 2 times a day. Morning & Afternoon, sometimes takes 3 times a day    ALBUTEROL 108 (90 BASE) MCG/ACT AERO SOLN INHALATION AEROSOL    INHALE 2 PUFFS BY MOUTH EVERY 6 HOURS AS NEEDED FOR SHORTNESS OF BREATH    AMLODIPINE (NORVASC) 5 MG TAB    Take 1 Tablet by mouth every day.    ASCORBIC ACID (VITAMIN C) 1000 MG TAB    Take 1 Tablet by mouth every morning.    ATORVASTATIN (LIPITOR) 80 MG TABLET    Take 1 Tablet by mouth every evening.    BIOTIN W/ VITAMINS C & E 1250-7.5-7.5 MCG-MG-UNT CHEW TAB    Chew 1 Tab every morning.    CARVEDILOL (COREG) 6.25 MG TAB    TAKE 1 TABLET BY MOUTH TWICE DAILY WITH MEALS    CHOLECALCIFEROL (VITAMIN D) 50 MCG (2000 UT) CAP    Take 2,000 Units by mouth every morning. Taking vit D3    CRANBERRY PO    Take  by mouth.    GUAIFENESIN LA (MUCINEX) 600 MG TABLET SR 12 HR    Take 400 mg by mouth every morning.    HYDROCORTISONE 2.5 % CREAM TOPICAL CREAM    Apply 1 Application topically 2 times a day. Use on legs, if red, raised, itchy or with rash.  Stop if worsens.  Stop when rash / itching clears.    ISOSORBIDE MONONITRATE SR (IMDUR) 30 MG TABLET SR 24 HR    Take 1 Tablet by mouth every evening.    LISINOPRIL (PRINIVIL) 20 MG TAB    Take 1 Tablet by mouth every day.    LORATADINE (CLARITIN) 10 MG TAB    Take 1 Tablet  "by mouth every day.    LORAZEPAM (ATIVAN) 0.5 MG TAB    Take 1 Tablet by mouth every day for 90 days. (Try taking 1/2 tablet before bed, if unable to fall asleep take additional 1/2 tablet)    MELATONIN 10 MG TAB    Take 10 mg by mouth at bedtime.    MELOXICAM (MOBIC) 7.5 MG TAB    Take 1 Tablet by mouth every day.    METAMUCIL FIBER CHEW TAB    Chew.    MONTELUKAST (SINGULAIR) 10 MG TAB    TAKE ONE TABLET BY MOUTH IN THE EVENING    MULTIPLE VITAMINS-MINERALS (CENTRUM SILVER PO)    Take 1 Tab by mouth every morning.    MULTIPLE VITAMINS-MINERALS (OCUVITE-LUTEIN) TAB    Take 1 Tablet by mouth every day.    OMEPRAZOLE (PRILOSEC) 20 MG DELAYED-RELEASE CAPSULE    Take 1 Capsule by mouth every morning with breakfast.    PROBIOTIC CHEW TAB    Chew.    SALINE (OCEAN NASAL SPRAY NA)    Administer  into affected nostril(S).    VITAMIN E PO    Take 1 Cap by mouth every morning.       ALLERGIES  Bactrim [sulfamethoxazole w-trimethoprim], Pcn [penicillins], Codeine, and Sulfamethoxazole-trimethoprim    PHYSICAL EXAM  /84   Pulse 70   Temp 36.9 °C (98.5 °F) (Temporal)   Resp 16   Ht 1.6 m (5' 3\")   Wt 54.3 kg (119 lb 11.4 oz)   LMP  (LMP Unknown)   SpO2 95%   BMI 21.21 kg/m²   Constitutional: Well developed, Well nourished, No acute distress, Non-toxic appearance.   HENT: Normocephalic, Atraumatic, Bilateral external ears normal, Oropharynx is clear mucous membranes are moist. No oral exudates or nasal discharge.   Eyes: Pupils are equal round and reactive, EOMI, Conjunctiva normal, No discharge.   Neck: Normal range of motion, No tenderness, Supple, No stridor. No meningismus.  Lymphatic: No lymphadenopathy noted.   Cardiovascular: Regular rate and rhythm without murmur rub or gallop.  Thorax & Lungs: Clear breath sounds bilaterally without wheezes, rhonchi or rales. There is no chest wall tenderness.   Abdomen: Soft non-tender non-distended. There is no rebound or guarding. No organomegaly is appreciated. Bowel " sounds are normal.  Skin: Normal without rash.   Back: No CVA or spinal tenderness.   Extremities: Intact distal pulses, No edema, No tenderness, No cyanosis, No clubbing. Capillary refill is less than 2 seconds.  Musculoskeletal: Diminished reflex of left achilles, regular reflex in right achilles and leg.  Neurologic: Alert & oriented x 3, Normal motor function, Normal sensory function, No focal deficits noted. Reflexes are normal.  Psychiatric: Affect normal, Judgment normal, Mood normal. There is no suicidal ideation or patient reported hallucinations.     DIAGNOSTIC STUDIES      COURSE & MEDICAL DECISION MAKING     ED Observation Status? No; Patient does not meet criteria for ED Observation.     INITIAL ASSESSMENT, COURSE AND PLAN  Care Narrative: Patient has had issues with her back since her 20s but sciatica-like symptoms over the last 5 to 6 years occasionally treated by Dr. Fall and currently on Neurontin therapy at 300 mg 3 times a day.  She seems to be having exacerbation of her sciatica now but was verbally resistant to therapy but her son fortunately was able to calm her down and she subsequently accepted therapy    5:39 PM - Patient seen and examined at bedside. Discussed plan of care, including treatment of pain. Patient agrees to the plan of care. The patient will be medicated with prednisone 20 mg and a lidocaine patch. I discussed plan for discharge and follow up as outlined below. The patient is stable for discharge at this time and will return for any new or worsening symptoms. Patient verbalizes understanding and support with my plan for discharge.       DISPOSITION AND DISCUSSIONS  I have discussed management of the patient with the following physicians and LAKESHIA's:  None    Discussion of management with other [facility] or appropriate source(s): None     Escalation of care considered, and ultimately not performed: diagnostic imaging.  No evidence of cauda equina syndrome    Barriers to care at this  time, including but not limited to:  None .       DISPOSITION:  Patient will be discharged home in stable condition.  She is going to follow-up with Dr. Fall and increase gabapentin to 400 mg 3 times a day as well as use of Medrol Dosepak and Lidoderm patches    FOLLOW UP:  No follow-up provider specified.    OUTPATIENT MEDICATIONS:  New Prescriptions    GABAPENTIN (NEURONTIN) 400 MG CAP    Take 1 Capsule by mouth 3 times a day.    METHYLPREDNISOLONE (MEDROL DOSEPAK) 4 MG TABLET THERAPY PACK    Use as directed       FINAL DIANGOSIS  1. Sciatica of left side       Brenna PETTIT (Macarena), am scribing for, and in the presence of, Boy Orlando M.D..    Electronically signed by: Brenna Hermosillo (Macarena), 3/25/2023    Boy PETTIT M.D. personally performed the services described in this documentation, as scribed by Brenna Hermosillo in my presence, and it is both accurate and complete.     The note accurately reflects work and decisions made by me.  Boy Orlando M.D.  3/25/2023  6:10 PM

## 2023-03-26 NOTE — ED NOTES
Pt provided with discharge instructions. Pt verbalized understanding. Pt medicated per mar. Pt assisted out of ed via WC. ERP made aware of pt BP.

## 2023-03-26 NOTE — DISCHARGE INSTRUCTIONS
Please buy some Lidoderm patches over-the-counter when you fill your prescriptions  Please see Dr. Fall in follow-up in about a week

## 2023-03-30 ENCOUNTER — OFFICE VISIT (OUTPATIENT)
Dept: PHYSICAL MEDICINE AND REHAB | Facility: MEDICAL CENTER | Age: 88
End: 2023-03-30
Payer: MEDICARE

## 2023-03-30 VITALS
BODY MASS INDEX: 21.09 KG/M2 | HEIGHT: 63 IN | HEART RATE: 74 BPM | RESPIRATION RATE: 16 BRPM | DIASTOLIC BLOOD PRESSURE: 80 MMHG | OXYGEN SATURATION: 96 % | WEIGHT: 119 LBS | TEMPERATURE: 98.6 F | SYSTOLIC BLOOD PRESSURE: 130 MMHG

## 2023-03-30 DIAGNOSIS — Z79.899 MEDICATION MANAGEMENT: ICD-10-CM

## 2023-03-30 DIAGNOSIS — M48.061 SPINAL STENOSIS OF LUMBAR REGION, UNSPECIFIED WHETHER NEUROGENIC CLAUDICATION PRESENT: ICD-10-CM

## 2023-03-30 DIAGNOSIS — M43.16 SPONDYLOLISTHESIS OF LUMBAR REGION: ICD-10-CM

## 2023-03-30 PROCEDURE — 99215 OFFICE O/P EST HI 40 MIN: CPT | Performed by: PHYSICAL MEDICINE & REHABILITATION

## 2023-03-30 ASSESSMENT — PAIN SCALES - GENERAL: PAINLEVEL: 7=MODERATE-SEVERE PAIN

## 2023-03-30 ASSESSMENT — FIBROSIS 4 INDEX: FIB4 SCORE: 2.14

## 2023-03-30 NOTE — PROGRESS NOTES
"Follow up patient note  Pain Medicine, Interventional spine and sports physiatry, Physical medicine rehabilitation      Chief complaint:   Chief Complaint   Patient presents with    Follow-Up     Leg pain          HISTORY    Please see new patient note dated 9/10/2019 by Dr Fall,  for more details.     HPI  Patient identification: Dayana Lechuga 89 y.o. female with grade II spondylolisthesis at L4-5 and mod-severe lumbar spinal stenosis at L4-5 presents for follow-up of low back and left leg pain.    Interval history:   Dayana was last seen 03/15/2022 and had been doing well with her activity level and gabapentin 200mg po tid.  She started to have more pain around February 2023 without any particular injury. She did slip and fall on the snow on New Year's Day, but did not have any injury at that time.    Reports that she has continued to take gabapentin 300mg po tid.  Looks like this increase was started around 11/2022.      She presents today with radicular symptoms into the left leg that have been worsening since around Feburary and worsening.  She was seen in the ED on 03/25/2023.    From what she reports, she has had more difficulty walking her dog.  The severity of the winter has limited her usual walking routine over the last 3-4 months.    Reports that she has ahd some constipation.  Diarrhea with meloxicam and she \"threw this away.\" She has had some feeling of urinary retention, which seems better now.    Pain is 7/10 on the NRS.  She is taking gabapentin 400mg po tid now.  She has some intermittent claudication after about 2-3 minutes of walking.  Denies significant back pain.    No more falls after the slip and fall without injury on New Year's Day.  She has also had some difficulty with balance that seemed to resolve.  No work-up or diagnosis of stroke.  She has also had some issues with dry eyes.    She is independent with ADLs, in independent living.  She is here with her son, Jl"   ROS Red Flags :   Fever, Chills, Sweats: Denies, reports night sweats this week, unclear significance  Involuntary Weight Loss: Denies  Bowel/Bladder Incontinence: Denies, see above  Saddle Anesthesia: Denies    PMHx:   Past Medical History:   Diagnosis Date    Allergy     Anxiety     ASTHMA     Bronchitis     CAD (coronary artery disease)     JT to RCA; 70% stenosis in LAD    Chills     Constipation     Difficulty breathing     GERD (gastroesophageal reflux disease)     Heart attack ([facility])     Heartburn     Hyperlipidemia     Hypertension     Influenza     Insomnia     Lumbar back pain 9/10/2016    Mild peripheral edema 7/31/2020    Mumps     OSTEOPOROSIS     Palpitations     Peripheral vascular disease, unspecified ([facility]) 9/14/2021    Pneumonia     Post concussion syndrome 9/11/2020    PVD (peripheral vascular disease) ([facility])     70% PAD-followed by Lary AMBROCIO    Sweat, sweating, excessive     Tonsillitis        PSHx:   Past Surgical History:   Procedure Laterality Date    ABDOMINAL HYSTERECTOMY TOTAL      APPENDECTOMY      HERNIA REPAIR      HYSTERECTOMY LAPAROSCOPY      TONSILLECTOMY      ZZZ CARDIAC CATH         Family history   Family History   Problem Relation Age of Onset    Heart Attack Father     Cancer Brother     Cancer Brother     Heart Disease Neg Hx     Heart Failure Neg Hx     Hyperlipidemia Neg Hx        Medications:   Current Outpatient Medications   Medication    gabapentin (NEURONTIN) 400 MG Cap    Metamucil Fiber Chew Tab    CRANBERRY PO    Multiple Vitamins-Minerals (OCUVITE-LUTEIN) Tab    Probiotic Chew Tab    Saline (OCEAN NASAL SPRAY NA)    LORazepam (ATIVAN) 0.5 MG Tab    montelukast (SINGULAIR) 10 MG Tab    carvedilol (COREG) 6.25 MG Tab    atorvastatin (LIPITOR) 80 MG tablet    amLODIPine (NORVASC) 5 MG Tab    isosorbide mononitrate SR (IMDUR) 30 MG TABLET SR 24 HR    lisinopril (PRINIVIL) 20 MG Tab    albuterol 108 (90 Base) MCG/ACT Aero Soln inhalation aerosol    loratadine (CLARITIN) 10  MG Tab    Acetaminophen 500 MG Cap    Melatonin 10 MG Tab    Multiple Vitamins-Minerals (CENTRUM SILVER PO)    Biotin w/ Vitamins C & E 1250-7.5-7.5 MCG-MG-UNT Chew Tab    Ascorbic Acid (VITAMIN C) 1000 MG Tab    VITAMIN E PO    Cholecalciferol (VITAMIN D) 50 MCG (2000 UT) Cap    omeprazole (PRILOSEC) 20 MG delayed-release capsule    methylPREDNISolone (MEDROL DOSEPAK) 4 MG Tablet Therapy Pack    meloxicam (MOBIC) 7.5 MG Tab    hydrocortisone 2.5 % Cream topical cream    guaifenesin LA (MUCINEX) 600 MG TABLET SR 12 HR     No current facility-administered medications for this visit.       Allergies:   Allergies   Allergen Reactions    Bactrim [Sulfamethoxazole W-Trimethoprim] Hives    Pcn [Penicillins] Hives     About 70 years    Codeine Unspecified     Unknown reaction      Sulfamethoxazole-Trimethoprim Rash       Social Hx:   Social History     Socioeconomic History    Marital status:      Spouse name: Not on file    Number of children: Not on file    Years of education: Not on file    Highest education level: Not on file   Occupational History    Not on file   Tobacco Use    Smoking status: Never    Smokeless tobacco: Never   Vaping Use    Vaping Use: Never used   Substance and Sexual Activity    Alcohol use: No     Alcohol/week: 0.0 oz    Drug use: No    Sexual activity: Not Currently   Other Topics Concern     Service No    Blood Transfusions Yes    Caffeine Concern No    Occupational Exposure No    Hobby Hazards No    Sleep Concern Yes    Stress Concern Yes    Weight Concern No    Special Diet No    Back Care No    Exercise No    Bike Helmet No    Seat Belt Yes    Self-Exams No   Social History Narrative    Not on file     Social Determinants of Health     Financial Resource Strain: Low Risk     Difficulty of Paying Living Expenses: Not very hard   Food Insecurity: No Food Insecurity    Worried About Running Out of Food in the Last Year: Never true    Ran Out of Food in the Last Year: Never true  "  Transportation Needs: No Transportation Needs    Lack of Transportation (Medical): No    Lack of Transportation (Non-Medical): No   Physical Activity: Insufficiently Active    Days of Exercise per Week: 7 days    Minutes of Exercise per Session: 20 min   Stress: No Stress Concern Present    Feeling of Stress : Not at all   Social Connections: Moderately Isolated    Frequency of Communication with Friends and Family: More than three times a week    Frequency of Social Gatherings with Friends and Family: More than three times a week    Attends [employer] Services: 1 to 4 times per year    Active Member of Clubs or Organizations: No    Attends Club or Organization Meetings: Never    Marital Status:    Intimate Partner Violence: Not on file   Housing Stability: Low Risk     Unable to Pay for Housing in the Last Year: No    Number of Places Lived in the Last Year: 1    Unstable Housing in the Last Year: No         EXAMINATION     Physical Exam:   Vitals: /80 (BP Location: Right arm, Patient Position: Sitting, BP Cuff Size: Adult)   Pulse 74   Temp 37 °C (98.6 °F) (Temporal)   Resp 16   Ht 1.6 m (5' 3\")   Wt 54 kg (119 lb)   SpO2 96%     Constitutional:   Body Habitus: Body mass index is 21.08 kg/m².  Cooperation: Fully cooperates with exam  Appearance: Well-groomed no disheveled     Respiratory-  breathing comfortable on room air, no audible wheezing  Cardiovascular- capillary refills less than 2 seconds. No lower extremity edema is noted.   Psychiatric- alert and oriented ×3. Normal affect.   Spine:   Functional ROM of the lumbar spine.  No significant tenderness to palpation over the lumbar spine, paraspinals, gluteal region or sacrum.  Mild tenderness over the left greater trochanter    Functional ROM of the hips in internal and external rotation.  Nikole's is negative bilaterally  SLR is negative bilaterally    Key points for the international standards for neurological classification of spinal " cord injury (ISNCSCI) to light touch.     Dermatome R L   L2 2 2   L3 2 2   L4 2 2   L5 2 2   S1 2 2   S2 2 2     Motor Exam Lower Extremities    ? Myotome R L   Hip flexion L2 5 5   Knee extension L3 5 5   Ankle dorsiflexion L4 5 5   Toe extension L5 5 5   Ankle plantarflexion S1 5 5               MEDICAL DECISION MAKING    DATA    Labs:   Lab Results   Component Value Date/Time    SODIUM 141 08/16/2022 05:45 PM    POTASSIUM 3.9 08/16/2022 05:45 PM    CHLORIDE 107 08/16/2022 05:45 PM    CO2 21 08/16/2022 05:45 PM    GLUCOSE 123 (H) 08/16/2022 05:45 PM    BUN 15 08/16/2022 05:45 PM    CREATININE 0.49 (L) 08/16/2022 05:45 PM        Lab Results   Component Value Date/Time    PROTHROMBTM 12.4 01/05/2021 04:23 PM    INR 0.89 01/05/2021 04:23 PM        Lab Results   Component Value Date/Time    WBC 6.5 08/16/2022 05:45 PM    RBC 4.02 (L) 08/16/2022 05:45 PM    HEMOGLOBIN 13.1 08/16/2022 05:45 PM    HEMATOCRIT 38.7 08/16/2022 05:45 PM    MCV 96.3 08/16/2022 05:45 PM    MCH 32.6 08/16/2022 05:45 PM    MCHC 33.9 08/16/2022 05:45 PM    MPV 9.9 08/16/2022 05:45 PM    NEUTSPOLYS 52.10 08/16/2022 05:45 PM    LYMPHOCYTES 32.60 08/16/2022 05:45 PM    MONOCYTES 9.90 08/16/2022 05:45 PM    EOSINOPHILS 3.40 08/16/2022 05:45 PM    BASOPHILS 1.70 08/16/2022 05:45 PM        No results found for: HBA1C       Imaging: I personally reviewed following images    Reviewed MRI from 09/18/2019, but no new imaging available    I reviewed the following radiology reports                     Results for orders placed during the hospital encounter of 09/18/19    MR-LUMBAR SPINE-W/O    Impression  Severe L4/5 facet arthropathy and degenerative these results in right lateral listhesis and nearly grade 2 anterolisthesis    Moderate-severe central stenosis L4/5. Lesser stenoses at other levels as detailed above    Greatest foraminal stenosis is moderate-severe on the left at L5/S1            DIAGNOSIS   Visit Diagnoses     ICD-10-CM   1. Spinal  stenosis of lumbar region, unspecified whether neurogenic claudication present  M48.061   2. Spondylolisthesis of lumbar region  M43.16   3. Medication management  Z79.899         ASSESSMENT and PLAN:     Dayana Lechuga 89 y.o. female seen for above    Dayana was seen today for follow-up.    Diagnoses and all orders for this visit:    Spinal stenosis of lumbar region, unspecified whether neurogenic claudication present  -     MR-LUMBAR SPINE-W/O; Future    Spondylolisthesis of lumbar region  -     MR-LUMBAR SPINE-W/O; Future    Medication management  -     Comp Metabolic Panel; Future    Discussed possible treatment options. Continue with gabapentin 400mg po tid.  Plan for PT that is scheduled to start next week for difficulty with balance.  Plan to transition to PT for low back and left leg pain.  Discussed possible caudal epidural steroid injection.  The risks benefits and alternatives to this procedure were discussed and the patient wishes to proceed with the procedure. Risks include but are not limited to damage to surrounding structures, infection, bleeding, worsening of pain which can be permanent, weakness which can be permanent. Benefits include pain relief, improved function. Alternatives includes not doing the procedure.    Encouraged her to get xrays of the lumbar spine.  MRI lumbar spine ordered to assess for progression of lumbar spinal stenosis.    Continue activity as tolerated.      Follow up: after imaging    Thank you for allowing me to participate in the care of this patient. If you have any questions please not hesitate to contact me.      Total time: 53 minutes face-to-face time. I spent greater than 50% of the time for patient care and coordination on this date, including with the patient as per assessment and plan above.     Please note that this dictation was created using voice recognition software. I have made every reasonable attempt to correct obvious errors but there may be  errors of grammar and content that I may have overlooked prior to finalization of this note.      Torres Fall MD  Interventional Spine and Sports Physiatry  Physical Medicine and Rehabilitation  [facility]  3/30/2023

## 2023-04-04 ENCOUNTER — APPOINTMENT (OUTPATIENT)
Dept: PHYSICAL THERAPY | Facility: REHABILITATION | Age: 88
End: 2023-04-04
Attending: FAMILY MEDICINE
Payer: MEDICARE

## 2023-04-04 ENCOUNTER — APPOINTMENT (OUTPATIENT)
Dept: RADIOLOGY | Facility: MEDICAL CENTER | Age: 88
End: 2023-04-04
Attending: PHYSICAL MEDICINE & REHABILITATION
Payer: MEDICARE

## 2023-04-04 DIAGNOSIS — M43.16 SPONDYLOLISTHESIS OF LUMBAR REGION: ICD-10-CM

## 2023-04-04 DIAGNOSIS — M48.061 SPINAL STENOSIS OF LUMBAR REGION, UNSPECIFIED WHETHER NEUROGENIC CLAUDICATION PRESENT: ICD-10-CM

## 2023-04-04 PROCEDURE — 72148 MRI LUMBAR SPINE W/O DYE: CPT

## 2023-04-05 DIAGNOSIS — M54.41 ACUTE BILATERAL LOW BACK PAIN WITH BILATERAL SCIATICA: ICD-10-CM

## 2023-04-05 DIAGNOSIS — M54.42 ACUTE BILATERAL LOW BACK PAIN WITH BILATERAL SCIATICA: ICD-10-CM

## 2023-04-06 ENCOUNTER — HOSPITAL ENCOUNTER (OUTPATIENT)
Dept: LAB | Facility: MEDICAL CENTER | Age: 88
End: 2023-04-06
Attending: PHYSICAL MEDICINE & REHABILITATION
Payer: MEDICARE

## 2023-04-06 DIAGNOSIS — Z79.899 MEDICATION MANAGEMENT: ICD-10-CM

## 2023-04-06 PROCEDURE — 36415 COLL VENOUS BLD VENIPUNCTURE: CPT

## 2023-04-06 PROCEDURE — 80053 COMPREHEN METABOLIC PANEL: CPT

## 2023-04-06 RX ORDER — MELOXICAM 7.5 MG/1
7.5 TABLET ORAL DAILY
Qty: 30 TABLET | Refills: 0 | Status: SHIPPED | OUTPATIENT
Start: 2023-04-06 | End: 2023-04-12

## 2023-04-07 DIAGNOSIS — M54.41 ACUTE BILATERAL LOW BACK PAIN WITH BILATERAL SCIATICA: ICD-10-CM

## 2023-04-07 DIAGNOSIS — M54.42 ACUTE BILATERAL LOW BACK PAIN WITH BILATERAL SCIATICA: ICD-10-CM

## 2023-04-07 LAB
ALBUMIN SERPL BCP-MCNC: 4.1 G/DL (ref 3.2–4.9)
ALBUMIN/GLOB SERPL: 1.5 G/DL
ALP SERPL-CCNC: 50 U/L (ref 30–99)
ALT SERPL-CCNC: 18 U/L (ref 2–50)
ANION GAP SERPL CALC-SCNC: 11 MMOL/L (ref 7–16)
AST SERPL-CCNC: 18 U/L (ref 12–45)
BILIRUB SERPL-MCNC: 0.4 MG/DL (ref 0.1–1.5)
BUN SERPL-MCNC: 15 MG/DL (ref 8–22)
CALCIUM ALBUM COR SERPL-MCNC: 9.4 MG/DL (ref 8.5–10.5)
CALCIUM SERPL-MCNC: 9.5 MG/DL (ref 8.5–10.5)
CHLORIDE SERPL-SCNC: 103 MMOL/L (ref 96–112)
CO2 SERPL-SCNC: 21 MMOL/L (ref 20–33)
CREAT SERPL-MCNC: 0.64 MG/DL (ref 0.5–1.4)
GFR SERPLBLD CREATININE-BSD FMLA CKD-EPI: 82 ML/MIN/1.73 M 2
GLOBULIN SER CALC-MCNC: 2.8 G/DL (ref 1.9–3.5)
GLUCOSE SERPL-MCNC: 95 MG/DL (ref 65–99)
POTASSIUM SERPL-SCNC: 4.5 MMOL/L (ref 3.6–5.5)
PROT SERPL-MCNC: 6.9 G/DL (ref 6–8.2)
SODIUM SERPL-SCNC: 135 MMOL/L (ref 135–145)

## 2023-04-11 ENCOUNTER — APPOINTMENT (OUTPATIENT)
Dept: PHYSICAL THERAPY | Facility: REHABILITATION | Age: 88
End: 2023-04-11
Attending: FAMILY MEDICINE
Payer: MEDICARE

## 2023-04-12 ENCOUNTER — OFFICE VISIT (OUTPATIENT)
Dept: PHYSICAL MEDICINE AND REHAB | Facility: MEDICAL CENTER | Age: 88
End: 2023-04-12
Payer: MEDICARE

## 2023-04-12 VITALS
OXYGEN SATURATION: 95 % | DIASTOLIC BLOOD PRESSURE: 64 MMHG | TEMPERATURE: 97.3 F | HEIGHT: 62 IN | BODY MASS INDEX: 22.45 KG/M2 | SYSTOLIC BLOOD PRESSURE: 132 MMHG | WEIGHT: 122 LBS | HEART RATE: 72 BPM

## 2023-04-12 DIAGNOSIS — M48.061 SPINAL STENOSIS OF LUMBAR REGION, UNSPECIFIED WHETHER NEUROGENIC CLAUDICATION PRESENT: ICD-10-CM

## 2023-04-12 DIAGNOSIS — M43.16 SPONDYLOLISTHESIS OF LUMBAR REGION: ICD-10-CM

## 2023-04-12 DIAGNOSIS — M54.16 LEFT LUMBAR RADICULITIS: ICD-10-CM

## 2023-04-12 DIAGNOSIS — M51.27 LUMBOSACRAL DISC HERNIATION: ICD-10-CM

## 2023-04-12 PROCEDURE — 99214 OFFICE O/P EST MOD 30 MIN: CPT | Performed by: PHYSICAL MEDICINE & REHABILITATION

## 2023-04-12 RX ORDER — GABAPENTIN 400 MG/1
400 CAPSULE ORAL 3 TIMES DAILY
Qty: 90 CAPSULE | Refills: 0 | Status: SHIPPED | OUTPATIENT
Start: 2023-04-12 | End: 2023-05-04 | Stop reason: SDUPTHER

## 2023-04-12 ASSESSMENT — PATIENT HEALTH QUESTIONNAIRE - PHQ9: CLINICAL INTERPRETATION OF PHQ2 SCORE: 0

## 2023-04-12 ASSESSMENT — FIBROSIS 4 INDEX: FIB4 SCORE: 1.75

## 2023-04-12 ASSESSMENT — PAIN SCALES - GENERAL: PAINLEVEL: 9=SEVERE PAIN

## 2023-04-12 NOTE — PROGRESS NOTES
Follow up patient note  Pain Medicine, Interventional spine and sports physiatry, Physical medicine rehabilitation      Chief complaint:   Chief Complaint   Patient presents with    Follow-Up     MRI Review            HISTORY    Please see new patient note dated 9/10/2019 by Dr Fall,  for more details.     HPI  Patient identification: Dayana Lechuga 89 y.o. female with grade II spondylolisthesis at L4-5 and mod-severe lumbar spinal stenosis at L4-5 presents for follow-up of low back and left leg pain.    Interval history:     Dayana returns for follow-up after MRI of the lumbar spine.    Physical therapy is starting tomorrow.  This had to be rescheduled.    Pain is 8-9/10 on the NRS.  Her pain continues to be in the low back and radiates down the left leg to the bottom of her foot.    She is not taking gabapentin 400 mg p.o. 3 times daily.  This may be somewhat helpful, although it is unclear how much this helps.    Constipation continues to be intermittent.  Diarrhea has resolved since stopping meloxicam.    She is independent with ADLs, in independent living.       ROS Red Flags :   Fever, Chills, Sweats: Denies, reports night sweats this week, unclear significance  Involuntary Weight Loss: Denies  Bowel/Bladder Incontinence: Denies, see above  Saddle Anesthesia: Denies    PMHx:   Past Medical History:   Diagnosis Date    Allergy     Anxiety     ASTHMA     Bronchitis     CAD (coronary artery disease)     JT to RCA; 70% stenosis in LAD    Chills     Constipation     Difficulty breathing     GERD (gastroesophageal reflux disease)     Heart attack (HCC)     Heartburn     Hyperlipidemia     Hypertension     Influenza     Insomnia     Lumbar back pain 9/10/2016    Mild peripheral edema 7/31/2020    Mumps     OSTEOPOROSIS     Palpitations     Peripheral vascular disease, unspecified (HCC) 9/14/2021    Pneumonia     Post concussion syndrome 9/11/2020    PVD (peripheral vascular disease) (HCC)     70%  PAD-followed by L[facility]BROCIO    Sweat, sweating, excessive     Tonsillitis        PSHx:   Past Surgical History:   Procedure Laterality Date    ABDOMINAL HYSTERECTOMY TOTAL      APPENDECTOMY      HERNIA REPAIR      HYSTERECTOMY LAPAROSCOPY      TONSILLECTOMY      ZZZ CARDIAC CATH         Family history   Family History   Problem Relation Age of Onset    Heart Attack Father     Cancer Brother     Cancer Brother     Heart Disease Neg Hx     Heart Failure Neg Hx     Hyperlipidemia Neg Hx        Medications:   Current Outpatient Medications   Medication    gabapentin (NEURONTIN) 400 MG Cap    Metamucil Fiber Chew Tab    CRANBERRY PO    Multiple Vitamins-Minerals (OCUVITE-LUTEIN) Tab    Probiotic Chew Tab    Saline (OCEAN NASAL SPRAY NA)    LORazepam (ATIVAN) 0.5 MG Tab    montelukast (SINGULAIR) 10 MG Tab    carvedilol (COREG) 6.25 MG Tab    atorvastatin (LIPITOR) 80 MG tablet    amLODIPine (NORVASC) 5 MG Tab    isosorbide mononitrate SR (IMDUR) 30 MG TABLET SR 24 HR    lisinopril (PRINIVIL) 20 MG Tab    hydrocortisone 2.5 % Cream topical cream    albuterol 108 (90 Base) MCG/ACT Aero Soln inhalation aerosol    loratadine (CLARITIN) 10 MG Tab    Acetaminophen 500 MG Cap    Melatonin 10 MG Tab    Multiple Vitamins-Minerals (CENTRUM SILVER PO)    Biotin w/ Vitamins C & E 1250-7.5-7.5 MCG-MG-UNT Chew Tab    guaifenesin LA (MUCINEX) 600 MG TABLET SR 12 HR    Ascorbic Acid (VITAMIN C) 1000 MG Tab    VITAMIN E PO    Cholecalciferol (VITAMIN D) 50 MCG (2000 UT) Cap    omeprazole (PRILOSEC) 20 MG delayed-release capsule     No current facility-administered medications for this visit.       Allergies:   Allergies   Allergen Reactions    Bactrim [Sulfamethoxazole W-Trimethoprim] Hives    Pcn [Penicillins] Hives     About 70 years    Codeine Unspecified     Unknown reaction      Sulfamethoxazole-Trimethoprim Rash       Social Hx:   Social History     Socioeconomic History    Marital status:      Spouse name: Not on file     Number of children: Not on file    Years of education: Not on file    Highest education level: Not on file   Occupational History    Not on file   Tobacco Use    Smoking status: Never    Smokeless tobacco: Never   Vaping Use    Vaping Use: Never used   Substance and Sexual Activity    Alcohol use: No     Alcohol/week: 0.0 oz    Drug use: No    Sexual activity: Not Currently   Other Topics Concern     Service No    Blood Transfusions Yes    Caffeine Concern No    Occupational Exposure No    Hobby Hazards No    Sleep Concern Yes    Stress Concern Yes    Weight Concern No    Special Diet No    Back Care No    Exercise No    Bike Helmet No    Seat Belt Yes    Self-Exams No   Social History Narrative    Not on file     Social Determinants of Health     Financial Resource Strain: Low Risk     Difficulty of Paying Living Expenses: Not very hard   Food Insecurity: No Food Insecurity    Worried About Running Out of Food in the Last Year: Never true    Ran Out of Food in the Last Year: Never true   Transportation Needs: No Transportation Needs    Lack of Transportation (Medical): No    Lack of Transportation (Non-Medical): No   Physical Activity: Insufficiently Active    Days of Exercise per Week: 7 days    Minutes of Exercise per Session: 20 min   Stress: No Stress Concern Present    Feeling of Stress : Not at all   Social Connections: Moderately Isolated    Frequency of Communication with Friends and Family: More than three times a week    Frequency of Social Gatherings with Friends and Family: More than three times a week    Attends Jain Services: 1 to 4 times per year    Active Member of Clubs or Organizations: No    Attends Club or Organization Meetings: Never    Marital Status:    Intimate Partner Violence: Not on file   Housing Stability: Low Risk     Unable to Pay for Housing in the Last Year: No    Number of Places Lived in the Last Year: 1    Unstable Housing in the Last Year: No  "        EXAMINATION     Physical Exam:   Vitals: /64 (BP Location: Right arm, Patient Position: Sitting, BP Cuff Size: Adult)   Pulse 72   Temp 36.3 °C (97.3 °F) (Temporal)   Ht 1.575 m (5' 2\")   Wt 55.3 kg (122 lb)   SpO2 95%     Constitutional:   Body Habitus: Body mass index is 22.31 kg/m².  Cooperation: Fully cooperates with exam  Appearance: Well-groomed no disheveled     Respiratory-  breathing comfortable on room air, no audible wheezing  Cardiovascular- capillary refills less than 2 seconds. No lower extremity edema is noted.   Psychiatric- alert and oriented ×3. Normal affect.   Spine:   Functional ROM of the lumbar spine.  No significant tenderness to palpation over the lumbar spine, paraspinals, gluteal region or sacrum.    Functional ROM of the hips in internal and external rotation.    Seated SLR is negative bilaterally    Key points for the international standards for neurological classification of spinal cord injury (ISNCSCI) to light touch.     Dermatome R L   L2 2 2   L3 2 2   L4 2 2   L5 2 2   S1 2 2   S2 2 2     Motor Exam Lower Extremities    ? Myotome R L   Hip flexion L2 5 5   Knee extension L3 5 5   Ankle dorsiflexion L4 5 5   Toe extension L5 5 5   Ankle plantarflexion S1 5 5     \      MEDICAL DECISION MAKING    DATA    Labs:   Lab Results   Component Value Date/Time    SODIUM 135 04/06/2023 10:25 AM    POTASSIUM 4.5 04/06/2023 10:25 AM    CHLORIDE 103 04/06/2023 10:25 AM    CO2 21 04/06/2023 10:25 AM    GLUCOSE 95 04/06/2023 10:25 AM    BUN 15 04/06/2023 10:25 AM    CREATININE 0.64 04/06/2023 10:25 AM        Lab Results   Component Value Date/Time    PROTHROMBTM 12.4 01/05/2021 04:23 PM    INR 0.89 01/05/2021 04:23 PM        Lab Results   Component Value Date/Time    WBC 6.5 08/16/2022 05:45 PM    RBC 4.02 (L) 08/16/2022 05:45 PM    HEMOGLOBIN 13.1 08/16/2022 05:45 PM    HEMATOCRIT 38.7 08/16/2022 05:45 PM    MCV 96.3 08/16/2022 05:45 PM    MCH 32.6 08/16/2022 05:45 PM    MCHC " 33.9 08/16/2022 05:45 PM    MPV 9.9 08/16/2022 05:45 PM    NEUTSPOLYS 52.10 08/16/2022 05:45 PM    LYMPHOCYTES 32.60 08/16/2022 05:45 PM    MONOCYTES 9.90 08/16/2022 05:45 PM    EOSINOPHILS 3.40 08/16/2022 05:45 PM    BASOPHILS 1.70 08/16/2022 05:45 PM        No results found for: HBA1C       Imaging: I personally reviewed following images  MRI lumbar spine April 5, 2023  At L1-2, no central or foraminal stenosis  At L2-3, mild disc bulge, mild facet arthropathy, no central or foraminal stenosis  At L3-4, mild disc bulge, mild facet arthropathy, mild central canal stenosis mild disc bulge  At L4-5, mild disc bulge, facet arthropathy, severe central canal stenosis  At L5-S1, left paracentral and foraminal disc extrusion that results in severe left lateral recess stenosis at L5 foraminal stenosis.  Mild central canal stenosis is noted.      Reviewed MRI from 09/18/2019, but no new imaging available    I reviewed the following radiology reports    MRI lumbar spine 04/05/2023  IMPRESSION:     1.  Multifocal degenerative disease in the lumbar spine as described above. The degenerative changes are significantly progressed since the previous study.  2.  There is central, left paracentral and left foraminal disc extrusion at L5-S1 causing severe left lateral recess and left L5 neural foraminal stenosis. The left S1 and L5 nerve roots might have been impinged.  3.  There is an approximately 9 mm anterolisthesis of L4 on 5.                       Results for orders placed during the hospital encounter of 09/18/19    MR-LUMBAR SPINE-W/O    Impression  Severe L4/5 facet arthropathy and degenerative these results in right lateral listhesis and nearly grade 2 anterolisthesis    Moderate-severe central stenosis L4/5. Lesser stenoses at other levels as detailed above    Greatest foraminal stenosis is moderate-severe on the left at L5/S1            DIAGNOSIS   Visit Diagnoses     ICD-10-CM   1. Left lumbar radiculitis  M54.16   2.  Lumbosacral disc herniation  M51.27   3. Spinal stenosis of lumbar region, unspecified whether neurogenic claudication present  M48.061   4. Spondylolisthesis of lumbar region  M43.16           ASSESSMENT and PLAN:     Dayana Lechuga 89 y.o. female seen for above    Dayana was seen today for follow-up.    Diagnoses and all orders for this visit:    Left lumbar radiculitis  -     gabapentin (NEURONTIN) 400 MG Cap; Take 1 Capsule by mouth 3 times a day.  -     Cancel: Referral to Pain Clinic  -     Referral to Pain Clinic    Lumbosacral disc herniation  -     Referral to Pain Clinic    Spinal stenosis of lumbar region, unspecified whether neurogenic claudication present  -     gabapentin (NEURONTIN) 400 MG Cap; Take 1 Capsule by mouth 3 times a day.  -     Cancel: Referral to Pain Clinic  -     Referral to Pain Clinic    Spondylolisthesis of lumbar region          Discussed findings on MRI of the lumbar spine.  She has severe lumbar spinal stenosis at L4-5 and symptoms are consistent with the left central and foraminal disc extrusion at L5-S1.  Discussed proceeding with caudal epidural steroid injection with catheter. The risks benefits and alternatives to this procedure were discussed and the patient wishes to proceed with the procedure. Risks include but are not limited to damage to surrounding structures, infection, bleeding, worsening of pain which can be permanent, weakness which can be permanent. Benefits include pain relief, improved function. Alternatives includes not doing the procedure.    Continue gabapentin 400 mg p.o. 3 times daily.  We discussed adjustments to this medication but given that she tolerates it we have elected to continue current dose.  Continue with plan for physical therapy as tolerated.      Follow up: Hospital injection    Thank you for allowing me to participate in the care of this patient. If you have any questions please not hesitate to contact me.      Please note that  this dictation was created using voice recognition software. I have made every reasonable attempt to correct obvious errors but there may be errors of grammar and content that I may have overlooked prior to finalization of this note.      Torres Flal MD  Interventional Spine and Sports Physiatry  Physical Medicine and Rehabilitation  [facility]

## 2023-04-12 NOTE — PATIENT INSTRUCTIONS
Your procedure will be at the [facility] special procedure suite.    [facility] [place] [address]       PRE-PROCEDURE INSTRUCTIONS  You may take your regular medications except:   No Anti-inflammatories 5 days prior to your procedure. Anti-inflammatories include medicines such as  ibuprofen (Motrin, Advil), Excedrin, Naproxen (Aleve, Anaprox, Naprelan, Naprosyn), Celecoxib (Celebrex), Diclofenac (Voltaren-XR tab), and Meloxicam (Mobic).   No Glucophage or Metformin 24 hours before your procedure. You may resume next day after your procedure.  Call the physiatry office if you are taking or prescribed anti-biotics within five days of procedure.  Please ask provider if you are taking any new diabetes medication.  CONTINUE TAKING BLOOD PRESSURE MEDICATIONS AS PRESCRIBED.  Pain medications will not be prescribed on the procedure day. Procedural pain medication may be used by your provider   Call your doctor's office performing the procedure if you have a fever, chills, rash or new illness prior to your procedure    Anticoagulation/antiplatelet medications  No Blood thinning medications such as Coumadin or Plavix 5 days prior to procedure unless your doctor said to continue these medications. Call your doctor if a new medication is prescribed in this class.     Restrictions for eating before procedure:   If you are getting procedural sedation, then do not eat to for 8 hours prior to procedure appointment time. Do not drink fluids for four hours prior to your procedure time.   If you are not having procedural sedation, then Skip the meal prior to your procedure. If you have a morning procedure then skip breakfast. If you have an afternoon procedure then skip lunch.   You may drink clear liquids up to 2 hours prior to your procedure  You must have a  the day of procedure to accompany you home.      POST PROCEDURE INSTRUCTIONS   No heavy lifting, strenuous bending or strenuous exercise for 3 days  after your procedure.  No hot tubs, baths, swimming for 3 days after your procedure  You can remove the bandage the day after the procedure.  IF YOU RECEIVED A STEROID INJECTION. PLEASE NOTE THAT THERE MAY BE A DELAY FOR THE INJECTION TO START WORKING, THE DELAY MAY BE UP TO TWO WEEKS. IF YOU HAVE DIABETES, PLEASE NOTE THAT YOUR SUGAR LEVELS MAY BE ELEVATED FOR 1-2 DAYS AFTER A STEROID INJECTION.  THE STEROID MAY CAUSE TEMPORARY SYMPTOMS WHICH USUALLY RESOLVE ON THEIR OWN WITHIN 1 TO 2 DAYS INCLUDING FACIAL FLUSHING OR A FEELING OF WARMTH ON THE FACE, TEMPORARY INCREASES IN BLOOD SUGAR, INSOMNIA, INCREASED HUNGER  IF YOU RECEIVED A DIAGNOSTIC PROCEDURE (SUCH AS A MEDIAL BRANCH BLOCK), PLEASE NOTE THAT WE DO EXPECT THIS INJECTION TO WEAR OFF.  IT IS IMPORTANT TO COMPLETE THE PAIN DIARY AND LIST THE PAIN SCORE ONLY FOR THE REGION WHERE THE PROCEDURE WAS AND BRING THIS TO YOUR FOLLOW UP VISIT.  IF YOU RECEIVED A RADIOFREQUENCY PROCEDURE, THERE MAY BE SOME SORENESS AFTER THE PROCEDURE.  THIS IS NORMAL.  IF YOU EXPERIENCE PROLONGED WEAKNESS LONGER THAN ONE DAY, BOWEL OR BLADDER INCONTINENCE THEN PLEASE CALL THE PHYSIATRY OFFICE.  Your leg may feel heavy, weak and numb for up to 1-2 days. Be very careful walking.    You may resume normal activities 3 days after procedure.

## 2023-04-12 NOTE — H&P (VIEW-ONLY)
Follow up patient note  Pain Medicine, Interventional spine and sports physiatry, Physical medicine rehabilitation      Chief complaint:   Chief Complaint   Patient presents with    Follow-Up     MRI Review            HISTORY    Please see new patient note dated 9/10/2019 by Dr Fall,  for more details.     HPI  Patient identification: Dayana Lechuga 89 y.o. female with grade II spondylolisthesis at L4-5 and mod-severe lumbar spinal stenosis at L4-5 presents for follow-up of low back and left leg pain.    Interval history:     Dayana returns for follow-up after MRI of the lumbar spine.    Physical therapy is starting tomorrow.  This had to be rescheduled.    Pain is 8-9/10 on the NRS.  Her pain continues to be in the low back and radiates down the left leg to the bottom of her foot.    She is not taking gabapentin 400 mg p.o. 3 times daily.  This may be somewhat helpful, although it is unclear how much this helps.    Constipation continues to be intermittent.  Diarrhea has resolved since stopping meloxicam.    She is independent with ADLs, in independent living.       ROS Red Flags :   Fever, Chills, Sweats: Denies, reports night sweats this week, unclear significance  Involuntary Weight Loss: Denies  Bowel/Bladder Incontinence: Denies, see above  Saddle Anesthesia: Denies    PMHx:   Past Medical History:   Diagnosis Date    Allergy     Anxiety     ASTHMA     Bronchitis     CAD (coronary artery disease)     JT to RCA; 70% stenosis in LAD    Chills     Constipation     Difficulty breathing     GERD (gastroesophageal reflux disease)     Heart attack (HCC)     Heartburn     Hyperlipidemia     Hypertension     Influenza     Insomnia     Lumbar back pain 9/10/2016    Mild peripheral edema 7/31/2020    Mumps     OSTEOPOROSIS     Palpitations     Peripheral vascular disease, unspecified (HCC) 9/14/2021    Pneumonia     Post concussion syndrome 9/11/2020    PVD (peripheral vascular disease) (HCC)     70%  PAD-followed by Lary AMBROCIO    Sweat, sweating, excessive     Tonsillitis        PSHx:   Past Surgical History:   Procedure Laterality Date    ABDOMINAL HYSTERECTOMY TOTAL      APPENDECTOMY      HERNIA REPAIR      HYSTERECTOMY LAPAROSCOPY      TONSILLECTOMY      ZZZ CARDIAC CATH         Family history   Family History   Problem Relation Age of Onset    Heart Attack Father     Cancer Brother     Cancer Brother     Heart Disease Neg Hx     Heart Failure Neg Hx     Hyperlipidemia Neg Hx        Medications:   Current Outpatient Medications   Medication    gabapentin (NEURONTIN) 400 MG Cap    Metamucil Fiber Chew Tab    CRANBERRY PO    Multiple Vitamins-Minerals (OCUVITE-LUTEIN) Tab    Probiotic Chew Tab    Saline (OCEAN NASAL SPRAY NA)    LORazepam (ATIVAN) 0.5 MG Tab    montelukast (SINGULAIR) 10 MG Tab    carvedilol (COREG) 6.25 MG Tab    atorvastatin (LIPITOR) 80 MG tablet    amLODIPine (NORVASC) 5 MG Tab    isosorbide mononitrate SR (IMDUR) 30 MG TABLET SR 24 HR    lisinopril (PRINIVIL) 20 MG Tab    hydrocortisone 2.5 % Cream topical cream    albuterol 108 (90 Base) MCG/ACT Aero Soln inhalation aerosol    loratadine (CLARITIN) 10 MG Tab    Acetaminophen 500 MG Cap    Melatonin 10 MG Tab    Multiple Vitamins-Minerals (CENTRUM SILVER PO)    Biotin w/ Vitamins C & E 1250-7.5-7.5 MCG-MG-UNT Chew Tab    guaifenesin LA (MUCINEX) 600 MG TABLET SR 12 HR    Ascorbic Acid (VITAMIN C) 1000 MG Tab    VITAMIN E PO    Cholecalciferol (VITAMIN D) 50 MCG (2000 UT) Cap    omeprazole (PRILOSEC) 20 MG delayed-release capsule     No current facility-administered medications for this visit.       Allergies:   Allergies   Allergen Reactions    Bactrim [Sulfamethoxazole W-Trimethoprim] Hives    Pcn [Penicillins] Hives     About 70 years    Codeine Unspecified     Unknown reaction      Sulfamethoxazole-Trimethoprim Rash       Social Hx:   Social History     Socioeconomic History    Marital status:      Spouse name: Not on file     Number of children: Not on file    Years of education: Not on file    Highest education level: Not on file   Occupational History    Not on file   Tobacco Use    Smoking status: Never    Smokeless tobacco: Never   Vaping Use    Vaping Use: Never used   Substance and Sexual Activity    Alcohol use: No     Alcohol/week: 0.0 oz    Drug use: No    Sexual activity: Not Currently   Other Topics Concern     Service No    Blood Transfusions Yes    Caffeine Concern No    Occupational Exposure No    Hobby Hazards No    Sleep Concern Yes    Stress Concern Yes    Weight Concern No    Special Diet No    Back Care No    Exercise No    Bike Helmet No    Seat Belt Yes    Self-Exams No   Social History Narrative    Not on file     Social Determinants of Health     Financial Resource Strain: Low Risk     Difficulty of Paying Living Expenses: Not very hard   Food Insecurity: No Food Insecurity    Worried About Running Out of Food in the Last Year: Never true    Ran Out of Food in the Last Year: Never true   Transportation Needs: No Transportation Needs    Lack of Transportation (Medical): No    Lack of Transportation (Non-Medical): No   Physical Activity: Insufficiently Active    Days of Exercise per Week: 7 days    Minutes of Exercise per Session: 20 min   Stress: No Stress Concern Present    Feeling of Stress : Not at all   Social Connections: Moderately Isolated    Frequency of Communication with Friends and Family: More than three times a week    Frequency of Social Gatherings with Friends and Family: More than three times a week    Attends [employer] Services: 1 to 4 times per year    Active Member of Clubs or Organizations: No    Attends Club or Organization Meetings: Never    Marital Status:    Intimate Partner Violence: Not on file   Housing Stability: Low Risk     Unable to Pay for Housing in the Last Year: No    Number of Places Lived in the Last Year: 1    Unstable Housing in the Last Year: No  "        EXAMINATION     Physical Exam:   Vitals: /64 (BP Location: Right arm, Patient Position: Sitting, BP Cuff Size: Adult)   Pulse 72   Temp 36.3 °C (97.3 °F) (Temporal)   Ht 1.575 m (5' 2\")   Wt 55.3 kg (122 lb)   SpO2 95%     Constitutional:   Body Habitus: Body mass index is 22.31 kg/m².  Cooperation: Fully cooperates with exam  Appearance: Well-groomed no disheveled     Respiratory-  breathing comfortable on room air, no audible wheezing  Cardiovascular- capillary refills less than 2 seconds. No lower extremity edema is noted.   Psychiatric- alert and oriented ×3. Normal affect.   Spine:   Functional ROM of the lumbar spine.  No significant tenderness to palpation over the lumbar spine, paraspinals, gluteal region or sacrum.    Functional ROM of the hips in internal and external rotation.    Seated SLR is negative bilaterally    Key points for the international standards for neurological classification of spinal cord injury (ISNCSCI) to light touch.     Dermatome R L   L2 2 2   L3 2 2   L4 2 2   L5 2 2   S1 2 2   S2 2 2     Motor Exam Lower Extremities    ? Myotome R L   Hip flexion L2 5 5   Knee extension L3 5 5   Ankle dorsiflexion L4 5 5   Toe extension L5 5 5   Ankle plantarflexion S1 5 5     \      MEDICAL DECISION MAKING    DATA    Labs:   Lab Results   Component Value Date/Time    SODIUM 135 04/06/2023 10:25 AM    POTASSIUM 4.5 04/06/2023 10:25 AM    CHLORIDE 103 04/06/2023 10:25 AM    CO2 21 04/06/2023 10:25 AM    GLUCOSE 95 04/06/2023 10:25 AM    BUN 15 04/06/2023 10:25 AM    CREATININE 0.64 04/06/2023 10:25 AM        Lab Results   Component Value Date/Time    PROTHROMBTM 12.4 01/05/2021 04:23 PM    INR 0.89 01/05/2021 04:23 PM        Lab Results   Component Value Date/Time    WBC 6.5 08/16/2022 05:45 PM    RBC 4.02 (L) 08/16/2022 05:45 PM    HEMOGLOBIN 13.1 08/16/2022 05:45 PM    HEMATOCRIT 38.7 08/16/2022 05:45 PM    MCV 96.3 08/16/2022 05:45 PM    MCH 32.6 08/16/2022 05:45 PM    MCHC " 33.9 08/16/2022 05:45 PM    MPV 9.9 08/16/2022 05:45 PM    NEUTSPOLYS 52.10 08/16/2022 05:45 PM    LYMPHOCYTES 32.60 08/16/2022 05:45 PM    MONOCYTES 9.90 08/16/2022 05:45 PM    EOSINOPHILS 3.40 08/16/2022 05:45 PM    BASOPHILS 1.70 08/16/2022 05:45 PM        No results found for: HBA1C       Imaging: I personally reviewed following images  MRI lumbar spine April 5, 2023  At L1-2, no central or foraminal stenosis  At L2-3, mild disc bulge, mild facet arthropathy, no central or foraminal stenosis  At L3-4, mild disc bulge, mild facet arthropathy, mild central canal stenosis mild disc bulge  At L4-5, mild disc bulge, facet arthropathy, severe central canal stenosis  At L5-S1, left paracentral and foraminal disc extrusion that results in severe left lateral recess stenosis at L5 foraminal stenosis.  Mild central canal stenosis is noted.      Reviewed MRI from 09/18/2019, but no new imaging available    I reviewed the following radiology reports    MRI lumbar spine 04/05/2023  IMPRESSION:     1.  Multifocal degenerative disease in the lumbar spine as described above. The degenerative changes are significantly progressed since the previous study.  2.  There is central, left paracentral and left foraminal disc extrusion at L5-S1 causing severe left lateral recess and left L5 neural foraminal stenosis. The left S1 and L5 nerve roots might have been impinged.  3.  There is an approximately 9 mm anterolisthesis of L4 on 5.                       Results for orders placed during the hospital encounter of 09/18/19    MR-LUMBAR SPINE-W/O    Impression  Severe L4/5 facet arthropathy and degenerative these results in right lateral listhesis and nearly grade 2 anterolisthesis    Moderate-severe central stenosis L4/5. Lesser stenoses at other levels as detailed above    Greatest foraminal stenosis is moderate-severe on the left at L5/S1            DIAGNOSIS   Visit Diagnoses     ICD-10-CM   1. Left lumbar radiculitis  M54.16   2.  Lumbosacral disc herniation  M51.27   3. Spinal stenosis of lumbar region, unspecified whether neurogenic claudication present  M48.061   4. Spondylolisthesis of lumbar region  M43.16           ASSESSMENT and PLAN:     Dayana Lechuga 89 y.o. female seen for above    Dayana was seen today for follow-up.    Diagnoses and all orders for this visit:    Left lumbar radiculitis  -     gabapentin (NEURONTIN) 400 MG Cap; Take 1 Capsule by mouth 3 times a day.  -     Cancel: Referral to Pain Clinic  -     Referral to Pain Clinic    Lumbosacral disc herniation  -     Referral to Pain Clinic    Spinal stenosis of lumbar region, unspecified whether neurogenic claudication present  -     gabapentin (NEURONTIN) 400 MG Cap; Take 1 Capsule by mouth 3 times a day.  -     Cancel: Referral to Pain Clinic  -     Referral to Pain Clinic    Spondylolisthesis of lumbar region          Discussed findings on MRI of the lumbar spine.  She has severe lumbar spinal stenosis at L4-5 and symptoms are consistent with the left central and foraminal disc extrusion at L5-S1.  Discussed proceeding with caudal epidural steroid injection with catheter. The risks benefits and alternatives to this procedure were discussed and the patient wishes to proceed with the procedure. Risks include but are not limited to damage to surrounding structures, infection, bleeding, worsening of pain which can be permanent, weakness which can be permanent. Benefits include pain relief, improved function. Alternatives includes not doing the procedure.    Continue gabapentin 400 mg p.o. 3 times daily.  We discussed adjustments to this medication but given that she tolerates it we have elected to continue current dose.  Continue with plan for physical therapy as tolerated.      Follow up: Hospital injection    Thank you for allowing me to participate in the care of this patient. If you have any questions please not hesitate to contact me.      Please note that  this dictation was created using voice recognition software. I have made every reasonable attempt to correct obvious errors but there may be errors of grammar and content that I may have overlooked prior to finalization of this note.      Torres Fall MD  Interventional Spine and Sports Physiatry  Physical Medicine and Rehabilitation  [facility]

## 2023-04-17 RX ORDER — ASPIRIN 81 MG/1
81 TABLET ORAL DAILY
COMMUNITY

## 2023-04-18 ENCOUNTER — PHYSICAL THERAPY (OUTPATIENT)
Dept: PHYSICAL THERAPY | Facility: REHABILITATION | Age: 88
End: 2023-04-18
Attending: FAMILY MEDICINE
Payer: MEDICARE

## 2023-04-18 DIAGNOSIS — R26.89 IMBALANCE: ICD-10-CM

## 2023-04-18 DIAGNOSIS — M54.42 CHRONIC BILATERAL LOW BACK PAIN WITH LEFT-SIDED SCIATICA: ICD-10-CM

## 2023-04-18 DIAGNOSIS — G89.29 CHRONIC BILATERAL LOW BACK PAIN WITH LEFT-SIDED SCIATICA: ICD-10-CM

## 2023-04-18 PROCEDURE — 97162 PT EVAL MOD COMPLEX 30 MIN: CPT

## 2023-04-18 PROCEDURE — 97110 THERAPEUTIC EXERCISES: CPT

## 2023-04-18 ASSESSMENT — BALANCE ASSESSMENTS
BALANCE - SITTING STATIC: NORMAL
BALANCE - STANDING DYNAMIC: GOOD
BALANCE - SITTING DYNAMIC: NORMAL
BALANCE - STANDING STATIC: NORMAL

## 2023-04-18 ASSESSMENT — ENCOUNTER SYMPTOMS
PAIN SCALE AT HIGHEST: 9
PAIN SCALE: 2
QUALITY: SHARP
PAIN SCALE AT LOWEST: 2

## 2023-04-18 NOTE — OP THERAPY EVALUATION
Outpatient Physical Therapy  INITIAL EVALUATION    [facility] [place]  [address], [address]  [place] [address]  Phone:  [phone]    Date of Evaluation: 2023    Patient: Dayana Lechuga  YOB: 1929  MRN: [id number]     Referring Provider: Justin Haley M.D.  [address]  [place] [address]   Referring Diagnosis Other abnormalities of gait and mobility [R26.89]     Time Calculation  Start time: 1100  Stop time: 1145 Time Calculation (min): 45 minutes         Chief Complaint: No chief complaint on file.    Visit Diagnoses     ICD-10-CM   1. Imbalance  R26.89   2. Chronic bilateral low back pain with left-sided sciatica  M54.42    G89.29       Date of onset of impairment: 2023    Subjective   History of Present Illness:     History of chief complaint:   Chronic back and left leg pain worsening over the past several months. Patient has been seen previously at this clinic for chronic back and leg pain with hx of grade II spondylolisthesis at L4-5 and mod-severe lumbar spinal stenosis at L4-5.  PT has not previously been helpful or effective at relieving symptoms in the past. New episode:  Onset of left coccyx pain radiates down back to leg down to foot-insideous onset.  Right leg is starting to hurt from favoring left.  Patient will be having epidural 2024. No difficulty with balance   Aggravating:  standing sometimes sitting, transitioning stand to sit. Straightening knee and hip  Relieving : muscle relaxant Gabapentin tylenol,   Function:  using walker since Saturday which is helping manage symptoms  Activity:  sitting and reading, walking to breakfast and lunch, walking dog   PMH:  osteoporosis, chronic back/leg pain, PVD, CAD          Pain:     Current pain ratin    At best pain ratin    At worst pain ratin    Location:  Coccyx, left posterior glute, thigh and leg to foot    Quality:  Sharp    Progression:   Worsening    Activity Tolerance:     Current activity tolerance / Recreational activities:  Unable to tolerate walking without FWW secondary to increasing left leg pain   Short distances at residential facility to and from meals with FWW(for pain relief)  Short walks with dog on leash using FWW currently    Social Support:     Lives in:  Community-based residential facility    Lives with:  Alone    Diagnostic Tests:     X-ray: abnormal      Treatments:     Treatments to date:  Previous PT was not effective       Past Medical History:   Diagnosis Date    Allergy     Anxiety     ASTHMA     Bronchitis     CAD (coronary artery disease)     JT to RCA; 70% stenosis in LAD    Chills     Constipation     Difficulty breathing     GERD (gastroesophageal reflux disease)     Heart attack ([facility])     Heartburn     Hyperlipidemia     Hypertension     Influenza     Insomnia     Lumbar back pain 9/10/2016    Mild peripheral edema 7/31/2020    Mumps     OSTEOPOROSIS     Palpitations     Peripheral vascular disease, unspecified ([facility]) 9/14/2021    Pneumonia     Post concussion syndrome 9/11/2020    PVD (peripheral vascular disease) ([facility])     70% PAD-followed by Lary AMBROCIO    Sweat, sweating, excessive     Tonsillitis      Past Surgical History:   Procedure Laterality Date    ABDOMINAL HYSTERECTOMY TOTAL      APPENDECTOMY      HERNIA REPAIR      HYSTERECTOMY LAPAROSCOPY      TONSILLECTOMY      ZZZ CARDIAC CATH         Precautions:       Objective   Observation and functional movement:  Forward flexed standing posture       Range of motion and strength:    Moderate loss extension, increase//worse left posterior leg and foot with standing lumbar extension  Flexion:  FT to ankles increase//no worse  Rotation right increase//worse    Sensation and reflexes:     Sensation is intact.    Palpation and joint mobility:     Hypertonic left hamstrings and glute    Special tests:      + left LE neural tension     Balance:     Sitting (static):  Normal    Sitting (dynamic): Normal    Standing (static): Normal    Standing (dynamic): Good    Gait:        Excessive forward flexion using fww  Decreased weightshift left  Decreased TKE with excessive knee flexion in stance and swing  Usesing fww to decrease force on back    Coordination and tone:     Coordination is intact.    Basic self care and IADL's:     Independent with all self care.    Cognition and visual perception:     No cognition deficits noted.        Therapeutic Exercises (CPT 72931):     1. glute squeezes, 10/10 sec    2. TA activation in sitting and standing, x10    3. ice pack applied in right sidelying to left posterior thigh, 10 min no charge    Time-based treatments/modalities:    Physical Therapy Timed Treatment Charges  Therapeutic exercise minutes (CPT 24530): 8 minutes      Assessment, Response and Plan:   Assessment details:  Patient presents severe pain /radiculopathy left leg which is significantly impacting function.  Evaluation was limited secondary to severity ofsymptoms.  Patient will be having epidural 4/20/2023  Patient presents with excessive forward flexion in standing and with gait and decreased weightshift on left leg and excessive knee flexion in stance and swing secondary to neural tension/radiculopathy worsens with knee extension. Bed mobility and transfers provoked left leg pain and coccyx pain causing 10/10 pain left posterior thigh and knee.  Ice pack applied to LLE which provided some relief temporarily in right SL position.  Lumbar AROM was guarded and limited in extension >flexion but both provoked symptoms.  Balance was normal but not assessed formally secondary to pain.  Given that patient did not benefit from PT last episode, prognosis is fair-poor.  Recommend continued PT to meet goals stated below.   Barriers to therapy:  Comorbidities  Prognosis: fair    Goals:   Short Term Goals:   Patient able to walk without assistive device with >25% decreased  symptoms  Patient able to perform ADLS with >25% decreased symptoms  Short term goal time span:  2-4 weeks      Long Term Goals:    Patient able to walk dog >10 min without assistive device and with >50% decreased leg symptoms  Patient able to perform ADLS with >50% decreased symptoms  Long term goal time span:  6-8 weeks    Plan:   Therapy options:  Physical therapy treatment to continue  Planned therapy interventions:  Neuromuscular Re-education (CPT 52282), Manual Therapy (CPT 86214), Gait Training (CPT 10652), Therapeutic Activities (CPT 80327) and Therapeutic Exercise (CPT 31870)  Frequency:  1x week  Duration in weeks:  8  Discussed with:  Patient    Functional Assessment Used        Referring provider co-signature:  I have reviewed this plan of care and my co-signature certifies the need for services.    Certification Period: 04/18/2023 to  06/13/23    Physician Signature: ________________________________ Date: ______________

## 2023-04-20 ENCOUNTER — APPOINTMENT (OUTPATIENT)
Dept: RADIOLOGY | Facility: REHABILITATION | Age: 88
End: 2023-04-20
Attending: PHYSICAL MEDICINE & REHABILITATION
Payer: MEDICARE

## 2023-04-20 ENCOUNTER — HOSPITAL ENCOUNTER (OUTPATIENT)
Facility: REHABILITATION | Age: 88
End: 2023-04-20
Attending: PHYSICAL MEDICINE & REHABILITATION | Admitting: PHYSICAL MEDICINE & REHABILITATION
Payer: MEDICARE

## 2023-04-20 ENCOUNTER — APPOINTMENT (OUTPATIENT)
Dept: PHYSICAL THERAPY | Facility: REHABILITATION | Age: 88
End: 2023-04-20
Attending: FAMILY MEDICINE
Payer: MEDICARE

## 2023-04-20 VITALS
WEIGHT: 123.46 LBS | OXYGEN SATURATION: 96 % | HEIGHT: 62 IN | TEMPERATURE: 98.2 F | SYSTOLIC BLOOD PRESSURE: 147 MMHG | HEART RATE: 68 BPM | DIASTOLIC BLOOD PRESSURE: 62 MMHG | RESPIRATION RATE: 16 BRPM | BODY MASS INDEX: 22.72 KG/M2

## 2023-04-20 PROCEDURE — 62323 NJX INTERLAMINAR LMBR/SAC: CPT

## 2023-04-20 PROCEDURE — 700117 HCHG RX CONTRAST REV CODE 255

## 2023-04-20 PROCEDURE — 700101 HCHG RX REV CODE 250

## 2023-04-20 PROCEDURE — 700111 HCHG RX REV CODE 636 W/ 250 OVERRIDE (IP)

## 2023-04-20 RX ORDER — DEXAMETHASONE SODIUM PHOSPHATE 10 MG/ML
INJECTION, SOLUTION INTRAMUSCULAR; INTRAVENOUS
Status: COMPLETED
Start: 2023-04-20 | End: 2023-04-20

## 2023-04-20 RX ORDER — LIDOCAINE HYDROCHLORIDE 20 MG/ML
INJECTION, SOLUTION EPIDURAL; INFILTRATION; INTRACAUDAL; PERINEURAL
Status: COMPLETED
Start: 2023-04-20 | End: 2023-04-20

## 2023-04-20 RX ORDER — DEXAMETHASONE SODIUM PHOSPHATE 10 MG/ML
INJECTION, SOLUTION INTRAMUSCULAR; INTRAVENOUS
Status: DISCONTINUED
Start: 2023-04-20 | End: 2023-04-20 | Stop reason: HOSPADM

## 2023-04-20 RX ADMIN — DEXAMETHASONE SODIUM PHOSPHATE 20 MG: 10 INJECTION INTRAMUSCULAR; INTRAVENOUS at 09:36

## 2023-04-20 RX ADMIN — IOHEXOL 10 ML: 240 INJECTION, SOLUTION INTRATHECAL; INTRAVASCULAR; INTRAVENOUS; ORAL at 09:36

## 2023-04-20 RX ADMIN — LIDOCAINE HYDROCHLORIDE 5 ML: 20 INJECTION, SOLUTION EPIDURAL; INFILTRATION; INTRACAUDAL at 09:36

## 2023-04-20 ASSESSMENT — FIBROSIS 4 INDEX: FIB4 SCORE: 1.75

## 2023-04-20 ASSESSMENT — PAIN DESCRIPTION - PAIN TYPE
TYPE: CHRONIC PAIN

## 2023-04-20 NOTE — INTERVAL H&P NOTE
"H&P reviewed. The patient was examined and there are no changes to the H&P      /74   Pulse 62   Temp 36.8 °C (98.2 °F) (Tympanic)   Resp 16   Ht 1.575 m (5' 2\")   Wt 56 kg (123 lb 7.3 oz)   SpO2 97%     CV: RRR, Normal S1, S2  RESP: Clear to auscultation bilaterally    Continue with procedure as planned.    Torres Fall MD  Physical Medicine and Rehabilitation  Interventional Spine and Sports Physiatry  [facility]      "

## 2023-04-20 NOTE — OP REPORT
Pre-operative Diagnosis:    M48.061 (ICD-10-CM) - Spinal stenosis of lumbar region, unspecified whether neurogenic claudication present   M54.16 (ICD-10-CM) - Left lumbar radiculitis   M51.27 (ICD-10-CM) - Lumbosacral disc herniation        Post-operative Diagnosis:    M48.061 (ICD-10-CM) - Spinal stenosis of lumbar region, unspecified whether neurogenic claudication present   M54.16 (ICD-10-CM) - Left lumbar radiculitis   M51.27 (ICD-10-CM) - Lumbosacral disc herniation        Procedure: Fluoroscopically-guided Caudal epidural steroid injection with cathether     Blood loss: None     Description of procedure:  The risks, benefits, and alternatives of the procedure were reviewed and discussed with the patient.  Written informed consent was freely obtained. A pre-procedural time-out was conducted by the nurse verifying patient’s identity, procedure to be performed, procedure site and side, and allergy verification. Appropriate equipment was determined to be in place for the procedure.      No sedation was used for this procedure.      The patient was placed in a prone position and fluoroscope was used to assist in guidance.  The skin was prepped and draped in usual sterile technique.   Then, using a 25g 1.5 inch needle was used to inject 1cc of 1% lidocaine without epinephrine to numb superficial tissues. An 18g Tuohy needle was then used to access the caudal canal via the sacral hiatus and advanced into the epidural space.  Omnipaque was injected to confirm location in the lateral position.  Following that, a catheter was inserted and advanced under fluoroscopy in the AP position to the lower third of the fifth lumbar vertebrae.  Omnipaque was injected to confirm location in the AP position.      Following negative aspiration, 1.5cc of 2% lidocaine without epinephrine, 2cc of dexamethasone (10mg/cc), and 2cc of sterile normal saline was slowly injected into the caudal epidural space.  The patient tolerated the  procedure well, the needle and catheter were removed intact. The patient's skin was wiped with a 4x4 gauze, the area was cleansed with alcohol prep, and a bandaid was applied. There were no complications noted.      The patient was discharged in good condition after meeting discharge criteria and was given discharge instructions.     Torres Fall MD  Physical Medicine and Rehabilitation  Interventional Spine and Sports Physiatry  [facility]     CPT: 74897

## 2023-04-20 NOTE — PROGRESS NOTES
0850 Pt admitted to Pre Procedure area via wheelchair.  Consent signed and post op instructions reviewed with pt, all questions answered. Pt states bilateral legs feel numb.  Resting comfortable in chair moving legs around.        0942 Pt received to Post Procedure area with updates from procedure RN.  Snack and drink tolerated without C/O N/V.  Dressing clear, dry, no swelling noted.    0950 Pt seen by Dr. Fall for post procedure evaluation.     0955 Pt unable to stand on own.  States legs remain numb.   1030 Pt remains unsteady on feet.    1100 Dr. Fall notified and in to see pt.  Remains unsteady on feet.    1130 Pt able to stand and walk stable.    1145 Pt criteria for DC, accompanied to car via wheelchair and placed in passenger seat.  Advised son to stay with pt for a few hours once home.  Discharged to designated .

## 2023-04-21 ENCOUNTER — OFFICE VISIT (OUTPATIENT)
Dept: BEHAVIORAL HEALTH | Facility: CLINIC | Age: 88
End: 2023-04-21
Payer: MEDICARE

## 2023-04-21 DIAGNOSIS — F51.01 PRIMARY INSOMNIA: ICD-10-CM

## 2023-04-21 DIAGNOSIS — Z79.899 CHRONIC USE OF BENZODIAZEPINE FOR THERAPEUTIC PURPOSE: ICD-10-CM

## 2023-04-21 PROCEDURE — 99214 OFFICE O/P EST MOD 30 MIN: CPT | Performed by: PSYCHIATRY & NEUROLOGY

## 2023-04-21 RX ORDER — LORAZEPAM 0.5 MG/1
0.5 TABLET ORAL DAILY
Qty: 30 TABLET | Refills: 2 | Status: SHIPPED | OUTPATIENT
Start: 2023-04-21 | End: 2023-07-20

## 2023-04-21 NOTE — PROGRESS NOTES
PSYCHIATRY FOLLOW-UP NOTE      Name: Dayana Lechuga  MRN: [id number]  : 1929  Age: 89 y.o.  Date of assessment: 23  PCP: Justin Haley M.D.  Persons in attendance: Patient      REASON FOR VISIT/CHIEF COMPLAINT (as stated by Patient):  Dayana Lechuga is a 89 y.o., White female, attending follow-up appointment for longterm use of benzodiazepine management.       SUBJECTIVE/HPI  Dayana Lechuga is a 89 y.o. old female with insomnia and benzodiazepine use who comes in today for follow up. Patient was last seen on 23, at which time the plan was to: continue lorazepam and melatonin at bedtime.    Patient reports attempting to take half tablet at bedtime, she has done a few times.  She has had varied success, sometimes unable to fall asleep for multiple hours, thus takes remaining half tablet, other times she was able to fall and stay asleep like normal.  She has had no falls.  She has had some gait instability recently with a pinched nerve in her left leg.  Recently had an epidural steroid injection to alleviate this pain.  Pain onset 2023.  Rated pain as 9/10 (10 being the worst).  She had epidural steroid injection yesterday, reports that pain has been significantly reduced is currently at 4/10 (10 being the worst).  She is using a walker when coming into the office today.  Reports that she was not using a walker earlier this morning, but as she was walking around on her feet and after breakfast that pain was reoccurring.  Thus gait instability related to pain and leg that is being addressed.      Patient did have increase of gabapentin to 400 mg 3 times daily.  Patient understands the risks associated with chronic benzodiazepine use, particularly in patients over the age of 65, specifically the risks associated with falls.  Patient also verbalized and acknowledged understanding risks associated with increase of gabapentin use in combination with  benzodiazepines.  Patient understands all these risks, verbalizes and acknowledges understanding, would like to continue to take medications as she has been taking them as any adjustment has been made not overly successful.      CURRENT MEDICATIONS:  Current Outpatient Medications   Medication Sig Dispense Refill    aspirin (ASA) 81 MG Chew Tab chewable tablet Chew 81 mg every day.      gabapentin (NEURONTIN) 400 MG Cap Take 1 Capsule by mouth 3 times a day. 90 Capsule 0    Metamucil Fiber Chew Tab Chew.      CRANBERRY PO Take  by mouth.      Multiple Vitamins-Minerals (OCUVITE-LUTEIN) Tab Take 1 Tablet by mouth every day.      Saline (OCEAN NASAL SPRAY NA) Administer  into affected nostril(S).      LORazepam (ATIVAN) 0.5 MG Tab Take 1 Tablet by mouth every day for 90 days. (Try taking 1/2 tablet before bed, if unable to fall asleep take additional 1/2 tablet) (Patient taking differently: Take 0.5 mg by mouth every day. (Try taking 1/2 tablet before bed, if unable to fall asleep take additional 1/2 tablet)    2/22/23 taking 1 pill per night) 30 Tablet 2    montelukast (SINGULAIR) 10 MG Tab TAKE ONE TABLET BY MOUTH IN THE EVENING 90 Tablet 3    carvedilol (COREG) 6.25 MG Tab TAKE 1 TABLET BY MOUTH TWICE DAILY WITH MEALS (Patient taking differently: 2/22/23 Does not take with meals) 180 Tablet 3    atorvastatin (LIPITOR) 80 MG tablet Take 1 Tablet by mouth every evening. 100 Tablet 3    amLODIPine (NORVASC) 5 MG Tab Take 1 Tablet by mouth every day. 100 Tablet 3    isosorbide mononitrate SR (IMDUR) 30 MG TABLET SR 24 HR Take 1 Tablet by mouth every evening. 100 Tablet 3    lisinopril (PRINIVIL) 20 MG Tab Take 1 Tablet by mouth every day. 100 Tablet 3    hydrocortisone 2.5 % Cream topical cream Apply 1 Application topically 2 times a day. Use on legs, if red, raised, itchy or with rash.  Stop if worsens.  Stop when rash / itching clears. (Patient not taking: Reported on 3/30/2023) 30 g 0    albuterol 108 (90 Base)  MCG/ACT Aero Soln inhalation aerosol INHALE 2 PUFFS BY MOUTH EVERY 6 HOURS AS NEEDED FOR SHORTNESS OF BREATH 25.5 g 0    loratadine (CLARITIN) 10 MG Tab Take 1 Tablet by mouth every day.      Acetaminophen 500 MG Cap Take 1 Capsule by mouth 2 times a day. Morning & Afternoon, sometimes takes 3 times a day      Melatonin 10 MG Tab Take 10 mg by mouth at bedtime.      Multiple Vitamins-Minerals (CENTRUM SILVER PO) Take 1 Tab by mouth every morning.      Biotin w/ Vitamins C & E 1250-7.5-7.5 MCG-MG-UNT Chew Tab Chew 1 Tab every morning.      guaifenesin LA (MUCINEX) 600 MG TABLET SR 12 HR Take 400 mg by mouth every morning. (Patient not taking: Reported on 3/30/2023)      Ascorbic Acid (VITAMIN C) 1000 MG Tab Take 1 Tablet by mouth every morning.      VITAMIN E PO Take 1 Cap by mouth every morning.      Cholecalciferol (VITAMIN D) 50 MCG (2000 UT) Cap Take 2,000 Units by mouth every morning. Taking vit D3      omeprazole (PRILOSEC) 20 MG delayed-release capsule Take 1 Capsule by mouth every morning with breakfast.       No current facility-administered medications for this visit.       MEDICAL HISTORY  Past Medical History:   Diagnosis Date    Allergy     Anxiety     ASTHMA     Bronchitis     CAD (coronary artery disease)     JT to RCA; 70% stenosis in LAD    Chills     Constipation     Difficulty breathing     GERD (gastroesophageal reflux disease)     Heart attack ([facility])     Heartburn     Hyperlipidemia     Hypertension     Influenza     Insomnia     Lumbar back pain 9/10/2016    Mild peripheral edema 7/31/2020    Mumps     OSTEOPOROSIS     Palpitations     Peripheral vascular disease, unspecified ([facility]) 9/14/2021    Pneumonia     Post concussion syndrome 9/11/2020    PVD (peripheral vascular disease) ([facility])     70% PAD-followed by Lary AMBROCIO    Sweat, sweating, excessive     Tonsillitis      Past Surgical History:   Procedure Laterality Date    ABDOMINAL HYSTERECTOMY TOTAL      APPENDECTOMY      HERNIA REPAIR       HYSTERECTOMY LAPAROSCOPY      TONSILLECTOMY      ZZZ CARDIAC CATH         PAST PSYCHIATRIC HISTORY  Prior psychiatric hospitalization: denies  Prior Self harm/suicide attempt: denies  Prior Diagnosis: denies     PAST PSYCHIATRIC MEDICATIONS  Lorazepam, denies other medication trials      FAMILY HISTORY  Psychiatric diagnosis:  denies     SUBSTANCE USE HISTORY:  ALCOHOL: denies use, states she may have a half glass of wine every few months  TOBACCO: denies  CANNABIS: denies  OPIOIDS: denies  PRESCRIPTION MEDICATIONS: denies  OTHERS: denies  History of inpatient/outpatient rehab treatment: N/A     SOCIAL HISTORY  Lived in [place] until 2004 at which time she moved to [place]. Has lived in [place] for 18 years.  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      REVIEW OF SYSTEMS:        Constitutional negative   Eyes negative   Ears/Nose/Mouth/Throat negative   Cardiovascular negative   Respiratory negative   Gastrointestinal negative   Genitourinary negative   Muscular negative   Integumentary negative   Neurological negative   Endocrine negative   Hematologic/Lymphatic negative     PHYSICAL EXAMINATION:  Vital signs: LMP  (LMP Unknown)   Musculoskeletal: Normal gait.   Abnormal movements: no      MENTAL STATUS EXAMINATION      General:   - Grooming and hygiene: Good, Casual, and Neat,   - Apparent distress: no,   - Behavior: Calm  - Eye Contact:  Good,   - no psychomotor agitation or retardation    - Participation: Active verbal participation, Attentive, and Engaged  Orientation: Alert and Fully  Oriented to person, place and time  Mood: Euthymic  Affect: Flexible and Full range,  Thought Process: Logical and Goal-directed  Thought Content: Denies suicidal or homicidal ideations, intent or plan Within normal limits  Perception: Denies auditory or visual hallucinations. No delusions noted Within normal limits  Attention span and concentration: Intact   Speech:Rate within normal limits and Volume within normal limits  Language: Appropriate   Insight: Good  Judgment: Good  Recent and remote memory: No gross evidence of memory deficits        DEPRESSION SCREENIN/29/2022     9:09 AM 2023    10:00 AM 2023    10:00 AM   Depression Screen (PHQ-2/PHQ-9)   PHQ-2 Total Score 0 0 0       Interpretation of PHQ-9 Total Score   Score Severity   1-4 No Depression   5-9 Mild Depression   10-14 Moderate Depression   15-19 Moderately Severe Depression   20-27 Severe Depression    CURRENT RISK:       Suicidal: Low       Homicidal: Low       Self-Harm: Low       Relapse: Not applicable       Crisis Safety Plan Reviewed Not Indicated       If evidence of imminent risk is present, intervention/plan:      MEDICAL RECORDS/LABS/DIAGNOSTIC TESTS REVIEWED:  No new lab since last visit     [facility] records -   Reviewed       ASSESSMENT:  Pt is a 89 year old pleasant female who is following up for chronic benzodiazepine use and insomnia.  Patient utilizes medication appropriately, attempted few days of taking 1/2 tablet instead of fall.  Varied success.  No concerns for falls at this time, has had some gait instability related to a pinched nerve that she recently which she had an epidural steroid injection for yesterday.  Patient is mentally sharp and memory appears robust.  Patient has gained physiologic dependence on medication, but is not displaying any adverse consequences of its routine use, and after discussing risks and adverse effects associated with his use in patients over the age of 65 patient will  continue.      DDX:  Primary Insomnia                2. Long Term Use of Benzodiazepine, dependence    PLAN:  (1) Continue Lorazepam 0.5mg PO at bedtime, patient will try taking 0.25 mg of lorazepam at bedtime.  Attempt to reduce dose.  (2) Melatonin 10mg PO at bedtime    Medication options, alternatives (including no medications) and medication risks/benefits/side effects were discussed in detail.  The patient was advised to call, message provider on [employer], or come in to the clinic if symptoms worsen or if any future questions/issues regarding their medications arise; the patient verbalized understanding and agreement.  The patient was educated to call 911, call the suicide hotline, or go to local ER if having thoughts of suicide or homicide; verbalized understanding.      Return to clinic in 3 months or sooner if symptoms worsen.  Next Appointment: instruction provided on how to make the next appointment.     The proposed treatment plan was discussed with the patient who was provided the opportunity to ask questions and make suggestions regarding alternative treatment. Patient verbalized understanding and expressed agreement with the plan.       Denys Andrade D.O.  04/21/23    This note was created using voice recognition software (Dragon). The accuracy of the dictation is limited by the abilities of the software. I have reviewed the note prior to signing, however some errors in grammar and context are still possible. If you have any questions related to this note please do not hesitate to contact our office.

## 2023-04-24 NOTE — OP THERAPY DAILY TREATMENT
Outpatient Physical Therapy  DAILY TREATMENT     [facility] Outpatient Physical Therapy [place] [address], [address]  [place] [address]  Phone:  [phone]    Date: 04/25/2023    Patient: Dayana Lechuga  YOB: 1929  MRN: [id number]     Time Calculation    Start time: 0930  Stop time: 1015 Time Calculation (min): 45 minutes         Chief Complaint: back problem  Visit #: 2    SUBJECTIVE:  Increased lower leg pain radiates up lumbar spine -new pain compared with pre epidural symptoms.     OBJECTIVE:  Current objective measures: significant posterior leg/calf and foot pain with left sidelying and transitional movements          Therapeutic Exercises (CPT 64173):     1. glute squeezes, 10/10 sec    2. TA activation in sitting and standing, x10    3. ice pack applied in right sidelying to left posterior thigh, 10 min no charge    4. ball roll with TA activaton, 3 min    5. ball modified bridge, x10    6. stepper, 10 min    Therapeutic Treatments and Modalities:     1. Manual Therapy (CPT 69174), STM lumbar paraspinals  Time-based treatments/modalities:    Physical Therapy Timed Treatment Charges  Manual therapy minutes (CPT 67803): 10 minutes  Therapeutic exercise minutes (CPT 18417): 30 minutes        ASSESSMENT:   Response to treatment: decrease//better post exercise pain decreased to 5/10 VAS left leg.  Patient encouraged to keep moving and try to position herself on her right side with pillow between legs for a position of relief.      PLAN/RECOMMENDATIONS:   Plan for treatment: therapy treatment to continue next visit.  Planned interventions for next visit: continue with current treatment.

## 2023-04-25 ENCOUNTER — PHYSICAL THERAPY (OUTPATIENT)
Dept: PHYSICAL THERAPY | Facility: REHABILITATION | Age: 88
End: 2023-04-25
Attending: FAMILY MEDICINE
Payer: MEDICARE

## 2023-04-25 ENCOUNTER — TELEPHONE (OUTPATIENT)
Dept: PHYSICAL MEDICINE AND REHAB | Facility: MEDICAL CENTER | Age: 88
End: 2023-04-25

## 2023-04-25 DIAGNOSIS — R26.89 IMBALANCE: ICD-10-CM

## 2023-04-25 DIAGNOSIS — G89.29 CHRONIC BILATERAL LOW BACK PAIN WITH LEFT-SIDED SCIATICA: ICD-10-CM

## 2023-04-25 DIAGNOSIS — M54.42 CHRONIC BILATERAL LOW BACK PAIN WITH LEFT-SIDED SCIATICA: ICD-10-CM

## 2023-04-25 PROCEDURE — 97140 MANUAL THERAPY 1/> REGIONS: CPT

## 2023-04-25 PROCEDURE — 97110 THERAPEUTIC EXERCISES: CPT

## 2023-04-25 NOTE — TELEPHONE ENCOUNTER
VOICEMAIL  1. Caller Name: Dayana Lechuga                       Call Back Number: [phone] (home)       2. Message: patient has been in pain and her leg is in a lot of pain. She wonder what causes that pain. She states that she has been having bowl movement  and urination problems. She has been having an upset stomach due to salt. She has been taking Tylenol 3 times a day with the gabapentin. She added a fourth she wants advice if she is talking too much.     3. Patient approves office to leave a detailed voicemail/MyChart message: N\A

## 2023-04-26 ENCOUNTER — OFFICE VISIT (OUTPATIENT)
Dept: CARDIOLOGY | Facility: MEDICAL CENTER | Age: 88
End: 2023-04-26
Payer: MEDICARE

## 2023-04-26 VITALS
OXYGEN SATURATION: 94 % | BODY MASS INDEX: 21.62 KG/M2 | WEIGHT: 122 LBS | SYSTOLIC BLOOD PRESSURE: 134 MMHG | HEART RATE: 82 BPM | DIASTOLIC BLOOD PRESSURE: 58 MMHG | RESPIRATION RATE: 16 BRPM | HEIGHT: 63 IN

## 2023-04-26 DIAGNOSIS — G62.89 OTHER POLYNEUROPATHY: ICD-10-CM

## 2023-04-26 DIAGNOSIS — I73.9 PVD (PERIPHERAL VASCULAR DISEASE) (HCC): ICD-10-CM

## 2023-04-26 DIAGNOSIS — I20.89 STABLE ANGINA PECTORIS (HCC): ICD-10-CM

## 2023-04-26 DIAGNOSIS — J90 PLEURAL EFFUSION: ICD-10-CM

## 2023-04-26 DIAGNOSIS — I10 ESSENTIAL HYPERTENSION: ICD-10-CM

## 2023-04-26 DIAGNOSIS — E78.5 DYSLIPIDEMIA: ICD-10-CM

## 2023-04-26 DIAGNOSIS — F07.81 POST CONCUSSION SYNDROME: ICD-10-CM

## 2023-04-26 PROCEDURE — 99214 OFFICE O/P EST MOD 30 MIN: CPT | Performed by: INTERNAL MEDICINE

## 2023-04-26 RX ORDER — GABAPENTIN 300 MG/1
CAPSULE ORAL
Status: ON HOLD | COMMUNITY
Start: 2023-02-27 | End: 2023-04-29

## 2023-04-26 RX ORDER — ISOSORBIDE MONONITRATE 60 MG/1
60 TABLET, EXTENDED RELEASE ORAL EVERY EVENING
Qty: 90 TABLET | Refills: 3 | Status: SHIPPED | OUTPATIENT
Start: 2023-04-26 | End: 2023-05-04 | Stop reason: SDUPTHER

## 2023-04-26 ASSESSMENT — FIBROSIS 4 INDEX: FIB4 SCORE: 1.75

## 2023-04-26 NOTE — PROGRESS NOTES
Chief Complaint   Patient presents with    Bradycardia     Follow up        Subjective     Dayana Lechuga is a 89 y.o. female who presents today for initial visit in follow-up of CAD PVD hypertension and hyperlipidemia as well as stable angina.  She is a patient of Dr. Jones who has left the practice.  Doing well.  She was requested to complete stress testing at last visit with Dr. ALVAREZ due to chest pressure but has declined this.  She feels it may be related to referred back pain.  It is not reproducible with exercise specifically in clinic or at other times.    Past Medical History:   Diagnosis Date    Allergy     Anxiety     ASTHMA     Bronchitis     CAD (coronary artery disease)     JT to RCA; 70% stenosis in LAD    Chills     Constipation     Difficulty breathing     GERD (gastroesophageal reflux disease)     Heart attack ([facility])     Heartburn     Hyperlipidemia     Hypertension     Influenza     Insomnia     Lumbar back pain 9/10/2016    Mild peripheral edema 7/31/2020    Mumps     OSTEOPOROSIS     Palpitations     Peripheral vascular disease, unspecified ([facility]) 9/14/2021    Pneumonia     Post concussion syndrome 9/11/2020    PVD (peripheral vascular disease) ([facility])     70% PAD-followed by Lary AMBROCIO    Sweat, sweating, excessive     Tonsillitis      Past Surgical History:   Procedure Laterality Date    TX INJ LUMBAR/SACRAL,W/ IMAGING N/A 4/20/2023    Procedure: Caudal epidural with catheter;  Surgeon: Torres Fall M.D.;  [facility];  Service: Pain Management    ABDOMINAL HYSTERECTOMY TOTAL      APPENDECTOMY      HERNIA REPAIR      HYSTERECTOMY LAPAROSCOPY      TONSILLECTOMY      ZZZ CARDIAC CATH       Family History   Problem Relation Age of Onset    Heart Attack Father     Cancer Brother     Cancer Brother     Heart Disease Neg Hx     Heart Failure Neg Hx     Hyperlipidemia Neg Hx      Social History     Socioeconomic History    Marital status:      Spouse name:  Not on file    Number of children: Not on file    Years of education: Not on file    Highest education level: Not on file   Occupational History    Not on file   Tobacco Use    Smoking status: Never    Smokeless tobacco: Never   Vaping Use    Vaping Use: Never used   Substance and Sexual Activity    Alcohol use: No     Alcohol/week: 0.0 oz    Drug use: No    Sexual activity: Not Currently   Other Topics Concern     Service No    Blood Transfusions Yes    Caffeine Concern No    Occupational Exposure No    Hobby Hazards No    Sleep Concern Yes    Stress Concern Yes    Weight Concern No    Special Diet No    Back Care No    Exercise No    Bike Helmet No    Seat Belt Yes    Self-Exams No   Social History Narrative    Not on file     Social Determinants of Health     Financial Resource Strain: Low Risk     Difficulty of Paying Living Expenses: Not very hard   Food Insecurity: No Food Insecurity    Worried About Running Out of Food in the Last Year: Never true    Ran Out of Food in the Last Year: Never true   Transportation Needs: No Transportation Needs    Lack of Transportation (Medical): No    Lack of Transportation (Non-Medical): No   Physical Activity: Insufficiently Active    Days of Exercise per Week: 7 days    Minutes of Exercise per Session: 20 min   Stress: No Stress Concern Present    Feeling of Stress : Not at all   Social Connections: Moderately Isolated    Frequency of Communication with Friends and Family: More than three times a week    Frequency of Social Gatherings with Friends and Family: More than three times a week    Attends Restorationist Services: 1 to 4 times per year    Active Member of Clubs or Organizations: No    Attends Club or Organization Meetings: Never    Marital Status:    Intimate Partner Violence: Not on file   Housing Stability: Low Risk     Unable to Pay for Housing in the Last Year: No    Number of Places Lived in the Last Year: 1    Unstable Housing in the Last Year: No      Allergies   Allergen Reactions    Bactrim [Sulfamethoxazole W-Trimethoprim] Hives    Pcn [Penicillins] Hives     About 70 years    Codeine Unspecified     Unknown reaction      Sulfamethoxazole-Trimethoprim Rash     Outpatient Encounter Medications as of 4/26/2023   Medication Sig Dispense Refill    isosorbide mononitrate SR (IMDUR) 60 MG TABLET SR 24 HR Take 1 Tablet by mouth every evening. 90 Tablet 3    LORazepam (ATIVAN) 0.5 MG Tab Take 1 Tablet by mouth every day for 90 days. (Try taking 1/2 tablet before bed, if unable to fall asleep take additional 1/2 tablet) 30 Tablet 2    aspirin (ASA) 81 MG Chew Tab chewable tablet Chew 81 mg every day.      gabapentin (NEURONTIN) 400 MG Cap Take 1 Capsule by mouth 3 times a day. 90 Capsule 0    Metamucil Fiber Chew Tab Chew.      CRANBERRY PO Take  by mouth.      Multiple Vitamins-Minerals (OCUVITE-LUTEIN) Tab Take 1 Tablet by mouth every day.      Saline (OCEAN NASAL SPRAY NA) Administer  into affected nostril(S).      montelukast (SINGULAIR) 10 MG Tab TAKE ONE TABLET BY MOUTH IN THE EVENING 90 Tablet 3    carvedilol (COREG) 6.25 MG Tab TAKE 1 TABLET BY MOUTH TWICE DAILY WITH MEALS 180 Tablet 3    atorvastatin (LIPITOR) 80 MG tablet Take 1 Tablet by mouth every evening. 100 Tablet 3    amLODIPine (NORVASC) 5 MG Tab Take 1 Tablet by mouth every day. 100 Tablet 3    lisinopril (PRINIVIL) 20 MG Tab Take 1 Tablet by mouth every day. 100 Tablet 3    hydrocortisone 2.5 % Cream topical cream Apply 1 Application topically 2 times a day. Use on legs, if red, raised, itchy or with rash.  Stop if worsens.  Stop when rash / itching clears. 30 g 0    albuterol 108 (90 Base) MCG/ACT Aero Soln inhalation aerosol INHALE 2 PUFFS BY MOUTH EVERY 6 HOURS AS NEEDED FOR SHORTNESS OF BREATH 25.5 g 0    loratadine (CLARITIN) 10 MG Tab Take 1 Tablet by mouth every day.      Melatonin 10 MG Tab Take 10 mg by mouth at bedtime.      Biotin w/ Vitamins C & E 1250-7.5-7.5 MCG-MG-UNT Chew Tab  "Chew 1 Tab every morning.      guaifenesin LA (MUCINEX) 600 MG TABLET SR 12 HR Take 400 mg by mouth every morning.      Ascorbic Acid (VITAMIN C) 1000 MG Tab Take 1 Tablet by mouth every morning.      VITAMIN E PO Take 1 Cap by mouth every morning.      Cholecalciferol (VITAMIN D) 50 MCG (2000 UT) Cap Take 2,000 Units by mouth every morning. Taking vit D3      omeprazole (PRILOSEC) 20 MG delayed-release capsule Take 1 Capsule by mouth every morning with breakfast.      gabapentin (NEURONTIN) 300 MG Cap  (Patient not taking: Reported on 4/26/2023)      [DISCONTINUED] Probiotic Chew Tab Chew.      [DISCONTINUED] LORazepam (ATIVAN) 0.5 MG Tab Take 1 Tablet by mouth every day for 90 days. (Try taking 1/2 tablet before bed, if unable to fall asleep take additional 1/2 tablet) (Patient taking differently: Take 0.5 mg by mouth every day. (Try taking 1/2 tablet before bed, if unable to fall asleep take additional 1/2 tablet)    2/22/23 taking 1 pill per night) 30 Tablet 2    [DISCONTINUED] isosorbide mononitrate SR (IMDUR) 30 MG TABLET SR 24 HR Take 1 Tablet by mouth every evening. 100 Tablet 3    Acetaminophen 500 MG Cap Take 1 Capsule by mouth 2 times a day. Morning & Afternoon, sometimes takes 3 times a day (Patient not taking: Reported on 4/26/2023)      Multiple Vitamins-Minerals (CENTRUM SILVER PO) Take 1 Tab by mouth every morning.       No facility-administered encounter medications on file as of 4/26/2023.     Review of Systems   All other systems reviewed and are negative.           Objective     /58 (BP Location: Left arm, Patient Position: Sitting)   Pulse 82   Resp 16   Ht 1.6 m (5' 3\")   Wt 55.3 kg (122 lb)   LMP  (LMP Unknown)   SpO2 94%   BMI 21.61 kg/m²     Physical Exam  Vitals and nursing note reviewed.   Constitutional:       General: She is not in acute distress.     Appearance: Normal appearance.   HENT:      Head: Normocephalic and atraumatic.      Right Ear: External ear normal.      " Left Ear: External ear normal.      Nose: Nose normal.   Eyes:      Conjunctiva/sclera: Conjunctivae normal.   Cardiovascular:      Rate and Rhythm: Normal rate and regular rhythm.      Pulses: Normal pulses.      Heart sounds: No murmur heard.  Pulmonary:      Effort: Pulmonary effort is normal. No respiratory distress.      Breath sounds: Normal breath sounds.   Abdominal:      General: There is no distension.      Palpations: Abdomen is soft.   Musculoskeletal:      Cervical back: No rigidity or tenderness.      Right lower leg: No edema.      Left lower leg: No edema.   Skin:     General: Skin is warm and dry.      Capillary Refill: Capillary refill takes 2 to 3 seconds.   Neurological:      General: No focal deficit present.      Mental Status: She is alert and oriented to person, place, and time.   Psychiatric:         Mood and Affect: Mood normal.         Behavior: Behavior normal.         Thought Content: Thought content normal.     LABS:  Lab Results   Component Value Date/Time    CHOLSTRLTOT 142 12/31/2018 10:08 AM    LDL 62 12/31/2018 10:08 AM    HDL 63 12/31/2018 10:08 AM    TRIGLYCERIDE 87 12/31/2018 10:08 AM       Lab Results   Component Value Date/Time    WBC 6.5 08/16/2022 05:45 PM    RBC 4.02 (L) 08/16/2022 05:45 PM    HEMOGLOBIN 13.1 08/16/2022 05:45 PM    HEMATOCRIT 38.7 08/16/2022 05:45 PM    MCV 96.3 08/16/2022 05:45 PM    NEUTSPOLYS 52.10 08/16/2022 05:45 PM    LYMPHOCYTES 32.60 08/16/2022 05:45 PM    MONOCYTES 9.90 08/16/2022 05:45 PM    EOSINOPHILS 3.40 08/16/2022 05:45 PM    BASOPHILS 1.70 08/16/2022 05:45 PM     Lab Results   Component Value Date/Time    SODIUM 135 04/06/2023 10:25 AM    POTASSIUM 4.5 04/06/2023 10:25 AM    CHLORIDE 103 04/06/2023 10:25 AM    CO2 21 04/06/2023 10:25 AM    GLUCOSE 95 04/06/2023 10:25 AM    BUN 15 04/06/2023 10:25 AM    CREATININE 0.64 04/06/2023 10:25 AM         Lab Results   Component Value Date/Time    ALKPHOSPHAT 50 04/06/2023 10:25 AM    ASTSGOT 18  04/06/2023 10:25 AM    ALTSGPT 18 04/06/2023 10:25 AM    TBILIRUBIN 0.4 04/06/2023 10:25 AM      Lab Results   Component Value Date/Time    BNPBTYPENAT 193 (H) 09/10/2016 04:00 PM      No results found for: TSH  Lab Results   Component Value Date/Time    PROTHROMBTM 12.4 01/05/2021 04:23 PM    INR 0.89 01/05/2021 04:23 PM                 Assessment & Plan     1. Pleural effusion        2. PVD (peripheral vascular disease) (HCC)        3. Other polyneuropathy        4. Post concussion syndrome        5. Stable angina pectoris (HCC)  isosorbide mononitrate SR (IMDUR) 60 MG TABLET SR 24 HR      6. Dyslipidemia  Comp Metabolic Panel    Lipid Profile      7. Essential hypertension            Medical Decision Making: Today's Assessment/Status/Plan:          Doing well.  Her chest pain is difficult to pin down.  She is not a particularly reliable based on the interview today.  I agree with stress testing which she defers.  This is reasonable at her age given her wishes and a trial of increased medical therapy is warranted.  Also CMP and lipid profile for goal LDL less than 70.  She will follow-up routinely.

## 2023-04-27 ENCOUNTER — OFFICE VISIT (OUTPATIENT)
Dept: PHYSICAL MEDICINE AND REHAB | Facility: MEDICAL CENTER | Age: 88
End: 2023-04-27
Payer: MEDICARE

## 2023-04-27 ENCOUNTER — APPOINTMENT (OUTPATIENT)
Dept: PHYSICAL THERAPY | Facility: REHABILITATION | Age: 88
End: 2023-04-27
Attending: FAMILY MEDICINE
Payer: MEDICARE

## 2023-04-27 VITALS
BODY MASS INDEX: 21.79 KG/M2 | DIASTOLIC BLOOD PRESSURE: 54 MMHG | TEMPERATURE: 97.8 F | HEART RATE: 85 BPM | SYSTOLIC BLOOD PRESSURE: 108 MMHG | WEIGHT: 123 LBS | OXYGEN SATURATION: 93 % | HEIGHT: 63 IN

## 2023-04-27 DIAGNOSIS — M79.672 ACUTE PAIN OF LEFT FOOT: ICD-10-CM

## 2023-04-27 DIAGNOSIS — M43.16 SPONDYLOLISTHESIS OF LUMBAR REGION: ICD-10-CM

## 2023-04-27 DIAGNOSIS — M48.061 SPINAL STENOSIS OF LUMBAR REGION, UNSPECIFIED WHETHER NEUROGENIC CLAUDICATION PRESENT: ICD-10-CM

## 2023-04-27 DIAGNOSIS — M54.2 CERVICALGIA: ICD-10-CM

## 2023-04-27 PROCEDURE — 99215 OFFICE O/P EST HI 40 MIN: CPT | Performed by: PHYSICAL MEDICINE & REHABILITATION

## 2023-04-27 RX ORDER — TRAMADOL HYDROCHLORIDE 50 MG/1
25-50 TABLET ORAL EVERY 8 HOURS PRN
Qty: 21 TABLET | Refills: 0 | Status: SHIPPED | OUTPATIENT
Start: 2023-04-27 | End: 2023-05-03

## 2023-04-27 ASSESSMENT — PAIN SCALES - GENERAL: PAINLEVEL: 10=SEVERE PAIN

## 2023-04-27 ASSESSMENT — FIBROSIS 4 INDEX: FIB4 SCORE: 1.75

## 2023-04-29 ENCOUNTER — APPOINTMENT (OUTPATIENT)
Dept: RADIOLOGY | Facility: MEDICAL CENTER | Age: 88
End: 2023-04-29
Attending: EMERGENCY MEDICINE
Payer: MEDICARE

## 2023-04-29 ENCOUNTER — HOSPITAL ENCOUNTER (OUTPATIENT)
Facility: MEDICAL CENTER | Age: 88
End: 2023-04-30
Attending: EMERGENCY MEDICINE | Admitting: HOSPITALIST
Payer: MEDICARE

## 2023-04-29 DIAGNOSIS — J90 PLEURAL EFFUSION: ICD-10-CM

## 2023-04-29 DIAGNOSIS — N30.01 ACUTE CYSTITIS WITH HEMATURIA: ICD-10-CM

## 2023-04-29 DIAGNOSIS — I70.201 POPLITEAL ARTERY STENOSIS, RIGHT (HCC): ICD-10-CM

## 2023-04-29 DIAGNOSIS — I73.9 PERIPHERAL VASCULAR DISEASE, UNSPECIFIED (HCC): ICD-10-CM

## 2023-04-29 DIAGNOSIS — G62.89 OTHER POLYNEUROPATHY: ICD-10-CM

## 2023-04-29 DIAGNOSIS — E78.5 DYSLIPIDEMIA: Chronic | ICD-10-CM

## 2023-04-29 DIAGNOSIS — I10 ESSENTIAL HYPERTENSION: ICD-10-CM

## 2023-04-29 DIAGNOSIS — I73.9 PVD (PERIPHERAL VASCULAR DISEASE) (HCC): ICD-10-CM

## 2023-04-29 DIAGNOSIS — N30.00 ACUTE CYSTITIS WITHOUT HEMATURIA: ICD-10-CM

## 2023-04-29 DIAGNOSIS — I25.119 CORONARY ARTERY DISEASE INVOLVING NATIVE CORONARY ARTERY OF NATIVE HEART WITH ANGINA PECTORIS (HCC): ICD-10-CM

## 2023-04-29 PROBLEM — N39.0 UTI (URINARY TRACT INFECTION): Status: ACTIVE | Noted: 2023-04-29

## 2023-04-29 PROBLEM — I95.9 HYPOTENSION: Status: ACTIVE | Noted: 2023-04-29

## 2023-04-29 PROBLEM — K59.00 CN (CONSTIPATION): Status: ACTIVE | Noted: 2018-08-29

## 2023-04-29 PROBLEM — D53.9 MACROCYTIC ANEMIA: Status: ACTIVE | Noted: 2023-04-29

## 2023-04-29 LAB
ALBUMIN SERPL BCP-MCNC: 3.3 G/DL (ref 3.2–4.9)
ALBUMIN/GLOB SERPL: 1.2 G/DL
ALP SERPL-CCNC: 47 U/L (ref 30–99)
ALT SERPL-CCNC: 13 U/L (ref 2–50)
ANION GAP SERPL CALC-SCNC: 7 MMOL/L (ref 7–16)
APPEARANCE UR: ABNORMAL
AST SERPL-CCNC: 10 U/L (ref 12–45)
BACTERIA #/AREA URNS HPF: ABNORMAL /HPF
BASOPHILS # BLD AUTO: 0.7 % (ref 0–1.8)
BASOPHILS # BLD: 0.08 K/UL (ref 0–0.12)
BILIRUB SERPL-MCNC: 0.4 MG/DL (ref 0.1–1.5)
BILIRUB UR QL STRIP.AUTO: NEGATIVE
BUN SERPL-MCNC: 21 MG/DL (ref 8–22)
CALCIUM ALBUM COR SERPL-MCNC: 9.2 MG/DL (ref 8.5–10.5)
CALCIUM SERPL-MCNC: 8.6 MG/DL (ref 8.5–10.5)
CHLORIDE SERPL-SCNC: 105 MMOL/L (ref 96–112)
CO2 SERPL-SCNC: 22 MMOL/L (ref 20–33)
COLOR UR: YELLOW
CREAT SERPL-MCNC: 0.83 MG/DL (ref 0.5–1.4)
EKG IMPRESSION: NORMAL
EOSINOPHIL # BLD AUTO: 0.2 K/UL (ref 0–0.51)
EOSINOPHIL NFR BLD: 1.7 % (ref 0–6.9)
EPI CELLS #/AREA URNS HPF: NEGATIVE /HPF
ERYTHROCYTE [DISTWIDTH] IN BLOOD BY AUTOMATED COUNT: 49.1 FL (ref 35.9–50)
GFR SERPLBLD CREATININE-BSD FMLA CKD-EPI: 65 ML/MIN/1.73 M 2
GLOBULIN SER CALC-MCNC: 2.7 G/DL (ref 1.9–3.5)
GLUCOSE SERPL-MCNC: 119 MG/DL (ref 65–99)
GLUCOSE UR STRIP.AUTO-MCNC: NEGATIVE MG/DL
HCT VFR BLD AUTO: 32.2 % (ref 37–47)
HGB BLD-MCNC: 10.2 G/DL (ref 12–16)
HYALINE CASTS #/AREA URNS LPF: ABNORMAL /LPF
IMM GRANULOCYTES # BLD AUTO: 0.07 K/UL (ref 0–0.11)
IMM GRANULOCYTES NFR BLD AUTO: 0.6 % (ref 0–0.9)
KETONES UR STRIP.AUTO-MCNC: NEGATIVE MG/DL
LACTATE SERPL-SCNC: 0.9 MMOL/L (ref 0.5–2)
LACTATE SERPL-SCNC: 1.3 MMOL/L (ref 0.5–2)
LEUKOCYTE ESTERASE UR QL STRIP.AUTO: ABNORMAL
LYMPHOCYTES # BLD AUTO: 1.79 K/UL (ref 1–4.8)
LYMPHOCYTES NFR BLD: 15.3 % (ref 22–41)
MCH RBC QN AUTO: 32.7 PG (ref 27–33)
MCHC RBC AUTO-ENTMCNC: 31.7 G/DL (ref 33.6–35)
MCV RBC AUTO: 103.2 FL (ref 81.4–97.8)
MICRO URNS: ABNORMAL
MONOCYTES # BLD AUTO: 1.19 K/UL (ref 0–0.85)
MONOCYTES NFR BLD AUTO: 10.2 % (ref 0–13.4)
NEUTROPHILS # BLD AUTO: 8.36 K/UL (ref 2–7.15)
NEUTROPHILS NFR BLD: 71.5 % (ref 44–72)
NITRITE UR QL STRIP.AUTO: POSITIVE
NRBC # BLD AUTO: 0 K/UL
NRBC BLD-RTO: 0 /100 WBC
PH UR STRIP.AUTO: 5.5 [PH] (ref 5–8)
PLATELET # BLD AUTO: 205 K/UL (ref 164–446)
PMV BLD AUTO: 10.4 FL (ref 9–12.9)
POTASSIUM SERPL-SCNC: 4.1 MMOL/L (ref 3.6–5.5)
PROT SERPL-MCNC: 6 G/DL (ref 6–8.2)
PROT UR QL STRIP: 30 MG/DL
RBC # BLD AUTO: 3.12 M/UL (ref 4.2–5.4)
RBC # URNS HPF: ABNORMAL /HPF
RBC UR QL AUTO: NEGATIVE
SODIUM SERPL-SCNC: 134 MMOL/L (ref 135–145)
SP GR UR STRIP.AUTO: 1.01
UROBILINOGEN UR STRIP.AUTO-MCNC: 1 MG/DL
VIT B12 SERPL-MCNC: 490 PG/ML (ref 211–911)
WBC # BLD AUTO: 11.7 K/UL (ref 4.8–10.8)
WBC #/AREA URNS HPF: ABNORMAL /HPF

## 2023-04-29 PROCEDURE — 99285 EMERGENCY DEPT VISIT HI MDM: CPT

## 2023-04-29 PROCEDURE — 700102 HCHG RX REV CODE 250 W/ 637 OVERRIDE(OP): Performed by: HOSPITALIST

## 2023-04-29 PROCEDURE — 700105 HCHG RX REV CODE 258: Performed by: HOSPITALIST

## 2023-04-29 PROCEDURE — 82607 VITAMIN B-12: CPT

## 2023-04-29 PROCEDURE — 96374 THER/PROPH/DIAG INJ IV PUSH: CPT

## 2023-04-29 PROCEDURE — 93005 ELECTROCARDIOGRAM TRACING: CPT | Performed by: EMERGENCY MEDICINE

## 2023-04-29 PROCEDURE — 87186 SC STD MICRODIL/AGAR DIL: CPT

## 2023-04-29 PROCEDURE — 87077 CULTURE AEROBIC IDENTIFY: CPT

## 2023-04-29 PROCEDURE — 74018 RADEX ABDOMEN 1 VIEW: CPT

## 2023-04-29 PROCEDURE — G0378 HOSPITAL OBSERVATION PER HR: HCPCS

## 2023-04-29 PROCEDURE — 51701 INSERT BLADDER CATHETER: CPT

## 2023-04-29 PROCEDURE — 85025 COMPLETE CBC W/AUTO DIFF WBC: CPT

## 2023-04-29 PROCEDURE — 99223 1ST HOSP IP/OBS HIGH 75: CPT | Performed by: HOSPITALIST

## 2023-04-29 PROCEDURE — 81001 URINALYSIS AUTO W/SCOPE: CPT

## 2023-04-29 PROCEDURE — 96372 THER/PROPH/DIAG INJ SC/IM: CPT

## 2023-04-29 PROCEDURE — 87086 URINE CULTURE/COLONY COUNT: CPT

## 2023-04-29 PROCEDURE — 700111 HCHG RX REV CODE 636 W/ 250 OVERRIDE (IP): Performed by: EMERGENCY MEDICINE

## 2023-04-29 PROCEDURE — 87040 BLOOD CULTURE FOR BACTERIA: CPT | Mod: 91

## 2023-04-29 PROCEDURE — 71045 X-RAY EXAM CHEST 1 VIEW: CPT

## 2023-04-29 PROCEDURE — 700111 HCHG RX REV CODE 636 W/ 250 OVERRIDE (IP): Performed by: HOSPITALIST

## 2023-04-29 PROCEDURE — 80053 COMPREHEN METABOLIC PANEL: CPT

## 2023-04-29 PROCEDURE — 83605 ASSAY OF LACTIC ACID: CPT

## 2023-04-29 PROCEDURE — A9270 NON-COVERED ITEM OR SERVICE: HCPCS | Performed by: HOSPITALIST

## 2023-04-29 PROCEDURE — 36415 COLL VENOUS BLD VENIPUNCTURE: CPT

## 2023-04-29 RX ORDER — CHOLECALCIFEROL (VITAMIN D3) 125 MCG
10 CAPSULE ORAL
Status: DISCONTINUED | OUTPATIENT
Start: 2023-04-29 | End: 2023-04-30 | Stop reason: HOSPADM

## 2023-04-29 RX ORDER — ALBUTEROL SULFATE 90 UG/1
2 AEROSOL, METERED RESPIRATORY (INHALATION) EVERY 6 HOURS PRN
Status: DISCONTINUED | OUTPATIENT
Start: 2023-04-29 | End: 2023-04-30 | Stop reason: HOSPADM

## 2023-04-29 RX ORDER — FLUTICASONE PROPIONATE 50 MCG
1 SPRAY, SUSPENSION (ML) NASAL DAILY
Status: SHIPPED | COMMUNITY
End: 2023-05-31

## 2023-04-29 RX ORDER — ONDANSETRON 2 MG/ML
4 INJECTION INTRAMUSCULAR; INTRAVENOUS EVERY 4 HOURS PRN
Status: DISCONTINUED | OUTPATIENT
Start: 2023-04-29 | End: 2023-04-30 | Stop reason: HOSPADM

## 2023-04-29 RX ORDER — ATORVASTATIN CALCIUM 80 MG/1
80 TABLET, FILM COATED ORAL EVERY EVENING
Status: DISCONTINUED | OUTPATIENT
Start: 2023-04-29 | End: 2023-04-30 | Stop reason: HOSPADM

## 2023-04-29 RX ORDER — MONTELUKAST SODIUM 10 MG/1
10 TABLET ORAL EVERY EVENING
Status: DISCONTINUED | OUTPATIENT
Start: 2023-04-29 | End: 2023-04-30 | Stop reason: HOSPADM

## 2023-04-29 RX ORDER — BISACODYL 10 MG
10 SUPPOSITORY, RECTAL RECTAL
Status: DISCONTINUED | OUTPATIENT
Start: 2023-04-29 | End: 2023-04-30 | Stop reason: HOSPADM

## 2023-04-29 RX ORDER — SODIUM CHLORIDE 9 MG/ML
INJECTION, SOLUTION INTRAVENOUS CONTINUOUS
Status: DISCONTINUED | OUTPATIENT
Start: 2023-04-29 | End: 2023-04-30 | Stop reason: HOSPADM

## 2023-04-29 RX ORDER — POLYETHYLENE GLYCOL 3350 17 G/17G
1 POWDER, FOR SOLUTION ORAL
Status: DISCONTINUED | OUTPATIENT
Start: 2023-04-29 | End: 2023-04-30 | Stop reason: HOSPADM

## 2023-04-29 RX ORDER — CEFTRIAXONE 1 G/1
1000 INJECTION, POWDER, FOR SOLUTION INTRAMUSCULAR; INTRAVENOUS ONCE
Status: COMPLETED | OUTPATIENT
Start: 2023-04-29 | End: 2023-04-29

## 2023-04-29 RX ORDER — METHYLPREDNISOLONE 4 MG/1
4 TABLET ORAL DAILY
Status: SHIPPED | COMMUNITY
End: 2023-05-03

## 2023-04-29 RX ORDER — ENOXAPARIN SODIUM 100 MG/ML
40 INJECTION SUBCUTANEOUS DAILY
Status: DISCONTINUED | OUTPATIENT
Start: 2023-04-29 | End: 2023-04-30 | Stop reason: HOSPADM

## 2023-04-29 RX ORDER — ONDANSETRON 4 MG/1
4 TABLET, ORALLY DISINTEGRATING ORAL EVERY 4 HOURS PRN
Status: DISCONTINUED | OUTPATIENT
Start: 2023-04-29 | End: 2023-04-30 | Stop reason: HOSPADM

## 2023-04-29 RX ORDER — GABAPENTIN 400 MG/1
400 CAPSULE ORAL 3 TIMES DAILY
Status: DISCONTINUED | OUTPATIENT
Start: 2023-04-30 | End: 2023-04-29

## 2023-04-29 RX ORDER — POLYETHYLENE GLYCOL 3350 17 G/17G
1 POWDER, FOR SOLUTION ORAL DAILY
Status: DISCONTINUED | OUTPATIENT
Start: 2023-04-29 | End: 2023-04-30 | Stop reason: HOSPADM

## 2023-04-29 RX ORDER — ASPIRIN 81 MG/1
81 TABLET, CHEWABLE ORAL DAILY
Status: DISCONTINUED | OUTPATIENT
Start: 2023-04-30 | End: 2023-04-30 | Stop reason: HOSPADM

## 2023-04-29 RX ORDER — OMEPRAZOLE 20 MG/1
20 CAPSULE, DELAYED RELEASE ORAL
Status: DISCONTINUED | OUTPATIENT
Start: 2023-04-30 | End: 2023-04-30 | Stop reason: HOSPADM

## 2023-04-29 RX ORDER — AMOXICILLIN 250 MG
2 CAPSULE ORAL 2 TIMES DAILY
Status: DISCONTINUED | OUTPATIENT
Start: 2023-04-29 | End: 2023-04-30 | Stop reason: HOSPADM

## 2023-04-29 RX ORDER — ACETAMINOPHEN 500 MG
500 TABLET ORAL 4 TIMES DAILY
Status: DISCONTINUED | OUTPATIENT
Start: 2023-04-29 | End: 2023-04-30 | Stop reason: HOSPADM

## 2023-04-29 RX ORDER — GABAPENTIN 400 MG/1
400 CAPSULE ORAL 3 TIMES DAILY
Status: DISCONTINUED | OUTPATIENT
Start: 2023-04-29 | End: 2023-04-30 | Stop reason: HOSPADM

## 2023-04-29 RX ADMIN — POLYETHYLENE GLYCOL 3350 1 PACKET: 17 POWDER, FOR SOLUTION ORAL at 14:53

## 2023-04-29 RX ADMIN — CEFTRIAXONE SODIUM 1000 MG: 1 INJECTION, POWDER, FOR SOLUTION INTRAMUSCULAR; INTRAVENOUS at 12:49

## 2023-04-29 RX ADMIN — Medication 10 MG: at 20:07

## 2023-04-29 RX ADMIN — MONTELUKAST 10 MG: 10 TABLET, FILM COATED ORAL at 18:15

## 2023-04-29 RX ADMIN — ENOXAPARIN SODIUM 40 MG: 40 INJECTION SUBCUTANEOUS at 18:16

## 2023-04-29 RX ADMIN — SODIUM CHLORIDE: 9 INJECTION, SOLUTION INTRAVENOUS at 16:52

## 2023-04-29 RX ADMIN — ATORVASTATIN CALCIUM 80 MG: 80 TABLET, FILM COATED ORAL at 18:18

## 2023-04-29 RX ADMIN — GABAPENTIN 400 MG: 400 CAPSULE ORAL at 20:07

## 2023-04-29 ASSESSMENT — LIFESTYLE VARIABLES
EVER HAD A DRINK FIRST THING IN THE MORNING TO STEADY YOUR NERVES TO GET RID OF A HANGOVER: NO
ON A TYPICAL DAY WHEN YOU DRINK ALCOHOL HOW MANY DRINKS DO YOU HAVE: 0
CONSUMPTION TOTAL: NEGATIVE
ALCOHOL_USE: NO
TOTAL SCORE: 0
TOTAL SCORE: 0
AVERAGE NUMBER OF DAYS PER WEEK YOU HAVE A DRINK CONTAINING ALCOHOL: 0
HAVE PEOPLE ANNOYED YOU BY CRITICIZING YOUR DRINKING: NO
HOW MANY TIMES IN THE PAST YEAR HAVE YOU HAD 5 OR MORE DRINKS IN A DAY: 0
EVER FELT BAD OR GUILTY ABOUT YOUR DRINKING: NO
DOES PATIENT WANT TO STOP DRINKING: CANNOT ASSESS
TOTAL SCORE: 0
HAVE YOU EVER FELT YOU SHOULD CUT DOWN ON YOUR DRINKING: NO

## 2023-04-29 ASSESSMENT — FIBROSIS 4 INDEX
FIB4 SCORE: 1.75
FIB4 SCORE: 1.27

## 2023-04-29 ASSESSMENT — PAIN DESCRIPTION - PAIN TYPE
TYPE: CHRONIC PAIN
TYPE: CHRONIC PAIN

## 2023-04-29 NOTE — ASSESSMENT & PLAN NOTE
Continue aspirin and statin  Given hypotension will hold amlodipine carvedilol lisinopril and Imdur  If blood pressure remains stable  with resume her carvedilol lisinopril Imdur with close monitoring and continue to hold amlodipine

## 2023-04-29 NOTE — ASSESSMENT & PLAN NOTE
Likely multifactorial secondary to dehydration and recent increase in her Imdur along with maintenance on carvedilol lisinopril and recent initiation of tramadol.  She also appears clinically dry  IV hydration  Hold BP meds and Imdur at this time  Hold tramadol  Close clinical monitoring

## 2023-04-29 NOTE — CARE PLAN
The patient is Stable - Low risk of patient condition declining or worsening    Shift Goals  Clinical Goals: [place] to unit; IV placement  Patient Goals: Oral antibiotics and go home    Progress made toward(s) clinical / shift goals:  Patient oriented to unit. RICU RN on unit to place [facility].

## 2023-04-29 NOTE — ED NOTES
EKG obtained and signed by Dr. Montes De Oca. Patient daughter present in room while obtaining EKG.

## 2023-04-29 NOTE — ASSESSMENT & PLAN NOTE
We will hold her lorazepam at this time I suspect polypharmacy with lorazepam and tramadol is contributing to her presentation   No

## 2023-04-29 NOTE — PROGRESS NOTES
Received report and assumed care of patient (pt) at 1345. Pt is A&O x 4. Pt reports 1/10 pain in her back. Pt declined medication or intervention at this time. Vital signs are stable. Bed is in lowest, locked position, call light and belongings are within reach. Pt calls for assistance and bed alarm is off as patient calls appropriately.  Plan of care discussed and all questions answered. All other needs met at this time.

## 2023-04-29 NOTE — PROGRESS NOTES
Attempted to begin fluids as ordered when IV infiltrated. Chg RN attempted to regain IV access but was unsuccessful. IR-US Guided order placed.

## 2023-04-29 NOTE — ASSESSMENT & PLAN NOTE
Likely secondary to tramadol  We will start her on MiraLAX and stool softeners  Reviewed her abdominal x-ray consistent with constipation  I reviewed her chest x-ray with no acute pathology noted

## 2023-04-29 NOTE — ASSESSMENT & PLAN NOTE
She was started on ceftriaxone in the emergency department we will continue ceftriaxone and plan to transition her to p.o. antibiotics on discharge  Gentle hydration

## 2023-04-29 NOTE — ED TRIAGE NOTES
"Pt bib ems from assisted living where staff called ems because pt was \"out of it\" drowsy. Per ems pt was hypotensive on arrival 72/46. Pt was given ns 200ml iv pta. Pt states she took ultram this am and this is new medication for her just started on Thursday. Pt denies pain A&Ox4  "

## 2023-04-29 NOTE — ED PROVIDER NOTES
ED Provider Note    CHIEF COMPLAINT  Chief Complaint   Patient presents with    Hypotension       EXTERNAL RECORDS REVIEWED  Outpatient Notes 4/27 encounter with [facility].  Note is not complete..    HPI/ROS    OUTSIDE HISTORIAN(S):  EMS gave fluid bolus for hypotension.  Blood pressure improved has not been tachycardic    Dayana Lechuga is a 89 y.o. female who presents because of weakness.  Patient was at breakfast she felt like she could not sit up because she was so weak she felt like she was going to fall asleep.  Paramedics were called.  Patient was found to be hypotensive.  She was given a fluid bolus and blood pressure improved.  She states she feels generally tired.  She has a history of pain.  She was just started on tramadol.  She took this yesterday did not have any issues, but took it this morning and it may be playing a role.  She denies having any chest pain or shortness of breath.  No abdominal pain but she feels constipated.  She said urinary frequency.  No dysuria.  She has not had a fever that she is aware of.    PAST MEDICAL HISTORY   has a past medical history of Allergy, Anxiety, ASTHMA, Bronchitis, CAD (coronary artery disease), Chills, Constipation, Difficulty breathing, GERD (gastroesophageal reflux disease), Heart attack ([facility]), Heartburn, Hyperlipidemia, Hypertension, Influenza, Insomnia, Lumbar back pain (9/10/2016), Mild peripheral edema (7/31/2020), Mumps, OSTEOPOROSIS, Palpitations, Peripheral vascular disease, unspecified ([facility]) (9/14/2021), Pneumonia, Post concussion syndrome (9/11/2020), PVD (peripheral vascular disease) ([facility]), Sweat, sweating, excessive, and Tonsillitis.    SURGICAL HISTORY   has a past surgical history that includes appendectomy; abdominal hysterectomy total; hernia repair; tonsillectomy; zzz cardiac cath; hysterectomy laparoscopy; and inj lumbar/sacral,w/ imaging (N/A, 4/20/2023).    FAMILY HISTORY  Family History   Problem Relation Age of Onset     Heart Attack Father     Cancer Brother     Cancer Brother     Heart Disease Neg Hx     Heart Failure Neg Hx     Hyperlipidemia Neg Hx        SOCIAL HISTORY  Social History     Tobacco Use    Smoking status: Never    Smokeless tobacco: Never   Vaping Use    Vaping Use: Never used   Substance and Sexual Activity    Alcohol use: No     Alcohol/week: 0.0 oz    Drug use: No    Sexual activity: Not Currently       CURRENT MEDICATIONS  Home Medications       Reviewed by Margie Cristobal R.N. (Registered Nurse) on 04/29/23 at 0943  Med List Status: Not Addressed     Medication Last Dose Status   Acetaminophen 500 MG Cap  Active   albuterol 108 (90 Base) MCG/ACT Aero Soln inhalation aerosol  Active   amLODIPine (NORVASC) 5 MG Tab  Active   Ascorbic Acid (VITAMIN C) 1000 MG Tab  Active   aspirin (ASA) 81 MG Chew Tab chewable tablet  Active   atorvastatin (LIPITOR) 80 MG tablet  Active   Biotin w/ Vitamins C & E 1250-7.5-7.5 MCG-MG-UNT Chew Tab  Active   carvedilol (COREG) 6.25 MG Tab  Active   Cholecalciferol (VITAMIN D) 50 MCG (2000 UT) Cap  Active   CRANBERRY PO  Active   gabapentin (NEURONTIN) 300 MG Cap  Active   gabapentin (NEURONTIN) 400 MG Cap  Active   guaifenesin LA (MUCINEX) 600 MG TABLET SR 12 HR  Active   hydrocortisone 2.5 % Cream topical cream  Active   isosorbide mononitrate SR (IMDUR) 60 MG TABLET SR 24 HR  Active   lisinopril (PRINIVIL) 20 MG Tab  Active   loratadine (CLARITIN) 10 MG Tab  Active   LORazepam (ATIVAN) 0.5 MG Tab  Active   Melatonin 10 MG Tab  Active   Metamucil Fiber Chew Tab  Active   montelukast (SINGULAIR) 10 MG Tab  Active   Multiple Vitamins-Minerals (OCUVITE-LUTEIN) Tab  Active   omeprazole (PRILOSEC) 20 MG delayed-release capsule  Active   Saline (OCEAN NASAL SPRAY NA)  Active   traMADol (ULTRAM) 50 MG Tab  Active   VITAMIN E PO  Active                    ALLERGIES  Allergies   Allergen Reactions    Bactrim [Sulfamethoxazole W-Trimethoprim] Hives    Pcn [Penicillins] Hives     About 70  years    Codeine Unspecified     Unknown reaction      Sulfamethoxazole-Trimethoprim Rash       PHYSICAL EXAM  VITAL SIGNS: /55   Pulse 67   Temp 36.2 °C (97.1 °F) (Temporal)   Resp 15   Wt 51.7 kg (114 lb)   LMP  (LMP Unknown)   SpO2 90%   BMI 20.19 kg/m²    Constitutional: Awake and alert  HENT: Normal inspection, dry mucous membranes  Eyes: Normal inspection  Neck: Grossly normal range of motion.  Cardiovascular: Normal heart rate, Normal rhythm.  Symmetric peripheral pulses.   Thorax & Lungs: No respiratory distress, No wheezing, No rales, No rhonchi, No chest tenderness.   Abdomen: Bowel sounds normal, soft, non-distended, nontender, no mass  Skin: No obvious rash.  Back: No tenderness, No CVA tenderness.   Extremities: No clubbing, cyanosis, edema, no Homans or cords.  Neurologic: Grossly normal   Psychiatric: Normal for situation    DIAGNOSTIC STUDIES / PROCEDURES      LABS  Results for orders placed or performed during the hospital encounter of 04/29/23   LACTIC ACID   Result Value Ref Range    Lactic Acid 1.3 0.5 - 2.0 mmol/L   CBC WITH DIFFERENTIAL   Result Value Ref Range    WBC 11.7 (H) 4.8 - 10.8 K/uL    RBC 3.12 (L) 4.20 - 5.40 M/uL    Hemoglobin 10.2 (L) 12.0 - 16.0 g/dL    Hematocrit 32.2 (L) 37.0 - 47.0 %    .2 (H) 81.4 - 97.8 fL    MCH 32.7 27.0 - 33.0 pg    MCHC 31.7 (L) 33.6 - 35.0 g/dL    RDW 49.1 35.9 - 50.0 fL    Platelet Count 205 164 - 446 K/uL    MPV 10.4 9.0 - 12.9 fL    Neutrophils-Polys 71.50 44.00 - 72.00 %    Lymphocytes 15.30 (L) 22.00 - 41.00 %    Monocytes 10.20 0.00 - 13.40 %    Eosinophils 1.70 0.00 - 6.90 %    Basophils 0.70 0.00 - 1.80 %    Immature Granulocytes 0.60 0.00 - 0.90 %    Nucleated RBC 0.00 /100 WBC    Neutrophils (Absolute) 8.36 (H) 2.00 - 7.15 K/uL    Lymphs (Absolute) 1.79 1.00 - 4.80 K/uL    Monos (Absolute) 1.19 (H) 0.00 - 0.85 K/uL    Eos (Absolute) 0.20 0.00 - 0.51 K/uL    Baso (Absolute) 0.08 0.00 - 0.12 K/uL    Immature Granulocytes (abs)  0.07 0.00 - 0.11 K/uL    NRBC (Absolute) 0.00 K/uL   COMP METABOLIC PANEL   Result Value Ref Range    Sodium 134 (L) 135 - 145 mmol/L    Potassium 4.1 3.6 - 5.5 mmol/L    Chloride 105 96 - 112 mmol/L    Co2 22 20 - 33 mmol/L    Anion Gap 7.0 7.0 - 16.0    Glucose 119 (H) 65 - 99 mg/dL    Bun 21 8 - 22 mg/dL    Creatinine 0.83 0.50 - 1.40 mg/dL    Calcium 8.6 8.5 - 10.5 mg/dL    AST(SGOT) 10 (L) 12 - 45 U/L    ALT(SGPT) 13 2 - 50 U/L    Alkaline Phosphatase 47 30 - 99 U/L    Total Bilirubin 0.4 0.1 - 1.5 mg/dL    Albumin 3.3 3.2 - 4.9 g/dL    Total Protein 6.0 6.0 - 8.2 g/dL    Globulin 2.7 1.9 - 3.5 g/dL    A-G Ratio 1.2 g/dL   URINALYSIS    Specimen: Urine   Result Value Ref Range    Color Yellow     Character Cloudy (A)     Specific Gravity 1.015 <1.035    Ph 5.5 5.0 - 8.0    Glucose Negative Negative mg/dL    Ketones Negative Negative mg/dL    Protein 30 (A) Negative mg/dL    Bilirubin Negative Negative    Urobilinogen, Urine 1.0 Negative    Nitrite Positive (A) Negative    Leukocyte Esterase Moderate (A) Negative    Occult Blood Negative Negative    Micro Urine Req Microscopic    ESTIMATED GFR   Result Value Ref Range    GFR (CKD-EPI) 65 >60 mL/min/1.73 m 2   CORRECTED CALCIUM   Result Value Ref Range    Correct Calcium 9.2 8.5 - 10.5 mg/dL   URINE MICROSCOPIC (W/UA)   Result Value Ref Range    -150 (A) /hpf    RBC 2-5 (A) /hpf    Bacteria Moderate (A) None /hpf    Epithelial Cells Negative /hpf    Hyaline Cast 3-5 (A) /lpf   EKG   Result Value Ref Range    Report       [facility] Emergency Dept.    Test Date:  2023  Pt Name:    LAVERN OAKLEY           Department: ER  MRN:        [id number]                      Room:        08  Gender:     Female                       Technician: [id number]  :        [id number]                   Requested By:GLORIA YEBOAH  Order #:    [id number]                    Reading MD: GLORIA YEBOAH MD    Measurements  Intervals                                 Axis  Rate:       63                           P:          46  FL:         214                          QRS:        -30  QRSD:       92                           T:          8  QT:         414  QTc:        424    Interpretive Statements  Sinus rhythm  Borderline prolonged FL interval  Inferior infarct, old  Anterior infarct, old  Compared to ECG 08/16/2022 17:58:42  Left-axis deviation no longer present  Myocardial infarct finding still present  Electronically Signed On 4- 12:20:10 PDT by GLORIA YEBOAH MD            COURSE & MEDICAL DECISION MAKING        INITIAL ASSESSMENT, COURSE AND PLAN  Care Narrative: Patient presents to the ER with altered mental status and hypotension.  high risk case requiring consideration of multiple life-threatening conditions.  History would suggest opiate intolerance, but given her age screening medical evaluation is indicated.  Patient's blood pressure was marginal ordered IV fluids.  Will work-up for possible infection.  Described being constipated obtain x-ray rule out obstruction.  Unlikely abdominal aortic aneurysm.  No chest pain shortness of breath to suggest PE.  Will obtain labs to rule out anemia and electrolyte disturbance.  Evaluate for dehydration.    Laboratory data returns with nitrite positive UTI.  Lactic acid is normal.  Blood pressure is soft but not frankly hypotensive.  I ordered ceftriaxone.  Does not meet criteria for sepsis at this time.    At this point I would like to admit patient for further treatment of urinary tract infection and ensure that she has stable vital signs prior to discharge.  I consulted hospitalist for admission.        DISPOSITION AND DISCUSSIONS  I have discussed management of the patient with the following physicians and LAKESHIA's: Dr. Johnston, hospitalist.  He will admit patient    Discussion of management with other [facility] or appropriate source(s): Pharmacy reviewed medications administered      FINAL DIAGNOSIS  1. Acute  cystitis with hematuria    2.  Hypotension, suspected opiate intolerance       Electronically signed by: Liborio Montes De Oca M.D., 4/29/2023 12:20 PM

## 2023-04-29 NOTE — H&P
Hospital Medicine History & Physical Note    Date of Service  4/29/2023    Primary Care Physician  Justin Haley M.D.    Consultants    Code Status  Full Code discussed with pt    Chief Complaint  Chief Complaint   Patient presents with    Hypotension       History of Presenting Illness  Dayana Lechuga is a 89 y.o. female who presented 4/29/2023 with Altered mental status and hypotension.  She has history of peripheral vascular disease and angina was recently seen by her cardiologist and had Imdur increasedAnd was recently started on tramadol for arthralgias.  This morning while sitting to eat breakfast she was noted to be hypersomnolent paramedics were called and she was noted to be hypotensive with systolic blood pressure in the 70s.  She received fluid resuscitation and was transferred to the emergency department.  She denies any chest pain or dyspnea.  She has some mild lower abdominal discomfort and constipation.  No dysuria no hematuria.    I discussed the plan of care with patient, family, and ED physician .    Review of Systems  Review of Systems   Constitutional:  Positive for malaise/fatigue.   Musculoskeletal:  Positive for joint pain.   All other systems reviewed and are negative.    Past Medical History   has a past medical history of Allergy, Anxiety, ASTHMA, Bronchitis, CAD (coronary artery disease), Chills, Constipation, Difficulty breathing, GERD (gastroesophageal reflux disease), Heart attack ([facility]), Heartburn, Hyperlipidemia, Hypertension, Influenza, Insomnia, Lumbar back pain (9/10/2016), Mild peripheral edema (7/31/2020), Mumps, OSTEOPOROSIS, Palpitations, Peripheral vascular disease, unspecified (HCC) (9/14/2021), Pneumonia, Post concussion syndrome (9/11/2020), PVD (peripheral vascular disease) ([facility]), Sweat, sweating, excessive, and Tonsillitis.    Surgical History   has a past surgical history that includes appendectomy; abdominal hysterectomy total; hernia repair;  tonsillectomy; zzz cardiac cath; hysterectomy laparoscopy; and pr inj lumbar/sacral,w/ imaging (N/A, 4/20/2023).     Family History  family history includes Cancer in her brother and brother; Heart Attack in her father.   Family history reviewed with patient. There is no family history that is pertinent to the chief complaint.     Social History   reports that she has never smoked. She has never used smokeless tobacco. She reports that she does not drink alcohol and does not use drugs.    Allergies  Allergies   Allergen Reactions    Bactrim [Sulfamethoxazole W-Trimethoprim] Hives    Pcn [Penicillins] Hives     About 70 years. Tolerated ceftriaxone before in 2021    Codeine Unspecified     Unknown reaction      Sulfamethoxazole-Trimethoprim Rash       Medications  Prior to Admission Medications   Prescriptions Last Dose Informant Patient Reported? Taking?   Acetaminophen 500 MG Cap  Patient Yes No   Sig: Take 1 Capsule by mouth 2 times a day. Morning & Afternoon, sometimes takes 3 times a day   Patient not taking: Reported on 4/26/2023   Ascorbic Acid (VITAMIN C) 1000 MG Tab  Patient Yes No   Sig: Take 1 Tablet by mouth every morning.   Biotin w/ Vitamins C & E 1250-7.5-7.5 MCG-MG-UNT Chew Tab  Patient Yes No   Sig: Chew 1 Tab every morning.   CRANBERRY PO   Yes No   Sig: Take  by mouth.   Cholecalciferol (VITAMIN D) 50 MCG (2000 UT) Cap  Patient Yes No   Sig: Take 2,000 Units by mouth every morning. Taking vit D3   LORazepam (ATIVAN) 0.5 MG Tab   No No   Sig: Take 1 Tablet by mouth every day for 90 days. (Try taking 1/2 tablet before bed, if unable to fall asleep take additional 1/2 tablet)   Melatonin 10 MG Tab  Patient Yes No   Sig: Take 10 mg by mouth at bedtime.   Metamucil Fiber Chew Tab   Yes No   Sig: Chew.   Multiple Vitamins-Minerals (OCUVITE-LUTEIN) Tab   Yes No   Sig: Take 1 Tablet by mouth every day.   Saline (OCEAN NASAL SPRAY NA)   Yes No   Sig: Administer  into affected nostril(S).   VITAMIN E PO   Patient Yes No   Sig: Take 1 Cap by mouth every morning.   albuterol 108 (90 Base) MCG/ACT Aero Soln inhalation aerosol   No No   Sig: INHALE 2 PUFFS BY MOUTH EVERY 6 HOURS AS NEEDED FOR SHORTNESS OF BREATH   amLODIPine (NORVASC) 5 MG Tab   No No   Sig: Take 1 Tablet by mouth every day.   aspirin (ASA) 81 MG Chew Tab chewable tablet   Yes No   Sig: Chew 81 mg every day.   atorvastatin (LIPITOR) 80 MG tablet   No No   Sig: Take 1 Tablet by mouth every evening.   carvedilol (COREG) 6.25 MG Tab   No No   Sig: TAKE 1 TABLET BY MOUTH TWICE DAILY WITH MEALS   gabapentin (NEURONTIN) 300 MG Cap   Yes No   Patient not taking: Reported on 4/26/2023   gabapentin (NEURONTIN) 400 MG Cap   No No   Sig: Take 1 Capsule by mouth 3 times a day.   guaifenesin LA (MUCINEX) 600 MG TABLET SR 12 HR  Patient Yes No   Sig: Take 400 mg by mouth every morning.   hydrocortisone 2.5 % Cream topical cream   No No   Sig: Apply 1 Application topically 2 times a day. Use on legs, if red, raised, itchy or with rash.  Stop if worsens.  Stop when rash / itching clears.   isosorbide mononitrate SR (IMDUR) 60 MG TABLET SR 24 HR   No No   Sig: Take 1 Tablet by mouth every evening.   lisinopril (PRINIVIL) 20 MG Tab   No No   Sig: Take 1 Tablet by mouth every day.   loratadine (CLARITIN) 10 MG Tab   Yes No   Sig: Take 1 Tablet by mouth every day.   montelukast (SINGULAIR) 10 MG Tab   No No   Sig: TAKE ONE TABLET BY MOUTH IN THE EVENING   omeprazole (PRILOSEC) 20 MG delayed-release capsule  Patient Yes No   Sig: Take 1 Capsule by mouth every morning with breakfast.   traMADol (ULTRAM) 50 MG Tab   No No   Sig: Take 0.5-1 Tablets by mouth every 8 hours as needed for Severe Pain for up to 7 days.      Facility-Administered Medications: None       Physical Exam  Temp:  [36.2 °C (97.1 °F)] 36.2 °C (97.1 °F)  Pulse:  [67] 67  Resp:  [15] 15  BP: (100)/(55) 100/55  SpO2:  [90 %] 90 %  Blood Pressure : 100/55   Temperature: 36.2 °C (97.1 °F)   Pulse: 67    Respiration: 15   Pulse Oximetry: 90 %       Physical Exam  Vitals and nursing note reviewed.   Constitutional:       General: She is not in acute distress.  HENT:      Head: Normocephalic and atraumatic.      Nose: Nose normal. No rhinorrhea.      Mouth/Throat:      Pharynx: No oropharyngeal exudate or posterior oropharyngeal erythema.   Eyes:      General: No scleral icterus.        Right eye: No discharge.         Left eye: No discharge.   Cardiovascular:      Rate and Rhythm: Normal rate and regular rhythm.      Heart sounds: Normal heart sounds. No murmur heard.    No friction rub. No gallop.   Pulmonary:      Effort: Pulmonary effort is normal. No respiratory distress.      Breath sounds: No stridor. Decreased breath sounds present. No wheezing, rhonchi or rales.   Chest:      Chest wall: No tenderness.   Abdominal:      General: Bowel sounds are normal. There is no distension.      Palpations: Abdomen is soft. There is no mass.      Tenderness: There is no abdominal tenderness. There is no guarding or rebound.   Musculoskeletal:         General: No swelling or tenderness.      Cervical back: Neck supple. No rigidity.   Skin:     General: Skin is warm and dry.      Coloration: Skin is not cyanotic or jaundiced.      Nails: There is no clubbing.   Neurological:      General: No focal deficit present.      Mental Status: She is alert and oriented to person, place, and time.      Cranial Nerves: No cranial nerve deficit.      Motor: No weakness.   Psychiatric:         Mood and Affect: Mood normal.         Behavior: Behavior normal.       Laboratory:  Recent Labs     04/29/23  0946   WBC 11.7*   RBC 3.12*   HEMOGLOBIN 10.2*   HEMATOCRIT 32.2*   .2*   MCH 32.7   MCHC 31.7*   RDW 49.1   PLATELETCT 205   MPV 10.4     Recent Labs     04/29/23  0946   SODIUM 134*   POTASSIUM 4.1   CHLORIDE 105   CO2 22   GLUCOSE 119*   BUN 21   CREATININE 0.83   CALCIUM 8.6     Recent Labs     04/29/23  0946   ALTSGPT 13    ASTSGOT 10*   ALKPHOSPHAT 47   TBILIRUBIN 0.4   GLUCOSE 119*         No results for input(s): NTPROBNP in the last 72 hours.      No results for input(s): TROPONINT in the last 72 hours.    Imaging:  [id number] VIEW   Final Result      1.  No evidence of bowel obstruction.      2.  Large amount of stool throughout colon suggesting constipation.      DX-CHEST-PORTABLE (1 VIEW)   Final Result      No acute cardiopulmonary abnormality identified.               Assessment/Plan:  Justification for Admission Status  I anticipate this patient is appropriate for observation status at this time because close clinical monitoring in the setting of hypertension and altered mental status    Patient will need a Med/Surg bed on MEDICAL service .  The need is secondary to hypotension.    * UTI (urinary tract infection)- (present on admission)  Assessment & Plan  She was started on ceftriaxone in the emergency department we will continue ceftriaxone and plan to transition her to p.o. antibiotics on discharge  Gentle hydration    Macrocytic anemia  Assessment & Plan  Recheck CBC in a.m.  Check B12     Hypotension  Assessment & Plan  Likely multifactorial secondary to dehydration and recent increase in her Imdur along with maintenance on carvedilol lisinopril and recent initiation of tramadol.  She also appears clinically dry  IV hydration  Hold BP meds and Imdur at this time  Hold tramadol  Close clinical monitoring    Hyponatremia  Assessment & Plan  Sodium 134   IV hydration and monitor levels    CN (constipation)  Assessment & Plan  Likely secondary to tramadol  We will start her on MiraLAX and stool softeners  Reviewed her abdominal x-ray consistent with constipation  I reviewed her chest x-ray with no acute pathology noted    Benzodiazepine dependence (HCC)- (present on admission)  Assessment & Plan  We will hold her lorazepam at this time I suspect polypharmacy with lorazepam and tramadol is contributing to her  presentation    Coronary artery disease involving native coronary artery of native heart with angina pectoris (HCC)- (present on admission)  Assessment & Plan  Continue aspirin and statin  Given hypotension will hold amlodipine carvedilol lisinopril and Imdur  If blood pressure remains stable  with resume her carvedilol lisinopril Imdur with close monitoring and continue to hold amlodipine          VTE prophylaxis: enoxaparin ppx

## 2023-04-30 ENCOUNTER — PHARMACY VISIT (OUTPATIENT)
Dept: PHARMACY | Facility: MEDICAL CENTER | Age: 88
End: 2023-04-30
Payer: COMMERCIAL

## 2023-04-30 VITALS
WEIGHT: 125.88 LBS | SYSTOLIC BLOOD PRESSURE: 178 MMHG | BODY MASS INDEX: 23.17 KG/M2 | RESPIRATION RATE: 16 BRPM | DIASTOLIC BLOOD PRESSURE: 80 MMHG | HEIGHT: 62 IN | HEART RATE: 73 BPM | OXYGEN SATURATION: 92 % | TEMPERATURE: 98.1 F

## 2023-04-30 PROBLEM — E87.1 HYPONATREMIA: Status: RESOLVED | Noted: 2020-11-20 | Resolved: 2023-04-30

## 2023-04-30 PROBLEM — I95.9 HYPOTENSION: Status: RESOLVED | Noted: 2023-04-29 | Resolved: 2023-04-30

## 2023-04-30 PROBLEM — K59.00 CN (CONSTIPATION): Status: RESOLVED | Noted: 2018-08-29 | Resolved: 2023-04-30

## 2023-04-30 LAB
ANION GAP SERPL CALC-SCNC: 9 MMOL/L (ref 7–16)
BASOPHILS # BLD AUTO: 0.8 % (ref 0–1.8)
BASOPHILS # BLD: 0.07 K/UL (ref 0–0.12)
BUN SERPL-MCNC: 10 MG/DL (ref 8–22)
CALCIUM SERPL-MCNC: 8.8 MG/DL (ref 8.5–10.5)
CHLORIDE SERPL-SCNC: 108 MMOL/L (ref 96–112)
CO2 SERPL-SCNC: 22 MMOL/L (ref 20–33)
CREAT SERPL-MCNC: 0.49 MG/DL (ref 0.5–1.4)
EOSINOPHIL # BLD AUTO: 0.16 K/UL (ref 0–0.51)
EOSINOPHIL NFR BLD: 1.8 % (ref 0–6.9)
ERYTHROCYTE [DISTWIDTH] IN BLOOD BY AUTOMATED COUNT: 46.9 FL (ref 35.9–50)
GFR SERPLBLD CREATININE-BSD FMLA CKD-EPI: 87 ML/MIN/1.73 M 2
GLUCOSE SERPL-MCNC: 106 MG/DL (ref 65–99)
HCT VFR BLD AUTO: 32.6 % (ref 37–47)
HGB BLD-MCNC: 10.7 G/DL (ref 12–16)
IMM GRANULOCYTES # BLD AUTO: 0.03 K/UL (ref 0–0.11)
IMM GRANULOCYTES NFR BLD AUTO: 0.3 % (ref 0–0.9)
LYMPHOCYTES # BLD AUTO: 2.11 K/UL (ref 1–4.8)
LYMPHOCYTES NFR BLD: 23.6 % (ref 22–41)
MCH RBC QN AUTO: 32.9 PG (ref 27–33)
MCHC RBC AUTO-ENTMCNC: 32.8 G/DL (ref 33.6–35)
MCV RBC AUTO: 100.3 FL (ref 81.4–97.8)
MONOCYTES # BLD AUTO: 0.89 K/UL (ref 0–0.85)
MONOCYTES NFR BLD AUTO: 10 % (ref 0–13.4)
NEUTROPHILS # BLD AUTO: 5.68 K/UL (ref 2–7.15)
NEUTROPHILS NFR BLD: 63.5 % (ref 44–72)
NRBC # BLD AUTO: 0 K/UL
NRBC BLD-RTO: 0 /100 WBC
PLATELET # BLD AUTO: 213 K/UL (ref 164–446)
PMV BLD AUTO: 9.8 FL (ref 9–12.9)
POTASSIUM SERPL-SCNC: 3.9 MMOL/L (ref 3.6–5.5)
RBC # BLD AUTO: 3.25 M/UL (ref 4.2–5.4)
SODIUM SERPL-SCNC: 139 MMOL/L (ref 135–145)
WBC # BLD AUTO: 8.9 K/UL (ref 4.8–10.8)

## 2023-04-30 PROCEDURE — 700102 HCHG RX REV CODE 250 W/ 637 OVERRIDE(OP): Performed by: HOSPITALIST

## 2023-04-30 PROCEDURE — 80048 BASIC METABOLIC PNL TOTAL CA: CPT

## 2023-04-30 PROCEDURE — G0378 HOSPITAL OBSERVATION PER HR: HCPCS

## 2023-04-30 PROCEDURE — A9270 NON-COVERED ITEM OR SERVICE: HCPCS | Performed by: NURSE PRACTITIONER

## 2023-04-30 PROCEDURE — A9270 NON-COVERED ITEM OR SERVICE: HCPCS | Performed by: HOSPITALIST

## 2023-04-30 PROCEDURE — 99239 HOSP IP/OBS DSCHRG MGMT >30: CPT | Performed by: INTERNAL MEDICINE

## 2023-04-30 PROCEDURE — 85025 COMPLETE CBC W/AUTO DIFF WBC: CPT

## 2023-04-30 PROCEDURE — 700102 HCHG RX REV CODE 250 W/ 637 OVERRIDE(OP): Performed by: NURSE PRACTITIONER

## 2023-04-30 PROCEDURE — RXMED WILLOW AMBULATORY MEDICATION CHARGE: Performed by: NURSE PRACTITIONER

## 2023-04-30 RX ORDER — LISINOPRIL 10 MG/1
10 TABLET ORAL ONCE
Status: COMPLETED | OUTPATIENT
Start: 2023-04-30 | End: 2023-04-30

## 2023-04-30 RX ORDER — NITROFURANTOIN 25; 75 MG/1; MG/1
100 CAPSULE ORAL 2 TIMES DAILY
Qty: 10 CAPSULE | Refills: 0 | Status: SHIPPED | OUTPATIENT
Start: 2023-04-30 | End: 2023-05-04

## 2023-04-30 RX ORDER — CARVEDILOL 3.12 MG/1
3.12 TABLET ORAL 2 TIMES DAILY WITH MEALS
Qty: 60 TABLET | Refills: 0 | Status: SHIPPED | OUTPATIENT
Start: 2023-04-30 | End: 2023-04-30 | Stop reason: SDUPTHER

## 2023-04-30 RX ORDER — CARVEDILOL 6.25 MG/1
6.25 TABLET ORAL 2 TIMES DAILY WITH MEALS
Qty: 60 TABLET | Refills: 0 | Status: SHIPPED | OUTPATIENT
Start: 2023-04-30 | End: 2023-05-04 | Stop reason: SDUPTHER

## 2023-04-30 RX ORDER — LISINOPRIL 20 MG/1
10 TABLET ORAL DAILY
Qty: 100 TABLET | Refills: 3 | Status: SHIPPED | OUTPATIENT
Start: 2023-04-30 | End: 2023-05-04 | Stop reason: SDUPTHER

## 2023-04-30 RX ADMIN — ACETAMINOPHEN 500 MG: 500 TABLET, FILM COATED ORAL at 01:10

## 2023-04-30 RX ADMIN — ASPIRIN 81 MG: 81 TABLET, CHEWABLE ORAL at 05:58

## 2023-04-30 RX ADMIN — GABAPENTIN 400 MG: 400 CAPSULE ORAL at 11:53

## 2023-04-30 RX ADMIN — LISINOPRIL 10 MG: 10 TABLET ORAL at 12:39

## 2023-04-30 RX ADMIN — OMEPRAZOLE 20 MG: 20 CAPSULE, DELAYED RELEASE ORAL at 08:55

## 2023-04-30 RX ADMIN — GABAPENTIN 400 MG: 400 CAPSULE ORAL at 05:58

## 2023-04-30 RX ADMIN — ACETAMINOPHEN 500 MG: 500 TABLET, FILM COATED ORAL at 08:55

## 2023-04-30 ASSESSMENT — PAIN DESCRIPTION - PAIN TYPE: TYPE: CHRONIC PAIN

## 2023-04-30 NOTE — DISCHARGE SUMMARY
Discharge Summary    CHIEF COMPLAINT ON ADMISSION  Chief Complaint   Patient presents with    Hypotension       Reason for Admission  ems     Admission Date  4/29/2023    CODE STATUS  Prior    HPI & HOSPITAL COURSE    Ms. Dayana Lechuga is a 89 y.o. female with a PMHx of PVD, angina, severe anxiety, insomnia, who presented 4/29/2023 with altered mental status and hypotension.      She recently was seen by her cardiologist, Dr. Zepeda, and had Imdur increased from 30mg to 50mg along with being started on tramadol for arthralgias.  This morning while sitting to eat breakfast she was noted to be hypersomnolent paramedics were called and she was noted to be hypotensive with systolic blood pressure in the 70s.  She received fluid resuscitation and was transferred to the emergency department.  She denies any chest pain or dyspnea.  She has some mild lower abdominal discomfort and constipation.  No dysuria no hematuria.    In ER, patient noted to have normal vital signs.  Notable lab findings include leukocytosis at 11.7, RBC 3.12, hemoglobin 10.2, hematocrit 32.2, sodium 134, glucose 119, AST 10.  Urine analysis noted to be cloudy in character with increased protein at 30, positive nitrates, moderate leukocyte esterase, increased level of WBC at 100-1 50, elevated RBC 2-5, moderate amount of bacteria, and increased hyaline cast at 3-5.  Blood cultures obtained and negative to date.  Initial EKG obtained noting sinus rhythm with borderline prolonged VA interval at 214, old inferior and anterior infarct as compared to EKG obtained on 8/16/2022.  Patient admitted to hospital medicine for management of care for UTI versus dehydration.    Patient was monitored overnight with no events noted.  Patient was rehydrated.  Patient was initiated on ceftriaxone for management of UTI.  Patient seen and examined prior to being discharged.  Discussed with patient blood pressure management.  At this time, we will  discontinue amlodipine as this can cause constipation.  Continue dosage of Imdur which this was recently adjusted by cardiologist.  Patient will be placed on Macrobid twice daily for 5 days for management of UTI.  Patient educated in regards to keeping herself hydrated.  Also discussed with patient to have a discussion with her psychiatrist in regards to possible mismanagement of anxiolytics at home.  Patient highly recommended to follow-up with her PCP s/p hospitalization.  All questions and concerns answered prior to being discharged.  Patient discharged home.       Therefore, she is discharged in good and stable condition to home with close outpatient follow-up.    The patient recovered much more quickly than anticipated on admission.    Discharge Date  4/30/2023    FOLLOW UP ITEMS POST DISCHARGE  Follow-up with PCP and cardiology    DISCHARGE DIAGNOSES  Principal Problem:    UTI (urinary tract infection) POA: Yes  Active Problems:    Coronary artery disease involving native coronary artery of native heart with angina pectoris (HCC) POA: Yes    Benzodiazepine dependence (HCC) POA: Yes    Macrocytic anemia POA: Unknown  Resolved Problems:    CN (constipation) POA: Unknown    Hyponatremia POA: Unknown    Hypotension POA: Unknown      FOLLOW UP  Future Appointments   Date Time Provider Department Center   5/3/2023 10:00 AM Torres Fall M.D. [facility] None   5/4/2023 11:20 AM [address]   5/16/2023 11:00 AM MARIANNE Walsh [facility]   5/18/2023 10:20 AM [address]   6/30/2023  9:30 AM Denys Andrade D.O. [facility] [address]   8/15/2023  1:00 PM Brian Zepeda M.D. [facility] None     Justin Haley M.D.  [address] [id number]  [phone]    Schedule an appointment as soon as possible for a visit in 1 week(s)      MARIANNE Vallejo  [address] [address]  [phone]          [facility]  [facility]  [address]  [facility] [address]  [phone]  Schedule an appointment as soon as possible for a visit in 1 week(s)        MEDICATIONS ON DISCHARGE     Medication List        START taking these medications        Instructions   nitrofurantoin 100 MG Caps  Commonly known as: Macrobid   Take 1 Capsule by mouth 2 times a day for 5 days.  Dose: 100 mg            CHANGE how you take these medications        Instructions   lisinopril 20 MG Tabs  What changed: how much to take  Commonly known as: PRINIVIL   Doctor's comments: Please call to schedule follow up appointment for further refills. Please call [phone]. Thank you.  Take 0.5 Tablets by mouth every day.  Dose: 10 mg            CONTINUE taking these medications        Instructions   Acetaminophen 500 MG Caps   Take 1 Capsule by mouth 2 times a day. Morning & Afternoon, sometimes takes 3 times a day  Dose: 1 Capsule     aspirin 81 MG EC tablet   Chew 81 mg every day.  Dose: 81 mg     atorvastatin 80 MG tablet  Commonly known as: LIPITOR   Take 1 Tablet by mouth every evening.  Dose: 80 mg     Biotin w/ Vitamins C & E 1250-7.5-7.5 MCG-MG-UNT Chew   Chew 1 Tab every morning.  Dose: 1 Tablet     carvedilol 6.25 MG Tabs  Commonly known as: COREG   Take 1 Tablet by mouth 2 times a day with meals for 30 days.  Dose: 6.25 mg     CRANBERRY PO   Take 1 Tablet by mouth every day.  Dose: 1 Tablet     fluticasone 50 MCG/ACT nasal spray  Commonly known as: FLONASE   Administer 1 Spray into affected nostril(S) every day.  Dose: 1 Spray     gabapentin 400 MG Caps  Commonly known as: NEURONTIN   Take 1 Capsule by mouth 3 times a day.  Dose: 400 mg     guaiFENesin  MG Tb12  Commonly known as: MUCINEX   Take 600 mg by mouth every morning.  Dose: 600 mg     isosorbide mononitrate SR 60 MG Tb24  Commonly known as: IMDUR   Take 1 Tablet by mouth every evening.  Dose: 60 mg     loratadine 10 MG Tabs  Commonly known as: CLARITIN   Take 1 Tablet by  mouth every day.  Dose: 10 mg     LORazepam 0.5 MG Tabs  Commonly known as: ATIVAN   Take 1 Tablet by mouth every day for 90 days. (Try taking 1/2 tablet before bed, if unable to fall asleep take additional 1/2 tablet)  Dose: 0.5 mg     Melatonin 10 MG Tabs   Take 10 mg by mouth at bedtime.  Dose: 10 mg     Metamucil Fiber Chew   Chew 3 Tablets every day.  Dose: 3 Tablet     methylPREDNISolone 4 MG Tbpk  Commonly known as: MEDROL DOSEPAK   Take 4 mg by mouth every day. Follow schedule on package instructions.  Dose: 4 mg     montelukast 10 MG Tabs  Commonly known as: SINGULAIR   TAKE ONE TABLET BY MOUTH IN THE EVENING     OCEAN NASAL SPRAY NA   Administer 1 Spray into affected nostril(S) every day.  Dose: 1 Spray     Ocuvite-Lutein Tabs   Take 1 Tablet by mouth every day.  Dose: 1 Tablet     omeprazole 20 MG delayed-release capsule  Commonly known as: PRILOSEC   Take 1 Capsule by mouth every morning with breakfast.  Dose: 20 mg     traMADol 50 MG Tabs  Commonly known as: Ultram   Take 0.5-1 Tablets by mouth every 8 hours as needed for Severe Pain for up to 7 days.  Dose: 25-50 mg     Vitamin C 1000 MG Tabs   Take 1 Tablet by mouth every morning.  Dose: 1,000 mg     Vitamin D 50 MCG (2000 UT) Caps   Take 2,000 Units by mouth every morning.  Dose: 2,000 Units     VITAMIN E PO   Take 1 Cap by mouth every morning.  Dose: 1 Capsule            STOP taking these medications      amLODIPine 5 MG Tabs  Commonly known as: NORVASC              Allergies  Allergies   Allergen Reactions    Bactrim [Sulfamethoxazole W-Trimethoprim] Hives    Pcn [Penicillins] Hives     About 70 years. Tolerated ceftriaxone before in 2021    Codeine Unspecified     Unknown reaction         DIET  No orders of the defined types were placed in this encounter.      ACTIVITY  As tolerated.  Weight bearing as tolerated    CONSULTATIONS  none    PROCEDURES  none    LABORATORY  Lab Results   Component Value Date    SODIUM 139 04/30/2023    POTASSIUM 3.9  04/30/2023    CHLORIDE 108 04/30/2023    CO2 22 04/30/2023    GLUCOSE 106 (H) 04/30/2023    BUN 10 04/30/2023    CREATININE 0.49 (L) 04/30/2023        Lab Results   Component Value Date    WBC 8.9 04/30/2023    HEMOGLOBIN 10.7 (L) 04/30/2023    HEMATOCRIT 32.6 (L) 04/30/2023    PLATELETCT 213 04/30/2023        Total time of the discharge process exceeds 35 minutes.

## 2023-04-30 NOTE — DISCHARGE PLANNING
Case Management Discharge Planning    Admission Date: 4/29/2023  GMLOS:    ALOS: 0    6-Clicks ADL Score:    6-Clicks Mobility Score:        Anticipated Discharge Dispo: Discharge Disposition: Discharged to home/self care (01)    DME Needed: No    Action(s) Taken: Updated Provider/Nurse on Discharge Plan    Escalations Completed: None    Medically Clear: Yes    Next Steps: None    Barriers to Discharge: None    CM completed chart review.  Patient to DC home.  No CM needs at this time.

## 2023-04-30 NOTE — CARE PLAN
The patient is Stable - Low risk of patient condition declining or worsening    Shift Goals  Clinical Goals: abx, pain control  Patient Goals: Rest  Family Goals: OG    Progress made toward(s) clinical / shift goals:  plan to dc home    Patient is not progressing towards the following goals:      Problem: Knowledge Deficit - Standard  Goal: Patient and family/care givers will demonstrate understanding of plan of care, disease process/condition, diagnostic tests and medications  Description: Target End Date:  1-3 days or as soon as patient condition allows    Document in Patient Education    1.  Patient and family/caregiver oriented to unit, equipment, visitation policy and means for communicating concern  2.  Complete/review Learning Assessment  3.  Assess knowledge level of disease process/condition, treatment plan, diagnostic tests and medications  4.  Explain disease process/condition, treatment plan, diagnostic tests and medications  Outcome: Met     Problem: Pain - Standard  Goal: Alleviation of pain or a reduction in pain to the patient’s comfort goal  Description: Target End Date:  Prior to discharge or change in level of care    Document on Vitals flowsheet    1.  Document pain using the appropriate pain scale per order or unit policy  2.  Educate and implement non-pharmacologic comfort measures (i.e. relaxation, distraction, massage, cold/heat therapy, etc.)  3.  Pain management medications as ordered  4.  Reassess pain after pain med administration per policy  5.  If opiods administered assess patient's response to pain medication is appropriate per POSS sedation scale  6.  Follow pain management plan developed in collaboration with patient and interdisciplinary team (including palliative care or pain specialists if applicable)  Outcome: Met

## 2023-04-30 NOTE — HOSPITAL COURSE
Ms. Dayana Lechuga is a 89 y.o. female with a PMHx of PVD, angina, severe anxiety, insomnia, who presented 4/29/2023 with altered mental status and hypotension.      She recently was seen by her cardiologist, Dr. Zepeda, and had Imdur increased from 30mg to 50mg along with being started on tramadol for arthralgias.  This morning while sitting to eat breakfast she was noted to be hypersomnolent paramedics were called and she was noted to be hypotensive with systolic blood pressure in the 70s.  She received fluid resuscitation and was transferred to the emergency department.  She denies any chest pain or dyspnea.  She has some mild lower abdominal discomfort and constipation.  No dysuria no hematuria.    In ER, patient noted to have normal vital signs.  Notable lab findings include leukocytosis at 11.7, RBC 3.12, hemoglobin 10.2, hematocrit 32.2, sodium 134, glucose 119, AST 10.  Urine analysis noted to be cloudy in character with increased protein at 30, positive nitrates, moderate leukocyte esterase, increased level of WBC at 100-1 50, elevated RBC 2-5, moderate amount of bacteria, and increased hyaline cast at 3-5.  Blood cultures obtained and negative to date.  Initial EKG obtained noting sinus rhythm with borderline prolonged IA interval at 214, old inferior and anterior infarct as compared to EKG obtained on 8/16/2022.  Patient admitted to hospital medicine for management of care for UTI versus dehydration.    Patient was monitored overnight with no events noted.  Patient was rehydrated.  Patient was initiated on ceftriaxone for management of UTI.  Patient seen and examined prior to being discharged.  Discussed with patient blood pressure management.  At this time, we will discontinue amlodipine as this can cause constipation.  Continue dosage of Imdur which this was recently adjusted by cardiologist.  Patient will be placed on Macrobid twice daily for 5 days for management of UTI.  Patient  educated in regards to keeping herself hydrated.  Also discussed with patient to have a discussion with her psychiatrist in regards to possible mismanagement of anxiolytics at home.  Patient highly recommended to follow-up with her PCP s/p hospitalization.  All questions and concerns answered prior to being discharged.  Patient discharged home.

## 2023-04-30 NOTE — PROGRESS NOTES
Med Rec complete per patient  No oral antibiotics in the last 30 days  Allergies reviewed  Preferred Pharmacy: [facility]

## 2023-04-30 NOTE — DISCHARGE INSTRUCTIONS
Please call [phone] to schedule PCP appointment for patient.    Required specialty appointments include:       Discharge Instructions per Rita Sevilla, [email or web address].    -Follow-up with PCP s/p hospitalization  -Follow-up with cardiology as indicated  -Continue all home medications; except for Amlodapine  -Check your blood pressure prior to taking all your blood pressure medications  -Follow-up with psychiatrist in regards to management of anxiolytics  -Start taking Macrobid twice daily for 5 days for management of UTI  -Keep yourself hydrated, drink at least 1.0-1.5L of water daily    DIET: Cardiac    ACTIVITY: As tolerated    DIAGNOSIS: Altered mentation, low blood pressure    Return to ER if symptoms persist, chest pain, palpitations, shortness of breath, numbness, tingling, weakness, and high fevers.    Please note that this dictation was created using voice recognition software. I have made every reasonable attempt to correct obvious errors, but there may be errors of grammar and possibly content that I did not discover before finalizing the note.    Electronically signed by:  Dr. IRIS Sevilla, DNP, APRN, FNP-C  Hospitalist Services  [facility]  [phone]  [email or web address]  04/30/23                [phone]

## 2023-04-30 NOTE — PROGRESS NOTES
4 Eyes Skin Assessment Completed by Ankita, RN and Milena RN.    Head WDL  Ears WDL  Nose WDL  Mouth WDL  Neck WDL  Breast/Chest WDL  Shoulder Blades WDL  Spine WDL  (R) Arm/Elbow/Hand WDL  (L) Arm/Elbow/Hand WDL  Abdomen WDL  Groin WDL  Scrotum/Coccyx/Buttocks WDL  (R) Leg WDL  (L) Leg WDL  (R) Heel/Foot/Toe WDL  (L) Heel/Foot/Toe WDL          Devices In Places Pulse Ox and Nasal Cannula      Interventions In Place Gray Ear Foams, Pillows, and Pressure Redistribution Mattress    Possible Skin Injury No    Pictures Uploaded Into Epic N/A  Wound Consult Placed N/A  RN Wound Prevention Protocol Ordered No

## 2023-04-30 NOTE — DISCHARGE PLANNING
HTH/SCP TCN chart review completed. Collaborated with JOZEF Florian prior to meeting with the pt. The most current review of medical record, knowledge of pt's PLOF and social support, LACE+ score of 25 and 6 clicks scores (none available) were considered. Per RN, patient no longer on IV ABX; MD switched to PO ABX. Per chart review, patient on room air. Per MD, she is cleared for discharge.    Pt seen at bedside. Introduced TCN program. Provided education regarding post acute levels of care. Discussed HTH/SCP plan benefits (Meds to Beds, medical uber and [facility] transitional care). Pt verbalizes understanding. Patient lives alone with son and dtr in law living nearby. Patient states she is independent with mobility, ADLs, and IADLs. Family helps with transportation. She is hoping to go home with outpatient f/u.    TCN will continue to follow and collaborate with discharge planning team as additional post acute needs arise. Thank you.     Completed today:  Choice obtained: none as pt as functional baseline  [facility] referral sent 4/30/23

## 2023-04-30 NOTE — PROGRESS NOTES
Pt ambulated around montaño with standby assistance, steady gait, VSS - -110s. Discussed medication regimen going forward at discharge. Pt to dc home, reporting moderate chronic back pain. Pt family to arrive at 1230 to take pt home. Pending meds to beds

## 2023-04-30 NOTE — PROGRESS NOTES
PT BP elevated  - MD notified, RN ordered to give scheduled home meds of coreg and lisinopril. Will give and recheck BP

## 2023-04-30 NOTE — CARE PLAN
The patient is Stable - Low risk of patient condition declining or worsening    Shift Goals  Clinical Goals: IV fluids, rest, monitor labs/vitals, pain control  Patient Goals: Rest  Family Goals: OG      Problem: Knowledge Deficit - Standard  Goal: Patient and family/care givers will demonstrate understanding of plan of care, disease process/condition, diagnostic tests and medications  Outcome: Progressing     Problem: Pain - Standard  Goal: Alleviation of pain or a reduction in pain to the patient’s comfort goal  Outcome: Progressing

## 2023-04-30 NOTE — PROGRESS NOTES
Discussed discharge instructions, medication regimen, plan of care going forward, polypharmacy, answered pt questions. IV removed, pt toileted, steady gait no assistance needed. Meds to beds delivered - held IV rocephin per MD and started pt on PO Macrobid.

## 2023-05-02 ENCOUNTER — APPOINTMENT (OUTPATIENT)
Dept: PHYSICAL THERAPY | Facility: REHABILITATION | Age: 88
End: 2023-05-02
Attending: FAMILY MEDICINE
Payer: MEDICARE

## 2023-05-03 ENCOUNTER — OFFICE VISIT (OUTPATIENT)
Dept: PHYSICAL MEDICINE AND REHAB | Facility: MEDICAL CENTER | Age: 88
End: 2023-05-03
Payer: MEDICARE

## 2023-05-03 VITALS
SYSTOLIC BLOOD PRESSURE: 120 MMHG | TEMPERATURE: 97.5 F | OXYGEN SATURATION: 94 % | WEIGHT: 125.4 LBS | HEIGHT: 62 IN | HEART RATE: 83 BPM | DIASTOLIC BLOOD PRESSURE: 54 MMHG | BODY MASS INDEX: 23.08 KG/M2

## 2023-05-03 DIAGNOSIS — M48.061 SPINAL STENOSIS OF LUMBAR REGION, UNSPECIFIED WHETHER NEUROGENIC CLAUDICATION PRESENT: ICD-10-CM

## 2023-05-03 DIAGNOSIS — K59.00 CONSTIPATION, UNSPECIFIED CONSTIPATION TYPE: ICD-10-CM

## 2023-05-03 DIAGNOSIS — M43.16 SPONDYLOLISTHESIS OF LUMBAR REGION: ICD-10-CM

## 2023-05-03 PROCEDURE — 99214 OFFICE O/P EST MOD 30 MIN: CPT | Performed by: PHYSICAL MEDICINE & REHABILITATION

## 2023-05-03 ASSESSMENT — PAIN SCALES - GENERAL: PAINLEVEL: 5=MODERATE PAIN

## 2023-05-03 ASSESSMENT — PATIENT HEALTH QUESTIONNAIRE - PHQ9
5. POOR APPETITE OR OVEREATING: 0 - NOT AT ALL
SUM OF ALL RESPONSES TO PHQ QUESTIONS 1-9: 3
CLINICAL INTERPRETATION OF PHQ2 SCORE: 1

## 2023-05-03 ASSESSMENT — FIBROSIS 4 INDEX: FIB4 SCORE: 1.22

## 2023-05-03 NOTE — PROGRESS NOTES
Follow up patient note  Pain Medicine, Interventional spine and sports physiatry, Physical medicine rehabilitation      Chief complaint:   Chief Complaint   Patient presents with    Follow-Up          HISTORY    Please see new patient note dated 9/10/2019 by Dr aFll,  for more details.     HPI  Patient identification: Dayana Lechuga 89 y.o. female with grade II spondylolisthesis at L4-5 and mod-severe lumbar spinal stenosis at L4-5 presents for follow-up of low back and left leg pain.    Interval history:   Dayana returns for follow-up.  Since the last visit, she was hospitalized for hypotension and UTI.  Started on nitrofurantoin.  She discontinued tramadol.    She reports that she is not having significant neck pain now.  Moving her shoulders and arms is not painful now.    Pain continues in the low back and into her legs.  This is now a 5/10 on the NRS.  Pain is significantly less than at last visit.  She has not been having left foot pain and swelling has also resolved.    Bowel movements also were difficult.  She developed constipation, likely from tramadol as well.  She has intermittent constipation, though.  Her usual bowel pattern is daily bowel movements and she has not been having them daily.  Started fiber gummies a month or two ago.     She is independent with ADLs, in independent living.      She is here with her daughter-in-law today.    Medical record review:  ED Hosp stay from 04/29/2023-04/30/2023: Dr. Kirk UTI, hypotension, hyponatremia, constipation, benzodiazepine dependence, CAD.  Patient presented with altered mental status.  UTI identified and started on nitrofurantoin     ROS Red Flags :   Fever, Chills, Sweats: Denies  Involuntary Weight Loss: Denies  Bowel/Bladder Incontinence: Denies, see above  Saddle Anesthesia: Denies    PMHx:   Past Medical History:   Diagnosis Date    Allergy     Anxiety     ASTHMA     Bronchitis     CAD (coronary artery disease)     JT to RCA; 70%  stenosis in LAD    Chills     Constipation     Difficulty breathing     GERD (gastroesophageal reflux disease)     Heart attack (HCC)     Heartburn     Hyperlipidemia     Hypertension     Influenza     Insomnia     Lumbar back pain 9/10/2016    Mild peripheral edema 7/31/2020    Mumps     OSTEOPOROSIS     Palpitations     Peripheral vascular disease, unspecified (HCC) 9/14/2021    Pneumonia     Post concussion syndrome 9/11/2020    PVD (peripheral vascular disease) ([facility])     70% PAD-followed by Lary AMBROCIO    Sweat, sweating, excessive     Tonsillitis        PSHx:   Past Surgical History:   Procedure Laterality Date    [place] INJ LUMBAR/SACRAL,W/ IMAGING N/A 4/20/2023    Procedure: Caudal epidural with catheter;  Surgeon: Torres Fall M.D.;  [facility];  Service: Pain Management    ABDOMINAL HYSTERECTOMY TOTAL      APPENDECTOMY      HERNIA REPAIR      HYSTERECTOMY LAPAROSCOPY      TONSILLECTOMY      ZZZ CARDIAC CATH         Family history   Family History   Problem Relation Age of Onset    Heart Attack Father     Cancer Brother     Cancer Brother     Heart Disease Neg Hx     Heart Failure Neg Hx     Hyperlipidemia Neg Hx        Medications:   Current Outpatient Medications   Medication    nitrofurantoin (MACROBID) 100 MG Cap    lisinopril (PRINIVIL) 20 MG Tab    carvedilol (COREG) 6.25 MG Tab    fluticasone (FLONASE) 50 MCG/ACT nasal spray    isosorbide mononitrate SR (IMDUR) 60 MG TABLET SR 24 HR    LORazepam (ATIVAN) 0.5 MG Tab    aspirin 81 MG EC tablet    gabapentin (NEURONTIN) 400 MG Cap    Metamucil Fiber Chew Tab    CRANBERRY PO    Multiple Vitamins-Minerals (OCUVITE-LUTEIN) Tab    Saline (OCEAN NASAL SPRAY NA)    montelukast (SINGULAIR) 10 MG Tab    atorvastatin (LIPITOR) 80 MG tablet    loratadine (CLARITIN) 10 MG Tab    Acetaminophen 500 MG Cap    Melatonin 10 MG Tab    Biotin w/ Vitamins C & E 1250-7.5-7.5 MCG-MG-UNT Chew Tab    guaifenesin LA (MUCINEX) 600 MG TABLET SR 12 HR     Ascorbic Acid (VITAMIN C) 1000 MG Tab    VITAMIN E PO    Cholecalciferol (VITAMIN D) 50 MCG (2000 UT) Cap    omeprazole (PRILOSEC) 20 MG delayed-release capsule     No current facility-administered medications for this visit.       Allergies:   Allergies   Allergen Reactions    Bactrim [Sulfamethoxazole W-Trimethoprim] Hives    Pcn [Penicillins] Hives     About 70 years. Tolerated ceftriaxone before in 2021    Codeine Unspecified     Unknown reaction         Social Hx:   Social History     Socioeconomic History    Marital status:      Spouse name: Not on file    Number of children: Not on file    Years of education: Not on file    Highest education level: Not on file   Occupational History    Not on file   Tobacco Use    Smoking status: Never    Smokeless tobacco: Never   Vaping Use    Vaping Use: Never used   Substance and Sexual Activity    Alcohol use: No     Alcohol/week: 0.0 oz    Drug use: No    Sexual activity: Not Currently   Other Topics Concern     Service No    Blood Transfusions Yes    Caffeine Concern No    Occupational Exposure No    Hobby Hazards No    Sleep Concern Yes    Stress Concern Yes    Weight Concern No    Special Diet No    Back Care No    Exercise No    Bike Helmet No    Seat Belt Yes    Self-Exams No   Social History Narrative    Not on file     Social Determinants of Health     Financial Resource Strain: Low Risk     Difficulty of Paying Living Expenses: Not very hard   Food Insecurity: No Food Insecurity    Worried About Running Out of Food in the Last Year: Never true    Ran Out of Food in the Last Year: Never true   Transportation Needs: No Transportation Needs    Lack of Transportation (Medical): No    Lack of Transportation (Non-Medical): No   Physical Activity: Insufficiently Active    Days of Exercise per Week: 7 days    Minutes of Exercise per Session: 20 min   Stress: No Stress Concern Present    Feeling of Stress : Not at all   Social Connections: Moderately  "Isolated    Frequency of Communication with Friends and Family: More than three times a week    Frequency of Social Gatherings with Friends and Family: More than three times a week    Attends Church Services: 1 to 4 times per year    Active Member of Clubs or Organizations: No    Attends Club or Organization Meetings: Never    Marital Status:    Intimate Partner Violence: Not on file   Housing Stability: Low Risk     Unable to Pay for Housing in the Last Year: No    Number of Places Lived in the Last Year: 1    Unstable Housing in the Last Year: No         EXAMINATION     Physical Exam:   Vitals: /54 (BP Location: Left arm, Patient Position: Sitting, BP Cuff Size: Adult)   Pulse 83   Temp 36.4 °C (97.5 °F) (Temporal)   Ht 1.575 m (5' 2\")   Wt 56.9 kg (125 lb 6.4 oz)   SpO2 94%     Constitutional:   Body Habitus: Body mass index is 22.94 kg/m².  Cooperation: Fully cooperates with exam  Appearance: Well-groomed no disheveled   Skin no skin lesions noted  Respiratory-  breathing comfortable on room air, no audible wheezing  Cardiovascular- capillary refills less than 2 seconds. No edema left foot, no foot or ankle tenderness  Psychiatric- alert and oriented ×3. Normal affect.   Spine:     Functional ROM of the hips in internal and external rotation.    Seated SLR is negative bilaterally    Key points for the international standards for neurological classification of spinal cord injury (ISNCSCI) to light touch.     No sensory deficits in the upper extremities bilaterally    Dermatome R L   L2 2 2   L3 2 2   L4 2 2   L5 2 2   S1 2 2   S2 2 2       Motor Exam Lower Extremities    ? Myotome R L   Hip flexion L2 5 5   Knee extension L3 5 5   Ankle dorsiflexion L4 5 5   Toe extension L5 5 5   Ankle plantarflexion S1 5 5           MEDICAL DECISION MAKING    DATA    Labs:   Lab Results   Component Value Date/Time    SODIUM 139 04/30/2023 01:38 AM    POTASSIUM 3.9 04/30/2023 01:38 AM    CHLORIDE 108 " 04/30/2023 01:38 AM    CO2 22 04/30/2023 01:38 AM    GLUCOSE 106 (H) 04/30/2023 01:38 AM    BUN 10 04/30/2023 01:38 AM    CREATININE 0.49 (L) 04/30/2023 01:38 AM        Lab Results   Component Value Date/Time    PROTHROMBTM 12.4 01/05/2021 04:23 PM    INR 0.89 01/05/2021 04:23 PM        Lab Results   Component Value Date/Time    WBC 8.9 04/30/2023 01:38 AM    RBC 3.25 (L) 04/30/2023 01:38 AM    HEMOGLOBIN 10.7 (L) 04/30/2023 01:38 AM    HEMATOCRIT 32.6 (L) 04/30/2023 01:38 AM    .3 (H) 04/30/2023 01:38 AM    MCH 32.9 04/30/2023 01:38 AM    MCHC 32.8 (L) 04/30/2023 01:38 AM    MPV 9.8 04/30/2023 01:38 AM    NEUTSPOLYS 63.50 04/30/2023 01:38 AM    LYMPHOCYTES 23.60 04/30/2023 01:38 AM    MONOCYTES 10.00 04/30/2023 01:38 AM    EOSINOPHILS 1.80 04/30/2023 01:38 AM    BASOPHILS 0.80 04/30/2023 01:38 AM        No results found for: HBA1C       Imaging: I personally reviewed following images  MRI lumbar spine April 5, 2023  At L1-2, no central or foraminal stenosis  At L2-3, mild disc bulge, mild facet arthropathy, no central or foraminal stenosis  At L3-4, mild disc bulge, mild facet arthropathy, mild central canal stenosis mild disc bulge  At L4-5, mild disc bulge, facet arthropathy, severe central canal stenosis  At L5-S1, left paracentral and foraminal disc extrusion that results in severe left lateral recess stenosis at L5 foraminal stenosis.  Mild central canal stenosis is noted.      Reviewed MRI from 09/18/2019, but no new imaging available    I reviewed the following radiology reports    MRI lumbar spine 04/05/2023  IMPRESSION:     1.  Multifocal degenerative disease in the lumbar spine as described above. The degenerative changes are significantly progressed since the previous study.  2.  There is central, left paracentral and left foraminal disc extrusion at L5-S1 causing severe left lateral recess and left L5 neural foraminal stenosis. The left S1 and L5 nerve roots might have been impinged.  3.  There  is an approximately 9 mm anterolisthesis of L4 on 5.                       Results for orders placed during the hospital encounter of 09/18/19    MR-LUMBAR SPINE-W/O    Impression  Severe L4/5 facet arthropathy and degenerative these results in right lateral listhesis and nearly grade 2 anterolisthesis    Moderate-severe central stenosis L4/5. Lesser stenoses at other levels as detailed above    Greatest foraminal stenosis is moderate-severe on the left at L5/S1            DIAGNOSIS   Visit Diagnoses     ICD-10-CM   1. Spinal stenosis of lumbar region, unspecified whether neurogenic claudication present  M48.061   2. Spondylolisthesis of lumbar region  M43.16   3. Constipation, unspecified constipation type  K59.00           ASSESSMENT and PLAN:     Dayana Lechuga 89 y.o. female seen for above    Dayana was seen today for follow-up.    Diagnoses and all orders for this visit:    Spinal stenosis of lumbar region, unspecified whether neurogenic claudication present    Spondylolisthesis of lumbar region    Constipation, unspecified constipation type        Pain has significantly improved since last visit.  Taking tylenol less regularly.  Okay to take tylenol 500mg po tid.  Discontinue tramadol.  Left foot pain has resolved.  Continue nitrofurantoin. Urine culture reports that UTI is sensitive to this medication.  Return to PT.  She will call to schedule.  Continue gabapentin.    Maintain regular bowel movements. Discussed that fiber can cause constipation in some people.  Consider discontinuing.  Could start taking prunes or prune juice daily to help maintain daily bowel movements. Can consider other medications if this is not successful.      Follow up: 6 weeks, prn    My total time spent caring for the patient on the day of the encounter was 32 minutes.   This does not include time spent on separately billable procedures/tests.      Thank you for allowing me to participate in the care of this patient.  If you have any questions please not hesitate to contact me.    My total time spent caring for the patient on the day of the encounter was 40 minutes.   This does not include time spent on separately billable procedures/tests.      Please note that this dictation was created using voice recognition software. I have made every reasonable attempt to correct obvious errors but there may be errors of grammar and content that I may have overlooked prior to finalization of this note.      Torres Fall MD  Interventional Spine and Sports Physiatry  Physical Medicine and Rehabilitation  [facility]       CC: Justin Haley MD

## 2023-05-04 ENCOUNTER — OFFICE VISIT (OUTPATIENT)
Dept: MEDICAL GROUP | Facility: PHYSICIAN GROUP | Age: 88
End: 2023-05-04
Payer: MEDICARE

## 2023-05-04 VITALS
HEART RATE: 74 BPM | HEIGHT: 62 IN | TEMPERATURE: 98.5 F | OXYGEN SATURATION: 94 % | WEIGHT: 121.6 LBS | DIASTOLIC BLOOD PRESSURE: 50 MMHG | BODY MASS INDEX: 22.38 KG/M2 | SYSTOLIC BLOOD PRESSURE: 108 MMHG

## 2023-05-04 DIAGNOSIS — M54.16 LEFT LUMBAR RADICULITIS: ICD-10-CM

## 2023-05-04 DIAGNOSIS — I20.89 STABLE ANGINA PECTORIS (HCC): ICD-10-CM

## 2023-05-04 DIAGNOSIS — M48.061 SPINAL STENOSIS OF LUMBAR REGION, UNSPECIFIED WHETHER NEUROGENIC CLAUDICATION PRESENT: ICD-10-CM

## 2023-05-04 DIAGNOSIS — F13.20 BENZODIAZEPINE DEPENDENCE (HCC): ICD-10-CM

## 2023-05-04 DIAGNOSIS — I10 ESSENTIAL HYPERTENSION: ICD-10-CM

## 2023-05-04 DIAGNOSIS — I73.9 PVD (PERIPHERAL VASCULAR DISEASE) (HCC): ICD-10-CM

## 2023-05-04 DIAGNOSIS — M54.41 ACUTE BILATERAL LOW BACK PAIN WITH BILATERAL SCIATICA: ICD-10-CM

## 2023-05-04 DIAGNOSIS — E78.5 DYSLIPIDEMIA: Chronic | ICD-10-CM

## 2023-05-04 DIAGNOSIS — M54.42 ACUTE BILATERAL LOW BACK PAIN WITH BILATERAL SCIATICA: ICD-10-CM

## 2023-05-04 DIAGNOSIS — I25.119 CORONARY ARTERY DISEASE INVOLVING NATIVE CORONARY ARTERY OF NATIVE HEART WITH ANGINA PECTORIS (HCC): ICD-10-CM

## 2023-05-04 DIAGNOSIS — G62.89 OTHER POLYNEUROPATHY: ICD-10-CM

## 2023-05-04 LAB
BACTERIA BLD CULT: NORMAL
BACTERIA BLD CULT: NORMAL
SIGNIFICANT IND 70042: NORMAL
SIGNIFICANT IND 70042: NORMAL
SITE SITE: NORMAL
SITE SITE: NORMAL
SOURCE SOURCE: NORMAL
SOURCE SOURCE: NORMAL

## 2023-05-04 PROCEDURE — 99214 OFFICE O/P EST MOD 30 MIN: CPT | Performed by: FAMILY MEDICINE

## 2023-05-04 RX ORDER — GABAPENTIN 400 MG/1
400 CAPSULE ORAL 3 TIMES DAILY
Qty: 270 CAPSULE | Refills: 3 | Status: SHIPPED | OUTPATIENT
Start: 2023-05-04 | End: 2023-08-02

## 2023-05-04 RX ORDER — CARVEDILOL 3.12 MG/1
3.12 TABLET ORAL 2 TIMES DAILY
Qty: 180 TABLET | Refills: 3 | Status: SHIPPED | OUTPATIENT
Start: 2023-05-04 | End: 2023-08-02

## 2023-05-04 RX ORDER — ISOSORBIDE MONONITRATE 60 MG/1
60 TABLET, EXTENDED RELEASE ORAL EVERY EVENING
Qty: 90 TABLET | Refills: 3 | Status: SHIPPED | OUTPATIENT
Start: 2023-05-04 | End: 2023-11-16 | Stop reason: SDUPTHER

## 2023-05-04 RX ORDER — LISINOPRIL 10 MG/1
10 TABLET ORAL DAILY
Qty: 90 TABLET | Refills: 3 | Status: SHIPPED | OUTPATIENT
Start: 2023-05-04 | End: 2023-05-08 | Stop reason: SDUPTHER

## 2023-05-04 ASSESSMENT — FIBROSIS 4 INDEX: FIB4 SCORE: 1.22

## 2023-05-04 NOTE — PROGRESS NOTES
Subjective:     Chief Complaint   Patient presents with    Leg Pain     Pinched nerve     Medication Problem     Straighten out medication        HPI:   Dayana presents today to discuss the following.    Acute bilateral low back pain with bilateral sciatica  Acute on chronic  S/p epidural with Physiatry     Over the past couple of days having some pain in the back of her left leg.     Saw physiatry yest and they recommend continuing to monitor.     Essential hypertension  Chronic issue  On coreg 3.125mg BID, lisinopril 10mg daily  Not taking amlodipine anymore   BP is low.     Past Medical History:   Diagnosis Date    Allergy     Anxiety     ASTHMA     Bronchitis     CAD (coronary artery disease)     JT to RCA; 70% stenosis in LAD    Chills     Constipation     Difficulty breathing     GERD (gastroesophageal reflux disease)     Heart attack ([facility])     Heartburn     Hyperlipidemia     Hypertension     Influenza     Insomnia     Lumbar back pain 9/10/2016    Mild peripheral edema 7/31/2020    Mumps     OSTEOPOROSIS     Palpitations     Peripheral vascular disease, unspecified ([facility]) 9/14/2021    Pneumonia     Post concussion syndrome 9/11/2020    PVD (peripheral vascular disease) ([facility])     70% PAD-followed by Lary AMBROCIO    Sweat, sweating, excessive     Tonsillitis        Current Outpatient Medications Ordered in Epic   Medication Sig Dispense Refill    carvedilol (COREG) 3.125 MG Tab Take 1 Tablet by mouth 2 times a day for 90 days. 180 Tablet 3    lisinopril (PRINIVIL) 10 MG Tab Take 1 Tablet by mouth every day. 90 Tablet 3    isosorbide mononitrate SR (IMDUR) 60 MG TABLET SR 24 HR Take 1 Tablet by mouth every evening. 90 Tablet 3    gabapentin (NEURONTIN) 400 MG Cap Take 1 Capsule by mouth 3 times a day for 90 days. 270 Capsule 3    fluticasone (FLONASE) 50 MCG/ACT nasal spray Administer 1 Spray into affected nostril(S) every day.      LORazepam (ATIVAN) 0.5 MG Tab Take 1 Tablet by mouth every day for 90 days.  "(Try taking 1/2 tablet before bed, if unable to fall asleep take additional 1/2 tablet) 30 Tablet 2    aspirin 81 MG EC tablet Chew 81 mg every day.      CRANBERRY PO Take 1 Tablet by mouth every day.      Multiple Vitamins-Minerals (OCUVITE-LUTEIN) Tab Take 1 Tablet by mouth every day.      Saline (OCEAN NASAL SPRAY NA) Administer 1 Spray into affected nostril(S) every day.      montelukast (SINGULAIR) 10 MG Tab TAKE ONE TABLET BY MOUTH IN THE EVENING (Patient taking differently: Take 10 mg by mouth every evening.) 90 Tablet 3    atorvastatin (LIPITOR) 80 MG tablet Take 1 Tablet by mouth every evening. 100 Tablet 3    loratadine (CLARITIN) 10 MG Tab Take 1 Tablet by mouth every day.      Acetaminophen 500 MG Cap Take 1 Capsule by mouth 2 times a day. Morning & Afternoon, sometimes takes 3 times a day      Melatonin 10 MG Tab Take 10 mg by mouth at bedtime.      Biotin w/ Vitamins C & E 1250-7.5-7.5 MCG-MG-UNT Chew Tab Chew 1 Tab every morning.      guaifenesin LA (MUCINEX) 600 MG TABLET SR 12 HR Take 600 mg by mouth every morning.      Ascorbic Acid (VITAMIN C) 1000 MG Tab Take 1 Tablet by mouth every morning.      VITAMIN E PO Take 1 Cap by mouth every morning.      Cholecalciferol (VITAMIN D) 50 MCG (2000 UT) Cap Take 2,000 Units by mouth every morning.      omeprazole (PRILOSEC) 20 MG delayed-release capsule Take 1 Capsule by mouth every morning with breakfast.       No current Epic-ordered facility-administered medications on file.       Allergies:  Bactrim [sulfamethoxazole w-trimethoprim], Pcn [penicillins], and Codeine    Health Maintenance: Completed    ROS:  Gen: no fevers/chills, no changes in weight  Eyes: no changes in vision  Pulm: no sob, no cough  CV: no chest pain, no palpitations  GI: no nausea/vomiting, no diarrhea      Objective:     Exam:  /50 (BP Location: Left arm, Patient Position: Sitting, BP Cuff Size: Adult)   Pulse 74   Temp 36.9 °C (98.5 °F) (Temporal)   Ht 1.575 m (5' 2\")   Wt " 55.2 kg (121 lb 9.6 oz)   LMP  (LMP Unknown)   SpO2 94%   BMI 22.24 kg/m²  Body mass index is 22.24 kg/m².      Constitutional: Alert, no distress, well-groomed.  Skin: Warm, dry, good turgor, no rashes in visible areas.  Eye: Equal, round and reactive, conjunctiva clear, lids normal.  ENMT: Lips without lesions, good dentition, moist mucous membranes.  Neck: Trachea midline, no masses, no thyromegaly.  Respiratory: Unlabored respiratory effort, no cough.  MSK: Normal gait, moves all extremities.  Neuro: Grossly non-focal.   Psych: Alert and oriented x3, normal affect and mood.        Assessment & Plan:     89 y.o. female with the following -     1. Acute bilateral low back pain with bilateral sciatica  Chronic, stable condition.  Recommend she continues to follow-up with physiatry.  Continue with gabapentin.    2. Essential hypertension  Chronic, stable condition.  On the lower side.  As such I will readjust her regimen by cutting down on Coreg to 3.125 twice daily.  Also coming down on lisinopril 10 mg daily.  Continue to monitor blood pressure at home.  - carvedilol (COREG) 3.125 MG Tab; Take 1 Tablet by mouth 2 times a day for 90 days.  Dispense: 180 Tablet; Refill: 3    3. Dyslipidemia  - carvedilol (COREG) 3.125 MG Tab; Take 1 Tablet by mouth 2 times a day for 90 days.  Dispense: 180 Tablet; Refill: 3    4. Coronary artery disease involving native coronary artery of native heart with angina pectoris (HCC)  - carvedilol (COREG) 3.125 MG Tab; Take 1 Tablet by mouth 2 times a day for 90 days.  Dispense: 180 Tablet; Refill: 3    5. PVD (peripheral vascular disease) ([facility])  - lisinopril (PRINIVIL) 10 MG Tab; Take 1 Tablet by mouth every day.  Dispense: 90 Tablet; Refill: 3    6. Other polyneuropathy  - lisinopril (PRINIVIL) 10 MG Tab; Take 1 Tablet by mouth every day.  Dispense: 90 Tablet; Refill: 3    7. Stable angina pectoris (HCC)  Chronic condition.  Continue to follow-up with cardiology.  Continue with  Imdur.  - isosorbide mononitrate SR (IMDUR) 60 MG TABLET SR 24 HR; Take 1 Tablet by mouth every evening.  Dispense: 90 Tablet; Refill: 3    8. Spinal stenosis of lumbar region, unspecified whether neurogenic claudication present  - gabapentin (NEURONTIN) 400 MG Cap; Take 1 Capsule by mouth 3 times a day for 90 days.  Dispense: 270 Capsule; Refill: 3    9. Left lumbar radiculitis  - gabapentin (NEURONTIN) 400 MG Cap; Take 1 Capsule by mouth 3 times a day for 90 days.  Dispense: 270 Capsule; Refill: 3    10.  Benzodiazepine dependence  Continue to follow-up with subspecialist.  Continue with low-dose Ativan.    No follow-ups on file.    HCC Gap Form    Diagnosis to address: F13.20 - Benzodiazepine dependence (HCC)  Assessment and plan: Chronic, stable. Continue with current defined treatment plan: low dose ativan. Follow-up at least annually.  Last edited 05/04/23 11:25 PDT by Justin Haley M.D.           Please note that this dictation was created using voice recognition software. I have made every reasonable attempt to correct obvious errors, but I expect that there are errors of grammar and possibly content that I did not discover before finalizing the note.

## 2023-05-04 NOTE — ASSESSMENT & PLAN NOTE
Chronic issue  On coreg 3.125mg BID, lisinopril 10mg daily  Not taking amlodipine anymore   BP is low.

## 2023-05-04 NOTE — ASSESSMENT & PLAN NOTE
Acute on chronic  S/p epidural with Physiatry     Over the past couple of days having some pain in the back of her left leg.     Saw physiatry yest and they recommend continuing to monitor.

## 2023-05-08 ENCOUNTER — PATIENT OUTREACH (OUTPATIENT)
Dept: HEALTH INFORMATION MANAGEMENT | Facility: OTHER | Age: 88
End: 2023-05-08
Payer: MEDICARE

## 2023-05-08 DIAGNOSIS — R33.9 RETENTION OF URINE: ICD-10-CM

## 2023-05-08 DIAGNOSIS — I10 ESSENTIAL HYPERTENSION: ICD-10-CM

## 2023-05-08 DIAGNOSIS — I73.9 PVD (PERIPHERAL VASCULAR DISEASE) (HCC): ICD-10-CM

## 2023-05-08 DIAGNOSIS — R33.9 INCOMPLETE EMPTYING OF BLADDER: ICD-10-CM

## 2023-05-08 DIAGNOSIS — R26.89 IMBALANCE: ICD-10-CM

## 2023-05-08 DIAGNOSIS — G62.89 OTHER POLYNEUROPATHY: ICD-10-CM

## 2023-05-08 PROCEDURE — 99999 PR NO CHARGE: CPT | Performed by: FAMILY MEDICINE

## 2023-05-08 RX ORDER — LISINOPRIL 10 MG/1
10 TABLET ORAL DAILY
Qty: 100 TABLET | Refills: 3 | Status: SHIPPED | OUTPATIENT
Start: 2023-05-08 | End: 2023-10-24 | Stop reason: SDUPTHER

## 2023-05-08 NOTE — TELEPHONE ENCOUNTER
Received request via: Pharmacy    Was the patient seen in the last year in this department? Yes    Does the patient have an active prescription (recently filled or refills available) for medication(s) requested?  INSURANCE REQUEST 100 DAY SUPPLY TO COVER MEDICATION COST NEW RX IS NEEDED PER PHARMACY PLEASE ADVISE.    Does the patient have [facility] Plus and need 100 day supply (blood pressure, diabetes and cholesterol meds only)? Yes, quantity updated to 100 days

## 2023-05-08 NOTE — PROGRESS NOTES
Assessment    Phone outreach to patient to complete her monthly PCM follow-up.  Dayana reports she had an epidural & it did not work, still having pain (pinched nerve) in her leg.  Was in the hospital ([facility] 4/29/23-4/30/23) for a UTI.  Having a lot of headaches, she thinks they started occurring following the epidural.  She checked with PT today & she cannot get in until June but they need an okay from Dr. Haley to restart PT, I routed chart to him re: same.  We reviewed her upcoming appointments. Tramadol did not work for her.  She had a good visit with Cardiology on 4/26, isosorbide mononitrate was increased to 60 mg.  She is still walking but using her walker.  Does not use the walker in her apartment.  Has somebody walking her dog.  She wants to be able to put her jeans on & walk this summer.      Education    No updates @ this time    Plan of Care and Goals    No updates @ this time    Barriers:    Age, chronic pain     Progress:    Slow progress    Next outreach:  6/8/23

## 2023-05-16 PROBLEM — M51.379 DDD (DEGENERATIVE DISC DISEASE), LUMBOSACRAL: Status: ACTIVE | Noted: 2023-05-16

## 2023-05-16 PROBLEM — G31.9 CEREBRAL ATROPHY (HCC): Status: ACTIVE | Noted: 2023-05-16

## 2023-05-16 PROBLEM — I70.0 ATHEROSCLEROSIS OF AORTA (HCC): Status: ACTIVE | Noted: 2023-05-16

## 2023-05-16 PROBLEM — R94.30 NONSPECIFIC ABNORMAL FUNCTION STUDY, CARDIOVASCULAR: Status: ACTIVE | Noted: 2023-05-16

## 2023-05-16 PROBLEM — M51.37 DDD (DEGENERATIVE DISC DISEASE), LUMBOSACRAL: Status: ACTIVE | Noted: 2023-05-16

## 2023-05-16 PROBLEM — F32.0 MILD MAJOR DEPRESSION (HCC): Status: ACTIVE | Noted: 2023-05-16

## 2023-05-16 PROBLEM — I20.89 STABLE ANGINA (HCC): Status: ACTIVE | Noted: 2023-05-16

## 2023-05-22 ENCOUNTER — TELEPHONE (OUTPATIENT)
Dept: MEDICAL GROUP | Facility: PHYSICIAN GROUP | Age: 88
End: 2023-05-22
Payer: MEDICARE

## 2023-05-22 NOTE — TELEPHONE ENCOUNTER
VOICEMAIL  1. Caller Name: Dayana Lechuga                        Call Back Number: [phone] (home)       2. Message: Pt states she had an appt with Dr Haley on 5/3/23 where they discussed cutting her Carvedilol to 3.125 mg 2x per day and her Lisinopril to 10mg per day. She stated that since this change her BP has gotten higher. On day of call pt reports her BP as 141/80. Pt would like to know if she should still be taking her medications as Dr. Haley instructed or revert to original dosage.     3. Patient approves office to leave a detailed voicemail/MyChart message: N\A

## 2023-05-24 NOTE — TELEPHONE ENCOUNTER
Phone Number Called: [phone] (home)       Call outcome: Spoke to patient regarding message below.    Message: Infromed pt. Patient stated that she already requested 3 months supply of dosage and that she was going to call pharmacy to correct it to original doses. Pt also took BP while on phone and reported 169/72

## 2023-05-31 RX ORDER — FLUTICASONE PROPIONATE 50 MCG
SPRAY, SUSPENSION (ML) NASAL
Qty: 16 G | Refills: 0 | Status: SHIPPED | OUTPATIENT
Start: 2023-05-31 | End: 2023-08-08

## 2023-06-06 ENCOUNTER — PHYSICAL THERAPY (OUTPATIENT)
Dept: PHYSICAL THERAPY | Facility: REHABILITATION | Age: 88
End: 2023-06-06
Attending: FAMILY MEDICINE
Payer: MEDICARE

## 2023-06-06 DIAGNOSIS — R26.89 IMBALANCE: ICD-10-CM

## 2023-06-06 PROCEDURE — 97110 THERAPEUTIC EXERCISES: CPT

## 2023-06-06 NOTE — OP THERAPY PROGRESS SUMMARY
Outpatient Physical Therapy  PROGRESS SUMMARY NOTE      [facility]  [address], [address]  [place] [address]  Phone:  [phone]    Date of Visit: 06/06/2023    Patient: Dayana Lechuga  YOB: 1929  MRN: [id number]     Referring Provider: Justin Haley M.D.  [address]  [place] [address]   Referring Diagnosis Imbalance [R26.89]     Visit Diagnoses     ICD-10-CM   1. Imbalance  R26.89       Rehab Potential: excellent    Progress Report Period: 4/18/2023-6/6/2023    Functional Assessment Used          Objective Findings and Assessment:   Patient progression towards goals: Short Term Goals:   Patient able to walk without assistive device with >25% decreased symptoms  Patient able to perform ADLS with >25% decreased symptoms        Long Term Goals:    Patient able to walk dog >10 min without assistive device and with >50% decreased leg symptoms  Patient able to perform ADLS with >50% decreased symptoms    Objective findings and assessment details: Patient has had recent hospitalization secondary to LOC during breakfast secondary to hypotensive episode and reaction from taking Tramoadol. .  Patient presents with decreased pain since last follow up 4/25/2023    Pain:  chronic, continuous left LE pain radiating from lower lumbar symptoms currently 4/10 worse:  8/10 VAS  Aggs: varied no specific position.  Standing to sitting  Sleeping:  difficulty first in the morning   Activity:  walking the dog 3 x day x  15 min without the walker.  Walks to all meals and is INdep with ADLS  Gait:  using the fww for most community distances but doesn't always take the walker when walking dog.  Strength: 4-/5 grossly bilateral quads, hamstrings//hips 4/5 abductors and extensors-difficult to assess secondary to patient anxious about resisting movement  5 x sit to stand: 14.8 sec  Squat: symmetrical >50% with no effect on leg pain   Recommend continued PT to meet  goals stated below.           Goals:   Short Term Goals:   Patient able to walk >20 min without assistive device @ Indep with >25% decreased symptoms left leg  Patient able Patient able to perform floor transfer @Indep with >25% decreased symptoms  Short term goal time span:  2-4 weeks      Long Term Goals:    Patient able to walk >30 min with >25% decreased leg pain without assistive device  5 x sit to stand 12 sec  Long term goal time span:  6-8 weeks    Plan:   Planned therapy interventions:  Neuromuscular Re-education (CPT 58743), Manual Therapy (CPT 35855), Gait Training (CPT 63954) and Therapeutic Exercise (CPT 41544)  Frequency:  1x week  Plan details:  1-2 x week x 8 weeks      Referring provider co-signature:  I have reviewed this plan of care and my co-signature certifies the need for services.     Certification Period: 06/06/2023 to 08/01/23    Physician Signature: ________________________________ Date: ______________

## 2023-06-06 NOTE — OP THERAPY DAILY TREATMENT
Outpatient Physical Therapy  DAILY TREATMENT     [facility] [address], [address]  [place] [address]  Phone:  [phone]    Date: 06/06/2023    Patient: Dayana Lechuga  YOB: 1929  MRN: [id number]     Time Calculation    Start time: 1515  Stop time: 1600 Time Calculation (min): 45 minutes         Chief Complaint: left lumbar radiculopathy  Visit #: 3    SUBJECTIVE:  Patient comes in reporting significantly less left sided lumbar and leg pain compared to last session in April. Patient has been hospitalized since then for a negative reaction to Tramadol.      OBJECTIVE:  Current objective measures:Please see UPOC          Therapeutic Exercises (CPT 03425):     1. heel raises min ue support, 2 x 1 min    2. sit to stand 18 inch, 2 x 30 sec, produce//NW leg pain    3. box squats unsupported, x10, No effect    4. bridge, 3 x 10 reps, no effect    5. Stepper, L4 2 min//5.0 8 min seat 16      Time-based treatments/modalities:    Physical Therapy Timed Treatment Charges  Therapeutic exercise minutes (CPT 64775): 38 minutes  ASSESSMENT:   Response to treatment: UPOC completed.  Patient demonstrates increased tolerance to exercise and PT but still has chronic left leg pain 4/10 VAS throughout session with no DP or pattern. Continue to progress functional strength and endurance next session.  Add core stability/strength    PLAN/RECOMMENDATIONS:   Plan for treatment: therapy treatment to continue next visit.  Planned interventions for next visit: continue with current treatment.

## 2023-06-12 ENCOUNTER — TELEPHONE (OUTPATIENT)
Dept: MEDICAL GROUP | Facility: PHYSICIAN GROUP | Age: 88
End: 2023-06-12
Payer: MEDICARE

## 2023-06-12 NOTE — TELEPHONE ENCOUNTER
VOICEMAIL  1. Caller Name: Dayana Lechuga                        Call Back Number: [phone] (home)       2. Message: Pt calling regarding a blood pressure log she dropped off sometime last week. Pt requesting advice regarding her BP medication based on her logs.     3. Patient approves office to leave a detailed voicemail/MyChart message: N\A

## 2023-06-14 ENCOUNTER — OFFICE VISIT (OUTPATIENT)
Dept: PHYSICAL MEDICINE AND REHAB | Facility: MEDICAL CENTER | Age: 88
End: 2023-06-14
Payer: MEDICARE

## 2023-06-14 VITALS
OXYGEN SATURATION: 93 % | WEIGHT: 117 LBS | HEART RATE: 80 BPM | SYSTOLIC BLOOD PRESSURE: 122 MMHG | TEMPERATURE: 98.1 F | DIASTOLIC BLOOD PRESSURE: 60 MMHG | BODY MASS INDEX: 21.53 KG/M2 | HEIGHT: 62 IN

## 2023-06-14 DIAGNOSIS — M48.061 SPINAL STENOSIS OF LUMBAR REGION, UNSPECIFIED WHETHER NEUROGENIC CLAUDICATION PRESENT: ICD-10-CM

## 2023-06-14 DIAGNOSIS — K59.00 CONSTIPATION, UNSPECIFIED CONSTIPATION TYPE: ICD-10-CM

## 2023-06-14 DIAGNOSIS — M43.16 SPONDYLOLISTHESIS OF LUMBAR REGION: ICD-10-CM

## 2023-06-14 PROCEDURE — 99214 OFFICE O/P EST MOD 30 MIN: CPT | Performed by: PHYSICAL MEDICINE & REHABILITATION

## 2023-06-14 PROCEDURE — 3074F SYST BP LT 130 MM HG: CPT | Performed by: PHYSICAL MEDICINE & REHABILITATION

## 2023-06-14 PROCEDURE — 1125F AMNT PAIN NOTED PAIN PRSNT: CPT | Performed by: PHYSICAL MEDICINE & REHABILITATION

## 2023-06-14 PROCEDURE — 3078F DIAST BP <80 MM HG: CPT | Performed by: PHYSICAL MEDICINE & REHABILITATION

## 2023-06-14 ASSESSMENT — FIBROSIS 4 INDEX: FIB4 SCORE: 1.22

## 2023-06-14 ASSESSMENT — PAIN SCALES - GENERAL: PAINLEVEL: 5=MODERATE PAIN

## 2023-06-14 ASSESSMENT — PATIENT HEALTH QUESTIONNAIRE - PHQ9: CLINICAL INTERPRETATION OF PHQ2 SCORE: 0

## 2023-06-14 NOTE — PROGRESS NOTES
Follow up patient note  Pain Medicine, Interventional spine and sports physiatry, Physical medicine rehabilitation      Chief complaint:   Chief Complaint   Patient presents with    Follow-Up     F/u visit meds          HISTORY    Please see new patient note dated 9/10/2019 by Dr Fall,  for more details.     HPI  Patient identification: Dayana Lechuga 89 y.o. female with grade II spondylolisthesis at L4-5 and mod-severe lumbar spinal stenosis at L4-5 presents for follow-up of low back and left leg pain.    Interval history:     Dayana returns for follow-up.  She has been doing pretty well overall.  She is moving much better than she was prior to steroid injection on 04/20/2023.      She continues to take tylenol 500mg po tid and gabapentin 400mg po tid.  She has been able to return to her walking program with her dog. She is using a walker, but does walk the dog without it.  Returning to home exercises and is going to PT and is doing well.  Pain is sometimes present, but is doing much better and is less constant.  No ongoing left leg pain.  Some intermittent axial back pain now.    No bowel changes.  She is able to maintain regular bowel routine with use of prune juice.    She is independent with ADLs, in independent living.      She is here with her son today.    Medical record review:  [facility] stay from 04/29/2023-04/30/2023: Dr. Kirk UTI, hypotension, hyponatremia, constipation, benzodiazepine dependence, CAD.  Patient presented with altered mental status.  UTI identified and started on nitrofurantoin     ROS Red Flags :   Fever, Chills, Sweats: Denies  Involuntary Weight Loss: Denies  Bowel/Bladder Incontinence: Denies, see above  Saddle Anesthesia: Denies    PMHx:   Past Medical History:   Diagnosis Date    Allergy     Anxiety     ASTHMA     Bronchitis     CAD (coronary artery disease)     JT to RCA; 70% stenosis in LAD    Chills     Constipation     Difficulty breathing     GERD  (gastroesophageal reflux disease)     Heart attack (HCC)     Heartburn     Hyperlipidemia     Hypertension     Influenza     Insomnia     Lumbar back pain 9/10/2016    Mild peripheral edema 7/31/2020    Mumps     OSTEOPOROSIS     Palpitations     Peripheral vascular disease, unspecified (HCC) 9/14/2021    Pneumonia     Post concussion syndrome 9/11/2020    PVD (peripheral vascular disease) ([facility])     70% PAD-followed by L[facility] AMBROCIO    Sweat, sweating, excessive     Tonsillitis        PSHx:   Past Surgical History:   Procedure Laterality Date    IL INJ LUMBAR/SACRAL,W/ IMAGING N/A 4/20/2023    Procedure: Caudal epidural with catheter;  Surgeon: Torres Fall M.D.;  [facility];  Service: Pain Management    ABDOMINAL HYSTERECTOMY TOTAL      APPENDECTOMY      HERNIA REPAIR      HYSTERECTOMY LAPAROSCOPY      TONSILLECTOMY      ZZZ CARDIAC CATH         Family history   Family History   Problem Relation Age of Onset    Heart Attack Father     Cancer Brother     Cancer Brother     Heart Disease Neg Hx     Heart Failure Neg Hx     Hyperlipidemia Neg Hx        Medications:   Current Outpatient Medications   Medication    fluticasone (FLONASE) 50 MCG/ACT nasal spray    lisinopril (PRINIVIL) 10 MG Tab    carvedilol (COREG) 3.125 MG Tab    isosorbide mononitrate SR (IMDUR) 60 MG TABLET SR 24 HR    gabapentin (NEURONTIN) 400 MG Cap    LORazepam (ATIVAN) 0.5 MG Tab    aspirin 81 MG EC tablet    CRANBERRY PO    Multiple Vitamins-Minerals (OCUVITE-LUTEIN) Tab    Saline (OCEAN NASAL SPRAY NA)    montelukast (SINGULAIR) 10 MG Tab    atorvastatin (LIPITOR) 80 MG tablet    loratadine (CLARITIN) 10 MG Tab    Acetaminophen 500 MG Cap    Melatonin 10 MG Tab    Biotin w/ Vitamins C & E 1250-7.5-7.5 MCG-MG-UNT Chew Tab    guaifenesin LA (MUCINEX) 600 MG TABLET SR 12 HR    Ascorbic Acid (VITAMIN C) 1000 MG Tab    VITAMIN E PO    Cholecalciferol (VITAMIN D) 50 MCG (2000 UT) Cap    omeprazole (PRILOSEC) 20 MG  delayed-release capsule     No current facility-administered medications for this visit.       Allergies:   Allergies   Allergen Reactions    Bactrim [Sulfamethoxazole W-Trimethoprim] Hives    Pcn [Penicillins] Hives     About 70 years. Tolerated ceftriaxone before in 2021    Codeine Unspecified     Unknown reaction      Tramadol Unspecified     Urinary retention       Social Hx:   Social History     Socioeconomic History    Marital status:      Spouse name: Not on file    Number of children: Not on file    Years of education: Not on file    Highest education level: Not on file   Occupational History    Not on file   Tobacco Use    Smoking status: Never    Smokeless tobacco: Never   Vaping Use    Vaping Use: Never used   Substance and Sexual Activity    Alcohol use: No     Alcohol/week: 0.0 oz    Drug use: No    Sexual activity: Not Currently   Other Topics Concern     Service No    Blood Transfusions Yes    Caffeine Concern No    Occupational Exposure No    Hobby Hazards No    Sleep Concern Yes    Stress Concern Yes    Weight Concern No    Special Diet No    Back Care No    Exercise No    Bike Helmet No    Seat Belt Yes    Self-Exams No   Social History Narrative    Not on file     Social Determinants of Health     Financial Resource Strain: Low Risk  (12/7/2022)    Overall Financial Resource Strain (CARDIA)     Difficulty of Paying Living Expenses: Not very hard   Food Insecurity: No Food Insecurity (12/7/2022)    Hunger Vital Sign     Worried About Running Out of Food in the Last Year: Never true     Ran Out of Food in the Last Year: Never true   Transportation Needs: No Transportation Needs (12/7/2022)    PRAPARE - Transportation     Lack of Transportation (Medical): No     Lack of Transportation (Non-Medical): No   Physical Activity: Insufficiently Active (12/7/2022)    Exercise Vital Sign     Days of Exercise per Week: 7 days     Minutes of Exercise per Session: 20 min   Stress: No Stress  "Concern Present (12/7/2022)    [employer] - Occupational Stress Questionnaire     Feeling of Stress : Not at all   Social Connections: Moderately Isolated (12/7/2022)    Social Connection and Isolation Panel [NHANES]     Frequency of Communication with Friends and Family: More than three times a week     Frequency of Social Gatherings with Friends and Family: More than three times a week     Attends Holiness Services: 1 to 4 times per year     Active Member of Clubs or Organizations: No     Attends Club or Organization Meetings: Never     Marital Status:    Intimate Partner Violence: Not on file   Housing Stability: Low Risk  (12/7/2022)    Housing Stability Vital Sign     Unable to Pay for Housing in the Last Year: No     Number of Places Lived in the Last Year: 1     Unstable Housing in the Last Year: No         EXAMINATION     Physical Exam:   Vitals: /60 (BP Location: Left arm, Patient Position: Sitting, BP Cuff Size: Adult)   Pulse 80   Temp 36.7 °C (98.1 °F) (Temporal)   Ht 1.575 m (5' 2\")   Wt 53.1 kg (117 lb)   SpO2 93%     Constitutional:   Body Habitus: Body mass index is 21.4 kg/m².  Cooperation: Fully cooperates with exam  Appearance: Well-groomed no disheveled   Skin no skin lesions noted  Respiratory-  breathing comfortable on room air, no audible wheezing  Cardiovascular- capillary refills less than 2 seconds.  Psychiatric- alert and oriented ×3. Normal affect.   Gait: Steady without assist device.  She does have a four wheeled walker for longer distances.  Demonstrates leg extension, free standing squats.    Spine:   No significant tenderness over the lumbar paraspinals.  Mild lateral hip tenderness    Seated SLR is negative bilaterally    Key points for the international standards for neurological classification of spinal cord injury (ISNCSCI) to light touch.     No sensory deficits in the upper extremities bilaterally    Dermatome R L   L2 2 2   L3 " 2 2   L4 2 2   L5 2 2   S1 2 2   S2 2 2       Motor Exam Lower Extremities    ? Myotome R L   Hip flexion L2 5 5   Knee extension L3 5 5   Ankle dorsiflexion L4 5 5   Toe extension L5 5 5   Ankle plantarflexion S1 5 5           MEDICAL DECISION MAKING    DATA    Labs:   Lab Results   Component Value Date/Time    SODIUM 139 04/30/2023 01:38 AM    POTASSIUM 3.9 04/30/2023 01:38 AM    CHLORIDE 108 04/30/2023 01:38 AM    CO2 22 04/30/2023 01:38 AM    GLUCOSE 106 (H) 04/30/2023 01:38 AM    BUN 10 04/30/2023 01:38 AM    CREATININE 0.49 (L) 04/30/2023 01:38 AM        Lab Results   Component Value Date/Time    PROTHROMBTM 12.4 01/05/2021 04:23 PM    INR 0.89 01/05/2021 04:23 PM        Lab Results   Component Value Date/Time    WBC 8.9 04/30/2023 01:38 AM    RBC 3.25 (L) 04/30/2023 01:38 AM    HEMOGLOBIN 10.7 (L) 04/30/2023 01:38 AM    HEMATOCRIT 32.6 (L) 04/30/2023 01:38 AM    .3 (H) 04/30/2023 01:38 AM    MCH 32.9 04/30/2023 01:38 AM    MCHC 32.8 (L) 04/30/2023 01:38 AM    MPV 9.8 04/30/2023 01:38 AM    NEUTSPOLYS 63.50 04/30/2023 01:38 AM    LYMPHOCYTES 23.60 04/30/2023 01:38 AM    MONOCYTES 10.00 04/30/2023 01:38 AM    EOSINOPHILS 1.80 04/30/2023 01:38 AM    BASOPHILS 0.80 04/30/2023 01:38 AM        No results found for: HBA1C       Imaging: I personally reviewed following images  MRI lumbar spine April 5, 2023  At L1-2, no central or foraminal stenosis  At L2-3, mild disc bulge, mild facet arthropathy, no central or foraminal stenosis  At L3-4, mild disc bulge, mild facet arthropathy, mild central canal stenosis mild disc bulge  At L4-5, mild disc bulge, facet arthropathy, severe central canal stenosis  At L5-S1, left paracentral and foraminal disc extrusion that results in severe left lateral recess stenosis at L5 foraminal stenosis.  Mild central canal stenosis is noted.      Reviewed MRI from 09/18/2019, but no new imaging available    I reviewed the following radiology reports    MRI lumbar spine  04/05/2023  IMPRESSION:     1.  Multifocal degenerative disease in the lumbar spine as described above. The degenerative changes are significantly progressed since the previous study.  2.  There is central, left paracentral and left foraminal disc extrusion at L5-S1 causing severe left lateral recess and left L5 neural foraminal stenosis. The left S1 and L5 nerve roots might have been impinged.  3.  There is an approximately 9 mm anterolisthesis of L4 on 5.                       Results for orders placed during the hospital encounter of 09/18/19    MR-LUMBAR SPINE-W/O    Impression  Severe L4/5 facet arthropathy and degenerative these results in right lateral listhesis and nearly grade 2 anterolisthesis    Moderate-severe central stenosis L4/5. Lesser stenoses at other levels as detailed above    Greatest foraminal stenosis is moderate-severe on the left at L5/S1            DIAGNOSIS   Visit Diagnoses     ICD-10-CM   1. Spinal stenosis of lumbar region, unspecified whether neurogenic claudication present  M48.061   2. Spondylolisthesis of lumbar region  M43.16   3. Constipation, unspecified constipation type  K59.00             ASSESSMENT and PLAN:     Dayana Lechuga 89 y.o. female seen for above    Dayana was seen today for follow-up.    Diagnoses and all orders for this visit:    Spinal stenosis of lumbar region, unspecified whether neurogenic claudication present    Spondylolisthesis of lumbar region    Constipation, unspecified constipation type          She is doing much better than she was.  Still has some axial back pain, but mobility has improved.  Radicular symptoms are improved.  Taking tylenol 500mg po tid and stable dose of gabapentin 400mg po tid.  Continue with home activity and physical therapy.  Okay to wean as tolerated.  Encouraged her to continue to use prune juice to maintain regular bowel movements.  Encouraged her to follow-up with PCP regarding adjustments to blood pressure  medications.      Follow up: prn       Thank you for allowing me to participate in the care of this patient. If you have any questions please not hesitate to contact me.      Please note that this dictation was created using voice recognition software. I have made every reasonable attempt to correct obvious errors but there may be errors of grammar and content that I may have overlooked prior to finalization of this note.      Torres Fall MD  Interventional Spine and Sports Physiatry  Physical Medicine and Rehabilitation  [facility]       CC: Justin Haley MD

## 2023-06-22 ENCOUNTER — PHYSICAL THERAPY (OUTPATIENT)
Dept: PHYSICAL THERAPY | Facility: REHABILITATION | Age: 88
End: 2023-06-22
Attending: FAMILY MEDICINE
Payer: MEDICARE

## 2023-06-22 DIAGNOSIS — R26.89 IMBALANCE: ICD-10-CM

## 2023-06-22 DIAGNOSIS — M54.42 CHRONIC BILATERAL LOW BACK PAIN WITH LEFT-SIDED SCIATICA: ICD-10-CM

## 2023-06-22 DIAGNOSIS — G89.29 CHRONIC BILATERAL LOW BACK PAIN WITH LEFT-SIDED SCIATICA: ICD-10-CM

## 2023-06-22 PROCEDURE — 97110 THERAPEUTIC EXERCISES: CPT

## 2023-06-22 NOTE — OP THERAPY DAILY TREATMENT
Outpatient Physical Therapy  DAILY TREATMENT     [facility] Outpatient Physical Therapy [address], [address]  [place] [address]  Phone:  [phone]    Date: 06/22/2023  Patient: Dayana Lechuga  YOB: 1929  MRN: [id number]   Time Calculation  Start time: 0300  Stop time: 0345 Time Calculation (min): 45 minutes   Chief Complaint: No chief complaint on file.  Visit #: 4    SUBJECTIVE:  Pt indicates that she had a good day when she last saw the physiatrist but she has had a bad day or two since last seeing her since the pain has come back. She fears it might not ever get better.     Unable to tolerate walking without FWW secondary to increasing left leg pain   Short distances at residential facility to and from meals with FWW(for pain relief)  Short walks with dog on leash using FWW currently     OBJECTIVE:  Current objective measures: 11 squats until wants to stop; slight fear of feeling worse until tomorrow        Therapeutic Exercises (CPT 49088):       Therapeutic Exercise Summary:       1. heel raises min ue support, 2 x 10    2. sit to stand 18 inch, 2 x 30 sec, produce//NW leg pain not today    3. box squats unsupported, 2x10 (stops at 11 first set fear of making things worse)    4. bridge, 2 x 10    5. Stepper, L4 2 min//5.0 8 min seat 16         Time-based treatments/modalities:  Physical Therapy Timed Treatment Charges  Therapeutic exercise minutes (CPT 98628): 40 minutes  ASSESSMENT:   Response to treatment: Pt with some functional strength deficits; she is limited somewhat by worries of exercise making her symptoms worse into the next day; no pain increasing during her exercises today during PT. We edu on graded exposure to stimuli. She can cont to benefit from skilled PT for a graded approach to improve her overall strength and function.     PLAN/RECOMMENDATIONS:   Plan for treatment: therapy treatment to continue next visit.  Planned interventions for next visit: continue  with current treatment.

## 2023-07-06 ENCOUNTER — OFFICE VISIT (OUTPATIENT)
Dept: URGENT CARE | Facility: CLINIC | Age: 88
End: 2023-07-06
Payer: MEDICARE

## 2023-07-06 VITALS
BODY MASS INDEX: 21.71 KG/M2 | WEIGHT: 118 LBS | OXYGEN SATURATION: 95 % | SYSTOLIC BLOOD PRESSURE: 116 MMHG | HEIGHT: 62 IN | DIASTOLIC BLOOD PRESSURE: 72 MMHG | HEART RATE: 83 BPM | RESPIRATION RATE: 14 BRPM | TEMPERATURE: 98 F

## 2023-07-06 DIAGNOSIS — H00.025 HORDEOLUM INTERNUM OF LEFT LOWER EYELID: ICD-10-CM

## 2023-07-06 PROCEDURE — 99213 OFFICE O/P EST LOW 20 MIN: CPT | Performed by: NURSE PRACTITIONER

## 2023-07-06 PROCEDURE — 3074F SYST BP LT 130 MM HG: CPT | Performed by: NURSE PRACTITIONER

## 2023-07-06 PROCEDURE — 3078F DIAST BP <80 MM HG: CPT | Performed by: NURSE PRACTITIONER

## 2023-07-06 RX ORDER — ERYTHROMYCIN 5 MG/G
1 OINTMENT OPHTHALMIC 2 TIMES DAILY
Qty: 3.5 G | Refills: 0 | Status: SHIPPED | OUTPATIENT
Start: 2023-07-06 | End: 2023-07-13

## 2023-07-06 ASSESSMENT — FIBROSIS 4 INDEX: FIB4 SCORE: 1.22

## 2023-07-06 NOTE — PROGRESS NOTES
Subjective:     Dayana Lechuga is a 89 y.o. female who presents for Eye Problem (Left eye irritation x yesterday )      Eye Problem   Associated symptoms include eye redness. Pertinent negatives include no blurred vision, eye discharge, double vision or photophobia.     Pt presents for evaluation of a new problem.  Dayana is a very pleasant 89-year-old female who presents to urgent care today with complaints of left sided eye pain and irritation to her lower eyelid that started yesterday.  She has been applying warm compresses with no relief.  She denies any changes in vision or discharge.    Review of Systems   Eyes:  Positive for pain and redness. Negative for blurred vision, double vision, photophobia and discharge.       PMH:   Past Medical History:   Diagnosis Date    Allergy     Anxiety     ASTHMA     Bronchitis     CAD (coronary artery disease)     JT to RCA; 70% stenosis in LAD    Chills     Constipation     Difficulty breathing     GERD (gastroesophageal reflux disease)     Heart attack (HCC)     Heartburn     Hyperlipidemia     Hypertension     Influenza     Insomnia     Lumbar back pain 9/10/2016    Mild peripheral edema 7/31/2020    Mumps     OSTEOPOROSIS     Palpitations     Peripheral vascular disease, unspecified (HCC) 9/14/2021    Pneumonia     Post concussion syndrome 9/11/2020    PVD (peripheral vascular disease) ([facility])     70% PAD-followed by Lary AMBROCIO    Sweat, sweating, excessive     Tonsillitis      ALLERGIES:   Allergies   Allergen Reactions    Bactrim [Sulfamethoxazole W-Trimethoprim] Hives    Pcn [Penicillins] Hives     About 70 years. Tolerated ceftriaxone before in 2021    Codeine Unspecified     Unknown reaction      Tramadol Unspecified     Urinary retention     SURGHX:   Past Surgical History:   Procedure Laterality Date    MS INJ LUMBAR/SACRAL,W/ IMAGING N/A 4/20/2023    Procedure: Caudal epidural with catheter;  Surgeon: Torres Fall M.D.;  Location: [facility];  Service: Pain Management    ABDOMINAL HYSTERECTOMY TOTAL      APPENDECTOMY      HERNIA REPAIR      HYSTERECTOMY LAPAROSCOPY      TONSILLECTOMY      ZZZ CARDIAC CATH       SOCHX:   Social History     Socioeconomic History    Marital status:    Tobacco Use    Smoking status: Never    Smokeless tobacco: Never   Vaping Use    Vaping Use: Never used   Substance and Sexual Activity    Alcohol use: No     Alcohol/week: 0.0 oz    Drug use: No    Sexual activity: Not Currently   Other Topics Concern     Service No    Blood Transfusions Yes    Caffeine Concern No    Occupational Exposure No    Hobby Hazards No    Sleep Concern Yes    Stress Concern Yes    Weight Concern No    Special Diet No    Back Care No    Exercise No    Bike Helmet No    Seat Belt Yes    Self-Exams No     Social Determinants of Health     Financial Resource Strain: Low Risk  (12/7/2022)    Overall Financial Resource Strain (CARDIA)     Difficulty of Paying Living Expenses: Not very hard   Food Insecurity: No Food Insecurity (12/7/2022)    Hunger Vital Sign     Worried About Running Out of Food in the Last Year: Never true     Ran Out of Food in the Last Year: Never true   Transportation Needs: No Transportation Needs (12/7/2022)    PRAPARE - Transportation     Lack of Transportation (Medical): No     Lack of Transportation (Non-Medical): No   Physical Activity: Insufficiently Active (12/7/2022)    Exercise Vital Sign     Days of Exercise per Week: 7 days     Minutes of Exercise per Session: 20 min   Stress: No Stress Concern Present (12/7/2022)    [place] [employer] - Occupational Stress Questionnaire     Feeling of Stress : Not at all   Social Connections: Moderately Isolated (12/7/2022)    Social Connection and Isolation Panel [NHANES]     Frequency of Communication with Friends and Family: More than three times a week     Frequency of Social Gatherings with Friends and Family: More than three  "times a week     Attends Latter-day Services: 1 to 4 times per year     Active Member of Clubs or Organizations: No     Attends Club or Organization Meetings: Never     Marital Status:    Housing Stability: Low Risk  (12/7/2022)    Housing Stability Vital Sign     Unable to Pay for Housing in the Last Year: No     Number of Places Lived in the Last Year: 1     Unstable Housing in the Last Year: No     FH:   Family History   Problem Relation Age of Onset    Heart Attack Father     Cancer Brother     Cancer Brother     Heart Disease Neg Hx     Heart Failure Neg Hx     Hyperlipidemia Neg Hx          Objective:   /72 (BP Location: Left arm, Patient Position: Sitting, BP Cuff Size: Adult)   Pulse 83   Temp 36.7 °C (98 °F) (Temporal)   Resp 14   Ht 1.575 m (5' 2\")   Wt 53.5 kg (118 lb)   LMP  (LMP Unknown)   SpO2 95%   BMI 21.58 kg/m²     Physical Exam  Vitals and nursing note reviewed.   Constitutional:       General: She is not in acute distress.     Appearance: Normal appearance. She is normal weight. She is not ill-appearing or toxic-appearing.   HENT:      Head: Normocephalic.      Right Ear: External ear normal.      Left Ear: External ear normal.      Nose: No congestion or rhinorrhea.      Mouth/Throat:      Pharynx: No oropharyngeal exudate or posterior oropharyngeal erythema.   Eyes:      General:         Right eye: No discharge.         Left eye: Hordeolum present.No discharge.      Pupils: Pupils are equal, round, and reactive to light.        Comments: There are 2 visible hordeolum present to internal and external left lower eyelid.  Positive for mild swelling and erythema.  There is pain with palpation.   Pulmonary:      Effort: Pulmonary effort is normal.   Abdominal:      General: Abdomen is flat.   Musculoskeletal:         General: Normal range of motion.      Cervical back: Normal range of motion and neck supple.   Skin:     General: Skin is dry.   Neurological:      General: No " focal deficit present.      Mental Status: She is alert and oriented to person, place, and time. Mental status is at baseline.   Psychiatric:         Mood and Affect: Mood normal.         Behavior: Behavior normal.         Thought Content: Thought content normal.         Judgment: Judgment normal.         Assessment/Plan:   Assessment    1. Hordeolum internum of left lower eyelid  erythromycin 5 MG/GM Ointment        Patient to continue with frequent warm compresses.  Erythromycin ointment sent to pharmacy.  Patient to return for worsening or persistent symptoms.  She is in agreement with plan of care today.  AVS handout given and reviewed with patient. Pt educated on red flags and when to seek treatment back in ER or UC.

## 2023-07-07 ASSESSMENT — ENCOUNTER SYMPTOMS
EYE DISCHARGE: 0
PHOTOPHOBIA: 0
BLURRED VISION: 0
EYE REDNESS: 1
DOUBLE VISION: 0
EYE PAIN: 1

## 2023-07-13 ENCOUNTER — OFFICE VISIT (OUTPATIENT)
Dept: MEDICAL GROUP | Facility: PHYSICIAN GROUP | Age: 88
End: 2023-07-13
Payer: MEDICARE

## 2023-07-13 VITALS
TEMPERATURE: 97.8 F | SYSTOLIC BLOOD PRESSURE: 114 MMHG | BODY MASS INDEX: 22.41 KG/M2 | HEIGHT: 62 IN | HEART RATE: 74 BPM | WEIGHT: 121.8 LBS | OXYGEN SATURATION: 96 % | DIASTOLIC BLOOD PRESSURE: 80 MMHG

## 2023-07-13 DIAGNOSIS — K59.01 SLOW TRANSIT CONSTIPATION: ICD-10-CM

## 2023-07-13 DIAGNOSIS — I10 ESSENTIAL HYPERTENSION: ICD-10-CM

## 2023-07-13 PROCEDURE — 3074F SYST BP LT 130 MM HG: CPT | Performed by: FAMILY MEDICINE

## 2023-07-13 PROCEDURE — 99214 OFFICE O/P EST MOD 30 MIN: CPT | Performed by: FAMILY MEDICINE

## 2023-07-13 PROCEDURE — 3079F DIAST BP 80-89 MM HG: CPT | Performed by: FAMILY MEDICINE

## 2023-07-13 RX ORDER — POLYETHYLENE GLYCOL 3350 17 G/17G
17 POWDER, FOR SOLUTION ORAL DAILY
Qty: 225 G | Refills: 3 | Status: SHIPPED | OUTPATIENT
Start: 2023-07-13 | End: 2023-08-10

## 2023-07-13 ASSESSMENT — FIBROSIS 4 INDEX: FIB4 SCORE: 1.22

## 2023-07-13 NOTE — PROGRESS NOTES
Subjective:     Chief Complaint   Patient presents with    Follow-Up    Hypertension Follow-up     Pt reports BP log is in her chart     Bloated     6 weeks Discomfort Pt thinks its her bowels        HPI:   Dayana presents today to discuss the following.    Essential hypertension  Chronic issue    On lisinopril 10mg and COREG 3.125mg BID. No longer on amlodipine    Slow transit constipation  Chronic issue  Having issues with BMs  Has not taken miralax    Past Medical History:   Diagnosis Date    Allergy     Anxiety     ASTHMA     Bronchitis     CAD (coronary artery disease)     JT to RCA; 70% stenosis in LAD    Chills     Constipation     Difficulty breathing     GERD (gastroesophageal reflux disease)     Heart attack ([facility])     Heartburn     Hyperlipidemia     Hypertension     Influenza     Insomnia     Lumbar back pain 9/10/2016    Mild peripheral edema 7/31/2020    Mumps     OSTEOPOROSIS     Palpitations     Peripheral vascular disease, unspecified ([facility]) 9/14/2021    Pneumonia     Post concussion syndrome 9/11/2020    PVD (peripheral vascular disease) ([facility])     70% PAD-followed by Lary AMBROCIO    Sweat, sweating, excessive     Tonsillitis        Current Outpatient Medications Ordered in Epic   Medication Sig Dispense Refill    polyethylene glycol 3350 (MIRALAX) 17 GM/SCOOP Powder Take 17 g by mouth every day. 225 g 3    erythromycin 5 MG/GM Ointment Apply 1 Application to left eye 2 times a day for 7 days. 3.5 g 0    fluticasone (FLONASE) 50 MCG/ACT nasal spray use 1 spray in each nostril once a day 16 g 0    lisinopril (PRINIVIL) 10 MG Tab Take 1 Tablet by mouth every day. 100 Tablet 3    carvedilol (COREG) 3.125 MG Tab Take 1 Tablet by mouth 2 times a day for 90 days. 180 Tablet 3    isosorbide mononitrate SR (IMDUR) 60 MG TABLET SR 24 HR Take 1 Tablet by mouth every evening. 90 Tablet 3    gabapentin (NEURONTIN) 400 MG Cap Take 1 Capsule by mouth 3 times a day for 90 days. 270 Capsule 3    LORazepam (ATIVAN)  0.5 MG Tab Take 1 Tablet by mouth every day for 90 days. (Try taking 1/2 tablet before bed, if unable to fall asleep take additional 1/2 tablet) 30 Tablet 2    aspirin 81 MG EC tablet Chew 81 mg every day.      CRANBERRY PO Take 1 Tablet by mouth every day.      Multiple Vitamins-Minerals (OCUVITE-LUTEIN) Tab Take 1 Tablet by mouth every day.      Saline (OCEAN NASAL SPRAY NA) Administer 1 Spray into affected nostril(S) every day.      montelukast (SINGULAIR) 10 MG Tab TAKE ONE TABLET BY MOUTH IN THE EVENING (Patient taking differently: Take 10 mg by mouth every evening.) 90 Tablet 3    atorvastatin (LIPITOR) 80 MG tablet Take 1 Tablet by mouth every evening. 100 Tablet 3    loratadine (CLARITIN) 10 MG Tab Take 1 Tablet by mouth every day.      Acetaminophen 500 MG Cap Take 1 Capsule by mouth 2 times a day. Morning & Afternoon, sometimes takes 3 times a day      Melatonin 10 MG Tab Take 10 mg by mouth at bedtime.      Biotin w/ Vitamins C & E 1250-7.5-7.5 MCG-MG-UNT Chew Tab Chew 1 Tab every morning.      guaifenesin LA (MUCINEX) 600 MG TABLET SR 12 HR Take 600 mg by mouth every morning.      Ascorbic Acid (VITAMIN C) 1000 MG Tab Take 1 Tablet by mouth every morning.      VITAMIN E PO Take 1 Cap by mouth every morning.      Cholecalciferol (VITAMIN D) 50 MCG (2000 UT) Cap Take 2,000 Units by mouth every morning.      omeprazole (PRILOSEC) 20 MG delayed-release capsule Take 1 Capsule by mouth every morning with breakfast.       No current Epic-ordered facility-administered medications on file.       Allergies:  Bactrim [sulfamethoxazole w-trimethoprim], Pcn [penicillins], Codeine, and Tramadol    Health Maintenance: Completed    ROS:  Gen: no fevers/chills, no changes in weight  Eyes: no changes in vision  Pulm: no sob, no cough  CV: no chest pain, no palpitations  GI: no nausea/vomiting, no diarrhea      Objective:     Exam:  /80 (BP Location: Left arm, Patient Position: Sitting, BP Cuff Size: Adult)   Pulse  "74   Temp 36.6 °C (97.8 °F) (Temporal)   Ht 1.575 m (5' 2\")   Wt 55.2 kg (121 lb 12.8 oz)   LMP  (LMP Unknown)   SpO2 96%   BMI 22.28 kg/m²  Body mass index is 22.28 kg/m².    Constitutional: Alert, no distress, well-groomed.  Skin: Warm, dry, good turgor, no rashes in visible areas.  Eye: Equal, round and reactive, conjunctiva clear, lids normal.  ENMT: Lips without lesions, good dentition, moist mucous membranes.  Neck: Trachea midline, no masses, no thyromegaly.  Respiratory: Unlabored respiratory effort, no cough.  MSK: Normal gait, moves all extremities.  Neuro: Grossly non-focal.   Psych: Alert and oriented x3, normal affect and mood.        Assessment & Plan:     89 y.o. female with the following -     1. Essential hypertension  Chronic, stable condition.  Continue with prescribed regimen.  Continue with lisinopril and carvedilol.    2. Slow transit constipation  Chronic, uncontrolled condition.  I recommend MiraLAX.  New prescription sent to the pharmacy.  - polyethylene glycol 3350 (MIRALAX) 17 GM/SCOOP Powder; Take 17 g by mouth every day.  Dispense: 225 g; Refill: 3      No follow-ups on file.          Please note that this dictation was created using voice recognition software. I have made every reasonable attempt to correct obvious errors, but I expect that there are errors of grammar and possibly content that I did not discover before finalizing the note.        "

## 2023-07-20 ENCOUNTER — PATIENT OUTREACH (OUTPATIENT)
Dept: HEALTH INFORMATION MANAGEMENT | Facility: OTHER | Age: 88
End: 2023-07-20
Payer: MEDICARE

## 2023-07-20 DIAGNOSIS — Z79.899 CHRONIC USE OF BENZODIAZEPINE FOR THERAPEUTIC PURPOSE: ICD-10-CM

## 2023-07-20 DIAGNOSIS — F13.20 BENZODIAZEPINE DEPENDENCE (HCC): ICD-10-CM

## 2023-07-20 DIAGNOSIS — I10 ESSENTIAL HYPERTENSION: ICD-10-CM

## 2023-07-20 DIAGNOSIS — R19.4 FREQUENT BOWEL MOVEMENTS: ICD-10-CM

## 2023-07-20 DIAGNOSIS — F41.9 ANXIETY: ICD-10-CM

## 2023-07-20 PROCEDURE — 99490 CHRNC CARE MGMT STAFF 1ST 20: CPT | Performed by: FAMILY MEDICINE

## 2023-07-20 NOTE — PROGRESS NOTES
Assessment    1542 on 7/20/23 phone outreach to patient to complete her monthly PCM follow-up & quarterly review, left voicemail message with contact information.  1056 on 8/2/23 short conversation with patient, she was shopping in [employer], I am scheduled to call her back @ 1500 this afternoon.  1314 on 8/2/23 phone conversation with patient, at her request I rescheduled her PCP appointment from 8/3/23 (she was unaware of appointment & already had an appointment w/her dentist on that date) to 8/10/23 (same day as her lab appointment @ [facility]).  Dayana reports she is pretty good today.  Had episodes of diarrhea yesterday but not today, had an accident (lose stools) before she got to the bathroom.  Taking polyethylene glycol 3350, did not work at first but is working better now.  Her legs sometimes hurt.  Does not use a walker but has a walker now, admits to using it when she goes down for her meals.   Walks her dog all around the park in the morning.  Takes him (the dog) out 3 times a day.  No falls lately.  We reviewed her upcoming appointments. We discussed her PCP (Dr. Haley) leaving [facility], she wants to talk to  Dr. Haley about his recommendations for a new PCP, she prefers an M.D. and a male. Quarterly review included a review of her Care Plan.  She gets agitated when I try to review her medications so will defer this task to her PCP.            Education    Mailing her newspaper article from [employer] about tips to rev up your energy.     Plan of Care and Goals    No updates @ this time    Barriers:    Chronic leg pain    Progress:    Slow Progress    Next outreach:  9/13/23, call @ 1500

## 2023-07-21 ENCOUNTER — OFFICE VISIT (OUTPATIENT)
Dept: BEHAVIORAL HEALTH | Facility: CLINIC | Age: 88
End: 2023-07-21
Payer: MEDICARE

## 2023-07-21 DIAGNOSIS — Z79.899 CHRONIC USE OF BENZODIAZEPINE FOR THERAPEUTIC PURPOSE: ICD-10-CM

## 2023-07-21 DIAGNOSIS — F51.01 PRIMARY INSOMNIA: ICD-10-CM

## 2023-07-21 PROCEDURE — 99214 OFFICE O/P EST MOD 30 MIN: CPT | Performed by: PSYCHIATRY & NEUROLOGY

## 2023-07-21 RX ORDER — LORAZEPAM 0.5 MG/1
0.5 TABLET ORAL NIGHTLY
Qty: 30 TABLET | Refills: 0 | Status: SHIPPED | OUTPATIENT
Start: 2023-10-09 | End: 2023-08-10

## 2023-07-21 RX ORDER — LORAZEPAM 0.5 MG/1
0.5 TABLET ORAL NIGHTLY
Qty: 30 TABLET | Refills: 0 | Status: SHIPPED | OUTPATIENT
Start: 2023-09-09 | End: 2023-08-10

## 2023-07-21 RX ORDER — LORAZEPAM 0.5 MG/1
0.5 TABLET ORAL NIGHTLY
Qty: 30 TABLET | Refills: 0 | Status: SHIPPED | OUTPATIENT
Start: 2023-08-10 | End: 2023-09-12

## 2023-07-21 ASSESSMENT — ANXIETY QUESTIONNAIRES
5. BEING SO RESTLESS THAT IT IS HARD TO SIT STILL: NOT AT ALL
3. WORRYING TOO MUCH ABOUT DIFFERENT THINGS: SEVERAL DAYS
6. BECOMING EASILY ANNOYED OR IRRITABLE: NOT AT ALL
2. NOT BEING ABLE TO STOP OR CONTROL WORRYING: NOT AT ALL
IF YOU CHECKED OFF ANY PROBLEMS ON THIS QUESTIONNAIRE, HOW DIFFICULT HAVE THESE PROBLEMS MADE IT FOR YOU TO DO YOUR WORK, TAKE CARE OF THINGS AT HOME, OR GET ALONG WITH OTHER PEOPLE: NOT DIFFICULT AT ALL
7. FEELING AFRAID AS IF SOMETHING AWFUL MIGHT HAPPEN: NOT AT ALL
4. TROUBLE RELAXING: NOT AT ALL
GAD7 TOTAL SCORE: 1
1. FEELING NERVOUS, ANXIOUS, OR ON EDGE: NOT AT ALL

## 2023-07-21 ASSESSMENT — PATIENT HEALTH QUESTIONNAIRE - PHQ9
CLINICAL INTERPRETATION OF PHQ2 SCORE: 0
5. POOR APPETITE OR OVEREATING: 0 - NOT AT ALL

## 2023-07-21 NOTE — PROGRESS NOTES
"INITIAL PSYCHIATRIC EVALUATION  This provider informed the patient their medical records are totally confidential except for the use by other providers involved in their care, or if the patient signs a release, or to report instances of child or elder abuse, or if it is determined they are an immediate risk to harm themselves or others.    CHIEF COMPLAINT  \"I sleep well\"    HISTORY OF PRESENT ILLNESS  Dayana Lechuga is a 89 y.o. old female comes in today to establish care and for evaluation of insomnia with chronic benzodiazepine use. I reviewed all outpatient psychiatry follow up notes over last 3 years. Patient was following with Dr. Andrade in this clinic. Patient is new to me in the clinic.     Patient describes chronic benzodiazepine use spanning greater than 15 years.  Had previously been taking 2 mg of Ativan daily to help with anxiety and sleep.  Notes that anxiety initially became an issue after someone was murdered overnight in the town she was living in.  Patient began feeling anxious that someone would be able to enter her home and her at that time.  Notes some difficulty sleeping and potential chest pain also related to this.  No longer has this fear and feels safe in her current living situation.  Has been slowly tapering off of Ativan and now uses 0.5 mg nightly.  She had attempted to use 0.25 mg over 2 months ago and reports that she did end up using another 0.25 mg later in the night.  Ultimately, patient began experiencing issues related to back pain and leg pain and other unrelated problems (hordeolum, UTI, ongoing back and leg pain, etc).  These issues have caused some disruption to her life and she resumed using Ativan 0.5 mg nightly.  She expresses some motivation to continue decreasing this medication as she would like to be able to enjoy a glass of wine every now and then but staunchly avoids it with her concurrent benzodiazepine use.  Denies recent falls or difficulties with " balance.  Now has a walker that she uses for longer distances within her senior living community.  Exercises regularly, including daily morning walks with her dog, Deshawn.  Denies changes to cognition, morning grogginess or excessive sedation, breathing difficulties beyond a history of asthma that she reports is currently well-controlled.    PSYCHIATRIC REVIEW OF SYSTEMS: denies depressive symptoms, denies symptoms of anxiety that interfere with functioning or are overwhelming, denies manic symptoms, and denies psychotic symptoms including AH / VH    MEDICAL REVIEW OF SYSTEMS:   Constitutional negative   Eyes positive - hordeolum on Lt lower eyelid   Ears/Nose/Mouth/Throat negative   Cardiovascular negative   Respiratory negative   Gastrointestinal negative   Genitourinary positive - recent UTI   Muscular positive - ongoing back pain   Integumentary negative   Neurological positive - ongoing sciatica   Endocrine negative   Hematologic/Lymphatic negative     CURRENT MEDICATIONS:  Current Outpatient Medications   Medication Sig Dispense Refill    polyethylene glycol 3350 (MIRALAX) 17 GM/SCOOP Powder Take 17 g by mouth every day. 225 g 3    fluticasone (FLONASE) 50 MCG/ACT nasal spray use 1 spray in each nostril once a day 16 g 0    lisinopril (PRINIVIL) 10 MG Tab Take 1 Tablet by mouth every day. 100 Tablet 3    carvedilol (COREG) 3.125 MG Tab Take 1 Tablet by mouth 2 times a day for 90 days. 180 Tablet 3    isosorbide mononitrate SR (IMDUR) 60 MG TABLET SR 24 HR Take 1 Tablet by mouth every evening. 90 Tablet 3    gabapentin (NEURONTIN) 400 MG Cap Take 1 Capsule by mouth 3 times a day for 90 days. 270 Capsule 3    aspirin 81 MG EC tablet Chew 81 mg every day.      CRANBERRY PO Take 1 Tablet by mouth every day.      Multiple Vitamins-Minerals (OCUVITE-LUTEIN) Tab Take 1 Tablet by mouth every day.      Saline (OCEAN NASAL SPRAY NA) Administer 1 Spray into affected nostril(S) every day.      montelukast (SINGULAIR) 10 MG  "Tab TAKE ONE TABLET BY MOUTH IN THE EVENING (Patient taking differently: Take 10 mg by mouth every evening.) 90 Tablet 3    atorvastatin (LIPITOR) 80 MG tablet Take 1 Tablet by mouth every evening. 100 Tablet 3    loratadine (CLARITIN) 10 MG Tab Take 1 Tablet by mouth every day.      Acetaminophen 500 MG Cap Take 1 Capsule by mouth 2 times a day. Morning & Afternoon, sometimes takes 3 times a day      Melatonin 10 MG Tab Take 10 mg by mouth at bedtime.      Biotin w/ Vitamins C & E 1250-7.5-7.5 MCG-MG-UNT Chew Tab Chew 1 Tab every morning.      guaifenesin LA (MUCINEX) 600 MG TABLET SR 12 HR Take 600 mg by mouth every morning.      Ascorbic Acid (VITAMIN C) 1000 MG Tab Take 1 Tablet by mouth every morning.      VITAMIN E PO Take 1 Cap by mouth every morning.      Cholecalciferol (VITAMIN D) 50 MCG (2000 UT) Cap Take 2,000 Units by mouth every morning.      omeprazole (PRILOSEC) 20 MG delayed-release capsule Take 1 Capsule by mouth every morning with breakfast.       No current facility-administered medications for this visit.       ALLERGIES:  Bactrim [sulfamethoxazole w-trimethoprim], Pcn [penicillins], Codeine, and Tramadol    PAST PSYCHIATRIC HISTORY  Prior psychiatric hospitalization: Denies.  Prior Self harm/suicide attempt: Denies/denies.  Prior Diagnosis: Anxiety, insomnia    PAST PSYCHIATRIC MEDICATIONS  Lorazepam for over 15 years.  Previously taking 2 mg daily.    FAMILY HISTORY  Psychiatric diagnosis: Denies.  History of suicide attempts: Denies.  Substance abuse history: Denies.    SUBSTANCE USE HISTORY:  ALCOHOL: Denies.  TOBACCO: Denies.  CANNABIS: Denies.  OPIOIDS: Denies.  PRESCRIPTION MEDICATIONS: Denies.  OTHERS: Denies.  History of inpatient/outpatient rehab treatment: N/A    SOCIAL HISTORY  Childhood: born in [place] and describes childhood as \"good\".  Also spent time in [place].  Primarily lived in [place] during her adult life--[place] until 2004 when she moved to [place], " [place].  Employment: Worked as a  and  throughout most of her life.  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  Relationship: Single  Kids: 1 son (Jl) living in [place]. 1 daughter living in [place].  Son helps patient attend doctors visits.  Has 1 grandchild and 2 great-grandchildren in [place].  Current living situation: Lives in a senior home center.  Primarily is independent but has support available if needed.  Lives with a dog named Deshawn who she walks every morning.  Current/past legal issues: Denies.    MEDICAL HISTORY  Past Medical History:   Diagnosis Date    Allergy     Anxiety     ASTHMA     Bronchitis     CAD (coronary artery disease)     JT to RCA; 70% stenosis in LAD    Chills     Constipation     Difficulty breathing     GERD (gastroesophageal reflux disease)     Heart attack ([facility])     Heartburn     Hyperlipidemia     Hypertension     Influenza     Insomnia     Lumbar back pain 9/10/2016    Mild peripheral edema 7/31/2020    Mumps     OSTEOPOROSIS     Palpitations     Peripheral vascular disease, unspecified ([facility]) 9/14/2021    Pneumonia     Post concussion syndrome 9/11/2020    PVD (peripheral vascular disease) ([facility])     70% PAD-followed by [facility]    Sweat, sweating, excessive     Tonsillitis      Past Surgical History:   Procedure Laterality Date    KS INJ LUMBAR/SACRAL,W/ IMAGING N/A 4/20/2023    Procedure: Caudal epidural with catheter;  Surgeon: Torres Fall M.D.;  [facility];  Service: Pain Management    ABDOMINAL HYSTERECTOMY TOTAL      APPENDECTOMY      HERNIA REPAIR      HYSTERECTOMY LAPAROSCOPY      TONSILLECTOMY      [facility] CARDIAC CATH       PHYSICAL EXAMINAION:  Vital signs: LMP  (LMP Unknown)   Musculoskeletal: Normal gait.   Abnormal movements: No abnormal movements noted.  Slow but steady gait.  Ambulating without the use of assistive devices.  Describes using a walker for longer distances at  "home.    MENTAL STATUS EXAMINATION    General:   - Grooming and hygiene: Casual and Neat,   - Apparent distress: None noted,   - Behavior: Calm and cooperative, pleasant  - Eye Contact:  Good,   - no psychomotor agitation or retardation    - Participation: Active verbal participation, Attentive, and Engaged  Orientation: Alert and Fully Oriented to person, place and time  Mood: Euthymic, \"good\"  Affect: Full range and Congruent with content,  Thought Process: Logical and Goal-directed  Thought Content: Denies suicidal or homicidal ideations, intent or plan Within normal limits  Perception: Denies auditory or visual hallucinations. No delusions noted Within normal limits  Attention span and concentration: Intact   Speech:Rate within normal limits and Volume within normal limits  Language: Appropriate   Insight: Good  Judgment: Good  Recent and remote memory: No gross evidence of memory deficits    DEPRESSION SCREENIN/16/2023    11:00 AM 2023    10:00 AM 2023     1:00 PM   Depression Screen (PHQ-2/PHQ-9)   PHQ-2 Total Score 4 0 0   PHQ-9 Total Score 6     Interpretation of PHQ-9 Total Score   Score Severity   1-4 No Depression   5-9 Mild Depression   10-14 Moderate Depression   15-19 Moderately Severe Depression   20-27 Severe Depression        2023     2:15 PM 2021    11:11 AM    LESLEY-7 ANXIETY SCALE FLOWSHEET   Feeling nervous, anxious, or on edge 0 1   Not being able to stop or control worrying 0 0   Worrying too much about different things 1 1   Trouble relaxing 0 0   Being so restless that it is hard to sit still 0 0   Becoming easily annoyed or irritable 0 1   Feeling afraid as if something awful might happen 0 0   LESLEY-7 Total Score 1 3   How difficult have these problems made it for you to do your work, take care of things at home, or get along with other people? Not difficult at all    Interpretation of LESLEY-7 Total Score   Score Severity   0-4 Minimal Anxiety  5-9 Mild Anxiety "   10-14 Moderate Anxiety  15-21 Severy Anxiety     SAFETY ASSESSMENT - SELF:  Does patient acknowledge current or past symptoms of dangerousness to self?  No  History of suicide by family member: No  History of suicide by friend/significant other: No  Recent change in amount/specificity/intensity of suicidal thoughts or self-harm behavior?  No  Current access to firearms, medications, or other identified means of suicide/self-harm?  No  If yes, willing to restrict access to means of suicide/self-harm?  N/A  Protective factors present: Yes- supportive family, cares for her dog, enjoys watching Neil Marquez and Laura Garcia.     SAFETY ASSESSMENT - OTHERS:  Does patient acknowledge current or past symptoms of aggressive behavior or risk to others?  No  Recent change in amount/specificity/intensity of thoughts or threats to harm others?  No  Current access to firearms/other identified means of harm?  No  If yes, willing to restrict access to weapons/means of harm?  N/A     CURRENT RISK:       Suicidal: Low       Homicidal: Low       Self-Harm: Low       Relapse: Not applicable       Crisis Safety Plan Reviewed Not Indicated    MEDICAL RECORDS/LABS/DIAGNOSTIC TESTS REVIEWED:  Labs done 4/2023 with low RBC, hemoglobin, hematocrit and slight elevation to MCV.  Otherwise, grossly within normal limits.    [place]  records -   Reviewed; no concerns.    DIFFERENTIAL DIAGNOSES  Primary insomnia  Long-term use of benzodiazepine, dependence    89-year-old female with a psychiatric history of benzodiazepine dependence resulting in insomnia without it.  Patient was initially prescribed benzodiazepines over 15 years ago for nighttime anxiety regarding individual safety.  Patient has had significant difficulty decreasing from Ativan 0.5 mg nightly to 0.25 mg nightly, but she has been able to decrease overall benzodiazepine use over the past few years from 2 mg to 0.5 mg.  Per chart review, patient has struggled to decrease the dose  further secondary to ongoing medical stressors that appear unrelated to benzodiazepine use.  She is not having issues with falls and does have a walker that she uses for longer distances within her senior living community.  No issues with cognition or excessive sedation.  No breathing issues beyond a history of asthma that is well controlled with albuterol inhalers.  She maintains an active lifestyle and is motivated to continue this so that she can continue to function independently.  Her son is very involved in her care.  Both patient and son agreed to continue attempts to decrease benzodiazepine use to 0.25 mg nightly while monitoring for side effects, including cognitive decline and falls. She also uses melatonin to aid in sleep. Patient denies other psychiatric needs at this time.      PLAN:  (1) Continue lorazepam 0.5 mg at bedtime for sleep and benzodiazepine dependence.  Patient will continue attempts to decrease to 0.25 mg at bedtime for sleep and benzodiazepine dependence with an additional 0.25 mg available if the first dose is ineffective.  (2) Continue melatonin 10 mg at bedtime for sleep.    Medication options, alternatives (including no medications) and medication risks/benefits/side effects were discussed in detail.  Explained importance of contraceptive measures while on psychotropic medications, educated to let provider know if ever pregnant or wanting to become pregnant. Verbalized understanding.  The patient was advised to call, message provider on [employer], or come in to the clinic if symptoms worsen or if any future questions/issues regarding their medications arise; the patient verbalized understanding and agreement.    The patient was educated to call 911, call the suicide hotline, or go to local ER if having thoughts of suicide or homicide; verbalized understanding.    Return to clinic in 3 months or sooner if symptoms worsen.  Next Appointment:  instruction provided on how to make the next  appointment.     The proposed treatment plan was discussed with the patient who was provided the opportunity to ask questions and make suggestions regarding alternative treatment. Patient verbalized understanding and expressed agreement with the plan.     Thank you for allowing me to participate in the care of this patient.    Chela Smith M.D.  07/21/23    CC:   Justin Haley M.D.    This note was created using voice recognition software (Dragon). The accuracy of the dictation is limited by the abilities of the software. I have reviewed the note prior to signing, however some errors in grammar and context are still possible. If you have any questions related to this note please do not hesitate to contact our office.

## 2023-07-27 ENCOUNTER — APPOINTMENT (OUTPATIENT)
Dept: PHYSICAL THERAPY | Facility: REHABILITATION | Age: 88
End: 2023-07-27
Attending: FAMILY MEDICINE
Payer: MEDICARE

## 2023-07-31 ENCOUNTER — TELEPHONE (OUTPATIENT)
Dept: CARDIOLOGY | Facility: MEDICAL CENTER | Age: 88
End: 2023-07-31
Payer: MEDICARE

## 2023-08-04 NOTE — PATIENT INSTRUCTIONS
Aim for about 48-64 ounces of fluids per day    Ok to scheduled your annual cardiology appt    Then just use the miralax as needed    Take BP 1-2 times per week    Lorazepam refilled  
Attending Attestation (For Attendings USE Only)...

## 2023-08-08 ENCOUNTER — TELEPHONE (OUTPATIENT)
Dept: MEDICAL GROUP | Facility: PHYSICIAN GROUP | Age: 88
End: 2023-08-08
Payer: MEDICARE

## 2023-08-08 RX ORDER — FLUTICASONE PROPIONATE 50 MCG
SPRAY, SUSPENSION (ML) NASAL
Qty: 16 G | Refills: 0 | Status: SHIPPED | OUTPATIENT
Start: 2023-08-08 | End: 2023-11-02 | Stop reason: SDUPTHER

## 2023-08-09 NOTE — TELEPHONE ENCOUNTER
VOICEMAIL  1. Caller Name: Dayana Lechuga                        Call Back Number: [phone] (home)       2. Message: Pt lvm stating she is out of her lorazepam but the pharmacy wont give her more until 8/10/23. Pt requesting the rx early.     3. Patient approves office to leave a detailed voicemail/MyChart message: N\A

## 2023-08-10 ENCOUNTER — HOSPITAL ENCOUNTER (OUTPATIENT)
Dept: LAB | Facility: MEDICAL CENTER | Age: 88
End: 2023-08-10
Attending: INTERNAL MEDICINE
Payer: MEDICARE

## 2023-08-10 ENCOUNTER — OFFICE VISIT (OUTPATIENT)
Dept: MEDICAL GROUP | Facility: PHYSICIAN GROUP | Age: 88
End: 2023-08-10
Payer: MEDICARE

## 2023-08-10 VITALS
WEIGHT: 120.6 LBS | OXYGEN SATURATION: 92 % | HEART RATE: 68 BPM | SYSTOLIC BLOOD PRESSURE: 140 MMHG | HEIGHT: 62 IN | TEMPERATURE: 98.7 F | BODY MASS INDEX: 22.19 KG/M2 | DIASTOLIC BLOOD PRESSURE: 72 MMHG

## 2023-08-10 DIAGNOSIS — E78.5 DYSLIPIDEMIA: ICD-10-CM

## 2023-08-10 DIAGNOSIS — K59.01 SLOW TRANSIT CONSTIPATION: ICD-10-CM

## 2023-08-10 LAB
ALBUMIN SERPL BCP-MCNC: 4.2 G/DL (ref 3.2–4.9)
ALBUMIN/GLOB SERPL: 1.8 G/DL
ALP SERPL-CCNC: 51 U/L (ref 30–99)
ALT SERPL-CCNC: 14 U/L (ref 2–50)
ANION GAP SERPL CALC-SCNC: 12 MMOL/L (ref 7–16)
AST SERPL-CCNC: 24 U/L (ref 12–45)
BILIRUB SERPL-MCNC: 0.5 MG/DL (ref 0.1–1.5)
BUN SERPL-MCNC: 9 MG/DL (ref 8–22)
CALCIUM ALBUM COR SERPL-MCNC: 9.6 MG/DL (ref 8.5–10.5)
CALCIUM SERPL-MCNC: 9.8 MG/DL (ref 8.5–10.5)
CHLORIDE SERPL-SCNC: 103 MMOL/L (ref 96–112)
CHOLEST SERPL-MCNC: 111 MG/DL (ref 100–199)
CO2 SERPL-SCNC: 21 MMOL/L (ref 20–33)
CREAT SERPL-MCNC: 0.55 MG/DL (ref 0.5–1.4)
FASTING STATUS PATIENT QL REPORTED: NORMAL
GFR SERPLBLD CREATININE-BSD FMLA CKD-EPI: 85 ML/MIN/1.73 M 2
GLOBULIN SER CALC-MCNC: 2.4 G/DL (ref 1.9–3.5)
GLUCOSE SERPL-MCNC: 99 MG/DL (ref 65–99)
HDLC SERPL-MCNC: 46 MG/DL
LDLC SERPL CALC-MCNC: 47 MG/DL
POTASSIUM SERPL-SCNC: 4.1 MMOL/L (ref 3.6–5.5)
PROT SERPL-MCNC: 6.6 G/DL (ref 6–8.2)
SODIUM SERPL-SCNC: 136 MMOL/L (ref 135–145)
TRIGL SERPL-MCNC: 91 MG/DL (ref 0–149)

## 2023-08-10 PROCEDURE — 99213 OFFICE O/P EST LOW 20 MIN: CPT | Performed by: FAMILY MEDICINE

## 2023-08-10 PROCEDURE — 3078F DIAST BP <80 MM HG: CPT | Performed by: FAMILY MEDICINE

## 2023-08-10 PROCEDURE — 80053 COMPREHEN METABOLIC PANEL: CPT

## 2023-08-10 PROCEDURE — 36415 COLL VENOUS BLD VENIPUNCTURE: CPT

## 2023-08-10 PROCEDURE — 3077F SYST BP >= 140 MM HG: CPT | Performed by: FAMILY MEDICINE

## 2023-08-10 PROCEDURE — 80061 LIPID PANEL: CPT

## 2023-08-10 RX ORDER — CARVEDILOL 3.12 MG/1
TABLET ORAL 2 TIMES DAILY
COMMUNITY
Start: 2023-08-08 | End: 2024-02-06 | Stop reason: SDUPTHER

## 2023-08-10 RX ORDER — ALBUTEROL SULFATE 90 UG/1
AEROSOL, METERED RESPIRATORY (INHALATION) PRN
COMMUNITY

## 2023-08-10 RX ORDER — BISACODYL 5 MG/1
5 TABLET, DELAYED RELEASE ORAL DAILY
Qty: 90 TABLET | Refills: 1 | Status: SHIPPED | OUTPATIENT
Start: 2023-08-10 | End: 2023-09-11

## 2023-08-10 RX ORDER — GABAPENTIN 400 MG/1
CAPSULE ORAL 3 TIMES DAILY
COMMUNITY
Start: 2023-08-08 | End: 2024-02-06 | Stop reason: SDUPTHER

## 2023-08-10 ASSESSMENT — FIBROSIS 4 INDEX: FIB4 SCORE: 1.22

## 2023-08-10 NOTE — ASSESSMENT & PLAN NOTE
Chronic issue  Did a trial of miralax  No longer taking   Taking prune juice daily   Last BM was 1 day ago

## 2023-08-14 ENCOUNTER — DOCUMENTATION (OUTPATIENT)
Dept: HEALTH INFORMATION MANAGEMENT | Facility: OTHER | Age: 88
End: 2023-08-14
Payer: MEDICARE

## 2023-08-15 ENCOUNTER — OFFICE VISIT (OUTPATIENT)
Dept: CARDIOLOGY | Facility: MEDICAL CENTER | Age: 88
End: 2023-08-15
Attending: INTERNAL MEDICINE
Payer: MEDICARE

## 2023-08-15 DIAGNOSIS — E78.5 DYSLIPIDEMIA: Chronic | ICD-10-CM

## 2023-08-15 DIAGNOSIS — I70.0 ATHEROSCLEROSIS OF AORTA (HCC): ICD-10-CM

## 2023-08-15 DIAGNOSIS — I25.119 CORONARY ARTERY DISEASE INVOLVING NATIVE CORONARY ARTERY OF NATIVE HEART WITH ANGINA PECTORIS (HCC): ICD-10-CM

## 2023-08-15 DIAGNOSIS — I10 ESSENTIAL HYPERTENSION: ICD-10-CM

## 2023-08-15 PROCEDURE — 99213 OFFICE O/P EST LOW 20 MIN: CPT | Performed by: INTERNAL MEDICINE

## 2023-08-15 PROCEDURE — 3078F DIAST BP <80 MM HG: CPT | Performed by: INTERNAL MEDICINE

## 2023-08-15 PROCEDURE — 3075F SYST BP GE 130 - 139MM HG: CPT | Performed by: INTERNAL MEDICINE

## 2023-08-15 ASSESSMENT — FIBROSIS 4 INDEX: FIB4 SCORE: 2.83

## 2023-08-17 VITALS
DIASTOLIC BLOOD PRESSURE: 72 MMHG | BODY MASS INDEX: 21.9 KG/M2 | HEART RATE: 66 BPM | SYSTOLIC BLOOD PRESSURE: 134 MMHG | RESPIRATION RATE: 16 BRPM | WEIGHT: 119 LBS | HEIGHT: 62 IN | OXYGEN SATURATION: 97 %

## 2023-08-17 NOTE — PROGRESS NOTES
Chief Complaint   Patient presents with    Coronary Artery Disease     Follow up       Subjective     Dayana Lechuga is a 89 y.o. female who presents today for initial follow-up of CAD PVD hypertension and hyperlipidemia as well as stable angina.  Doing well.      In the interim since last visit she has been feeling well.  Blood pressures are controlled.  Medications are appropriate she presents with family.    Past Medical History:   Diagnosis Date    Allergy     Anxiety     ASTHMA     Bronchitis     CAD (coronary artery disease)     JT to RCA; 70% stenosis in LAD    Chills     Constipation     Difficulty breathing     GERD (gastroesophageal reflux disease)     Heart attack ([facility])     Heartburn     Hyperlipidemia     Hypertension     Influenza     Insomnia     Lumbar back pain 9/10/2016    Mild peripheral edema 7/31/2020    Mumps     OSTEOPOROSIS     Palpitations     Peripheral vascular disease, unspecified ([facility]) 9/14/2021    Pneumonia     Post concussion syndrome 9/11/2020    PVD (peripheral vascular disease) ([facility])     70% PAD-followed by Lary AMBROCIO    Sweat, sweating, excessive     Tonsillitis      Past Surgical History:   Procedure Laterality Date    MO INJ LUMBAR/SACRAL,W/ IMAGING N/A 4/20/2023    Procedure: Caudal epidural with catheter;  Surgeon: Torres Fall M.D.;  [facility];  Service: Pain Management    ABDOMINAL HYSTERECTOMY TOTAL      APPENDECTOMY      HERNIA REPAIR      HYSTERECTOMY LAPAROSCOPY      TONSILLECTOMY      ZZZ CARDIAC CATH       Family History   Problem Relation Age of Onset    Heart Attack Father     Cancer Brother     Cancer Brother     Heart Disease Neg Hx     Heart Failure Neg Hx     Hyperlipidemia Neg Hx      Social History     Socioeconomic History    Marital status:      Spouse name: Not on file    Number of children: Not on file    Years of education: Not on file    Highest education level: Not on file   Occupational History    Not on  file   Tobacco Use    Smoking status: Never    Smokeless tobacco: Never   Vaping Use    Vaping Use: Never used   Substance and Sexual Activity    Alcohol use: No     Alcohol/week: 0.0 oz    Drug use: No    Sexual activity: Not Currently   Other Topics Concern     Service No    Blood Transfusions Yes    Caffeine Concern No    Occupational Exposure No    Hobby Hazards No    Sleep Concern Yes    Stress Concern Yes    Weight Concern No    Special Diet No    Back Care No    Exercise No    Bike Helmet No    Seat Belt Yes    Self-Exams No   Social History Narrative    Not on file     Social Determinants of Health     Financial Resource Strain: Low Risk  (12/7/2022)    Overall Financial Resource Strain (CARDIA)     Difficulty of Paying Living Expenses: Not very hard   Food Insecurity: No Food Insecurity (12/7/2022)    Hunger Vital Sign     Worried About Running Out of Food in the Last Year: Never true     Ran Out of Food in the Last Year: Never true   Transportation Needs: No Transportation Needs (12/7/2022)    PRAPARE - Transportation     Lack of Transportation (Medical): No     Lack of Transportation (Non-Medical): No   Physical Activity: Insufficiently Active (12/7/2022)    Exercise Vital Sign     Days of Exercise per Week: 7 days     Minutes of Exercise per Session: 20 min   Stress: No Stress Concern Present (12/7/2022)    [employer] [employer] - Occupational Stress Questionnaire     Feeling of Stress : Not at all   Social Connections: Moderately Isolated (12/7/2022)    Social Connection and Isolation Panel [NHANES]     Frequency of Communication with Friends and Family: More than three times a week     Frequency of Social Gatherings with Friends and Family: More than three times a week     Attends Gnosticist Services: 1 to 4 times per year     Active Member of Clubs or Organizations: No     Attends Club or Organization Meetings: Never     Marital Status:    Intimate Partner Violence:  Not on file   Housing Stability: Low Risk  (12/7/2022)    Housing Stability Vital Sign     Unable to Pay for Housing in the Last Year: No     Number of Places Lived in the Last Year: 1     Unstable Housing in the Last Year: No     Allergies   Allergen Reactions    Bactrim [Sulfamethoxazole W-Trimethoprim] Hives    Pcn [Penicillins] Hives     About 70 years. Tolerated ceftriaxone before in 2021    Codeine Unspecified     Unknown reaction      Tramadol Unspecified     Urinary retention     Outpatient Encounter Medications as of 8/15/2023   Medication Sig Dispense Refill    albuterol 108 (90 Base) MCG/ACT Aero Soln inhalation aerosol as needed.      carvedilol (COREG) 3.125 MG Tab 2 times a day.      gabapentin (NEURONTIN) 400 MG Cap 3 times a day.      fluticasone (FLONASE) 50 MCG/ACT nasal spray USE 1 SPRAY IN EACH NOSTRIL ONCE A DAY 16 g 0    LORazepam (ATIVAN) 0.5 MG Tab Take 1 Tablet by mouth every evening for 30 days. (Patient taking differently: Take 0.5 mg by mouth every evening. 1/2 tablet) 30 Tablet 0    lisinopril (PRINIVIL) 10 MG Tab Take 1 Tablet by mouth every day. 100 Tablet 3    isosorbide mononitrate SR (IMDUR) 60 MG TABLET SR 24 HR Take 1 Tablet by mouth every evening. 90 Tablet 3    aspirin 81 MG EC tablet Chew 81 mg every day.      CRANBERRY PO Take 2 Tablets by mouth every day.      Multiple Vitamins-Minerals (OCUVITE-LUTEIN) Tab Take 1 Tablet by mouth every day.      Saline (OCEAN NASAL SPRAY NA) Administer 1 Spray into affected nostril(S) every day.      montelukast (SINGULAIR) 10 MG Tab TAKE ONE TABLET BY MOUTH IN THE EVENING (Patient taking differently: Take 10 mg by mouth every evening.) 90 Tablet 3    atorvastatin (LIPITOR) 80 MG tablet Take 1 Tablet by mouth every evening. 100 Tablet 3    loratadine (CLARITIN) 10 MG Tab Take 1 Tablet by mouth every day.      Acetaminophen 500 MG Cap Take 1 Capsule by mouth 2 times a day. Morning & Afternoon, sometimes takes 3 times a day      Melatonin 10 MG  "Tab Take 15 mg by mouth at bedtime.      Biotin w/ Vitamins C & E 1250-7.5-7.5 MCG-MG-UNT Chew Tab Chew 1 Tab every morning.      guaifenesin LA (MUCINEX) 600 MG TABLET SR 12 HR Take 600 mg by mouth every morning.      Ascorbic Acid (VITAMIN C) 1000 MG Tab Take 1 Tablet by mouth every morning.      Cholecalciferol (VITAMIN D) 50 MCG (2000 UT) Cap Take 2,000 Units by mouth every morning.      omeprazole (PRILOSEC) 20 MG delayed-release capsule Take 1 Capsule by mouth every morning with breakfast.      bisacodyl (DULCOLAX) 5 MG EC tablet Take 1 Tablet by mouth every day. (Patient not taking: Reported on 8/15/2023) 90 Tablet 1    [DISCONTINUED] LORazepam (ATIVAN) 0.5 MG Tab Take 1 Tablet by mouth every evening for 30 days. (Patient not taking: Reported on 8/10/2023) 30 Tablet 0    [DISCONTINUED] LORazepam (ATIVAN) 0.5 MG Tab Take 1 Tablet by mouth every evening for 30 days. (Patient not taking: Reported on 8/10/2023) 30 Tablet 0    [DISCONTINUED] polyethylene glycol 3350 (MIRALAX) 17 GM/SCOOP Powder Take 17 g by mouth every day. (Patient not taking: Reported on 8/10/2023) 225 g 3    [DISCONTINUED] fluticasone (FLONASE) 50 MCG/ACT nasal spray use 1 spray in each nostril once a day 16 g 0    [] carvedilol (COREG) 3.125 MG Tab Take 1 Tablet by mouth 2 times a day for 90 days. 180 Tablet 3    [] gabapentin (NEURONTIN) 400 MG Cap Take 1 Capsule by mouth 3 times a day for 90 days. 270 Capsule 3    VITAMIN E PO Take 1 Cap by mouth every morning. (Patient not taking: Reported on 8/10/2023)       No facility-administered encounter medications on file as of 8/15/2023.     Review of Systems   All other systems reviewed and are negative.             Objective     /72 (BP Location: Left arm, Patient Position: Sitting)   Pulse 66   Resp 16   Ht 1.575 m (5' 2\")   Wt 54 kg (119 lb)   LMP  (LMP Unknown)   SpO2 97%   BMI 21.77 kg/m²     Physical Exam  Vitals and nursing note reviewed.   Constitutional:       " General: She is not in acute distress.     Appearance: Normal appearance.   HENT:      Head: Normocephalic and atraumatic.      Right Ear: External ear normal.      Left Ear: External ear normal.      Nose: Nose normal.   Eyes:      Conjunctiva/sclera: Conjunctivae normal.   Cardiovascular:      Rate and Rhythm: Normal rate and regular rhythm.      Pulses: Normal pulses.      Heart sounds: No murmur heard.  Pulmonary:      Effort: Pulmonary effort is normal. No respiratory distress.      Breath sounds: Normal breath sounds.   Abdominal:      General: There is no distension.      Palpations: Abdomen is soft.   Musculoskeletal:      Cervical back: No rigidity or tenderness.      Right lower leg: No edema.      Left lower leg: No edema.   Skin:     General: Skin is warm and dry.      Capillary Refill: Capillary refill takes 2 to 3 seconds.   Neurological:      General: No focal deficit present.      Mental Status: She is alert and oriented to person, place, and time.   Psychiatric:         Mood and Affect: Mood normal.         Behavior: Behavior normal.         Thought Content: Thought content normal.       LABS:  Lab Results   Component Value Date/Time    CHOLSTRLTOT 111 08/10/2023 08:44 AM    LDL 47 08/10/2023 08:44 AM    HDL 46 08/10/2023 08:44 AM    TRIGLYCERIDE 91 08/10/2023 08:44 AM       Lab Results   Component Value Date/Time    WBC 8.9 04/30/2023 01:38 AM    RBC 3.25 (L) 04/30/2023 01:38 AM    HEMOGLOBIN 10.7 (L) 04/30/2023 01:38 AM    HEMATOCRIT 32.6 (L) 04/30/2023 01:38 AM    .3 (H) 04/30/2023 01:38 AM    NEUTSPOLYS 63.50 04/30/2023 01:38 AM    LYMPHOCYTES 23.60 04/30/2023 01:38 AM    MONOCYTES 10.00 04/30/2023 01:38 AM    EOSINOPHILS 1.80 04/30/2023 01:38 AM    BASOPHILS 0.80 04/30/2023 01:38 AM     Lab Results   Component Value Date/Time    SODIUM 136 08/10/2023 08:44 AM    POTASSIUM 4.1 08/10/2023 08:44 AM    CHLORIDE 103 08/10/2023 08:44 AM    CO2 21 08/10/2023 08:44 AM    GLUCOSE 99 08/10/2023  "08:44 AM    BUN 9 08/10/2023 08:44 AM    CREATININE 0.55 08/10/2023 08:44 AM         Lab Results   Component Value Date/Time    ALKPHOSPHAT 51 08/10/2023 08:44 AM    ASTSGOT 24 08/10/2023 08:44 AM    ALTSGPT 14 08/10/2023 08:44 AM    TBILIRUBIN 0.5 08/10/2023 08:44 AM      Lab Results   Component Value Date/Time    BNPBTYPENAT 193 (H) 09/10/2016 04:00 PM      No results found for: \"TSH\"  Lab Results   Component Value Date/Time    PROTHROMBTM 12.4 01/05/2021 04:23 PM    INR 0.89 01/05/2021 04:23 PM                 Assessment & Plan     1. Coronary artery disease involving native coronary artery of native heart with angina pectoris (HCC)        2. Atherosclerosis of aorta (HCC)        3. Essential hypertension        4. Dyslipidemia            Medical Decision Making: Today's Assessment/Status/Plan:          Doing well.  No cardiac concerns.  Tolerating medical therapy.  Blood pressures controlled.          "

## 2023-08-23 ENCOUNTER — PATIENT OUTREACH (OUTPATIENT)
Dept: HEALTH INFORMATION MANAGEMENT | Facility: OTHER | Age: 88
End: 2023-08-23
Payer: MEDICARE

## 2023-08-23 DIAGNOSIS — I10 ESSENTIAL HYPERTENSION: ICD-10-CM

## 2023-08-23 NOTE — PROGRESS NOTES
Patient would like to transfer her PCM follow-up to Nurse Fink for now, patient will meet with Nurse Fink in-person after her PCP visit on 9/11/23.

## 2023-09-11 ENCOUNTER — PATIENT OUTREACH (OUTPATIENT)
Dept: MEDICAL GROUP | Facility: PHYSICIAN GROUP | Age: 88
End: 2023-09-11

## 2023-09-11 ENCOUNTER — OFFICE VISIT (OUTPATIENT)
Dept: MEDICAL GROUP | Facility: PHYSICIAN GROUP | Age: 88
End: 2023-09-11
Payer: MEDICARE

## 2023-09-11 VITALS
SYSTOLIC BLOOD PRESSURE: 144 MMHG | DIASTOLIC BLOOD PRESSURE: 64 MMHG | WEIGHT: 119.6 LBS | OXYGEN SATURATION: 92 % | BODY MASS INDEX: 22.01 KG/M2 | HEIGHT: 62 IN | TEMPERATURE: 98.2 F | HEART RATE: 77 BPM

## 2023-09-11 DIAGNOSIS — J30.9 ALLERGIC RHINITIS, UNSPECIFIED SEASONALITY, UNSPECIFIED TRIGGER: ICD-10-CM

## 2023-09-11 DIAGNOSIS — I73.9 PVD (PERIPHERAL VASCULAR DISEASE) (HCC): ICD-10-CM

## 2023-09-11 DIAGNOSIS — E78.5 DYSLIPIDEMIA: Chronic | ICD-10-CM

## 2023-09-11 DIAGNOSIS — I10 ESSENTIAL HYPERTENSION: Chronic | ICD-10-CM

## 2023-09-11 DIAGNOSIS — I10 ESSENTIAL HYPERTENSION: ICD-10-CM

## 2023-09-11 PROCEDURE — 3077F SYST BP >= 140 MM HG: CPT | Performed by: STUDENT IN AN ORGANIZED HEALTH CARE EDUCATION/TRAINING PROGRAM

## 2023-09-11 PROCEDURE — 99213 OFFICE O/P EST LOW 20 MIN: CPT | Performed by: STUDENT IN AN ORGANIZED HEALTH CARE EDUCATION/TRAINING PROGRAM

## 2023-09-11 PROCEDURE — 99999 PR NO CHARGE: CPT | Performed by: STUDENT IN AN ORGANIZED HEALTH CARE EDUCATION/TRAINING PROGRAM

## 2023-09-11 PROCEDURE — 3078F DIAST BP <80 MM HG: CPT | Performed by: STUDENT IN AN ORGANIZED HEALTH CARE EDUCATION/TRAINING PROGRAM

## 2023-09-11 ASSESSMENT — FIBROSIS 4 INDEX: FIB4 SCORE: 2.83

## 2023-09-11 NOTE — PROGRESS NOTES
Assessment    This patient is in good spirits and full of energy today and is only willing to speak briefly as she is wanting to get home and watch her football game. The patient denies any falls, any acute medical concerns, any trouble obtaining or questions about medications, or any service needs at this time. Patient appears to have a strong family support system. I will follow up in 1 month.    Education    *Purpose of PCM services.    Plan of Care and Goals    *Maintain good health  *Stay free from falls    Barriers:    *Age    Progress:    Progressing    Next outreach:    October 11th, 10:30 am by phone.

## 2023-09-12 DIAGNOSIS — F51.01 PRIMARY INSOMNIA: ICD-10-CM

## 2023-09-12 DIAGNOSIS — Z79.899 CHRONIC USE OF BENZODIAZEPINE FOR THERAPEUTIC PURPOSE: ICD-10-CM

## 2023-09-12 RX ORDER — LORAZEPAM 0.5 MG/1
.25-.5 TABLET ORAL NIGHTLY
Qty: 30 TABLET | Refills: 0 | Status: SHIPPED | OUTPATIENT
Start: 2023-09-12 | End: 2023-10-12

## 2023-09-14 PROBLEM — J30.9 ALLERGIC RHINITIS: Status: ACTIVE | Noted: 2023-09-14

## 2023-09-14 ASSESSMENT — ENCOUNTER SYMPTOMS
CHILLS: 0
DIZZINESS: 0
ABDOMINAL PAIN: 0
NAUSEA: 0
HEADACHES: 0
PALPITATIONS: 0
COUGH: 1
SHORTNESS OF BREATH: 0
DIARRHEA: 0
FEVER: 0
VOMITING: 0

## 2023-09-14 NOTE — PROGRESS NOTES
Subjective:     CC:    Chief Complaint   Patient presents with    Establish Care        HISTORY OF THE PRESENT ILLNESS: Patient is a 89 y.o. female. This pleasant patient is here today to establish care. Prior PCP was Dr. Haley.    Problem   Allergic Rhinitis    Patient has concerns today about allergy symptoms. She reports runny nose, post-nasal drip, frequent sneezing, and mild dry cough. Symptoms are chronic and persistent. She takes Claritin and uses Flonase nasal spray once a day.     Coronary Artery Disease Involving Native Coronary Artery of Native Heart With Angina Pectoris (Hcc)   Dyslipidemia    Lab Results   Component Value Date/Time    CHOLSTRLTOT 111 08/10/2023 0844    TRIGLYCERIDE 91 08/10/2023 0844    HDL 46 08/10/2023 0844    LDL 47 08/10/2023 0844     Current medications: atorvastatin 80 mg daily     Essential Hypertension    This is a chronic condition.  Current Meds: lisinopril 10 mg daily, also takes carvedilol 3.125 mg twice daily  Side effects: none  Associated symptoms: Denies chest pain, shortness of breath, headaches, dizziness/lightheadedness              Past Medical History: Diagnoses of Dyslipidemia, Allergic rhinitis, unspecified seasonality, unspecified trigger, and Essential hypertension were pertinent to this visit.    Current Outpatient Medications   Medication Sig    albuterol 108 (90 Base) MCG/ACT Aero Soln inhalation aerosol as needed.    carvedilol (COREG) 3.125 MG Tab 2 times a day.    gabapentin (NEURONTIN) 400 MG Cap 3 times a day.    fluticasone (FLONASE) 50 MCG/ACT nasal spray USE 1 SPRAY IN EACH NOSTRIL ONCE A DAY    lisinopril (PRINIVIL) 10 MG Tab Take 1 Tablet by mouth every day.    isosorbide mononitrate SR (IMDUR) 60 MG TABLET SR 24 HR Take 1 Tablet by mouth every evening.    aspirin 81 MG EC tablet Chew 81 mg every day.    CRANBERRY PO Take 2 Tablets by mouth every day.    Multiple Vitamins-Minerals (OCUVITE-LUTEIN) Tab Take 1 Tablet by mouth every day.    Saline  (OCEAN NASAL SPRAY NA) Administer 1 Spray into affected nostril(S) every day.    montelukast (SINGULAIR) 10 MG Tab TAKE ONE TABLET BY MOUTH IN THE EVENING (Patient taking differently: Take 10 mg by mouth every evening.)    atorvastatin (LIPITOR) 80 MG tablet Take 1 Tablet by mouth every evening.    loratadine (CLARITIN) 10 MG Tab Take 1 Tablet by mouth every day.    Acetaminophen 500 MG Cap Take 1 Capsule by mouth 2 times a day. Morning & Afternoon, sometimes takes 3 times a day    guaifenesin LA (MUCINEX) 600 MG TABLET SR 12 HR Take 600 mg by mouth every morning.    Ascorbic Acid (VITAMIN C) 1000 MG Tab Take 1 Tablet by mouth every morning.    Cholecalciferol (VITAMIN D) 50 MCG (2000 UT) Cap Take 2,000 Units by mouth every morning.    omeprazole (PRILOSEC) 20 MG delayed-release capsule Take 1 Capsule by mouth every morning with breakfast.    LORazepam (ATIVAN) 0.5 MG Tab Take 0.5-1 Tablets by mouth every evening for 30 days.    Melatonin 10 MG Tab Take 15 mg by mouth at bedtime.    VITAMIN E PO Take 1 Cap by mouth every morning. (Patient not taking: Reported on 9/11/2023)        Social History     Socioeconomic History    Marital status:    Tobacco Use    Smoking status: Never    Smokeless tobacco: Never   Vaping Use    Vaping Use: Never used   Substance and Sexual Activity    Alcohol use: No     Alcohol/week: 0.0 oz    Drug use: No    Sexual activity: Not Currently   Other Topics Concern     Service No    Blood Transfusions Yes    Caffeine Concern No    Occupational Exposure No    Hobby Hazards No    Sleep Concern Yes    Stress Concern Yes    Weight Concern No    Special Diet No    Back Care No    Exercise No    Bike Helmet No    Seat Belt Yes    Self-Exams No     Social Determinants of Health     Financial Resource Strain: Low Risk  (12/7/2022)    Overall Financial Resource Strain (CARDIA)     Difficulty of Paying Living Expenses: Not very hard   Food Insecurity: No Food Insecurity (12/7/2022)     Hunger Vital Sign     Worried About Running Out of Food in the Last Year: Never true     Ran Out of Food in the Last Year: Never true   Transportation Needs: No Transportation Needs (12/7/2022)    PRAPARE - Transportation     Lack of Transportation (Medical): No     Lack of Transportation (Non-Medical): No   Physical Activity: Insufficiently Active (12/7/2022)    Exercise Vital Sign     Days of Exercise per Week: 7 days     Minutes of Exercise per Session: 20 min   Stress: No Stress Concern Present (12/7/2022)    [employer] - Occupational Stress Questionnaire     Feeling of Stress : Not at all   Social Connections: Moderately Isolated (12/7/2022)    Social Connection and Isolation Panel [NHANES]     Frequency of Communication with Friends and Family: More than three times a week     Frequency of Social Gatherings with Friends and Family: More than three times a week     Attends Buddhism Services: 1 to 4 times per year     Active Member of Clubs or Organizations: No     Attends Club or Organization Meetings: Never     Marital Status:    Housing Stability: Low Risk  (12/7/2022)    Housing Stability Vital Sign     Unable to Pay for Housing in the Last Year: No     Number of Places Lived in the Last Year: 1     Unstable Housing in the Last Year: No       Family History   Problem Relation Age of Onset    Heart Attack Father     Cancer Brother     Cancer Brother     Heart Disease Neg Hx     Heart Failure Neg Hx     Hyperlipidemia Neg Hx          Health Maintenance: Completed      ROS:   Review of Systems   Constitutional:  Negative for chills and fever.   HENT:  Positive for congestion.    Respiratory:  Positive for cough. Negative for shortness of breath.    Cardiovascular:  Negative for chest pain and palpitations.   Gastrointestinal:  Negative for abdominal pain, diarrhea, nausea and vomiting.   Neurological:  Negative for dizziness and headaches.       Objective:     Exam: BP (!)  "144/64 (BP Location: Right arm, Patient Position: Sitting, BP Cuff Size: Adult)   Pulse 77   Temp 36.8 °C (98.2 °F) (Temporal)   Ht 1.575 m (5' 2\")   Wt 54.3 kg (119 lb 9.6 oz)   SpO2 92%  Body mass index is 21.88 kg/m².    Physical Exam  Constitutional:       General: She is not in acute distress.  HENT:      Mouth/Throat:      Mouth: Mucous membranes are moist.   Eyes:      Extraocular Movements: Extraocular movements intact.   Cardiovascular:      Rate and Rhythm: Normal rate and regular rhythm.      Heart sounds: No murmur heard.     No friction rub. No gallop.   Pulmonary:      Effort: Pulmonary effort is normal.      Breath sounds: No wheezing, rhonchi or rales.   Musculoskeletal:      Cervical back: Normal range of motion and neck supple.      Right lower leg: No edema.      Left lower leg: No edema.   Lymphadenopathy:      Cervical: No cervical adenopathy.   Skin:     General: Skin is warm and dry.   Neurological:      Mental Status: She is alert.   Psychiatric:         Mood and Affect: Mood normal.         Labs: Reviewed.    Assessment & Plan:     89 y.o. female with the following -    1. Allergic rhinitis, unspecified seasonality, unspecified trigger  Chronic, stable. Continue daily antihistamine and steroid nasal spray. Advised patient to consider also using a saline nasal irrigation system.     2. Dyslipidemia  Chronic, controlled. The last cholesterol panel was within normal limits. Continue current dosing.    3. Essential hypertension  Chronic. BP is mildly elevated today at 144/64. Continue current medication regimen for now. Monitor BP at home and consider adjusting medications if persistently elevated.        Return in about 3 months (around 12/11/2023).    Please note that this dictation was created using voice recognition software. I have made every reasonable attempt to correct obvious errors, but I expect that there are errors of grammar and possibly content that I did not discover before " finalizing the note.

## 2023-10-11 ENCOUNTER — PATIENT OUTREACH (OUTPATIENT)
Dept: HEALTH INFORMATION MANAGEMENT | Facility: OTHER | Age: 88
End: 2023-10-11
Payer: MEDICARE

## 2023-10-11 DIAGNOSIS — I10 ESSENTIAL HYPERTENSION: ICD-10-CM

## 2023-10-11 DIAGNOSIS — R26.89 IMBALANCE: ICD-10-CM

## 2023-10-11 PROCEDURE — 99490 CHRNC CARE MGMT STAFF 1ST 20: CPT | Performed by: STUDENT IN AN ORGANIZED HEALTH CARE EDUCATION/TRAINING PROGRAM

## 2023-10-11 NOTE — PROGRESS NOTES
"Assessment    I spoke with the patient today over the phone for her monthly CCM follow up. Patient's future appointments reviewed. Patient voices understanding. Patient medications reviewed. Patient states that she manages them herself in her apartment and that they are being taken as indicated in her chart. Patient denies any recent falls or injuries. Patient states that she participates in activities in the assisted living facility where she lives. Patient states that she hydrates \"a lot\" each day but doesn't specifically monitor the amount. The patient states that she feels her nutrition would be better if her assisted living didn't have such issues with their suppliers. She mentions people not getting parts of their meals because they \"run out.\" Patient denies any current medical concerns or questions, is provided with my contact information should any emerge.     Education    *Article on important medical screenings to have after 60 sent by mail.     Plan of Care and Goals    *Remain free from falls/injuries  *Maintain healthy nutrition, hydration, and activity    Barriers:    *Little diet autonomy  *Dependence on others for transportation, resources.     Progress:    Progressing    Next outreach:  11/6/23  by phone                         "

## 2023-10-16 ENCOUNTER — APPOINTMENT (OUTPATIENT)
Dept: RADIOLOGY | Facility: MEDICAL CENTER | Age: 88
End: 2023-10-16
Attending: EMERGENCY MEDICINE
Payer: MEDICARE

## 2023-10-16 ENCOUNTER — HOSPITAL ENCOUNTER (EMERGENCY)
Facility: MEDICAL CENTER | Age: 88
End: 2023-10-16
Attending: EMERGENCY MEDICINE
Payer: MEDICARE

## 2023-10-16 ENCOUNTER — OFFICE VISIT (OUTPATIENT)
Dept: URGENT CARE | Facility: CLINIC | Age: 88
End: 2023-10-16
Payer: MEDICARE

## 2023-10-16 VITALS
HEART RATE: 61 BPM | TEMPERATURE: 98 F | HEIGHT: 62 IN | WEIGHT: 117.28 LBS | SYSTOLIC BLOOD PRESSURE: 184 MMHG | OXYGEN SATURATION: 98 % | DIASTOLIC BLOOD PRESSURE: 90 MMHG | BODY MASS INDEX: 21.58 KG/M2 | RESPIRATION RATE: 18 BRPM

## 2023-10-16 VITALS
TEMPERATURE: 98.7 F | DIASTOLIC BLOOD PRESSURE: 92 MMHG | WEIGHT: 119 LBS | OXYGEN SATURATION: 96 % | HEIGHT: 62 IN | BODY MASS INDEX: 21.9 KG/M2 | SYSTOLIC BLOOD PRESSURE: 160 MMHG | HEART RATE: 90 BPM | RESPIRATION RATE: 14 BRPM

## 2023-10-16 DIAGNOSIS — R42 LIGHTHEADEDNESS: ICD-10-CM

## 2023-10-16 DIAGNOSIS — R42 DIZZINESS: ICD-10-CM

## 2023-10-16 DIAGNOSIS — Z86.79 HISTORY OF CAD (CORONARY ARTERY DISEASE): ICD-10-CM

## 2023-10-16 DIAGNOSIS — I10 PRIMARY HYPERTENSION: ICD-10-CM

## 2023-10-16 DIAGNOSIS — I10 ESSENTIAL HYPERTENSION: Chronic | ICD-10-CM

## 2023-10-16 DIAGNOSIS — Z86.2 HISTORY OF ANEMIA: ICD-10-CM

## 2023-10-16 LAB
ABO + RH BLD: NORMAL
ABO GROUP BLD: NORMAL
ALBUMIN SERPL BCP-MCNC: 4.3 G/DL (ref 3.2–4.9)
ALBUMIN/GLOB SERPL: 1.4 G/DL
ALP SERPL-CCNC: 63 U/L (ref 30–99)
ALT SERPL-CCNC: 23 U/L (ref 2–50)
ANION GAP SERPL CALC-SCNC: 11 MMOL/L (ref 7–16)
APPEARANCE UR: CLEAR
APTT PPP: 31.8 SEC (ref 24.7–36)
AST SERPL-CCNC: 28 U/L (ref 12–45)
BASOPHILS # BLD AUTO: 1.1 % (ref 0–1.8)
BASOPHILS # BLD: 0.07 K/UL (ref 0–0.12)
BILIRUB SERPL-MCNC: 0.3 MG/DL (ref 0.1–1.5)
BILIRUB UR QL STRIP.AUTO: NEGATIVE
BLD GP AB SCN SERPL QL: NORMAL
BUN SERPL-MCNC: 11 MG/DL (ref 8–22)
CALCIUM ALBUM COR SERPL-MCNC: 10.1 MG/DL (ref 8.5–10.5)
CALCIUM SERPL-MCNC: 10.3 MG/DL (ref 8.5–10.5)
CHLORIDE SERPL-SCNC: 107 MMOL/L (ref 96–112)
CO2 SERPL-SCNC: 23 MMOL/L (ref 20–33)
COLOR UR: YELLOW
CREAT SERPL-MCNC: 0.53 MG/DL (ref 0.5–1.4)
EKG IMPRESSION: NORMAL
EOSINOPHIL # BLD AUTO: 0.2 K/UL (ref 0–0.51)
EOSINOPHIL NFR BLD: 3.1 % (ref 0–6.9)
ERYTHROCYTE [DISTWIDTH] IN BLOOD BY AUTOMATED COUNT: 46.2 FL (ref 35.9–50)
GFR SERPLBLD CREATININE-BSD FMLA CKD-EPI: 85 ML/MIN/1.73 M 2
GLOBULIN SER CALC-MCNC: 3.1 G/DL (ref 1.9–3.5)
GLUCOSE SERPL-MCNC: 123 MG/DL (ref 65–99)
GLUCOSE UR STRIP.AUTO-MCNC: NEGATIVE MG/DL
HCT VFR BLD AUTO: 42.8 % (ref 37–47)
HGB BLD-MCNC: 13.9 G/DL (ref 12–16)
IMM GRANULOCYTES # BLD AUTO: 0.01 K/UL (ref 0–0.11)
IMM GRANULOCYTES NFR BLD AUTO: 0.2 % (ref 0–0.9)
INR PPP: 0.92 (ref 0.87–1.13)
KETONES UR STRIP.AUTO-MCNC: NEGATIVE MG/DL
LEUKOCYTE ESTERASE UR QL STRIP.AUTO: NEGATIVE
LIPASE SERPL-CCNC: 29 U/L (ref 11–82)
LYMPHOCYTES # BLD AUTO: 1.94 K/UL (ref 1–4.8)
LYMPHOCYTES NFR BLD: 29.6 % (ref 22–41)
MCH RBC QN AUTO: 32.5 PG (ref 27–33)
MCHC RBC AUTO-ENTMCNC: 32.5 G/DL (ref 32.2–35.5)
MCV RBC AUTO: 100 FL (ref 81.4–97.8)
MICRO URNS: NORMAL
MONOCYTES # BLD AUTO: 0.66 K/UL (ref 0–0.85)
MONOCYTES NFR BLD AUTO: 10.1 % (ref 0–13.4)
NEUTROPHILS # BLD AUTO: 3.67 K/UL (ref 1.82–7.42)
NEUTROPHILS NFR BLD: 55.9 % (ref 44–72)
NITRITE UR QL STRIP.AUTO: NEGATIVE
NRBC # BLD AUTO: 0 K/UL
NRBC BLD-RTO: 0 /100 WBC (ref 0–0.2)
PH UR STRIP.AUTO: 7.5 [PH] (ref 5–8)
PLATELET # BLD AUTO: 186 K/UL (ref 164–446)
PMV BLD AUTO: 10.1 FL (ref 9–12.9)
POTASSIUM SERPL-SCNC: 4.1 MMOL/L (ref 3.6–5.5)
PROT SERPL-MCNC: 7.4 G/DL (ref 6–8.2)
PROT UR QL STRIP: NEGATIVE MG/DL
PROTHROMBIN TIME: 12.5 SEC (ref 12–14.6)
RBC # BLD AUTO: 4.28 M/UL (ref 4.2–5.4)
RBC UR QL AUTO: NEGATIVE
RH BLD: NORMAL
SODIUM SERPL-SCNC: 141 MMOL/L (ref 135–145)
SP GR UR STRIP.AUTO: 1.01
TROPONIN T SERPL-MCNC: 23 NG/L (ref 6–19)
UROBILINOGEN UR STRIP.AUTO-MCNC: 0.2 MG/DL
WBC # BLD AUTO: 6.6 K/UL (ref 4.8–10.8)

## 2023-10-16 PROCEDURE — A9270 NON-COVERED ITEM OR SERVICE: HCPCS | Performed by: EMERGENCY MEDICINE

## 2023-10-16 PROCEDURE — 85730 THROMBOPLASTIN TIME PARTIAL: CPT

## 2023-10-16 PROCEDURE — 86901 BLOOD TYPING SEROLOGIC RH(D): CPT

## 2023-10-16 PROCEDURE — 99285 EMERGENCY DEPT VISIT HI MDM: CPT

## 2023-10-16 PROCEDURE — 83690 ASSAY OF LIPASE: CPT

## 2023-10-16 PROCEDURE — 84484 ASSAY OF TROPONIN QUANT: CPT

## 2023-10-16 PROCEDURE — 3080F DIAST BP >= 90 MM HG: CPT | Performed by: PHYSICIAN ASSISTANT

## 2023-10-16 PROCEDURE — 86850 RBC ANTIBODY SCREEN: CPT

## 2023-10-16 PROCEDURE — 3077F SYST BP >= 140 MM HG: CPT | Performed by: PHYSICIAN ASSISTANT

## 2023-10-16 PROCEDURE — 99214 OFFICE O/P EST MOD 30 MIN: CPT | Performed by: PHYSICIAN ASSISTANT

## 2023-10-16 PROCEDURE — 96376 TX/PRO/DX INJ SAME DRUG ADON: CPT

## 2023-10-16 PROCEDURE — 700111 HCHG RX REV CODE 636 W/ 250 OVERRIDE (IP): Performed by: EMERGENCY MEDICINE

## 2023-10-16 PROCEDURE — 36415 COLL VENOUS BLD VENIPUNCTURE: CPT

## 2023-10-16 PROCEDURE — 71045 X-RAY EXAM CHEST 1 VIEW: CPT

## 2023-10-16 PROCEDURE — 93000 ELECTROCARDIOGRAM COMPLETE: CPT | Performed by: PHYSICIAN ASSISTANT

## 2023-10-16 PROCEDURE — 81003 URINALYSIS AUTO W/O SCOPE: CPT

## 2023-10-16 PROCEDURE — 85610 PROTHROMBIN TIME: CPT

## 2023-10-16 PROCEDURE — 700102 HCHG RX REV CODE 250 W/ 637 OVERRIDE(OP): Performed by: EMERGENCY MEDICINE

## 2023-10-16 PROCEDURE — 93005 ELECTROCARDIOGRAM TRACING: CPT

## 2023-10-16 PROCEDURE — 86900 BLOOD TYPING SEROLOGIC ABO: CPT

## 2023-10-16 PROCEDURE — 96374 THER/PROPH/DIAG INJ IV PUSH: CPT

## 2023-10-16 PROCEDURE — 93005 ELECTROCARDIOGRAM TRACING: CPT | Performed by: EMERGENCY MEDICINE

## 2023-10-16 PROCEDURE — 85025 COMPLETE CBC W/AUTO DIFF WBC: CPT

## 2023-10-16 PROCEDURE — 700105 HCHG RX REV CODE 258: Performed by: EMERGENCY MEDICINE

## 2023-10-16 PROCEDURE — 80053 COMPREHEN METABOLIC PANEL: CPT

## 2023-10-16 RX ORDER — SODIUM CHLORIDE 9 MG/ML
1000 INJECTION, SOLUTION INTRAVENOUS ONCE
Status: COMPLETED | OUTPATIENT
Start: 2023-10-16 | End: 2023-10-16

## 2023-10-16 RX ORDER — ENALAPRILAT 1.25 MG/ML
1.25 INJECTION INTRAVENOUS ONCE
Status: COMPLETED | OUTPATIENT
Start: 2023-10-16 | End: 2023-10-16

## 2023-10-16 RX ORDER — CARVEDILOL 6.25 MG/1
6.25 TABLET ORAL 2 TIMES DAILY WITH MEALS
Status: DISCONTINUED | OUTPATIENT
Start: 2023-10-16 | End: 2023-10-16

## 2023-10-16 RX ORDER — CARVEDILOL 6.25 MG/1
6.25 TABLET ORAL ONCE
Status: COMPLETED | OUTPATIENT
Start: 2023-10-16 | End: 2023-10-16

## 2023-10-16 RX ADMIN — ENALAPRILAT 1.25 MG: 1.25 INJECTION INTRAVENOUS at 17:13

## 2023-10-16 RX ADMIN — ENALAPRILAT 1.25 MG: 1.25 INJECTION INTRAVENOUS at 18:23

## 2023-10-16 RX ADMIN — CARVEDILOL 6.25 MG: 6.25 TABLET, FILM COATED ORAL at 16:30

## 2023-10-16 RX ADMIN — SODIUM CHLORIDE 1000 ML: 9 INJECTION, SOLUTION INTRAVENOUS at 14:18

## 2023-10-16 ASSESSMENT — FIBROSIS 4 INDEX
FIB4 SCORE: 2.83
FIB4 SCORE: 2.83

## 2023-10-16 NOTE — ED NOTES
/87 ERP notified. Confirmed will hold off on antihypertensive medication, to monitor until fluid bolus is done.

## 2023-10-16 NOTE — ED PROVIDER NOTES
"ER Provider Note    Scribed for Jamal Cruz D.O. by Shyanne Barraza. 10/16/2023  1:42 PM    Primary Care Provider: Monique Borjas P.A.-C.    CHIEF COMPLAINT  Chief Complaint   Patient presents with    Dizziness     Patient reports last night at 1600 she developed dizziness. Patient reports she was making a meal for herself and felt dizzy. Patient was seen at urgent care and told to come to ER. Patient reports it \"feels like I'm walking on air light headed.\"     Lower GI Bleed     Patient reports for the last month she has had \"dark tarry stools.\"      HPI/ROS  Dayana Lechuga is a 89 y.o. female who presents to the Emergency Department for dizziness onset last night. Patient report she was making a meal for herself when she first began to develop dizziness. She followed up at urgent care and was sent here for further evaluation. She reports feeling like \"I'm walking on air\". She denies feeling dizzy while she lays down and reports her dizziness is exacerbated when she stands up. Patient has associated light headedness and off balance. She states she feels like \"sometimes I could use some oxygen\". Patient states she hasn't been drinking fluids or eating as well as she should be. Patient denies syncopal episode or near syncopal episode. Patient denies coughing or chest pain. Patient also reports having dark tarry stools for the last month. Patient reports regular stools and denies diarrhea. Patient reports having a pinched nerve at the bottom of her spine. She notes history of acid reflux and takes Omeprazole for her GI problems. She reports she also takes Jane's when she feels like she needs to. Patient uses Albuterol as needed. No alleviating factors noted. Patient denies any indigestion at this time. Patient reports a myocardial infarction in 2016 and currently has a stent in place. She reports having had COVID twice in the past.     ROS as per HPI.    PAST MEDICAL HISTORY  Past Medical History: "   Diagnosis Date    Allergy     Anxiety     ASTHMA     Bronchitis     CAD (coronary artery disease)     JT to RCA; 70% stenosis in LAD    Chills     Constipation     Difficulty breathing     GERD (gastroesophageal reflux disease)     Heart attack ([facility])     Heartburn     Hyperlipidemia     Hypertension     Influenza     Insomnia     Lumbar back pain 9/10/2016    Mild peripheral edema 7/31/2020    Mumps     OSTEOPOROSIS     Palpitations     Peripheral vascular disease, unspecified ([facility]) 9/14/2021    Pneumonia     Post concussion syndrome 9/11/2020    PVD (peripheral vascular disease) ([facility])     70% PAD-followed by [facility]    Sweat, sweating, excessive     Tonsillitis      SURGICAL HISTORY  Past Surgical History:   Procedure Laterality Date    NM INJ LUMBAR/SACRAL,W/ IMAGING N/A 4/20/2023    Procedure: Caudal epidural with catheter;  Surgeon: Torres Fall M.D.;  [facility];  Service: Pain Management    ABDOMINAL HYSTERECTOMY TOTAL      APPENDECTOMY      HERNIA REPAIR      HYSTERECTOMY LAPAROSCOPY      TONSILLECTOMY      ZZZ CARDIAC CATH       FAMILY HISTORY  Family History   Problem Relation Age of Onset    Heart Attack Father     Cancer Brother     Cancer Brother     Heart Disease Neg Hx     Heart Failure Neg Hx     Hyperlipidemia Neg Hx      SOCIAL HISTORY   reports that she has never smoked. She has never used smokeless tobacco. She reports that she does not drink alcohol and does not use drugs.    CURRENT MEDICATIONS  Discharge Medication List as of 10/16/2023  7:12 PM        CONTINUE these medications which have NOT CHANGED    Details   albuterol 108 (90 Base) MCG/ACT Aero Soln inhalation aerosol as needed., Historical Med      carvedilol (COREG) 3.125 MG Tab 2 times a day., Historical Med      gabapentin (NEURONTIN) 400 MG Cap 3 times a day., Historical Med      fluticasone (FLONASE) 50 MCG/ACT nasal spray USE 1 SPRAY IN EACH NOSTRIL ONCE A DAY, Disp-16 g, R-0, Normal     "  lisinopril (PRINIVIL) 10 MG Tab Take 1 Tablet by mouth every day., Disp-100 Tablet, R-3, Normal      isosorbide mononitrate SR (IMDUR) 60 MG TABLET SR 24 HR Take 1 Tablet by mouth every evening., Disp-90 Tablet, R-3, Normal      aspirin 81 MG EC tablet Chew 81 mg every day., Historical Med      CRANBERRY PO Take 2 Tablets by mouth every day., Historical Med      Multiple Vitamins-Minerals (OCUVITE-LUTEIN) Tab Take 1 Tablet by mouth every day., Historical Med      Saline (OCEAN NASAL SPRAY NA) Administer 1 Spray into affected nostril(S) every day., Historical Med      montelukast (SINGULAIR) 10 MG Tab TAKE ONE TABLET BY MOUTH IN THE EVENING, Disp-90 Tablet, R-3, Normal      atorvastatin (LIPITOR) 80 MG tablet Take 1 Tablet by mouth every evening., Disp-100 Tablet, R-3, Normal      loratadine (CLARITIN) 10 MG Tab Take 1 Tablet by mouth every day., Historical Med      Acetaminophen 500 MG Cap Take 1 Capsule by mouth 2 times a day. Morning & Afternoon, sometimes takes 3 times a day, Historical Med      Melatonin 10 MG Tab Take 15 mg by mouth at bedtime., Historical Med      guaifenesin LA (MUCINEX) 600 MG TABLET SR 12 HR Take 600 mg by mouth every morning., Historical Med      Ascorbic Acid (VITAMIN C) 1000 MG Tab Take 1 Tablet by mouth every morning., Historical Med      VITAMIN E PO Take 1 Cap by mouth every morning., Historical Med      Cholecalciferol (VITAMIN D) 50 MCG (2000 UT) Cap Take 2,000 Units by mouth every morning., Historical Med      omeprazole (PRILOSEC) 20 MG delayed-release capsule Take 1 Capsule by mouth every morning with breakfast., Historical Med           ALLERGIES  Bactrim [sulfamethoxazole w-trimethoprim], Pcn [penicillins], Codeine, and Tramadol    PHYSICAL EXAM  BP (!) 187/83   Pulse 68   Temp 36.4 °C (97.6 °F) (Temporal)   Resp 16   Ht 1.575 m (5' 2\")   Wt 53.2 kg (117 lb 4.6 oz)   LMP  (LMP Unknown)   SpO2 95%   BMI 21.45 kg/m²     General: No acute distress.  HENT: Normocephalic, " Mucus membranes are moist.   Chest: Lungs have even and unlabored respirations, Clear to auscultation.   Cardiovascular: Regular rate and regular rhythm, No peripheral cyanosis.  Abdomen: Non distended.  Rectal exam: Brown stools. Hemoccult negative.   Neuro: Awake, Conversive, Able to relay recent events.  Psychiatric: Calm and cooperative.     EXTERNAL RECORDS REVIEWED  Review of patient's past medical records show patient was seen at urgent care today. No evaluation was done and patient was sent to ED .     INITIAL ASSESSMENT  Patient presents with lightheadedness sensation and is worse with standing. Concerns for hypovolemia, anemia or electrolyte imbalance will be considered. Patient reports black stools. GI bleed will be in consideration. Will give IV fluids and evaluate with lab.     ED Observation Status? Yes; I am placing the patient in to an observation status due to a diagnostic uncertainty as well as therapeutic intensity. Patient placed in observation status at 1:58 PM, 10/16/2023.     Observation plan is as follows: Will give IV fluids and evaluate with labs    Upon Reevaluation, the patient's condition has: Improved; and will be discharged.    Patient discharged from ED Observation status at 6:48 PM (Time) 10/16/2023 (Date).     DIAGNOSTIC STUDIES  Labs:   Results for orders placed or performed during the hospital encounter of 10/16/23   COD (ADULT)   Result Value Ref Range    ABO Grouping Only O     Rh Grouping Only POS     Antibody Screen-Cod NEG    CBC WITH DIFFERENTIAL   Result Value Ref Range    WBC 6.6 4.8 - 10.8 K/uL    RBC 4.28 4.20 - 5.40 M/uL    Hemoglobin 13.9 12.0 - 16.0 g/dL    Hematocrit 42.8 37.0 - 47.0 %    .0 (H) 81.4 - 97.8 fL    MCH 32.5 27.0 - 33.0 pg    MCHC 32.5 32.2 - 35.5 g/dL    RDW 46.2 35.9 - 50.0 fL    Platelet Count 186 164 - 446 K/uL    MPV 10.1 9.0 - 12.9 fL    Neutrophils-Polys 55.90 44.00 - 72.00 %    Lymphocytes 29.60 22.00 - 41.00 %    Monocytes 10.10 0.00 -  13.40 %    Eosinophils 3.10 0.00 - 6.90 %    Basophils 1.10 0.00 - 1.80 %    Immature Granulocytes 0.20 0.00 - 0.90 %    Nucleated RBC 0.00 0.00 - 0.20 /100 WBC    Neutrophils (Absolute) 3.67 1.82 - 7.42 K/uL    Lymphs (Absolute) 1.94 1.00 - 4.80 K/uL    Monos (Absolute) 0.66 0.00 - 0.85 K/uL    Eos (Absolute) 0.20 0.00 - 0.51 K/uL    Baso (Absolute) 0.07 0.00 - 0.12 K/uL    Immature Granulocytes (abs) 0.01 0.00 - 0.11 K/uL    NRBC (Absolute) 0.00 K/uL   COMP METABOLIC PANEL   Result Value Ref Range    Sodium 141 135 - 145 mmol/L    Potassium 4.1 3.6 - 5.5 mmol/L    Chloride 107 96 - 112 mmol/L    Co2 23 20 - 33 mmol/L    Anion Gap 11.0 7.0 - 16.0    Glucose 123 (H) 65 - 99 mg/dL    Bun 11 8 - 22 mg/dL    Creatinine 0.53 0.50 - 1.40 mg/dL    Calcium 10.3 8.5 - 10.5 mg/dL    Correct Calcium 10.1 8.5 - 10.5 mg/dL    AST(SGOT) 28 12 - 45 U/L    ALT(SGPT) 23 2 - 50 U/L    Alkaline Phosphatase 63 30 - 99 U/L    Total Bilirubin 0.3 0.1 - 1.5 mg/dL    Albumin 4.3 3.2 - 4.9 g/dL    Total Protein 7.4 6.0 - 8.2 g/dL    Globulin 3.1 1.9 - 3.5 g/dL    A-G Ratio 1.4 g/dL   LIPASE   Result Value Ref Range    Lipase 29 11 - 82 U/L   PROTHROMBIN TIME   Result Value Ref Range    PT 12.5 12.0 - 14.6 sec    INR 0.92 0.87 - 1.13   APTT   Result Value Ref Range    APTT 31.8 24.7 - 36.0 sec   ABO Rh Confirm   Result Value Ref Range    ABO Rh Confirm O POS    ESTIMATED GFR   Result Value Ref Range    GFR (CKD-EPI) 85 >60 mL/min/1.73 m 2   TROPONIN   Result Value Ref Range    Troponin T 23 (H) 6 - 19 ng/L   URINALYSIS,CULTURE IF INDICATED    Specimen: Urine, Cath   Result Value Ref Range    Color Yellow     Character Clear     Specific Gravity 1.008 <1.035    Ph 7.5 5.0 - 8.0    Glucose Negative Negative mg/dL    Ketones Negative Negative mg/dL    Protein Negative Negative mg/dL    Bilirubin Negative Negative    Urobilinogen, Urine 0.2 Negative    Nitrite Negative Negative    Leukocyte Esterase Negative Negative    Occult Blood Negative  Negative    Micro Urine Req see below    EKG   Result Value Ref Range    Report       [facility] Emergency Dept.    Test Date:  2023-10-16  Pt Name:    LAVERN OAKLEY           Department: ER  MRN:        [id number]                      Room:  Gender:     Female                       Technician: [id number]  :        [id number]                   Requested By:ER TRIAGE PROTOCOL  Order #:    [id number]                    Reading MD:    Measurements  Intervals                                Axis  Rate:       67                           P:          31  LA:         221                          QRS:        -44  QRSD:       87                           T:          -6  QT:         413  QTc:        436    Interpretive Statements  Sinus rhythm  Prolonged LA interval  Left atrial enlargement  Inferior infarct, old  Anterior infarct, old  Artifact in lead(s) I,II,III,aVR,aVL,aVF  Compared to ECG 2023 11:11:58  Atrial abnormality now present  Myocardial infarct finding still present       EKG:   I have independently interpreted the above EKG.    Radiology:   The attending emergency physician has independently interpreted the diagnostic imaging associated with this visit and am waiting the final reading from the radiologist.   Preliminary interpretation is as follows: No acute disease  Radiologist interpretation:   DX-CHEST-PORTABLE (1 VIEW)   Final Result      Cardiomegaly.      No definite acute abnormality.        COURSE & MEDICAL DECISION MAKING     COURSE AND PLAN  1:46 PM - Patient was seen and evaluated at bedside. Patient presents to the ED for dizziness and black stools. On rectal exam patient had brown stools with negative hemoccult. After my exam, I discussed with the patient the plan of care, which includes treating the patient with medication for their symptoms, as well as obtaining lab work and imaging for further evaluation. Patient understands and verbalizes agreement to plan of care.  Patient will be treated with NS infusion 1,000 mL. Ordered DX chest, EKG, COD, CBC w/ diff, CMP, lipase, prothrombin and APTT to evaluate.     3:52 PM - Patient was reevaluated at bedside. Patient's lightheadedness remains. Her blood pressure has been elevated. She took Lisinopril and Carvedilol this morning and states she is due for Carvedilol. Will treat her blood pressure with Carvedilol. Evaluation shows no specific cause for lightheadedness. Will treat blood pressure and discharge home. Patient reports she lives at a [facility] facility and will follow up care there.     6:47 PM - Patient was reevaluated at bedside. Patient feels improved at this time. Discussed blood pressure is trending down and informed them plan to discharge. I informed the patient of my plan for discharge, which includes strict return precautions for any new or worsening symptoms. Patient understands and verbalizes agreement to plan of care. Patient is comfortable going home at this time.     ED Summary: Patient presented with a lightheadedness sensation.  It is not worse with movement or position.  Is not spinning or vertigo dizziness.  She had no chest pain no shortness of breath.    Troponin is 23, this is not significant for length of time she had her symptoms I do not believe she has had a cardiac event.  Her blood pressure was elevated here.  She was medicated with IV Vasotec x2, her blood pressure is trending down.  She has not had any near syncope episodes is just a vague lightheadedness sensation.  There is no signs of stroke or weakness.  The patient is stable for discharge home.    HYDRATION: Based on the patient's presentation of GI Bleed / Upset the patient was given IV fluids. IV Hydration was used because oral hydration was not adequate alone. Upon recheck following hydration, the patient was improved.  HTN/IDDM FOLLOW UP:  The patient has known hypertension and is being followed by their primary care doctor    DISPOSITION  AND DISCUSSIONS  Patient will be discharged home.    FOLLOW UP:  Monique Borjas P.A.-C.  [address]  [place] [address] [phone]    In 1 week    FINAL DIAGNOSIS  1. Dizziness    2. Lightheadedness    3. Primary hypertension      Shyanne PETTIT (Scribe), am scribing for, and in the presence of, Jamal Cruz D.O..    Electronically signed by: Shyanne Barraza (Scribe), 10/16/2023    IJamal D.O. personally performed the services described in this documentation, as scribed by Shyanne Barraza in my presence, and it is both accurate and complete.     The note accurately reflects work and decisions made by me.  Jmaal Cruz D.O.  10/16/2023  7:48 PM   Normal rate, regular rhythm, normal S1, S2 heart sounds heard.

## 2023-10-16 NOTE — ED TRIAGE NOTES
"Chief Complaint   Patient presents with    Dizziness     Patient reports last night at 1600 she developed dizziness. Patient reports she was making a meal for herself and felt dizzy. Patient was seen at urgent care and told to come to ER. Patient reports it \"feels like I'm walking on air light headed.\"     Lower GI Bleed     Patient reports for the last month she has had \"dark tarry stools.\"        "

## 2023-10-17 NOTE — DISCHARGE INSTRUCTIONS
Your blood pressure is trending down.  You are given extra doses of ACE inhibitor tonight.    Do not take any blood pressure medications this evening.  Tomorrow morning start taking blood pressure medications as previous.  Please monitor your blood pressure daily to have a record to show to your primary doctor for any need for a medication adjustment.

## 2023-10-20 PROBLEM — F13.20 SEDATIVE, HYPNOTIC OR ANXIOLYTIC DEPENDENCE, UNCOMPLICATED (HCC): Status: ACTIVE | Noted: 2023-10-20

## 2023-10-24 ENCOUNTER — OFFICE VISIT (OUTPATIENT)
Dept: MEDICAL GROUP | Facility: PHYSICIAN GROUP | Age: 88
End: 2023-10-24
Payer: MEDICARE

## 2023-10-24 VITALS
SYSTOLIC BLOOD PRESSURE: 146 MMHG | DIASTOLIC BLOOD PRESSURE: 80 MMHG | TEMPERATURE: 98.1 F | WEIGHT: 120.6 LBS | HEIGHT: 62 IN | OXYGEN SATURATION: 95 % | HEART RATE: 72 BPM | BODY MASS INDEX: 22.19 KG/M2

## 2023-10-24 DIAGNOSIS — R42 DIZZINESS: ICD-10-CM

## 2023-10-24 DIAGNOSIS — K21.9 GASTROESOPHAGEAL REFLUX DISEASE, UNSPECIFIED WHETHER ESOPHAGITIS PRESENT: Chronic | ICD-10-CM

## 2023-10-24 DIAGNOSIS — I10 ESSENTIAL HYPERTENSION: Chronic | ICD-10-CM

## 2023-10-24 PROCEDURE — 3077F SYST BP >= 140 MM HG: CPT | Performed by: STUDENT IN AN ORGANIZED HEALTH CARE EDUCATION/TRAINING PROGRAM

## 2023-10-24 PROCEDURE — 3079F DIAST BP 80-89 MM HG: CPT | Performed by: STUDENT IN AN ORGANIZED HEALTH CARE EDUCATION/TRAINING PROGRAM

## 2023-10-24 PROCEDURE — 99214 OFFICE O/P EST MOD 30 MIN: CPT | Performed by: STUDENT IN AN ORGANIZED HEALTH CARE EDUCATION/TRAINING PROGRAM

## 2023-10-24 RX ORDER — LISINOPRIL 20 MG/1
20 TABLET ORAL DAILY
Qty: 100 TABLET | Refills: 0 | Status: SHIPPED | OUTPATIENT
Start: 2023-10-24 | End: 2023-10-24

## 2023-10-24 RX ORDER — LISINOPRIL 20 MG/1
20 TABLET ORAL DAILY
Qty: 100 TABLET | Refills: 0 | Status: SHIPPED | OUTPATIENT
Start: 2023-10-24 | End: 2024-02-20 | Stop reason: SDUPTHER

## 2023-10-24 ASSESSMENT — FIBROSIS 4 INDEX: FIB4 SCORE: 2.95

## 2023-10-24 NOTE — PROGRESS NOTES
"Subjective:     CC:   Chief Complaint   Patient presents with    Gastrophageal Reflux    Follow-Up       HPI:   Dayana presents today to discuss the following concerns:    Patient is here for ER follow-up.  She presented to the ER on 10/16/2023 with new onset dizziness and the sensation that she is off balance.  She had unremarkable lab work-up including CBC, CMP, lipase, PT/APTT, and urinalysis.  She did have an elevated troponin of 23.  An EKG did not show any acute ischemic changes.  She was complaining of dark stools, stool was guaiac negative in the ER.  She had a negative chest x-ray.  She was discharged from the ER when she felt a little bit better.  She states that her symptoms have persisted but waxed and waned.  She still feels as though she is a little off balance and describes mild dizziness.  She denies any nausea or vomiting.  Denies any chest pain or shortness of breath.  Does not have any headache, visual changes, speech or swallowing difficulties, or any numbness, tingling, weakness of her extremities.    She is also here to follow-up on her blood pressure.  Blood pressure has been somewhat elevated with systolic in the 140s typically.  Current medications include lisinopril 10 mg daily and Coreg 3.125 mg twice daily.    Additionally, she would like to talk about her acid reflux today.  She describes burning sensation up into her chest and throat intermittently.  She takes omeprazole 20 mg daily for this and Tums as needed, but this has not provided adequate control of her symptoms.      Past medical, family, and social history reviewed by me in Epic chart today.   Medications and allergies reviewed in Epic chart by me today.       Health Maintenance: Completed    ROS:  See HPI    Objective:     Exam:  BP (!) 146/80 (BP Location: Left arm, Patient Position: Sitting, BP Cuff Size: Adult)   Pulse 72   Temp 36.7 °C (98.1 °F) (Temporal)   Ht 1.575 m (5' 2\")   Wt 54.7 kg (120 lb 9.6 oz)   LMP  " (LMP Unknown)   SpO2 95%   BMI 22.06 kg/m²  Body mass index is 22.06 kg/m².    Physical Exam  Vitals reviewed.   Constitutional:       General: She is not in acute distress.  Eyes:      Extraocular Movements: Extraocular movements intact.   Cardiovascular:      Rate and Rhythm: Normal rate and regular rhythm.      Heart sounds: No murmur heard.     No friction rub. No gallop.   Pulmonary:      Effort: Pulmonary effort is normal.      Breath sounds: No wheezing, rhonchi or rales.   Musculoskeletal:      Cervical back: Neck supple.   Skin:     General: Skin is warm and dry.   Neurological:      General: No focal deficit present.      Mental Status: She is alert and oriented to person, place, and time.      Cranial Nerves: No cranial nerve deficit.      Gait: Gait normal.   Psychiatric:         Mood and Affect: Mood normal.         Labs: Lab results from recent ER visit were reviewed as documented in the HPI.    Assessment & Plan:     89 y.o. female with the following -     1. Dizziness  This is an acute problem.  Patient went to the ER last week with dizziness and disequilibrium.  She has generally felt well since leaving the ER but states that the symptoms have persisted.  They have improved slightly but have not completely resolved.  She does not have any other concerning neurologic symptoms.  Possibly symptoms are attributable to a benign cause of vertigo.  I did consider the possibility of a central process, she did not have any head imaging when she went to the ER.  However, her symptoms have been stable or improving, results of head imaging at this time are unlikely to change her management.  She will closely monitor her symptoms and has ER precautions for worsening symptoms.  I have recommended that she walk with a walker or cane until her balance improves.  We will follow-up in 1 month to reassess.    2. Essential hypertension  Chronic, uncontrolled.  Increase lisinopril from 10 mg daily to 20 mg daily.   Continue carvedilol at current dosing.  - lisinopril (PRINIVIL) 20 MG Tab; Take 1 Tablet by mouth every day.  Dispense: 100 Tablet; Refill: 0    3. Gastroesophageal reflux disease, unspecified whether esophagitis present  Chronic, symptoms are not currently well controlled.  Increase omeprazole dosing to 20 mg twice daily.  Discussed dietary modifications.  Continue Tums as needed for acute symptoms.        Return in about 4 weeks (around 11/21/2023) for blood pressure, GERD, dizziness.    Please note that this dictation was created using voice recognition software. I have made every reasonable attempt to correct obvious errors, but I expect that there are errors of grammar and possibly content that I did not discover before finalizing the note.

## 2023-10-30 ENCOUNTER — TELEPHONE (OUTPATIENT)
Dept: PHYSICAL THERAPY | Facility: REHABILITATION | Age: 88
End: 2023-10-30
Payer: MEDICARE

## 2023-10-30 ENCOUNTER — OFFICE VISIT (OUTPATIENT)
Dept: BEHAVIORAL HEALTH | Facility: CLINIC | Age: 88
End: 2023-10-30
Payer: MEDICARE

## 2023-10-30 DIAGNOSIS — G47.09 OTHER INSOMNIA: ICD-10-CM

## 2023-10-30 DIAGNOSIS — Z79.899 CHRONIC USE OF BENZODIAZEPINE FOR THERAPEUTIC PURPOSE: ICD-10-CM

## 2023-10-30 PROCEDURE — 99214 OFFICE O/P EST MOD 30 MIN: CPT | Mod: GC | Performed by: PSYCHIATRY & NEUROLOGY

## 2023-10-30 RX ORDER — LORAZEPAM 0.5 MG/1
0.25 TABLET ORAL NIGHTLY
Qty: 15 TABLET | Refills: 0 | Status: SHIPPED | OUTPATIENT
Start: 2023-10-30 | End: 2023-11-29

## 2023-10-30 RX ORDER — LORAZEPAM 0.5 MG/1
0.25 TABLET ORAL NIGHTLY
Qty: 15 TABLET | Refills: 0 | Status: SHIPPED | OUTPATIENT
Start: 2023-11-29 | End: 2023-12-29

## 2023-10-30 RX ORDER — LORAZEPAM 0.5 MG/1
0.25 TABLET ORAL NIGHTLY
Qty: 15 TABLET | Refills: 0 | Status: SHIPPED | OUTPATIENT
Start: 2023-12-29 | End: 2024-01-28

## 2023-10-30 NOTE — OP THERAPY DISCHARGE SUMMARY
Outpatient Physical Therapy  DISCHARGE SUMMARY NOTE      [facility] [address], [address]  [place] [address]  Phone:  [phone]    Date of Visit: 10/30/2023    Patient: Dayana Lechuga  YOB: 1929  MRN: [id number]     Referring Provider: Justin Haley M.D.   Referring Diagnosis Imbalance +1 more         Functional Assessment Used        Your patient is being discharged from Physical Therapy with the following comments:   Patient has failed to schedule or reschedule follow-up visits    Comments:       Limitations Remaining:  Please see daily treatment notes    Recommendations:  Discharge from PT    Adrienne Gomez PT, MSPT    Date: 10/30/2023

## 2023-10-30 NOTE — PROGRESS NOTES
PSYCHIATRY FOLLOW-UP NOTE    Name: Dayana Lechuga  MRN: [id number]  : 1929  Age: 89 y.o.  Date of assessment: 10/30/2023  PCP: Monique Borjas P.A.-C.  Persons in attendance: Patient and Son    REASON FOR VISIT/CHIEF COMPLAINT (as stated by Patient):  Dayana Lechuga is a 89 y.o., White female, attending follow-up appointment for management of chronic benzodiazepine use.    HISTORY OF PRESENT ILLNESS:  Dayana Lechuga is a 89 y.o. 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 Patient was last seen 3 months ago, and following treatment planning recommendations were done:  - Continue lorazepam 0.5mg nightly for sleep and benzodiazepine dependence. Continue attempting to use 0.25mg with the additional 0.25mg available if the first dose is ineffective.  - Continue melatonin 10mg nightly for sleep.    Since last visit, patient reports having been to the ED for lightheadedness/dizziness.  This first occurred approximately 2 weeks ago and patient reports no further instances after meeting with her PCP, who adjusted her blood pressure and GERD medications.  Outside of this issue, patient describes generally feeling well and functioning well.  She and her son both note some mildly decreased balance with ambulation that patient has adjusted to by utilizing her walker more frequently.  Continues to deny falls or functional deficits related to balance/ambulatory issues.  No overt changes to cognition or memory.  Continues to stick to her long-term routines, which includes walking her dog every morning and staying socially active in her living community.  Continues to be mindful of diet, including fluid intake.  Has been working on greater mindfulness of fluid intake since issues with lightheadedness.    Patient has been able to successfully reduce lorazepam to 0.25 mg nightly without significant changes to sleep or  anxiety.  No clear difficulty with this decrease.  She is interested in continuing to decrease this medication but she and her son both note comfort in just having it available.  Patient describes some difficulty with cutting the 0.5 mg tablets in half and is unsure about cutting them in half again to reach a dose of 0.125 mg.  Discussed this is an option and also discussed attempting to forego the use of lorazepam entirely on nights she feels particularly tired and/or relaxed.  Patient reiterates that this medication does not typically make her feel sleepy and also states that she no longer experiences the nighttime anxiety that prompted its initiation nearly 20 years ago.    CURRENT MEDICATIONS:  Current Outpatient Medications   Medication Sig Dispense Refill    lisinopril (PRINIVIL) 20 MG Tab Take 1 Tablet by mouth every day. 100 Tablet 0    albuterol 108 (90 Base) MCG/ACT Aero Soln inhalation aerosol as needed.      carvedilol (COREG) 3.125 MG Tab 2 times a day.      gabapentin (NEURONTIN) 400 MG Cap 3 times a day.      fluticasone (FLONASE) 50 MCG/ACT nasal spray USE 1 SPRAY IN EACH NOSTRIL ONCE A DAY 16 g 0    isosorbide mononitrate SR (IMDUR) 60 MG TABLET SR 24 HR Take 1 Tablet by mouth every evening. 90 Tablet 3    aspirin 81 MG EC tablet Chew 81 mg every day.      CRANBERRY PO Take 2 Tablets by mouth every day.      Multiple Vitamins-Minerals (OCUVITE-LUTEIN) Tab Take 1 Tablet by mouth every day.      Saline (OCEAN NASAL SPRAY NA) Administer 1 Spray into affected nostril(S) every day.      montelukast (SINGULAIR) 10 MG Tab TAKE ONE TABLET BY MOUTH IN THE EVENING (Patient taking differently: Take 10 mg by mouth every evening.) 90 Tablet 3    atorvastatin (LIPITOR) 80 MG tablet Take 1 Tablet by mouth every evening. 100 Tablet 3    loratadine (CLARITIN) 10 MG Tab Take 1 Tablet by mouth every day.      Acetaminophen 500 MG Cap Take 1 Capsule by mouth 2 times a day. Morning & Afternoon, sometimes takes 3 times a  day      Melatonin 10 MG Tab Take 15 mg by mouth at bedtime.      guaifenesin LA (MUCINEX) 600 MG TABLET SR 12 HR Take 600 mg by mouth every morning.      Ascorbic Acid (VITAMIN C) 1000 MG Tab Take 1 Tablet by mouth every morning.      VITAMIN E PO Take 1 Capsule by mouth every morning.      Cholecalciferol (VITAMIN D) 50 MCG (2000 UT) Cap Take 2,000 Units by mouth every morning.      omeprazole (PRILOSEC) 20 MG delayed-release capsule Take 20 mg by mouth 2 (two) times a day.       No current facility-administered medications for this visit.     MEDICAL HISTORY  Past Medical History:   Diagnosis Date    Allergy     Anxiety     ASTHMA     Bronchitis     CAD (coronary artery disease)     JT to RCA; 70% stenosis in LAD    Chills     Constipation     Difficulty breathing     GERD (gastroesophageal reflux disease)     Heart attack ([facility])     Heartburn     Hyperlipidemia     Hypertension     Influenza     Insomnia     Lumbar back pain 9/10/2016    Mild peripheral edema 7/31/2020    Mumps     OSTEOPOROSIS     Palpitations     Peripheral vascular disease, unspecified ([facility]) 9/14/2021    Pneumonia     Post concussion syndrome 9/11/2020    PVD (peripheral vascular disease) ([facility])     70% PAD-followed by [facility]    Sweat, sweating, excessive     Tonsillitis      Past Surgical History:   Procedure Laterality Date    WI INJ LUMBAR/SACRAL,W/ IMAGING N/A 4/20/2023    Procedure: Caudal epidural with catheter;  Surgeon: Torres Fall M.D.;  [facility];  Service: Pain Management    ABDOMINAL HYSTERECTOMY TOTAL      APPENDECTOMY      HERNIA REPAIR      HYSTERECTOMY LAPAROSCOPY      TONSILLECTOMY      [facility] CARDIAC CATH       PAST PSYCHIATRIC HISTORY  Prior psychiatric hospitalization: Denies.  Prior Self harm/suicide attempt: Denies/denies.  Prior Diagnosis: Anxiety, insomnia    PAST MEDICATION TRIALS:  Lorazepam for over 15 years.  Previously taking 2 mg daily.    FAMILY HISTORY  Psychiatric diagnosis:  "Denies.  History of suicide attempts: Denies.  Substance abuse history: Denies.    SUBSTANCE USE HISTORY:  ALCOHOL: Denies.  TOBACCO: Denies.  CANNABIS: Denies.  OPIOIDS: Denies.  PRESCRIPTION MEDICATIONS: Denies misuse.  OTHERS: Denies.  History of inpatient/outpatient rehab treatment: N/A    SOCIAL HISTORY  Childhood: born in [place] and describes childhood as \"good\".  Also spent time in [place].  Primarily lived in [place] during her adult life--[place] until 2004 when she moved to [place], [place].  Employment: Worked as a  and  throughout most of her life.  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  Relationship: Single  Kids: 1 son (Jl) living in [place]. 1 daughter living in [place].  Son helps patient attend doctors visits.  Has 1 grandchild and 2 great-grandchildren in [place].  Current living situation: Lives in a senior home center.  Primarily is independent but has support available if needed.  Lives with a dog named Deshawn who she walks every morning.  Current/past legal issues: Denies.    REVIEW OF SYSTEMS:        Constitutional negative   Eyes positive -hordeolum on Lt lower eyelid   Ears/Nose/Mouth/Throat negative   Cardiovascular negative   Respiratory negative   Gastrointestinal negative   Genitourinary positive -history of recurrent UTIs   Muscular positive -history of back pain   Integumentary negative   Neurological positive -history of sciatica   Endocrine negative   Hematologic/Lymphatic negative     PHYSICAL EXAMINAION:  Vital signs: LMP  (LMP Unknown)   Musculoskeletal: Normal gait.   Abnormal movements: None noted.    MENTAL STATUS EXAMINATION    General:   - Grooming and hygiene: Casual and Neat,   - Apparent distress: None noted.,   - Behavior: Calm and pleasant.  - Eye Contact:  Good,   - no psychomotor agitation or retardation    - Participation: Active verbal participation, Attentive, and " "Engaged  Orientation: Alert and Fully Oriented to person, place and time  Mood:  \"good\"  Affect: Full range, Congruent with content, Bright, Happy, and nonirritable and nonlabile. ,  Thought Process: Logical, Goal-directed, and linear  Thought Content: Denies suicidal or homicidal ideations, intent or plan Within normal limits  Perception: Denies auditory or visual hallucinations. No delusions noted Within normal limits  Attention span and concentration: Intact   Speech:Rate within normal limits and Volume within normal limits  Language: Appropriate   Insight: Good  Judgment: Good  Recent and remote memory: No gross evidence of memory deficits    DEPRESSION SCREENIN/16/2023    11:00 AM 2023    10:00 AM 2023     1:00 PM   Depression Screen (PHQ-2/PHQ-9)   PHQ-2 Total Score 4 0 0   PHQ-9 Total Score 6     Interpretation of PHQ-9 Total Score   Score Severity   1-4 No Depression   5-9 Mild Depression   10-14 Moderate Depression   15-19 Moderately Severe Depression   20-27 Severe Depression    CURRENT RISK:       Suicidal: Low       Homicidal: Low       Self-Harm: Low       Relapse: Not applicable       Crisis Safety Plan Reviewed Not Indicated       If evidence of imminent risk is present, intervention/plan: N/A    MEDICAL RECORDS/LABS/DIAGNOSTIC TESTS REVIEWED:  Labs drawn 10/16/23 show eGFR of 85 with CBC, CMP, UA, PT/INR WNL. Troponin elevated slightly at 23 (patient was in the ED for light headedness).    [place]  records -   Reviewed; no concerns. Lorazepam 0.5mg #30 tabs last filled 23.    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  Patient was initially prescribed lorazepam nearly 20 years ago for nighttime anxiety regarding individual safety.  Over time, she has been able to reduce the dose of lorazepam from 2 mg nightly to 0.25 mg nightly.  Historically, patient has had difficulties decreasing the dose when " dealing with acute medical issues that have most often been unrelated.  Fortunately, patient's most recent struggles with lightheadedness did not deter her from continuing to decrease the dose.  She is no longer feeling lightheaded or dizzy and does not describe any overt or acutely concerning side effects of lorazepam.  There are some ongoing concern for balance issues that may reach the point of impairing function, but patient has been able to adapt by increasing the use of her walker.  We will continue lorazepam 0.25 mg nightly.  Discussed attempting to cut a tablet into quarters to try a dose of approximately 0.125 mg but this may be difficult.  Also discussed the option of forgoing the use of lorazepam on nights she feels particularly tired and/or relaxed.  Of note, patient and patient's son have expressed concern about no longer having access to this medication.  Provided some reassurance that we will not be discontinuing it entirely unless patient feels ready for this or if patient's functional/medical status necessitates discontinuation.     DIAGNOSES & PLAN:  Chronic use of benzodiazepine for therapeutic purpose  Continue lorazepam 0.25 mg nightly for physiological dependence resulting in insomnia without the medication.  Continue to monitor for changes to cognition and stability with ambulation as well as other side effects of benzodiazepine use.  Other insomnia  Continue lorazepam and monitor for side effects as above.    Medication options, alternatives (including no medications) and medication risks/benefits/side effects were discussed in detail.  The patient was advised to call, message provider on [facility], or come in to the clinic if symptoms worsen or if any future questions/issues regarding their medications arise; the patient verbalized understanding and agreement.  The patient was educated to call 911, call the suicide hotline, or go to local ER if having thoughts of suicide or homicide;  verbalized understanding.    Billing Coding based on:  58130 based on [facility]  26297: based on psychotherapy timing  09954: based on total time spent of 40 min in evaluation, charting and file review.     Return to clinic in 3 months or sooner if symptoms worsen.  Next Appointment: instruction provided on how to make the next appointment.     The proposed treatment plan was discussed with the patient who was provided the opportunity to ask questions and make suggestions regarding alternative treatment. Patient verbalized understanding and expressed agreement with the plan.     Chela Smith M.D.  10/30/23    This note was created using voice recognition software (Dragon). The accuracy of the dictation is limited by the abilities of the software. I have reviewed the note prior to signing, however some errors in grammar and context are still possible. If you have any questions related to this note please do not hesitate to contact our office.

## 2023-10-31 RX ORDER — MONTELUKAST SODIUM 10 MG/1
10 TABLET ORAL EVERY EVENING
Qty: 90 TABLET | Refills: 1 | Status: SHIPPED | OUTPATIENT
Start: 2023-10-31 | End: 2023-11-16 | Stop reason: SDUPTHER

## 2023-10-31 NOTE — TELEPHONE ENCOUNTER
Received request via: Pharmacy    Was the patient seen in the last year in this department? Yes    Does the patient have an active prescription (recently filled or refills available) for medication(s) requested? No    Does the patient have assisted Plus and need 100 day supply (blood pressure, diabetes and cholesterol meds only)? Pt. Has [facility] care plus.

## 2023-11-02 RX ORDER — FLUTICASONE PROPIONATE 50 MCG
SPRAY, SUSPENSION (ML) NASAL
Qty: 16 G | Refills: 0 | Status: SHIPPED | OUTPATIENT
Start: 2023-11-02 | End: 2023-11-16 | Stop reason: SDUPTHER

## 2023-11-03 DIAGNOSIS — I70.201 POPLITEAL ARTERY STENOSIS, RIGHT (HCC): ICD-10-CM

## 2023-11-03 DIAGNOSIS — F07.81 POST CONCUSSION SYNDROME: ICD-10-CM

## 2023-11-03 DIAGNOSIS — Z98.42 S/P BILATERAL CATARACT EXTRACTION: ICD-10-CM

## 2023-11-03 DIAGNOSIS — Z98.41 S/P BILATERAL CATARACT EXTRACTION: ICD-10-CM

## 2023-11-03 DIAGNOSIS — I73.9 PVD (PERIPHERAL VASCULAR DISEASE) (HCC): ICD-10-CM

## 2023-11-03 RX ORDER — ATORVASTATIN CALCIUM 80 MG/1
80 TABLET, FILM COATED ORAL EVERY EVENING
Qty: 100 TABLET | Refills: 3 | Status: SHIPPED | OUTPATIENT
Start: 2023-11-03 | End: 2023-11-06 | Stop reason: SDUPTHER

## 2023-11-03 NOTE — TELEPHONE ENCOUNTER
TW    Received request via: Pharmacy    Was the patient seen in the last year in this department? Yes 8.15.2023    Does the patient have an active prescription (recently filled or refills available) for medication(s) requested? No.    Does the patient have [facility] and need 100 day supply (blood pressure, diabetes and cholesterol meds only)? Patient does not have SCP    Thank you,     Tonya OSBORN

## 2023-11-06 ENCOUNTER — TELEPHONE (OUTPATIENT)
Dept: CARDIOLOGY | Facility: MEDICAL CENTER | Age: 88
End: 2023-11-06
Payer: MEDICARE

## 2023-11-06 DIAGNOSIS — Z98.41 S/P BILATERAL CATARACT EXTRACTION: ICD-10-CM

## 2023-11-06 DIAGNOSIS — I73.9 PVD (PERIPHERAL VASCULAR DISEASE) (HCC): ICD-10-CM

## 2023-11-06 DIAGNOSIS — I70.201 POPLITEAL ARTERY STENOSIS, RIGHT (HCC): ICD-10-CM

## 2023-11-06 DIAGNOSIS — Z98.42 S/P BILATERAL CATARACT EXTRACTION: ICD-10-CM

## 2023-11-06 DIAGNOSIS — F07.81 POST CONCUSSION SYNDROME: ICD-10-CM

## 2023-11-06 RX ORDER — ATORVASTATIN CALCIUM 80 MG/1
80 TABLET, FILM COATED ORAL EVERY EVENING
Qty: 100 TABLET | Refills: 3 | Status: SHIPPED | OUTPATIENT
Start: 2023-11-06

## 2023-11-06 NOTE — TELEPHONE ENCOUNTER
----- Message from [facility] sent at 2023  7:35 AM PST -----  Regarding: FW: After Hours Call    ----- Message -----  From: Charity Medellin  Sent: 2023  11:16 AM PST  To: Frances Haque  Subject: After Hours Call                                 PATIENT NAME: Dayana Lechuga  CALLER NAME: Dayana Lechuga  REASON FOR CALL: Pt believes that her doctors are ordering her medications through the incorrect pharmacies in [place]. Pt states that she receives calls from other pharmacies stating to refill her medications.   PHONE NUMBER: [phone]  CARDIO PROVIDER: Katelyn  : 1929  MRN: [id number]  ADVISED 1-2 BUSINESS DAYS FOR CALL BACK Y/N: Y

## 2023-11-06 NOTE — TELEPHONE ENCOUNTER
Phone Number Called: [phone]    Call outcome: Spoke to patient regarding message below.    Message: Patient does not use [facility] pharmacy and wants them removed from her list of pharmacies. She only wants her medications sent to [facility]. Updated pharmacy and resent atorvastatin.

## 2023-11-07 RX ORDER — FLUTICASONE PROPIONATE 50 MCG
SPRAY, SUSPENSION (ML) NASAL
Qty: 16 G | Refills: 0 | OUTPATIENT
Start: 2023-11-07

## 2023-11-09 RX ORDER — MONTELUKAST SODIUM 10 MG/1
10 TABLET ORAL EVERY EVENING
Qty: 90 TABLET | Refills: 0 | OUTPATIENT
Start: 2023-11-09

## 2023-11-09 RX ORDER — FLUTICASONE PROPIONATE 50 MCG
SPRAY, SUSPENSION (ML) NASAL
Qty: 16 G | Refills: 0 | OUTPATIENT
Start: 2023-11-09

## 2023-11-10 ENCOUNTER — PATIENT OUTREACH (OUTPATIENT)
Dept: HEALTH INFORMATION MANAGEMENT | Facility: OTHER | Age: 88
End: 2023-11-10
Payer: MEDICARE

## 2023-11-10 DIAGNOSIS — R26.89 IMBALANCE: ICD-10-CM

## 2023-11-10 DIAGNOSIS — I10 ESSENTIAL HYPERTENSION: ICD-10-CM

## 2023-11-10 NOTE — PROGRESS NOTES
"I spoke to the patient this morning over the phone in order to complete her monthly and quarterly reviews. The patient states that she is currently at [facility] with her daughter. I told her that I would touch bases further with her when she comes in for her appointment on 11/21/23. She does state a concern that she feels that her medications are routinely being ordered from the wrong pharmacy. She states that these are prescriptions her primary care provider fills. I told her that I would route the chart to her PCP with her concerns and specific instructions that other than her Ativan or any acute use meds like antibiotics all medication refills should be sent to the [facility] mail order pharmacy service to prevent breaks in therapy.     1:19 pm: Called patient at provider's request to let them know that their concerns had been heard and that directions with regard to future refills are very clear and to confirm if there are any medications the patient needs refilled now. No answer. Message left with contact information.       11/21/23    Assessment    I spoke with the patient today while she was here for an appointment with her primary care provider. The patient reports that she \"is fine.\" She denies any recent falls or injuries. She denies any issues with nutrition, hydration, or appetite. Patient denies any dizziness.The patient denies any pressing medical questions or concerns other than that she is still unsure about whether or not the medication issues we discussed earlier this month have been handled and that she has run out of fluticasone. I showed her that I had called on 11/10/23 to update her that her concerns about medication refills had been communicated to her provider and see if there were any medications that she needed refilled straight away. She states that she doesn't always recognize the numbers of people calling her. Medications reviewed and ordered with PCP.     Education    *Article on " environmental safety and organization for seniors sent by mail.     Plan of Care and Goals    *Remain free from falls/injuries  *Healthy nutrition, hydration, activity    Barriers:    *Transportation, food autonomy    Progress:    Progressing    Next outreach:  12/21/23 by phone

## 2023-11-16 DIAGNOSIS — I20.89 STABLE ANGINA PECTORIS (HCC): ICD-10-CM

## 2023-11-16 RX ORDER — FLUTICASONE PROPIONATE 50 MCG
SPRAY, SUSPENSION (ML) NASAL
Qty: 16 G | Refills: 0 | Status: SHIPPED | OUTPATIENT
Start: 2023-11-16 | End: 2023-11-21 | Stop reason: SDUPTHER

## 2023-11-16 RX ORDER — ISOSORBIDE MONONITRATE 60 MG/1
60 TABLET, EXTENDED RELEASE ORAL EVERY EVENING
Qty: 90 TABLET | Refills: 3 | Status: SHIPPED | OUTPATIENT
Start: 2023-11-16 | End: 2023-11-21 | Stop reason: SDUPTHER

## 2023-11-16 RX ORDER — MONTELUKAST SODIUM 10 MG/1
10 TABLET ORAL EVERY EVENING
Qty: 90 TABLET | Refills: 1 | Status: SHIPPED | OUTPATIENT
Start: 2023-11-16 | End: 2023-11-21 | Stop reason: SDUPTHER

## 2023-11-16 NOTE — TELEPHONE ENCOUNTER
Received request via: Patient     Scripts were sent to [facility] instead of [facility]. Can we please fill at [facility] mail order only.

## 2023-11-21 ENCOUNTER — OFFICE VISIT (OUTPATIENT)
Dept: MEDICAL GROUP | Facility: PHYSICIAN GROUP | Age: 88
End: 2023-11-21
Payer: MEDICARE

## 2023-11-21 VITALS
DIASTOLIC BLOOD PRESSURE: 60 MMHG | SYSTOLIC BLOOD PRESSURE: 128 MMHG | HEIGHT: 62 IN | BODY MASS INDEX: 23 KG/M2 | TEMPERATURE: 98 F | OXYGEN SATURATION: 94 % | HEART RATE: 74 BPM | WEIGHT: 125 LBS

## 2023-11-21 DIAGNOSIS — I20.89 STABLE ANGINA PECTORIS (HCC): ICD-10-CM

## 2023-11-21 DIAGNOSIS — K21.9 GASTROESOPHAGEAL REFLUX DISEASE, UNSPECIFIED WHETHER ESOPHAGITIS PRESENT: Chronic | ICD-10-CM

## 2023-11-21 DIAGNOSIS — J30.9 ALLERGIC RHINITIS, UNSPECIFIED SEASONALITY, UNSPECIFIED TRIGGER: ICD-10-CM

## 2023-11-21 DIAGNOSIS — I10 ESSENTIAL HYPERTENSION: Chronic | ICD-10-CM

## 2023-11-21 PROCEDURE — 3074F SYST BP LT 130 MM HG: CPT | Performed by: STUDENT IN AN ORGANIZED HEALTH CARE EDUCATION/TRAINING PROGRAM

## 2023-11-21 PROCEDURE — 3078F DIAST BP <80 MM HG: CPT | Performed by: STUDENT IN AN ORGANIZED HEALTH CARE EDUCATION/TRAINING PROGRAM

## 2023-11-21 PROCEDURE — 99214 OFFICE O/P EST MOD 30 MIN: CPT | Performed by: STUDENT IN AN ORGANIZED HEALTH CARE EDUCATION/TRAINING PROGRAM

## 2023-11-21 RX ORDER — FLUTICASONE PROPIONATE 50 MCG
SPRAY, SUSPENSION (ML) NASAL
Qty: 16 G | Refills: 2 | Status: SHIPPED | OUTPATIENT
Start: 2023-11-21 | End: 2024-02-20 | Stop reason: SDUPTHER

## 2023-11-21 RX ORDER — ISOSORBIDE MONONITRATE 60 MG/1
60 TABLET, EXTENDED RELEASE ORAL EVERY EVENING
Qty: 100 TABLET | Refills: 3 | Status: SHIPPED | OUTPATIENT
Start: 2023-11-21

## 2023-11-21 RX ORDER — OMEPRAZOLE 20 MG/1
20 CAPSULE, DELAYED RELEASE ORAL
Qty: 200 CAPSULE | Refills: 1 | Status: SHIPPED | OUTPATIENT
Start: 2023-11-21 | End: 2024-02-05

## 2023-11-21 RX ORDER — MONTELUKAST SODIUM 10 MG/1
10 TABLET ORAL EVERY EVENING
Qty: 100 TABLET | Refills: 3 | Status: SHIPPED | OUTPATIENT
Start: 2023-11-21

## 2023-11-21 ASSESSMENT — ENCOUNTER SYMPTOMS
SHORTNESS OF BREATH: 0
FEVER: 0
DIZZINESS: 0
PALPITATIONS: 0
BLURRED VISION: 0
CHILLS: 0
HEADACHES: 0

## 2023-11-21 ASSESSMENT — FIBROSIS 4 INDEX: FIB4 SCORE: 2.95

## 2023-11-21 NOTE — PROGRESS NOTES
"Subjective:     CC:   Chief Complaint   Patient presents with    Hypertension Follow-up    Gastrophageal Reflux       HPI:   Dayana presents today with    Problem   Gerd (Gastroesophageal Reflux Disease)    This is a chronic condition.  Increased dose of omeprazole from 20 mg once daily to 20 mg twice daily about a month ago.  Symptoms have largely improved with this.  She still sometimes takes Tums occasionally as needed.     Essential Hypertension    This is a chronic condition.  Current Meds:   - lisinopril 20 mg daily, increased from 10 mg about a month ago  - carvedilol 3.125 mg twice daily  Side effects: none  Associated symptoms: Denies chest pain, shortness of breath, headaches, dizziness/lightheadedness              Past medical, family, and social history reviewed by me in Epic chart today.   Medications and allergies reviewed in Epic chart by me today.       Health Maintenance: Completed    ROS:  Review of Systems   Constitutional:  Negative for chills and fever.   Eyes:  Negative for blurred vision.   Respiratory:  Negative for shortness of breath.    Cardiovascular:  Negative for chest pain and palpitations.   Neurological:  Negative for dizziness and headaches.       Objective:     Exam:  /60 (BP Location: Left arm, Patient Position: Sitting, BP Cuff Size: Small adult)   Pulse 74   Temp 36.7 °C (98 °F) (Temporal)   Ht 1.575 m (5' 2\")   Wt 56.7 kg (125 lb)   LMP  (LMP Unknown)   SpO2 94%   BMI 22.86 kg/m²  Body mass index is 22.86 kg/m².    Physical Exam  Constitutional:       General: She is not in acute distress.  HENT:      Mouth/Throat:      Mouth: Mucous membranes are moist.   Eyes:      Extraocular Movements: Extraocular movements intact.      Conjunctiva/sclera: Conjunctivae normal.   Cardiovascular:      Rate and Rhythm: Normal rate and regular rhythm.      Heart sounds: No murmur heard.     No friction rub. No gallop.   Pulmonary:      Effort: Pulmonary effort is normal.      " Breath sounds: No wheezing, rhonchi or rales.   Musculoskeletal:      Cervical back: Normal range of motion and neck supple.   Lymphadenopathy:      Cervical: No cervical adenopathy.   Skin:     General: Skin is warm and dry.   Neurological:      Mental Status: She is alert.   Psychiatric:         Mood and Affect: Mood normal.           Assessment & Plan:     89 y.o. female with the following -     1. Essential hypertension  Chronic, controlled.  Continue current regimen.    2. Allergic rhinitis, unspecified seasonality, unspecified trigger  - fluticasone (FLONASE) 50 MCG/ACT nasal spray; USE 1 SPRAY IN EACH NOSTRIL ONCE A DAY  Dispense: 16 g; Refill: 2  - montelukast (SINGULAIR) 10 MG Tab; Take 1 Tablet by mouth every evening.  Dispense: 100 Tablet; Refill: 3    3. Stable angina pectoris  - isosorbide mononitrate SR (IMDUR) 60 MG TABLET SR 24 HR; Take 1 Tablet by mouth every evening.  Dispense: 100 Tablet; Refill: 3    4. Gastroesophageal reflux disease, unspecified whether esophagitis present  Chronic, stable.  Continue omeprazole 20 mg twice daily.  Continue dietary modifications and Tums as needed.  - omeprazole (PRILOSEC) 20 MG delayed-release capsule; Take 1 Capsule by mouth 2 (two) times a day.  Dispense: 200 Capsule; Refill: 1            Return in about 3 months (around 2/21/2024) for follow up.    Please note that this dictation was created using voice recognition software. I have made every reasonable attempt to correct obvious errors, but I expect that there are errors of grammar and possibly content that I did not discover before finalizing the note.

## 2023-12-12 ENCOUNTER — PATIENT OUTREACH (OUTPATIENT)
Dept: HEALTH INFORMATION MANAGEMENT | Facility: OTHER | Age: 88
End: 2023-12-12
Payer: MEDICARE

## 2023-12-12 DIAGNOSIS — R26.89 IMBALANCE: ICD-10-CM

## 2023-12-12 DIAGNOSIS — I10 ESSENTIAL HYPERTENSION: ICD-10-CM

## 2023-12-12 DIAGNOSIS — I25.119 CORONARY ARTERY DISEASE INVOLVING NATIVE CORONARY ARTERY OF NATIVE HEART WITH ANGINA PECTORIS (HCC): ICD-10-CM

## 2023-12-12 NOTE — PROGRESS NOTES
"Assessment    I spoke with the patient this afternoon for her monthly CCM review. The patient is quiet and reserved saying that she has her ups and downs but that she and her dog, Deshawn, are \"doing just fine.\" The patient denies any recent falls or injuries. She states that her medications are now coming to the correct place in the correct way. She has had no further issues. She denies any issues with med compliance or organization. She denies any pressing questions or concerns. She is looking forward to spending [place] with her kids and grandkids.  Message sent with care gaps for the patient to look at and address as they are able. I will follow up in January.     Education    *Article on effective     Plan of Care and Goals    *Remain free from falls/injuries  *Healthy nutrition, hydration, and activity.     Barriers:    *Age and medically related chronic conditions.     Progress:    Stable    Next outreach:  1/12/24                           "

## 2023-12-19 ENCOUNTER — OFFICE VISIT (OUTPATIENT)
Dept: URGENT CARE | Facility: CLINIC | Age: 88
End: 2023-12-19
Payer: MEDICARE

## 2023-12-19 ENCOUNTER — HOSPITAL ENCOUNTER (OUTPATIENT)
Facility: MEDICAL CENTER | Age: 88
End: 2023-12-19
Attending: FAMILY MEDICINE
Payer: MEDICARE

## 2023-12-19 VITALS
RESPIRATION RATE: 16 BRPM | BODY MASS INDEX: 22.15 KG/M2 | SYSTOLIC BLOOD PRESSURE: 136 MMHG | OXYGEN SATURATION: 95 % | DIASTOLIC BLOOD PRESSURE: 82 MMHG | HEART RATE: 71 BPM | HEIGHT: 63 IN | TEMPERATURE: 98.6 F | WEIGHT: 125 LBS

## 2023-12-19 DIAGNOSIS — M54.50 ACUTE BILATERAL LOW BACK PAIN WITHOUT SCIATICA: ICD-10-CM

## 2023-12-19 LAB
APPEARANCE UR: CLEAR
BILIRUB UR STRIP-MCNC: NEGATIVE MG/DL
COLOR UR AUTO: YELLOW
GLUCOSE UR STRIP.AUTO-MCNC: NEGATIVE MG/DL
KETONES UR STRIP.AUTO-MCNC: NEGATIVE MG/DL
LEUKOCYTE ESTERASE UR QL STRIP.AUTO: NORMAL
NITRITE UR QL STRIP.AUTO: NEGATIVE
PH UR STRIP.AUTO: 5.5 [PH] (ref 5–8)
PROT UR QL STRIP: NEGATIVE MG/DL
RBC UR QL AUTO: NEGATIVE
SP GR UR STRIP.AUTO: 1
UROBILINOGEN UR STRIP-MCNC: 0.2 MG/DL

## 2023-12-19 PROCEDURE — 3079F DIAST BP 80-89 MM HG: CPT | Performed by: FAMILY MEDICINE

## 2023-12-19 PROCEDURE — 99213 OFFICE O/P EST LOW 20 MIN: CPT | Performed by: FAMILY MEDICINE

## 2023-12-19 PROCEDURE — 81002 URINALYSIS NONAUTO W/O SCOPE: CPT | Performed by: FAMILY MEDICINE

## 2023-12-19 PROCEDURE — 3075F SYST BP GE 130 - 139MM HG: CPT | Performed by: FAMILY MEDICINE

## 2023-12-19 PROCEDURE — 87086 URINE CULTURE/COLONY COUNT: CPT

## 2023-12-19 RX ORDER — POLYETHYLENE GLYCOL 3350 17 G/17G
17 POWDER, FOR SOLUTION ORAL DAILY
Qty: 30 PACKET | Refills: 0 | Status: SHIPPED | OUTPATIENT
Start: 2023-12-19 | End: 2024-02-20

## 2023-12-19 RX ORDER — POLYETHYLENE GLYCOL 3350 17 G/17G
17 POWDER, FOR SOLUTION ORAL DAILY
Qty: 30 PACKET | Refills: 0 | Status: SHIPPED | OUTPATIENT
Start: 2023-12-19 | End: 2023-12-19

## 2023-12-19 ASSESSMENT — ENCOUNTER SYMPTOMS
FEVER: 0
DIARRHEA: 1
NAUSEA: 0
BACK PAIN: 1
VOMITING: 0
ABDOMINAL PAIN: 0
CONSTIPATION: 1

## 2023-12-19 ASSESSMENT — FIBROSIS 4 INDEX: FIB4 SCORE: 2.95

## 2023-12-19 NOTE — PROGRESS NOTES
Subjective:     Dayana Lechuga is a 89 y.o. female who presents for Back Pain (Lower and mid back pain )    HPI  Pt presents for evaluation of an acute problem  Pt with upper back pain which started last night   Having some pain traveling down to the lower back this morning   No cough or sore throat   No abd pain   No vomiting or worsening diarrhea (states that she does get frequent intermittent diarrhea chronically)   No dysuria, urgency, or frequency   No fevers   Does not feel fatigued or ill   Nothing she can do to make the pain in the back worse   No positions that hurt, no pain with deep breaths     Review of Systems   Constitutional:  Negative for fever.   Gastrointestinal:  Positive for constipation and diarrhea. Negative for abdominal pain, nausea and vomiting.   Musculoskeletal:  Positive for back pain.   Skin:  Negative for rash.       PMH:  has a past medical history of Allergy, Anxiety, ASTHMA, Bronchitis, CAD (coronary artery disease), Chills, Constipation, Difficulty breathing, GERD (gastroesophageal reflux disease), Heart attack ([facility]), Heartburn, Hyperlipidemia, Hypertension, Influenza, Insomnia, Lumbar back pain (9/10/2016), Mild peripheral edema (7/31/2020), Mumps, OSTEOPOROSIS, Palpitations, Peripheral vascular disease, unspecified ([facility]) (9/14/2021), Pneumonia, Post concussion syndrome (9/11/2020), PVD (peripheral vascular disease) ([facility]), Sweat, sweating, excessive, and Tonsillitis.  MEDS:   Current Outpatient Medications:     polyethylene glycol/lytes (MIRALAX) Pack, Take 1 Packet by mouth every day., Disp: 30 Packet, Rfl: 0    fluticasone (FLONASE) 50 MCG/ACT nasal spray, USE 1 SPRAY IN EACH NOSTRIL ONCE A DAY, Disp: 16 g, Rfl: 2    montelukast (SINGULAIR) 10 MG Tab, Take 1 Tablet by mouth every evening., Disp: 100 Tablet, Rfl: 3    isosorbide mononitrate SR (IMDUR) 60 MG TABLET SR 24 HR, Take 1 Tablet by mouth every evening., Disp: 100 Tablet, Rfl: 3    omeprazole (PRILOSEC) 20 MG  delayed-release capsule, Take 1 Capsule by mouth 2 (two) times a day., Disp: 200 Capsule, Rfl: 1    atorvastatin (LIPITOR) 80 MG tablet, Take 1 Tablet by mouth every evening., Disp: 100 Tablet, Rfl: 3    LORazepam (ATIVAN) 0.5 MG Tab, Take 0.5 Tablets by mouth every evening for 30 days., Disp: 15 Tablet, Rfl: 0    [START ON 12/29/2023] LORazepam (ATIVAN) 0.5 MG Tab, Take 0.5 Tablets by mouth every evening for 30 days., Disp: 15 Tablet, Rfl: 0    lisinopril (PRINIVIL) 20 MG Tab, Take 1 Tablet by mouth every day., Disp: 100 Tablet, Rfl: 0    albuterol 108 (90 Base) MCG/ACT Aero Soln inhalation aerosol, as needed., Disp: , Rfl:     carvedilol (COREG) 3.125 MG Tab, 2 times a day., Disp: , Rfl:     gabapentin (NEURONTIN) 400 MG Cap, 3 times a day., Disp: , Rfl:     aspirin 81 MG EC tablet, Chew 81 mg every day., Disp: , Rfl:     CRANBERRY PO, Take 3 Tablets by mouth every day., Disp: , Rfl:     Multiple Vitamins-Minerals (OCUVITE-LUTEIN) Tab, Take 1 Tablet by mouth every day., Disp: , Rfl:     Saline (OCEAN NASAL SPRAY NA), Administer 1 Spray into affected nostril(S) every day., Disp: , Rfl:     Acetaminophen 500 MG Cap, Take 1 Capsule by mouth 2 times a day. Morning & Afternoon, sometimes takes 3 times a day, Disp: , Rfl:     Melatonin 10 MG Tab, Take 15 mg by mouth at bedtime., Disp: , Rfl:     guaifenesin LA (MUCINEX) 600 MG TABLET SR 12 HR, Take 600 mg by mouth every morning., Disp: , Rfl:     Ascorbic Acid (VITAMIN C) 1000 MG Tab, Take 1 Tablet by mouth every morning., Disp: , Rfl:     VITAMIN E PO, Take 1 Capsule by mouth every morning., Disp: , Rfl:     Cholecalciferol (VITAMIN D) 50 MCG (2000 UT) Cap, Take 2,000 Units by mouth every morning., Disp: , Rfl:   ALLERGIES:   Allergies   Allergen Reactions    Bactrim [Sulfamethoxazole W-Trimethoprim] Hives    Pcn [Penicillins] Hives     About 70 years. Tolerated ceftriaxone before in 2021    Codeine Unspecified     Unknown reaction      Tramadol Unspecified     Urinary  "retention     SURGHX:   Past Surgical History:   Procedure Laterality Date    [place] INJ LUMBAR/SACRAL,W/ IMAGING N/A 4/20/2023    Procedure: Caudal epidural with catheter;  Surgeon: Torres Fall M.D.;  [facility];  Service: Pain Management    ABDOMINAL HYSTERECTOMY TOTAL      APPENDECTOMY      HERNIA REPAIR      HYSTERECTOMY LAPAROSCOPY      TONSILLECTOMY      ZZZ CARDIAC CATH       SOCHX:  reports that she has never smoked. She has never used smokeless tobacco. She reports that she does not drink alcohol and does not use drugs.     Objective:   /82   Pulse 71   Temp 37 °C (98.6 °F) (Temporal)   Resp 16   Ht 1.6 m (5' 3\")   Wt 56.7 kg (125 lb)   LMP  (LMP Unknown)   SpO2 95%   BMI 22.14 kg/m²     Physical Exam  Constitutional:       General: She is not in acute distress.     Appearance: She is well-developed. She is not diaphoretic.   HENT:      Head: Normocephalic and atraumatic.      Nose: Nose normal.      Mouth/Throat:      Mouth: Mucous membranes are moist.      Pharynx: Oropharynx is clear. No oropharyngeal exudate or posterior oropharyngeal erythema.   Neck:      Trachea: No tracheal deviation.   Cardiovascular:      Rate and Rhythm: Normal rate and regular rhythm.   Pulmonary:      Effort: Pulmonary effort is normal. No respiratory distress.      Breath sounds: Normal breath sounds. No wheezing or rales.   Abdominal:      General: Abdomen is flat. There is no distension.      Palpations: Abdomen is soft.      Tenderness: There is no abdominal tenderness. There is no right CVA tenderness, left CVA tenderness, guarding or rebound.   Musculoskeletal:      Cervical back: Normal range of motion and neck supple. No tenderness.   Lymphadenopathy:      Cervical: No cervical adenopathy.   Skin:     General: Skin is warm and dry.      Findings: No rash.   Neurological:      Mental Status: She is alert.     Minor tenderness in the right thoracic paraspinal muscles and " bilateral lumbar paraspinal muscles  Assessment/Plan:   Assessment    1. Acute bilateral low back pain without sciatica  - POCT Urinalysis  - URINE CULTURE(NEW); Future  - polyethylene glycol/lytes (MIRALAX) Pack; Take 1 Packet by mouth every day.  Dispense: 30 Packet; Refill: 0    Patient with lower back pain and thoracic back pain.  No rash or skin sensitivity to suggest shingles.  Has only mild tenderness to palpation, but no pain with any range of motion exercises.  No significant abdominal pain, though patient does state that she frequently is constipated and has had some fullness sensation in the lower abdomen lately.  This may be minor muscular strain, though would also be concern for possible constipation as the etiology of the pain.  Recommended starting a stool softener and closely monitoring the symptoms for the next few days.  Point-of-care urine was within normal limits and will send urine for culture to fully rule out any sort of urinary tract infection.

## 2023-12-21 LAB
BACTERIA UR CULT: NORMAL
SIGNIFICANT IND 70042: NORMAL
SITE SITE: NORMAL
SOURCE SOURCE: NORMAL

## 2024-01-12 ENCOUNTER — PATIENT OUTREACH (OUTPATIENT)
Dept: HEALTH INFORMATION MANAGEMENT | Facility: OTHER | Age: 89
End: 2024-01-12
Payer: MEDICARE

## 2024-01-12 DIAGNOSIS — I10 ESSENTIAL HYPERTENSION: ICD-10-CM

## 2024-01-12 DIAGNOSIS — I25.119 CORONARY ARTERY DISEASE INVOLVING NATIVE CORONARY ARTERY OF NATIVE HEART WITH ANGINA PECTORIS (HCC): ICD-10-CM

## 2024-01-12 PROCEDURE — 99490 CHRNC CARE MGMT STAFF 1ST 20: CPT | Performed by: STUDENT IN AN ORGANIZED HEALTH CARE EDUCATION/TRAINING PROGRAM

## 2024-01-12 NOTE — PROGRESS NOTES
"Assessment    I spoke with the patient this morning for her monthly CCM review. Future appointments reviewed. Patient is aware and voices understanding. Patient is very careful. No recent falls or injuries. She is frustrated for not being able to go out and walk as much as she would like because of the snow and ice. She states that she does walk her dog and walks down the [place] to do her laundry and down to the dining room for meals. The patient states that she has had some abnormal urination and bowel patters. She states that she has a pinched nerve in her lower back and that her bowels and bladder don't always convey the correct signals to her body they way they once did. The patient states that she hydrates well. Fiber, activity, and some kind of reminder to go try to use the restroom reinforced as other measures to take against constipation and avoid accidents. Patient voices interest in shingles and RSV vaccines, advised to schedule through the pharmacy of her choice. Emotionally patient states that she \"has her ups and downs\" but is more isolated since her daughter won't drive in the snow. She took the bus from her assisted living facility to do grocery shopping this week. Patient states that she has been masking because she \"doesn't know who has RSV or Covid and whether they stayed in their rooms for a long enough time.\" Patient states that periodically she has been developing a bad headache or feeling poorly before meals but that eating usually resolves this. She states she has been caring for her skin with Cetaphil and cocoa butter. No other pressing questions at this time.     Education    *Article on winter skin care sent by mail    Plan of Care and Goals    *Remain free from falls/injuries  *Healthy nutrition, hydration, and activity    Barriers:    *Management of age related and chronic medical concerns    Progress:    Stable    Next outreach:  2/15/24 post PCP 10:20 am                           "

## 2024-01-19 NOTE — ASSESSMENT & PLAN NOTE
Pulse was 55 in the ER  Decrease coreg and place holding parameters  Continuous tele monitoring    82.6

## 2024-01-29 ENCOUNTER — OFFICE VISIT (OUTPATIENT)
Dept: BEHAVIORAL HEALTH | Facility: CLINIC | Age: 89
End: 2024-01-29
Payer: MEDICARE

## 2024-01-29 DIAGNOSIS — F41.9 ANXIETY: ICD-10-CM

## 2024-01-29 DIAGNOSIS — F51.01 PRIMARY INSOMNIA: ICD-10-CM

## 2024-01-29 DIAGNOSIS — Z79.899 CHRONIC USE OF BENZODIAZEPINE FOR THERAPEUTIC PURPOSE: ICD-10-CM

## 2024-01-29 PROCEDURE — 99214 OFFICE O/P EST MOD 30 MIN: CPT | Mod: GC | Performed by: PSYCHIATRY & NEUROLOGY

## 2024-01-29 RX ORDER — LORAZEPAM 0.5 MG/1
0.25 TABLET ORAL NIGHTLY PRN
Qty: 15 TABLET | Refills: 0 | Status: SHIPPED | OUTPATIENT
Start: 2024-03-09 | End: 2024-04-08

## 2024-01-29 RX ORDER — LORAZEPAM 0.5 MG/1
0.25 TABLET ORAL NIGHTLY PRN
Qty: 15 TABLET | Refills: 0 | Status: SHIPPED | OUTPATIENT
Start: 2024-02-08 | End: 2024-03-09

## 2024-01-29 RX ORDER — LORAZEPAM 0.5 MG/1
0.25 TABLET ORAL NIGHTLY PRN
Qty: 15 TABLET | Refills: 0 | Status: SHIPPED | OUTPATIENT
Start: 2024-04-08 | End: 2024-05-08

## 2024-01-29 NOTE — PROGRESS NOTES
PSYCHIATRY FOLLOW-UP NOTE    Name: Dayana Lechuga  MRN: [id number]  : 1929  Age: 89 y.o.  Date of assessment: 24  PCP: Monique Borjas P.A.-C.  Persons in attendance: Patient and Son    REASON FOR VISIT/CHIEF COMPLAINT (as stated by Patient):  Dayana Lechuga is a 89 y.o., White female, attending follow-up appointment for management of chronic benzodiazepine use.    HISTORY OF PRESENT ILLNESS:  Dayana Lechuga is a 89 y.o. 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 Patient was last seen 3 months ago, and following treatment planning recommendations were done:  - Continue lorazepam 0.25mg nightly for sleep/benzodiazepine dependence.  - Continue melatonin 10mg nightly for sleep.    Since last visit, patient reports ongoing intermittent lightheadedness without any falls or near falls.  She has learned to get up slowly when she is feeling more lightheaded.  Denies any depression or elevated baseline anxiety.  She continues to use lorazepam 0.25 mg nightly and has not tried to go without it.  She would like to eventually not need this medication but she is unsure if she feels ready for that at this time as she has been taking it for over 20 years.  Her son notes that she has some psychological dependence and finds comfort in having it available, and patient agrees with his observation.  No issues with confusion or grogginess.  Remains physically active by walking her dog regularly and also going to the gym.  Has had new/worsened pain and constipation, which has caused some increased anxiety over the past few months.  Continues to be agreeable to try cutting the tablet into quarters rather has but notes that this will be difficult due to the already small size.  She also remains agreeable to attempting to skip a dose on nights she feels especially tired.    CURRENT MEDICATIONS:  Current Outpatient  Medications   Medication Sig Dispense Refill    polyethylene glycol/lytes (MIRALAX) Pack Take 1 Packet by mouth every day. 30 Packet 0    fluticasone (FLONASE) 50 MCG/ACT nasal spray USE 1 SPRAY IN EACH NOSTRIL ONCE A DAY 16 g 2    montelukast (SINGULAIR) 10 MG Tab Take 1 Tablet by mouth every evening. 100 Tablet 3    isosorbide mononitrate SR (IMDUR) 60 MG TABLET SR 24 HR Take 1 Tablet by mouth every evening. 100 Tablet 3    omeprazole (PRILOSEC) 20 MG delayed-release capsule Take 1 Capsule by mouth 2 (two) times a day. 200 Capsule 1    atorvastatin (LIPITOR) 80 MG tablet Take 1 Tablet by mouth every evening. 100 Tablet 3    lisinopril (PRINIVIL) 20 MG Tab Take 1 Tablet by mouth every day. 100 Tablet 0    albuterol 108 (90 Base) MCG/ACT Aero Soln inhalation aerosol as needed.      carvedilol (COREG) 3.125 MG Tab 2 times a day.      gabapentin (NEURONTIN) 400 MG Cap 3 times a day.      aspirin 81 MG EC tablet Chew 81 mg every day.      CRANBERRY PO Take 3 Tablets by mouth every day.      Multiple Vitamins-Minerals (OCUVITE-LUTEIN) Tab Take 1 Tablet by mouth every day.      Saline (OCEAN NASAL SPRAY NA) Administer 1 Spray into affected nostril(S) every day.      Acetaminophen 500 MG Cap Take 1 Capsule by mouth 2 times a day. Morning & Afternoon, sometimes takes 3 times a day      Melatonin 10 MG Tab Take 15 mg by mouth at bedtime.      guaifenesin LA (MUCINEX) 600 MG TABLET SR 12 HR Take 600 mg by mouth every morning.      Ascorbic Acid (VITAMIN C) 1000 MG Tab Take 1 Tablet by mouth every morning.      VITAMIN E PO Take 1 Capsule by mouth every morning.      Cholecalciferol (VITAMIN D) 50 MCG (2000 UT) Cap Take 2,000 Units by mouth every morning.       No current facility-administered medications for this visit.     MEDICAL HISTORY  Past Medical History:   Diagnosis Date    Allergy     Anxiety     ASTHMA     Bronchitis     CAD (coronary artery disease)     JT to RCA; 70% stenosis in LAD    Chills     Constipation   "   Difficulty breathing     GERD (gastroesophageal reflux disease)     Heart attack ([facility])     Heartburn     Hyperlipidemia     Hypertension     Influenza     Insomnia     Lumbar back pain 9/10/2016    Mild peripheral edema 7/31/2020    Mumps     OSTEOPOROSIS     Palpitations     Peripheral vascular disease, unspecified ([facility]) 9/14/2021    Pneumonia     Post concussion syndrome 9/11/2020    PVD (peripheral vascular disease) ([facility])     70% PAD-followed by [facility]    Sweat, sweating, excessive     Tonsillitis      Past Surgical History:   Procedure Laterality Date    [place] INJ LUMBAR/SACRAL,W/ IMAGING N/A 4/20/2023    Procedure: Caudal epidural with catheter;  Surgeon: Torres Fall M.D.;  Location[facility];  Service: Pain Management    ABDOMINAL HYSTERECTOMY TOTAL      APPENDECTOMY      HERNIA REPAIR      HYSTERECTOMY LAPAROSCOPY      TONSILLECTOMY      Z CARDIAC CATH       PAST PSYCHIATRIC HISTORY  Prior psychiatric hospitalization: Denies.  Prior Self harm/suicide attempt: Denies/denies.  Prior Diagnosis: Anxiety, insomnia    PAST MEDICATION TRIALS:  Lorazepam for over 15 years.  Previously taking 2 mg daily.    FAMILY HISTORY  Psychiatric diagnosis: Denies.  History of suicide attempts: Denies.  Substance abuse history: Denies.    SUBSTANCE USE HISTORY:  ALCOHOL: Denies.  TOBACCO: Denies.  CANNABIS: Denies.  OPIOIDS: Denies.  PRESCRIPTION MEDICATIONS: Denies misuse.  OTHERS: Denies.  History of inpatient/outpatient rehab treatment: N/A    SOCIAL HISTORY  Childhood: born in [place] and describes childhood as \"good\".  Also spent time in [place].  Primarily lived in [place] during her adult life--[place] until 2004 when she moved to [place], [place].  Employment: Worked as a  and  throughout most of her life.  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  Relationship: Single  Kids: 1 son (Jl) living in [place]. 1 " "daughter living in [place].  Son helps patient attend doctors visits.  Has 1 grandchild and 2 great-grandchildren in [place].  Current living situation: Lives in a senior home center.  Primarily is independent but has support available if needed.  Lives with a dog named Deshawn who she walks every morning.  Current/past legal issues: Denies.    REVIEW OF SYSTEMS:        Constitutional negative   Eyes positive -hordeolum on Lt lower eyelid   Ears/Nose/Mouth/Throat negative   Cardiovascular negative   Respiratory negative   Gastrointestinal negative   Genitourinary positive -history of recurrent UTIs   Muscular positive -history of back pain   Integumentary negative   Neurological positive -history of sciatica   Endocrine negative   Hematologic/Lymphatic negative     PHYSICAL EXAMINAION:  Vital signs: LMP  (LMP Unknown)   Musculoskeletal: Normal gait.   Abnormal movements: None noted.    MENTAL STATUS EXAMINATION    General:   - Grooming and hygiene: Casual and Neat,   - Apparent distress: None noted.,   - Behavior: Calm and pleasant.  - Eye Contact:  Good,   - no psychomotor agitation or retardation    - Participation: Active verbal participation, Attentive, and Engaged  Orientation: Alert and Fully Oriented to person, place and time  Mood:  \"pretty good\"  Affect: Full range, Congruent with content, Bright, Happy, and nonirritable and nonlabile. ,  Thought Process: Logical, Goal-directed, and linear  Thought Content: Denies suicidal or homicidal ideations, intent or plan Within normal limits  Perception: Denies auditory or visual hallucinations. No delusions noted Within normal limits  Attention span and concentration: Intact   Speech:Rate within normal limits and Volume within normal limits  Language: Appropriate   Insight: Good  Judgment: Good  Recent and remote memory: No gross evidence of memory deficits    DEPRESSION SCREENIN/16/2023    11:00 AM 2023    10:00 AM 2023     1:00 PM   Depression Screen " (PHQ-2/PHQ-9)   PHQ-2 Total Score 4 0 0   PHQ-9 Total Score 6     Interpretation of PHQ-9 Total Score   Score Severity   1-4 No Depression   5-9 Mild Depression   10-14 Moderate Depression   15-19 Moderately Severe Depression   20-27 Severe Depression    CURRENT RISK:       Suicidal: Low       Homicidal: Low       Self-Harm: Low       Relapse: Not applicable       Crisis Safety Plan Reviewed Not Indicated       If evidence of imminent risk is present, intervention/plan: N/A    MEDICAL RECORDS/LABS/DIAGNOSTIC TESTS REVIEWED:  Labs drawn 10/16/23 show eGFR of 85 with CBC, CMP, UA, PT/INR WNL. Troponin elevated slightly at 23 (patient was in the ED for light headedness). No new labs.    [place]  records -   Reviewed; no concerns.    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  Patient was initially prescribed lorazepam nearly 20 years ago for nighttime anxiety regarding individual safety.  Over time, she has been able to reduce the dose of lorazepam from 2 mg nightly to 0.25 mg nightly.  Historically, patient has had difficulties decreasing the dose when dealing with acute medical issues that have most often been unrelated.  There are some ongoing concerns for balance issues that may reach the point of impairing function, but patient has been able to adapt by increasing the use of her walker and maintaining a level of physical activity/exercise that promotes independence and strength.  We will continue lorazepam 0.25 mg nightly.  Discussed attempting to cut a tablet into quarters to try a dose of approximately 0.125 mg but this may be difficult due to the size of the tablets.  Also discussed the option of forgoing the use of lorazepam on nights she feels particularly tired and/or relaxed.  Of note, patient and patient's son have expressed concern about no longer having access to this medication.  Provided some reassurance that we will not be  discontinuing it entirely unless patient feels ready for this or if patient's functional/medical status necessitates discontinuation.     DIAGNOSES & PLAN:  Chronic use of benzodiazepine for therapeutic purpose  Continue lorazepam 0.25 mg nightly for physiological dependence resulting in insomnia without the medication.  Discussed attempting to reduce further by dividing the tablets into quarters rather than halves.  Also discussed attempting to skip a dose on nights when she is feeling especially tired.  Continue to monitor for changes to cognition and stability with ambulation as well as other side effects of benzodiazepine use.  Other insomnia  Continue lorazepam and monitor for side effects as above.    Medication options, alternatives (including no medications) and medication risks/benefits/side effects were discussed in detail.  The patient was advised to call, message provider on [employer], or come in to the clinic if symptoms worsen or if any future questions/issues regarding their medications arise; the patient verbalized understanding and agreement.  The patient was educated to call 911, call the suicide hotline, or go to local ER if having thoughts of suicide or homicide; verbalized understanding.    Billing Coding based on:  80571 based on [facility]  16080: based on psychotherapy timing  42261: based on total time spent of 40 min in evaluation, charting and file review.     Return to clinic in 3 months or sooner if symptoms worsen.  Next Appointment: instruction provided on how to make the next appointment.     The proposed treatment plan was discussed with the patient who was provided the opportunity to ask questions and make suggestions regarding alternative treatment. Patient verbalized understanding and expressed agreement with the plan.     Chela Smith M.D.  1/29/24    This note was created using voice recognition software (Dragon). The accuracy of the dictation is limited by the abilities of the  software. I have reviewed the note prior to signing, however some errors in grammar and context are still possible. If you have any questions related to this note please do not hesitate to contact our office.

## 2024-02-05 DIAGNOSIS — K21.9 GASTROESOPHAGEAL REFLUX DISEASE, UNSPECIFIED WHETHER ESOPHAGITIS PRESENT: Chronic | ICD-10-CM

## 2024-02-05 RX ORDER — OMEPRAZOLE 20 MG/1
20 CAPSULE, DELAYED RELEASE ORAL 2 TIMES DAILY
Qty: 200 CAPSULE | Refills: 0 | Status: SHIPPED | OUTPATIENT
Start: 2024-02-05

## 2024-02-06 NOTE — TELEPHONE ENCOUNTER
Received request via: Pharmacy    Was the patient seen in the last year in this department? Yes    Does the patient have an active prescription (recently filled or refills available) for medication(s) requested? No    [facility]    Does the patient have [facility] and need 100 day supply (blood pressure, diabetes and cholesterol meds only)? Yes, quantity updated to 100 days

## 2024-02-06 NOTE — TELEPHONE ENCOUNTER
Received request via: Pharmacy    Was the patient seen in the last year in this department? Yes    Does the patient have an active prescription (recently filled or refills available) for medication(s) requested? No    Pharmacy Name:   [id number] - [place], [place] [id number] [place] [address] [address]          Does the patient have [facility] Plus and need 100 day supply (blood pressure, diabetes and cholesterol meds only)? Yes, quantity updated to 100 days

## 2024-02-07 RX ORDER — GABAPENTIN 400 MG/1
400 CAPSULE ORAL 3 TIMES DAILY
Qty: 270 CAPSULE | Refills: 0 | Status: SHIPPED | OUTPATIENT
Start: 2024-02-07

## 2024-02-07 RX ORDER — CARVEDILOL 3.12 MG/1
3.12 TABLET ORAL 2 TIMES DAILY
Qty: 200 TABLET | Refills: 0 | Status: SHIPPED | OUTPATIENT
Start: 2024-02-07 | End: 2024-02-20 | Stop reason: SDUPTHER

## 2024-02-20 ENCOUNTER — PATIENT OUTREACH (OUTPATIENT)
Dept: HEALTH INFORMATION MANAGEMENT | Facility: OTHER | Age: 89
End: 2024-02-20

## 2024-02-20 ENCOUNTER — OFFICE VISIT (OUTPATIENT)
Dept: MEDICAL GROUP | Facility: PHYSICIAN GROUP | Age: 89
End: 2024-02-20
Payer: MEDICARE

## 2024-02-20 VITALS
WEIGHT: 126.8 LBS | BODY MASS INDEX: 23.34 KG/M2 | HEIGHT: 62 IN | HEART RATE: 64 BPM | TEMPERATURE: 98.3 F | OXYGEN SATURATION: 95 % | DIASTOLIC BLOOD PRESSURE: 80 MMHG | SYSTOLIC BLOOD PRESSURE: 162 MMHG

## 2024-02-20 DIAGNOSIS — J30.9 ALLERGIC RHINITIS, UNSPECIFIED SEASONALITY, UNSPECIFIED TRIGGER: ICD-10-CM

## 2024-02-20 DIAGNOSIS — I70.0 ATHEROSCLEROSIS OF AORTA (HCC): ICD-10-CM

## 2024-02-20 DIAGNOSIS — I10 ESSENTIAL HYPERTENSION: Chronic | ICD-10-CM

## 2024-02-20 DIAGNOSIS — F32.0 MILD MAJOR DEPRESSION (HCC): ICD-10-CM

## 2024-02-20 DIAGNOSIS — I10 ESSENTIAL HYPERTENSION: ICD-10-CM

## 2024-02-20 DIAGNOSIS — G31.9 CEREBRAL ATROPHY (HCC): ICD-10-CM

## 2024-02-20 DIAGNOSIS — E78.5 DYSLIPIDEMIA: Chronic | ICD-10-CM

## 2024-02-20 DIAGNOSIS — F13.20 SEDATIVE, HYPNOTIC OR ANXIOLYTIC DEPENDENCE, UNCOMPLICATED (HCC): ICD-10-CM

## 2024-02-20 PROCEDURE — 3079F DIAST BP 80-89 MM HG: CPT | Performed by: STUDENT IN AN ORGANIZED HEALTH CARE EDUCATION/TRAINING PROGRAM

## 2024-02-20 PROCEDURE — 99214 OFFICE O/P EST MOD 30 MIN: CPT | Performed by: STUDENT IN AN ORGANIZED HEALTH CARE EDUCATION/TRAINING PROGRAM

## 2024-02-20 PROCEDURE — 3077F SYST BP >= 140 MM HG: CPT | Performed by: STUDENT IN AN ORGANIZED HEALTH CARE EDUCATION/TRAINING PROGRAM

## 2024-02-20 RX ORDER — AMLODIPINE BESYLATE 5 MG/1
5 TABLET ORAL DAILY
Qty: 100 TABLET | Refills: 0 | Status: SHIPPED | OUTPATIENT
Start: 2024-02-20

## 2024-02-20 RX ORDER — FLUTICASONE PROPIONATE 50 MCG
SPRAY, SUSPENSION (ML) NASAL
Qty: 16 G | Refills: 2 | Status: SHIPPED | OUTPATIENT
Start: 2024-02-20

## 2024-02-20 RX ORDER — CARVEDILOL 6.25 MG/1
6.25 TABLET ORAL 2 TIMES DAILY
Qty: 200 TABLET | Refills: 0 | Status: SHIPPED | OUTPATIENT
Start: 2024-02-20

## 2024-02-20 RX ORDER — LISINOPRIL 20 MG/1
20 TABLET ORAL DAILY
Qty: 100 TABLET | Refills: 1 | Status: SHIPPED | OUTPATIENT
Start: 2024-02-20

## 2024-02-20 ASSESSMENT — PATIENT HEALTH QUESTIONNAIRE - PHQ9: CLINICAL INTERPRETATION OF PHQ2 SCORE: 0

## 2024-02-20 ASSESSMENT — ENCOUNTER SYMPTOMS
DIZZINESS: 0
CHILLS: 0
SHORTNESS OF BREATH: 0
HEADACHES: 0
FEVER: 0

## 2024-02-20 ASSESSMENT — FIBROSIS 4 INDEX: FIB4 SCORE: 2.98

## 2024-02-20 NOTE — PROGRESS NOTES
Assessment    I spoke with the patient for her monthly and quarterly reviews while she was in clinic for an appointment with her PCP. The patient is aware of future appointment dates and times. The patient states that her blood pressure has been too high and that she feels that her medications need to be adjusted. Her blood pressure is 162/80 at today's appointment. She reports taking all medications as indicated and denies any major changes in diet or activity. Meds reviewed and are accurate in the patient's chart with changes made by Monique Borjas PA-C. The patient states that she continues to walk her dog several times daily for activity. The patient states that the food in the dining room where she lives continues to need improvement but that she does her best to make good choices. No further reported questions or concerns at this time. Patient voices understanding of how to contact me if needed.     Education    *Article on indoor exercises for seniors to keep active during winter sent by mail.     Plan of Care and Goals    *Remain fall/injury free  *Healthy nutrition, hydration, and activity      Barriers:    *Management of age related and chronic medical concerns. There are limits to food and transportation autonomy    Progress:    Stable    Next outreach:  3/20/24 by phone

## 2024-02-21 NOTE — PROGRESS NOTES
"Subjective:     CC:   Chief Complaint   Patient presents with    Follow-Up    Bowel Problem    Finger Pain     All fingers are hurting but middle fingers are locking on both hands        HPI:   Dayana presents today with    Problem   Slow Transit Constipation   Essential Hypertension    This is a chronic condition.  Current Meds:   - lisinopril 20  - carvedilol 3.125 mg twice daily  Side effects: none  Patient reports her BP has been elevated on her home log recently. BP today is 162/80.  Associated symptoms: Denies chest pain, shortness of breath, headaches, dizziness/lightheadedness              Past medical, family, and social history reviewed by me in Epic chart today.   Medications and allergies reviewed in Epic chart by me today.       Health Maintenance: Completed    ROS:  Review of Systems   Constitutional:  Negative for chills and fever.   Respiratory:  Negative for shortness of breath.    Cardiovascular:  Negative for chest pain.   Neurological:  Negative for dizziness and headaches.       Objective:     Exam:  BP (!) 162/80 (BP Location: Left arm, Patient Position: Sitting, BP Cuff Size: Adult long)   Pulse 64   Temp 36.8 °C (98.3 °F) (Temporal)   Ht 1.575 m (5' 2\")   Wt 57.5 kg (126 lb 12.8 oz)   LMP  (LMP Unknown)   SpO2 95%   BMI 23.19 kg/m²  Body mass index is 23.19 kg/m².    Physical Exam  Vitals reviewed.   Constitutional:       General: She is not in acute distress.  Eyes:      Extraocular Movements: Extraocular movements intact.   Cardiovascular:      Rate and Rhythm: Normal rate and regular rhythm.      Heart sounds: No murmur heard.     No friction rub. No gallop.   Pulmonary:      Effort: Pulmonary effort is normal.      Breath sounds: No wheezing, rhonchi or rales.   Musculoskeletal:      Cervical back: Neck supple.   Skin:     General: Skin is warm and dry.   Neurological:      Mental Status: She is alert.   Psychiatric:         Mood and Affect: Mood normal.           Assessment & " Plan:     89 y.o. female with the following -     1. Essential hypertension  Chronic, uncontrolled.  /80 today and patient states it has been similar on home BP log.  We will continue lisinopril at current dosing.  We will increase carvedilol to 6.25 mg daily.  We will restart amlodipine 5 mg daily.  Patient will continue to maintain her blood pressure log at home and we will follow-up in 6 weeks to reassess.  - amLODIPine (NORVASC) 5 MG Tab; Take 1 Tablet by mouth every day.  Dispense: 100 Tablet; Refill: 0  - carvedilol (COREG) 6.25 MG Tab; Take 1 Tablet by mouth 2 times a day.  Dispense: 200 Tablet; Refill: 0  - lisinopril (PRINIVIL) 20 MG Tab; Take 1 Tablet by mouth every day.  Dispense: 100 Tablet; Refill: 1    2. Allergic rhinitis, unspecified seasonality, unspecified trigger  Chronic, stable.  Flonase refilled.  - fluticasone (FLONASE) 50 MCG/ACT nasal spray; USE 1 SPRAY IN EACH NOSTRIL ONCE A DAY  Dispense: 16 g; Refill: 2        HCC Gap Form    Diagnosis to address: I70.0 - Atherosclerosis of aorta (HCC)  Assessment and plan: Chronic, stable. Continue with current defined treatment plan: Continue high-intensity statin. Follow-up at least annually.  Diagnosis: G31.9 - Cerebral atrophy (HCC)  Assessment and plan: Chronic, stable. Continue with current defined treatment plan: Asymptomatic, continue to monitor. Follow-up at least annually.  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 Continue with current treatment plan: Patient is on lorazepam 0.5 mg nightly PRN for sleep, prescribed by her psychiatrist. She denies any concerning side effects. Follow-up with specialist as directed, but at least every 3 months.  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 Continue with current defined treatment plan:  Continue to monitor. Follow-up at least annually.  Last edited 02/20/24 17:21 PST by Monique Borjas P.A.-C.             Return in about 6 weeks (around 4/2/2024) for follow up.    Please note that this dictation was created using voice recognition software. I have made every reasonable attempt to correct obvious errors, but I expect that there are errors of grammar and possibly content that I did not discover before finalizing the note.

## 2024-03-04 ENCOUNTER — TELEPHONE (OUTPATIENT)
Dept: MEDICAL GROUP | Facility: PHYSICIAN GROUP | Age: 89
End: 2024-03-04
Payer: MEDICARE

## 2024-03-04 NOTE — TELEPHONE ENCOUNTER
"Caller Name: Dayana Lechuga   Call Back Number: [phone] (home)       How would the patient prefer to be contacted with a response: Phone call OK to leave a detailed message    PT called to advise her stool is lissette colored for over a week and she knows that's not good.     Tried to make her an appointment and she declined, said \"she will just follow up with you at her next appointment. \"Just wanted you to know.    "

## 2024-03-19 ENCOUNTER — PATIENT OUTREACH (OUTPATIENT)
Dept: HEALTH INFORMATION MANAGEMENT | Facility: OTHER | Age: 89
End: 2024-03-19
Payer: MEDICARE

## 2024-03-19 DIAGNOSIS — I10 ESSENTIAL HYPERTENSION: ICD-10-CM

## 2024-03-19 DIAGNOSIS — I25.119 CORONARY ARTERY DISEASE INVOLVING NATIVE CORONARY ARTERY OF NATIVE HEART WITH ANGINA PECTORIS (HCC): Chronic | ICD-10-CM

## 2024-03-19 DIAGNOSIS — E78.5 DYSLIPIDEMIA: ICD-10-CM

## 2024-03-19 NOTE — PROGRESS NOTES
Assessment    I spoke with the patient briefly this afternoon over the phone for her monthly check in. She states that she has had cold-like symptoms for about a week and that her family has been bringing her provisions. She states that she is just trying to rest, have plenty of fluids, and is supplementing with Emergen-C. The patient states that other than this cold there have been no other major complaints with regard to her overall health. There is a note from the beginning of March regarding lissette colored stools that it is said will be addressed at her appointment on April second. Patient denies any assistance with being scheduled to come in sooner. Patient voices understanding of how to contact me should any arise.     Education    *Article on nutrition to aid in recovery after illness    Plan of Care and Goals    *Remain free from falls, injuries  *Healthy nutrition, hydration, and activity    Barriers:    *Management of acute and chronic medical concerns    Progress:    Stable    Next outreach:  One Month.

## 2024-04-02 ENCOUNTER — PATIENT OUTREACH (OUTPATIENT)
Dept: HEALTH INFORMATION MANAGEMENT | Facility: OTHER | Age: 89
End: 2024-04-02

## 2024-04-02 ENCOUNTER — APPOINTMENT (OUTPATIENT)
Dept: MEDICAL GROUP | Facility: PHYSICIAN GROUP | Age: 89
End: 2024-04-02
Payer: MEDICARE

## 2024-04-02 VITALS
WEIGHT: 123 LBS | OXYGEN SATURATION: 94 % | DIASTOLIC BLOOD PRESSURE: 64 MMHG | HEIGHT: 62 IN | SYSTOLIC BLOOD PRESSURE: 146 MMHG | TEMPERATURE: 97.2 F | BODY MASS INDEX: 22.63 KG/M2 | HEART RATE: 66 BPM

## 2024-04-02 DIAGNOSIS — I10 ESSENTIAL HYPERTENSION: Chronic | ICD-10-CM

## 2024-04-02 DIAGNOSIS — R19.5 PALE STOOL: ICD-10-CM

## 2024-04-02 DIAGNOSIS — I10 ESSENTIAL HYPERTENSION: ICD-10-CM

## 2024-04-02 DIAGNOSIS — I25.119 CORONARY ARTERY DISEASE INVOLVING NATIVE CORONARY ARTERY OF NATIVE HEART WITH ANGINA PECTORIS (HCC): ICD-10-CM

## 2024-04-02 DIAGNOSIS — E78.5 DYSLIPIDEMIA: ICD-10-CM

## 2024-04-02 PROCEDURE — 99214 OFFICE O/P EST MOD 30 MIN: CPT | Performed by: STUDENT IN AN ORGANIZED HEALTH CARE EDUCATION/TRAINING PROGRAM

## 2024-04-02 PROCEDURE — 3077F SYST BP >= 140 MM HG: CPT | Performed by: STUDENT IN AN ORGANIZED HEALTH CARE EDUCATION/TRAINING PROGRAM

## 2024-04-02 PROCEDURE — 3078F DIAST BP <80 MM HG: CPT | Performed by: STUDENT IN AN ORGANIZED HEALTH CARE EDUCATION/TRAINING PROGRAM

## 2024-04-02 ASSESSMENT — FIBROSIS 4 INDEX: FIB4 SCORE: 2.98

## 2024-04-02 ASSESSMENT — ENCOUNTER SYMPTOMS
DIARRHEA: 0
BLOOD IN STOOL: 0
CONSTIPATION: 0
SHORTNESS OF BREATH: 0
VOMITING: 0
HEADACHES: 0
CHILLS: 0
DIZZINESS: 0
NAUSEA: 0
ABDOMINAL PAIN: 0
FEVER: 0

## 2024-04-02 NOTE — PROGRESS NOTES
"Subjective:     CC:   Chief Complaint   Patient presents with    Follow-Up       HPI:   Dayana presents today with    Problem   Essential Hypertension    This is a chronic condition.  Current Meds:   - lisinopril 20  - amlodipine 5 mg daily  - carvedilol 6.25 mg twice daily  Side effects: none  Home BP los-140s/60s  Associated symptoms: Denies chest pain, shortness of breath, headaches, dizziness/lightheadedness              Past medical, family, and social history reviewed by me in Epic chart today.   Medications and allergies reviewed in Epic chart by me today.       Health Maintenance: Completed    ROS:  Review of Systems   Constitutional:  Negative for chills and fever.   Respiratory:  Negative for shortness of breath.    Cardiovascular:  Negative for chest pain.   Gastrointestinal:  Negative for abdominal pain, blood in stool, constipation, diarrhea, nausea and vomiting.   Neurological:  Negative for dizziness and headaches.       Objective:     Exam:  BP (!) 146/64 (BP Location: Left arm, Patient Position: Sitting, BP Cuff Size: Adult long)   Pulse 66   Temp 36.2 °C (97.2 °F) (Temporal)   Ht 1.575 m (5' 2\")   Wt 55.8 kg (123 lb)   LMP  (LMP Unknown)   SpO2 94%   BMI 22.50 kg/m²  Body mass index is 22.5 kg/m².    Physical Exam  Vitals reviewed.   Constitutional:       General: She is not in acute distress.  HENT:      Mouth/Throat:      Mouth: Mucous membranes are moist.   Eyes:      Extraocular Movements: Extraocular movements intact.   Cardiovascular:      Rate and Rhythm: Normal rate and regular rhythm.      Heart sounds: No murmur heard.     No friction rub. No gallop.   Pulmonary:      Effort: Pulmonary effort is normal.      Breath sounds: No wheezing, rhonchi or rales.   Abdominal:      General: There is no distension.      Palpations: Abdomen is soft.      Tenderness: There is no abdominal tenderness.      Comments: No hepatosplenomegaly   Musculoskeletal:      Cervical back: Normal " "range of motion and neck supple.   Skin:     General: Skin is warm and dry.   Neurological:      Mental Status: She is alert and oriented to person, place, and time.   Psychiatric:         Mood and Affect: Mood normal.           Assessment & Plan:     89 y.o. female with the following -     1. Essential hypertension  Chronic.  Blood pressure is somewhat uncontrolled in the clinic today.  Blood pressure is 146/64.  The patient would prefer not to make any medication changes at this time.  She agrees to keep a blood pressure log at home over the next few weeks and we will reevaluate at follow-up.    2. Pale stool  Patient mentions that she had \"lissette colored\" a couple of months ago.  This seems to be getting better, however her stool is still quite light in color.  She does not have any other symptoms of concern.  Will continue to monitor this closely.  - Comp Metabolic Panel; Future          Return in about 6 weeks (around 5/14/2024) for blood pressure.    Please note that this dictation was created using voice recognition software. I have made every reasonable attempt to correct obvious errors, but I expect that there are errors of grammar and possibly content that I did not discover before finalizing the note.        "

## 2024-04-02 NOTE — PROGRESS NOTES
Assessment    I spoke to the patient this afternoon for her monthly CCM follow ups. The patient is aware of future appointment dates and times. The patient denies any recent falls or injuries. She denies any significant changes in her overall health. She states she is feeling much better from the cold she had the last time we spoke.  Patient denies any cardiac symptoms, states that she doesn't take her blood pressure often. Patient advised to take at least once a week. Patient voices understanding. Patient voices understanding of current recommendations with regard to nutrition and hydration. Patient denies any issues obtaining or taking medications stating that they are all sorted out now. Patient states that she continues to get activity by walking her dog. Patient voices no questions or concerns at this time. She voices understanding of how to get a hold of me should any arise.     Education    *Article on spring allergy management strategies sent by mail    Plan of Care and Goals    *Remain free from falls, injuries  *Healthy nutrition, hydration, and activity    Barriers:    *Management of age related and chronic medical concerns.     Progress:    Progressing    Next outreach:  5/21/24 11 am post PCP

## 2024-04-09 ENCOUNTER — HOSPITAL ENCOUNTER (OUTPATIENT)
Dept: LAB | Facility: MEDICAL CENTER | Age: 89
End: 2024-04-09
Attending: STUDENT IN AN ORGANIZED HEALTH CARE EDUCATION/TRAINING PROGRAM
Payer: MEDICARE

## 2024-04-09 DIAGNOSIS — R19.5 PALE STOOL: ICD-10-CM

## 2024-04-09 LAB
ALBUMIN SERPL BCP-MCNC: 4.3 G/DL (ref 3.2–4.9)
ALBUMIN/GLOB SERPL: 1.7 G/DL
ALP SERPL-CCNC: 72 U/L (ref 30–99)
ALT SERPL-CCNC: 16 U/L (ref 2–50)
ANION GAP SERPL CALC-SCNC: 10 MMOL/L (ref 7–16)
AST SERPL-CCNC: 20 U/L (ref 12–45)
BILIRUB SERPL-MCNC: 0.3 MG/DL (ref 0.1–1.5)
BUN SERPL-MCNC: 13 MG/DL (ref 8–22)
CALCIUM ALBUM COR SERPL-MCNC: 9.4 MG/DL (ref 8.5–10.5)
CALCIUM SERPL-MCNC: 9.6 MG/DL (ref 8.5–10.5)
CHLORIDE SERPL-SCNC: 105 MMOL/L (ref 96–112)
CO2 SERPL-SCNC: 23 MMOL/L (ref 20–33)
CREAT SERPL-MCNC: 0.52 MG/DL (ref 0.5–1.4)
GFR SERPLBLD CREATININE-BSD FMLA CKD-EPI: 85 ML/MIN/1.73 M 2
GLOBULIN SER CALC-MCNC: 2.6 G/DL (ref 1.9–3.5)
GLUCOSE SERPL-MCNC: 110 MG/DL (ref 65–99)
POTASSIUM SERPL-SCNC: 4 MMOL/L (ref 3.6–5.5)
PROT SERPL-MCNC: 6.9 G/DL (ref 6–8.2)
SODIUM SERPL-SCNC: 138 MMOL/L (ref 135–145)

## 2024-04-09 PROCEDURE — 80053 COMPREHEN METABOLIC PANEL: CPT

## 2024-04-09 PROCEDURE — 36415 COLL VENOUS BLD VENIPUNCTURE: CPT

## 2024-04-13 ENCOUNTER — OFFICE VISIT (OUTPATIENT)
Dept: URGENT CARE | Facility: CLINIC | Age: 89
End: 2024-04-13
Payer: MEDICARE

## 2024-04-13 ENCOUNTER — HOSPITAL ENCOUNTER (OUTPATIENT)
Facility: MEDICAL CENTER | Age: 89
End: 2024-04-13
Payer: MEDICARE

## 2024-04-13 VITALS
HEART RATE: 82 BPM | OXYGEN SATURATION: 94 % | DIASTOLIC BLOOD PRESSURE: 76 MMHG | SYSTOLIC BLOOD PRESSURE: 130 MMHG | HEIGHT: 62 IN | RESPIRATION RATE: 18 BRPM | BODY MASS INDEX: 21.16 KG/M2 | TEMPERATURE: 97.6 F | WEIGHT: 115 LBS

## 2024-04-13 DIAGNOSIS — R39.9 UTI SYMPTOMS: ICD-10-CM

## 2024-04-13 LAB
APPEARANCE UR: CLEAR
BILIRUB UR STRIP-MCNC: NEGATIVE MG/DL
COLOR UR AUTO: YELLOW
GLUCOSE UR STRIP.AUTO-MCNC: NEGATIVE MG/DL
KETONES UR STRIP.AUTO-MCNC: NEGATIVE MG/DL
LEUKOCYTE ESTERASE UR QL STRIP.AUTO: NORMAL
NITRITE UR QL STRIP.AUTO: NEGATIVE
PH UR STRIP.AUTO: 7 [PH] (ref 5–8)
PROT UR QL STRIP: NEGATIVE MG/DL
RBC UR QL AUTO: NORMAL
SP GR UR STRIP.AUTO: 1.01
UROBILINOGEN UR STRIP-MCNC: 0.2 MG/DL

## 2024-04-13 PROCEDURE — 81002 URINALYSIS NONAUTO W/O SCOPE: CPT

## 2024-04-13 PROCEDURE — 87086 URINE CULTURE/COLONY COUNT: CPT

## 2024-04-13 PROCEDURE — 3078F DIAST BP <80 MM HG: CPT

## 2024-04-13 PROCEDURE — 99213 OFFICE O/P EST LOW 20 MIN: CPT

## 2024-04-13 PROCEDURE — 3075F SYST BP GE 130 - 139MM HG: CPT

## 2024-04-13 RX ORDER — CEFDINIR 300 MG/1
300 CAPSULE ORAL 2 TIMES DAILY
Qty: 10 CAPSULE | Refills: 0 | Status: SHIPPED | OUTPATIENT
Start: 2024-04-13 | End: 2024-04-18

## 2024-04-13 ASSESSMENT — FIBROSIS 4 INDEX: FIB4 SCORE: 2.55

## 2024-04-13 ASSESSMENT — ENCOUNTER SYMPTOMS
CHILLS: 0
FLANK PAIN: 0
FEVER: 0

## 2024-04-13 NOTE — PROGRESS NOTES
Subjective:   Dayana Lechuga is a 89 y.o. female who presents for Urinary Frequency (X4days, Urinary frequency, lower abdominal)      HPI: This is a 89-year-old female patient who presents today for UTI symptoms.  Patient reports developing urinary frequency and suprapubic discomfort over the last 4 days.  She reports taking cranberry juice pills without any significant improvement in her symptoms.  She denies any fevers, chills, bodyaches.  She denies any history of recurrent UTIs.      Review of Systems   Constitutional:  Negative for chills, fever and malaise/fatigue.   Genitourinary:  Positive for frequency. Negative for dysuria, flank pain, hematuria and urgency.        + Suprapubic discomfort       Medications:    Current Outpatient Medications on File Prior to Visit   Medication Sig Dispense Refill    amLODIPine (NORVASC) 5 MG Tab Take 1 Tablet by mouth every day. 100 Tablet 0    carvedilol (COREG) 6.25 MG Tab Take 1 Tablet by mouth 2 times a day. 200 Tablet 0    lisinopril (PRINIVIL) 20 MG Tab Take 1 Tablet by mouth every day. 100 Tablet 1    fluticasone (FLONASE) 50 MCG/ACT nasal spray USE 1 SPRAY IN EACH NOSTRIL ONCE A DAY 16 g 2    gabapentin (NEURONTIN) 400 MG Cap Take 1 Capsule by mouth 3 times a day. 270 Capsule 0    omeprazole (PRILOSEC) 20 MG delayed-release capsule TAKE ONE CAPSULE BY MOUTH TWO TIMES A  Capsule 0    LORazepam (ATIVAN) 0.5 MG Tab Take 0.5 Tablets by mouth at bedtime as needed for Anxiety (sleep) for up to 30 days. Indications: Feeling Anxious, Trouble Sleeping 15 Tablet 0    montelukast (SINGULAIR) 10 MG Tab Take 1 Tablet by mouth every evening. 100 Tablet 3    isosorbide mononitrate SR (IMDUR) 60 MG TABLET SR 24 HR Take 1 Tablet by mouth every evening. 100 Tablet 3    atorvastatin (LIPITOR) 80 MG tablet Take 1 Tablet by mouth every evening. 100 Tablet 3    albuterol 108 (90 Base) MCG/ACT Aero Soln inhalation aerosol as needed.      aspirin 81 MG EC tablet Chew 81  mg every day.      CRANBERRY PO Take 3 Tablets by mouth every day.      Multiple Vitamins-Minerals (OCUVITE-LUTEIN) Tab Take 1 Tablet by mouth every day.      Saline (OCEAN NASAL SPRAY NA) Administer 1 Spray into affected nostril(S) every day.      Acetaminophen 500 MG Cap Take 1 Capsule by mouth 2 times a day. Morning & Afternoon, sometimes takes 3 times a day      Melatonin 10 MG Tab Take 15 mg by mouth at bedtime.      guaifenesin LA (MUCINEX) 600 MG TABLET SR 12 HR Take 600 mg by mouth every morning.      Ascorbic Acid (VITAMIN C) 1000 MG Tab Take 1 Tablet by mouth every morning.      VITAMIN E PO Take 1 Capsule by mouth every morning.      Cholecalciferol (VITAMIN D) 50 MCG (2000 UT) Cap Take 2,000 Units by mouth every morning.       No current facility-administered medications on file prior to visit.        Allergies:   Bactrim [sulfamethoxazole w-trimethoprim], Pcn [penicillins], Codeine, and Tramadol    Problem List:   Patient Active Problem List   Diagnosis    Essential hypertension    GERD (gastroesophageal reflux disease)    Dyslipidemia    Osteopenia    Anxiety    Coronary artery disease involving native coronary artery of native heart with angina pectoris (HCC)    Slow transit constipation    Acute bilateral low back pain with bilateral sciatica    Urinary retention    Pleural effusion    Debility    Peripheral neuropathy    Chronic use of benzodiazepine for therapeutic purpose    Primary insomnia    BMI 20.0-20.9, adult    Chronic bilateral low back pain with left-sided sciatica    Imbalance    Macrocytic anemia    Stable angina (HCC)    Cerebral atrophy (HCC)    Atherosclerosis of aorta (HCC)    DDD (degenerative disc disease), lumbosacral    Mild major depression (HCC)    Nonspecific abnormal function study, cardiovascular    Allergic rhinitis    Sedative, hypnotic or anxiolytic dependence, uncomplicated (HCC)    Dizziness        Surgical History:  Past Surgical History:   Procedure Laterality Date  "   [place] INJ LUMBAR/SACRAL,W/ IMAGING N/A 4/20/2023    Procedure: Caudal epidural with catheter;  Surgeon: Torres Fall M.D.;  [facility];  Service: Pain Management    ABDOMINAL HYSTERECTOMY TOTAL      APPENDECTOMY      HERNIA REPAIR      HYSTERECTOMY LAPAROSCOPY      TONSILLECTOMY      ZZZ CARDIAC CATH         Past Social Hx:   Social History     Tobacco Use    Smoking status: Never    Smokeless tobacco: Never   Vaping Use    Vaping Use: Never used   Substance Use Topics    Alcohol use: No     Alcohol/week: 0.0 oz    Drug use: No          Problem list, medications, and allergies reviewed by myself today in Epic.     Objective:     /76 (BP Location: Left arm, Patient Position: Sitting, BP Cuff Size: Adult)   Pulse 82   Temp 36.4 °C (97.6 °F) (Temporal)   Resp 18   Ht 1.575 m (5' 2\")   Wt 52.2 kg (115 lb)   LMP  (LMP Unknown)   SpO2 94%   BMI 21.03 kg/m²     Physical Exam  Vitals and nursing note reviewed.   Constitutional:       General: She is not in acute distress.     Appearance: Normal appearance. She is normal weight. She is not ill-appearing, toxic-appearing or diaphoretic.   HENT:      Head: Normocephalic and atraumatic.   Cardiovascular:      Rate and Rhythm: Normal rate and regular rhythm.      Pulses: Normal pulses.      Heart sounds: Normal heart sounds. No murmur heard.     No friction rub. No gallop.   Pulmonary:      Effort: Pulmonary effort is normal. No respiratory distress.      Breath sounds: Normal breath sounds. No stridor. No wheezing, rhonchi or rales.   Chest:      Chest wall: No tenderness.   Abdominal:      Tenderness: There is no right CVA tenderness or left CVA tenderness.   Musculoskeletal:      Cervical back: Neck supple. No tenderness.   Lymphadenopathy:      Cervical: No cervical adenopathy.   Skin:     General: Skin is warm and dry.      Capillary Refill: Capillary refill takes less than 2 seconds.   Neurological:      General: No focal " deficit present.      Mental Status: She is alert and oriented to person, place, and time. Mental status is at baseline.      Gait: Gait normal.   Psychiatric:         Mood and Affect: Mood normal.         Behavior: Behavior normal.         Thought Content: Thought content normal.         Judgment: Judgment normal.         Assessment/Plan:     Diagnosis and associated orders:   1. UTI symptoms  POCT Urinalysis    URINE CULTURE(NEW)    cefdinir (OMNICEF) 300 MG Cap         Results for orders placed or performed in visit on 04/13/24   POCT Urinalysis   Result Value Ref Range    POC Color yellow Negative    POC Appearance clear Negative    POC Glucose negative Negative mg/dL    POC Bilirubin negative Negative mg/dL    POC Ketones negative Negative mg/dL    POC Specific Gravity 1.015 <1.005 - >1.030    POC Blood trace Negative    POC Urine PH 7.0 5.0 - 8.0    POC Protein negative Negative mg/dL    POC Urobiligen 0.2 Negative (0.2) mg/dL    POC Nitrites negative Negative    POC Leukocyte Esterase trace Negative       Comments/MDM:   Pt is clinically stable at today's acute urgent care visit.  No acute distress noted. Appropriate for outpatient management at this time.     Acute problem.  Patient presents today for UTI symptoms.  POC UA is positive for blood and leukocytes.  The urine will be sent for culture, will follow.  Patient will be treated empirically with course of cefdinir secondary to penicillin and sulfa allergy.  Patient has tolerated cephalosporins in the past.  The patient is to begin taking antibiotic as prescribed and stay well-hydrated. Patient is to return to UC or go to ER for any new or worsening signs or symptoms, and follow with with PCP for recheck. Patient is agreeable with plan of care and verbalizes good understanding.             Discussed DDx, management options (risks,benefits, and alternatives to planned treatment), natural progression and supportive care.  Expressed understanding and the  treatment plan was agreed upon. Questions were encouraged and answered   Return to urgent care prn if new or worsening sx or if there is no improvement in condition prn.    Educated in Red flags and indications to immediately call 911 or present to the Emergency Department.   Advised the patient to follow-up with the primary care physician for recheck, reevaluation, and consideration of further management.    I personally reviewed prior external notes and test results pertinent to today's visit.  I have independently reviewed and interpreted all diagnostics ordered during this urgent care acute visit.       Please note that this dictation was created using voice recognition software. I have made a reasonable attempt to correct obvious errors, but I expect that there are errors of grammar and possibly content that I did not discover before finalizing the note.    This note was electronically signed by SOHEILA Roman

## 2024-04-14 DIAGNOSIS — R39.9 UTI SYMPTOMS: ICD-10-CM

## 2024-04-15 DIAGNOSIS — J30.9 ALLERGIC RHINITIS, UNSPECIFIED SEASONALITY, UNSPECIFIED TRIGGER: ICD-10-CM

## 2024-04-15 DIAGNOSIS — K21.9 GASTROESOPHAGEAL REFLUX DISEASE, UNSPECIFIED WHETHER ESOPHAGITIS PRESENT: Chronic | ICD-10-CM

## 2024-04-15 RX ORDER — MONTELUKAST SODIUM 10 MG/1
10 TABLET ORAL EVERY EVENING
Qty: 100 TABLET | Refills: 1 | Status: SHIPPED | OUTPATIENT
Start: 2024-04-15

## 2024-04-15 RX ORDER — OMEPRAZOLE 20 MG/1
20 CAPSULE, DELAYED RELEASE ORAL 2 TIMES DAILY
Qty: 200 CAPSULE | Refills: 1 | Status: SHIPPED | OUTPATIENT
Start: 2024-04-15

## 2024-04-15 NOTE — TELEPHONE ENCOUNTER
Received request via: Pharmacy    Was the patient seen in the last year in this department? Yes    Does the patient have an active prescription (recently filled or refills available) for medication(s) requested? No    Ph[facility] Name: [facility]    Does the patient have [facility] and need 100 day supply (blood pressure, diabetes and cholesterol meds only)? Medication is not for cholesterol, blood pressure or diabetes

## 2024-04-15 NOTE — TELEPHONE ENCOUNTER
Received request via: Pharmacy    Was the patient seen in the last year in this department? Yes    Does the patient have an active prescription (recently filled or refills available) for medication(s) requested? No    Pharmacy Name: [facility]     Does the patient have [facility] Plus and need 100 day supply (blood pressure, diabetes and cholesterol meds only)? Yes, quantity updated to 100 days

## 2024-04-16 LAB
BACTERIA UR CULT: NORMAL
SIGNIFICANT IND 70042: NORMAL
SITE SITE: NORMAL
SOURCE SOURCE: NORMAL

## 2024-04-16 RX ORDER — GABAPENTIN 400 MG/1
400 CAPSULE ORAL 3 TIMES DAILY
Qty: 270 CAPSULE | Refills: 0 | Status: SHIPPED | OUTPATIENT
Start: 2024-04-16

## 2024-04-23 ENCOUNTER — OFFICE VISIT (OUTPATIENT)
Dept: CARDIOLOGY | Facility: MEDICAL CENTER | Age: 89
End: 2024-04-23
Attending: INTERNAL MEDICINE
Payer: MEDICARE

## 2024-04-23 VITALS
RESPIRATION RATE: 16 BRPM | SYSTOLIC BLOOD PRESSURE: 132 MMHG | HEART RATE: 68 BPM | HEIGHT: 62 IN | BODY MASS INDEX: 22.63 KG/M2 | OXYGEN SATURATION: 96 % | DIASTOLIC BLOOD PRESSURE: 76 MMHG | WEIGHT: 123 LBS

## 2024-04-23 DIAGNOSIS — I10 ESSENTIAL HYPERTENSION: ICD-10-CM

## 2024-04-23 DIAGNOSIS — I70.0 ATHEROSCLEROSIS OF AORTA (HCC): ICD-10-CM

## 2024-04-23 DIAGNOSIS — E78.5 DYSLIPIDEMIA: ICD-10-CM

## 2024-04-23 DIAGNOSIS — I25.119 CORONARY ARTERY DISEASE INVOLVING NATIVE CORONARY ARTERY OF NATIVE HEART WITH ANGINA PECTORIS (HCC): Chronic | ICD-10-CM

## 2024-04-23 PROCEDURE — 99213 OFFICE O/P EST LOW 20 MIN: CPT | Performed by: INTERNAL MEDICINE

## 2024-04-23 PROCEDURE — 3075F SYST BP GE 130 - 139MM HG: CPT | Performed by: INTERNAL MEDICINE

## 2024-04-23 PROCEDURE — 3078F DIAST BP <80 MM HG: CPT | Performed by: INTERNAL MEDICINE

## 2024-04-23 RX ORDER — CARVEDILOL 6.25 MG/1
6.25 TABLET ORAL 2 TIMES DAILY WITH MEALS
Qty: 180 TABLET | Refills: 3 | Status: SHIPPED | OUTPATIENT
Start: 2024-04-23

## 2024-04-23 RX ORDER — CARVEDILOL 3.12 MG/1
3.12 TABLET ORAL 2 TIMES DAILY WITH MEALS
COMMUNITY
End: 2024-04-23

## 2024-04-23 ASSESSMENT — FIBROSIS 4 INDEX: FIB4 SCORE: 2.55

## 2024-04-23 NOTE — PROGRESS NOTES
Chief Complaint   Patient presents with    Coronary Artery Disease     Follow up        Subjective     Dayana Lechuga is a 89 y.o. female who presents today for follow-up of CAD PVD hypertension and hyperlipidemia as well as stable angina.  Doing well.      Since last visit she has no cardiovascular complaints.  While stretching and reaching over something to retrieve echo from the closet she noted bandlike cramping pain sharp and stabbing through her chest that lasted a couple of minutes and has not recurred.  She has no exertional complaints.  Taking her medications as directed.    Past Medical History:   Diagnosis Date    Allergy     Anxiety     ASTHMA     Bronchitis     CAD (coronary artery disease)     JT to RCA; 70% stenosis in LAD    Chills     Constipation     Difficulty breathing     GERD (gastroesophageal reflux disease)     Heart attack ([facility])     Heartburn     Hyperlipidemia     Hypertension     Influenza     Insomnia     Lumbar back pain 9/10/2016    Mild peripheral edema 7/31/2020    Mumps     OSTEOPOROSIS     Palpitations     Peripheral vascular disease, unspecified ([facility]) 9/14/2021    Pneumonia     Post concussion syndrome 9/11/2020    PVD (peripheral vascular disease) ([facility])     70% PAD-followed by Lary AMBROCIO    Sweat, sweating, excessive     Tonsillitis      Past Surgical History:   Procedure Laterality Date    SD INJ LUMBAR/SACRAL,W/ IMAGING N/A 4/20/2023    Procedure: Caudal epidural with catheter;  Surgeon: Torres Fall M.D.;  Location: [facility];  Service: Pain Management    ABDOMINAL HYSTERECTOMY TOTAL      APPENDECTOMY      HERNIA REPAIR      HYSTERECTOMY LAPAROSCOPY      TONSILLECTOMY      ZZZ CARDIAC CATH       Family History   Problem Relation Age of Onset    Heart Attack Father     Cancer Brother     Cancer Brother     Heart Disease Neg Hx     Heart Failure Neg Hx     Hyperlipidemia Neg Hx      Social History     Socioeconomic History    Marital status:       Spouse name: Not on file    Number of children: Not on file    Years of education: Not on file    Highest education level: Not on file   Occupational History    Not on file   Tobacco Use    Smoking status: Never    Smokeless tobacco: Never   Vaping Use    Vaping Use: Never used   Substance and Sexual Activity    Alcohol use: No     Alcohol/week: 0.0 oz    Drug use: No    Sexual activity: Not Currently   Other Topics Concern     Service No    Blood Transfusions Yes    Caffeine Concern No    Occupational Exposure No    Hobby Hazards No    Sleep Concern Yes    Stress Concern Yes    Weight Concern No    Special Diet No    Back Care No    Exercise No    Bike Helmet No    Seat Belt Yes    Self-Exams No   Social History Narrative    Not on file     Social Determinants of Health     Financial Resource Strain: Low Risk  (12/7/2022)    Overall Financial Resource Strain (CARDIA)     Difficulty of Paying Living Expenses: Not very hard   Food Insecurity: No Food Insecurity (12/7/2022)    Hunger Vital Sign     Worried About Running Out of Food in the Last Year: Never true     Ran Out of Food in the Last Year: Never true   Transportation Needs: No Transportation Needs (12/7/2022)    PRAPARE - Transportation     Lack of Transportation (Medical): No     Lack of Transportation (Non-Medical): No   Physical Activity: Insufficiently Active (12/7/2022)    Exercise Vital Sign     Days of Exercise per Week: 7 days     Minutes of Exercise per Session: 20 min   Stress: No Stress Concern Present (12/7/2022)    [employer] - Occupational Stress Questionnaire     Feeling of Stress : Not at all   Social Connections: Moderately Isolated (12/7/2022)    Social Connection and Isolation Panel [NHANES]     Frequency of Communication with Friends and Family: More than three times a week     Frequency of Social Gatherings with Friends and Family: More than three times a week     Attends Anabaptism  Services: 1 to 4 times per year     Active Member of Clubs or Organizations: No     Attends Club or Organization Meetings: Never     Marital Status:    Intimate Partner Violence: Not on file   Housing Stability: Low Risk  (12/7/2022)    Housing Stability Vital Sign     Unable to Pay for Housing in the Last Year: No     Number of Places Lived in the Last Year: 1     Unstable Housing in the Last Year: No     Allergies   Allergen Reactions    Bactrim [Sulfamethoxazole W-Trimethoprim] Hives    Pcn [Penicillins] Hives     About 70 years. Tolerated ceftriaxone before in 2021    Codeine Unspecified     Unknown reaction      Tramadol Unspecified     Urinary retention     Outpatient Encounter Medications as of 4/23/2024   Medication Sig Dispense Refill    carvedilol (COREG) 6.25 MG Tab Take 1 Tablet by mouth 2 times a day with meals. 180 Tablet 3    gabapentin (NEURONTIN) 400 MG Cap Take 1 Capsule by mouth 3 times a day. 270 Capsule 0    montelukast (SINGULAIR) 10 MG Tab TAKE ONE TABLET BY MOUTH EVERY  EVENING 100 Tablet 1    omeprazole (PRILOSEC) 20 MG delayed-release capsule TAKE ONE CAPSULE BY MOUTH TWO  TIMES A  Capsule 1    amLODIPine (NORVASC) 5 MG Tab Take 1 Tablet by mouth every day. 100 Tablet 0    lisinopril (PRINIVIL) 20 MG Tab Take 1 Tablet by mouth every day. 100 Tablet 1    fluticasone (FLONASE) 50 MCG/ACT nasal spray USE 1 SPRAY IN EACH NOSTRIL ONCE A DAY 16 g 2    LORazepam (ATIVAN) 0.5 MG Tab Take 0.5 Tablets by mouth at bedtime as needed for Anxiety (sleep) for up to 30 days. Indications: Feeling Anxious, Trouble Sleeping 15 Tablet 0    isosorbide mononitrate SR (IMDUR) 60 MG TABLET SR 24 HR Take 1 Tablet by mouth every evening. 100 Tablet 3    atorvastatin (LIPITOR) 80 MG tablet Take 1 Tablet by mouth every evening. 100 Tablet 3    albuterol 108 (90 Base) MCG/ACT Aero Soln inhalation aerosol as needed.      aspirin 81 MG EC tablet Chew 81 mg every day.      CRANBERRY PO Take 3 Tablets by  "mouth every day.      Multiple Vitamins-Minerals (OCUVITE-LUTEIN) Tab Take 1 Tablet by mouth every day.      Saline (OCEAN NASAL SPRAY NA) Administer 1 Spray into affected nostril(S) every day.      Acetaminophen 500 MG Cap Take 1 Capsule by mouth 2 times a day. Morning & Afternoon, sometimes takes 3 times a day      Melatonin 10 MG Tab Take 15 mg by mouth at bedtime.      guaifenesin LA (MUCINEX) 600 MG TABLET SR 12 HR Take 600 mg by mouth every morning.      Ascorbic Acid (VITAMIN C) 1000 MG Tab Take 1 Tablet by mouth every morning.      VITAMIN E PO Take 1 Capsule by mouth every morning.      Cholecalciferol (VITAMIN D) 50 MCG (2000 UT) Cap Take 2,000 Units by mouth every morning.      [DISCONTINUED] carvedilol (COREG) 3.125 MG Tab Take 3.125 mg by mouth 2 times a day with meals.      [] cefdinir (OMNICEF) 300 MG Cap Take 1 Capsule by mouth 2 times a day for 5 days. 10 Capsule 0    [DISCONTINUED] carvedilol (COREG) 6.25 MG Tab Take 1 Tablet by mouth 2 times a day. (Patient not taking: Reported on 2024) 200 Tablet 0    [DISCONTINUED] gabapentin (NEURONTIN) 400 MG Cap Take 1 Capsule by mouth 3 times a day. 270 Capsule 0    [DISCONTINUED] omeprazole (PRILOSEC) 20 MG delayed-release capsule TAKE ONE CAPSULE BY MOUTH TWO TIMES A  Capsule 0    [DISCONTINUED] montelukast (SINGULAIR) 10 MG Tab Take 1 Tablet by mouth every evening. 100 Tablet 3     No facility-administered encounter medications on file as of 2024.     Review of Systems   All other systems reviewed and are negative.             Objective     /76 (BP Location: Left arm, Patient Position: Sitting)   Pulse 68   Resp 16   Ht 1.575 m (5' 2\")   Wt 55.8 kg (123 lb)   LMP  (LMP Unknown)   SpO2 96%   BMI 22.50 kg/m²     Physical Exam  Vitals and nursing note reviewed.   Constitutional:       General: She is not in acute distress.     Appearance: Normal appearance.   HENT:      Head: Normocephalic and atraumatic.      Right Ear: " External ear normal.      Left Ear: External ear normal.      Nose: Nose normal.   Eyes:      Conjunctiva/sclera: Conjunctivae normal.   Cardiovascular:      Rate and Rhythm: Normal rate and regular rhythm.      Pulses: Normal pulses.      Heart sounds: No murmur heard.  Pulmonary:      Effort: Pulmonary effort is normal. No respiratory distress.      Breath sounds: Normal breath sounds.   Abdominal:      General: There is no distension.      Palpations: Abdomen is soft.   Musculoskeletal:      Cervical back: No rigidity or tenderness.      Right lower leg: No edema.      Left lower leg: No edema.   Skin:     General: Skin is warm and dry.      Capillary Refill: Capillary refill takes 2 to 3 seconds.   Neurological:      General: No focal deficit present.      Mental Status: She is alert and oriented to person, place, and time.   Psychiatric:         Mood and Affect: Mood normal.         Behavior: Behavior normal.         Thought Content: Thought content normal.       LABS:  Lab Results   Component Value Date/Time    CHOLSTRLTOT 111 08/10/2023 08:44 AM    LDL 47 08/10/2023 08:44 AM    HDL 46 08/10/2023 08:44 AM    TRIGLYCERIDE 91 08/10/2023 08:44 AM       Lab Results   Component Value Date/Time    WBC 6.6 10/16/2023 12:22 PM    RBC 4.28 10/16/2023 12:22 PM    HEMOGLOBIN 13.9 10/16/2023 12:22 PM    HEMATOCRIT 42.8 10/16/2023 12:22 PM    .0 (H) 10/16/2023 12:22 PM    NEUTSPOLYS 55.90 10/16/2023 12:22 PM    LYMPHOCYTES 29.60 10/16/2023 12:22 PM    MONOCYTES 10.10 10/16/2023 12:22 PM    EOSINOPHILS 3.10 10/16/2023 12:22 PM    BASOPHILS 1.10 10/16/2023 12:22 PM     Lab Results   Component Value Date/Time    SODIUM 138 04/09/2024 10:49 AM    POTASSIUM 4.0 04/09/2024 10:49 AM    CHLORIDE 105 04/09/2024 10:49 AM    CO2 23 04/09/2024 10:49 AM    GLUCOSE 110 (H) 04/09/2024 10:49 AM    BUN 13 04/09/2024 10:49 AM    CREATININE 0.52 04/09/2024 10:49 AM         Lab Results   Component Value Date/Time    ALKPHOSPHAT 72  "04/09/2024 10:49 AM    ASTSGOT 20 04/09/2024 10:49 AM    ALTSGPT 16 04/09/2024 10:49 AM    TBILIRUBIN 0.3 04/09/2024 10:49 AM      Lab Results   Component Value Date/Time    BNPBTYPENAT 193 (H) 09/10/2016 04:00 PM      No results found for: \"TSH\"  Lab Results   Component Value Date/Time    PROTHROMBTM 12.5 10/16/2023 12:22 PM    INR 0.92 10/16/2023 12:22 PM                 Assessment & Plan     1. Essential hypertension  carvedilol (COREG) 6.25 MG Tab      2. Coronary artery disease involving native coronary artery of native heart with angina pectoris (HCC)        3. Atherosclerosis of aorta (HCC)        4. Dyslipidemia            Medical Decision Making: Today's Assessment/Status/Plan:        Doing well with no cardiovascular issues.  Tolerating medical therapy well.  Requested adjustment of her Coreg back to his normal dose of 6.25 mg twice daily which I think is reasonable.  Goal LDL less than 70 on statin therapy.  Follow-up routinely.        "

## 2024-05-04 DIAGNOSIS — I10 ESSENTIAL HYPERTENSION: Chronic | ICD-10-CM

## 2024-05-06 RX ORDER — AMLODIPINE BESYLATE 5 MG/1
5 TABLET ORAL DAILY
Qty: 100 TABLET | Refills: 3 | Status: SHIPPED | OUTPATIENT
Start: 2024-05-06

## 2024-05-06 NOTE — TELEPHONE ENCOUNTER
Received request via: Pharmacy    Was the patient seen in the last year in this department? Yes    Does the patient have an active prescription (recently filled or refills available) for medication(s) requested? No    [facility]    Does the patient have [facility] and need 100 day supply (blood pressure, diabetes and cholesterol meds only)? Yes, quantity updated to 100 days

## 2024-05-10 ENCOUNTER — OFFICE VISIT (OUTPATIENT)
Dept: BEHAVIORAL HEALTH | Facility: CLINIC | Age: 89
End: 2024-05-10
Payer: MEDICARE

## 2024-05-10 DIAGNOSIS — Z79.899 CHRONIC USE OF BENZODIAZEPINE FOR THERAPEUTIC PURPOSE: ICD-10-CM

## 2024-05-10 DIAGNOSIS — G47.09 OTHER INSOMNIA: ICD-10-CM

## 2024-05-10 PROCEDURE — 99214 OFFICE O/P EST MOD 30 MIN: CPT | Mod: GC | Performed by: PSYCHIATRY & NEUROLOGY

## 2024-05-10 RX ORDER — LORAZEPAM 0.5 MG/1
0.25 TABLET ORAL NIGHTLY PRN
Qty: 15 TABLET | Refills: 0 | Status: SHIPPED | OUTPATIENT
Start: 2024-05-10 | End: 2024-06-09

## 2024-05-10 RX ORDER — LORAZEPAM 0.5 MG/1
0.25 TABLET ORAL NIGHTLY PRN
Qty: 15 TABLET | Refills: 0 | Status: SHIPPED | OUTPATIENT
Start: 2024-07-09 | End: 2024-08-08

## 2024-05-10 RX ORDER — LORAZEPAM 0.5 MG/1
0.25 TABLET ORAL NIGHTLY PRN
Qty: 15 TABLET | Refills: 0 | Status: SHIPPED | OUTPATIENT
Start: 2024-06-09 | End: 2024-07-09

## 2024-05-10 NOTE — PROGRESS NOTES
PSYCHIATRY FOLLOW-UP NOTE    Name: Dayana Lechuga  MRN: [id number]  : 1929  Age: 89 y.o.  Date of assessment: 5/10/24  PCP: Monique Borjas P.A.-C.  Persons in attendance: Patient and Son    REASON FOR VISIT/CHIEF COMPLAINT (as stated by Patient):  Dayana Lechuga is a 89 y.o., White female, attending follow-up appointment for management of chronic benzodiazepine use.    HISTORY OF PRESENT ILLNESS:  Dayana Lechuga is a 89 y.o. 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 Patient was last seen 4 months ago, and following treatment planning recommendations were done:  - Continue lorazepam 0.25mg nightly for sleep/benzodiazepine dependence.  - Continue melatonin 10mg nightly for sleep.    Now using Ativan 2-3 times per week rather than nightly.     ***Since last visit, patient reports ongoing intermittent lightheadedness without any falls or near falls.  She has learned to get up slowly when she is feeling more lightheaded.  Denies any depression or elevated baseline anxiety.  She continues to use lorazepam 0.25 mg nightly and has not tried to go without it.  She would like to eventually not need this medication but she is unsure if she feels ready for that at this time as she has been taking it for over 20 years.  Her son notes that she has some psychological dependence and finds comfort in having it available, and patient agrees with his observation.  No issues with confusion or grogginess.  Remains physically active by walking her dog regularly and also going to the gym.  Has had new/worsened pain and constipation, which has caused some increased anxiety over the past few months.  Continues to be agreeable to try cutting the tablet into quarters rather has but notes that this will be difficult due to the already small size.  She also remains agreeable to attempting to skip a dose on nights she feels  especially tired.    CURRENT MEDICATIONS:  Current Outpatient Medications   Medication Sig Dispense Refill    amLODIPine (NORVASC) 5 MG Tab TAKE 1 TABLET BY MOUTH DAILY 100 Tablet 3    carvedilol (COREG) 6.25 MG Tab Take 1 Tablet by mouth 2 times a day with meals. 180 Tablet 3    gabapentin (NEURONTIN) 400 MG Cap Take 1 Capsule by mouth 3 times a day. 270 Capsule 0    montelukast (SINGULAIR) 10 MG Tab TAKE ONE TABLET BY MOUTH EVERY  EVENING 100 Tablet 1    omeprazole (PRILOSEC) 20 MG delayed-release capsule TAKE ONE CAPSULE BY MOUTH TWO  TIMES A  Capsule 1    lisinopril (PRINIVIL) 20 MG Tab Take 1 Tablet by mouth every day. 100 Tablet 1    fluticasone (FLONASE) 50 MCG/ACT nasal spray USE 1 SPRAY IN EACH NOSTRIL ONCE A DAY 16 g 2    isosorbide mononitrate SR (IMDUR) 60 MG TABLET SR 24 HR Take 1 Tablet by mouth every evening. 100 Tablet 3    atorvastatin (LIPITOR) 80 MG tablet Take 1 Tablet by mouth every evening. 100 Tablet 3    albuterol 108 (90 Base) MCG/ACT Aero Soln inhalation aerosol as needed.      aspirin 81 MG EC tablet Chew 81 mg every day.      CRANBERRY PO Take 3 Tablets by mouth every day.      Multiple Vitamins-Minerals (OCUVITE-LUTEIN) Tab Take 1 Tablet by mouth every day.      Saline (OCEAN NASAL SPRAY NA) Administer 1 Spray into affected nostril(S) every day.      Acetaminophen 500 MG Cap Take 1 Capsule by mouth 2 times a day. Morning & Afternoon, sometimes takes 3 times a day      Melatonin 10 MG Tab Take 15 mg by mouth at bedtime.      guaifenesin LA (MUCINEX) 600 MG TABLET SR 12 HR Take 600 mg by mouth every morning.      Ascorbic Acid (VITAMIN C) 1000 MG Tab Take 1 Tablet by mouth every morning.      VITAMIN E PO Take 1 Capsule by mouth every morning.      Cholecalciferol (VITAMIN D) 50 MCG (2000 UT) Cap Take 2,000 Units by mouth every morning.       No current facility-administered medications for this visit.     MEDICAL HISTORY  Past Medical History:   Diagnosis Date    Allergy      "Anxiety     ASTHMA     Bronchitis     CAD (coronary artery disease)     JT to RCA; 70% stenosis in LAD    Chills     Constipation     Difficulty breathing     GERD (gastroesophageal reflux disease)     Heart attack ([facility])     Heartburn     Hyperlipidemia     Hypertension     Influenza     Insomnia     Lumbar back pain 9/10/2016    Mild peripheral edema 7/31/2020    Mumps     OSTEOPOROSIS     Palpitations     Peripheral vascular disease, unspecified ([facility]) 9/14/2021    Pneumonia     Post concussion syndrome 9/11/2020    PVD (peripheral vascular disease) ([facility])     70% PAD-followed by Lary AMBROCIO    Sweat, sweating, excessive     Tonsillitis      Past Surgical History:   Procedure Laterality Date    [place] INJ LUMBAR/SACRAL,W/ IMAGING N/A 4/20/2023    Procedure: Caudal epidural with catheter;  Surgeon: Torres Fall M.D.;  Location: [facility];  Service: Pain Management    ABDOMINAL HYSTERECTOMY TOTAL      APPENDECTOMY      HERNIA REPAIR      HYSTERECTOMY LAPAROSCOPY      TONSILLECTOMY      Z CARDIAC CATH       PAST PSYCHIATRIC HISTORY  Prior psychiatric hospitalization: Denies.  Prior Self harm/suicide attempt: Denies/denies.  Prior Diagnosis: Anxiety, insomnia    PAST MEDICATION TRIALS:  Lorazepam for over 15 years.  Previously taking 2 mg daily.    FAMILY HISTORY  Psychiatric diagnosis: Denies.  History of suicide attempts: Denies.  Substance abuse history: Denies.    SUBSTANCE USE HISTORY:  ALCOHOL: Denies.  TOBACCO: Denies.  CANNABIS: Denies.  OPIOIDS: Denies.  PRESCRIPTION MEDICATIONS: Denies misuse.  OTHERS: Denies.  History of inpatient/outpatient rehab treatment: N/A    SOCIAL HISTORY  Childhood: born in [place] and describes childhood as \"good\".  Also spent time in [place].  Primarily lived in [place] during her adult life--[place] until 2004 when she moved to [place], [place].  Employment: Worked as a  and  throughout most of her life.  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  Relationship: Single  Kids: 1 son (Jl) living in [place]. 1 daughter living in [place].  Son helps patient attend doctors visits.  Has 1 grandchild and 2 great-grandchildren in [place].  Current living situation: Lives in a senior home center.  Primarily is independent but has support available if needed.  Lives with a dog named Deshawn who she walks every morning.  Current/past legal issues: Denies.    REVIEW OF SYSTEMS:        Constitutional negative   Eyes positive -hordeolum on Lt lower eyelid   Ears/Nose/Mouth/Throat negative   Cardiovascular negative   Respiratory negative   Gastrointestinal negative   Genitourinary positive -history of recurrent UTIs   Muscular positive -history of back pain   Integumentary negative   Neurological positive -history of sciatica   Endocrine negative   Hematologic/Lymphatic negative     PHYSICAL EXAMINAION:  Vital signs: LMP  (LMP Unknown)   Musculoskeletal: Normal gait.   Abnormal movements: None noted.    MENTAL STATUS EXAMINATION    General:   - Grooming and hygiene: Casual and Neat,   - Apparent distress: None noted.,   - Behavior: Calm and pleasant.  - Eye Contact:  Good,   - no psychomotor agitation or retardation    - Participation: Active verbal participation, Attentive, and Engaged  Orientation: Alert and Fully Oriented to person, place and time  Mood:  \"pretty good\"  Affect: Full range, Congruent with content, Bright, Happy, and nonirritable and nonlabile. ,  Thought Process: Logical, Goal-directed, and linear  Thought Content: Denies suicidal or homicidal ideations, intent or plan Within normal limits  Perception: Denies auditory or visual hallucinations. No delusions noted Within normal limits  Attention span and concentration: Intact   Speech:Rate within normal limits and Volume within normal limits  Language: Appropriate   Insight: Good  Judgment: Good  Recent and remote memory: No gross evidence of " memory deficits    DEPRESSION SCREENIN/14/2023    10:00 AM 2023     1:00 PM 2024     3:00 PM   Depression Screen (PHQ-2/PHQ-9)   PHQ-2 Total Score 0 0 0   Interpretation of PHQ-9 Total Score   Score Severity   1-4 No Depression   5-9 Mild Depression   10-14 Moderate Depression   15-19 Moderately Severe Depression   20-27 Severe Depression    CURRENT RISK:       Suicidal: Low       Homicidal: Low       Self-Harm: Low       Relapse: Not applicable       Crisis Safety Plan Reviewed Not Indicated       If evidence of imminent risk is present, intervention/plan: N/A    MEDICAL RECORDS/LABS/DIAGNOSTIC TESTS REVIEWED:  Labs drawn 10/16/23 show eGFR of 85 with CBC, CMP, UA, PT/INR WNL. Troponin elevated slightly at 23 (patient was in the ED for light headedness). No new labs.    [place]  records -   Reviewed; no concerns.    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  Patient was initially prescribed lorazepam nearly 20 years ago for nighttime anxiety regarding individual safety.  Over time, she has been able to reduce the dose of lorazepam from 2 mg nightly to 0.25 mg nightly.  Historically, patient has had difficulties decreasing the dose when dealing with acute medical issues that have most often been unrelated.  There are some ongoing concerns for balance issues that may reach the point of impairing function, but patient has been able to adapt by increasing the use of her walker and maintaining a level of physical activity/exercise that promotes independence and strength.  We will continue lorazepam 0.25 mg nightly.  Discussed attempting to cut a tablet into quarters to try a dose of approximately 0.125 mg but this may be difficult due to the size of the tablets.  Also discussed the option of forgoing the use of lorazepam on nights she feels particularly tired and/or relaxed.  Of note, patient and patient's son have  expressed concern about no longer having access to this medication.  Provided some reassurance that we will not be discontinuing it entirely unless patient feels ready for this or if patient's functional/medical status necessitates discontinuation.     DIAGNOSES & PLAN:  Chronic use of benzodiazepine for therapeutic purpose  Continue lorazepam 0.25 mg nightly for physiological dependence resulting in insomnia without the medication.  Discussed attempting to reduce further by dividing the tablets into quarters rather than halves.  Also discussed attempting to skip a dose on nights when she is feeling especially tired.  Continue to monitor for changes to cognition and stability with ambulation as well as other side effects of benzodiazepine use.  Other insomnia  Continue lorazepam and monitor for side effects as above.    Medication options, alternatives (including no medications) and medication risks/benefits/side effects were discussed in detail.  The patient was advised to call, message provider on [place], or come in to the clinic if symptoms worsen or if any future questions/issues regarding their medications arise; the patient verbalized understanding and agreement.  The patient was educated to call 911, call the suicide hotline, or go to local ER if having thoughts of suicide or homicide; verbalized understanding.    Billing Coding based on:  01163 based on [facility]  26713: based on psychotherapy timing  08203: based on total time spent of 40 min in evaluation, charting and file review.     Return to clinic in 3 months or sooner if symptoms worsen.  Next Appointment: instruction provided on how to make the next appointment.     The proposed treatment plan was discussed with the patient who was provided the opportunity to ask questions and make suggestions regarding alternative treatment. Patient verbalized understanding and expressed agreement with the plan.     Chela Smith M.D.  1/29/24    This note was  created using voice recognition software (Dragon). The accuracy of the dictation is limited by the abilities of the software. I have reviewed the note prior to signing, however some errors in grammar and context are still possible. If you have any questions related to this note please do not hesitate to contact our office.

## 2024-05-21 ENCOUNTER — PATIENT OUTREACH (OUTPATIENT)
Dept: HEALTH INFORMATION MANAGEMENT | Facility: OTHER | Age: 89
End: 2024-05-21

## 2024-05-21 ENCOUNTER — OFFICE VISIT (OUTPATIENT)
Dept: MEDICAL GROUP | Facility: PHYSICIAN GROUP | Age: 89
End: 2024-05-21
Payer: MEDICARE

## 2024-05-21 VITALS
DIASTOLIC BLOOD PRESSURE: 56 MMHG | OXYGEN SATURATION: 92 % | SYSTOLIC BLOOD PRESSURE: 116 MMHG | TEMPERATURE: 98 F | HEART RATE: 69 BPM | WEIGHT: 122 LBS | BODY MASS INDEX: 22.45 KG/M2 | HEIGHT: 62 IN

## 2024-05-21 DIAGNOSIS — I25.119 CORONARY ARTERY DISEASE INVOLVING NATIVE CORONARY ARTERY OF NATIVE HEART WITH ANGINA PECTORIS (HCC): ICD-10-CM

## 2024-05-21 DIAGNOSIS — E78.5 DYSLIPIDEMIA: ICD-10-CM

## 2024-05-21 DIAGNOSIS — Z79.899 CHRONIC USE OF BENZODIAZEPINE FOR THERAPEUTIC PURPOSE: ICD-10-CM

## 2024-05-21 DIAGNOSIS — I10 ESSENTIAL HYPERTENSION: ICD-10-CM

## 2024-05-21 DIAGNOSIS — I10 ESSENTIAL HYPERTENSION: Chronic | ICD-10-CM

## 2024-05-21 PROCEDURE — 3078F DIAST BP <80 MM HG: CPT | Performed by: STUDENT IN AN ORGANIZED HEALTH CARE EDUCATION/TRAINING PROGRAM

## 2024-05-21 PROCEDURE — 99213 OFFICE O/P EST LOW 20 MIN: CPT | Performed by: STUDENT IN AN ORGANIZED HEALTH CARE EDUCATION/TRAINING PROGRAM

## 2024-05-21 PROCEDURE — 3074F SYST BP LT 130 MM HG: CPT | Performed by: STUDENT IN AN ORGANIZED HEALTH CARE EDUCATION/TRAINING PROGRAM

## 2024-05-21 ASSESSMENT — ENCOUNTER SYMPTOMS
FEVER: 0
HEADACHES: 0
SHORTNESS OF BREATH: 0
CHILLS: 0
DIZZINESS: 0

## 2024-05-21 ASSESSMENT — FIBROSIS 4 INDEX: FIB4 SCORE: 2.55

## 2024-05-21 NOTE — PROGRESS NOTES
"Subjective:     CC:   Chief Complaint   Patient presents with    Hypertension Follow-up       HPI:   Dayana presents today with    Problem   Essential Hypertension    This is a chronic condition.  Current Meds:   - lisinopril 20  - amlodipine 5 mg daily  - carvedilol 6.25 mg twice daily   Side effects: none  Associated symptoms: Denies chest pain, shortness of breath, headaches, dizziness/lightheadedness               Past medical, family, and social history reviewed by me in Epic chart today.   Medications and allergies reviewed in Epic chart by me today.       Health Maintenance: Completed    ROS:  Review of Systems   Constitutional:  Negative for chills and fever.   Respiratory:  Negative for shortness of breath.    Cardiovascular:  Negative for chest pain.   Neurological:  Negative for dizziness and headaches.       Objective:     Exam:  /56 (BP Location: Left arm, Patient Position: Sitting, BP Cuff Size: Adult long)   Pulse 69   Temp 36.7 °C (98 °F) (Temporal)   Ht 1.575 m (5' 2\")   Wt 55.3 kg (122 lb)   LMP  (LMP Unknown)   SpO2 92%   BMI 22.31 kg/m²  Body mass index is 22.31 kg/m².    Physical Exam  Vitals reviewed.   Constitutional:       General: She is not in acute distress.  Eyes:      Extraocular Movements: Extraocular movements intact.   Cardiovascular:      Rate and Rhythm: Normal rate and regular rhythm.      Heart sounds: No murmur heard.     No friction rub. No gallop.   Pulmonary:      Effort: Pulmonary effort is normal.      Breath sounds: No wheezing, rhonchi or rales.   Musculoskeletal:      Cervical back: Neck supple.   Skin:     General: Skin is warm and dry.   Neurological:      Mental Status: She is alert.   Psychiatric:         Mood and Affect: Mood normal.           Assessment & Plan:     89 y.o. female with the following -     1. Essential hypertension  Chronic, controlled.  Blood pressure is at goal today.  Continue current regimen.             Return in about 3 months " (around 8/21/2024) for follow up.    Please note that this dictation was created using voice recognition software. I have made every reasonable attempt to correct obvious errors, but I expect that there are errors of grammar and possibly content that I did not discover before finalizing the note.

## 2024-05-21 NOTE — PROGRESS NOTES
Assessment    I spoke to the patient this morning while she was in clinic for a PCP appointment for her monthly and quarterly PCM follow ups. The patient's appointments were reviewed and she is aware. The patients care gaps were sent to her by Tanisha. The patient's medications were reviewed by Elizabeth Ramírez MA and the patient reports taking them as indicated in her chart. The patient denies any recent falls or injuries. The patient denies any cardiac issues. The patient denies any issues with nutrition or hydration. The patient states that she has been sleeping well after stopping Ativan therapy. The patient denies any pressing questions, needs or concerns at this time. The patient has been walking and getting outside for activity.     Education    Article on cholesterol sent by mail    Plan of Care and Goals    *Patient will remain free from falls/injuries  *Healthy nutrition, hydration, and activity    Barriers:    *Management of age related and chronic medical concerns    Progress:    Progressing    Next outreach:  Next PCM outreach one month

## 2024-06-11 ENCOUNTER — TELEPHONE (OUTPATIENT)
Dept: MEDICAL GROUP | Facility: PHYSICIAN GROUP | Age: 89
End: 2024-06-11
Payer: MEDICARE

## 2024-06-11 DIAGNOSIS — K59.09 CHRONIC CONSTIPATION: ICD-10-CM

## 2024-06-11 NOTE — TELEPHONE ENCOUNTER
VOICEMAIL  1. Caller Name: Dayana Lechuga                        Call Back Number: [phone] (home)       2. Message: patient Would like a referral to GI, she is having continuing issues, she advised they are not getting better and she would like to go see someone.     3. Patient approves office to leave a detailed voicemail/MyChart message: N\A

## 2024-06-13 ENCOUNTER — APPOINTMENT (OUTPATIENT)
Dept: MEDICAL GROUP | Facility: PHYSICIAN GROUP | Age: 89
End: 2024-06-13
Payer: MEDICARE

## 2024-06-18 ENCOUNTER — OFFICE VISIT (OUTPATIENT)
Dept: MEDICAL GROUP | Facility: PHYSICIAN GROUP | Age: 89
End: 2024-06-18
Payer: MEDICARE

## 2024-06-18 ENCOUNTER — HOSPITAL ENCOUNTER (OUTPATIENT)
Dept: RADIOLOGY | Facility: MEDICAL CENTER | Age: 89
End: 2024-06-18
Attending: STUDENT IN AN ORGANIZED HEALTH CARE EDUCATION/TRAINING PROGRAM
Payer: MEDICARE

## 2024-06-18 VITALS
HEART RATE: 62 BPM | TEMPERATURE: 97.6 F | DIASTOLIC BLOOD PRESSURE: 60 MMHG | OXYGEN SATURATION: 98 % | SYSTOLIC BLOOD PRESSURE: 130 MMHG | BODY MASS INDEX: 21.9 KG/M2 | WEIGHT: 119 LBS | HEIGHT: 62 IN

## 2024-06-18 DIAGNOSIS — K59.00 CONSTIPATION, UNSPECIFIED CONSTIPATION TYPE: ICD-10-CM

## 2024-06-18 DIAGNOSIS — R19.4 CHANGE IN BOWEL HABITS: ICD-10-CM

## 2024-06-18 PROCEDURE — 99213 OFFICE O/P EST LOW 20 MIN: CPT | Performed by: STUDENT IN AN ORGANIZED HEALTH CARE EDUCATION/TRAINING PROGRAM

## 2024-06-18 PROCEDURE — 3075F SYST BP GE 130 - 139MM HG: CPT | Performed by: STUDENT IN AN ORGANIZED HEALTH CARE EDUCATION/TRAINING PROGRAM

## 2024-06-18 PROCEDURE — 74018 RADEX ABDOMEN 1 VIEW: CPT

## 2024-06-18 PROCEDURE — 3078F DIAST BP <80 MM HG: CPT | Performed by: STUDENT IN AN ORGANIZED HEALTH CARE EDUCATION/TRAINING PROGRAM

## 2024-06-18 ASSESSMENT — ENCOUNTER SYMPTOMS
CHILLS: 0
FEVER: 0
SHORTNESS OF BREATH: 0
HEADACHES: 0
DIZZINESS: 0

## 2024-06-18 ASSESSMENT — FIBROSIS 4 INDEX: FIB4 SCORE: 2.55

## 2024-06-18 NOTE — PROGRESS NOTES
"Verbal consent was acquired by the patient to use [employer] ambient listening note generation during this visit.    Subjective:     HPI:   History of Present Illness  The patient presents for follow-up of constipation.     This has been a chronic issue for the patient.  Recently, she has noticed improvement in symptoms with increased water intake, fiber supplementation, and MiraLAX as needed.  She rarely takes MiraLAX.  However, she has noticed increased frequency of bowel movements in the past 2 days.  Denies diarrhea, loose stools, or hard stools, but states that it does feel like she has to strain to have a bowel movement.  She also has a sensation that she is not evacuating completely.  She denies any urgency or incontinence.  She describes some abdominal discomfort but no pain.  She also reports feeling generally fatigued, but denies any nausea or vomiting.          Health Maintenance: Completed    ROS:  Review of Systems   Constitutional:  Negative for chills and fever.   Respiratory:  Negative for shortness of breath.    Cardiovascular:  Negative for chest pain.   Neurological:  Negative for dizziness and headaches.       Objective:     Exam:  /60 (BP Location: Left arm, Patient Position: Sitting, BP Cuff Size: Adult)   Pulse 62   Temp 36.4 °C (97.6 °F) (Temporal)   Ht 1.575 m (5' 2\")   Wt 54 kg (119 lb)   LMP  (LMP Unknown)   SpO2 98%   BMI 21.77 kg/m²  Body mass index is 21.77 kg/m².    Physical Exam  Vitals reviewed.   Constitutional:       General: She is not in acute distress.  Eyes:      Extraocular Movements: Extraocular movements intact.   Cardiovascular:      Rate and Rhythm: Normal rate and regular rhythm.      Heart sounds: No murmur heard.     No friction rub. No gallop.   Pulmonary:      Effort: Pulmonary effort is normal.      Breath sounds: No wheezing, rhonchi or rales.   Abdominal:      General: There is no distension.      Palpations: Abdomen is soft.      Comments: Mild " generalized tenderness in the lower abdomen.  No rebound or guarding.   Musculoskeletal:      Cervical back: Neck supple.   Skin:     General: Skin is warm and dry.   Neurological:      Mental Status: She is alert.   Psychiatric:         Mood and Affect: Mood normal.           Results      Assessment & Plan:     1. Constipation, unspecified constipation type  [id number] VIEW      2. Change in bowel habits            Assessment & Plan  1. Change in bowel habits  Constipation is chronic in nature, is not generally well-controlled, with acute change in bowel habits in the past couple of days.  Abdominal exam is benign.  Will get an abdominal x-ray.  Patient has an outstanding referral to gastroenterology, I have given her contact information to make an appointment.  Continue treating with dietary modifications for constipation.  We will follow-up in 2 to 3 weeks if symptoms have persisted.            Please note that this dictation was created using voice recognition software. I have made every reasonable attempt to correct obvious errors, but I expect that there are errors of grammar and possibly content that I did not discover before finalizing the note.

## 2024-06-24 ENCOUNTER — PATIENT OUTREACH (OUTPATIENT)
Dept: HEALTH INFORMATION MANAGEMENT | Facility: OTHER | Age: 89
End: 2024-06-24
Payer: MEDICARE

## 2024-06-24 DIAGNOSIS — I25.119 CORONARY ARTERY DISEASE INVOLVING NATIVE CORONARY ARTERY OF NATIVE HEART WITH ANGINA PECTORIS (HCC): ICD-10-CM

## 2024-06-24 DIAGNOSIS — E78.5 DYSLIPIDEMIA: ICD-10-CM

## 2024-06-24 DIAGNOSIS — I20.89 STABLE ANGINA PECTORIS (HCC): ICD-10-CM

## 2024-06-24 DIAGNOSIS — I10 ESSENTIAL HYPERTENSION: ICD-10-CM

## 2024-06-24 DIAGNOSIS — F41.9 ANXIETY: ICD-10-CM

## 2024-06-24 NOTE — TELEPHONE ENCOUNTER
Received request via: Pharmacy    Was the patient seen in the last year in this department? Yes    Does the patient have an active prescription (recently filled or refills available) for medication(s) requested? No    [facility]    Does the patient have [facility] Plus and need 100 day supply (blood pressure, diabetes and cholesterol meds only)? Yes, quantity updated to 100 days

## 2024-06-25 RX ORDER — ISOSORBIDE MONONITRATE 60 MG/1
60 TABLET, EXTENDED RELEASE ORAL EVERY EVENING
Qty: 100 TABLET | Refills: 3 | Status: SHIPPED | OUTPATIENT
Start: 2024-06-25

## 2024-06-28 NOTE — PROGRESS NOTES
Assessment    I spoke to the patient this morning for her monthly PCM follow up. Patient denies any significant changes to her overall health or circumstances. She states she is going to work on scheduling a GI appointment per the referral that is outstanding in her chart from her PCP but needed to wait to schedule until her son returned from out of town since he would have to take her. The patient reports her constipation is unchanged. She denies any recent falls or injuries. She denies any cardiac or respiratory symptoms at this time. The patient was provided with contact information once again to reach me if needed.     Education    Methods for accessing care    Plan of Care and Goals    Patient will remain free from falls, injuries.   Healthy nutrition, hydration, and activity  Follow up with PCP, specialists as indicated.     Barriers:    Management of age related and chronic medical concerns.     Progress:    In process    Next outreach:  One Month

## 2024-07-01 ENCOUNTER — PATIENT OUTREACH (OUTPATIENT)
Dept: HEALTH INFORMATION MANAGEMENT | Facility: OTHER | Age: 89
End: 2024-07-01
Payer: MEDICARE

## 2024-07-01 DIAGNOSIS — E78.5 DYSLIPIDEMIA: ICD-10-CM

## 2024-07-01 DIAGNOSIS — Z79.899 CHRONIC USE OF BENZODIAZEPINE FOR THERAPEUTIC PURPOSE: ICD-10-CM

## 2024-07-01 DIAGNOSIS — R33.9 INCOMPLETE EMPTYING OF BLADDER: ICD-10-CM

## 2024-07-01 DIAGNOSIS — I10 ESSENTIAL HYPERTENSION: ICD-10-CM

## 2024-07-05 RX ORDER — GABAPENTIN 400 MG/1
400 CAPSULE ORAL 3 TIMES DAILY
Qty: 270 CAPSULE | Refills: 1 | Status: SHIPPED | OUTPATIENT
Start: 2024-07-05

## 2024-07-09 ENCOUNTER — TELEPHONE (OUTPATIENT)
Dept: HEALTH INFORMATION MANAGEMENT | Facility: OTHER | Age: 89
End: 2024-07-09
Payer: MEDICARE

## 2024-07-10 ENCOUNTER — TELEPHONE (OUTPATIENT)
Dept: HEALTH INFORMATION MANAGEMENT | Facility: OTHER | Age: 89
End: 2024-07-10
Payer: MEDICARE

## 2024-07-16 ENCOUNTER — PHARMACY VISIT (OUTPATIENT)
Dept: PHARMACY | Facility: MEDICAL CENTER | Age: 89
End: 2024-07-16

## 2024-07-16 ENCOUNTER — APPOINTMENT (OUTPATIENT)
Dept: RADIOLOGY | Facility: MEDICAL CENTER | Age: 89
End: 2024-07-16
Attending: EMERGENCY MEDICINE
Payer: MEDICARE

## 2024-07-16 ENCOUNTER — OFFICE VISIT (OUTPATIENT)
Dept: URGENT CARE | Facility: CLINIC | Age: 89
End: 2024-07-16
Payer: MEDICARE

## 2024-07-16 ENCOUNTER — HOSPITAL ENCOUNTER (EMERGENCY)
Facility: MEDICAL CENTER | Age: 89
End: 2024-07-16
Attending: EMERGENCY MEDICINE
Payer: MEDICARE

## 2024-07-16 VITALS
RESPIRATION RATE: 16 BRPM | OXYGEN SATURATION: 94 % | DIASTOLIC BLOOD PRESSURE: 70 MMHG | WEIGHT: 121 LBS | HEART RATE: 73 BPM | BODY MASS INDEX: 22.84 KG/M2 | HEIGHT: 61 IN | TEMPERATURE: 99.8 F | SYSTOLIC BLOOD PRESSURE: 138 MMHG

## 2024-07-16 VITALS
HEART RATE: 68 BPM | RESPIRATION RATE: 20 BRPM | BODY MASS INDEX: 22.99 KG/M2 | TEMPERATURE: 98.4 F | OXYGEN SATURATION: 92 % | DIASTOLIC BLOOD PRESSURE: 70 MMHG | SYSTOLIC BLOOD PRESSURE: 160 MMHG | WEIGHT: 119.71 LBS

## 2024-07-16 DIAGNOSIS — M54.6 ACUTE BILATERAL THORACIC BACK PAIN: ICD-10-CM

## 2024-07-16 DIAGNOSIS — S29.012A RHOMBOID MUSCLE STRAIN, INITIAL ENCOUNTER: ICD-10-CM

## 2024-07-16 LAB
ALBUMIN SERPL BCP-MCNC: 4.3 G/DL (ref 3.2–4.9)
ALBUMIN/GLOB SERPL: 1.8 G/DL
ALP SERPL-CCNC: 79 U/L (ref 30–99)
ALT SERPL-CCNC: 23 U/L (ref 2–50)
ANION GAP SERPL CALC-SCNC: 13 MMOL/L (ref 7–16)
AST SERPL-CCNC: 24 U/L (ref 12–45)
BASOPHILS # BLD AUTO: 1.2 % (ref 0–1.8)
BASOPHILS # BLD: 0.09 K/UL (ref 0–0.12)
BILIRUB SERPL-MCNC: 0.2 MG/DL (ref 0.1–1.5)
BUN SERPL-MCNC: 12 MG/DL (ref 8–22)
CALCIUM ALBUM COR SERPL-MCNC: 9.8 MG/DL (ref 8.5–10.5)
CALCIUM SERPL-MCNC: 10 MG/DL (ref 8.5–10.5)
CHLORIDE SERPL-SCNC: 104 MMOL/L (ref 96–112)
CO2 SERPL-SCNC: 22 MMOL/L (ref 20–33)
CREAT SERPL-MCNC: 0.57 MG/DL (ref 0.5–1.4)
EKG IMPRESSION: NORMAL
EOSINOPHIL # BLD AUTO: 0.26 K/UL (ref 0–0.51)
EOSINOPHIL NFR BLD: 3.4 % (ref 0–6.9)
ERYTHROCYTE [DISTWIDTH] IN BLOOD BY AUTOMATED COUNT: 44.3 FL (ref 35.9–50)
GFR SERPLBLD CREATININE-BSD FMLA CKD-EPI: 83 ML/MIN/1.73 M 2
GLOBULIN SER CALC-MCNC: 2.4 G/DL (ref 1.9–3.5)
GLUCOSE SERPL-MCNC: 169 MG/DL (ref 65–99)
HCT VFR BLD AUTO: 36.6 % (ref 37–47)
HGB BLD-MCNC: 12.6 G/DL (ref 12–16)
IMM GRANULOCYTES # BLD AUTO: 0.02 K/UL (ref 0–0.11)
IMM GRANULOCYTES NFR BLD AUTO: 0.3 % (ref 0–0.9)
LYMPHOCYTES # BLD AUTO: 2.27 K/UL (ref 1–4.8)
LYMPHOCYTES NFR BLD: 29.8 % (ref 22–41)
MCH RBC QN AUTO: 33.3 PG (ref 27–33)
MCHC RBC AUTO-ENTMCNC: 34.4 G/DL (ref 32.2–35.5)
MCV RBC AUTO: 96.8 FL (ref 81.4–97.8)
MONOCYTES # BLD AUTO: 0.73 K/UL (ref 0–0.85)
MONOCYTES NFR BLD AUTO: 9.6 % (ref 0–13.4)
NEUTROPHILS # BLD AUTO: 4.26 K/UL (ref 1.82–7.42)
NEUTROPHILS NFR BLD: 55.7 % (ref 44–72)
NRBC # BLD AUTO: 0 K/UL
NRBC BLD-RTO: 0 /100 WBC (ref 0–0.2)
PLATELET # BLD AUTO: 189 K/UL (ref 164–446)
PMV BLD AUTO: 10.1 FL (ref 9–12.9)
POTASSIUM SERPL-SCNC: 3.7 MMOL/L (ref 3.6–5.5)
PROT SERPL-MCNC: 6.7 G/DL (ref 6–8.2)
RBC # BLD AUTO: 3.78 M/UL (ref 4.2–5.4)
SODIUM SERPL-SCNC: 139 MMOL/L (ref 135–145)
TROPONIN T SERPL-MCNC: 18 NG/L (ref 6–19)
WBC # BLD AUTO: 7.6 K/UL (ref 4.8–10.8)

## 2024-07-16 PROCEDURE — 84484 ASSAY OF TROPONIN QUANT: CPT

## 2024-07-16 PROCEDURE — 93005 ELECTROCARDIOGRAM TRACING: CPT | Performed by: EMERGENCY MEDICINE

## 2024-07-16 PROCEDURE — 93000 ELECTROCARDIOGRAM COMPLETE: CPT | Performed by: PHYSICIAN ASSISTANT

## 2024-07-16 PROCEDURE — 85025 COMPLETE CBC W/AUTO DIFF WBC: CPT

## 2024-07-16 PROCEDURE — RXOTC WILLOW AMBULATORY OTC CHARGE

## 2024-07-16 PROCEDURE — 80053 COMPREHEN METABOLIC PANEL: CPT

## 2024-07-16 PROCEDURE — 700101 HCHG RX REV CODE 250: Performed by: EMERGENCY MEDICINE

## 2024-07-16 PROCEDURE — 99213 OFFICE O/P EST LOW 20 MIN: CPT | Performed by: PHYSICIAN ASSISTANT

## 2024-07-16 PROCEDURE — 71046 X-RAY EXAM CHEST 2 VIEWS: CPT

## 2024-07-16 PROCEDURE — 36415 COLL VENOUS BLD VENIPUNCTURE: CPT

## 2024-07-16 PROCEDURE — 99284 EMERGENCY DEPT VISIT MOD MDM: CPT

## 2024-07-16 RX ORDER — LIDOCAINE 50 MG/G
1 PATCH TOPICAL EVERY 24 HOURS
Qty: 10 PATCH | Refills: 0 | Status: SHIPPED | OUTPATIENT
Start: 2024-07-16

## 2024-07-16 RX ORDER — LIDOCAINE 4 G/G
1 PATCH TOPICAL EVERY 24 HOURS
Status: DISCONTINUED | OUTPATIENT
Start: 2024-07-16 | End: 2024-07-16 | Stop reason: HOSPADM

## 2024-07-16 RX ADMIN — LIDOCAINE 1 PATCH: 4 PATCH TOPICAL at 17:45

## 2024-07-16 ASSESSMENT — ENCOUNTER SYMPTOMS
ABDOMINAL PAIN: 0
VOMITING: 0
BACK PAIN: 1
HEADACHES: 0
CHILLS: 0
FEVER: 0
SHORTNESS OF BREATH: 0
DIZZINESS: 0
NAUSEA: 0
SPUTUM PRODUCTION: 0
DIARRHEA: 0
WHEEZING: 0
COUGH: 0
PALPITATIONS: 0

## 2024-07-16 ASSESSMENT — FIBROSIS 4 INDEX
FIB4 SCORE: 2.55
FIB4 SCORE: 2.55

## 2024-07-18 DIAGNOSIS — I10 ESSENTIAL HYPERTENSION: Chronic | ICD-10-CM

## 2024-07-18 RX ORDER — LISINOPRIL 20 MG/1
20 TABLET ORAL DAILY
Qty: 100 TABLET | Refills: 3 | Status: SHIPPED | OUTPATIENT
Start: 2024-07-18

## 2024-07-23 ENCOUNTER — PATIENT OUTREACH (OUTPATIENT)
Dept: HEALTH INFORMATION MANAGEMENT | Facility: OTHER | Age: 89
End: 2024-07-23
Payer: MEDICARE

## 2024-07-23 ENCOUNTER — TELEPHONE (OUTPATIENT)
Dept: HEALTH INFORMATION MANAGEMENT | Facility: OTHER | Age: 89
End: 2024-07-23

## 2024-07-23 DIAGNOSIS — R19.4 FREQUENT BOWEL MOVEMENTS: ICD-10-CM

## 2024-07-23 DIAGNOSIS — F41.9 ANXIETY: ICD-10-CM

## 2024-07-23 DIAGNOSIS — I10 ESSENTIAL HYPERTENSION: ICD-10-CM

## 2024-07-23 DIAGNOSIS — I25.119 CORONARY ARTERY DISEASE INVOLVING NATIVE CORONARY ARTERY OF NATIVE HEART WITH ANGINA PECTORIS (HCC): ICD-10-CM

## 2024-07-23 DIAGNOSIS — E78.5 DYSLIPIDEMIA: ICD-10-CM

## 2024-07-30 DIAGNOSIS — J30.9 ALLERGIC RHINITIS, UNSPECIFIED SEASONALITY, UNSPECIFIED TRIGGER: ICD-10-CM

## 2024-07-30 RX ORDER — FLUTICASONE PROPIONATE 50 MCG
SPRAY, SUSPENSION (ML) NASAL
Qty: 16 G | Refills: 2 | Status: SHIPPED | OUTPATIENT
Start: 2024-07-30

## 2024-08-06 ENCOUNTER — TELEPHONE (OUTPATIENT)
Dept: HEALTH INFORMATION MANAGEMENT | Facility: OTHER | Age: 89
End: 2024-08-06
Payer: MEDICARE

## 2024-08-06 NOTE — TELEPHONE ENCOUNTER
Patient called to confirm date and time of next PCP appointment. Patient was provided with the information for her appointment on 8/13.

## 2024-08-09 ENCOUNTER — TELEPHONE (OUTPATIENT)
Dept: HEALTH INFORMATION MANAGEMENT | Facility: OTHER | Age: 89
End: 2024-08-09
Payer: MEDICARE

## 2024-08-09 ENCOUNTER — OFFICE VISIT (OUTPATIENT)
Dept: BEHAVIORAL HEALTH | Facility: CLINIC | Age: 89
End: 2024-08-09
Payer: MEDICARE

## 2024-08-09 DIAGNOSIS — Z86.59 HISTORY OF ANXIETY: ICD-10-CM

## 2024-08-09 DIAGNOSIS — Z87.898 HISTORY OF BENZODIAZEPINE USE: ICD-10-CM

## 2024-08-09 DIAGNOSIS — Z87.898 HISTORY OF INSOMNIA: ICD-10-CM

## 2024-08-09 PROCEDURE — 90792 PSYCH DIAG EVAL W/MED SRVCS: CPT | Performed by: PSYCHIATRY & NEUROLOGY

## 2024-08-09 ASSESSMENT — ANXIETY QUESTIONNAIRES
6. BECOMING EASILY ANNOYED OR IRRITABLE: SEVERAL DAYS
2. NOT BEING ABLE TO STOP OR CONTROL WORRYING: NOT AT ALL
7. FEELING AFRAID AS IF SOMETHING AWFUL MIGHT HAPPEN: NOT AT ALL
1. FEELING NERVOUS, ANXIOUS, OR ON EDGE: NOT AT ALL
3. WORRYING TOO MUCH ABOUT DIFFERENT THINGS: SEVERAL DAYS
4. TROUBLE RELAXING: NOT AT ALL
IF YOU CHECKED OFF ANY PROBLEMS ON THIS QUESTIONNAIRE, HOW DIFFICULT HAVE THESE PROBLEMS MADE IT FOR YOU TO DO YOUR WORK, TAKE CARE OF THINGS AT HOME, OR GET ALONG WITH OTHER PEOPLE: NOT DIFFICULT AT ALL
5. BEING SO RESTLESS THAT IT IS HARD TO SIT STILL: NOT AT ALL
GAD7 TOTAL SCORE: 2

## 2024-08-09 ASSESSMENT — PATIENT HEALTH QUESTIONNAIRE - PHQ9
5. POOR APPETITE OR OVEREATING: 0 - NOT AT ALL
SUM OF ALL RESPONSES TO PHQ QUESTIONS 1-9: 0
CLINICAL INTERPRETATION OF PHQ2 SCORE: 0

## 2024-08-09 ASSESSMENT — ENCOUNTER SYMPTOMS
WHEEZING: 0
BLOOD IN STOOL: 0
FALLS: 0
EYE DISCHARGE: 0

## 2024-08-09 NOTE — TELEPHONE ENCOUNTER
Patient called to confirm address for comprehensive health assessment next Friday. No appointment noted in futures. Appointment noted as cancelled under past appointments. I contacted [facility] as one of the personal assistants made and cancelled the appointment. Geovanna Addison agrees to call patient after lunch today to clarify and do scheduling.
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
## 2024-08-13 ENCOUNTER — PATIENT OUTREACH (OUTPATIENT)
Dept: HEALTH INFORMATION MANAGEMENT | Facility: OTHER | Age: 89
End: 2024-08-13

## 2024-08-13 ENCOUNTER — OFFICE VISIT (OUTPATIENT)
Dept: MEDICAL GROUP | Facility: PHYSICIAN GROUP | Age: 89
End: 2024-08-13
Payer: MEDICARE

## 2024-08-13 VITALS
SYSTOLIC BLOOD PRESSURE: 116 MMHG | WEIGHT: 123 LBS | HEART RATE: 66 BPM | DIASTOLIC BLOOD PRESSURE: 54 MMHG | TEMPERATURE: 98.4 F | BODY MASS INDEX: 24.15 KG/M2 | OXYGEN SATURATION: 96 % | HEIGHT: 60 IN

## 2024-08-13 DIAGNOSIS — I10 ESSENTIAL HYPERTENSION: ICD-10-CM

## 2024-08-13 DIAGNOSIS — R26.89 IMBALANCE: ICD-10-CM

## 2024-08-13 DIAGNOSIS — R19.4 FREQUENT BOWEL MOVEMENTS: ICD-10-CM

## 2024-08-13 DIAGNOSIS — K59.09 CHRONIC CONSTIPATION: ICD-10-CM

## 2024-08-13 DIAGNOSIS — I25.119 CORONARY ARTERY DISEASE INVOLVING NATIVE CORONARY ARTERY OF NATIVE HEART WITH ANGINA PECTORIS (HCC): ICD-10-CM

## 2024-08-13 DIAGNOSIS — F41.9 ANXIETY: ICD-10-CM

## 2024-08-13 DIAGNOSIS — R33.9 INCOMPLETE EMPTYING OF BLADDER: ICD-10-CM

## 2024-08-13 DIAGNOSIS — F51.01 PRIMARY INSOMNIA: ICD-10-CM

## 2024-08-13 DIAGNOSIS — E78.5 DYSLIPIDEMIA: Chronic | ICD-10-CM

## 2024-08-13 DIAGNOSIS — E78.5 DYSLIPIDEMIA: ICD-10-CM

## 2024-08-13 DIAGNOSIS — I10 ESSENTIAL HYPERTENSION: Chronic | ICD-10-CM

## 2024-08-13 DIAGNOSIS — Z79.899 CHRONIC USE OF BENZODIAZEPINE FOR THERAPEUTIC PURPOSE: ICD-10-CM

## 2024-08-13 PROBLEM — F13.20 SEDATIVE, HYPNOTIC OR ANXIOLYTIC DEPENDENCE, UNCOMPLICATED (HCC): Status: RESOLVED | Noted: 2023-10-20 | Resolved: 2024-08-13

## 2024-08-13 PROCEDURE — 3074F SYST BP LT 130 MM HG: CPT | Performed by: STUDENT IN AN ORGANIZED HEALTH CARE EDUCATION/TRAINING PROGRAM

## 2024-08-13 PROCEDURE — 3078F DIAST BP <80 MM HG: CPT | Performed by: STUDENT IN AN ORGANIZED HEALTH CARE EDUCATION/TRAINING PROGRAM

## 2024-08-13 PROCEDURE — 99214 OFFICE O/P EST MOD 30 MIN: CPT | Performed by: STUDENT IN AN ORGANIZED HEALTH CARE EDUCATION/TRAINING PROGRAM

## 2024-08-13 RX ORDER — DOCUSATE SODIUM 100 MG/1
100 CAPSULE, LIQUID FILLED ORAL DAILY
COMMUNITY
Start: 2024-08-02

## 2024-08-13 ASSESSMENT — FIBROSIS 4 INDEX: FIB4 SCORE: 2.52

## 2024-08-13 ASSESSMENT — ENCOUNTER SYMPTOMS
SHORTNESS OF BREATH: 0
CHILLS: 0
FEVER: 0
DIZZINESS: 0
HEADACHES: 0

## 2024-08-13 NOTE — LETTER
[facility]  Monique Borjas P.A.-C.  [address]  [place] [address]  Fax: [phone]   Authorization for Release/Disclosure of   Protected Health Information   Name: DAYANA OAKLEY : 1929 SSN: [id number]   Address: [address] [place] [address] [address]  [place] [address] Phone:    [phone] (home)    I authorize the entity listed below to release/disclose the PHI below to:   [facility]/Monique Borjas P.A.-C. and Monique Borjas P.A.-C.   Provider or Entity Name:  [facility]   Address   City, State, [facility]   Phone:      Fax:     Reason for request: continuity of care   Information to be released:    [  ] LAST COLONOSCOPY,  including any PATH REPORT and follow-up  [  ] LAST FIT/COLOGUARD RESULT [  ] LAST DEXA  [  ] LAST MAMMOGRAM  [  ] LAST PAP  [  ] LAST LABS [  ] RETINA EXAM REPORT  [  ] IMMUNIZATION RECORDS  [ xxx ] Visit notes      [  ] Check here and initial the line next to each item to release ALL health information INCLUDING  _____ Care and treatment for drug and / or alcohol abuse  _____ HIV testing, infection status, or AIDS  _____ Genetic Testing    DATES OF SERVICE OR TIME PERIOD TO BE DISCLOSED: _____________  I understand and acknowledge that:  * This Authorization may be revoked at any time by you in writing, except if your health information has already been used or disclosed.  * Your health information that will be used or disclosed as a result of you signing this authorization could be re-disclosed by the recipient. If this occurs, your re-disclosed health information may no longer be protected by State or Federal laws.  * You may refuse to sign this Authorization. Your refusal will not affect your ability to obtain treatment.  * This Authorization becomes effective upon signing and will  on (date) __________.      If no date is indicated, this Authorization will  one (1) year from the signature date.    Name: Dayana Mendieta  Alexandrea  Signature: Date:   8/13/2024     PLEASE FAX REQUESTED RECORDS BACK TO: [phone]

## 2024-08-13 NOTE — PROGRESS NOTES
Assessment    I spoke to the patient this morning for her PCM monthly and quarterly PCM review. The patient's medications were reviewed by her PCP and she reports that they are being taken as indicated. The patient denies any recent falls or injuries. The patient denies any cardiac or urological symptoms at this time. She states that she is still dealing with some constipation. Non pharmacological measures such as mobility, hydration, and fiber reviewed. Patient voices understanding. The patient reports no other pressing questions or concerns. The patient voices understanding of how to get a hold of him if needed.     Education and Self Management of Care    Article on constipation sent by mail.   Patient will continue to follow up with PCP/specialists as indicated.     Plan of Care and Goals    Patient will remain free from falls/injuries  Healthy nutrition, hydration, and activity    Barriers:    Management of age related and chronic health concerns    Progress:    Stable    Next outreach:  One Month

## 2024-08-13 NOTE — PROGRESS NOTES
Verbal consent was acquired by the patient to use [employer] ambient listening note generation during this visit.    Subjective:     HPI:   History of Present Illness  The patient presents for evaluation of multiple medical concerns. She is accompanied by her son.    She recently consulted a gastroenterologist for chronic constipation.  Continues Metamucil and a probiotic every morning.  She was also prescribed Colace, taking 100 mg daily.  She still does not have a bowel movement every day but thinks that her symptoms have improved.      She has discontinued lorazepam which was previously taken for sleep.  She states she is taking a melatonin supplement nightly and is sleeping well.     Her blood pressure is well-controlled.  She continues lisinopril 20 mg daily, amlodipine 5 mg daily, and Coreg 6.25 mg twice daily.  She denies any chest pain, shortness of breath, leg swelling.      Past medical, surgical, family, and social history were reviewed and updated.     Medications:    Current Outpatient Medications:     docusate sodium (COLACE) 100 MG Cap, Take 100 mg by mouth every day., Disp: , Rfl:     Melatonin 10 MG Tab, Take 10 mg by mouth at bedtime., Disp: , Rfl:     fluticasone (FLONASE) 50 MCG/ACT nasal spray, USE 1 SPRAY IN BOTH NOSTRILS  ONCE DAILY, Disp: 16 g, Rfl: 2    lisinopril (PRINIVIL) 20 MG Tab, TAKE 1 TABLET BY MOUTH DAILY, Disp: 100 Tablet, Rfl: 3    lidocaine (LIDODERM) 5 % Patch, Place 1 Patch on the skin every 24 hours. (12 hours on,12 hours off), Disp: 10 Patch, Rfl: 0    gabapentin (NEURONTIN) 400 MG Cap, TAKE 1 CAPSULE BY MOUTH 3 TIMES  DAILY, Disp: 270 Capsule, Rfl: 1    isosorbide mononitrate SR (IMDUR) 60 MG TABLET SR 24 HR, Take 1 Tablet by mouth every evening., Disp: 100 Tablet, Rfl: 3    amLODIPine (NORVASC) 5 MG Tab, TAKE 1 TABLET BY MOUTH DAILY, Disp: 100 Tablet, Rfl: 3    carvedilol (COREG) 6.25 MG Tab, Take 1 Tablet by mouth 2 times a day with meals., Disp: 180 Tablet, Rfl: 3     montelukast (SINGULAIR) 10 MG Tab, TAKE ONE TABLET BY MOUTH EVERY  EVENING, Disp: 100 Tablet, Rfl: 1    omeprazole (PRILOSEC) 20 MG delayed-release capsule, TAKE ONE CAPSULE BY MOUTH TWO  TIMES A DAY, Disp: 200 Capsule, Rfl: 1    atorvastatin (LIPITOR) 80 MG tablet, Take 1 Tablet by mouth every evening., Disp: 100 Tablet, Rfl: 3    albuterol 108 (90 Base) MCG/ACT Aero Soln inhalation aerosol, as needed., Disp: , Rfl:     aspirin 81 MG EC tablet, Chew 81 mg every day., Disp: , Rfl:     CRANBERRY PO, Take 3 Tablets by mouth every day., Disp: , Rfl:     Multiple Vitamins-Minerals (OCUVITE-LUTEIN) Tab, Take 1 Tablet by mouth every day., Disp: , Rfl:     Saline (OCEAN NASAL SPRAY NA), Administer 1 Spray into affected nostril(S) every day., Disp: , Rfl:     Acetaminophen 500 MG Cap, Take 1 Capsule by mouth 2 times a day. Morning & Afternoon, sometimes takes 3 times a day, Disp: , Rfl:     guaifenesin LA (MUCINEX) 600 MG TABLET SR 12 HR, Take 600 mg by mouth every morning., Disp: , Rfl:     Ascorbic Acid (VITAMIN C) 1000 MG Tab, Take 1 Tablet by mouth every morning., Disp: , Rfl:     VITAMIN E PO, Take 1 Capsule by mouth every morning., Disp: , Rfl:     Cholecalciferol (VITAMIN D) 50 MCG (2000 UT) Cap, Take 2,000 Units by mouth every morning., Disp: , Rfl:     Allergies:  Bactrim [sulfamethoxazole w-trimethoprim], Pcn [penicillins], Codeine, and Tramadol      Health Maintenance: Completed    ROS:  Review of Systems   Constitutional:  Negative for chills and fever.   Respiratory:  Negative for shortness of breath.    Cardiovascular:  Negative for chest pain.   Neurological:  Negative for dizziness and headaches.       Objective:     Exam:  /54 (BP Location: Left arm, Patient Position: Sitting, BP Cuff Size: Adult)   Pulse 66   Temp 36.9 °C (98.4 °F) (Temporal)   Ht 1.524 m (5')   Wt 55.8 kg (123 lb)   LMP  (LMP Unknown)   SpO2 96%   BMI 24.02 kg/m²  Body mass index is 24.02 kg/m².    Physical Exam  Vitals  reviewed.   Constitutional:       General: She is not in acute distress.  Eyes:      Extraocular Movements: Extraocular movements intact.   Cardiovascular:      Rate and Rhythm: Normal rate and regular rhythm.      Heart sounds: No murmur heard.     No friction rub. No gallop.   Pulmonary:      Effort: Pulmonary effort is normal.      Breath sounds: No wheezing, rhonchi or rales.   Musculoskeletal:      Cervical back: Neck supple.   Skin:     General: Skin is warm and dry.   Neurological:      Mental Status: She is alert.   Psychiatric:         Mood and Affect: Mood normal.         Results  Laboratory Studies  Potassium levels were normal in July.    Assessment & Plan:     1. Chronic constipation  TSH WITH REFLEX TO FT4      2. Essential hypertension  Basic Metabolic Panel      3. Primary insomnia        4. Dyslipidemia  Lipid Profile          Assessment & Plan  1. Constipation.  Chronic, stable condition.  Continue current regimen.  Records from the gastroenterologist will be requested.    2. Hypertension.  Chronic, controlled.  Blood pressure readings are within the normal range. Continuation of lisinopril, amlodipine, and carvedilol is advised.    3.  Insomnia.  Chronic, controlled.  Continue melatonin as needed.  There is no need for prescription medication at this time.      Follow-up  A follow-up visit is scheduled in 3 months, or sooner if necessary.            Please note that this dictation was created using voice recognition software. I have made every reasonable attempt to correct obvious errors, but I expect that there are errors of grammar and possibly content that I did not discover before finalizing the note.

## 2024-08-22 ASSESSMENT — PATIENT HEALTH QUESTIONNAIRE - PHQ9
1. LITTLE INTEREST OR PLEASURE IN DOING THINGS: NOT AT ALL
2. FEELING DOWN, DEPRESSED, IRRITABLE, OR HOPELESS: NOT AT ALL

## 2024-08-22 ASSESSMENT — ACTIVITIES OF DAILY LIVING (ADL): BATHING_REQUIRES_ASSISTANCE: 0

## 2024-08-22 ASSESSMENT — ENCOUNTER SYMPTOMS: GENERAL WELL-BEING: GOOD

## 2024-08-22 NOTE — ASSESSMENT & PLAN NOTE
Chronic, stable. Found on prior imaging in 2016. Patient currently takes atorvastatin 80 mg daily and 81mg ASA. Follow up with PCP for continued monitoring.

## 2024-08-22 NOTE — ASSESSMENT & PLAN NOTE
Chronic, Stable. BP in office today is 108/50. She does check her BP at home. She currently takes amlodipine 5 mg daily, carvedilol 6.25 mg BID, and lisinopril 20 mg daily. Follow up with PCP for continued monitoring and management.

## 2024-08-22 NOTE — ASSESSMENT & PLAN NOTE
Chronic, stable. PHQ-9 in office today is 0. She is not taking any medications for this. She does not feel like she is depressed but does feel like she gets sad. Follow up with PCP as needed.

## 2024-08-22 NOTE — ASSESSMENT & PLAN NOTE
Chronic, stable. Last lipid panel in Aug 2023 was WNL. She currently takes atorvastatin 80 mg daily. We discussed her dietary/lifestyle regimen. Follow up with PCP for continued monitoring and management.

## 2024-08-22 NOTE — ASSESSMENT & PLAN NOTE
Chronic. She has not fallen but notes one of her legs is shorter than the other which does not help with her imbalance. We discussed different balance exercises she can do at home. Follow up with PCP at least annually for continued monitoring and management.

## 2024-08-22 NOTE — ASSESSMENT & PLAN NOTE
Chronic, stable. Currently omeprazole 20 mg BID. States this has well controlled her reflux. Follow up with PCP for continued monitoring.

## 2024-08-22 NOTE — ASSESSMENT & PLAN NOTE
Chronic, stable. Incidentally found on prior imaging in 2021. Likely age-related. Not associated with memory changes. Follow-up with PCP as previously scheduled.      30-year-old female, past medical history of beta thalassemia, recent spontaneous vaginal delivery on 04/08, presenting with 2 days onset of midsternal chest pain.  Denies any specific trigger.  Reports symptoms exacerbated by taking deep breath, walking.  No prior history of coronary artery disease or blood clots.  Her father has coronary artery disease at the age of late 60s.  Denies fever, cough, recent illness.  Denies numbness or weakness of extremity. VSWNL on arrival. EKG is NSR. PE as noted above was unremarkable. Patient is well-appearing, NAD, normal respiratory distress, no abdominal tenderness, mild leg swelling. DDx include but not limited to ACS vs PE vs MSK vs very low suspicion of aortic dissection given patient has no neurologic symptoms, unremarkable PE and VS. will get labs, CTA, cardiac markers, reassess. 30-year-old female, past medical history of beta thalassemia, recent spontaneous vaginal delivery on 04/08, presenting with 2 days onset of midsternal chest pain.  Denies any specific trigger.  Reports symptoms exacerbated by taking deep breath, walking.  No prior history of coronary artery disease or blood clots.  Her father has coronary artery disease at the age of late 60s.  Denies fever, cough, recent illness.  Denies numbness or weakness of extremity. VSWNL on arrival. EKG is NSR. PE as noted above was unremarkable. Patient is well-appearing, NAD, normal respiratory distress, no abdominal tenderness, mild leg swelling. DDx include but not limited to ACS vs PE vs MSK vs very low suspicion of aortic dissection given patient has no neurologic symptoms, unremarkable PE and VS. will get labs, CTA, cardiac markers, reassess.    Alfredo: 30-year-old female presents 3 days postpartum.  Patient with pleuritic chest pain and back pain.  Patient's pregnancy was complicated by her beta thalassemia which met that she got iron infusions during the pregnancy.  Patient did have some epigastric pain during the pregnancy but this pain is higher up and respiratory phasic.  Patient with some shortness of breath feeling with this.  Patient not tachycardic here.  Patient with no fever no cough.  With recent hospitalization and birth would be concerned about pulmonary embolism pneumothorax pneumonia will get a CT scan to evaluate for these.  But is not these things been treating the pain is the most important and we will do that.

## 2024-08-22 NOTE — ASSESSMENT & PLAN NOTE
Chronic, stable. She has a hx of MI with stents placed. Her cardiac meds are as follows: amlodipine 5 mg daily, 81 mg ASA, atorvastatin 80 mg daily, carvedilol 6.25 mg BID, isosorbide mononitrate 60 mg daily, lisinopril 20 mg daily. She denies chest pain. Follows with cardiology.

## 2024-08-23 ENCOUNTER — OFFICE VISIT (OUTPATIENT)
Dept: FAMILY PLANNING/WOMEN'S HEALTH CLINIC | Facility: PHYSICIAN GROUP | Age: 89
End: 2024-08-23
Payer: MEDICARE

## 2024-08-23 VITALS
HEIGHT: 63 IN | DIASTOLIC BLOOD PRESSURE: 50 MMHG | WEIGHT: 124 LBS | SYSTOLIC BLOOD PRESSURE: 108 MMHG | BODY MASS INDEX: 21.97 KG/M2

## 2024-08-23 DIAGNOSIS — I25.119 CORONARY ARTERY DISEASE INVOLVING NATIVE CORONARY ARTERY OF NATIVE HEART WITH ANGINA PECTORIS (HCC): Chronic | ICD-10-CM

## 2024-08-23 DIAGNOSIS — I10 ESSENTIAL HYPERTENSION: Chronic | ICD-10-CM

## 2024-08-23 DIAGNOSIS — K21.9 GASTROESOPHAGEAL REFLUX DISEASE, UNSPECIFIED WHETHER ESOPHAGITIS PRESENT: Chronic | ICD-10-CM

## 2024-08-23 DIAGNOSIS — I70.0 ATHEROSCLEROSIS OF AORTA (HCC): ICD-10-CM

## 2024-08-23 DIAGNOSIS — E78.5 DYSLIPIDEMIA: Chronic | ICD-10-CM

## 2024-08-23 DIAGNOSIS — R26.89 IMBALANCE: ICD-10-CM

## 2024-08-23 DIAGNOSIS — G31.9 CEREBRAL ATROPHY (HCC): ICD-10-CM

## 2024-08-23 DIAGNOSIS — F32.0 MILD MAJOR DEPRESSION (HCC): ICD-10-CM

## 2024-08-23 PROBLEM — R53.81 DEBILITY: Status: RESOLVED | Noted: 2020-11-20 | Resolved: 2024-08-23

## 2024-08-23 PROBLEM — I20.89 STABLE ANGINA (HCC): Status: RESOLVED | Noted: 2023-05-16 | Resolved: 2024-08-23

## 2024-08-23 PROCEDURE — G0439 PPPS, SUBSEQ VISIT: HCPCS

## 2024-08-23 PROCEDURE — 3078F DIAST BP <80 MM HG: CPT

## 2024-08-23 PROCEDURE — 3074F SYST BP LT 130 MM HG: CPT

## 2024-08-23 PROCEDURE — 1126F AMNT PAIN NOTED NONE PRSNT: CPT

## 2024-08-23 SDOH — ECONOMIC STABILITY: FOOD INSECURITY: WITHIN THE PAST 12 MONTHS, THE FOOD YOU BOUGHT JUST DIDN'T LAST AND YOU DIDN'T HAVE MONEY TO GET MORE.: NEVER TRUE

## 2024-08-23 SDOH — ECONOMIC STABILITY: FOOD INSECURITY: WITHIN THE PAST 12 MONTHS, YOU WORRIED THAT YOUR FOOD WOULD RUN OUT BEFORE YOU GOT MONEY TO BUY MORE.: NEVER TRUE

## 2024-08-23 SDOH — ECONOMIC STABILITY: HOUSING INSECURITY: AT ANY TIME IN THE PAST 12 MONTHS, WERE YOU HOMELESS OR LIVING IN A SHELTER (INCLUDING NOW)?: NO

## 2024-08-23 SDOH — ECONOMIC STABILITY: INCOME INSECURITY: IN THE LAST 12 MONTHS, WAS THERE A TIME WHEN YOU WERE NOT ABLE TO PAY THE MORTGAGE OR RENT ON TIME?: NO

## 2024-08-23 SDOH — ECONOMIC STABILITY: INCOME INSECURITY: HOW HARD IS IT FOR YOU TO PAY FOR THE VERY BASICS LIKE FOOD, HOUSING, MEDICAL CARE, AND HEATING?: NOT HARD AT ALL

## 2024-08-23 SDOH — ECONOMIC STABILITY: HOUSING INSECURITY: IN THE PAST 12 MONTHS, HOW MANY TIMES HAVE YOU MOVED WHERE YOU WERE LIVING?: 1

## 2024-08-23 ASSESSMENT — PATIENT HEALTH QUESTIONNAIRE - PHQ9: CLINICAL INTERPRETATION OF PHQ2 SCORE: 0

## 2024-08-23 ASSESSMENT — PAIN SCALES - GENERAL: PAINLEVEL: NO PAIN

## 2024-08-23 ASSESSMENT — FIBROSIS 4 INDEX: FIB4 SCORE: 2.52

## 2024-08-23 NOTE — PROGRESS NOTES
Comprehensive Health Assessment Program     Dayana Lechuga is a 89 y.o. here for her comprehensive health assessment.    Patient Active Problem List    Diagnosis Date Noted    Dizziness 10/24/2023    Allergic rhinitis 09/14/2023    Cerebral atrophy (HCC) 05/16/2023    Atherosclerosis of aorta ([facility]) 05/16/2023    DDD (degenerative disc disease), lumbosacral 05/16/2023    Mild major depression (HCC) 05/16/2023    Nonspecific abnormal function study, cardiovascular 05/16/2023    Macrocytic anemia 04/29/2023    Imbalance 02/16/2023    Chronic bilateral low back pain with left-sided sciatica 09/02/2022    BMI 20.0-20.9, adult 09/14/2021    Primary insomnia 07/09/2021    Peripheral neuropathy 11/20/2020    Pleural effusion 12/20/2019    Urinary retention 09/11/2019    Acute bilateral low back pain with bilateral sciatica 08/29/2019    Slow transit constipation 08/29/2018    Coronary artery disease involving native coronary artery of native heart with angina pectoris (HCC) 08/10/2016    Anxiety 06/22/2016    GERD (gastroesophageal reflux disease) 10/31/2009    Dyslipidemia 10/31/2009    Osteopenia 10/31/2009    Essential hypertension 10/02/2009       Current Outpatient Medications   Medication Sig Dispense Refill    docusate sodium (COLACE) 100 MG Cap Take 100 mg by mouth every day.      fluticasone (FLONASE) 50 MCG/ACT nasal spray USE 1 SPRAY IN BOTH NOSTRILS  ONCE DAILY 16 g 2    lisinopril (PRINIVIL) 20 MG Tab TAKE 1 TABLET BY MOUTH DAILY 100 Tablet 3    gabapentin (NEURONTIN) 400 MG Cap TAKE 1 CAPSULE BY MOUTH 3 TIMES  DAILY 270 Capsule 1    isosorbide mononitrate SR (IMDUR) 60 MG TABLET SR 24 HR Take 1 Tablet by mouth every evening. 100 Tablet 3    amLODIPine (NORVASC) 5 MG Tab TAKE 1 TABLET BY MOUTH DAILY 100 Tablet 3    carvedilol (COREG) 6.25 MG Tab Take 1 Tablet by mouth 2 times a day with meals. 180 Tablet 3    montelukast (SINGULAIR) 10 MG Tab TAKE ONE TABLET BY MOUTH EVERY  EVENING 100 Tablet 1     omeprazole (PRILOSEC) 20 MG delayed-release capsule TAKE ONE CAPSULE BY MOUTH TWO  TIMES A  Capsule 1    atorvastatin (LIPITOR) 80 MG tablet Take 1 Tablet by mouth every evening. 100 Tablet 3    albuterol 108 (90 Base) MCG/ACT Aero Soln inhalation aerosol as needed.      aspirin 81 MG EC tablet Chew 81 mg every day.      CRANBERRY PO Take 3 Tablets by mouth every day.      Multiple Vitamins-Minerals (OCUVITE-LUTEIN) Tab Take 1 Tablet by mouth every day.      Saline (OCEAN NASAL SPRAY NA) Administer 1 Spray into affected nostril(S) every day.      Acetaminophen 500 MG Cap Take 1 Capsule by mouth 2 times a day. Morning & Afternoon, sometimes takes 3 times a day      Melatonin 10 MG Tab Take 10 mg by mouth at bedtime.      guaifenesin LA (MUCINEX) 600 MG TABLET SR 12 HR Take 600 mg by mouth every morning.      Ascorbic Acid (VITAMIN C) 1000 MG Tab Take 1 Tablet by mouth every morning.      VITAMIN E PO Take 1 Capsule by mouth every morning.      Cholecalciferol (VITAMIN D) 50 MCG (2000 UT) Cap Take 2,000 Units by mouth every morning.       No current facility-administered medications for this visit.          Current supplements as per medication list.     Allergies:   Bactrim [sulfamethoxazole w-trimethoprim], Pcn [penicillins], Codeine, and Tramadol  Social History     Tobacco Use    Smoking status: Never    Smokeless tobacco: Never   Vaping Use    Vaping status: Never Used   Substance Use Topics    Alcohol use: No     Alcohol/week: 0.0 oz    Drug use: No     Family History   Problem Relation Age of Onset    Heart Attack Father     Cancer Brother     Cancer Brother     Heart Disease Neg Hx     Heart Failure Neg Hx     Hyperlipidemia Neg Hx      Dayana  has a past medical history of Allergy, Anxiety, ASTHMA, Bronchitis, CAD (coronary artery disease), Chills, Constipation, Difficulty breathing, GERD (gastroesophageal reflux disease), Heart attack (HCC), Heartburn, Hyperlipidemia, Hypertension, Influenza,  Insomnia, Lumbar back pain (09/10/2016), Mild peripheral edema (07/31/2020), Mumps, OSTEOPOROSIS, Palpitations, Peripheral vascular disease, unspecified (HCC) (09/14/2021), Pneumonia, Post concussion syndrome (09/11/2020), PVD (peripheral vascular disease) ([facility]), Sedative, hypnotic or anxiolytic dependence, 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   Past Surgical History:   Procedure Laterality Date    CO INJ LUMBAR/SACRAL,W/ IMAGING N/A 4/20/2023    Procedure: Caudal epidural with catheter;  Surgeon: Torres Fall M.D.;  Location[facility];  Service: Pain Management    ABDOMINAL HYSTERECTOMY TOTAL      APPENDECTOMY      HERNIA REPAIR      HYSTERECTOMY LAPAROSCOPY      TONSILLECTOMY      ZZZ CARDIAC CATH         Screening:  In the last six months have you experienced any leakage of urine? Yes, wears a pad all the time.     Depression Screening  Little interest or pleasure in doing things?  0 - not at all  Feeling down, depressed , or hopeless? 0 - not at all  Patient Health Questionnaire Score: 0     If depressive symptoms identified deferred to follow up visit unless specifically addressed in assessment and plan.    Interpretation of PHQ-9 Total Score   Score Severity   1-4 No Depression   5-9 Mild Depression   10-14 Moderate Depression   15-19 Moderately Severe Depression   20-27 Severe Depression    Screening for Cognitive Impairment  Do you or any of your friends or family members have any concern about your memory? No  Three Minute Recall (Leader, Season, Table) 3/3    Antolin clock face with all 12 numbers and set the hands to show 10 minutes after 11.  Yes    Cognitive concerns identified deferred for follow up unless specifically addressed in assessment and plan.    Fall Risk Assessment  Has the patient had two or more falls in the last year or any fall with injury in the last year?  No    Safety Assessment  Do you always wear your seatbelt?  Yes  Any  changes to home needed to function safely? Yes  Difficulty hearing.  No  Patient counseled about all safety risks that were identified.    Functional Assessment ADLs  Are there any barriers preventing you from cooking for yourself or meeting nutritional needs?  Yes.  Her apartment has a kitchen that cooks for her.  Are there any barriers preventing you from driving safely or obtaining transportation?  No.    Are there any barriers preventing you from using a telephone or calling for help?  No    Are there any barriers preventing you from shopping?  No.    Are there any barriers preventing you from taking care of your own finances?  No    Are there any barriers preventing you from managing your medications?  No    Are there any barriers preventing you from showering, bathing or dressing yourself? No    Are there any barriers preventing you from doing housework or laundry? No  Are there any barriers preventing you from using the toilet?No  Are you currently engaging in any exercise or physical activity?  Yes.  Walks her dog every morning, occasionally rides her stationary bike in her apartment gym.     Self-Assessment of Health  What is your perception of your health? Good    Do you sleep more than six hours a night? Yes    In the past 7 days, how much did pain keep you from doing your normal work? None    Do you spend quality time with family or friends (virtually or in person)? Yes    Do you usually eat a heart healthy diet that constists of a variety of fruits, vegetables, whole grains and fiber? Yes    Do you eat foods high in fat and/or Fast Food more than three times per week? No    How concerned are you that your medical conditions are not being well managed? Not at all    Are you worried that in the next 2 months, you may not have stable housing that you own, rent, or stay in as part of a household? No      Advance Care Planning  Do you have an Advance Directive, Living Will, Durable Power of , or  POLST? No                 Health Maintenance Summary            Overdue - Zoster (Shingles) Vaccines (1 of 2) Never done      No completion history exists for this topic.              Overdue - Bone Density Scan (Every 5 Years) Overdue since 6/13/2023 06/13/2018  DS-BONE DENSITY STUDY (DEXA)    11/20/2014  Postponed              Overdue - COVID-19 Vaccine (5 - 2023-24 season) Overdue since 2/12/2024      10/12/2023  Imm Admin: Covid-19 Mrna (Spikevax) Moderna 12+ Years    09/22/2022  Imm Admin: PFIZER BIVALENT SARS-COV-2 VACCINE (12+)    11/12/2021  Imm Admin: PFIZER PURPLE CAP SARS-COV-2 VACCINATION (12+)    01/17/2021  Imm Admin: PFIZER PURPLE CAP SARS-COV-2 VACCINATION (12+)              Influenza Vaccine (1) Next due on 9/1/2024 09/19/2023  Imm Admin: Influenza Vaccine Adult HD    09/22/2022  Imm Admin: Influenza, Unspecified - HISTORICAL DATA    09/04/2020  Imm Admin: Influenza Vaccine Adult HD    09/11/2019  Imm Admin: Influenza Vaccine Adult HD    09/28/2018  Imm Admin: Influenza Vaccine Adult HD    Only the first 5 history entries have been loaded, but more history exists.              Annual Wellness Visit (Yearly) Next due on 8/23/2025 08/23/2024  Level of Service: [place] ANNUAL WELLNESS VISIT-INCLUDES PPPS SUBSEQUE*    05/16/2023  Level of Service: [place] ANNUAL WELLNESS VISIT-INCLUDES PPPS SUBSEQUE*    06/21/2022  Level of Service: [place] ANNUAL WELLNESS VISIT-INCLUDES PPPS SUBSEQUE*    06/21/2022  Visit Dx: Medicare annual wellness visit, subsequent    05/11/2022  Level of Service: [place] ANNUAL WELLNESS VISIT-INCLUDES PPPS SUBSEQUE*    Only the first 5 history entries have been loaded, but more history exists.              IMM DTaP/Tdap/Td Vaccine (3 - Td or Tdap) Next due on 6/26/2026 06/26/2016  Imm Admin: Dtap Vaccine    06/21/2016  Imm Admin: Tdap Vaccine              Pneumococcal Vaccine: 65+ Years (Series Information) Completed      10/13/2017  Imm Admin: Pneumococcal polysaccharide vaccine  (PPSV-23)    06/21/2016  Imm Admin: Pneumococcal Conjugate Vaccine (Prevnar/PCV-13)    06/21/2016  Imm Admin: Pneumococcal Conjugate Vaccine (Prevnar/PCV-13)    06/01/2016  Imm Admin: Pneumococcal Conjugate Vaccine (Prevnar/PCV-13)    06/01/2016  Imm Admin: Pneumococcal Conjugate Vaccine (Prevnar/PCV-13)    Only the first 5 history entries have been loaded, but more history exists.              Hepatitis A Vaccine (Hep A) (Series Information) Aged Out      No completion history exists for this topic.              HPV Vaccines (Series Information) Aged Out      No completion history exists for this topic.              Polio Vaccine (Inactivated Polio) (Series Information) Aged Out      No completion history exists for this topic.              Meningococcal Immunization (Series Information) Aged Out      No completion history exists for this topic.              Discontinued - Mammogram  Discontinued        Frequency changed to Never automatically (Topic No Longer Applies)    02/06/2018  MA-MAMMO SCREENING BILAT W/XIOMARA W/CAD    10/23/2014  Previously completed    08/20/2014  Previously completed              Discontinued - Urine Drug Screening  Discontinued        Frequency changed to Never automatically (Topic No Longer Applies)    07/30/2020  Pain Management Screen    05/08/2017  [facility] PAIN MANAGEMENT SCREEN    07/11/2016  PAIN MANAGEMENT PANEL, SCRN W/ RFLX TO QNT    05/09/2016  URINE DRUG SCREEN    Only the first 5 history entries have been loaded, but more history exists.              Discontinued - Colorectal Cancer Screening  Discontinued        Frequency changed to Never automatically (Topic No Longer Applies)    02/23/2008  Colonoscopy (Previously completed)    11/20/2007  Colonoscopy (Previously completed)              Discontinued - Hepatitis B Vaccine (Hep B)  Discontinued      No completion history exists for this topic.                    Patient Care Team:  Monique Borjas P.A.-C. as PCP - General  "(Physician Assistant)  Torres Fall M.D. as Consulting Physician (Phys Med and Rehab)  Bharat Restrepo Ass't as    [facility] as Home Health Provider  Jeffery Jones M.D. (Cardiovascular Disease (Cardiology))  Dory Lewis R.N. as Chronic Care Manager (Registered Nurse)      Financial Resource Strain: Low Risk  (8/23/2024)    Overall Financial Resource Strain (CARDIA)     Difficulty of Paying Living Expenses: Not hard at all      Transportation Needs: No Transportation Needs (8/23/2024)    PRAPARE - Transportation     Lack of Transportation (Medical): No     Lack of Transportation (Non-Medical): No      Food Insecurity: No Food Insecurity (8/23/2024)    Hunger Vital Sign     Worried About Running Out of Food in the Last Year: Never true     Ran Out of Food in the Last Year: Never true        Encounter Vitals  Blood Pressure : 108/50  Weight: 56.2 kg (124 lb)  Height: 160 cm (5' 3\")  BMI (Calculated): 21.97  Pain Score: No pain     Alert, oriented in no acute distress.  Eye contact is good, speech goal directed, affect calm.    Assessment and Plan. The following treatment and monitoring plan is recommended:  Atherosclerosis of aorta (HCC)  Chronic, stable. Found on prior imaging in 2016. Patient currently takes atorvastatin 80 mg daily and 81mg ASA. Follow up with PCP for continued monitoring.      Cerebral atrophy (HCC)  Chronic, stable. Incidentally found on prior imaging in 2021. Likely age-related. Not associated with memory changes. Follow-up with PCP as previously scheduled.       Coronary artery disease involving native coronary artery of native heart with angina pectoris (HCC)  Chronic, stable. She has a hx of MI with stents placed. Her cardiac meds are as follows: amlodipine 5 mg daily, 81 mg ASA, atorvastatin 80 mg daily, carvedilol 6.25 mg BID, isosorbide mononitrate 60 mg daily, lisinopril 20 mg daily. She denies chest pain. Follows with cardiology. "     Dyslipidemia  Chronic, stable. Last lipid panel in Aug 2023 was WNL. She currently takes atorvastatin 80 mg daily. We discussed her dietary/lifestyle regimen. Follow up with PCP for continued monitoring and management.      Essential hypertension  Chronic, Stable. BP in office today is 108/50. She does check her BP at home. She currently takes amlodipine 5 mg daily, carvedilol 6.25 mg BID, and lisinopril 20 mg daily. Follow up with PCP for continued monitoring and management.      GERD (gastroesophageal reflux disease)  Chronic, stable. Currently omeprazole 20 mg BID. States this has well controlled her reflux. Follow up with PCP for continued monitoring.      Imbalance  Chronic. She has not fallen but notes one of her legs is shorter than the other which does not help with her imbalance. We discussed different balance exercises she can do at home. Follow up with PCP at least annually for continued monitoring and management.      Mild major depression (HCC)  Chronic, stable. PHQ-9 in office today is 0. She is not taking any medications for this. She does not feel like she is depressed but does feel like she gets sad. Follow up with PCP as needed.      Services suggested: No services needed at this time  Health Care Screening: Age-appropriate preventive services recommended by USPTF and ACIP covered by Medicare were discussed today. Services ordered if indicated and agreed upon by the patient.  Referrals offered: Community-based lifestyle interventions to reduce health risks and promote self-management and wellness, fall prevention, nutrition, physical activity, tobacco-use cessation, weight loss, and mental health services as per orders if indicated.    Discussion today about general wellness and lifestyle habits:    Prevent falls and reduce trip hazards; Cautioned about securing or removing rugs.  Have a working fire alarm and carbon monoxide detector.  Engage in regular physical activity and social  activities.    Follow-up: Return for appointment with Primary Care Provider as needed..

## 2024-09-12 ENCOUNTER — PATIENT OUTREACH (OUTPATIENT)
Dept: HEALTH INFORMATION MANAGEMENT | Facility: OTHER | Age: 89
End: 2024-09-12
Payer: MEDICARE

## 2024-09-12 DIAGNOSIS — I25.119 CORONARY ARTERY DISEASE INVOLVING NATIVE CORONARY ARTERY OF NATIVE HEART WITH ANGINA PECTORIS (HCC): ICD-10-CM

## 2024-09-12 DIAGNOSIS — E78.5 DYSLIPIDEMIA: ICD-10-CM

## 2024-09-12 DIAGNOSIS — I10 ESSENTIAL HYPERTENSION: ICD-10-CM

## 2024-09-12 NOTE — PROGRESS NOTES
First attempt made for September PCM follow up. No answer. Message left with contact information and request for call back.

## 2024-09-12 NOTE — PROGRESS NOTES
Assessment    I spoke to the patient this afternoon for her monthly PCM follow up. The patient reports no falls or injuries since our last conversation. Patient reports no exacerbations or novel symptoms related to her monitored chronic conditions. She states she is experiencing improvement in her GI symptoms but that they are not completely resolved yet. She sees them again next week. The patient reports understanding of her future appointment dates and times and her plan of care. The patient states that she would be ok if we called her every other month. Patient voices understanding of how to get a hold of me if needed.     Education and Self Management of Care    Article on Hypertension sent by mail  Patient will continue to follow up with PCP, specialists as indicated.     Plan of Care and Goals    Patient will remain free from falls/injuries  Healthy nutrition, hydration, and activity    Barriers:    Management of age related and chronic health concerns.     Progress:    Stable    Next outreach:    One month

## 2024-09-18 ENCOUNTER — TELEPHONE (OUTPATIENT)
Dept: HEALTH INFORMATION MANAGEMENT | Facility: OTHER | Age: 89
End: 2024-09-18
Payer: MEDICARE

## 2024-09-18 NOTE — TELEPHONE ENCOUNTER
The patient called asking for assistance in contacting [facility] as she missed her appointment due to diarrhea and both she and her son were unable to get through.   [facility] was consulted and they state that the patient has an appointment on the [facility] for Viv 13th 2024 @ 1:30 am.

## 2024-09-30 NOTE — TELEPHONE ENCOUNTER
Received request via: Pharmacy    Was the patient seen in the last year in this department? Yes    Does the patient have an active prescription (recently filled or refills available) for medication(s) requested? No   Add Associated Diagnosis When Managing Medication Side Effects: No Retinoid Dermatitis Aggressive Treatment: I recommended more frequent application of Cetaphil or CeraVe to the areas of dermatitis. I also prescribed a topical steroid for twice daily use until the dermatitis resolves. Xerosis Aggressive Treatment: I recommended application of Cetaphil or CeraVe numerous times a day and before going to bed to all dry areas. I also prescribed a topical steroid for twice daily use. Hypercholesterolemia Monitoring: I explained this is common when taking isotretinoin. We will monitor closely. Is Xerosis Present?: Yes - Normal Treatment Dosing Month 1 (Required For Cumulative Dosing): 30mg Daily Cheilitis Normal Treatment: I recommended application of petrolatum ointment numerous times a day (as often as every hour) \\nand before going to bed. Dosing Month 2 (Required For Cumulative Dosing): 30mg BID Hypertriglyceridemia Monitoring: I explained that this is common while taking isotretinoin. We will monitor the values closely. Display Individual Monthly Dosage In The Note (If Yes Will Display All Dosages Which Are Not N/A): yes Detail Level: Zone Counseling Text: I reviewed the side effect in detail. Patient should get monthly blood tests, not donate blood, not drive at night if vision affected, and not share medication. Headache Monitoring: I recommended monitoring the headaches for now. There is no evidence of increased intracranial pressure. The patient was instructed to call the office if the headaches are worsening. Cheilitis Aggressive Treatment: I recommended application of Vaseline or Aquaphor numerous times a day (as often as every hour) and before going to bed. I also prescribed a topical steroid for twice daily use. Xerosis Normal Treatment: I recommended application of emollients numerous times a day and before going to bed. Retinoid Dermatitis Normal Treatment: I recommended more frequent application of Cetaphil or CeraVe to the areas of dermatitis. Female Pregnancy Counseling Text: Female patients should also be on two forms of birth control while taking this medication and for one month after their last dose. What Is The Patient's Gender: Male Myalgia Monitoring: I explained this is common when taking isotretinoin. If this worsens they will contact us. Xerosis Aggressive Treatment: I recommended application of Cetaphil or CeraVe numerous times a day going to bed to all dry areas. I also prescribed a topical steroid for twice daily use. Myalgia Treatment: I explained this is common when taking isotretinoin. If this worsens they will contact us. They may try OTC ibuprofen. Nosebleeds Normal Treatment: I explained this is a common side effect while taking isotretinoin. I recommended using saline mist or petrolatum ointment in each nostril numerous times a day. Use Therapeutic Ranged Or Therapeutic Target: please select Range or Target Pounds Preamble Statement (Weight Entered In Details Tab): Reported Weight in pounds: Lower Range (In Mg/Kg): 120 Kilograms Preamble Statement (Weight Entered In Details Tab): Reported Weight in kilograms: Patient Weight (Optional But Required For Cumulative Dose-Numbers And Decimals Only): 155 Comments: Starting Month # 6 after today's visit 09/30/24. Continue Accutane 30 mg BID. Pt has had no breakouts pending next visit this will possibly be his last month. Female Completion Statement: After discussing her treatment course we decided to discontinue isotretinoin therapy at this time. I explained that she would need to continue her birth control methods for at least one month after the last dosage. She should also get a pregnancy test one month after the last dose. She shouldn't donate blood for one month after the last dose. She should call with any new symptoms of depression. Weight Units: pounds Upper Range (In Mg/Kg): 150 Male Completion Statement: After discussing his treatment course we decided to discontinue isotretinoin therapy at this time. He shouldn't donate blood for one month after the last dose. He should call with any new symptoms of depression. Target Cumulative Dosage (In Mg/Kg): 135

## 2024-10-01 ENCOUNTER — HOSPITAL ENCOUNTER (OUTPATIENT)
Dept: LAB | Facility: MEDICAL CENTER | Age: 89
End: 2024-10-01
Attending: STUDENT IN AN ORGANIZED HEALTH CARE EDUCATION/TRAINING PROGRAM
Payer: MEDICARE

## 2024-10-01 DIAGNOSIS — K59.09 CHRONIC CONSTIPATION: ICD-10-CM

## 2024-10-01 DIAGNOSIS — I10 ESSENTIAL HYPERTENSION: Chronic | ICD-10-CM

## 2024-10-01 DIAGNOSIS — E78.5 DYSLIPIDEMIA: Chronic | ICD-10-CM

## 2024-10-01 LAB
ANION GAP SERPL CALC-SCNC: 11 MMOL/L (ref 7–16)
BUN SERPL-MCNC: 15 MG/DL (ref 8–22)
CALCIUM SERPL-MCNC: 9.5 MG/DL (ref 8.5–10.5)
CHLORIDE SERPL-SCNC: 104 MMOL/L (ref 96–112)
CHOLEST SERPL-MCNC: 121 MG/DL (ref 100–199)
CO2 SERPL-SCNC: 22 MMOL/L (ref 20–33)
CREAT SERPL-MCNC: 0.71 MG/DL (ref 0.5–1.4)
FASTING STATUS PATIENT QL REPORTED: NORMAL
GFR SERPLBLD CREATININE-BSD FMLA CKD-EPI: 78 ML/MIN/1.73 M 2
GLUCOSE SERPL-MCNC: 122 MG/DL (ref 65–99)
HDLC SERPL-MCNC: 49 MG/DL
LDLC SERPL CALC-MCNC: 18 MG/DL
POTASSIUM SERPL-SCNC: 4.1 MMOL/L (ref 3.6–5.5)
SODIUM SERPL-SCNC: 137 MMOL/L (ref 135–145)
TRIGL SERPL-MCNC: 270 MG/DL (ref 0–149)

## 2024-10-01 PROCEDURE — 80048 BASIC METABOLIC PNL TOTAL CA: CPT

## 2024-10-01 PROCEDURE — 80061 LIPID PANEL: CPT

## 2024-10-01 PROCEDURE — 36415 COLL VENOUS BLD VENIPUNCTURE: CPT

## 2024-10-01 PROCEDURE — 84443 ASSAY THYROID STIM HORMONE: CPT

## 2024-10-02 LAB — TSH SERPL DL<=0.005 MIU/L-ACNC: 3.04 UIU/ML (ref 0.38–5.33)

## 2024-10-03 ENCOUNTER — OFFICE VISIT (OUTPATIENT)
Dept: CARDIOLOGY | Facility: MEDICAL CENTER | Age: 89
End: 2024-10-03
Attending: INTERNAL MEDICINE
Payer: MEDICARE

## 2024-10-03 VITALS
WEIGHT: 122 LBS | RESPIRATION RATE: 16 BRPM | OXYGEN SATURATION: 93 % | DIASTOLIC BLOOD PRESSURE: 84 MMHG | BODY MASS INDEX: 21.62 KG/M2 | SYSTOLIC BLOOD PRESSURE: 148 MMHG | HEART RATE: 70 BPM | HEIGHT: 63 IN

## 2024-10-03 DIAGNOSIS — I25.84 CORONARY ARTERY DISEASE DUE TO CALCIFIED CORONARY LESION: ICD-10-CM

## 2024-10-03 DIAGNOSIS — I73.9 PVD (PERIPHERAL VASCULAR DISEASE) (HCC): ICD-10-CM

## 2024-10-03 DIAGNOSIS — I25.10 CORONARY ARTERY DISEASE DUE TO CALCIFIED CORONARY LESION: ICD-10-CM

## 2024-10-03 DIAGNOSIS — I10 ESSENTIAL HYPERTENSION: ICD-10-CM

## 2024-10-03 DIAGNOSIS — I70.201 POPLITEAL ARTERY STENOSIS, RIGHT (HCC): ICD-10-CM

## 2024-10-03 PROCEDURE — 3077F SYST BP >= 140 MM HG: CPT | Performed by: INTERNAL MEDICINE

## 2024-10-03 PROCEDURE — 99213 OFFICE O/P EST LOW 20 MIN: CPT | Performed by: INTERNAL MEDICINE

## 2024-10-03 PROCEDURE — 3079F DIAST BP 80-89 MM HG: CPT | Performed by: INTERNAL MEDICINE

## 2024-10-03 RX ORDER — GINSENG 100 MG
CAPSULE ORAL
COMMUNITY

## 2024-10-03 ASSESSMENT — FIBROSIS 4 INDEX: FIB4 SCORE: 2.52

## 2024-10-05 ENCOUNTER — APPOINTMENT (OUTPATIENT)
Dept: RADIOLOGY | Facility: IMAGING CENTER | Age: 89
End: 2024-10-05
Attending: PHYSICIAN ASSISTANT
Payer: MEDICARE

## 2024-10-05 ENCOUNTER — OFFICE VISIT (OUTPATIENT)
Dept: URGENT CARE | Facility: CLINIC | Age: 89
End: 2024-10-05
Payer: MEDICARE

## 2024-10-05 VITALS
TEMPERATURE: 98.7 F | HEIGHT: 63 IN | RESPIRATION RATE: 14 BRPM | DIASTOLIC BLOOD PRESSURE: 60 MMHG | WEIGHT: 125.4 LBS | SYSTOLIC BLOOD PRESSURE: 136 MMHG | OXYGEN SATURATION: 95 % | HEART RATE: 78 BPM | BODY MASS INDEX: 22.22 KG/M2

## 2024-10-05 DIAGNOSIS — S97.101A CRUSHING INJURY OF RIGHT FOOT AND TOE, INITIAL ENCOUNTER: Primary | ICD-10-CM

## 2024-10-05 DIAGNOSIS — S97.81XA CRUSHING INJURY OF RIGHT FOOT AND TOE, INITIAL ENCOUNTER: Primary | ICD-10-CM

## 2024-10-05 DIAGNOSIS — S99.921A FOOT INJURY, RIGHT, INITIAL ENCOUNTER: ICD-10-CM

## 2024-10-05 PROCEDURE — 73630 X-RAY EXAM OF FOOT: CPT | Mod: TC,RT | Performed by: PHYSICIAN ASSISTANT

## 2024-10-05 PROCEDURE — 99213 OFFICE O/P EST LOW 20 MIN: CPT | Performed by: PHYSICIAN ASSISTANT

## 2024-10-05 PROCEDURE — 3078F DIAST BP <80 MM HG: CPT | Performed by: PHYSICIAN ASSISTANT

## 2024-10-05 PROCEDURE — 3075F SYST BP GE 130 - 139MM HG: CPT | Performed by: PHYSICIAN ASSISTANT

## 2024-10-05 ASSESSMENT — ENCOUNTER SYMPTOMS
SENSORY CHANGE: 0
TINGLING: 0
NUMBNESS: 0
FOCAL WEAKNESS: 0

## 2024-10-05 ASSESSMENT — FIBROSIS 4 INDEX: FIB4 SCORE: 2.52

## 2024-10-23 DIAGNOSIS — K21.9 GASTROESOPHAGEAL REFLUX DISEASE, UNSPECIFIED WHETHER ESOPHAGITIS PRESENT: Chronic | ICD-10-CM

## 2024-10-23 DIAGNOSIS — I70.201 POPLITEAL ARTERY STENOSIS, RIGHT (HCC): ICD-10-CM

## 2024-10-23 DIAGNOSIS — I73.9 PVD (PERIPHERAL VASCULAR DISEASE) (HCC): ICD-10-CM

## 2024-10-23 DIAGNOSIS — Z98.41 S/P BILATERAL CATARACT EXTRACTION: ICD-10-CM

## 2024-10-23 DIAGNOSIS — J30.9 ALLERGIC RHINITIS, UNSPECIFIED SEASONALITY, UNSPECIFIED TRIGGER: ICD-10-CM

## 2024-10-23 DIAGNOSIS — Z98.42 S/P BILATERAL CATARACT EXTRACTION: ICD-10-CM

## 2024-10-23 DIAGNOSIS — F07.81 POST CONCUSSION SYNDROME: ICD-10-CM

## 2024-10-23 RX ORDER — MONTELUKAST SODIUM 10 MG/1
10 TABLET ORAL EVERY EVENING
Qty: 100 TABLET | Refills: 2 | Status: SHIPPED | OUTPATIENT
Start: 2024-10-23

## 2024-10-23 NOTE — TELEPHONE ENCOUNTER
Is the patient due for a refill? Yes    Was the patient seen the past year? Yes    Date of last office visit: 10/3/2024    Does the patient have an upcoming appointment?  No    Provider to refill:TW    Does the patient have [facility] and need 100-day supply? (This applies to ALL medications) Yes, quantity updated to 100 days

## 2024-10-31 ENCOUNTER — PATIENT OUTREACH (OUTPATIENT)
Dept: HEALTH INFORMATION MANAGEMENT | Facility: OTHER | Age: 89
End: 2024-10-31
Payer: MEDICARE

## 2024-10-31 DIAGNOSIS — I10 ESSENTIAL HYPERTENSION: ICD-10-CM

## 2024-10-31 DIAGNOSIS — E78.5 DYSLIPIDEMIA: ICD-10-CM

## 2024-11-01 RX ORDER — ATORVASTATIN CALCIUM 80 MG/1
80 TABLET, FILM COATED ORAL EVERY EVENING
Qty: 100 TABLET | Refills: 2 | Status: SHIPPED | OUTPATIENT
Start: 2024-11-01

## 2024-11-14 ENCOUNTER — OFFICE VISIT (OUTPATIENT)
Dept: MEDICAL GROUP | Facility: PHYSICIAN GROUP | Age: 89
End: 2024-11-14
Payer: MEDICARE

## 2024-11-14 ENCOUNTER — PATIENT OUTREACH (OUTPATIENT)
Dept: HEALTH INFORMATION MANAGEMENT | Facility: OTHER | Age: 89
End: 2024-11-14

## 2024-11-14 VITALS
HEIGHT: 62 IN | WEIGHT: 124 LBS | DIASTOLIC BLOOD PRESSURE: 50 MMHG | BODY MASS INDEX: 22.82 KG/M2 | HEART RATE: 70 BPM | SYSTOLIC BLOOD PRESSURE: 90 MMHG | TEMPERATURE: 97.7 F | OXYGEN SATURATION: 94 %

## 2024-11-14 DIAGNOSIS — I25.119 CORONARY ARTERY DISEASE INVOLVING NATIVE CORONARY ARTERY OF NATIVE HEART WITH ANGINA PECTORIS (HCC): ICD-10-CM

## 2024-11-14 DIAGNOSIS — I10 ESSENTIAL HYPERTENSION: ICD-10-CM

## 2024-11-14 DIAGNOSIS — R73.01 IMPAIRED FASTING GLUCOSE: ICD-10-CM

## 2024-11-14 DIAGNOSIS — I10 ESSENTIAL HYPERTENSION: Chronic | ICD-10-CM

## 2024-11-14 DIAGNOSIS — M85.80 OSTEOPENIA AFTER MENOPAUSE: ICD-10-CM

## 2024-11-14 DIAGNOSIS — R19.8 ALTERNATING CONSTIPATION AND DIARRHEA: ICD-10-CM

## 2024-11-14 DIAGNOSIS — Z78.0 OSTEOPENIA AFTER MENOPAUSE: ICD-10-CM

## 2024-11-14 LAB
HBA1C MFR BLD: 5.9 % (ref ?–5.8)
POCT INT CON NEG: NEGATIVE
POCT INT CON POS: POSITIVE

## 2024-11-14 PROCEDURE — 3078F DIAST BP <80 MM HG: CPT | Performed by: STUDENT IN AN ORGANIZED HEALTH CARE EDUCATION/TRAINING PROGRAM

## 2024-11-14 PROCEDURE — 3074F SYST BP LT 130 MM HG: CPT | Performed by: STUDENT IN AN ORGANIZED HEALTH CARE EDUCATION/TRAINING PROGRAM

## 2024-11-14 PROCEDURE — 99214 OFFICE O/P EST MOD 30 MIN: CPT | Performed by: STUDENT IN AN ORGANIZED HEALTH CARE EDUCATION/TRAINING PROGRAM

## 2024-11-14 PROCEDURE — 83036 HEMOGLOBIN GLYCOSYLATED A1C: CPT | Performed by: STUDENT IN AN ORGANIZED HEALTH CARE EDUCATION/TRAINING PROGRAM

## 2024-11-14 ASSESSMENT — ENCOUNTER SYMPTOMS
CHILLS: 0
SHORTNESS OF BREATH: 0
HEADACHES: 0
DIZZINESS: 0
FEVER: 0

## 2024-11-14 ASSESSMENT — FIBROSIS 4 INDEX: FIB4 SCORE: 2.52

## 2024-11-14 NOTE — PROGRESS NOTES
Subjective:     CC:   Chief Complaint   Patient presents with    Follow-Up    Lab Results       HPI:   Dayana presents today with    Problem   Alternating Constipation and Diarrhea    This is a chronic problem.  Chronic history of constipation, will go several days without a bowel movement.   Occasionally experiences 1-2 days of diarrhea accompanied by abdominal cramping.  Follows with GI.  Takes a fiber supplement and a probiotic everyday. Found docusate to be unhelpful.   Recently was advised to eliminate dairy due to possible lactose allergy.     Essential Hypertension    This is a chronic condition.  Current Meds:   - lisinopril 20 mg daily  - amlodipine 5 mg daily  - carvedilol 6.25 mg twice daily   Side effects: none  Associated symptoms: Denies chest pain, shortness of breath, headaches, dizziness/lightheadedness               Past medical, surgical, family, and social history were reviewed and updated.     Medications:    Current Outpatient Medications:     atorvastatin (LIPITOR) 80 MG tablet, TAKE ONE TABLET BY MOUTH EVERY  EVENING, Disp: 100 Tablet, Rfl: 2    montelukast (SINGULAIR) 10 MG Tab, TAKE 1 TABLET BY MOUTH EVERY  EVENING, Disp: 100 Tablet, Rfl: 2    omeprazole (PRILOSEC) 20 MG delayed-release capsule, TAKE 1 CAPSULE BY MOUTH TWICE  DAILY, Disp: 200 Capsule, Rfl: 2    Zinc 50 MG Tab, Take  by mouth., Disp: , Rfl:     BIOTIN PO, Take  by mouth., Disp: , Rfl:     Multiple Vitamins-Minerals (HAIR SKIN & NAILS PO), Take  by mouth., Disp: , Rfl:     Metamucil Fiber Chew Tab, Chew., Disp: , Rfl:     Probiotic Product (PROBIOTIC GUMMIES PO), Take  by mouth., Disp: , Rfl:     fluticasone (FLONASE) 50 MCG/ACT nasal spray, USE 1 SPRAY IN BOTH NOSTRILS  ONCE DAILY, Disp: 16 g, Rfl: 2    lisinopril (PRINIVIL) 20 MG Tab, TAKE 1 TABLET BY MOUTH DAILY, Disp: 100 Tablet, Rfl: 3    gabapentin (NEURONTIN) 400 MG Cap, TAKE 1 CAPSULE BY MOUTH 3 TIMES  DAILY, Disp: 270 Capsule, Rfl: 1    isosorbide mononitrate SR  "(IMDUR) 60 MG TABLET SR 24 HR, Take 1 Tablet by mouth every evening., Disp: 100 Tablet, Rfl: 3    amLODIPine (NORVASC) 5 MG Tab, TAKE 1 TABLET BY MOUTH DAILY, Disp: 100 Tablet, Rfl: 3    carvedilol (COREG) 6.25 MG Tab, Take 1 Tablet by mouth 2 times a day with meals., Disp: 180 Tablet, Rfl: 3    albuterol 108 (90 Base) MCG/ACT Aero Soln inhalation aerosol, as needed., Disp: , Rfl:     aspirin 81 MG EC tablet, Chew 81 mg every day., Disp: , Rfl:     CRANBERRY PO, Take 3 Tablets by mouth every day., Disp: , Rfl:     Multiple Vitamins-Minerals (OCUVITE-LUTEIN) Tab, Take 1 Tablet by mouth every day., Disp: , Rfl:     Saline (OCEAN NASAL SPRAY NA), Administer 1 Spray into affected nostril(S) every day., Disp: , Rfl:     Acetaminophen 500 MG Cap, Take 1 Capsule by mouth 2 times a day. Morning & Afternoon, sometimes takes 3 times a day, Disp: , Rfl:     Melatonin 10 MG Tab, Take 10 mg by mouth at bedtime., Disp: , Rfl:     guaifenesin LA (MUCINEX) 600 MG TABLET SR 12 HR, Take 600 mg by mouth every morning., Disp: , Rfl:     Ascorbic Acid (VITAMIN C) 1000 MG Tab, Take 1 Tablet by mouth every morning., Disp: , Rfl:     VITAMIN E PO, Take 1 Capsule by mouth every morning., Disp: , Rfl:     Cholecalciferol (VITAMIN D) 50 MCG (2000 UT) Cap, Take 2,000 Units by mouth every morning., Disp: , Rfl:     Allergies:  Bactrim [sulfamethoxazole w-trimethoprim], Pcn [penicillins], Codeine, and Tramadol      Health Maintenance: Completed    ROS:  Review of Systems   Constitutional:  Negative for chills and fever.   Respiratory:  Negative for shortness of breath.    Cardiovascular:  Negative for chest pain.   Neurological:  Negative for dizziness and headaches.       Objective:     Exam:  BP 90/50 (BP Location: Left arm, Patient Position: Sitting, BP Cuff Size: Adult)   Pulse 70   Temp 36.5 °C (97.7 °F) (Temporal)   Ht 1.575 m (5' 2\")   Wt 56.2 kg (124 lb)   LMP  (LMP Unknown)   SpO2 94%   BMI 22.68 kg/m²  Body mass index is 22.68 " kg/m².    Physical Exam  Vitals reviewed.   Constitutional:       General: She is not in acute distress.  Eyes:      Extraocular Movements: Extraocular movements intact.   Cardiovascular:      Rate and Rhythm: Normal rate and regular rhythm.      Heart sounds: No murmur heard.     No friction rub. No gallop.   Pulmonary:      Effort: Pulmonary effort is normal.      Breath sounds: No wheezing, rhonchi or rales.   Musculoskeletal:      Cervical back: Neck supple.   Skin:     General: Skin is warm and dry.   Neurological:      Mental Status: She is alert.   Psychiatric:         Mood and Affect: Mood normal.           Labs:      Latest Reference Range & Units 10/01/24 10:01   Sodium 135 - 145 mmol/L 137   Potassium 3.6 - 5.5 mmol/L 4.1   Chloride 96 - 112 mmol/L 104   Co2 20 - 33 mmol/L 22   Anion Gap 7.0 - 16.0  11.0   Glucose 65 - 99 mg/dL 122 (H)   Bun 8 - 22 mg/dL 15   Creatinine 0.50 - 1.40 mg/dL 0.71   GFR (CKD-EPI) >60 mL/min/1.73 m 2 78   Calcium 8.5 - 10.5 mg/dL 9.5   Fasting Status  Fasting   Cholesterol,Tot 100 - 199 mg/dL 121   Triglycerides 0 - 149 mg/dL 270 (H)   HDL >=40 mg/dL 49   LDL <100 mg/dL 18   TSH 0.380 - 5.330 uIU/mL 3.040   (H): Data is abnormally high      Lab Results   Component Value Date/Time    HBA1C 5.9 (A) 11/14/2024 11:22 AM          Assessment & Plan:     89 y.o. female with the following -     1. Essential hypertension  Chronic, controlled.  Blood pressure is at goal today.  Continue current regimen.     2. Alternating constipation and diarrhea  Chronic in nature. Symptoms are intermittent. Following with GI. Continue current supplement regimen per GI. Miralax as needed. Trial elimination of dairy due to possible lactose intolerance.    3. Osteopenia after menopause  - DS-BONE DENSITY STUDY (DEXA); Future    4. Impaired fasting glucose  A1c in prediabetic range. Discussed dietary modifications. Continue to monitor.  - POCT  A1C            Return in about 3 months (around  2/14/2025).    Please note that this dictation was created using voice recognition software. I have made every reasonable attempt to correct obvious errors, but I expect that there are errors of grammar and possibly content that I did not discover before finalizing the note.

## 2024-11-14 NOTE — PROGRESS NOTES
Assessment    I spoke to the patient while she was in clinic for a PCP visit. The patient reports no falls or injuries since our last conversation. The patient's meds were reviewed for her PCP visit and are confirmed as being taken as indicated. The patient reports blood pressures between  systolic. In clinic today 90/50. The patient reports no associated symptomology. Patient endorses no urological changes. Patient states that she went to the GI doctor recently and that they have instructed her to remove dairy from her diet for a time to see if it reduces her symptoms. Dairy alternatives discussed. Patient denies any other pressing community health issues at this time. Patient has demonstrated ability to reach out to me if needed.     Education and Self-Management of Care    Article on lactose controlled nutrition sent by mail  Patient will continue to follow up with PCP/specialists as indicated    Plan of Care and Goals    Patient will remain free from falls/injuries  Healthy nutrition, hydration, and activity    Barriers:    Management of age related and chronic health concerns    Progress:    Stable    Next outreach: One month

## 2024-12-12 ENCOUNTER — PATIENT OUTREACH (OUTPATIENT)
Dept: HEALTH INFORMATION MANAGEMENT | Facility: OTHER | Age: 89
End: 2024-12-12
Payer: MEDICARE

## 2024-12-12 DIAGNOSIS — I25.119 CORONARY ARTERY DISEASE INVOLVING NATIVE CORONARY ARTERY OF NATIVE HEART WITH ANGINA PECTORIS (HCC): ICD-10-CM

## 2024-12-12 DIAGNOSIS — I10 ESSENTIAL HYPERTENSION: ICD-10-CM

## 2024-12-12 NOTE — CARE PLAN
Care Plans       Chronic Care Management (CMS)    Met: 0 of 6 Met: 0 of 6      No Outcome (6)       Demonstrate Knowledge of Hypertension (Knowledge of hypertension - Long Term)  Disciplines:  Interdisciplinary  Expected end:  12/12/25     Demonstrate factors that can contribute to high blood pressure (Knowledge of factors contributing to hypertension - Long Term)  Disciplines:  Interdisciplinary  Expected end:  12/12/25     Identify which modifiable health habits can be modifed (Recognize current health habits that may contribute to hypertension - Long Term)  Disciplines:  Interdisciplinary  Expected end:  12/12/25     Demonstrate the elements of self-monitoring blood pressure (Knowledge of self-monitoring blood pressure)  Disciplines:  Interdisciplinary  Expected end:  12/12/25     Reduce the Risk of Falls and Fall Related Injuries (Risk of Falls - Long Term)  Disciplines:  Nurse, Interdisciplinary  Expected end:  12/12/25     Patient Stated Goal: The patient will optimize remaining quality and quantity of life.  (Patient Stated Problem: Continue Living- Long Term)  Disciplines:  Nurse, Interdisciplinary  Expected end:  12/12/25   Patient-stated:  Yes

## 2024-12-12 NOTE — PROGRESS NOTES
First attempt made for PCM monthly follow up. No answer. Message left with contact information and request for call back. Care plans updated by new protocol.

## 2024-12-13 NOTE — PROGRESS NOTES
Assessment    I spoke to the patient this afternoon by phone for her monthly PCM follow up. The patient reports no cardiac or urological symptoms at this time. The patient reports no respiratory symptoms at this time other than congestion suspicious for sinus infection, which she states has held on for a number of weeks. Urgent care was recommended as the clinic is fairly booked up for the next few weeks. The patient also lives right across the street. The patient states that she has been able to get out and walk with her dog, Deshawn, over the last couple of days. The patient reports no major changes in her diet or eating habits. The patient voices no further questions or concerns at this time. The patient voices understanding of how to get a hold of me if necessary.     Education and Self-Management of Care    Article on cold weather health concerns sent by mail  Patient will continue to follow up with PCP, specialists as indicated    Plan of Care and Goals    See Care Plan Note  Healthy nutrition, hydration, and activity  Patient will remain free from falls/injuries.     Barriers:    Management of age related and chronic health concerns.     Progress:    Patient reports increased comfort in joints with regular walking.     Next outreach: One Month

## 2024-12-28 ENCOUNTER — OFFICE VISIT (OUTPATIENT)
Dept: URGENT CARE | Facility: CLINIC | Age: 89
End: 2024-12-28
Payer: MEDICARE

## 2024-12-28 ENCOUNTER — APPOINTMENT (OUTPATIENT)
Dept: RADIOLOGY | Facility: IMAGING CENTER | Age: 89
End: 2024-12-28
Attending: NURSE PRACTITIONER
Payer: MEDICARE

## 2024-12-28 VITALS
DIASTOLIC BLOOD PRESSURE: 86 MMHG | OXYGEN SATURATION: 91 % | TEMPERATURE: 96.9 F | BODY MASS INDEX: 23.41 KG/M2 | HEART RATE: 69 BPM | HEIGHT: 61 IN | RESPIRATION RATE: 16 BRPM | SYSTOLIC BLOOD PRESSURE: 138 MMHG | WEIGHT: 124 LBS

## 2024-12-28 DIAGNOSIS — J01.90 ACUTE BACTERIAL SINUSITIS: ICD-10-CM

## 2024-12-28 DIAGNOSIS — R05.3 PERSISTENT COUGH FOR 3 WEEKS OR LONGER: ICD-10-CM

## 2024-12-28 DIAGNOSIS — R06.02 SHORTNESS OF BREATH: ICD-10-CM

## 2024-12-28 DIAGNOSIS — B96.89 ACUTE BACTERIAL SINUSITIS: ICD-10-CM

## 2024-12-28 PROCEDURE — 99213 OFFICE O/P EST LOW 20 MIN: CPT | Performed by: NURSE PRACTITIONER

## 2024-12-28 PROCEDURE — 3075F SYST BP GE 130 - 139MM HG: CPT | Performed by: NURSE PRACTITIONER

## 2024-12-28 PROCEDURE — 3079F DIAST BP 80-89 MM HG: CPT | Performed by: NURSE PRACTITIONER

## 2024-12-28 PROCEDURE — 71046 X-RAY EXAM CHEST 2 VIEWS: CPT | Mod: TC | Performed by: RADIOLOGY

## 2024-12-28 RX ORDER — PREDNISONE 20 MG/1
20 TABLET ORAL DAILY
Qty: 7 TABLET | Refills: 0 | Status: SHIPPED | OUTPATIENT
Start: 2024-12-28 | End: 2025-01-04

## 2024-12-28 RX ORDER — DOXYCYCLINE HYCLATE 100 MG
100 TABLET ORAL 2 TIMES DAILY
Qty: 14 TABLET | Refills: 0 | Status: SHIPPED | OUTPATIENT
Start: 2024-12-28 | End: 2025-01-04

## 2024-12-28 RX ORDER — AZITHROMYCIN 500 MG/1
500 TABLET, FILM COATED ORAL
COMMUNITY
Start: 2024-11-21

## 2024-12-28 ASSESSMENT — ENCOUNTER SYMPTOMS: COUGH: 1

## 2024-12-28 ASSESSMENT — FIBROSIS 4 INDEX: FIB4 SCORE: 2.52

## 2024-12-28 NOTE — PROGRESS NOTES
Subjective:     Dayana Lechuga is a 89 y.o. female who presents for Cough (Cough x 2 weeks)      Cough      Pt presents for evaluation of a new problem. Dayana is a 89-year-old female who presents to urgent care today with complaints of an ongoing dry cough for the past 2 weeks.  Associated symptoms include nasal congestion and postnasal drip.  There has been no fever, chills, nausea/vomiting or diarrhea.  No medication has been used for her symptoms.  She denies any known ill contacts.    Review of Systems   Respiratory:  Positive for cough.        PMH:   Past Medical History:   Diagnosis Date    Allergy     Anxiety     ASTHMA     Bronchitis     CAD (coronary artery disease)     JT to RCA; 70% stenosis in LAD    Chills     Constipation     Difficulty breathing     GERD (gastroesophageal reflux disease)     Heart attack ([facility])     Heartburn     Hyperlipidemia     Hypertension     Influenza     Insomnia     Lumbar back pain 09/10/2016    Mild peripheral edema 07/31/2020    Mumps     OSTEOPOROSIS     Palpitations     Peripheral vascular disease, unspecified ([facility]) 09/14/2021    Pneumonia     Post concussion syndrome 09/11/2020    PVD (peripheral vascular disease) ([facility])     70% PAD-followed by [facility]    Sedative, hypnotic or anxiolytic dependence, 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 Tolerated ceftriaxone before in 2021    Codeine Unspecified     Unknown reaction      Tramadol Unspecified     Urinary retention     SURGHX:   Past Surgical History:   Procedure Laterality Date    DE INJ LUMBAR/SACRAL,W/ IMAGING N/A 4/20/2023    Procedure: Caudal epidural with catheter;  Surgeon: Torres Fall M.D.;  [facility];  Service: Pain Management    ABDOMINAL HYSTERECTOMY TOTAL      APPENDECTOMY       HERNIA REPAIR      HYSTERECTOMY LAPAROSCOPY      TONSILLECTOMY      ZZZ CARDIAC CATH       SOCHX:   Social History     Socioeconomic History    Marital status:    Tobacco Use    Smoking status: Never    Smokeless tobacco: Never   Vaping Use    Vaping status: Never Used   Substance and Sexual Activity    Alcohol use: Yes     Comment: occ    Drug use: No    Sexual activity: Not Currently   Other Topics Concern     Service No    Blood Transfusions Yes    Caffeine Concern No    Occupational Exposure No    Hobby Hazards No    Sleep Concern Yes    Stress Concern Yes    Weight Concern No    Special Diet No    Back Care No    Exercise No    Bike Helmet No    Seat Belt Yes    Self-Exams No     Social Drivers of Health     Financial Resource Strain: Low Risk  (8/23/2024)    Overall Financial Resource Strain (CARDIA)     Difficulty of Paying Living Expenses: Not hard at all   Food Insecurity: No Food Insecurity (8/23/2024)    Hunger Vital Sign     Worried About Running Out of Food in the Last Year: Never true     Ran Out of Food in the Last Year: Never true   Transportation Needs: No Transportation Needs (8/23/2024)    PRAPARE - Transportation     Lack of Transportation (Medical): No     Lack of Transportation (Non-Medical): No   Physical Activity: Insufficiently Active (12/7/2022)    Exercise Vital Sign     Days of Exercise per Week: 7 days     Minutes of Exercise per Session: 20 min   Stress: No Stress Concern Present (12/7/2022)    [employer] [place] [employer] - Occupational Stress Questionnaire     Feeling of Stress : Not at all   Social Connections: Moderately Isolated (12/7/2022)    Social Connection and Isolation Panel [NHANES]     Frequency of Communication with Friends and Family: More than three times a week     Frequency of Social Gatherings with Friends and Family: More than three times a week     Attends [employer] Services: 1 to 4 times per year     Active Member of Clubs or  "Organizations: No     Attends Club or Organization Meetings: Never     Marital Status:    Housing Stability: Low Risk  (8/23/2024)    Housing Stability Vital Sign     Unable to Pay for Housing in the Last Year: No     Number of Times Moved in the Last Year: 1     Homeless in the Last Year: No     FH:   Family History   Problem Relation Age of Onset    Heart Attack Father     Cancer Brother     Cancer Brother     Heart Disease Neg Hx     Heart Failure Neg Hx     Hyperlipidemia Neg Hx          Objective:   /86 (BP Location: Left arm, Patient Position: Sitting, BP Cuff Size: Adult)   Pulse 69   Temp 36.1 °C (96.9 °F) (Temporal)   Resp 16   Ht 1.537 m (5' 0.5\")   Wt 56.2 kg (124 lb)   LMP  (LMP Unknown)   SpO2 91%   BMI 23.82 kg/m²     Physical Exam  Vitals and nursing note reviewed.   Constitutional:       General: She is not in acute distress.     Appearance: Normal appearance. She is normal weight. She is not ill-appearing or toxic-appearing.   HENT:      Head: Normocephalic.      Right Ear: Tympanic membrane, ear canal and external ear normal.      Left Ear: Tympanic membrane, ear canal and external ear normal.      Nose: Congestion present. No rhinorrhea.      Right Sinus: Maxillary sinus tenderness present.      Left Sinus: Maxillary sinus tenderness present.      Mouth/Throat:      Mouth: Mucous membranes are moist.      Pharynx: No oropharyngeal exudate or posterior oropharyngeal erythema.      Comments: PND present  Eyes:      General:         Right eye: No discharge.         Left eye: No discharge.      Pupils: Pupils are equal, round, and reactive to light.   Cardiovascular:      Rate and Rhythm: Normal rate and regular rhythm.      Pulses: Normal pulses.      Heart sounds: Normal heart sounds.   Pulmonary:      Effort: Pulmonary effort is normal.   Abdominal:      General: Abdomen is flat.   Musculoskeletal:         General: Normal range of motion.      Cervical back: Normal range of " motion and neck supple. No tenderness.   Lymphadenopathy:      Cervical: No cervical adenopathy.   Skin:     General: Skin is dry.   Neurological:      General: No focal deficit present.      Mental Status: She is alert and oriented to person, place, and time. Mental status is at baseline.   Psychiatric:         Mood and Affect: Mood normal.         Behavior: Behavior normal.         Thought Content: Thought content normal.         Judgment: Judgment normal.       DX-CHEST-2 VIEWS    Result Date: 12/28/2024 12/28/2024 1:30 PM HISTORY/REASON FOR EXAM:  Cough; Cough X 3 weeks. TECHNIQUE/EXAM DESCRIPTION AND NUMBER OF VIEWS: Two views of the chest. COMPARISON:  7/16/2024 FINDINGS: Cardiomediastinal silhouette is stable. Aortic calcified atherosclerotic plaque. No focal consolidation, pleural effusion, pulmonary edema or pneumothorax. No acute osseous abnormality.     No acute cardiopulmonary abnormality.     Assessment/Plan:   Assessment    1. Persistent cough for 3 weeks or longer  DX-CHEST-2 VIEWS    predniSONE (DELTASONE) 20 MG Tab      2. Shortness of breath  DX-CHEST-2 VIEWS      3. Acute bacterial sinusitis  doxycycline (VIBRAMYCIN) 100 MG Tab        Prescriptions called into pharmacy. AVS printed and reviewed with pt. Red flags identified of when to seek care back in UC or ER including SOB, increased fever and worsening symptoms. Symptomatic treatment such as OTC Ibuprofen/ Acetaminophen, increased fluids, and humidifier encouraged Pt in agreement with care plan today.

## 2024-12-31 NOTE — PROGRESS NOTES
Chief Complaint   Patient presents with    Coronary Artery Disease     Follow up       Subjective     Dayana Lechuga is a 89 y.o. female who presents today for follow-up of CAD PVD hypertension and hyperlipidemia as well as stable angina.  Doing well.      No significant changes.    Past Medical History:   Diagnosis Date    Allergy     Anxiety     ASTHMA     Bronchitis     CAD (coronary artery disease)     JT to RCA; 70% stenosis in LAD    Chills     Constipation     Difficulty breathing     GERD (gastroesophageal reflux disease)     Heart attack (Formerly [facility])     Heartburn     Hyperlipidemia     Hypertension     Influenza     Insomnia     Lumbar back pain 09/10/2016    Mild peripheral edema 07/31/2020    Mumps     OSTEOPOROSIS     Palpitations     Peripheral vascular disease, unspecified ([facility]ly [facility]) 09/14/2021    Pneumonia     Post concussion syndrome 09/11/2020    PVD (peripheral vascular disease) (Formerly [facility])     70% PAD-followed by [facility] AMBROCIO    Sedative, hypnotic or anxiolytic dependence, uncomplicated ([facility]) 10/20/2023    Sweat, sweating, excessive     Tonsillitis      Past Surgical History:   Procedure Laterality Date    OR INJ LUMBAR/SACRAL,W/ IMAGING N/A 4/20/2023    Procedure: Caudal epidural with catheter;  Surgeon: Torres Fall M.D.;  [facility];  Service: Pain Management    ABDOMINAL HYSTERECTOMY TOTAL      APPENDECTOMY      HERNIA REPAIR      HYSTERECTOMY LAPAROSCOPY      TONSILLECTOMY      ZZZ CARDIAC CATH       Family History   Problem Relation Age of Onset    Heart Attack Father     Cancer Brother     Cancer Brother     Heart Disease Neg Hx     Heart Failure Neg Hx     Hyperlipidemia Neg Hx      Social History     Socioeconomic History    Marital status:      Spouse name: Not on file    Number of children: Not on file    Years of education: Not on file    Highest education level: Not on file   Occupational History    Not on file   Tobacco Use    Smoking status: Never     Smokeless tobacco: Never   Vaping Use    Vaping status: Never Used   Substance and Sexual Activity    Alcohol use: Yes     Comment: occ    Drug use: No    Sexual activity: Not Currently   Other Topics Concern     Service No    Blood Transfusions Yes    Caffeine Concern No    Occupational Exposure No    Hobby Hazards No    Sleep Concern Yes    Stress Concern Yes    Weight Concern No    Special Diet No    Back Care No    Exercise No    Bike Helmet No    Seat Belt Yes    Self-Exams No   Social History Narrative    Not on file     Social Drivers of Health     Financial Resource Strain: Low Risk  (8/23/2024)    Overall Financial Resource Strain (CARDIA)     Difficulty of Paying Living Expenses: Not hard at all   Food Insecurity: No Food Insecurity (8/23/2024)    Hunger Vital Sign     Worried About Running Out of Food in the Last Year: Never true     Ran Out of Food in the Last Year: Never true   Transportation Needs: No Transportation Needs (8/23/2024)    PRAPARE - Transportation     Lack of Transportation (Medical): No     Lack of Transportation (Non-Medical): No   Physical Activity: Insufficiently Active (12/7/2022)    Exercise Vital Sign     Days of Exercise per Week: 7 days     Minutes of Exercise per Session: 20 min   Stress: No Stress Concern Present (12/7/2022)    [employer] - Occupational Stress Questionnaire     Feeling of Stress : Not at all   Social Connections: Moderately Isolated (12/7/2022)    Social Connection and Isolation Panel [NHANES]     Frequency of Communication with Friends and Family: More than three times a week     Frequency of Social Gatherings with Friends and Family: More than three times a week     Attends Worship Services: 1 to 4 times per year     Active Member of Clubs or Organizations: No     Attends Club or Organization Meetings: Never     Marital Status:    Intimate Partner Violence: Not on file   Housing Stability: Low Risk  (8/23/2024)     Housing Stability Vital Sign     Unable to Pay for Housing in the Last Year: No     Number of Times Moved in the Last Year: 1     Homeless in the Last Year: No     Allergies   Allergen Reactions    Bactrim [Sulfamethoxazole W-Trimethoprim] Hives    Pcn [Penicillins] Hives     About 70 years. Tolerated ceftriaxone before in 2021    Codeine Unspecified     Unknown reaction      Tramadol Unspecified     Urinary retention     Outpatient Encounter Medications as of 10/3/2024   Medication Sig Dispense Refill    Zinc 50 MG Tab Take  by mouth.      BIOTIN PO Take  by mouth.      Multiple Vitamins-Minerals (HAIR SKIN & NAILS PO) Take  by mouth.      Metamucil Fiber Chew Tab Chew.      Probiotic Product (PROBIOTIC GUMMIES PO) Take  by mouth.      [DISCONTINUED] docusate sodium (COLACE) 100 MG Cap Take 100 mg by mouth every day. (Patient not taking: Reported on 11/14/2024)      fluticasone (FLONASE) 50 MCG/ACT nasal spray USE 1 SPRAY IN BOTH NOSTRILS  ONCE DAILY 16 g 2    lisinopril (PRINIVIL) 20 MG Tab TAKE 1 TABLET BY MOUTH DAILY 100 Tablet 3    gabapentin (NEURONTIN) 400 MG Cap TAKE 1 CAPSULE BY MOUTH 3 TIMES  DAILY 270 Capsule 1    isosorbide mononitrate SR (IMDUR) 60 MG TABLET SR 24 HR Take 1 Tablet by mouth every evening. 100 Tablet 3    amLODIPine (NORVASC) 5 MG Tab TAKE 1 TABLET BY MOUTH DAILY 100 Tablet 3    carvedilol (COREG) 6.25 MG Tab Take 1 Tablet by mouth 2 times a day with meals. 180 Tablet 3    [DISCONTINUED] montelukast (SINGULAIR) 10 MG Tab TAKE ONE TABLET BY MOUTH EVERY  EVENING 100 Tablet 1    [DISCONTINUED] omeprazole (PRILOSEC) 20 MG delayed-release capsule TAKE ONE CAPSULE BY MOUTH TWO  TIMES A  Capsule 1    [DISCONTINUED] atorvastatin (LIPITOR) 80 MG tablet Take 1 Tablet by mouth every evening. 100 Tablet 3    albuterol 108 (90 Base) MCG/ACT Aero Soln inhalation aerosol as needed.      aspirin 81 MG EC tablet Chew 81 mg every day.      CRANBERRY PO Take 3 Tablets by mouth every day.       "Multiple Vitamins-Minerals (OCUVITE-LUTEIN) Tab Take 1 Tablet by mouth every day.      Saline (OCEAN NASAL SPRAY NA) Administer 1 Spray into affected nostril(S) every day.      Acetaminophen 500 MG Cap Take 1 Capsule by mouth 2 times a day. Morning & Afternoon, sometimes takes 3 times a day      Melatonin 10 MG Tab Take 10 mg by mouth at bedtime.      guaifenesin LA (MUCINEX) 600 MG TABLET SR 12 HR Take 600 mg by mouth every morning.      Ascorbic Acid (VITAMIN C) 1000 MG Tab Take 1 Tablet by mouth every morning.      VITAMIN E PO Take 1 Capsule by mouth every morning.      Cholecalciferol (VITAMIN D) 50 MCG (2000 UT) Cap Take 2,000 Units by mouth every morning.       No facility-administered encounter medications on file as of 10/3/2024.     Review of Systems   All other systems reviewed and are negative.             Objective     BP (!) 148/84 (BP Location: Left arm, Patient Position: Sitting)   Pulse 70   Resp 16   Ht 1.6 m (5' 3\")   Wt 55.3 kg (122 lb)   LMP  (LMP Unknown)   SpO2 93%   BMI 21.61 kg/m²     Physical Exam  Vitals and nursing note reviewed.   Constitutional:       General: She is not in acute distress.     Appearance: Normal appearance.   HENT:      Head: Normocephalic and atraumatic.      Right Ear: External ear normal.      Left Ear: External ear normal.      Nose: Nose normal.   Eyes:      Conjunctiva/sclera: Conjunctivae normal.   Cardiovascular:      Rate and Rhythm: Normal rate and regular rhythm.      Pulses: Normal pulses.      Heart sounds: No murmur heard.  Pulmonary:      Effort: Pulmonary effort is normal. No respiratory distress.      Breath sounds: Normal breath sounds.   Abdominal:      General: There is no distension.      Palpations: Abdomen is soft.   Musculoskeletal:      Cervical back: No rigidity or tenderness.      Right lower leg: No edema.      Left lower leg: No edema.   Skin:     General: Skin is warm and dry.      Capillary Refill: Capillary refill takes 2 to 3 " "seconds.   Neurological:      General: No focal deficit present.      Mental Status: She is alert and oriented to person, place, and time.   Psychiatric:         Mood and Affect: Mood normal.         Behavior: Behavior normal.         Thought Content: Thought content normal.       LABS:  Lab Results   Component Value Date/Time    CHOLSTRLTOT 121 10/01/2024 10:01 AM    LDL 18 10/01/2024 10:01 AM    HDL 49 10/01/2024 10:01 AM    TRIGLYCERIDE 270 (H) 10/01/2024 10:01 AM       Lab Results   Component Value Date/Time    WBC 7.6 07/16/2024 04:07 PM    RBC 3.78 (L) 07/16/2024 04:07 PM    HEMOGLOBIN 12.6 07/16/2024 04:07 PM    HEMATOCRIT 36.6 (L) 07/16/2024 04:07 PM    MCV 96.8 07/16/2024 04:07 PM    NEUTSPOLYS 55.70 07/16/2024 04:07 PM    LYMPHOCYTES 29.80 07/16/2024 04:07 PM    MONOCYTES 9.60 07/16/2024 04:07 PM    EOSINOPHILS 3.40 07/16/2024 04:07 PM    BASOPHILS 1.20 07/16/2024 04:07 PM     Lab Results   Component Value Date/Time    SODIUM 137 10/01/2024 10:01 AM    POTASSIUM 4.1 10/01/2024 10:01 AM    CHLORIDE 104 10/01/2024 10:01 AM    CO2 22 10/01/2024 10:01 AM    GLUCOSE 122 (H) 10/01/2024 10:01 AM    BUN 15 10/01/2024 10:01 AM    CREATININE 0.71 10/01/2024 10:01 AM     Lab Results   Component Value Date    HBA1C 5.9 (A) 11/14/2024      Lab Results   Component Value Date/Time    ALKPHOSPHAT 79 07/16/2024 04:07 PM    ASTSGOT 24 07/16/2024 04:07 PM    ALTSGPT 23 07/16/2024 04:07 PM    TBILIRUBIN 0.2 07/16/2024 04:07 PM      Lab Results   Component Value Date/Time    BNPBTYPENAT 193 (H) 09/10/2016 04:00 PM      No results found for: \"TSH\"  Lab Results   Component Value Date/Time    PROTHROMBTM 12.5 10/16/2023 12:22 PM    INR 0.92 10/16/2023 12:22 PM                 Assessment & Plan     1. Essential hypertension        2. Coronary artery disease due to calcified coronary lesion        3. Popliteal artery stenosis, right (HCC)        4. PVD (peripheral vascular disease) ([facility])            Medical Decision Making: Today's " Assessment/Status/Plan:        Doing well with no active CV issues. Continue secondary prevention measures and FU routinely.

## 2025-01-06 ENCOUNTER — OFFICE VISIT (OUTPATIENT)
Dept: URGENT CARE | Facility: CLINIC | Age: OVER 89
End: 2025-01-06
Payer: MEDICARE

## 2025-01-06 VITALS
OXYGEN SATURATION: 96 % | BODY MASS INDEX: 24.35 KG/M2 | HEART RATE: 67 BPM | SYSTOLIC BLOOD PRESSURE: 120 MMHG | TEMPERATURE: 98.9 F | DIASTOLIC BLOOD PRESSURE: 66 MMHG | HEIGHT: 60 IN | WEIGHT: 124 LBS | RESPIRATION RATE: 16 BRPM

## 2025-01-06 DIAGNOSIS — R35.0 URINE FREQUENCY: ICD-10-CM

## 2025-01-06 LAB
APPEARANCE UR: CLEAR
BILIRUB UR STRIP-MCNC: NORMAL MG/DL
COLOR UR AUTO: YELLOW
GLUCOSE UR STRIP.AUTO-MCNC: NORMAL MG/DL
KETONES UR STRIP.AUTO-MCNC: NORMAL MG/DL
LEUKOCYTE ESTERASE UR QL STRIP.AUTO: NORMAL
NITRITE UR QL STRIP.AUTO: NORMAL
PH UR STRIP.AUTO: 5.5 [PH] (ref 5–8)
PROT UR QL STRIP: NORMAL MG/DL
RBC UR QL AUTO: NORMAL
SP GR UR STRIP.AUTO: 1.01
UROBILINOGEN UR STRIP-MCNC: 0.2 MG/DL

## 2025-01-06 PROCEDURE — 81002 URINALYSIS NONAUTO W/O SCOPE: CPT

## 2025-01-06 PROCEDURE — 99213 OFFICE O/P EST LOW 20 MIN: CPT

## 2025-01-06 PROCEDURE — 3078F DIAST BP <80 MM HG: CPT

## 2025-01-06 PROCEDURE — 3074F SYST BP LT 130 MM HG: CPT

## 2025-01-06 ASSESSMENT — FIBROSIS 4 INDEX: FIB4 SCORE: 2.52

## 2025-01-06 ASSESSMENT — ENCOUNTER SYMPTOMS
FEVER: 0
COUGH: 1
VOMITING: 0

## 2025-01-06 NOTE — PROGRESS NOTES
Subjective:     CHIEF COMPLAINT  Chief Complaint   Patient presents with    UTI     Possible UTI, frequency on and off, lower back pain x 2 days       HPI  Dayana Lechuga is a very pleasant 89 y.o. female who presents accompanied by a  with concern regarding a possible UTI.  She had increased urinary frequency on Saturday where she felt she had to urinate nearly every 5 minutes.  She has not had any painful urination.  Her urinary frequency has returned to normal at this time.  She recently completed a course of doxycycline for sinusitis.  She reports that her nasal congestion has improved greatly and her cough is nearly resolved.  She has been experiencing low back pain for the past several days.  She believes that this may be secondary to not being able to lay flat while sleeping secondary to congestion and her antibiotic.  She has been able to sleep well starting last night.  She has not had any fevers.  She is otherwise feeling well.      REVIEW OF SYSTEMS  Review of Systems   Constitutional:  Negative for fever.   HENT:  Negative for congestion.    Respiratory:  Positive for cough (Improving from prior visit).    Gastrointestinal:  Negative for vomiting.   Genitourinary:  Positive for frequency (Frequency increased Saturday, resolved). Negative for dysuria and urgency.       PAST MEDICAL HISTORY  Patient Active Problem List    Diagnosis Date Noted    Alternating constipation and diarrhea 11/14/2024    Dizziness 10/24/2023    Allergic rhinitis 09/14/2023    Cerebral atrophy (HCC) 05/16/2023    Atherosclerosis of aorta (HCC) 05/16/2023    DDD (degenerative disc disease), lumbosacral 05/16/2023    Mild major depression (HCC) 05/16/2023    Nonspecific abnormal function study, cardiovascular 05/16/2023    Macrocytic anemia 04/29/2023    Imbalance 02/16/2023    Chronic bilateral low back pain with left-sided sciatica 09/02/2022    BMI 20.0-20.9, adult 09/14/2021    Primary insomnia 07/09/2021     Peripheral neuropathy 11/20/2020    Pleural effusion 12/20/2019    Urinary retention 09/11/2019    Acute bilateral low back pain with bilateral sciatica 08/29/2019    Slow transit constipation 08/29/2018    Coronary artery disease involving native coronary artery of native heart with angina pectoris (HCC) 08/10/2016    Anxiety 06/22/2016    GERD (gastroesophageal reflux disease) 10/31/2009    Dyslipidemia 10/31/2009    Osteopenia 10/31/2009    Essential hypertension 10/02/2009       SURGICAL HISTORY   has a past surgical history that includes appendectomy; abdominal hysterectomy total; hernia repair; tonsillectomy; zzz cardiac cath; hysterectomy laparoscopy; and inj lumbar/sacral,w/ imaging (N/A, 4/20/2023).    ALLERGIES  Allergies   Allergen Reactions    Bactrim [Sulfamethoxazole W-Trimethoprim] Hives    Pcn [Penicillins] Hives     About 70 years. Tolerated ceftriaxone before in 2021    Codeine Unspecified     Unknown reaction      Tramadol Unspecified     Urinary retention       CURRENT MEDICATIONS  Home Medications       Reviewed by DORYS Mcadams-CBarney (Physician Assistant) on 01/06/25 at 1017  Med List Status: <None>     Medication Last Dose Status   Acetaminophen 500 MG Cap Taking Active   albuterol 108 (90 Base) MCG/ACT Aero Soln inhalation aerosol Taking Active   amLODIPine (NORVASC) 5 MG Tab Taking Active   Ascorbic Acid (VITAMIN C) 1000 MG Tab Taking Active   aspirin 81 MG EC tablet Taking Active   atorvastatin (LIPITOR) 80 MG tablet Taking Active   azithromycin (ZITHROMAX) 500 MG tablet Taking Active   BIOTIN PO Taking Active   carvedilol (COREG) 6.25 MG Tab Taking Active   Cholecalciferol (VITAMIN D) 50 MCG (2000 UT) Cap Taking Active   CRANBERRY PO Taking Active   fluticasone (FLONASE) 50 MCG/ACT nasal spray Taking Active   gabapentin (NEURONTIN) 400 MG Cap Taking Active   guaifenesin LA (MUCINEX) 600 MG TABLET SR 12 HR Taking Active   isosorbide mononitrate SR (IMDUR) 60 MG TABLET SR 24 HR  "Taking Active   lisinopril (PRINIVIL) 20 MG Tab Taking Active   Melatonin 10 MG Tab Taking Active   Metamucil Fiber Chew Tab Taking Active   montelukast (SINGULAIR) 10 MG Tab Taking Active   Multiple Vitamins-Minerals (HAIR SKIN & NAILS PO) Taking Active   Multiple Vitamins-Minerals (OCUVITE-LUTEIN) Tab Taking Active   omeprazole (PRILOSEC) 20 MG delayed-release capsule Taking Active   Probiotic Product (PROBIOTIC GUMMIES PO) Taking Active   Saline (OCEAN NASAL SPRAY NA) Taking Active   VITAMIN E PO Taking Active   Zinc 50 MG Tab Taking Active                    SOCIAL HISTORY  Social History     Tobacco Use    Smoking status: Never    Smokeless tobacco: Never   Vaping Use    Vaping status: Never Used   Substance and Sexual Activity    Alcohol use: Yes     Comment: occ    Drug use: No    Sexual activity: Not Currently       FAMILY HISTORY  Family History   Problem Relation Age of Onset    Heart Attack Father     Cancer Brother     Cancer Brother     Heart Disease Neg Hx     Heart Failure Neg Hx     Hyperlipidemia Neg Hx           Objective:     VITAL SIGNS: /66 (BP Location: Left arm, Patient Position: Sitting, BP Cuff Size: Adult)   Pulse 67   Temp 37.2 °C (98.9 °F) (Temporal)   Resp 16   Ht 1.525 m (5' 0.05\")   Wt 56.2 kg (124 lb)   LMP  (LMP Unknown)   SpO2 96%   BMI 24.18 kg/m²     PHYSICAL EXAM  Physical Exam  Vitals reviewed. Exam conducted with a chaperone present.   Constitutional:       General: She is not in acute distress.     Appearance: Normal appearance. She is not ill-appearing or toxic-appearing.   HENT:      Head: Normocephalic and atraumatic.      Nose: No congestion.      Mouth/Throat:      Mouth: Mucous membranes are moist.   Eyes:      Conjunctiva/sclera: Conjunctivae normal.      Pupils: Pupils are equal, round, and reactive to light.   Cardiovascular:      Rate and Rhythm: Normal rate and regular rhythm.      Heart sounds: Normal heart sounds.   Pulmonary:      Effort: Pulmonary " effort is normal. No respiratory distress.      Breath sounds: Normal breath sounds. No stridor. No wheezing, rhonchi or rales.   Skin:     General: Skin is warm and dry.      Coloration: Skin is not pale.   Neurological:      General: No focal deficit present.      Mental Status: She is alert and oriented to person, place, and time.   Psychiatric:         Mood and Affect: Mood normal.         Assessment/Plan:     1. Urine frequency  - POCT Urinalysis  -Rest and hydrate  -Monitor symptoms and return to clinic if symptoms worsen or fail to resolve    MDM/Comments:  Patient has stable vital signs and is non-toxic appearing.  Urinalysis obtained in office without evidence of acute cystitis.  Urinalysis was negative for leukocytes, nitrates, and blood.  Patient will monitor her symptoms and has agreed to return to the clinic if any changes occur.  Patient's physical exam was within normal limits.  Previous sinusitis and cough seem to have improved greatly.  Patient's lungs are clear to auscultation bilaterally with a pulse oxygen of 96% on room air.  Discussed supportive care with hydration, rest, Tylenol as needed. Patient demonstrated understanding of treatment plan at this time and will RTC if symptoms worsen or fail to resolve.     Differential diagnosis, natural history, supportive care, and indications for immediate follow-up discussed. All questions answered. Patient agrees with the plan of care.    Follow-up as needed if symptoms worsen or fail to improve to PCP, Urgent care or Emergency Room.    I have personally reviewed prior external notes and test results pertinent to today's visit.  I have independently reviewed and interpreted all diagnostics ordered during this urgent care acute visit.   Discussed management options (risks,benefits, and alternatives to treatment). Pt expresses understanding and the treatment plan was agreed upon. Questions were encouraged and answered to pt's satisfaction.    Please note  that this dictation was created using voice recognition software. I have made a reasonable attempt to correct obvious errors, but I expect that there are errors of grammar and possibly content that I did not discover before finalizing the note.

## 2025-01-07 RX ORDER — GABAPENTIN 400 MG/1
400 CAPSULE ORAL 3 TIMES DAILY
Qty: 270 CAPSULE | Refills: 3 | Status: SHIPPED | OUTPATIENT
Start: 2025-01-07

## 2025-01-07 NOTE — TELEPHONE ENCOUNTER
Received request via: Pharmacy    Was the patient seen in the last year in this department? Yes    Does the patient have an active prescription (recently filled or refills available) for medication(s) requested? No    Pharmacy Name: [facility]    Does the patient have shelter Plus and need 100-day supply? (This applies to ALL medications) Patient does not have SCP

## 2025-01-09 ENCOUNTER — HOSPITAL ENCOUNTER (OUTPATIENT)
Dept: RADIOLOGY | Facility: MEDICAL CENTER | Age: OVER 89
End: 2025-01-09
Attending: STUDENT IN AN ORGANIZED HEALTH CARE EDUCATION/TRAINING PROGRAM
Payer: MEDICARE

## 2025-01-09 DIAGNOSIS — Z78.0 OSTEOPENIA AFTER MENOPAUSE: ICD-10-CM

## 2025-01-09 DIAGNOSIS — M85.80 OSTEOPENIA AFTER MENOPAUSE: ICD-10-CM

## 2025-01-09 PROCEDURE — 77080 DXA BONE DENSITY AXIAL: CPT

## 2025-01-28 ENCOUNTER — TELEPHONE (OUTPATIENT)
Dept: MEDICAL GROUP | Facility: PHYSICIAN GROUP | Age: OVER 89
End: 2025-01-28
Payer: MEDICARE

## 2025-01-28 NOTE — TELEPHONE ENCOUNTER
VOICEMAIL  1. Caller Name: Dayana Lechuga                        Call Back Number: [phone] (home)       2. Message: Needs a referral for neck pain. Stiff neck for over a week.     3. Patient approves office to leave a detailed voicemail/MyChart message: N\A

## 2025-01-29 ENCOUNTER — PATIENT OUTREACH (OUTPATIENT)
Dept: HEALTH INFORMATION MANAGEMENT | Facility: OTHER | Age: OVER 89
End: 2025-01-29
Payer: MEDICARE

## 2025-01-29 DIAGNOSIS — E78.5 DYSLIPIDEMIA: ICD-10-CM

## 2025-01-29 DIAGNOSIS — I10 ESSENTIAL HYPERTENSION: ICD-10-CM

## 2025-01-29 DIAGNOSIS — F41.9 ANXIETY: ICD-10-CM

## 2025-01-29 DIAGNOSIS — I25.119 CORONARY ARTERY DISEASE INVOLVING NATIVE CORONARY ARTERY OF NATIVE HEART WITH ANGINA PECTORIS (HCC): ICD-10-CM

## 2025-01-30 ENCOUNTER — OFFICE VISIT (OUTPATIENT)
Dept: MEDICAL GROUP | Facility: PHYSICIAN GROUP | Age: OVER 89
End: 2025-01-30
Payer: MEDICARE

## 2025-01-30 VITALS
OXYGEN SATURATION: 95 % | WEIGHT: 128.2 LBS | HEIGHT: 62 IN | TEMPERATURE: 98.5 F | DIASTOLIC BLOOD PRESSURE: 64 MMHG | BODY MASS INDEX: 23.59 KG/M2 | HEART RATE: 75 BPM | SYSTOLIC BLOOD PRESSURE: 118 MMHG

## 2025-01-30 DIAGNOSIS — M85.859 OSTEOPENIA OF NECK OF FEMUR, UNSPECIFIED LATERALITY: ICD-10-CM

## 2025-01-30 DIAGNOSIS — M54.2 NECK PAIN: ICD-10-CM

## 2025-01-30 DIAGNOSIS — M89.8X1 PAIN OF RIGHT SCAPULA: ICD-10-CM

## 2025-01-30 RX ORDER — METHYLPREDNISOLONE 4 MG/1
TABLET ORAL
Qty: 21 TABLET | Refills: 0 | Status: SHIPPED | OUTPATIENT
Start: 2025-01-30

## 2025-01-30 RX ORDER — TIZANIDINE 2 MG/1
2 TABLET ORAL 3 TIMES DAILY
Qty: 90 TABLET | Refills: 3 | Status: SHIPPED | OUTPATIENT
Start: 2025-01-30

## 2025-01-30 ASSESSMENT — PATIENT HEALTH QUESTIONNAIRE - PHQ9: CLINICAL INTERPRETATION OF PHQ2 SCORE: 0

## 2025-01-30 ASSESSMENT — FIBROSIS 4 INDEX: FIB4 SCORE: 2.52

## 2025-01-30 NOTE — PROGRESS NOTES
Reason for Follow-Up:    I spoke with the patient today while she was in clinic for a primary care appointment to complete her monthly PCM follow up. She is followed by PCM due to diagnoses of CAD, HTN, and anxiety/depression.     Current Health Status:    The patient is still recovering from an upper respiratory infection she was seen for in urgent care on 12/28/24. The patient denies any falls or injuries since our last conversation. The patient denies any other medical or community health concerns at this time.     Medical Updates:  The patient was seen in urgent care for upper respiratory symptoms which have persisted at the end of December.     Medication Updates:  The patient was prescribed tizanadine and a medrol dose pack today.     Symptoms:  The patient reports persistent intermittent cough.     Plan of Care Goals and Progress:    Goal 1. Over the next month the patient will take concrete and consistent steps towards optimizing cardiac health.      Progress:  The patient reports taking medications as prescribed. The patient reports no cardiac symptoms at this time. The patient's blood pressures have been within a range acceptable to her providers over her last 3 clinic visits.       Barriers:  The patient has meals at her senior living facility that she is not in charge of. The patient only walks her dog for exercise.      Interventions:  The patient will make the best choices available to her with regard to nutrition. The patient will continue to take her dog for walks and explore other appropriate exercise.      Education:  Continued medication compliance discussed.     Goal 2. Over the next month the patient will take concrete and consistent steps towards maintaining good mental health.      Progress:  The patient reports being in good spirits with no associated symptoms at present.       Barriers:  The patient lives on her own.      Interventions:  The patient will enjoy time with her dog. The patient  will continue to participate in activities she enjoys at her Senior Living facility.      Education:  The importance of self care discussed.       Patient's Concerns and Feedback (Self Management of Care):     The patient reports that she is implementing her plan of care and participating in her ADLs confidently at this time. She states that the  at her Senior living facility is having his last day today and they are waiting to see what happens for transportation.     Next Steps:     Follow-Up Plan:  The patient's next PCM follow up will be in approximately 4 weeks      Appointments:  The patient's future appointments were reviewed and she voices understanding.      Contact Information:  Call [phone] with any questions or concerns.

## 2025-01-31 NOTE — CARE PLAN
Problem: Knowledge of hypertension - Long Term  Goal: Demonstrate Knowledge of Hypertension  Intervention: Educate patient on harmful effects of high blood pressure     Problem: Knowledge of hypertension - Long Term  Goal: Demonstrate Knowledge of Hypertension  Intervention: Educate patient on understanding what the blood pressure reading means  Description: provide handout on understanding blood pressure readings     Problem: Knowledge of hypertension - Long Term  Goal: Demonstrate Knowledge of Hypertension  Intervention: Define hypertension in lay terms     Problem: Knowledge of hypertension - Long Term  Goal: Demonstrate Knowledge of Hypertension  Outcome: Met  Intervention: Define hypertension in lay terms  Intervention: Educate patient on understanding what the blood pressure reading means  Description: provide handout on understanding blood pressure readings  Intervention: Educate patient on harmful effects of high blood pressure       Problem: Knowledge of hypertension - Long Term  Goal: Demonstrate Knowledge of Hypertension  Outcome: Met  Intervention: Define hypertension in lay terms  Intervention: Educate patient on understanding what the blood pressure reading means  Description: provide handout on understanding blood pressure readings  Intervention: Educate patient on harmful effects of high blood pressure  Intervention: In conjunction with their medical provider, identify goal blood pressure: 130/80     Problem: Patient Stated Problem: Continue Living- Long Term  Goal: Patient Stated Goal: The patient will optimize remaining quality and quantity of life.   Outcome: Progressing  Note: The patient loves her dog, Deshawn. The patient gets out and about.      Problem: Recognize current health habits that may contribute to hypertension - Long Term  Goal: Identify which modifiable health habits can be modifed  Outcome: Progressing  Note: Patient has verbalized willingness to be mindful of nutrition choices.       Problem: Recognize current health habits that may contribute to hypertension - Long Term  Goal: Identify which modifiable health habits can be modifed  Outcome: Progressing  Note: Patient has verbalized willingness to be mindful of nutrition choices.      Problem: Patient Stated Problem: Continue Living- Long Term  Goal: Patient Stated Goal: The patient will optimize remaining quality and quantity of life.   Outcome: Progressing  Note: The patient loves her dog, Deshawn. The patient gets out and about.      Problem: Knowledge of self-monitoring blood pressure  Goal: Demonstrate the elements of self-monitoring blood pressure  Intervention: Educate on importance of self-monitoring blood pressure     Problem: Knowledge of self-monitoring blood pressure  Goal: Demonstrate the elements of self-monitoring blood pressure  Intervention: Educate on importance of self-monitoring blood pressure     Problem: Knowledge of self-monitoring blood pressure  Goal: Demonstrate the elements of self-monitoring blood pressure  Outcome: Not Met  Note: Patient's blood pressure is well controlled but she doesn't regularly monitor them.     Intervention: Educate on importance of self-monitoring blood pressure     Problem: Knowledge of self-monitoring blood pressure  Goal: Demonstrate the elements of self-monitoring blood pressure  Outcome: Not Met  Note: Patient's blood pressure is well controlled but she doesn't regularly monitor them.     Intervention: Educate on importance of self-monitoring blood pressure

## 2025-01-31 NOTE — PROGRESS NOTES
"Verbal consent was acquired by the patient to use [employer] ambient listening note generation during this visit     Subjective:     HPI:   History of Present Illness  The patient presents for evaluation of back pain.    Back Pain  She reports a sudden onset of back pain around her right shoulder blade upon awakening 10 to 11 days ago, with no known precipitating event or injury. The patient mentions washing her hair the previous night, drying it partially, and securing it with pins before sleeping. She does not experience any associated numbness or tingling in her arm. The patient expresses interest in physical therapy as a potential treatment option. She has previously been prescribed Robaxin by Dr. Haley several years ago but discontinued its use due to severe muscle relaxation effects. The patient also questions whether her current back pain could be related to her dental issues, as she is currently under the care of Dr. Murray for tooth preservation. She has attempted to manage her pain with ibuprofen, which provided minimal relief, and Icy Hot, which was ineffective. The patient discontinued ibuprofen use last night due to concerns about potential blood thickening effects. She denies significant neck pain.  - Onset: Sudden onset upon awakening 10 to 11 days ago.  - Location: Back.  - Duration: 10 to 11 days.  - Character: No associated numbness or tingling in the arm.  - Alleviating/Aggravating Factors: Ibuprofen provided minimal relief; Icy Hot was ineffective; discontinued ibuprofen due to concerns about blood thickening effects.  - Severity: Severe enough to seek evaluation and consider physical therapy.    Osteopenia  The patient had a bone density test done recently and is seeking the results.            Objective:     Exam:  /64 (BP Location: Left arm, Patient Position: Sitting, BP Cuff Size: Adult)   Pulse 75   Temp 36.9 °C (98.5 °F) (Temporal)   Ht 1.575 m (5' 2\")   Wt 58.2 kg (128 lb 3.2 " oz)   LMP  (LMP Unknown)   SpO2 95%   BMI 23.45 kg/m²  Body mass index is 23.45 kg/m².    Physical Exam  Constitutional:       Appearance: Normal appearance.   HENT:      Head: Normocephalic and atraumatic.      Nose: Nose normal.      Mouth/Throat:      Mouth: Mucous membranes are moist.   Eyes:      Extraocular Movements: Extraocular movements intact.      Conjunctiva/sclera: Conjunctivae normal.      Pupils: Pupils are equal, round, and reactive to light.   Cardiovascular:      Rate and Rhythm: Normal rate and regular rhythm.   Pulmonary:      Effort: Pulmonary effort is normal.      Breath sounds: Normal breath sounds.   Abdominal:      General: Abdomen is flat. Bowel sounds are normal.      Palpations: Abdomen is soft.   Musculoskeletal:      Cervical back: Neck supple. Spasms present. No tenderness. Normal range of motion.      Thoracic back: Tenderness present.        Back:    Skin:     General: Skin is warm and dry.   Neurological:      General: No focal deficit present.      Mental Status: She is alert and oriented to person, place, and time.   Psychiatric:         Mood and Affect: Mood normal.             Results  Imaging  Bone density test shows mild osteopenia in left hip.    Assessment & Plan:     1. Neck pain  Referral to Physical Therapy    DX-CERVICAL SPINE-2 OR 3 VIEWS    DX-SCAPULA RIGHT    methylPREDNISolone (MEDROL DOSEPAK) 4 MG Tablet Therapy Pack    tizanidine (ZANAFLEX) 2 MG tablet      2. Pain of right scapula        3. Osteopenia of neck of femur, unspecified laterality            Assessment & Plan  1. Neck pain/Scapula  - Likely due to minor nerve irritation causing muscle spasms and locking  - Referral for physical therapy  - Prescription for a steroid pack  - Muscle relaxant to be taken at the lowest dose initially and gradually increased as tolerated  - Recommended home exercises and stretches  - Will try to avoid NSAIDs due to hx of HTN and ASA use  - Provided a handout detailing  exercises    2. 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 Continue with current treatment plan: lorazepam prn Follow-up with specialist as directed.  Last edited 01/31/25 11:56 PST by Nellie Rivera M.D.           Return in about 2 weeks (around 2/13/2025) for Med F/U.    Please note that this dictation was created using voice recognition software. I have made every reasonable attempt to correct obvious errors, but I expect that there are errors of grammar and possibly content that I did not discover before finalizing the note.      I personally reviewed and updated and reviewed the patient's personal, social, family and surgical history at today's appointment.

## 2025-01-31 NOTE — CARE PLAN
Problem: Knowledge of self-monitoring blood pressure  Goal: Demonstrate the elements of self-monitoring blood pressure  Intervention: Educate on importance of self-monitoring blood pressure     Problem: Recognize current health habits that may contribute to hypertension - Long Term  Goal: Identify which modifiable health habits can be modifed  Outcome: Progressing  Note: Patient has verbalized willingness to be mindful of nutrition choices.      Problem: Patient Stated Problem: Continue Living- Long Term  Goal: Patient Stated Goal: The patient will optimize remaining quality and quantity of life.   Outcome: Progressing  Note: The patient loves her dog, Deshawn. The patient gets out and about.      Problem: Knowledge of hypertension - Long Term  Goal: Demonstrate Knowledge of Hypertension  Outcome: Met  Intervention: Define hypertension in lay terms  Intervention: Educate patient on understanding what the blood pressure reading means  Description: provide handout on understanding blood pressure readings  Intervention: Educate patient on harmful effects of high blood pressure  Intervention: In conjunction with their medical provider, identify goal blood pressure: 130/80     Problem: Knowledge of factors contributing to hypertension - Long Term  Goal: Demonstrate factors that can contribute to high blood pressure  Note: Patient has verbalized understanding of factors contributing to high blood pressure

## 2025-01-31 NOTE — CARE PLAN
Problem: Knowledge of self-monitoring blood pressure  Goal: Demonstrate the elements of self-monitoring blood pressure  Outcome: Not Met  Note: Patient's blood pressure is well controlled but she doesn't regularly monitor them.     Intervention: Educate on importance of self-monitoring blood pressure     Problem: Recognize current health habits that may contribute to hypertension - Long Term  Goal: Identify which modifiable health habits can be modifed  Outcome: Progressing  Note: Patient has verbalized willingness to be mindful of nutrition choices.      Problem: Patient Stated Problem: Continue Living- Long Term  Goal: Patient Stated Goal: The patient will optimize remaining quality and quantity of life.   Outcome: Progressing  Note: The patient loves her dog, Deshawn. The patient gets out and about.      Problem: Knowledge of hypertension - Long Term  Goal: Demonstrate Knowledge of Hypertension  Outcome: Met  Intervention: Define hypertension in lay terms  Intervention: Educate patient on understanding what the blood pressure reading means  Description: provide handout on understanding blood pressure readings  Intervention: Educate patient on harmful effects of high blood pressure

## 2025-02-06 ENCOUNTER — APPOINTMENT (OUTPATIENT)
Dept: RADIOLOGY | Facility: IMAGING CENTER | Age: OVER 89
End: 2025-02-06
Payer: MEDICARE

## 2025-02-06 ENCOUNTER — OFFICE VISIT (OUTPATIENT)
Dept: URGENT CARE | Facility: CLINIC | Age: OVER 89
End: 2025-02-06
Payer: MEDICARE

## 2025-02-06 VITALS
DIASTOLIC BLOOD PRESSURE: 68 MMHG | OXYGEN SATURATION: 94 % | BODY MASS INDEX: 23.55 KG/M2 | RESPIRATION RATE: 18 BRPM | SYSTOLIC BLOOD PRESSURE: 110 MMHG | HEART RATE: 60 BPM | TEMPERATURE: 97 F | HEIGHT: 62 IN | WEIGHT: 128 LBS

## 2025-02-06 DIAGNOSIS — R05.2 SUBACUTE COUGH: ICD-10-CM

## 2025-02-06 DIAGNOSIS — J98.8 RESPIRATORY TRACT INFECTION: ICD-10-CM

## 2025-02-06 DIAGNOSIS — I51.7 CARDIOMEGALY: ICD-10-CM

## 2025-02-06 DIAGNOSIS — J30.9 ALLERGIC RHINITIS, UNSPECIFIED SEASONALITY, UNSPECIFIED TRIGGER: ICD-10-CM

## 2025-02-06 PROCEDURE — 3078F DIAST BP <80 MM HG: CPT

## 2025-02-06 PROCEDURE — 3074F SYST BP LT 130 MM HG: CPT

## 2025-02-06 PROCEDURE — 71046 X-RAY EXAM CHEST 2 VIEWS: CPT | Mod: TC | Performed by: RADIOLOGY

## 2025-02-06 PROCEDURE — 99214 OFFICE O/P EST MOD 30 MIN: CPT

## 2025-02-06 RX ORDER — AZITHROMYCIN 250 MG/1
TABLET, FILM COATED ORAL
Qty: 6 TABLET | Refills: 0 | Status: SHIPPED | OUTPATIENT
Start: 2025-02-06 | End: 2025-02-20

## 2025-02-06 RX ORDER — FLUTICASONE PROPIONATE 50 MCG
SPRAY, SUSPENSION (ML) NASAL
Qty: 16 G | Refills: 2 | Status: SHIPPED | OUTPATIENT
Start: 2025-02-06

## 2025-02-06 ASSESSMENT — FIBROSIS 4 INDEX: FIB4 SCORE: 2.52

## 2025-02-06 ASSESSMENT — ENCOUNTER SYMPTOMS: COUGH: 1

## 2025-02-06 NOTE — PROGRESS NOTES
Subjective     Dayana Lechuga is a 89 y.o. female who presents with Cough (Wet cough and congestion x 2 months )    HPI:   Dayana is a 89yo female presenting for ongoing wet cough x2 months. Pertinent history of pleural effusion and CAD. Reports associated phlegm production with cough and intermittent wheezing. Denies abdominal pain or vomiting. Reports intermittent diarrhea. No fever. Denies shortness of breath or increased work of breathing. Denies dysphagia or difficulty managing oral secretions. No rash. Denies chest pain. No lower extremity swelling or pain. Denies new medications. No unintentional weight loss.        Review of Systems   Constitutional:  Negative for chills, fever and malaise/fatigue.   HENT:  Negative for congestion, ear discharge, ear pain, sinus pain and sore throat.    Eyes:  Negative for blurred vision, double vision, photophobia, pain, discharge and redness.   Respiratory:  Positive for cough, sputum production and wheezing. Negative for shortness of breath and stridor.    Cardiovascular:  Negative for chest pain, palpitations and leg swelling.   Gastrointestinal:  Negative for abdominal pain, diarrhea, nausea and vomiting.   Musculoskeletal:  Negative for myalgias and neck pain.   Skin:  Negative for rash.   Neurological:  Negative for dizziness, tingling, sensory change, speech change, focal weakness, seizures, loss of consciousness, weakness and headaches.     Past Medical History:   Diagnosis Date    Allergy     Anxiety     ASTHMA     Bronchitis     CAD (coronary artery disease)     JT to RCA; 70% stenosis in LAD    Chills     Constipation     Difficulty breathing     GERD (gastroesophageal reflux disease)     Heart attack (HCC)     Heartburn     Hyperlipidemia     Hypertension     Influenza     Insomnia     Lumbar back pain 09/10/2016    Mild peripheral edema 07/31/2020    Mumps     OSTEOPOROSIS     Palpitations     Peripheral vascular disease, unspecified (HCC)  09/14/2021    Pneumonia     Post concussion syndrome 09/11/2020    PVD (peripheral vascular disease) ([facility])     70% PAD-followed by [facility]    Sedative, hypnotic or anxiolytic dependence, uncomplicated ([facility]) 10/20/2023    Sweat, sweating, excessive     Tonsillitis      Past Surgical History:   Procedure Laterality Date    WA INJ LUMBAR/SACRAL,W/ IMAGING N/A 4/20/2023    Procedure: Caudal epidural with catheter;  Surgeon: Torres Fall M.D.;  [facility];  Service: Pain Management    ABDOMINAL HYSTERECTOMY TOTAL      APPENDECTOMY      HERNIA REPAIR      HYSTERECTOMY LAPAROSCOPY      TONSILLECTOMY      ZZZ CARDIAC CATH       Allergies: Bactrim [sulfamethoxazole w-trimethoprim], Pcn [penicillins], Codeine, and Tramadol     Current Outpatient Medications:     methylPREDNISolone (MEDROL DOSEPAK) 4 MG Tablet Therapy Pack, As directed on the packaging label., Disp: 21 Tablet, Rfl: 0    tizanidine (ZANAFLEX) 2 MG tablet, Take 1 Tablet by mouth 3 times a day., Disp: 90 Tablet, Rfl: 3    gabapentin (NEURONTIN) 400 MG Cap, TAKE 1 CAPSULE BY MOUTH 3 TIMES  DAILY, Disp: 270 Capsule, Rfl: 3    atorvastatin (LIPITOR) 80 MG tablet, TAKE ONE TABLET BY MOUTH EVERY  EVENING, Disp: 100 Tablet, Rfl: 2    montelukast (SINGULAIR) 10 MG Tab, TAKE 1 TABLET BY MOUTH EVERY  EVENING, Disp: 100 Tablet, Rfl: 2    Zinc 50 MG Tab, Take  by mouth., Disp: , Rfl:     BIOTIN PO, Take  by mouth., Disp: , Rfl:     Multiple Vitamins-Minerals (HAIR SKIN & NAILS PO), Take  by mouth., Disp: , Rfl:     Metamucil Fiber Chew Tab, Chew., Disp: , Rfl:     Probiotic Product (PROBIOTIC GUMMIES PO), Take  by mouth., Disp: , Rfl:     fluticasone (FLONASE) 50 MCG/ACT nasal spray, USE 1 SPRAY IN BOTH NOSTRILS  ONCE DAILY, Disp: 16 g, Rfl: 2    lisinopril (PRINIVIL) 20 MG Tab, TAKE 1 TABLET BY MOUTH DAILY, Disp: 100 Tablet, Rfl: 3    isosorbide mononitrate SR (IMDUR) 60 MG TABLET SR 24 HR, Take 1 Tablet by mouth every evening., Disp: 100  Tablet, Rfl: 3    amLODIPine (NORVASC) 5 MG Tab, TAKE 1 TABLET BY MOUTH DAILY, Disp: 100 Tablet, Rfl: 3    carvedilol (COREG) 6.25 MG Tab, Take 1 Tablet by mouth 2 times a day with meals., Disp: 180 Tablet, Rfl: 3    albuterol 108 (90 Base) MCG/ACT Aero Soln inhalation aerosol, as needed., Disp: , Rfl:     aspirin 81 MG EC tablet, Chew 81 mg every day., Disp: , Rfl:     CRANBERRY PO, Take 3 Tablets by mouth every day., Disp: , Rfl:     Multiple Vitamins-Minerals (OCUVITE-LUTEIN) Tab, Take 1 Tablet by mouth every day., Disp: , Rfl:     Saline (OCEAN NASAL SPRAY NA), Administer 1 Spray into affected nostril(S) every day., Disp: , Rfl:     Acetaminophen 500 MG Cap, Take 1 Capsule by mouth 2 times a day. Morning & Afternoon, sometimes takes 3 times a day, Disp: , Rfl:     Melatonin 10 MG Tab, Take 10 mg by mouth at bedtime., Disp: , Rfl:     guaifenesin LA (MUCINEX) 600 MG TABLET SR 12 HR, Take 600 mg by mouth every morning., Disp: , Rfl:     Ascorbic Acid (VITAMIN C) 1000 MG Tab, Take 1 Tablet by mouth every morning., Disp: , Rfl:     VITAMIN E PO, Take 1 Capsule by mouth every morning., Disp: , Rfl:     Cholecalciferol (VITAMIN D) 50 MCG (2000 UT) Cap, Take 2,000 Units by mouth every morning., Disp: , Rfl:     omeprazole (PRILOSEC) 20 MG delayed-release capsule, TAKE 1 CAPSULE BY MOUTH TWICE  DAILY, Disp: 200 Capsule, Rfl: 2     Social History     Tobacco Use    Smoking status: Never    Smokeless tobacco: Never   Vaping Use    Vaping status: Never Used   Substance Use Topics    Alcohol use: Not Currently     Comment: occ    Drug use: No     Family History   Problem Relation Age of Onset    Heart Attack Father     Cancer Brother     Cancer Brother     Heart Disease Neg Hx     Heart Failure Neg Hx     Hyperlipidemia Neg Hx      Medications, Allergies, and current problem list reviewed today in Epic.      Objective     /68 (BP Location: Left arm, Patient Position: Sitting, BP Cuff Size: Adult)   Pulse 60   Temp  "36.1 °C (97 °F) (Temporal)   Resp 18   Ht 1.575 m (5' 2\")   Wt 58.1 kg (128 lb)   LMP  (LMP Unknown)   SpO2 94%   BMI 23.41 kg/m²      Physical Exam  Vitals reviewed.   Constitutional:       General: She is not in acute distress.  HENT:      Head: No right periorbital erythema or left periorbital erythema.      Jaw: There is normal jaw occlusion. No tenderness, swelling, pain on movement or malocclusion.      Right Ear: Tympanic membrane, ear canal and external ear normal.      Left Ear: Tympanic membrane, ear canal and external ear normal.      Nose: Nose normal.      Right Sinus: No maxillary sinus tenderness or frontal sinus tenderness.      Left Sinus: No maxillary sinus tenderness or frontal sinus tenderness.      Mouth/Throat:      Lips: No lesions.      Mouth: Mucous membranes are moist. No oral lesions or angioedema.      Tongue: No lesions. Tongue does not deviate from midline.      Palate: No mass and lesions.      Pharynx: Uvula midline. No oropharyngeal exudate, posterior oropharyngeal erythema or uvula swelling.   Eyes:      General: Vision grossly intact. Gaze aligned appropriately. No visual field deficit.     Extraocular Movements: Extraocular movements intact.      Conjunctiva/sclera: Conjunctivae normal.      Pupils: Pupils are equal, round, and reactive to light.      Right eye: Pupil is round, reactive and not sluggish.      Left eye: Pupil is round, reactive and not sluggish.      Funduscopic exam:     Right eye: Red reflex present.         Left eye: Red reflex present.  Neck:      Trachea: Trachea normal. No abnormal tracheal secretions or tracheal deviation.   Cardiovascular:      Rate and Rhythm: Normal rate and regular rhythm.      Pulses: Normal pulses.      Heart sounds: Normal heart sounds.   Pulmonary:      Effort: Pulmonary effort is normal. No tachypnea, accessory muscle usage, prolonged expiration, respiratory distress or retractions.      Breath sounds: No stridor, decreased " air movement or transmitted upper airway sounds. Rales present. No wheezing or rhonchi.   Musculoskeletal:         General: Normal range of motion.      Cervical back: Full passive range of motion without pain, normal range of motion and neck supple. No erythema, rigidity, tenderness or crepitus. No pain with movement. Normal range of motion.      Right lower leg: No edema.      Left lower leg: No edema.   Lymphadenopathy:      Cervical: No cervical adenopathy.   Skin:     General: Skin is warm and dry.      Findings: No rash.   Neurological:      General: No focal deficit present.      Mental Status: She is alert. Mental status is at baseline.      Cranial Nerves: Cranial nerves 2-12 are intact.      Sensory: Sensation is intact.      Motor: Motor function is intact.      Coordination: Coordination is intact.      Gait: Gait is intact.   Psychiatric:         Mood and Affect: Mood normal.         Behavior: Behavior normal.         Thought Content: Thought content normal.       DX-CHEST-2 VIEWS    Result Date: 2/6/2025 2/6/2025 3:16 PM HISTORY/REASON FOR EXAM: Cough for 2 months TECHNIQUE/EXAM DESCRIPTION AND NUMBER OF VIEWS: Two views of the chest. COMPARISON: None. FINDINGS: There is no evidence of focal consolidation or evidence of pulmonary edema. The heart is enlarged. There is no evidence of pleural effusion. Soft tissues and bony structures are unremarkable. There is atherosclerotic change of aorta.     Cardiomegaly.    Assessment & Plan     1. Subacute cough   - DX-CHEST-2 VIEWS; Future    2. Cardiomegaly   - proBrain Natriuretic Peptide, NT; Future    3. Respiratory tract infection   - azithromycin (ZITHROMAX) 250 MG Tab; Take 2 tablets by mouth on day one. Take one tablet by mouth the remaining days until gone  Dispense: 6 Tablet; Refill: 0       MDM/Comments:   Patient with persistent cough. She has been seen multiple times for this cough. She has completed a course of Prednisone, doxycycline, and medrol  dosepak. CXR with evidence of cardiomegaly. No shortness of breath, chest pain, or lower extremity swelling. Vital signs stable and patient non-toxic appearing. BNP ordered for further evaluation.    *STRICT emergency room precautions discussed. Signs and symptoms of CHF discussed with patient at length. Patient verbalizes understanding of when to present to emergency room or call 911. Will notify patient of BNP result.     Illness progression and alarm symptoms discussed with patient, emphasizing low threshold for returning to clinic/emergency department for worsening symptoms. Patient is agreeable to the plan and verbalizes understanding, and will follow up if warranted.       Differential diagnosis, natural history, supportive care, and indications for immediate follow-up discussed.      Follow-up as needed if symptoms worsen or fail to improve to PCP, Urgent care or Emergency Room.                               Electronically signed by SOHEILA Mckeon

## 2025-02-07 ENCOUNTER — APPOINTMENT (OUTPATIENT)
Dept: RADIOLOGY | Facility: MEDICAL CENTER | Age: OVER 89
End: 2025-02-07
Attending: EMERGENCY MEDICINE
Payer: MEDICARE

## 2025-02-07 ENCOUNTER — HOSPITAL ENCOUNTER (EMERGENCY)
Facility: MEDICAL CENTER | Age: OVER 89
End: 2025-02-07
Attending: EMERGENCY MEDICINE
Payer: MEDICARE

## 2025-02-07 ENCOUNTER — APPOINTMENT (OUTPATIENT)
Dept: RADIOLOGY | Facility: MEDICAL CENTER | Age: OVER 89
End: 2025-02-07
Payer: MEDICARE

## 2025-02-07 VITALS
SYSTOLIC BLOOD PRESSURE: 183 MMHG | TEMPERATURE: 98.3 F | DIASTOLIC BLOOD PRESSURE: 78 MMHG | HEIGHT: 62 IN | RESPIRATION RATE: 19 BRPM | HEART RATE: 65 BPM | WEIGHT: 125.88 LBS | OXYGEN SATURATION: 90 % | BODY MASS INDEX: 23.17 KG/M2

## 2025-02-07 DIAGNOSIS — R05.1 ACUTE COUGH: ICD-10-CM

## 2025-02-07 DIAGNOSIS — R06.2 WHEEZING: ICD-10-CM

## 2025-02-07 DIAGNOSIS — I10 ESSENTIAL HYPERTENSION: Chronic | ICD-10-CM

## 2025-02-07 LAB
ALBUMIN SERPL BCP-MCNC: 4.2 G/DL (ref 3.2–4.9)
ALBUMIN/GLOB SERPL: 1.5 G/DL
ALP SERPL-CCNC: 62 U/L (ref 30–99)
ALT SERPL-CCNC: 24 U/L (ref 2–50)
ANION GAP SERPL CALC-SCNC: 10 MMOL/L (ref 7–16)
AST SERPL-CCNC: 27 U/L (ref 12–45)
BASOPHILS # BLD AUTO: 0.8 % (ref 0–1.8)
BASOPHILS # BLD: 0.08 K/UL (ref 0–0.12)
BILIRUB SERPL-MCNC: 0.3 MG/DL (ref 0.1–1.5)
BUN SERPL-MCNC: 18 MG/DL (ref 8–22)
CALCIUM ALBUM COR SERPL-MCNC: 9.4 MG/DL (ref 8.5–10.5)
CALCIUM SERPL-MCNC: 9.6 MG/DL (ref 8.5–10.5)
CHLORIDE SERPL-SCNC: 104 MMOL/L (ref 96–112)
CO2 SERPL-SCNC: 23 MMOL/L (ref 20–33)
CREAT SERPL-MCNC: 0.69 MG/DL (ref 0.5–1.4)
EKG IMPRESSION: NORMAL
EOSINOPHIL # BLD AUTO: 0.16 K/UL (ref 0–0.51)
EOSINOPHIL NFR BLD: 1.7 % (ref 0–6.9)
ERYTHROCYTE [DISTWIDTH] IN BLOOD BY AUTOMATED COUNT: 51.6 FL (ref 35.9–50)
FLUAV RNA SPEC QL NAA+PROBE: NEGATIVE
FLUBV RNA SPEC QL NAA+PROBE: NEGATIVE
GFR SERPLBLD CREATININE-BSD FMLA CKD-EPI: 79 ML/MIN/1.73 M 2
GLOBULIN SER CALC-MCNC: 2.8 G/DL (ref 1.9–3.5)
GLUCOSE SERPL-MCNC: 92 MG/DL (ref 65–99)
HCT VFR BLD AUTO: 40.2 % (ref 37–47)
HGB BLD-MCNC: 13.1 G/DL (ref 12–16)
IMM GRANULOCYTES # BLD AUTO: 0.02 K/UL (ref 0–0.11)
IMM GRANULOCYTES NFR BLD AUTO: 0.2 % (ref 0–0.9)
LYMPHOCYTES # BLD AUTO: 1.73 K/UL (ref 1–4.8)
LYMPHOCYTES NFR BLD: 18.3 % (ref 22–41)
MCH RBC QN AUTO: 32.3 PG (ref 27–33)
MCHC RBC AUTO-ENTMCNC: 32.6 G/DL (ref 32.2–35.5)
MCV RBC AUTO: 99.3 FL (ref 81.4–97.8)
MONOCYTES # BLD AUTO: 1.3 K/UL (ref 0–0.85)
MONOCYTES NFR BLD AUTO: 13.8 % (ref 0–13.4)
NEUTROPHILS # BLD AUTO: 6.15 K/UL (ref 1.82–7.42)
NEUTROPHILS NFR BLD: 65.2 % (ref 44–72)
NRBC # BLD AUTO: 0 K/UL
NRBC BLD-RTO: 0 /100 WBC (ref 0–0.2)
NT-PROBNP SERPL IA-MCNC: 362 PG/ML (ref 0–125)
PLATELET # BLD AUTO: 218 K/UL (ref 164–446)
PMV BLD AUTO: 9.6 FL (ref 9–12.9)
POTASSIUM SERPL-SCNC: 4.2 MMOL/L (ref 3.6–5.5)
PROT SERPL-MCNC: 7 G/DL (ref 6–8.2)
RBC # BLD AUTO: 4.05 M/UL (ref 4.2–5.4)
RSV RNA SPEC QL NAA+PROBE: NEGATIVE
SARS-COV-2 RNA RESP QL NAA+PROBE: NOTDETECTED
SODIUM SERPL-SCNC: 137 MMOL/L (ref 135–145)
TROPONIN T SERPL-MCNC: 21 NG/L (ref 6–19)
WBC # BLD AUTO: 9.4 K/UL (ref 4.8–10.8)

## 2025-02-07 PROCEDURE — 80053 COMPREHEN METABOLIC PANEL: CPT

## 2025-02-07 PROCEDURE — 36415 COLL VENOUS BLD VENIPUNCTURE: CPT

## 2025-02-07 PROCEDURE — 85025 COMPLETE CBC W/AUTO DIFF WBC: CPT

## 2025-02-07 PROCEDURE — 0241U HCHG SARS-COV-2 COVID-19 NFCT DS RESP RNA 4 TRGT ED POC: CPT

## 2025-02-07 PROCEDURE — 94640 AIRWAY INHALATION TREATMENT: CPT

## 2025-02-07 PROCEDURE — 84484 ASSAY OF TROPONIN QUANT: CPT

## 2025-02-07 PROCEDURE — 700101 HCHG RX REV CODE 250: Performed by: EMERGENCY MEDICINE

## 2025-02-07 PROCEDURE — 83880 ASSAY OF NATRIURETIC PEPTIDE: CPT

## 2025-02-07 PROCEDURE — 99284 EMERGENCY DEPT VISIT MOD MDM: CPT

## 2025-02-07 PROCEDURE — 93005 ELECTROCARDIOGRAM TRACING: CPT | Mod: TC | Performed by: EMERGENCY MEDICINE

## 2025-02-07 PROCEDURE — 71045 X-RAY EXAM CHEST 1 VIEW: CPT

## 2025-02-07 PROCEDURE — 93005 ELECTROCARDIOGRAM TRACING: CPT | Mod: TC

## 2025-02-07 RX ORDER — ALBUTEROL SULFATE 90 UG/1
2 INHALANT RESPIRATORY (INHALATION) PRN
Qty: 8.5 G | Refills: 0 | Status: SHIPPED | OUTPATIENT
Start: 2025-02-07

## 2025-02-07 RX ORDER — LOSARTAN POTASSIUM 50 MG/1
50 TABLET ORAL DAILY
Qty: 100 TABLET | Refills: 3 | Status: SHIPPED | OUTPATIENT
Start: 2025-02-07 | End: 2026-03-14

## 2025-02-07 RX ORDER — IPRATROPIUM BROMIDE AND ALBUTEROL SULFATE 2.5; .5 MG/3ML; MG/3ML
3 SOLUTION RESPIRATORY (INHALATION)
Status: COMPLETED | OUTPATIENT
Start: 2025-02-07 | End: 2025-02-07

## 2025-02-07 RX ADMIN — IPRATROPIUM BROMIDE AND ALBUTEROL SULFATE 3 ML: .5; 2.5 SOLUTION RESPIRATORY (INHALATION) at 12:22

## 2025-02-07 ASSESSMENT — ENCOUNTER SYMPTOMS
DIZZINESS: 0
SORE THROAT: 0
SPUTUM PRODUCTION: 1
PHOTOPHOBIA: 0
NECK PAIN: 0
HEADACHES: 0
FOCAL WEAKNESS: 0
DIARRHEA: 0
EYE REDNESS: 0
VOMITING: 0
DOUBLE VISION: 0
WEAKNESS: 0
SENSORY CHANGE: 0
ABDOMINAL PAIN: 0
LOSS OF CONSCIOUSNESS: 0
BLURRED VISION: 0
EYE PAIN: 0
FEVER: 0
SEIZURES: 0
WHEEZING: 1
CHILLS: 0
SINUS PAIN: 0
STRIDOR: 0
TINGLING: 0
PALPITATIONS: 0
EYE DISCHARGE: 0
SHORTNESS OF BREATH: 0
MYALGIAS: 0
SPEECH CHANGE: 0
NAUSEA: 0

## 2025-02-07 ASSESSMENT — COPD QUESTIONNAIRES
DURING THE PAST 4 WEEKS HOW MUCH DID YOU FEEL SHORT OF BREATH: NONE/LITTLE OF THE TIME
HAVE YOU SMOKED AT LEAST 100 CIGARETTES IN YOUR ENTIRE LIFE: NO/DON'T KNOW
DO YOU EVER COUGH UP ANY MUCUS OR PHLEGM?: NO/ONLY WITH OCCASIONAL COLDS OR INFECTIONS
COPD SCREENING SCORE: 2

## 2025-02-07 ASSESSMENT — VISUAL ACUITY: OU: 1

## 2025-02-07 ASSESSMENT — FIBROSIS 4 INDEX: FIB4 SCORE: 2.52

## 2025-02-07 NOTE — ED NOTES
Pt ambulatory from lobby to [address] [facility]. Declined to change into gown at this time, connected to monitors. Chart up for ERP

## 2025-02-07 NOTE — ED PROVIDER NOTES
ER Provider Note    Scribed for Boo Benson M.D. by Madhavi Chinchilla. 2/7/2025   11:55 AM    Primary Care Provider: Monique Borjas P.A.-C.    CHIEF COMPLAINT  Chief Complaint   Patient presents with    Cough     Patient seen at  yesterday for cough that has been present since thanksgiving and was XR was taken that showed enlarged heart. She was told to have follow up for blood work. During breakfast this morning she had an episode of diarrhea.      EXTERNAL RECORDS REVIEWED  Outpatient Notes Seenn one day ago, 2/6/2025, at  for cough. Seen 1/30/25 by PCP for back pain.     HPI/ROS  LIMITATION TO HISTORY   Select: : None  OUTSIDE HISTORIAN(S):  Family present at bedside to provide patient with support.    Dayana Lechuga is a 89 y.o. female who presents to the ED for evaluation of acute cough onset three months ago. The patient reports she had an X-ray performed at  one day ago, which revealed enlarged heart.  She states she also has rhinorrhea and diarrhea, but denies sore throat. Patient confirms she began taking erythromycin yesterday, as prescribed. She confirms she is still taking Lisinopril. She notes history of pinched nerve in lower spine and asthma. She states she has an albuterol inhaler, though has been using it recently. She explains she has been untilizing quvar to treat.  Patient adds she is currently taking a muscle relaxer. She explains she lives at a senior living facility. No additional pain or symptoms noted at this time. There are no known alleviating or exacerbating factors.    PAST MEDICAL HISTORY  Past Medical History:   Diagnosis Date    Allergy     Anxiety     ASTHMA     Bronchitis     CAD (coronary artery disease)     JT to RCA; 70% stenosis in LAD    Chills     Constipation     Difficulty breathing     GERD (gastroesophageal reflux disease)     Heart attack (HCC)     Heartburn     Hyperlipidemia     Hypertension     Influenza     Insomnia     Lumbar back pain 09/10/2016     Mild peripheral edema 07/31/2020    Mumps     OSTEOPOROSIS     Palpitations     Peripheral vascular disease, unspecified ([facility]) 09/14/2021    Pneumonia     Post concussion syndrome 09/11/2020    PVD (peripheral vascular disease) ([facility])     70% PAD-followed by Lary AMBROCIO    Sedative, hypnotic or anxiolytic dependence, uncomplicated ([facility]) 10/20/2023    Sweat, sweating, excessive     Tonsillitis        SURGICAL HISTORY  Past Surgical History:   Procedure Laterality Date    WI INJ LUMBAR/SACRAL,W/ IMAGING N/A 4/20/2023    Procedure: Caudal epidural with catheter;  Surgeon: Torres Fall M.D.;  [facility];  Service: Pain Management    ABDOMINAL HYSTERECTOMY TOTAL      APPENDECTOMY      HERNIA REPAIR      HYSTERECTOMY LAPAROSCOPY      TONSILLECTOMY      ZZZ CARDIAC CATH         FAMILY HISTORY  Family History   Problem Relation Age of Onset    Heart Attack Father     Cancer Brother     Cancer Brother     Heart Disease Neg Hx     Heart Failure Neg Hx     Hyperlipidemia Neg Hx        SOCIAL HISTORY   reports that she has never smoked. She has never used smokeless tobacco. She reports that she does not currently use alcohol. She reports that she does not use drugs.    CURRENT MEDICATIONS  Discharge Medication List as of 2/7/2025  2:38 PM        CONTINUE these medications which have NOT CHANGED    Details   fluticasone (FLONASE) 50 MCG/ACT nasal spray USE 1 SPRAY IN BOTH NOSTRILS  ONCE DAILY, Disp-16 g, R-2, Normal      azithromycin (ZITHROMAX) 250 MG Tab Take 2 tablets by mouth on day one. Take one tablet by mouth the remaining days until gone, Disp-6 Tablet, R-0, Normal      methylPREDNISolone (MEDROL DOSEPAK) 4 MG Tablet Therapy Pack As directed on the packaging label., Disp-21 Tablet, R-0, Normal      tizanidine (ZANAFLEX) 2 MG tablet Take 1 Tablet by mouth 3 times a day., Disp-90 Tablet, R-3, Normal      gabapentin (NEURONTIN) 400 MG Cap TAKE 1 CAPSULE BY MOUTH 3 TIMES  DAILYPlease send a  replace/new response with 100-Day Supply if appropriate to maximize member benefit. Requesting 1 year supply.Disp-270 Capsule, R-3, Normal      atorvastatin (LIPITOR) 80 MG tablet TAKE ONE TABLET BY MOUTH EVERY  EVENINGPlease send a replace/new response with 100-Day Supply if appropriate to maximize member benefit. Requesting 1 year supply.Disp-100 Tablet, R-2, Normal      montelukast (SINGULAIR) 10 MG Tab TAKE 1 TABLET BY MOUTH EVERY  EVENINGPlease send a replace/new response with 100-Day Supply if appropriate to maximize member benefit. Requesting 1 year supply.Disp-100 Tablet, R-2, Normal      omeprazole (PRILOSEC) 20 MG delayed-release capsule TAKE 1 CAPSULE BY MOUTH TWICE  DAILYPlease send a replace/new response with 100-Day Supply if appropriate to maximize member benefit. Requesting 1 year supply.Disp-200 Capsule, R-2, Normal      Zinc 50 MG Tab Take  by mouth., Historical Med      BIOTIN PO Take  by mouth., Historical Med      !! Multiple Vitamins-Minerals (HAIR SKIN & NAILS PO) Take  by mouth., Historical Med      Metamucil Fiber Chew Tab Chew., Historical Med      Probiotic Product (PROBIOTIC GUMMIES PO) Take  by mouth., Historical Med      isosorbide mononitrate SR (IMDUR) 60 MG TABLET SR 24 HR Take 1 Tablet by mouth every evening., Disp-100 Tablet, R-3, Normal      amLODIPine (NORVASC) 5 MG Tab TAKE 1 TABLET BY MOUTH DAILYPlease send a replace/new response with 100-Day Supply if appropriate to maximize member benefit. Requesting 1 year supply.Disp-100 Tablet, R-3, Normal      carvedilol (COREG) 6.25 MG Tab Take 1 Tablet by mouth 2 times a day with meals., Disp-180 Tablet, R-3, Normal      albuterol 108 (90 Base) MCG/ACT Aero Soln inhalation aerosol as needed., Historical Med      aspirin 81 MG EC tablet Chew 81 mg every day., Historical Med      CRANBERRY PO Take 3 Tablets by mouth every day., Historical Med      !! Multiple Vitamins-Minerals (OCUVITE-LUTEIN) Tab Take 1 Tablet by mouth every day.,  "Historical Med      Saline (OCEAN NASAL SPRAY NA) Administer 1 Spray into affected nostril(S) every day., Historical Med      Acetaminophen 500 MG Cap Take 1 Capsule by mouth 2 times a day. Morning & Afternoon, sometimes takes 3 times a day, Historical Med      Melatonin 10 MG Tab Take 10 mg by mouth at bedtime., Historical Med      guaifenesin LA (MUCINEX) 600 MG TABLET SR 12 HR Take 600 mg by mouth every morning., Historical Med      Ascorbic Acid (VITAMIN C) 1000 MG Tab Take 1 Tablet by mouth every morning., Historical Med      VITAMIN E PO Take 1 Capsule by mouth every morning., Historical Med      Cholecalciferol (VITAMIN D) 50 MCG (2000 UT) Cap Take 2,000 Units by mouth every morning., Historical Med       !! - Potential duplicate medications found. Please discuss with provider.          ALLERGIES  Allergies   Allergen Reactions    Bactrim [Sulfamethoxazole W-Trimethoprim] Hives    Pcn [Penicillins] Hives     About 70 years. Tolerated ceftriaxone before in 2021    Codeine Unspecified     Unknown reaction      Tramadol Unspecified     Urinary retention        PHYSICAL EXAM  BP (!) 182/81   Pulse 66   Temp 36.8 °C (98.3 °F) (Temporal)   Resp 15   Ht 1.575 m (5' 2\")   Wt 57.1 kg (125 lb 14.1 oz)   LMP  (LMP Unknown)   SpO2 92%   BMI 23.02 kg/m²    Constitutional: Well developed, Well nourished, Mild distress,  HENT: Normocephalic, Atraumatic   Eyes: PERRLA, EOMI, Conjunctiva normal, No discharge.   Neck: No tenderness, Supple, No stridor.   Cardiovascular: Normal heart rate, Normal rhythm.   Thorax & Lungs: Slight wheezing, No respiratory distress.   Abdomen: Soft, No tenderness, No masses.   Skin: Warm, Dry, No rash.    Musculoskeletal: No lower extremity edema, No major deformities noted  Neurologic: Awake, alert. Moves all extremities spontaneously.  Psychiatric: Affect normal, Judgment normal, Mood normal.     DIAGNOSTIC STUDIES    Labs:   Results for orders placed or performed during the hospital " encounter of 02/07/25   POC CoV-2, FLU A/B, RSV by PCR    Collection Time: 02/07/25 10:37 AM   Result Value Ref Range    POC Influenza A RNA, PCR Negative Negative    POC Influenza B RNA, PCR Negative Negative    POC RSV, by PCR Negative Negative    POC SARS-CoV-2, PCR NotDetected NotDetected   CBC with Differential    Collection Time: 02/07/25 11:00 AM   Result Value Ref Range    WBC 9.4 4.8 - 10.8 K/uL    RBC 4.05 (L) 4.20 - 5.40 M/uL    Hemoglobin 13.1 12.0 - 16.0 g/dL    Hematocrit 40.2 37.0 - 47.0 %    MCV 99.3 (H) 81.4 - 97.8 fL    MCH 32.3 27.0 - 33.0 pg    MCHC 32.6 32.2 - 35.5 g/dL    RDW 51.6 (H) 35.9 - 50.0 fL    Platelet Count 218 164 - 446 K/uL    MPV 9.6 9.0 - 12.9 fL    Neutrophils-Polys 65.20 44.00 - 72.00 %    Lymphocytes 18.30 (L) 22.00 - 41.00 %    Monocytes 13.80 (H) 0.00 - 13.40 %    Eosinophils 1.70 0.00 - 6.90 %    Basophils 0.80 0.00 - 1.80 %    Immature Granulocytes 0.20 0.00 - 0.90 %    Nucleated RBC 0.00 0.00 - 0.20 /100 WBC    Neutrophils (Absolute) 6.15 1.82 - 7.42 K/uL    Lymphs (Absolute) 1.73 1.00 - 4.80 K/uL    Monos (Absolute) 1.30 (H) 0.00 - 0.85 K/uL    Eos (Absolute) 0.16 0.00 - 0.51 K/uL    Baso (Absolute) 0.08 0.00 - 0.12 K/uL    Immature Granulocytes (abs) 0.02 0.00 - 0.11 K/uL    NRBC (Absolute) 0.00 K/uL   Comp Metabolic Panel    Collection Time: 02/07/25 11:00 AM   Result Value Ref Range    Sodium 137 135 - 145 mmol/L    Potassium 4.2 3.6 - 5.5 mmol/L    Chloride 104 96 - 112 mmol/L    Co2 23 20 - 33 mmol/L    Anion Gap 10.0 7.0 - 16.0    Glucose 92 65 - 99 mg/dL    Bun 18 8 - 22 mg/dL    Creatinine 0.69 0.50 - 1.40 mg/dL    Calcium 9.6 8.5 - 10.5 mg/dL    Correct Calcium 9.4 8.5 - 10.5 mg/dL    AST(SGOT) 27 12 - 45 U/L    ALT(SGPT) 24 2 - 50 U/L    Alkaline Phosphatase 62 30 - 99 U/L    Total Bilirubin 0.3 0.1 - 1.5 mg/dL    Albumin 4.2 3.2 - 4.9 g/dL    Total Protein 7.0 6.0 - 8.2 g/dL    Globulin 2.8 1.9 - 3.5 g/dL    A-G Ratio 1.5 g/dL   proBrain Natriuretic Peptide, NT     Collection Time: 25 11:00 AM   Result Value Ref Range    NT-proBNP 362 (H) 0 - 125 pg/mL   Troponin    Collection Time: 25 11:00 AM   Result Value Ref Range    Troponin T 21 (H) 6 - 19 ng/L   ESTIMATED GFR    Collection Time: 25 11:00 AM   Result Value Ref Range    GFR (CKD-EPI) 79 >60 mL/min/1.73 m 2   EKG    Collection Time: 25  1:51 PM   Result Value Ref Range    Report       [facility]    Test Date:  2025  Pt Name:    LAVERN OAKLEY           Department: ER  MRN:        [id number]                      Room:  Gender:     Female                       Technician: [id number]  :        [id number]                   Requested By:ER TRIAGE PROTOCOL  Order #:    [id number]                    Reading MD: TIMOTHY MEDRANO MD    Measurements  Intervals                                Axis  Rate:       74                           P:          72  NY:         213                          QRS:        -43  QRSD:       90                           T:          56  QT:         386  QTc:        429    Interpretive Statements  Sinus rhythm  Borderline prolonged NY interval  Probable left atrial enlargement  Left axis deviation  Borderline ST depression, lateral leads  ST elevation, consider inferior injury  Compared to ECG 2024 16:25:36  Left-axis deviation now present  ST (T wave) deviation now present  Sinus bradycardia no long er present  Left ventricular hypertrophy no longer present  Myocardial infarct finding still present  Electronically Signed On 2025 13:51:18 PST by TIMOTHY MEDRANO MD       *Note: Due to a large number of results and/or encounters for the requested time period, some results have not been displayed. A complete set of results can be found in Results Review.       Radiology:   This attending emergency physician has independently interpreted the diagnostic imaging associated with this visit and is awaiting the final reading  from the radiologist.   Preliminary interpretation is a follows: No pneumonia  Radiologist interpretation:   DX-CHEST-PORTABLE (1 VIEW)   Final Result      1.  Stable cardiomegaly.   2.  No acute infiltrates or pulmonary edema.   3.  Probable rotator cuff calcific tendinopathy, right shoulder.             COURSE & MEDICAL DECISION MAKING     INITIAL ASSESSMENT, COURSE AND PLAN  Differential diagnoses include but not limited to: Bronchitis, Asthma, Lisinopril    Care Narrative: Patient with persistent cough for last several months, she is on an ACE inhibitor.  The patient does have a history of asthma, is slightly wheezy.  Give the patient a breathing treatment, chest x-ray is negative, does show some cardiomegaly.  Discussed with her at her age it would not be concerned about the cardiomegaly as long she is not short of breath and no lower extremity edema.  Patient has been on some steroids, I do not believe the patient needs more steroids at this point in time.  At this time's x-rays negative, I discussed with her to stop her antibiotics, no indication, it is causing her some diarrhea and possible adverse effects.  The patient's troponin is mildly elevated but do not see any evidence of ACS.  The patient will be discharged home with an inhaler, have her stop her lisinopril, will start losartan on the patient.  Have the patient return with worsening symptoms.    11:55 AM - Patient was evaluated at bedside. Discussed plan of care, including administering a breathing treatment as well as switching blood pressure medications. I advised the patient to start taking probiotics. Ordered for CoV-2 Flu A/B and RSV PCR, proBNP, CMP, CBC w/ diff, and DX-Chest to evaluate. The patient will be medicated with DuoNeb 3 mL for her symptoms. Patient verbalizes understanding and support with my plan of care.     1:31 PM - I reevaluated the patient at bedside. I discussed the patient's diagnostic study results. I discussed plan for  discharge and follow up as outlined below. The patient is stable for discharge at this time and will return for any new or worsening symptoms. Patient verbalizes understanding and support with my plan for discharge.     DISPOSITION AND DISCUSSIONS    I have discussed management of the patient with the following physicians and LAKESHIA's:  None    Discussion of management with other [facility] or appropriate source(s): None     Escalation of care considered, and ultimately not performed: acute inpatient care management, however at this time, the patient is most appropriate for outpatient management.    Barriers to care at this time, including but not limited to:  None .     Decision tools and prescription drugs considered including, but not limited to: Steroids.    The patient will return for new or worsening symptoms and is stable at the time of discharge.    The patient is referred to a primary physician for blood pressure management, diabetic screening, and for all other preventative health concerns.    DISPOSITION:  Patient will be discharged home in stable condition.    FOLLOW UP:  [facility], Emergency Dept  [address] [id number] [phone]    If symptoms worsen      OUTPATIENT MEDICATIONS:  Discharge Medication List as of 2/7/2025  2:38 PM        START taking these medications    Details   losartan (COZAAR) 50 MG Tab Take 1 Tablet by mouth every day., Disp-100 Tablet, R-3, Normal             FINAL DIAGNOSIS  1. Acute cough    2. Wheezing    3. Essential hypertension       Madhavi PETTIT (Macarena), am scribing for, and in the presence of, Boo Benson M.D..    Electronically signed by: Madhavi Diaz), 2/7/2025    IBoo M.D. personally performed the services described in this documentation, as scribed by Madhavi Chinchilla in my presence, and it is both accurate and complete.      The note accurately reflects work and decisions made by me.  Boo Benson M.D.   2/7/2025  6:00 PM

## 2025-02-07 NOTE — ED TRIAGE NOTES
"Chief Complaint   Patient presents with    Cough     Patient seen at  yesterday for cough that has been present since thanksgiving and was XR was taken that showed enlarged heart. She was told to have follow up for blood work. During breakfast this morning she had an episode of diarrhea.      COVID swab collected and running    Patient to triage ambulatory with a steady gait, AAOx4, Appropriate precautions in place.     Explained wait time and triage process. Placed back in lobby. Told to notify ED tech or RN of any changes, verbalized understanding.    BP (!) 168/73   Pulse 82   Temp 36.8 °C (98.3 °F) (Temporal)   Resp 16   Ht 1.575 m (5' 2\")   Wt 57.1 kg (125 lb 14.1 oz)   LMP  (LMP Unknown)   SpO2 92%   BMI 23.02 kg/m²     "

## 2025-02-07 NOTE — ED NOTES
This note was copied from the mother's chart.  Follow up lactation:Pt reports baby cluster fed last noc so her nipples were a little tender last noc but today feedings have gone better again.  She feels he is getting a good latch.  She had lots of questions re:the cluster feeding and when he is acting like he wants to nurse but won't latch.  So we discussed this extensively.      I did not observe a feeding today but I did offer to have them not leave until after the next feeding but they declined and felt when he is awake and willing he latches well but the pt did say she would call if she has problems.    She verbalized understanding to all that was discussed and denies questions @ this time.  Rosalia Larry RN,IBCLC   X-Ray at bedside

## 2025-02-07 NOTE — ED NOTES
Discharge instructions provided. Patient to follow with PCP as needed. Discussed to return to ER if symptoms worsen. Patient verbalized understanding. Pt ambulated to car with daughter

## 2025-02-13 DIAGNOSIS — M54.42 ACUTE BILATERAL LOW BACK PAIN WITH BILATERAL SCIATICA: ICD-10-CM

## 2025-02-13 DIAGNOSIS — M54.41 ACUTE BILATERAL LOW BACK PAIN WITH BILATERAL SCIATICA: ICD-10-CM

## 2025-02-13 RX ORDER — GABAPENTIN 400 MG/1
400 CAPSULE ORAL 3 TIMES DAILY
Qty: 300 CAPSULE | Refills: 3 | Status: SHIPPED | OUTPATIENT
Start: 2025-02-13

## 2025-02-15 ENCOUNTER — APPOINTMENT (OUTPATIENT)
Dept: RADIOLOGY | Facility: MEDICAL CENTER | Age: OVER 89
End: 2025-02-15
Attending: STUDENT IN AN ORGANIZED HEALTH CARE EDUCATION/TRAINING PROGRAM
Payer: MEDICARE

## 2025-02-15 ENCOUNTER — HOSPITAL ENCOUNTER (EMERGENCY)
Facility: MEDICAL CENTER | Age: OVER 89
End: 2025-02-15
Attending: STUDENT IN AN ORGANIZED HEALTH CARE EDUCATION/TRAINING PROGRAM
Payer: MEDICARE

## 2025-02-15 VITALS
RESPIRATION RATE: 20 BRPM | HEART RATE: 66 BPM | SYSTOLIC BLOOD PRESSURE: 145 MMHG | OXYGEN SATURATION: 96 % | HEIGHT: 62 IN | TEMPERATURE: 98.6 F | DIASTOLIC BLOOD PRESSURE: 88 MMHG | BODY MASS INDEX: 23.12 KG/M2 | WEIGHT: 125.66 LBS

## 2025-02-15 DIAGNOSIS — R05.3 CHRONIC COUGH: Primary | ICD-10-CM

## 2025-02-15 LAB
ALBUMIN SERPL BCP-MCNC: 4.5 G/DL (ref 3.2–4.9)
ALBUMIN/GLOB SERPL: 1.5 G/DL
ALP SERPL-CCNC: 65 U/L (ref 30–99)
ALT SERPL-CCNC: 23 U/L (ref 2–50)
ANION GAP SERPL CALC-SCNC: 12 MMOL/L (ref 7–16)
AST SERPL-CCNC: 28 U/L (ref 12–45)
BILIRUB SERPL-MCNC: 0.3 MG/DL (ref 0.1–1.5)
BUN SERPL-MCNC: 10 MG/DL (ref 8–22)
CALCIUM ALBUM COR SERPL-MCNC: 9.8 MG/DL (ref 8.5–10.5)
CALCIUM SERPL-MCNC: 10.2 MG/DL (ref 8.5–10.5)
CHLORIDE SERPL-SCNC: 105 MMOL/L (ref 96–112)
CO2 SERPL-SCNC: 22 MMOL/L (ref 20–33)
CREAT SERPL-MCNC: 0.64 MG/DL (ref 0.5–1.4)
EKG IMPRESSION: NORMAL
GFR SERPLBLD CREATININE-BSD FMLA CKD-EPI: 81 ML/MIN/1.73 M 2
GLOBULIN SER CALC-MCNC: 3.1 G/DL (ref 1.9–3.5)
GLUCOSE SERPL-MCNC: 116 MG/DL (ref 65–99)
NT-PROBNP SERPL IA-MCNC: 264 PG/ML (ref 0–125)
POTASSIUM SERPL-SCNC: 3.9 MMOL/L (ref 3.6–5.5)
PROT SERPL-MCNC: 7.6 G/DL (ref 6–8.2)
SODIUM SERPL-SCNC: 139 MMOL/L (ref 135–145)
TROPONIN T SERPL-MCNC: 17 NG/L (ref 6–19)

## 2025-02-15 PROCEDURE — 84484 ASSAY OF TROPONIN QUANT: CPT

## 2025-02-15 PROCEDURE — 99284 EMERGENCY DEPT VISIT MOD MDM: CPT

## 2025-02-15 PROCEDURE — 71045 X-RAY EXAM CHEST 1 VIEW: CPT

## 2025-02-15 PROCEDURE — 93005 ELECTROCARDIOGRAM TRACING: CPT | Mod: TC | Performed by: STUDENT IN AN ORGANIZED HEALTH CARE EDUCATION/TRAINING PROGRAM

## 2025-02-15 PROCEDURE — 93005 ELECTROCARDIOGRAM TRACING: CPT | Mod: TC

## 2025-02-15 PROCEDURE — 83880 ASSAY OF NATRIURETIC PEPTIDE: CPT

## 2025-02-15 PROCEDURE — 80053 COMPREHEN METABOLIC PANEL: CPT

## 2025-02-15 PROCEDURE — 36415 COLL VENOUS BLD VENIPUNCTURE: CPT

## 2025-02-15 RX ORDER — BECLOMETHASONE DIPROPIONATE HFA 80 UG/1
1 AEROSOL, METERED RESPIRATORY (INHALATION) 2 TIMES DAILY
Qty: 10.6 G | Refills: 0 | Status: SHIPPED | OUTPATIENT
Start: 2025-02-15

## 2025-02-15 ASSESSMENT — FIBROSIS 4 INDEX: FIB4 SCORE: 2.4

## 2025-02-15 NOTE — ED TRIAGE NOTES
Patient to ED with family for complaints of cough that has been ongoing since Nov 2024. She reports it is productive - grey sputum.  She was seen 2/7 and told it was likely related to lisinopril. She was taken off that medications, but reports no improvement of symptoms. She also reports diarrhea that is intermittent. She was started on antibiotic last week but stopped them after her hospital visit. She thinks the cough might be related to asthma, because she stopped her daily inhalers two years ago.  She did receive an albuterol inhaler and spacer last week but she does not know how to use it.     Pt educated on ED process and asked to wait in lobby. Patient educated on importance of alerting staff to new or worsening symptoms or concerns.

## 2025-02-16 NOTE — ED PROVIDER NOTES
ED Provider Note    CHIEF COMPLAINT  Chief Complaint   Patient presents with    Cough    Diarrhea       EXTERNAL RECORDS REVIEWED  Outpatient Notes patient was seen at urgent care on February 6, 2025 for persistent cough.  She has been seen multiple times for this cough.  She had completed a course of prednisone, doxycycline, Medrol Dosepak.  She was then seen in the emergency room the next day and was taken off lisinopril and switch to losartan.  She was also given a nebulizer and she was on azithromycin however was taken off during the ER visit on 02/07/2025    HPI/ROS  LIMITATION TO HISTORY   Select: : None  OUTSIDE HISTORIAN(S):  Friend at bedside    Dayana Lechuga is a 89 y.o. female who presents for evaluation of cough that she has had for 2-1/2 months.  She states that it started around Thanksgiving.  She notes that it is sometimes productive of some grayish sputum.  Is never any hemoptysis.  She has coughing spasms.  She denies any shortness of breath or chest pain.  She has no runny nose, sore throat, fever, chills.  She has no leg pain or leg swelling.  She has no difficulty with laying flat.  She has tried multiple medications for this.  She has been seen in the emergency room, urgent care, PCP multiple times for this cough.  She has been on prednisone, doxycycline, Medrol Dosepak, as well as azithromycin.  She was on lisinopril but this was switched last week and she was placed on losartan as it was concerned that maybe the lisinopril was causing her cough.  She continues to have a cough despite this.  She is requesting to be placed back on Qvar as she notes that she had COVID in 2020 and saw a pulmonologist after this and was taken off Qvar.  She notes that she was diagnosed with asthma in the 90s and was on Qvar since then until she was taken off.  She denies any improvement with her prednisone or albuterol.  She also notes she has history of chronic diarrhea and follows with GI for this.   She had normal stools yesterday.    PAST MEDICAL HISTORY   has a past medical history of Allergy, Anxiety, ASTHMA, Bronchitis, CAD (coronary artery disease), Chills, Constipation, Difficulty breathing, GERD (gastroesophageal reflux disease), Heart attack ([facility]), Heartburn, Hyperlipidemia, Hypertension, Influenza, Insomnia, Lumbar back pain (09/10/2016), Mild peripheral edema (07/31/2020), Mumps, OSTEOPOROSIS, Palpitations, Peripheral vascular disease, unspecified ([facility]) (09/14/2021), Pneumonia, Post concussion syndrome (09/11/2020), PVD (peripheral vascular disease) ([facility]), Sedative, hypnotic or anxiolytic dependence, uncomplicated ([facility]) (10/20/2023), Sweat, sweating, excessive, and Tonsillitis.    SURGICAL HISTORY   has a past surgical history that includes appendectomy; abdominal hysterectomy total; hernia repair; tonsillectomy; zzz cardiac cath; hysterectomy laparoscopy; and inj lumbar/sacral,w/ imaging (N/A, 4/20/2023).    FAMILY HISTORY  Family History   Problem Relation Age of Onset    Heart Attack Father     Cancer Brother     Cancer Brother     Heart Disease Neg Hx     Heart Failure Neg Hx     Hyperlipidemia Neg Hx        SOCIAL HISTORY  Social History     Tobacco Use    Smoking status: Never    Smokeless tobacco: Never   Vaping Use    Vaping status: Never Used   Substance and Sexual Activity    Alcohol use: Not Currently     Comment: occ    Drug use: No    Sexual activity: Not Currently       CURRENT MEDICATIONS  Home Medications    **Home medications have not yet been reviewed for this encounter**       Audit from Redirected Encounters    **Home medications have not yet been reviewed for this encounter**         ALLERGIES  Allergies   Allergen Reactions    Bactrim [Sulfamethoxazole W-Trimethoprim] Hives    Pcn [Penicillins] Hives     About 70 years. Tolerated ceftriaxone before in 2021    Codeine Unspecified     Unknown reaction      Tramadol Unspecified     Urinary retention       PHYSICAL EXAM  VITAL SIGNS:  "BP (!) 176/96   Pulse 76   Temp 37 °C (98.6 °F) (Temporal)   Resp 18   Ht 1.575 m (5' 2\")   Wt 57 kg (125 lb 10.6 oz)   LMP  (LMP Unknown)   SpO2 94%   BMI 22.98 kg/m²      Constitutional: Well developed, Well nourished, No acute respiratory distress, Non-toxic appearance.   HENT: Normocephalic, Atraumatic, Bilateral external ears normal, Oropharynx clear, mucous membranes are moist.  Eyes: Conjunctiva normal, No discharge. No icterus.  Neck: Normal range of motion. Supple.  Cardiovascular: Normal heart rate, Normal rhythm, No murmurs, No rubs, No gallops.   Thorax & Lungs: Clear to auscultation bilaterally, No respiratory distress, Prolonged expiratory phase, speaking full sentences  Abdomen: Soft nontender normal bowel sounds no rebound masses or peritoneal signs  Skin: Warm, Dry, No erythema, No rash.   Extremities: Intact distal pulses, No edema, No tenderness  Musculoskeletal: Good range of motion in all major joints. Normal gait.  Neurologic: Alert & oriented. No focal deficits noted.   Psych: Alert normal affect       EKG/LABS  Results for orders placed or performed during the hospital encounter of 02/15/25   Comp Metabolic Panel    Collection Time: 02/15/25  2:51 PM   Result Value Ref Range    Sodium 139 135 - 145 mmol/L    Potassium 3.9 3.6 - 5.5 mmol/L    Chloride 105 96 - 112 mmol/L    Co2 22 20 - 33 mmol/L    Anion Gap 12.0 7.0 - 16.0    Glucose 116 (H) 65 - 99 mg/dL    Bun 10 8 - 22 mg/dL    Creatinine 0.64 0.50 - 1.40 mg/dL    Calcium 10.2 8.5 - 10.5 mg/dL    Correct Calcium 9.8 8.5 - 10.5 mg/dL    AST(SGOT) 28 12 - 45 U/L    ALT(SGPT) 23 2 - 50 U/L    Alkaline Phosphatase 65 30 - 99 U/L    Total Bilirubin 0.3 0.1 - 1.5 mg/dL    Albumin 4.5 3.2 - 4.9 g/dL    Total Protein 7.6 6.0 - 8.2 g/dL    Globulin 3.1 1.9 - 3.5 g/dL    A-G Ratio 1.5 g/dL   proBrain Natriuretic Peptide, NT    Collection Time: 02/15/25  2:51 PM   Result Value Ref Range    NT-proBNP 264 (H) 0 - 125 pg/mL   Troponin    " Collection Time: 02/15/25  2:51 PM   Result Value Ref Range    Troponin T 17 6 - 19 ng/L   ESTIMATED GFR    Collection Time: 02/15/25  2:51 PM   Result Value Ref Range    GFR (CKD-EPI) 81 >60 mL/min/1.73 m 2   EKG    Collection Time: 02/15/25  5:08 PM   Result Value Ref Range    Report       [facility] Emergency Dept.    Test Date:  2025-02-15  Pt Name:    LAVERN OAKLEY           Department: ER  MRN:        [id number]                      Room:  Gender:     Female                       Technician: [id number]  :        [id number]                   Requested By:ER TRIAGE PROTOCOL  Order #:    [id number]                    Reading MD: Flakita Mosquera    Measurements  Intervals                                Axis  Rate:       72                           P:          16  OR:         214                          QRS:        -45  QRSD:       87                           T:          24  QT:         414  QTc:        454    Interpretive Statements  Sinus rhythm  Borderline prolonged OR interval  No ST changes  Compared to ECG 2025 10:42:42  No significant changes  Electronically Signed On 02- 17:08:27 PST by Flakita Mosquera       *Note: Due to a large number of results and/or encounters for the requested time period, some results have not been displayed. A complete set of results can be found in Results Review.       I have independently interpreted this EKG    RADIOLOGY/PROCEDURES   I have independently interpreted the diagnostic imaging associated with this visit and am waiting the final reading from the radiologist.   My preliminary interpretation is as follows: no focal consolidation    Radiologist interpretation:  DX-CHEST-PORTABLE (1 VIEW)   Final Result      1.  Minimal LEFT pleural fluid versus reactive change, stable.   2.  No pneumonia or pulmonary edema.          COURSE & MEDICAL DECISION MAKING    ASSESSMENT, COURSE AND PLAN  Care Narrative:   This is a 89-year-old female with  history as noted above who is presenting for evaluation of cough that has been ongoing for the past 2-1/2 months.  Patient has been seen multiple times for this cough at her PCP, urgent care, as well as emergency room.  She has undergone 2 rounds of steroids, has been placed on albuterol inhaler, given albuterol nebulizer, given doxycycline and azithromycin and even had her lisinopril changed from losartan a week ago but her symptoms have persisted.  They have not significantly worsened.    She did have labs drawn in triage which demonstrate no electrolyte abnormality, her troponin is not elevated and is actually decreased from when she was seen a week ago.  EKG with no acute ischemic changes.  Her NT proBNP is slightly elevated at 264 however patient has no history of heart failure.  Chest x-ray demonstrates no acute changes.  We did discuss possibly doing CT imaging of the chest to get a better look at her chest however this would not be for a targeted reason therefore patient wishes to follow-up with PCP and pulmonology to discuss further and for further testing.  Patient's main concern is that she wants to get back on Qvar as she was on this previously for several years until she was taken off after she saw pulmonologist in 2020 who told her that she did not need to be on the Qvar.  She notes that she has history of asthma and I discussed with patient that this cough being related to asthma is less likely given is not improved with the albuterol inhaler nor was improved with 2 rounds of oral steroids.  Despite this, I do think that is reasonable to place the patient back on Qvar to see if she has any significant improvement I do not think that will cause her any harm.  We will go ahead and refer her to pulmonology as well therefore I have referred her to [facility] pulmonology and given her phone number to call for an appointment.    Patient is not hypoxic, has normal work of breathing and at this point do think  that she is stable for discharge home.  The CBC that was drawn in triage needs to be recollected however I discussed with patient we decided that we would not repeat the CBC as it would not . Patient will follow up with outpatient management, return to the ER for any new or worsening symptoms. She is agreeable to dc plan with no further questions.               ADDITIONAL PROBLEMS MANAGED  None    DISPOSITION AND DISCUSSIONS  I have discussed management of the patient with the following physicians and LAKESHIA's:  None    Discussion of management with other QHP or appropriate source(s): None     Escalation of care considered, and ultimately not performed:diagnostic imaging    Barriers to care at this time, including but not limited to:  None .     Decision tools and prescription drugs considered including, but not limited to:  None .     The patient will return for new or worsening symptoms and is stable at the time of discharge.    The patient is referred to a primary physician for blood pressure management, diabetic screening, and for all other preventative health concerns.    DISPOSITION:  Patient will be discharged home in stable condition.    FOLLOW UP:  Brenna Gil D.O.  [address] [place] [id number]  [phone]          [facility], Emergency Dept  [address] [facility] [address]  [phone]          OUTPATIENT MEDICATIONS:  Discharge Medication List as of 2/15/2025  5:11 PM        START taking these medications    Details   beclomethasone HFA (QVAR REDIHALER) 80 MCG/ACT inhaler Inhale 1 Puff 2 times a day., Disp-10.6 g, R-0, Normal               FINAL DIAGNOSIS  1. Chronic cough Acute        Electronically signed by: Flakita Mosquera M.D., 2/15/2025 4:07 PM

## 2025-02-16 NOTE — DISCHARGE INSTRUCTIONS
You were seen emergency room for evaluation of cough. I have referred you to pulmonary therefore please call [facility] Pulmonology on Monday. Try using the Qvar. Return to the ER if your symptoms are worsening or change at all.

## 2025-02-16 NOTE — ED NOTES
Thanked for the patience.  Discharge instructions given to pt including follow up with pulmonologist or returning if no improvement of symptoms or to return if worse. Prescription x 1 provided to pt. Questions answered by RN. Denies any new complaints. Discharged w/stable vitals and able to ambulate to the lobby with steady gait. Home with family member.

## 2025-02-20 ENCOUNTER — PATIENT OUTREACH (OUTPATIENT)
Dept: HEALTH INFORMATION MANAGEMENT | Facility: OTHER | Age: OVER 89
End: 2025-02-20

## 2025-02-20 ENCOUNTER — OFFICE VISIT (OUTPATIENT)
Dept: MEDICAL GROUP | Facility: PHYSICIAN GROUP | Age: OVER 89
End: 2025-02-20
Payer: MEDICARE

## 2025-02-20 VITALS
SYSTOLIC BLOOD PRESSURE: 118 MMHG | WEIGHT: 126.4 LBS | HEART RATE: 66 BPM | BODY MASS INDEX: 23.26 KG/M2 | OXYGEN SATURATION: 94 % | DIASTOLIC BLOOD PRESSURE: 60 MMHG | TEMPERATURE: 98 F | HEIGHT: 62 IN

## 2025-02-20 DIAGNOSIS — I10 ESSENTIAL HYPERTENSION: Chronic | ICD-10-CM

## 2025-02-20 DIAGNOSIS — R07.89 OTHER CHEST PAIN: ICD-10-CM

## 2025-02-20 DIAGNOSIS — E78.5 DYSLIPIDEMIA: ICD-10-CM

## 2025-02-20 DIAGNOSIS — F41.9 ANXIETY: ICD-10-CM

## 2025-02-20 DIAGNOSIS — I10 ESSENTIAL HYPERTENSION: ICD-10-CM

## 2025-02-20 DIAGNOSIS — I25.119 CORONARY ARTERY DISEASE INVOLVING NATIVE CORONARY ARTERY OF NATIVE HEART WITH ANGINA PECTORIS (HCC): ICD-10-CM

## 2025-02-20 DIAGNOSIS — R05.2 SUBACUTE COUGH: ICD-10-CM

## 2025-02-20 DIAGNOSIS — I25.119 CORONARY ARTERY DISEASE INVOLVING NATIVE CORONARY ARTERY OF NATIVE HEART WITH ANGINA PECTORIS (HCC): Chronic | ICD-10-CM

## 2025-02-20 PROCEDURE — 3078F DIAST BP <80 MM HG: CPT | Performed by: STUDENT IN AN ORGANIZED HEALTH CARE EDUCATION/TRAINING PROGRAM

## 2025-02-20 PROCEDURE — 99214 OFFICE O/P EST MOD 30 MIN: CPT | Performed by: STUDENT IN AN ORGANIZED HEALTH CARE EDUCATION/TRAINING PROGRAM

## 2025-02-20 PROCEDURE — 3074F SYST BP LT 130 MM HG: CPT | Performed by: STUDENT IN AN ORGANIZED HEALTH CARE EDUCATION/TRAINING PROGRAM

## 2025-02-20 ASSESSMENT — FIBROSIS 4 INDEX: FIB4 SCORE: 2.57

## 2025-02-20 NOTE — PROGRESS NOTES
Verbal consent was acquired by the patient to use [employer] ambient listening note generation during this visit.    Subjective:     HPI:   History of Present Illness  The patient presents today for follow-up. She has been struggling with a cough for the last several weeks.    She does have a chronic cough which she thought was associated with allergies. Symptoms initially onset roughly 2 months ago in mid-December, included congestion and postnasal drip. She went to urgent care where she had a normal chest x-ray and was treated with prednisone and doxycycline. She returned to urgent care at the beginning of February for the same problem where she was treated with azithromycin. She had a chest x-ray which was normal except for cardiomegaly. Subsequently, she went to the ER on 02/07/2025 because the cough was so severe. 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 She had an albuterol nebulizer treatment in the ER which was not helpful. She went back to the ER on 02/15/2025 again for this cough and again had a chest x-ray showing no acute changes, EKG with no acute ischemic changes, slightly elevated BNP, and otherwise unremarkable labs. She has previously used Qvar to treat her suspected allergic cough and found this to be very helpful. The ER provider did prescribe Qvar for her again but she has not been able to start it. She was also referred to pulmonology but has not yet made an appointment. Today, she comes in for follow-up and reports there has been some improvement in the cough compared to a few weeks ago. She is not coughing here in the clinic. She has not been able to start the Qvar because it was not available at her pharmacy when she went to pick it up. She is still not able to use her albuterol inhaler.    She does complain of some chest discomfort that she first noticed this morning when she got here and while she was being roomed along with  what she describes as heart palpitations. She was initially somewhat anxious during her visit today. The chest discomfort and palpitations did subside during the course of the visit. She does have a cardiologist whom she sees for coronary artery disease and she intends to make a follow-up appointment to address her recent symptoms. She is treated with atorvastatin 80 mg daily, carvedilol 6.25 mg twice a day, and a daily baby aspirin for this condition.    She denies shortness of breath, lightheadedness, fatigue or leg swelling.          Past medical, surgical, family, and social history were reviewed and updated.     Medications:    Current Outpatient Medications:     beclomethasone HFA (QVAR REDIHALER) 80 MCG/ACT inhaler, Inhale 1 Puff 2 times a day., Disp: 10.6 g, Rfl: 0    gabapentin (NEURONTIN) 400 MG Cap, Take 1 Capsule by mouth 3 times a day., Disp: 300 Capsule, Rfl: 3    losartan (COZAAR) 50 MG Tab, Take 1 Tablet by mouth every day., Disp: 100 Tablet, Rfl: 3    albuterol 108 (90 Base) MCG/ACT Aero Soln inhalation aerosol, Inhale 2 Puffs as needed for Shortness of Breath., Disp: 8.5 g, Rfl: 0    fluticasone (FLONASE) 50 MCG/ACT nasal spray, USE 1 SPRAY IN BOTH NOSTRILS  ONCE DAILY, Disp: 16 g, Rfl: 2    tizanidine (ZANAFLEX) 2 MG tablet, Take 1 Tablet by mouth 3 times a day., Disp: 90 Tablet, Rfl: 3    atorvastatin (LIPITOR) 80 MG tablet, TAKE ONE TABLET BY MOUTH EVERY  EVENING, Disp: 100 Tablet, Rfl: 2    montelukast (SINGULAIR) 10 MG Tab, TAKE 1 TABLET BY MOUTH EVERY  EVENING, Disp: 100 Tablet, Rfl: 2    omeprazole (PRILOSEC) 20 MG delayed-release capsule, TAKE 1 CAPSULE BY MOUTH TWICE  DAILY, Disp: 200 Capsule, Rfl: 2    Zinc 50 MG Tab, Take  by mouth., Disp: , Rfl:     BIOTIN PO, Take  by mouth., Disp: , Rfl:     Multiple Vitamins-Minerals (HAIR SKIN & NAILS PO), Take  by mouth., Disp: , Rfl:     Metamucil Fiber Chew Tab, Chew., Disp: , Rfl:     Probiotic Product (PROBIOTIC GUMMIES PO), Take  by mouth.,  "Disp: , Rfl:     isosorbide mononitrate SR (IMDUR) 60 MG TABLET SR 24 HR, Take 1 Tablet by mouth every evening., Disp: 100 Tablet, Rfl: 3    amLODIPine (NORVASC) 5 MG Tab, TAKE 1 TABLET BY MOUTH DAILY, Disp: 100 Tablet, Rfl: 3    carvedilol (COREG) 6.25 MG Tab, Take 1 Tablet by mouth 2 times a day with meals., Disp: 180 Tablet, Rfl: 3    aspirin 81 MG EC tablet, Chew 81 mg every day., Disp: , Rfl:     CRANBERRY PO, Take 3 Tablets by mouth every day., Disp: , Rfl:     Multiple Vitamins-Minerals (OCUVITE-LUTEIN) Tab, Take 1 Tablet by mouth every day., Disp: , Rfl:     Saline (OCEAN NASAL SPRAY NA), Administer 1 Spray into affected nostril(S) every day., Disp: , Rfl:     Acetaminophen 500 MG Cap, Take 1 Capsule by mouth 2 times a day. Morning & Afternoon, sometimes takes 3 times a day, Disp: , Rfl:     Melatonin 10 MG Tab, Take 10 mg by mouth at bedtime., Disp: , Rfl:     guaifenesin LA (MUCINEX) 600 MG TABLET SR 12 HR, Take 600 mg by mouth every morning., Disp: , Rfl:     Ascorbic Acid (VITAMIN C) 1000 MG Tab, Take 1 Tablet by mouth every morning., Disp: , Rfl:     VITAMIN E PO, Take 1 Capsule by mouth every morning., Disp: , Rfl:     Cholecalciferol (VITAMIN D) 50 MCG (2000 UT) Cap, Take 2,000 Units by mouth every morning., Disp: , Rfl:     Allergies:  Bactrim [sulfamethoxazole w-trimethoprim], Pcn [penicillins], Codeine, and Tramadol      Health Maintenance: Completed        Objective:     Exam:  /60 (BP Location: Left arm, Patient Position: Sitting, BP Cuff Size: Adult)   Pulse 66   Temp 36.7 °C (98 °F) (Temporal)   Ht 1.575 m (5' 2\")   Wt 57.3 kg (126 lb 6.4 oz)   LMP  (LMP Unknown)   SpO2 94%   BMI 23.12 kg/m²  Body mass index is 23.12 kg/m².    Physical Exam  Vitals reviewed.   Constitutional:       General: She is not in acute distress.  Eyes:      Extraocular Movements: Extraocular movements intact.   Cardiovascular:      Rate and Rhythm: Normal rate and regular rhythm.      Heart sounds: No " murmur heard.     No friction rub. No gallop.   Pulmonary:      Effort: Pulmonary effort is normal.      Breath sounds: No wheezing, rhonchi or rales.   Musculoskeletal:      Cervical back: Neck supple.      Right lower leg: No edema.      Left lower leg: No edema.   Skin:     General: Skin is warm and dry.   Neurological:      Mental Status: She is alert.   Psychiatric:         Mood and Affect: Mood normal.       Results  - Laboratory Studies:    - BNP slightly elevated    - Imaging:    - Chest x-ray showed cardiomegaly but no acute changes    - Testing:  EKG Interpretation   Ordered and interpreted by Monique Borjas PA-C  Rhythm: normal sinus   Rate: 62  Ectopy: none   Conduction: prolonged HI interval   ST Segments: no acute change   T Waves: no acute change   Clinical Impression: No acute ischemic changes. Prolonged HI interval. Possible left atrial enlargement. No change compared to prior EKG 2/15/25.      Assessment & Plan:     1. Other chest pain  EKG - Clinic Performed      2. Subacute cough        3. Essential hypertension        4. Coronary artery disease involving native coronary artery of native heart with angina pectoris (HCC)            Assessment & Plan  Chest pain.  She reported left-sided chest discomfort and palpitations upon arrival, accompanied by anxiety. An EKG performed in the clinic revealed no acute ischemic changes. Vital signs were within normal limits, and her symptoms subsided during the visit. She is advised to monitor her symptoms and seek medical evaluation if recurrent chest pain occurs. She will discuss EKG findings with her cardiologist during her next follow-up appointment.    Subacute cough.  The cough has persisted for approximately 2.5 months but is showing signs of improvement. She has sought care in urgent care and the ER multiple times for this issue, receiving two rounds of corticosteroids, doxycycline, and azithromycin. Her lisinopril was switched to losartan, which she is  tolerating well. She reports slow improvement in her symptoms. Despite a history of asthma, which she suspects may be causing the cough, the lack of response to corticosteroids suggests this is unlikely the cause of her current symptoms. Due to hand arthritis, she is unable to use her albuterol inhaler but declined a prescription for an albuterol nebulizer today. She was prescribed a Qvar inhaler, which she has previously found beneficial, and needs to collect it from the pharmacy. Today, she is not coughing and reports feeling well. She does not report any shortness of breath or leg swelling.    Hypertension.  This is a chronic condition. Her blood pressure readings are excellent today. She will continue her current medication regimen, which includes losartan 50 mg daily, amlodipine 5 mg daily, and carvedilol.    Coronary artery disease.  This is a chronic condition managed by her cardiologist. She will maintain her current medication regimen, which includes a high-intensity statin, low-dose aspirin, and a beta blocker, and will follow up with cardiology as planned.        Return in about 6 weeks (around 4/3/2025) for follow up medications, cough (40 minute visit).          Please note that this dictation was created using voice recognition software. I have made every reasonable attempt to correct obvious errors, but I expect that there are errors of grammar and possibly content that I did not discover before finalizing the note.

## 2025-02-20 NOTE — PROGRESS NOTES
Reason for Follow-Up:    I spoke to the patient today while she was in clinic for a PCP appointment in order to complete her monthly and quarterly PCM follow ups.     Current Health Status:    The patient is followed by PCM based on diagnoses of CAD, HTN, and anxiety/depression.     Medical Updates:  Since our last conversation the patient has been to the ER 2 times both for chest discomfort and intractable cough. The patient also saw her PCP today for further assessment.     Medication Updates:  Since our last conversation the patient has undergone a couple of rounds of prednisone therapy. The patient was also assigned a Qvar inhaler but it is out of stock and she and her daughter are working on alternative pharmacies.     Symptoms:  Dry Cough, chest discomfort      Plan of Care Goals and Progress:    Goal 1. Over the next month the patient will take concrete and consistent steps to optimize cardiac health     Progress:  The patient's blood pressures are 145 systolic and below over her last three clinic visits. The patient walks with her dog.       Barriers:  The patient doesn't monitor blood pressures at home. The patient is having chest discomfort of an indeterminate nature     Interventions:  The patient will follow up with Cardiology as requested.      Education:  Article on DASH nutrition sent by mail.     Goal 2. Over the next month the patient will take concrete and consistent steps towards improved mental health.      Progress:  The patient derives great comfort from her dog. The patient has a good support system in her family      Barriers:  The patient states she has always been anxious and describes being jumpy with noises.      Interventions: Regular exercise and participation in activities at her assisted living community      Education:  Taking 5  deep breaths discussed.       Patient's Concerns and Feedback (Self Management of Care):     The patient voices understanding that her PCP wishes for her to  make an appointment with cardiologist. Patient advised to call me if the appointment is too far out and to mention she was just in the ER. She was advised to call me if the appointments were too far out.     Next Steps:     Follow-Up Plan:  The patient's next PCM follow up will be in approximately 4 weeks.       Appointments:  3/20/25 (2 PM, PT), 3/25/25(1:15, PT), 4/10/25 (3:25 arrival, Monique)     Contact Information:  Call [phone] with any questions or concerns.

## 2025-02-20 NOTE — CARE PLAN
Problem: Risk of Falls - Long Term  Goal: Reduce the Risk of Falls and Fall Related Injuries  Outcome: Progressing  Note: Patient is fall free and verbalizes appropriate precautions she takes to prevent falls including taking her time and having the wall for balance. The patient voices understanding of intrinsic and extrinsic fall risk factors.   Intervention: Assess the patient's history of falls, including frequency, circumstances, and any resulting injuries  Intervention: Evaluate intrinsic risk factors (e.g., age, chronic conditions, medications, balance issues)  Intervention: Assess extrinsic risk factors (e.g., home environment, footwear, assistive devices)     Problem: Patient Stated Problem: Continue Living- Long Term  Goal: Patient Stated Goal: The patient will optimize remaining quality and quantity of life.   Outcome: Progressing  Note: The patient has achieved her 89th Birthday.      Problem: Knowledge of hypertension - Long Term  Goal: Demonstrate Knowledge of Hypertension  Note: The patient is aware of what high blood pressure is and what effects it can have.   Intervention: Define hypertension in lay terms  Intervention: Educate patient on harmful effects of high blood pressure

## 2025-03-20 ENCOUNTER — PHYSICAL THERAPY (OUTPATIENT)
Dept: PHYSICAL THERAPY | Facility: REHABILITATION | Age: OVER 89
End: 2025-03-20
Attending: FAMILY MEDICINE
Payer: MEDICARE

## 2025-03-20 DIAGNOSIS — M54.2 NECK PAIN: ICD-10-CM

## 2025-03-20 PROCEDURE — 97162 PT EVAL MOD COMPLEX 30 MIN: CPT

## 2025-03-20 NOTE — OP THERAPY EVALUATION
Outpatient Physical Therapy  INITIAL EVALUATION    [facility]  [address], [address]  [place] [address]  Phone:  [phone]    Date of Evaluation: 2025    Patient: Dayana Lechuga  YOB: 1929  MRN: [id number]     Referring Provider: Nellie Rivera M.D.  [address]  [place] [address]   Referring Diagnosis Neck pain [M54.2]     Time Calculation  Start time: 1400  Stop time: 1445 Time Calculation (min): 45 minutes         Chief Complaint: No chief complaint on file.    Visit Diagnoses     ICD-10-CM   1. Neck pain  M54.2       Date of onset of impairment: 2025    Subjective:   History of Present Illness:     Mechanism of injury:  Pt. is a 89 Y.O. F who reports she is not sure why she is coming in today. She was see about 6 weeks ago by her PCP. She took a prednisone taper. Xrays were ordered but she did not complete them.       Med. Chart review:  reports a sudden onset of back pain around her right shoulder blade upon awakening 10 to 11 days ago, with no known precipitating event or injury. The patient mentions washing her hair the previous night, drying it partially, and securing it with pins before sleeping  PMHx: significant for insignificant      Pain:     Current pain ratin    At best pain ratin    At worst pain ratin    Pain Comments::  PAIN  Location: 1. R  SH  ant. And post.  Descriptors: 1. Tension, tight    Aggravated with reach back to apply lotion. Eases with unk  Progression: improved   Hand dominance:  Right  Diagnostic Tests:     None    Treatments:     Previous treatment:  Medication  Patient Goals:     Other patient goals:  Reach her back to apply lotion      Past Medical History:   Diagnosis Date   • Allergy    • Anxiety    • ASTHMA    • Bronchitis    • CAD (coronary artery disease)     JT to RCA; 70% stenosis in LAD   • Chills    • Constipation    • Difficulty breathing    • GERD (gastroesophageal reflux  disease)    • Heart attack ([facility])    • Heartburn    • Hyperlipidemia    • Hypertension    • Influenza    • Insomnia    • Lumbar back pain 09/10/2016   • Mild peripheral edema 07/31/2020   • Mumps    • OSTEOPOROSIS    • Palpitations    • Peripheral vascular disease, unspecified ([facility]) 09/14/2021   • Pneumonia    • Post concussion syndrome 09/11/2020   • PVD (peripheral vascular disease) ([facility])     70% PAD-followed by [facility]   • Sedative, hypnotic or anxiolytic dependence, uncomplicated ([facility]) 10/20/2023   • Sweat, sweating, excessive    • Tonsillitis      Past Surgical History:   Procedure Laterality Date   • AZ INJ LUMBAR/SACRAL,W/ IMAGING N/A 4/20/2023    Procedure: Caudal epidural with catheter;  Surgeon: Torres Fall M.D.;  [facility];  Service: Pain Management   • ABDOMINAL HYSTERECTOMY TOTAL     • APPENDECTOMY     • HERNIA REPAIR     • HYSTERECTOMY LAPAROSCOPY     • TONSILLECTOMY     • ZZZ CARDIAC CATH       Social History     Tobacco Use   • Smoking status: Never   • Smokeless tobacco: Never   Substance Use Topics   • Alcohol use: Not Currently     Comment: occ     Family and Occupational History     Socioeconomic History   • Marital status:      Spouse name: Not on file   • Number of children: Not on file   • Years of education: Not on file   • Highest education level: Not on file   Occupational History   • Not on file       Objective     Cervical Screen    Cervical range of motion within normal limits with the following exceptions: WFL    Palpation     Right   No palpable tenderness to the anterior deltoid, biceps, infraspinatus, posterior deltoid, subscapularis, supraspinatus, teres minor, triceps and upper trapezius.     Active Range of Motion   Left Shoulder   External rotation BTH: T4   Internal rotation BTB: T10     Right Shoulder   External rotation BTH: T2   Internal rotation BTB: T10     Scapular Mobility     Right Shoulder     Scapular Mobility beyond 90° FF  "  Upward rotation: inadequate    Strength:      Left Shoulder   Normal muscle strength  Planes of Motion   External rotation at 0°: 4+     Right Shoulder   Normal muscle strength  Planes of Motion   External rotation at 0°: 4+         Therapeutic Exercises (CPT 78555):     1. scapula retract/ER, 10, +    2. R triceps stretch, 2x20\", +    3. back scratch, 1 ea. way, +      Therapeutic Exercise Summary: Hep ref [id number]    Therapeutic Treatments and Modalities:     1. Self Care ADL Training (CPT 66107), Pt. was educated in HEP with a handout    Time-based treatments/modalities:    Physical Therapy Timed Treatment Charges  Functional training, self care minutes (CPT 30758): 5 minutes  Therapeutic exercise minutes (CPT 43742): 10 minutes      Assessment, Response and Plan:   Assessment details:  Pt. Is a 89 y.o. F who presents with impairments of dec. [facility] S: non I: non N: m. strain that is resolved. Pt. Is not appropriate for skilled PT intervention.     Plan:   Discussed with:  Patient  Plan details:  Pt. at her baseline at this time and no skilled PT needed.     Referring provider co-signature:  I have reviewed this evaluation.      Physician Signature: ________________________________ Date: ______________                 "

## 2025-03-21 ASSESSMENT — ENCOUNTER SYMPTOMS
PAIN SCALE AT LOWEST: 0
PAIN SCALE AT HIGHEST: 0
PAIN SCALE: 0

## 2025-03-21 NOTE — OP THERAPY DISCHARGE SUMMARY
Outpatient Physical Therapy  DISCHARGE SUMMARY NOTE      [facility]  [address], [address]  [place] [address]  Phone:  [phone]    Date of Visit: 03/20/2025    Patient: Dayana Lechuga  YOB: 1929  MRN: [id number]     Referring Provider: Nellie Rivera M.D.  [address]  [place] [address]   Referring Diagnosis Neck pain [M54.2]     Your patient is being discharged from Physical Therapy with the following comments:   No skilled PT needed      Emily Neumann, PT    Date: 3/21/2025

## 2025-03-25 ENCOUNTER — APPOINTMENT (OUTPATIENT)
Dept: PHYSICAL THERAPY | Facility: REHABILITATION | Age: OVER 89
End: 2025-03-25
Attending: FAMILY MEDICINE
Payer: MEDICARE

## 2025-03-26 ENCOUNTER — PATIENT OUTREACH (OUTPATIENT)
Dept: HEALTH INFORMATION MANAGEMENT | Facility: OTHER | Age: OVER 89
End: 2025-03-26
Payer: MEDICARE

## 2025-03-26 DIAGNOSIS — F41.9 ANXIETY: ICD-10-CM

## 2025-03-26 DIAGNOSIS — I10 ESSENTIAL HYPERTENSION: ICD-10-CM

## 2025-03-26 DIAGNOSIS — I25.119 CORONARY ARTERY DISEASE INVOLVING NATIVE CORONARY ARTERY OF NATIVE HEART WITH ANGINA PECTORIS (HCC): ICD-10-CM

## 2025-03-26 DIAGNOSIS — E78.5 DYSLIPIDEMIA: ICD-10-CM

## 2025-03-26 NOTE — PROGRESS NOTES
I called this afternoon in an attempt to complete the patient's monthly PCM follow up. The patient was at [facility] and states she will call back when she is available.

## 2025-03-26 NOTE — PROGRESS NOTES
Reason for Follow-Up:    I spoke to the patient this afternoon by phone. She and I completed her monthly PCM follow up.    Current Health Status:    The patient is followed by PCM based on diagnoses of CAD and HTN. The patient reports no chest pain or pressure at this time. The patient doesn't regularly take her blood pressure at home but it was 118/60 at her last clinic appointment. The patient is up to date on labs related to cholesterol.     Medical Updates:  The patient reports no notable.     Medication Updates:  The patient denies any updates to her medication list today    Symptoms:  The patient reports no acute symptoms today.     Plan of Care Goals and Progress:    Goal 1. Over the next month the patient will take concrete and consistent measures to optimize cardiac health.      Progress:  The patient reports regularly walking with her dog.The patient reported using one of the exercise bikes in the work out room.       Barriers:  Weather     Interventions:  The patient will consider a bank of activities that can be completed easily at home.      Education:  The importance of staying mobile reinforced.     Goal 2. Over the next month the patient will take concrete and consistent measures to optimize mental health.      Progress: The patient attends social events in her senior living community. The patient reported using one of the exercise bikes in the work out room there.       Barriers:  The patient has had recurrent anxiety and depression     Interventions:  The patient will consider the best and most effective uses of her support system.      Education:  Information on community events sent by mail.       Patient's Concerns and Feedback (Self Management of Care):     The patient will be following up with pulmonology to schedule an appointment. The patient is aware of her future appointments and plans to attend as scheduled.     Next Steps:     Follow-Up Plan:  The patient's next PCM follow up will be in  approximately 4 weeks.       Appointments:  4/10/25(3:25 arrival, Monique), 5/1/25 (1:15 pm, Cardiology)     Contact Information:  Call [phone] with any questions or concerns.

## 2025-03-27 NOTE — CARE PLAN
Problem: Knowledge of hypertension - Long Term  Goal: Demonstrate Knowledge of Hypertension  Outcome: Progressing  Note: The patient is symptom free on her current plan of care.   Intervention: Educate patient on understanding what the blood pressure reading means  Description: provide handout on understanding blood pressure readings  Intervention: In conjunction with their medical provider, identify goal blood pressure: 130/80     Problem: Knowledge of factors contributing to hypertension - Long Term  Goal: Demonstrate factors that can contribute to high blood pressure  Note: Patient has verbalized understanding of factors contributing to high blood pressure including exercise and stress.   Intervention: Educate patient on role of physical activity  Description: Refer to community wellness programs  Intervention: Educate patient on role of stress/anxiety

## 2025-04-10 ENCOUNTER — OFFICE VISIT (OUTPATIENT)
Dept: MEDICAL GROUP | Facility: PHYSICIAN GROUP | Age: OVER 89
End: 2025-04-10
Payer: MEDICARE

## 2025-04-10 VITALS
OXYGEN SATURATION: 93 % | DIASTOLIC BLOOD PRESSURE: 68 MMHG | HEIGHT: 62 IN | TEMPERATURE: 98.2 F | SYSTOLIC BLOOD PRESSURE: 165 MMHG | HEART RATE: 70 BPM | BODY MASS INDEX: 23.37 KG/M2 | WEIGHT: 127 LBS

## 2025-04-10 DIAGNOSIS — R05.3 CHRONIC COUGH: ICD-10-CM

## 2025-04-10 DIAGNOSIS — I10 ESSENTIAL HYPERTENSION: Chronic | ICD-10-CM

## 2025-04-10 PROCEDURE — 3078F DIAST BP <80 MM HG: CPT | Performed by: STUDENT IN AN ORGANIZED HEALTH CARE EDUCATION/TRAINING PROGRAM

## 2025-04-10 PROCEDURE — 99214 OFFICE O/P EST MOD 30 MIN: CPT | Performed by: STUDENT IN AN ORGANIZED HEALTH CARE EDUCATION/TRAINING PROGRAM

## 2025-04-10 PROCEDURE — 3077F SYST BP >= 140 MM HG: CPT | Performed by: STUDENT IN AN ORGANIZED HEALTH CARE EDUCATION/TRAINING PROGRAM

## 2025-04-10 RX ORDER — BECLOMETHASONE DIPROPIONATE HFA 80 UG/1
1 AEROSOL, METERED RESPIRATORY (INHALATION) 2 TIMES DAILY
Qty: 10.6 G | Refills: 5 | Status: SHIPPED | OUTPATIENT
Start: 2025-04-10

## 2025-04-10 ASSESSMENT — FIBROSIS 4 INDEX: FIB4 SCORE: 2.57

## 2025-04-13 ASSESSMENT — ENCOUNTER SYMPTOMS
HEADACHES: 0
CHILLS: 0
SHORTNESS OF BREATH: 0
DIZZINESS: 0
FEVER: 0

## 2025-04-13 NOTE — PROGRESS NOTES
Subjective:     CC:   Chief Complaint   Patient presents with    Follow-Up       HPI:   Dayana presents today for follow-up.    Blood pressure is elevated today in the 160s systolic.  She is treated for hypertension with losartan 50 mg daily, carvedilol 6.25 mg twice a day, amlodipine 5 mg daily.  She has not been checking her blood pressure regularly at home.  She denies any headaches, lightheadedness, chest pain, shortness of breath.    Her cough has resolved.  Was experiencing a chronic cough for a while.  Was switched from lisinopril to losartan.  She was also restarted on daily Qvar inhaler.  She believes the Qvar is helpful for her cough, though she has no diagnosis of asthma or reactive airway disease.  She is unsure if lisinopril may have been contributing to her cough and she mentions that she had been experiencing a lot of nasal discharge and postnasal drip with her cough to.  This has also resolved.          Past medical, surgical, family, and social history were reviewed and updated.     Medications:    Current Outpatient Medications:     beclomethasone HFA (QVAR REDIHALER) 80 MCG/ACT inhaler, Inhale 1 Puff 2 times a day., Disp: 10.6 g, Rfl: 5    gabapentin (NEURONTIN) 400 MG Cap, Take 1 Capsule by mouth 3 times a day., Disp: 300 Capsule, Rfl: 3    losartan (COZAAR) 50 MG Tab, Take 1 Tablet by mouth every day., Disp: 100 Tablet, Rfl: 3    albuterol 108 (90 Base) MCG/ACT Aero Soln inhalation aerosol, Inhale 2 Puffs as needed for Shortness of Breath., Disp: 8.5 g, Rfl: 0    fluticasone (FLONASE) 50 MCG/ACT nasal spray, USE 1 SPRAY IN BOTH NOSTRILS  ONCE DAILY, Disp: 16 g, Rfl: 2    atorvastatin (LIPITOR) 80 MG tablet, TAKE ONE TABLET BY MOUTH EVERY  EVENING, Disp: 100 Tablet, Rfl: 2    montelukast (SINGULAIR) 10 MG Tab, TAKE 1 TABLET BY MOUTH EVERY  EVENING, Disp: 100 Tablet, Rfl: 2    omeprazole (PRILOSEC) 20 MG delayed-release capsule, TAKE 1 CAPSULE BY MOUTH TWICE  DAILY, Disp: 200 Capsule, Rfl: 2     Zinc 50 MG Tab, Take  by mouth., Disp: , Rfl:     BIOTIN PO, Take  by mouth., Disp: , Rfl:     Multiple Vitamins-Minerals (HAIR SKIN & NAILS PO), Take  by mouth., Disp: , Rfl:     Metamucil Fiber Chew Tab, Chew., Disp: , Rfl:     Probiotic Product (PROBIOTIC GUMMIES PO), Take  by mouth., Disp: , Rfl:     isosorbide mononitrate SR (IMDUR) 60 MG TABLET SR 24 HR, Take 1 Tablet by mouth every evening., Disp: 100 Tablet, Rfl: 3    amLODIPine (NORVASC) 5 MG Tab, TAKE 1 TABLET BY MOUTH DAILY, Disp: 100 Tablet, Rfl: 3    carvedilol (COREG) 6.25 MG Tab, Take 1 Tablet by mouth 2 times a day with meals., Disp: 180 Tablet, Rfl: 3    aspirin 81 MG EC tablet, Chew 81 mg every day., Disp: , Rfl:     CRANBERRY PO, Take 3 Tablets by mouth every day., Disp: , Rfl:     Multiple Vitamins-Minerals (OCUVITE-LUTEIN) Tab, Take 1 Tablet by mouth every day., Disp: , Rfl:     Saline (OCEAN NASAL SPRAY NA), Administer 1 Spray into affected nostril(S) every day., Disp: , Rfl:     Acetaminophen 500 MG Cap, Take 1 Capsule by mouth 2 times a day. Morning & Afternoon, sometimes takes 3 times a day, Disp: , Rfl:     Melatonin 10 MG Tab, Take 10 mg by mouth at bedtime., Disp: , Rfl:     guaifenesin LA (MUCINEX) 600 MG TABLET SR 12 HR, Take 600 mg by mouth every morning., Disp: , Rfl:     Ascorbic Acid (VITAMIN C) 1000 MG Tab, Take 1 Tablet by mouth every morning., Disp: , Rfl:     VITAMIN E PO, Take 1 Capsule by mouth every morning., Disp: , Rfl:     Cholecalciferol (VITAMIN D) 50 MCG (2000 UT) Cap, Take 2,000 Units by mouth every morning., Disp: , Rfl:     Allergies:  Bactrim [sulfamethoxazole w-trimethoprim], Pcn [penicillins], Codeine, and Tramadol      Health Maintenance: Completed    ROS:  Review of Systems   Constitutional:  Negative for chills and fever.   Respiratory:  Negative for shortness of breath.    Cardiovascular:  Negative for chest pain.   Neurological:  Negative for dizziness and headaches.       Objective:     Exam:  BP (!) 165/68  "(BP Location: Left arm, Patient Position: Sitting, BP Cuff Size: Adult)   Pulse 70   Temp 36.8 °C (98.2 °F) (Temporal)   Ht 1.575 m (5' 2\")   Wt 57.6 kg (127 lb)   LMP  (LMP Unknown)   SpO2 93%   BMI 23.23 kg/m²  Body mass index is 23.23 kg/m².    Physical Exam  Vitals reviewed.   Constitutional:       General: She is not in acute distress.  Eyes:      Extraocular Movements: Extraocular movements intact.   Cardiovascular:      Rate and Rhythm: Normal rate and regular rhythm.      Heart sounds: No murmur heard.     No friction rub. No gallop.   Pulmonary:      Effort: Pulmonary effort is normal.      Breath sounds: No wheezing, rhonchi or rales.   Musculoskeletal:      Cervical back: Neck supple.      Right lower leg: No edema.      Left lower leg: No edema.   Skin:     General: Skin is warm and dry.   Neurological:      Mental Status: She is alert.   Psychiatric:         Mood and Affect: Mood normal.             Assessment & Plan:     89 y.o. female with the following -     1. Essential hypertension  Chronic, uncontrolled.  Blood pressure is elevated today.  She is feeling a bit anxious today and believes that is why her blood pressure is high.  Does not want to make any changes to medications today.  So at this time we will continue losartan 50 mg daily, amlodipine 5 mg daily, carvedilol 6.25 mg twice daily.  She will start keeping a blood pressure log at home over the next several weeks and we will follow-up in 1 month to reevaluate her blood pressure.    2. Chronic cough  Cough has significantly improved.  Continue Qvar once a day as she has found it beneficial for her cough and allergy symptoms.  Continue albuterol as needed.  Continue steroid nasal spray.  - beclomethasone HFA (QVAR REDIHALER) 80 MCG/ACT inhaler; Inhale 1 Puff 2 times a day.  Dispense: 10.6 g; Refill: 5              Return in about 1 month (around 5/10/2025) for BP follow up.    Please note that this dictation was created using voice " recognition software. I have made every reasonable attempt to correct obvious errors, but I expect that there are errors of grammar and possibly content that I did not discover before finalizing the note.

## 2025-04-21 DIAGNOSIS — I10 ESSENTIAL HYPERTENSION: Chronic | ICD-10-CM

## 2025-04-21 RX ORDER — AMLODIPINE BESYLATE 5 MG/1
5 TABLET ORAL DAILY
Qty: 100 TABLET | Refills: 3 | Status: SHIPPED | OUTPATIENT
Start: 2025-04-21

## 2025-04-21 NOTE — TELEPHONE ENCOUNTER
Received request via: Patient    Was the patient seen in the last year in this department? Yes    Does the patient have an active prescription (recently filled or refills available) for medication(s) requested? No    [facility] [address]     Does the patient have prison Plus and need 100-day supply? (This applies to ALL medications) Yes, quantity updated to 100 days

## 2025-05-01 ENCOUNTER — HOSPITAL ENCOUNTER (EMERGENCY)
Facility: MEDICAL CENTER | Age: OVER 89
End: 2025-05-01
Attending: EMERGENCY MEDICINE
Payer: MEDICARE

## 2025-05-01 VITALS
WEIGHT: 127 LBS | BODY MASS INDEX: 23.37 KG/M2 | SYSTOLIC BLOOD PRESSURE: 108 MMHG | OXYGEN SATURATION: 90 % | HEART RATE: 70 BPM | RESPIRATION RATE: 20 BRPM | DIASTOLIC BLOOD PRESSURE: 52 MMHG | HEIGHT: 62 IN | TEMPERATURE: 98.7 F

## 2025-05-01 DIAGNOSIS — R19.7 DIARRHEA, UNSPECIFIED TYPE: ICD-10-CM

## 2025-05-01 DIAGNOSIS — D64.9 ANEMIA, UNSPECIFIED TYPE: ICD-10-CM

## 2025-05-01 LAB
ALBUMIN SERPL BCP-MCNC: 4 G/DL (ref 3.2–4.9)
ALBUMIN/GLOB SERPL: 1.5 G/DL
ALP SERPL-CCNC: 51 U/L (ref 30–99)
ALT SERPL-CCNC: 26 U/L (ref 2–50)
ANION GAP SERPL CALC-SCNC: 9 MMOL/L (ref 7–16)
AST SERPL-CCNC: 35 U/L (ref 12–45)
BASOPHILS # BLD AUTO: 0.6 % (ref 0–1.8)
BASOPHILS # BLD: 0.06 K/UL (ref 0–0.12)
BILIRUB SERPL-MCNC: 0.8 MG/DL (ref 0.1–1.5)
BUN SERPL-MCNC: 20 MG/DL (ref 8–22)
CALCIUM ALBUM COR SERPL-MCNC: 9.2 MG/DL (ref 8.5–10.5)
CALCIUM SERPL-MCNC: 9.2 MG/DL (ref 8.5–10.5)
CHLORIDE SERPL-SCNC: 103 MMOL/L (ref 96–112)
CO2 SERPL-SCNC: 19 MMOL/L (ref 20–33)
CREAT SERPL-MCNC: 0.78 MG/DL (ref 0.5–1.4)
EOSINOPHIL # BLD AUTO: 0.03 K/UL (ref 0–0.51)
EOSINOPHIL NFR BLD: 0.3 % (ref 0–6.9)
ERYTHROCYTE [DISTWIDTH] IN BLOOD BY AUTOMATED COUNT: 47.4 FL (ref 35.9–50)
GFR SERPLBLD CREATININE-BSD FMLA CKD-EPI: 69 ML/MIN/1.73 M 2
GLOBULIN SER CALC-MCNC: 2.6 G/DL (ref 1.9–3.5)
GLUCOSE SERPL-MCNC: 119 MG/DL (ref 65–99)
HCT VFR BLD AUTO: 30.4 % (ref 37–47)
HGB BLD-MCNC: 10 G/DL (ref 12–16)
IMM GRANULOCYTES # BLD AUTO: 0.03 K/UL (ref 0–0.11)
IMM GRANULOCYTES NFR BLD AUTO: 0.3 % (ref 0–0.9)
LYMPHOCYTES # BLD AUTO: 0.22 K/UL (ref 1–4.8)
LYMPHOCYTES NFR BLD: 2.3 % (ref 22–41)
MCH RBC QN AUTO: 33.3 PG (ref 27–33)
MCHC RBC AUTO-ENTMCNC: 32.9 G/DL (ref 32.2–35.5)
MCV RBC AUTO: 101.3 FL (ref 81.4–97.8)
MONOCYTES # BLD AUTO: 0.37 K/UL (ref 0–0.85)
MONOCYTES NFR BLD AUTO: 3.9 % (ref 0–13.4)
NEUTROPHILS # BLD AUTO: 8.85 K/UL (ref 1.82–7.42)
NEUTROPHILS NFR BLD: 92.6 % (ref 44–72)
NRBC # BLD AUTO: 0 K/UL
NRBC BLD-RTO: 0 /100 WBC (ref 0–0.2)
PLATELET # BLD AUTO: 83 K/UL (ref 164–446)
PLATELETS.RETICULATED NFR BLD AUTO: 4.2 % (ref 0.6–13.1)
PMV BLD AUTO: 10.9 FL (ref 9–12.9)
POTASSIUM SERPL-SCNC: 4.5 MMOL/L (ref 3.6–5.5)
PROT SERPL-MCNC: 6.6 G/DL (ref 6–8.2)
RBC # BLD AUTO: 3 M/UL (ref 4.2–5.4)
SODIUM SERPL-SCNC: 131 MMOL/L (ref 135–145)
WBC # BLD AUTO: 9.6 K/UL (ref 4.8–10.8)

## 2025-05-01 PROCEDURE — 36415 COLL VENOUS BLD VENIPUNCTURE: CPT

## 2025-05-01 PROCEDURE — 700105 HCHG RX REV CODE 258: Performed by: EMERGENCY MEDICINE

## 2025-05-01 PROCEDURE — 96360 HYDRATION IV INFUSION INIT: CPT

## 2025-05-01 PROCEDURE — 99284 EMERGENCY DEPT VISIT MOD MDM: CPT

## 2025-05-01 PROCEDURE — 85025 COMPLETE CBC W/AUTO DIFF WBC: CPT

## 2025-05-01 PROCEDURE — 700111 HCHG RX REV CODE 636 W/ 250 OVERRIDE (IP): Performed by: EMERGENCY MEDICINE

## 2025-05-01 PROCEDURE — 80053 COMPREHEN METABOLIC PANEL: CPT

## 2025-05-01 PROCEDURE — 85055 RETICULATED PLATELET ASSAY: CPT

## 2025-05-01 RX ORDER — ONDANSETRON 4 MG/1
4 TABLET, ORALLY DISINTEGRATING ORAL ONCE
Status: COMPLETED | OUTPATIENT
Start: 2025-05-01 | End: 2025-05-01

## 2025-05-01 RX ORDER — SODIUM CHLORIDE 9 MG/ML
1000 INJECTION, SOLUTION INTRAVENOUS ONCE
Status: COMPLETED | OUTPATIENT
Start: 2025-05-01 | End: 2025-05-01

## 2025-05-01 RX ADMIN — ONDANSETRON 4 MG: 4 TABLET, ORALLY DISINTEGRATING ORAL at 09:21

## 2025-05-01 RX ADMIN — SODIUM CHLORIDE 1000 ML: 9 INJECTION, SOLUTION INTRAVENOUS at 09:23

## 2025-05-01 ASSESSMENT — FIBROSIS 4 INDEX: FIB4 SCORE: 2.57

## 2025-05-01 NOTE — ED PROVIDER NOTES
ED Provider Note    CHIEF COMPLAINT  Chief Complaint   Patient presents with    Diarrhea     Pt states after eating lunch yesterday stomach felt upset and then had about 6 episodes watery diarrhea through night. Pt denies blood denies current abd pain just feels dehydrated        EXTERNAL RECORDS REVIEWED  Outpatient Notes patient was seen at her primary care doctor's office on April 10, 2025 for follow-up for hypertension    HPI/ROS  LIMITATION TO HISTORY   Select: : None  OUTSIDE HISTORIAN(S):  None    Dayana Lechuga is a 89 y.o. female who presents stating that she has had diarrhea since 3 AM, 6 episodes.  She denies any pain.  She had some nausea with no vomiting.  She denies any recent travel out of [place], no recent antibiotic use.  No blood in her stool.    PAST MEDICAL HISTORY   has a past medical history of Allergy, Anxiety, ASTHMA, Bronchitis, CAD (coronary artery disease), Chills, Constipation, Difficulty breathing, GERD (gastroesophageal reflux disease), Heart attack (HCC), Heartburn, Hyperlipidemia, Hypertension, Influenza, Insomnia, Lumbar back pain (09/10/2016), Mild peripheral edema (07/31/2020), Mumps, OSTEOPOROSIS, Palpitations, Peripheral vascular disease, unspecified (HCC) (09/14/2021), Pneumonia, Post concussion syndrome (09/11/2020), PVD (peripheral vascular disease) ([facility]), Sedative, hypnotic or anxiolytic dependence, uncomplicated ([facility]) (10/20/2023), Sweat, sweating, excessive, and Tonsillitis.    SURGICAL HISTORY   has a past surgical history that includes appendectomy; abdominal hysterectomy total; hernia repair; tonsillectomy; zzz cardiac cath; hysterectomy laparoscopy; and inj lumbar/sacral,w/ imaging (N/A, 4/20/2023).    FAMILY HISTORY  Family History   Problem Relation Age of Onset    Heart Attack Father     Cancer Brother     Cancer Brother     Heart Disease Neg Hx     Heart Failure Neg Hx     Hyperlipidemia Neg Hx        SOCIAL HISTORY  Social History     Tobacco Use     Smoking status: Never    Smokeless tobacco: Never   Vaping Use    Vaping status: Never Used   Substance and Sexual Activity    Alcohol use: Not Currently     Comment: occ    Drug use: No    Sexual activity: Not Currently       CURRENT MEDICATIONS  Home Medications       Reviewed by Tomasa Manzano R.N. (Registered Nurse) on 05/01/25 at 0824  Med List Status: Not Addressed     Medication Last Dose Status   Acetaminophen 500 MG Cap  Active   albuterol 108 (90 Base) MCG/ACT Aero Soln inhalation aerosol  Active   amLODIPine (NORVASC) 5 MG Tab  Active   Ascorbic Acid (VITAMIN C) 1000 MG Tab  Active   aspirin 81 MG EC tablet  Active   atorvastatin (LIPITOR) 80 MG tablet  Active   beclomethasone HFA (QVAR REDIHALER) 80 MCG/ACT inhaler  Active   BIOTIN PO  Active   carvedilol (COREG) 6.25 MG Tab  Active   Cholecalciferol (VITAMIN D) 50 MCG (2000 UT) Cap  Active   CRANBERRY PO  Active   fluticasone (FLONASE) 50 MCG/ACT nasal spray  Active   gabapentin (NEURONTIN) 400 MG Cap  Active   guaifenesin LA (MUCINEX) 600 MG TABLET SR 12 HR  Active   isosorbide mononitrate SR (IMDUR) 60 MG TABLET SR 24 HR  Active   losartan (COZAAR) 50 MG Tab  Active   Melatonin 10 MG Tab  Active   Metamucil Fiber Chew Tab  Active   montelukast (SINGULAIR) 10 MG Tab  Active   Multiple Vitamins-Minerals (HAIR SKIN & NAILS PO)  Active   Multiple Vitamins-Minerals (OCUVITE-LUTEIN) Tab  Active   omeprazole (PRILOSEC) 20 MG delayed-release capsule  Active   Probiotic Product (PROBIOTIC GUMMIES PO)  Active   Saline (OCEAN NASAL SPRAY NA)  Active   VITAMIN E PO  Active   Zinc 50 MG Tab  Active                    ALLERGIES  Allergies   Allergen Reactions    Bactrim [Sulfamethoxazole W-Trimethoprim] Hives    Pcn [Penicillins] Hives     About 70 years. Tolerated ceftriaxone before in 2021    Codeine Unspecified     Unknown reaction      Tramadol Unspecified     Urinary retention       PHYSICAL EXAM  VITAL SIGNS: /53   Pulse 70   Temp 37.1 °C (98.7 °F)  "(Temporal)   Resp 16   Ht 1.575 m (5' 2\")   Wt 57.6 kg (127 lb)   LMP  (LMP Unknown)   SpO2 92%   BMI 23.23 kg/m²      Constitutional: Alert.  HENT: No signs of trauma, Bilateral external ears normal, Nose normal.   Eyes: Pupils are equal and reactive, Conjunctiva normal, Non-icteric.   Neck: Normal range of motion, No tenderness, Supple, No stridor.   Lymphatic: No lymphadenopathy noted.   Cardiovascular: Regular rate and rhythm, no murmurs.   Thorax & Lungs: Normal breath sounds, No respiratory distress, No wheezing, No chest tenderness.   Abdomen: Bowel sounds normal, Soft, No tenderness, No peritoneal signs, No masses, No pulsatile masses.   Skin: Warm, Dry, No erythema, No rash.   Extremities: Intact distal pulses, No edema, No tenderness, No cyanosis.  Musculoskeletal: Good range of motion in all major joints. No tenderness to palpation or major deformities noted.   Neurologic: Alert , Normal motor function, Normal sensory function, No focal deficits noted.   Psychiatric: Affect normal, Judgment normal, Mood normal.   Rectal exam: With female chaperone the patient has guaiac negative brown stool    LABS      Labs Reviewed   COMP METABOLIC PANEL - Abnormal; Notable for the following components:       Result Value    Sodium 131 (*)     Co2 19 (*)     Glucose 119 (*)     All other components within normal limits   CBC WITH DIFFERENTIAL - Abnormal; Notable for the following components:    RBC 3.00 (*)     Hemoglobin 10.0 (*)     Hematocrit 30.4 (*)     .3 (*)     MCH 33.3 (*)     Platelet Count 83 (*)     Neutrophils-Polys 92.60 (*)     Lymphocytes 2.30 (*)     Neutrophils (Absolute) 8.85 (*)     Lymphs (Absolute) 0.22 (*)     All other components within normal limits    Narrative:     SPECIMEN IS A RECOLLECT   ESTIMATED GFR   IMMATURE PLT FRACTION    Narrative:     SPECIMEN IS A RECOLLECT               COURSE & MEDICAL DECISION MAKING    ASSESSMENT, COURSE AND PLAN  Care Narrative:     Patient " presents with diarrhea.  She has had no vomiting.  Patient was given Zofran 4 mg p.o. for nausea.  Labs were ordered to determine if she is dehydrated.  The patient was given 1 L NS IV.  Clinically she is dehydrated with dry mucous membranes and difficult to find veins.      11:35 AM the patient's labs are significant for anemia with an H&H of 10 and 30, in February her H&H had been 13 and 40.  For this reason rectal exam was performed, she is guaiac negative with brown stool.  The patient's CMP has only minor abnormalities.  At this time the patient be discharged to follow-up with her primary care doctor for workup of anemia.  She has no risk factors that would indicate antibiotics for diarrhea.  She will return for worsening symptoms.  I spoke with her daughter who agrees with this plan.            ADDITIONAL PROBLEMS MANAGED  Diarrhea, anemia    DISPOSITION AND DISCUSSIONS  I have discussed management of the patient with the following physicians and LAKESHIA's: None    Discussion of management with other QHP or appropriate source(s): None     Escalation of care considered, and ultimately not performed:acute inpatient care management, however at this time, the patient is most appropriate for outpatient management in addition the patient is not low enough with her H and H to have a blood transfusion    Barriers to care at this time, including but not limited to:  None .     Decision tools and prescription drugs considered including, but not limited to:  Instructions given on for diet for diarrhea and to follow-up with primary care doctor for anemia .    The patient will return for new or worsening symptoms and is stable at the time of discharge.    The patient is referred to a primary physician for blood pressure management, diabetic screening, and for all other preventative health concerns.      DISPOSITION:  Patient will be discharged home in stable condition.    FOLLOW UP:  [facility], Emergency  Dept  [address] [id number] [phone]    If symptoms worsen    Monique Borjas, P.A.-C.  [address]  [facility] [address] [phone]      Call for appointment to be evaluated for anemia      OUTPATIENT MEDICATIONS:  New Prescriptions    No medications on file         FINAL DIAGNOSIS  1. Diarrhea, unspecified type    2. Anemia, unspecified type         Electronically signed by: Nile Lou M.D., 5/1/2025 8:59 AM

## 2025-05-01 NOTE — DISCHARGE PLANNING
"TCN following. HTH/SCP chart review completed. Note pt currently in ED secondary to Diarrhea.  Per chart review patient patient recently seen in the ED on 2/15/25 for cough/diarrhea and 2/27/25 for cough.  During both ED visits patient was ambulating with \"steady gait\" and was on RA.  Per address patient resides in independent living.     Per review, has not ambulated in ED and is currently on RA. At this time anticipating that pt will dc to home with OP f/u (either directly from ED or after admission to [facility] if warranted). Per chart patient has [facility] PCP and PCP f/u scheduled for 5/7.     If pt admits to [facility], [facility] will continue to monitor and assist with transitional care needs as indicated. Please reach out to [facility] via [facility] if post acute transitional care needs are warranted for dc planning.    *Update*  Per chart patient discharge home, with recommendation to follow up with PCP.     "

## 2025-05-01 NOTE — ED NOTES
Patient discharged home per ERP.  Discharge teaching and education discussed with patient. POC discussed.   Patient verbalized understanding of discharge teaching and education. No other questions at this time.   PIV removed.     VSS. Patient alert and oriented. Patient arranged ride for self. Able to ambulate off unit safely with steady gait.

## 2025-05-01 NOTE — ED TRIAGE NOTES
"Chief Complaint   Patient presents with    Diarrhea     Pt states after eating lunch yesterday stomach felt upset and then had about 6 episodes watery diarrhea through night. Pt denies blood denies current abd pain just feels dehydrated      BIBA from assisted living for above. GCS 15 aox4 no interventions by EMS    /53   Pulse 70   Temp 37.1 °C (98.7 °F) (Temporal)   Resp 16   Ht 1.575 m (5' 2\")   Wt 57.6 kg (127 lb)   LMP  (LMP Unknown)   SpO2 92%   BMI 23.23 kg/m²     "

## 2025-05-02 ENCOUNTER — PATIENT OUTREACH (OUTPATIENT)
Dept: HEALTH INFORMATION MANAGEMENT | Facility: OTHER | Age: OVER 89
End: 2025-05-02
Payer: MEDICARE

## 2025-05-02 DIAGNOSIS — I25.119 CORONARY ARTERY DISEASE INVOLVING NATIVE CORONARY ARTERY OF NATIVE HEART WITH ANGINA PECTORIS (HCC): ICD-10-CM

## 2025-05-02 DIAGNOSIS — F41.9 ANXIETY: ICD-10-CM

## 2025-05-02 DIAGNOSIS — I10 ESSENTIAL HYPERTENSION: ICD-10-CM

## 2025-05-02 DIAGNOSIS — E78.5 DYSLIPIDEMIA: ICD-10-CM

## 2025-05-02 NOTE — PROGRESS NOTES
I spoke to the patient briefly by phone to check in after her ER visit. The patient has an appointment scheduled for next Wednesday. She reports that she has had no further bouts of diarrhea or nausea/vomiting but she does have an upset stomach. She also reports labs revealing acute anemia. She denies any acute needs but does want to discuss all of this with her provider next week. We will speak more then as well.

## 2025-05-05 DIAGNOSIS — I10 ESSENTIAL HYPERTENSION: ICD-10-CM

## 2025-05-05 NOTE — PROGRESS NOTES
The patient called in today still experiencing weakness and nausea. Indications for urgent and emergency care discussed and the patient voiced understanding. Unfortunately the appointments her PCP has available are very early tomorrow morning and the patient doesn't think she'd be able to attend. She doesn't feel like seeing another provider in practice would be helpful. The patient states understanding of ameya, mint, and the BRAT diet as interim non pharmacological measures.

## 2025-05-06 RX ORDER — CARVEDILOL 6.25 MG/1
6.25 TABLET ORAL 2 TIMES DAILY WITH MEALS
Qty: 200 TABLET | Refills: 1 | Status: SHIPPED | OUTPATIENT
Start: 2025-05-06

## 2025-05-07 ENCOUNTER — OFFICE VISIT (OUTPATIENT)
Dept: MEDICAL GROUP | Facility: PHYSICIAN GROUP | Age: OVER 89
End: 2025-05-07
Payer: MEDICARE

## 2025-05-07 ENCOUNTER — HOSPITAL ENCOUNTER (OUTPATIENT)
Dept: LAB | Facility: MEDICAL CENTER | Age: OVER 89
End: 2025-05-07
Attending: STUDENT IN AN ORGANIZED HEALTH CARE EDUCATION/TRAINING PROGRAM
Payer: MEDICARE

## 2025-05-07 VITALS
WEIGHT: 125 LBS | BODY MASS INDEX: 23 KG/M2 | HEART RATE: 75 BPM | SYSTOLIC BLOOD PRESSURE: 138 MMHG | TEMPERATURE: 98.7 F | HEIGHT: 62 IN | DIASTOLIC BLOOD PRESSURE: 70 MMHG | OXYGEN SATURATION: 96 %

## 2025-05-07 DIAGNOSIS — D69.6 THROMBOCYTOPENIA (HCC): ICD-10-CM

## 2025-05-07 DIAGNOSIS — R19.7 DIARRHEA OF PRESUMED INFECTIOUS ORIGIN: ICD-10-CM

## 2025-05-07 DIAGNOSIS — I10 PRIMARY HYPERTENSION: ICD-10-CM

## 2025-05-07 DIAGNOSIS — E87.1 HYPONATREMIA: ICD-10-CM

## 2025-05-07 DIAGNOSIS — D64.9 ANEMIA, UNSPECIFIED TYPE: ICD-10-CM

## 2025-05-07 DIAGNOSIS — K59.04 CHRONIC IDIOPATHIC CONSTIPATION: ICD-10-CM

## 2025-05-07 LAB
ERYTHROCYTE [DISTWIDTH] IN BLOOD BY AUTOMATED COUNT: 44.4 FL (ref 35.9–50)
HCT VFR BLD AUTO: 39 % (ref 37–47)
HGB BLD-MCNC: 13.3 G/DL (ref 12–16)
MCH RBC QN AUTO: 32.9 PG (ref 27–33)
MCHC RBC AUTO-ENTMCNC: 34.1 G/DL (ref 32.2–35.5)
MCV RBC AUTO: 96.5 FL (ref 81.4–97.8)
PLATELET # BLD AUTO: 204 K/UL (ref 164–446)
PMV BLD AUTO: 10.4 FL (ref 9–12.9)
RBC # BLD AUTO: 4.04 M/UL (ref 4.2–5.4)
WBC # BLD AUTO: 7.2 K/UL (ref 4.8–10.8)

## 2025-05-07 PROCEDURE — 83550 IRON BINDING TEST: CPT

## 2025-05-07 PROCEDURE — 36415 COLL VENOUS BLD VENIPUNCTURE: CPT

## 2025-05-07 PROCEDURE — 3075F SYST BP GE 130 - 139MM HG: CPT | Performed by: STUDENT IN AN ORGANIZED HEALTH CARE EDUCATION/TRAINING PROGRAM

## 2025-05-07 PROCEDURE — 85027 COMPLETE CBC AUTOMATED: CPT

## 2025-05-07 PROCEDURE — 82607 VITAMIN B-12: CPT

## 2025-05-07 PROCEDURE — 99214 OFFICE O/P EST MOD 30 MIN: CPT | Performed by: STUDENT IN AN ORGANIZED HEALTH CARE EDUCATION/TRAINING PROGRAM

## 2025-05-07 PROCEDURE — 3078F DIAST BP <80 MM HG: CPT | Performed by: STUDENT IN AN ORGANIZED HEALTH CARE EDUCATION/TRAINING PROGRAM

## 2025-05-07 PROCEDURE — 83540 ASSAY OF IRON: CPT

## 2025-05-07 PROCEDURE — 80048 BASIC METABOLIC PNL TOTAL CA: CPT

## 2025-05-07 PROCEDURE — 82746 ASSAY OF FOLIC ACID SERUM: CPT

## 2025-05-07 ASSESSMENT — ENCOUNTER SYMPTOMS
DIZZINESS: 0
CHILLS: 0
VOMITING: 0
SHORTNESS OF BREATH: 0
ABDOMINAL PAIN: 0
NAUSEA: 0
BLOOD IN STOOL: 0
HEADACHES: 0
ROS GI COMMENTS: + BLOATING
HEARTBURN: 0
FEVER: 0
DIARRHEA: 0
CONSTIPATION: 1

## 2025-05-07 ASSESSMENT — SOCIAL DETERMINANTS OF HEALTH (SDOH): IN THE PAST 12 MONTHS, HAS THE ELECTRIC, GAS, OIL, OR WATER COMPANY THREATENED TO SHUT OFF SERVICE IN YOUR HOME?: NO

## 2025-05-07 ASSESSMENT — FIBROSIS 4 INDEX: FIB4 SCORE: 7.94

## 2025-05-07 NOTE — PROGRESS NOTES
Verbal consent was acquired by the patient to use DocuTAP ambient listening note generation during this visit.    Subjective:     HPI:   History of Present Illness  The patient is here for a follow-up on hypertension and recent ER visit for diarrhea.    Hypertension  - Last seen on 04/10/2024, her BP was elevated, but no medication changes were made.  - Continues losartan 50 mg daily, amlodipine 5 mg daily, and carvedilol 6.25 mg BID.  - BP today is 138/70.    Recent ER Visit for Diarrhea  - Follow-up on recent ER visit on 05/01/2025 for diarrhea.  - Intermittent constipation and diarrhea, managed with fiber supplement, probiotic, and stool softener PRN.  - ER visit revealed mild anemia (Hb 10, previously 13.1), low platelet count, low sodium (131), elevated glucose (non-fasting), and decreased GFR (60s).  - Symptoms included soft, non-bloody diarrhea for 1 day, resolved since ER visit.  - Suspects she may have had a stomach bug of some kind because multiple people at her living facility were ill as well.  - Has had constipation since her ER visit, last BM 3 days ago was hard  - No abdominal pain, but bloating present.  - Appetite has been okay, slowly advancing diet.  - No fevers or chills.  - Taking Metamucil supplement every day  - Took Colace yesterday and today with no effect.      Supplemental information: None.        Past medical, surgical, family, and social history were reviewed and updated.     Medications:    Current Outpatient Medications:     carvedilol (COREG) 6.25 MG Tab, TAKE 1 TABLET BY MOUTH TWICE  DAILY WITH MEALS, Disp: 200 Tablet, Rfl: 1    amLODIPine (NORVASC) 5 MG Tab, TAKE 1 TABLET BY MOUTH DAILY, Disp: 100 Tablet, Rfl: 3    beclomethasone HFA (QVAR REDIHALER) 80 MCG/ACT inhaler, Inhale 1 Puff 2 times a day., Disp: 10.6 g, Rfl: 5    gabapentin (NEURONTIN) 400 MG Cap, Take 1 Capsule by mouth 3 times a day., Disp: 300 Capsule, Rfl: 3    losartan (COZAAR) 50 MG Tab, Take 1 Tablet by mouth  every day., Disp: 100 Tablet, Rfl: 3    albuterol 108 (90 Base) MCG/ACT Aero Soln inhalation aerosol, Inhale 2 Puffs as needed for Shortness of Breath., Disp: 8.5 g, Rfl: 0    fluticasone (FLONASE) 50 MCG/ACT nasal spray, USE 1 SPRAY IN BOTH NOSTRILS  ONCE DAILY, Disp: 16 g, Rfl: 2    atorvastatin (LIPITOR) 80 MG tablet, TAKE ONE TABLET BY MOUTH EVERY  EVENING, Disp: 100 Tablet, Rfl: 2    montelukast (SINGULAIR) 10 MG Tab, TAKE 1 TABLET BY MOUTH EVERY  EVENING, Disp: 100 Tablet, Rfl: 2    omeprazole (PRILOSEC) 20 MG delayed-release capsule, TAKE 1 CAPSULE BY MOUTH TWICE  DAILY, Disp: 200 Capsule, Rfl: 2    Zinc 50 MG Tab, Take  by mouth., Disp: , Rfl:     BIOTIN PO, Take  by mouth., Disp: , Rfl:     Multiple Vitamins-Minerals (HAIR SKIN & NAILS PO), Take  by mouth., Disp: , Rfl:     Metamucil Fiber Chew Tab, Chew., Disp: , Rfl:     Probiotic Product (PROBIOTIC GUMMIES PO), Take  by mouth., Disp: , Rfl:     isosorbide mononitrate SR (IMDUR) 60 MG TABLET SR 24 HR, Take 1 Tablet by mouth every evening., Disp: 100 Tablet, Rfl: 3    aspirin 81 MG EC tablet, Chew 81 mg every day., Disp: , Rfl:     CRANBERRY PO, Take 3 Tablets by mouth every day., Disp: , Rfl:     Multiple Vitamins-Minerals (OCUVITE-LUTEIN) Tab, Take 1 Tablet by mouth every day., Disp: , Rfl:     Saline (OCEAN NASAL SPRAY NA), Administer 1 Spray into affected nostril(S) every day., Disp: , Rfl:     Acetaminophen 500 MG Cap, Take 1 Capsule by mouth 2 times a day. Morning & Afternoon, sometimes takes 3 times a day, Disp: , Rfl:     Melatonin 10 MG Tab, Take 10 mg by mouth at bedtime., Disp: , Rfl:     guaifenesin LA (MUCINEX) 600 MG TABLET SR 12 HR, Take 600 mg by mouth every morning., Disp: , Rfl:     Ascorbic Acid (VITAMIN C) 1000 MG Tab, Take 1 Tablet by mouth every morning., Disp: , Rfl:     VITAMIN E PO, Take 1 Capsule by mouth every morning., Disp: , Rfl:     Cholecalciferol (VITAMIN D) 50 MCG (2000 UT) Cap, Take 2,000 Units by mouth every morning.,  "Disp: , Rfl:     Allergies:  Bactrim [sulfamethoxazole w-trimethoprim], Pcn [penicillins], Codeine, and Tramadol      Health Maintenance: Completed    ROS:  Review of Systems   Constitutional:  Positive for malaise/fatigue. Negative for chills and fever.   Respiratory:  Negative for shortness of breath.    Cardiovascular:  Negative for chest pain.   Gastrointestinal:  Positive for constipation. Negative for abdominal pain, blood in stool, diarrhea, heartburn, melena, nausea and vomiting.        + bloating   Neurological:  Negative for dizziness and headaches.       Objective:     Exam:  /70 (BP Location: Left arm, Patient Position: Sitting, BP Cuff Size: Adult)   Pulse 75   Temp 37.1 °C (98.7 °F) (Temporal)   Ht 1.575 m (5' 2\")   Wt 56.7 kg (125 lb)   LMP  (LMP Unknown)   SpO2 96%   BMI 22.86 kg/m²  Body mass index is 22.86 kg/m².    Physical Exam  Vitals reviewed.   Constitutional:       General: She is not in acute distress.  Eyes:      Extraocular Movements: Extraocular movements intact.   Cardiovascular:      Rate and Rhythm: Normal rate and regular rhythm.      Heart sounds: No murmur heard.     No friction rub. No gallop.   Pulmonary:      Effort: Pulmonary effort is normal.      Breath sounds: No wheezing, rhonchi or rales.   Abdominal:      Palpations: Abdomen is soft.      Tenderness: There is no abdominal tenderness. There is no guarding or rebound.      Comments: Mildly distended abdomen   Musculoskeletal:      Cervical back: Neck supple.   Skin:     General: Skin is warm and dry.   Neurological:      Mental Status: She is alert.   Psychiatric:         Mood and Affect: Mood normal.         Results  Labs    Results for orders placed or performed during the hospital encounter of 05/01/25   COMP METABOLIC PANEL    Collection Time: 05/01/25  9:00 AM   Result Value Ref Range    Sodium 131 (L) 135 - 145 mmol/L    Potassium 4.5 3.6 - 5.5 mmol/L    Chloride 103 96 - 112 mmol/L    Co2 19 (L) 20 - 33 " mmol/L    Anion Gap 9.0 7.0 - 16.0    Glucose 119 (H) 65 - 99 mg/dL    Bun 20 8 - 22 mg/dL    Creatinine 0.78 0.50 - 1.40 mg/dL    Calcium 9.2 8.5 - 10.5 mg/dL    Correct Calcium 9.2 8.5 - 10.5 mg/dL    AST(SGOT) 35 12 - 45 U/L    ALT(SGPT) 26 2 - 50 U/L    Alkaline Phosphatase 51 30 - 99 U/L    Total Bilirubin 0.8 0.1 - 1.5 mg/dL    Albumin 4.0 3.2 - 4.9 g/dL    Total Protein 6.6 6.0 - 8.2 g/dL    Globulin 2.6 1.9 - 3.5 g/dL    A-G Ratio 1.5 g/dL   ESTIMATED GFR    Collection Time: 05/01/25  9:00 AM   Result Value Ref Range    GFR (CKD-EPI) 69 >60 mL/min/1.73 m 2   CBC WITH DIFFERENTIAL    Collection Time: 05/01/25  9:15 AM   Result Value Ref Range    WBC 9.6 4.8 - 10.8 K/uL    RBC 3.00 (L) 4.20 - 5.40 M/uL    Hemoglobin 10.0 (L) 12.0 - 16.0 g/dL    Hematocrit 30.4 (L) 37.0 - 47.0 %    .3 (H) 81.4 - 97.8 fL    MCH 33.3 (H) 27.0 - 33.0 pg    MCHC 32.9 32.2 - 35.5 g/dL    RDW 47.4 35.9 - 50.0 fL    Platelet Count 83 (L) 164 - 446 K/uL    MPV 10.9 9.0 - 12.9 fL    Neutrophils-Polys 92.60 (H) 44.00 - 72.00 %    Lymphocytes 2.30 (L) 22.00 - 41.00 %    Monocytes 3.90 0.00 - 13.40 %    Eosinophils 0.30 0.00 - 6.90 %    Basophils 0.60 0.00 - 1.80 %    Immature Granulocytes 0.30 0.00 - 0.90 %    Nucleated RBC 0.00 0.00 - 0.20 /100 WBC    Neutrophils (Absolute) 8.85 (H) 1.82 - 7.42 K/uL    Lymphs (Absolute) 0.22 (L) 1.00 - 4.80 K/uL    Monos (Absolute) 0.37 0.00 - 0.85 K/uL    Eos (Absolute) 0.03 0.00 - 0.51 K/uL    Baso (Absolute) 0.06 0.00 - 0.12 K/uL    Immature Granulocytes (abs) 0.03 0.00 - 0.11 K/uL    NRBC (Absolute) 0.00 K/uL   IMMATURE PLT FRACTION    Collection Time: 05/01/25  9:15 AM   Result Value Ref Range    Imm. Plt Fraction 4.2 0.6 - 13.1 %     *Note: Due to a large number of results and/or encounters for the requested time period, some results have not been displayed. A complete set of results can be found in Results Review.         Assessment & Plan:     1. Diarrhea of presumed infectious origin         2. Chronic idiopathic constipation        3. Anemia, unspecified type  CBC WITHOUT DIFFERENTIAL    FOLATE    IRON/TOTAL IRON BIND    VITAMIN B12      4. Thrombocytopenia (HCC)  CBC WITHOUT DIFFERENTIAL      5. Hyponatremia  Basic Metabolic Panel      6. Primary hypertension            Assessment & Plan  1. Diarrhea  Follow-up after ER visit 5/1/2025 for acute diarrhea.  Diarrhea has resolved.  She is now experiencing constipation, which has been an intermittent and chronic problem.  Diarrhea suspected to be due to viral gastroenteritis.  Continue to slowly advance diet.    2. Constipation  Chronic condition. History of alternating constipation and diarrhea.  - Continue fiber supplement, aiming for around 25 g of fiber per day.  - Make sure to get adequate fluids.  - Continue probiotic supplement if helpful.  - Recommend taking an osmotic laxative (MiraLAX) while struggling with constipation.  - Stool softener can be taken as needed.    3. Anemia  This is acute.  Patient was found to be anemic with a hemoglobin of 10 during her recent ER visit.  Stool was guaiac negative.  Hemoglobin was normal at 13.1 when last checked 2 months ago and has been stable for a couple of years.  - Follow-up labs ordered: repeat CBC, iron, folate, B12.    4. Thrombocytopenia  Thrombocytopenic on recent CBC.  Previously normal platelet count. No easy bleeding.  - Order follow up CBC to confirm.    5. Hypertension  Chronic, stable. BP has been normal on her home blood pressure log.  BP is stable at 138/70 today.  - No changes to medication regimen.  - Continue losartan 50 mg daily, amlodipine 5 mg daily, carvedilol 6.25 mg BID.        Follow-up: Follow up scheduled on 05/21/2025.        I spent a total of 33 minutes with record review, exam, communication with the patient, communication with other providers, and documentation of this encounter.       Please note that this dictation was created using voice recognition software. I have  made every reasonable attempt to correct obvious errors, but I expect that there are errors of grammar and possibly content that I did not discover before finalizing the note.

## 2025-05-07 NOTE — CARE PLAN
Problem: Knowledge of self-monitoring blood pressure  Goal: Demonstrate the elements of self-monitoring blood pressure  Outcome: Not Progressing  Note: Patient advised to take blood pressure at least once a week and keep a log. She is inconsistent with this.      Problem: Risk of Falls - Long Term  Goal: Reduce the Risk of Falls and Fall Related Injuries  Outcome: Progressing  Note: Walking recommended. Proactivity encouraged.   Intervention: Recommend exercises to improve strength and balance (physical therapy)  Intervention: Encourage regular physical activity tailored to the patient's abilities

## 2025-05-08 ENCOUNTER — RESULTS FOLLOW-UP (OUTPATIENT)
Dept: MEDICAL GROUP | Facility: PHYSICIAN GROUP | Age: OVER 89
End: 2025-05-08

## 2025-05-08 LAB
ANION GAP SERPL CALC-SCNC: 9 MMOL/L (ref 7–16)
BUN SERPL-MCNC: 12 MG/DL (ref 8–22)
CALCIUM SERPL-MCNC: 9.9 MG/DL (ref 8.5–10.5)
CHLORIDE SERPL-SCNC: 104 MMOL/L (ref 96–112)
CO2 SERPL-SCNC: 23 MMOL/L (ref 20–33)
CREAT SERPL-MCNC: 0.52 MG/DL (ref 0.5–1.4)
FOLATE SERPL-MCNC: >40 NG/ML
GFR SERPLBLD CREATININE-BSD FMLA CKD-EPI: 85 ML/MIN/1.73 M 2
GLUCOSE SERPL-MCNC: 112 MG/DL (ref 65–99)
IRON SATN MFR SERPL: 50 % (ref 15–55)
IRON SERPL-MCNC: 138 UG/DL (ref 40–170)
POTASSIUM SERPL-SCNC: 3.7 MMOL/L (ref 3.6–5.5)
SODIUM SERPL-SCNC: 136 MMOL/L (ref 135–145)
TIBC SERPL-MCNC: 275 UG/DL (ref 250–450)
UIBC SERPL-MCNC: 137 UG/DL (ref 110–370)
VIT B12 SERPL-MCNC: 1223 PG/ML (ref 211–911)

## 2025-05-15 DIAGNOSIS — I10 ESSENTIAL HYPERTENSION: Chronic | ICD-10-CM

## 2025-05-15 RX ORDER — LOSARTAN POTASSIUM 50 MG/1
50 TABLET ORAL DAILY
Qty: 100 TABLET | Refills: 3 | Status: SHIPPED | OUTPATIENT
Start: 2025-05-15 | End: 2026-06-19

## 2025-05-15 NOTE — TELEPHONE ENCOUNTER
Is the patient due for a refill? Yes-Change of Pharmacy    Was the patient seen the last 15 months? Yes    Date of last office visit: 4/10/2025    Does the patient have an upcoming appointment?  Yes   If yes, When? 5/21/2025    Provider to refill: Monique Borjas    Does the patient have [facility] and need 100-day supply? (This applies to ALL medications) Yes, quantity updated to 100 days

## 2025-05-20 ENCOUNTER — OFFICE VISIT (OUTPATIENT)
Dept: URGENT CARE | Facility: CLINIC | Age: OVER 89
End: 2025-05-20
Payer: MEDICARE

## 2025-05-20 VITALS
DIASTOLIC BLOOD PRESSURE: 62 MMHG | WEIGHT: 125.2 LBS | OXYGEN SATURATION: 95 % | BODY MASS INDEX: 23.04 KG/M2 | SYSTOLIC BLOOD PRESSURE: 140 MMHG | RESPIRATION RATE: 15 BRPM | HEIGHT: 62 IN | TEMPERATURE: 98.2 F | HEART RATE: 75 BPM

## 2025-05-20 DIAGNOSIS — R03.0 ELEVATED BLOOD PRESSURE READING: ICD-10-CM

## 2025-05-20 DIAGNOSIS — Z87.440 PERSONAL HISTORY UTI: ICD-10-CM

## 2025-05-20 DIAGNOSIS — H00.015 HORDEOLUM EXTERNUM OF LEFT LOWER EYELID: Primary | ICD-10-CM

## 2025-05-20 LAB
APPEARANCE UR: CLEAR
BILIRUB UR STRIP-MCNC: NEGATIVE MG/DL
COLOR UR AUTO: YELLOW
GLUCOSE UR STRIP.AUTO-MCNC: NEGATIVE MG/DL
KETONES UR STRIP.AUTO-MCNC: NEGATIVE MG/DL
LEUKOCYTE ESTERASE UR QL STRIP.AUTO: NEGATIVE
NITRITE UR QL STRIP.AUTO: NEGATIVE
PH UR STRIP.AUTO: 5.5 [PH] (ref 5–8)
PROT UR QL STRIP: NEGATIVE MG/DL
RBC UR QL AUTO: NEGATIVE
SP GR UR STRIP.AUTO: 1.01
UROBILINOGEN UR STRIP-MCNC: 0.2 MG/DL

## 2025-05-20 PROCEDURE — 3077F SYST BP >= 140 MM HG: CPT

## 2025-05-20 PROCEDURE — 81002 URINALYSIS NONAUTO W/O SCOPE: CPT

## 2025-05-20 PROCEDURE — 3078F DIAST BP <80 MM HG: CPT

## 2025-05-20 PROCEDURE — 99214 OFFICE O/P EST MOD 30 MIN: CPT

## 2025-05-20 RX ORDER — ERYTHROMYCIN 5 MG/G
OINTMENT OPHTHALMIC
Qty: 3.5 G | Refills: 2 | Status: SHIPPED | OUTPATIENT
Start: 2025-05-20

## 2025-05-20 ASSESSMENT — FIBROSIS 4 INDEX: FIB4 SCORE: 3.23

## 2025-05-20 ASSESSMENT — ENCOUNTER SYMPTOMS
FOCAL WEAKNESS: 0
WEAKNESS: 0

## 2025-05-20 ASSESSMENT — VISUAL ACUITY: OU: 1

## 2025-05-20 NOTE — PROGRESS NOTES
Subjective     Dayana Lechuga is a 89 y.o. female who presents with UTI (X 4 days weak, her stool is slightly looser than normal and continuous, abdominal px, feels light headed and off, she believes she has a UTI, pt states she never has typical symptoms for UTI/ L eye has a white bump on lower water line that is painful and itches)    #1   Patient presents here today with concerns regarding a possible UTI. She felt foggy. She reports she typically has atypical symptoms when she has a UTI and would like to get checked.  Denies dysuria, urinary urgency, frequency.  No other vaginal symptoms.  No fever or chills.  No confusion. No back pain, no focal abdominal pain, flank pain.  Had transient loose stools 4 days ago. No blood in stools. Takes cranberry pills. Tolerating fluids, drinking lots of water. No other aggravating or alleviating factors.    #2  She also noted a white bump on her left lower eyelash. Feels irritated and itchy.  Denies trauma, headache, vomiting, watering, discharge, or photophobia. No prior infections including URI, sinusitis. No recent eye surgery/procedures. Denies severe, constant pain that is worse at night and in the morning. No pain. No contact lense use. No prior treatment.           Review of Systems   Cardiovascular:  Negative for chest pain.   Neurological:  Negative for focal weakness and weakness.   All other systems reviewed and are negative.      Medications:    Acetaminophen Caps  albuterol Aers  amLODIPine Tabs  aspirin  atorvastatin  BIOTIN PO  carvedilol Tabs  CRANBERRY PO  fluticasone  gabapentin Caps  guaiFENesin ER Tb12  HAIR SKIN & NAILS PO  isosorbide mononitrate SR Tb24  losartan Tabs  Melatonin Tabs  Metamucil Fiber Chew  montelukast Tabs  OCEAN NASAL SPRAY NA  Ocuvite-Lutein Tabs  omeprazole  PROBIOTIC GUMMIES PO  Qvar RediHaler Aerb  Vitamin C Tabs  Vitamin D Caps  VITAMIN E PO  Zinc Tabs    Allergies:  Bactrim [sulfamethoxazole w-trimethoprim], Pcn  "[penicillins], Codeine, and Tramadol    Past Social Hx:  Dayana Lechuga  reports that she has never smoked. She has never used smokeless tobacco. She reports that she does not currently use alcohol. She reports that she does not use drugs.    Past Medical Hx: Past Medical History[1]              Objective     BP (!) 140/62 (BP Location: Left arm, Patient Position: Sitting, BP Cuff Size: Adult)   Pulse 75   Temp 36.8 °C (98.2 °F)   Resp 15   Ht 1.575 m (5' 2\")   Wt 56.8 kg (125 lb 3.2 oz)   LMP  (LMP Unknown)   SpO2 95%   BMI 22.90 kg/m²      Physical Exam  Vitals reviewed.   Constitutional:       Appearance: Normal appearance.   HENT:      Head: Normocephalic and atraumatic.      Right Ear: External ear normal.      Left Ear: External ear normal.      Nose: Nose normal.      Mouth/Throat:      Mouth: Mucous membranes are moist.   Eyes:      General: Lids are everted, no foreign bodies appreciated. Vision grossly intact. No visual field deficit.        Left eye: Hordeolum present.No foreign body or discharge.      Extraocular Movements: Extraocular movements intact.      Conjunctiva/sclera: Conjunctivae normal.      Pupils: Pupils are equal, round, and reactive to light.     Cardiovascular:      Rate and Rhythm: Normal rate.   Pulmonary:      Effort: Pulmonary effort is normal.   Abdominal:      General: Abdomen is flat. Bowel sounds are normal.      Palpations: Abdomen is soft.      Tenderness: There is no abdominal tenderness. There is no right CVA tenderness, left CVA tenderness, guarding or rebound.   Skin:     General: Skin is warm and dry.      Capillary Refill: Capillary refill takes less than 2 seconds.   Neurological:      Mental Status: She is alert and oriented to person, place, and time.   Psychiatric:         Behavior: Behavior normal.               Results for orders placed or performed in visit on 05/20/25   POCT Urinalysis    Collection Time: 05/20/25 10:07 AM   Result Value Ref " Range    POC Color yellow Negative    POC Appearance clear Negative    POC Glucose negative Negative mg/dL    POC Bilirubin negative Negative mg/dL    POC Ketones negative Negative mg/dL    POC Specific Gravity 1.015 <1.005 - >1.030    POC Blood negative Negative    POC Urine PH 5.5 5.0 - 8.0    POC Protein negative Negative mg/dL    POC Urobiligen 0.2 Negative (0.2) mg/dL    POC Nitrites negative Negative    POC Leukocyte Esterase negative Negative     *Note: Due to a large number of results and/or encounters for the requested time period, some results have not been displayed. A complete set of results can be found in Results Review.                       Assessment & Plan  Hordeolum externum of left lower eyelid    Orders:    erythromycin 5 MG/GM Ointment; Apply to stye every twice daily while awake until better.    Elevated blood pressure reading         Personal history UTI    Orders:    POCT Urinalysis    #1 Reviewed urinalysis with patient.  No evidence of leukocytes, nitrates, or blood.  No acute abdomen.  No CVA tenderness. Recommend fluids and staying well hydrated.  #2 Left eye hordeolum noted. Recommend good hand hygiene and warm compress.     Patient has noted elevated hypertension today here in the office.  She reports she did not take antihypertensives this morning.  No chest pain, dizziness, weakness, vision changes or loss.  Recommend monitoring BP daily and following up with primary care provider for ongoing management.    Differential diagnosis, natural history, supportive care, and indications for immediate follow-up discussed. Follow-up as needed with PCP or RTC for recheck, reevaluation, and consideration of further management. Recommend Emergency Department or call 911 if symptoms fail to improve, for any change in condition, further concerns, or new concerning symptoms.     Discussed management options (risks, benefits, and alternatives to treatment). Pt/parent/guardian demonstrates  understanding and the treatment plan was agreed upon.     Electronically signed by   Jodi Sandhu, VERENICE, APRN, FNP-BC             [1]   Past Medical History:  Diagnosis Date    Allergy     Anxiety     ASTHMA     Bronchitis     CAD (coronary artery disease)     JT to RCA; 70% stenosis in LAD    Chills     Constipation     Difficulty breathing     GERD (gastroesophageal reflux disease)     Heart attack ([facility])     Heartburn     Hyperlipidemia     Hypertension     Influenza     Insomnia     Lumbar back pain 09/10/2016    Mild peripheral edema 07/31/2020    Mumps     OSTEOPOROSIS     Palpitations     Peripheral vascular disease, unspecified ([facility]) 09/14/2021    Pneumonia     Post concussion syndrome 09/11/2020    PVD (peripheral vascular disease) ([facility])     70% PAD-followed by [facility]    Sedative, hypnotic or anxiolytic dependence, 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

## 2025-05-21 ENCOUNTER — OFFICE VISIT (OUTPATIENT)
Dept: MEDICAL GROUP | Facility: PHYSICIAN GROUP | Age: OVER 89
End: 2025-05-21
Payer: MEDICARE

## 2025-05-21 VITALS
OXYGEN SATURATION: 97 % | WEIGHT: 123 LBS | TEMPERATURE: 98.8 F | HEIGHT: 62 IN | DIASTOLIC BLOOD PRESSURE: 68 MMHG | SYSTOLIC BLOOD PRESSURE: 122 MMHG | BODY MASS INDEX: 22.63 KG/M2 | HEART RATE: 72 BPM

## 2025-05-21 DIAGNOSIS — G89.29 CHRONIC ABDOMINAL PAIN: Primary | ICD-10-CM

## 2025-05-21 DIAGNOSIS — R10.9 CHRONIC ABDOMINAL PAIN: Primary | ICD-10-CM

## 2025-05-21 DIAGNOSIS — K59.09 CHRONIC CONSTIPATION: ICD-10-CM

## 2025-05-21 PROCEDURE — 99213 OFFICE O/P EST LOW 20 MIN: CPT | Performed by: STUDENT IN AN ORGANIZED HEALTH CARE EDUCATION/TRAINING PROGRAM

## 2025-05-21 PROCEDURE — 3074F SYST BP LT 130 MM HG: CPT | Performed by: STUDENT IN AN ORGANIZED HEALTH CARE EDUCATION/TRAINING PROGRAM

## 2025-05-21 PROCEDURE — 3078F DIAST BP <80 MM HG: CPT | Performed by: STUDENT IN AN ORGANIZED HEALTH CARE EDUCATION/TRAINING PROGRAM

## 2025-05-21 ASSESSMENT — FIBROSIS 4 INDEX: FIB4 SCORE: 3.23

## 2025-05-23 ASSESSMENT — ENCOUNTER SYMPTOMS
CHILLS: 0
DIZZINESS: 0
FEVER: 0
SHORTNESS OF BREATH: 0
HEADACHES: 0

## 2025-05-23 NOTE — PROGRESS NOTES
"Subjective:     CC:   Chief Complaint   Patient presents with    Lab Results    Constipation     Follow up        HPI:   Dayana presents today primarily for follow-up on lab results.    She continues to experience chronic intermittent abdominal pain with bloating and abnormal bowel movements.  She describes chronic constipation, for which she has seen GI and is on a regimen of fiber with MiraLAX as needed.  She states that she does usually have a bowel movement every day.  She also states that there are days when she experiences frequent and urgent bowel movements but her stool is typically formed.  She states her appetite is normal.  No nausea or vomiting.  No blood in the stool.        Past medical, surgical, family, and social history were reviewed and updated.     Medications:  Current Medications[1]    Allergies:  Bactrim [sulfamethoxazole w-trimethoprim], Pcn [penicillins], Codeine, and Tramadol      Health Maintenance: Completed    ROS:  Review of Systems   Constitutional:  Negative for chills and fever.   Respiratory:  Negative for shortness of breath.    Cardiovascular:  Negative for chest pain.   Neurological:  Negative for dizziness and headaches.       Objective:     Exam:  /68 (BP Location: Left arm, Patient Position: Sitting, BP Cuff Size: Adult)   Pulse 72   Temp 37.1 °C (98.8 °F) (Temporal)   Ht 1.575 m (5' 2\")   Wt 55.8 kg (123 lb)   LMP  (LMP Unknown)   SpO2 97%   BMI 22.50 kg/m²  Body mass index is 22.5 kg/m².    Physical Exam  Vitals reviewed.   Constitutional:       General: She is not in acute distress.  Eyes:      Extraocular Movements: Extraocular movements intact.   Cardiovascular:      Rate and Rhythm: Normal rate and regular rhythm.      Heart sounds: No murmur heard.     No friction rub. No gallop.   Pulmonary:      Effort: Pulmonary effort is normal.      Breath sounds: No wheezing, rhonchi or rales.   Abdominal:      General: There is no distension.      Palpations: " Abdomen is soft.      Tenderness: There is no abdominal tenderness.   Musculoskeletal:      Cervical back: Neck supple.   Skin:     General: Skin is warm and dry.   Neurological:      Mental Status: She is alert.   Psychiatric:         Mood and Affect: Mood normal.         Labs:     Labs, 5/7/25:  CBC with slightly low red blood cell count, this is stable, otherwise within normal limits  No evidence of iron, B12, or folate deficiency  BMP with mildly elevated glucose, these were nonfasting labs, otherwise normal  Renal function stable    She was in the emergency room about 3 weeks ago. Blood work was concerning at that time for anemia. Hemoglobin 10. Her platelet count was also decreased.  Her hemoglobin and platelet counts are now normal.      Assessment & Plan:     89 y.o. female with the following -     1. Chronic abdominal pain (Primary)  2. Chronic constipation  Chronic intermittent abdominal pain with change in bowel movements.  She describes constipation, this is variable.  She also experiences frequent, urgent stools but never quite diarrhea.  No blood in the stool.  This is an ongoing problem for a long time and symptoms are not very well-controlled.  She has seen GI in the past but does not wish to go back.  At this time recommend continuation of fiber supplement and adequate hydration.  Continue MiraLAX on as needed basis for constipation.  I will obtain an abdomen pelvis CT to further evaluate.  - CT-ABDOMEN-PELVIS WITH; Future              Return in about 6 weeks (around 7/2/2025) for follow up.    Please note that this dictation was created using voice recognition software. I have made every reasonable attempt to correct obvious errors, but I expect that there are errors of grammar and possibly content that I did not discover before finalizing the note.             [1]   Current Outpatient Medications:     erythromycin 5 MG/GM Ointment, Apply to stye every twice daily while awake until better., Disp:  3.5 g, Rfl: 2    losartan (COZAAR) 50 MG Tab, Take 1 Tablet by mouth every day., Disp: 100 Tablet, Rfl: 3    carvedilol (COREG) 6.25 MG Tab, TAKE 1 TABLET BY MOUTH TWICE  DAILY WITH MEALS, Disp: 200 Tablet, Rfl: 1    amLODIPine (NORVASC) 5 MG Tab, TAKE 1 TABLET BY MOUTH DAILY, Disp: 100 Tablet, Rfl: 3    beclomethasone HFA (QVAR REDIHALER) 80 MCG/ACT inhaler, Inhale 1 Puff 2 times a day., Disp: 10.6 g, Rfl: 5    gabapentin (NEURONTIN) 400 MG Cap, Take 1 Capsule by mouth 3 times a day., Disp: 300 Capsule, Rfl: 3    albuterol 108 (90 Base) MCG/ACT Aero Soln inhalation aerosol, Inhale 2 Puffs as needed for Shortness of Breath., Disp: 8.5 g, Rfl: 0    fluticasone (FLONASE) 50 MCG/ACT nasal spray, USE 1 SPRAY IN BOTH NOSTRILS  ONCE DAILY, Disp: 16 g, Rfl: 2    atorvastatin (LIPITOR) 80 MG tablet, TAKE ONE TABLET BY MOUTH EVERY  EVENING, Disp: 100 Tablet, Rfl: 2    montelukast (SINGULAIR) 10 MG Tab, TAKE 1 TABLET BY MOUTH EVERY  EVENING, Disp: 100 Tablet, Rfl: 2    omeprazole (PRILOSEC) 20 MG delayed-release capsule, TAKE 1 CAPSULE BY MOUTH TWICE  DAILY, Disp: 200 Capsule, Rfl: 2    Zinc 50 MG Tab, Take  by mouth., Disp: , Rfl:     BIOTIN PO, Take  by mouth., Disp: , Rfl:     Multiple Vitamins-Minerals (HAIR SKIN & NAILS PO), Take  by mouth., Disp: , Rfl:     Metamucil Fiber Chew Tab, Chew., Disp: , Rfl:     Probiotic Product (PROBIOTIC GUMMIES PO), Take  by mouth., Disp: , Rfl:     isosorbide mononitrate SR (IMDUR) 60 MG TABLET SR 24 HR, Take 1 Tablet by mouth every evening., Disp: 100 Tablet, Rfl: 3    aspirin 81 MG EC tablet, Chew 81 mg every day., Disp: , Rfl:     CRANBERRY PO, Take 3 Tablets by mouth every day., Disp: , Rfl:     Multiple Vitamins-Minerals (OCUVITE-LUTEIN) Tab, Take 1 Tablet by mouth every day., Disp: , Rfl:     Saline (OCEAN NASAL SPRAY NA), Administer 1 Spray into affected nostril(S) every day., Disp: , Rfl:     Acetaminophen 500 MG Cap, Take 1 Capsule by mouth 2 times a day. Morning & Afternoon,  sometimes takes 3 times a day, Disp: , Rfl:     Melatonin 10 MG Tab, Take 10 mg by mouth at bedtime., Disp: , Rfl:     guaifenesin LA (MUCINEX) 600 MG TABLET SR 12 HR, Take 600 mg by mouth every morning., Disp: , Rfl:     Ascorbic Acid (VITAMIN C) 1000 MG Tab, Take 1 Tablet by mouth every morning., Disp: , Rfl:     VITAMIN E PO, Take 1 Capsule by mouth every morning., Disp: , Rfl:     Cholecalciferol (VITAMIN D) 50 MCG (2000 UT) Cap, Take 2,000 Units by mouth every morning., Disp: , Rfl:

## 2025-06-03 ENCOUNTER — OFFICE VISIT (OUTPATIENT)
Dept: CARDIOLOGY | Facility: MEDICAL CENTER | Age: OVER 89
End: 2025-06-03
Attending: INTERNAL MEDICINE
Payer: MEDICARE

## 2025-06-03 VITALS
HEART RATE: 68 BPM | BODY MASS INDEX: 22.26 KG/M2 | WEIGHT: 121 LBS | OXYGEN SATURATION: 96 % | HEIGHT: 62 IN | RESPIRATION RATE: 16 BRPM | SYSTOLIC BLOOD PRESSURE: 120 MMHG | DIASTOLIC BLOOD PRESSURE: 56 MMHG

## 2025-06-03 DIAGNOSIS — I25.84 CORONARY ARTERY DISEASE DUE TO CALCIFIED CORONARY LESION: ICD-10-CM

## 2025-06-03 DIAGNOSIS — F07.81 POST CONCUSSION SYNDROME: ICD-10-CM

## 2025-06-03 DIAGNOSIS — I73.9 PVD (PERIPHERAL VASCULAR DISEASE) (HCC): ICD-10-CM

## 2025-06-03 DIAGNOSIS — Z98.41 S/P BILATERAL CATARACT EXTRACTION: ICD-10-CM

## 2025-06-03 DIAGNOSIS — I25.10 CORONARY ARTERY DISEASE DUE TO CALCIFIED CORONARY LESION: ICD-10-CM

## 2025-06-03 DIAGNOSIS — Z98.42 S/P BILATERAL CATARACT EXTRACTION: ICD-10-CM

## 2025-06-03 DIAGNOSIS — I10 ESSENTIAL HYPERTENSION: Primary | ICD-10-CM

## 2025-06-03 PROCEDURE — 99212 OFFICE O/P EST SF 10 MIN: CPT | Performed by: INTERNAL MEDICINE

## 2025-06-03 PROCEDURE — 3078F DIAST BP <80 MM HG: CPT | Performed by: INTERNAL MEDICINE

## 2025-06-03 PROCEDURE — 99214 OFFICE O/P EST MOD 30 MIN: CPT | Performed by: INTERNAL MEDICINE

## 2025-06-03 PROCEDURE — 3074F SYST BP LT 130 MM HG: CPT | Performed by: INTERNAL MEDICINE

## 2025-06-03 RX ORDER — ATORVASTATIN CALCIUM 40 MG/1
40 TABLET, FILM COATED ORAL EVERY EVENING
Qty: 100 TABLET | Refills: 3 | Status: SHIPPED | OUTPATIENT
Start: 2025-06-03

## 2025-06-03 RX ORDER — ATORVASTATIN CALCIUM 40 MG/1
40 TABLET, FILM COATED ORAL EVERY EVENING
Qty: 100 TABLET | Refills: 3 | Status: SHIPPED | OUTPATIENT
Start: 2025-06-03 | End: 2025-06-03

## 2025-06-03 ASSESSMENT — FIBROSIS 4 INDEX: FIB4 SCORE: 3.23

## 2025-06-03 NOTE — PROGRESS NOTES
Chief Complaint   Patient presents with    Hypertension     Follow up       Subjective     Dayana Lechuga is a 89 y.o. female who presents today for follow-up of CAD PVD hypertension and hyperlipidemia as well as stable angina.  Doing well.      No significant changes.  Most of her issues revolve around gastrointestinal concerns she is dealing with.  Intermittent palpitations and brief atypical chest discomfort unchanged.    Past Medical History:   Diagnosis Date    Allergy     Anxiety     ASTHMA     Bronchitis     CAD (coronary artery disease)     JT to RCA; 70% stenosis in LAD    Chills     Constipation     Difficulty breathing     GERD (gastroesophageal reflux disease)     Heart attack ([facility])     Heartburn     Hyperlipidemia     Hypertension     Influenza     Insomnia     Lumbar back pain 09/10/2016    Mild peripheral edema 07/31/2020    Mumps     OSTEOPOROSIS     Palpitations     Peripheral vascular disease, unspecified ([facility]) 09/14/2021    Pneumonia     Post concussion syndrome 09/11/2020    PVD (peripheral vascular disease) ([facility])     70% PAD-followed by Lary AMBROCIO    Sedative, hypnotic or anxiolytic dependence, uncomplicated ([facility]) 10/20/2023    Sweat, sweating, excessive     Tonsillitis      Past Surgical History:   Procedure Laterality Date    UT INJ LUMBAR/SACRAL,W/ IMAGING N/A 4/20/2023    Procedure: Caudal epidural with catheter;  Surgeon: Torres Fall M.D.;  [facility];  Service: Pain Management    ABDOMINAL HYSTERECTOMY TOTAL      APPENDECTOMY      HERNIA REPAIR      HYSTERECTOMY LAPAROSCOPY      TONSILLECTOMY      ZZZ CARDIAC CATH       Family History   Problem Relation Age of Onset    Heart Attack Father     Cancer Brother     Cancer Brother     Heart Disease Neg Hx     Heart Failure Neg Hx     Hyperlipidemia Neg Hx      Social History     Socioeconomic History    Marital status:      Spouse name: Not on file    Number of children: Not on file    Years  of education: Not on file    Highest education level: Not on file   Occupational History    Not on file   Tobacco Use    Smoking status: Never    Smokeless tobacco: Never   Vaping Use    Vaping status: Never Used   Substance and Sexual Activity    Alcohol use: Not Currently     Comment: occ    Drug use: No    Sexual activity: Not Currently   Other Topics Concern     Service No    Blood Transfusions Yes    Caffeine Concern No    Occupational Exposure No    Hobby Hazards No    Sleep Concern Yes    Stress Concern Yes    Weight Concern No    Special Diet No    Back Care No    Exercise No    Bike Helmet No    Seat Belt Yes    Self-Exams No   Social History Narrative    Not on file     Social Drivers of Health     Financial Resource Strain: Low Risk  (8/23/2024)    Overall Financial Resource Strain (CARDIA)     Difficulty of Paying Living Expenses: Not hard at all   Food Insecurity: No Food Insecurity (8/23/2024)    Hunger Vital Sign     Worried About Running Out of Food in the Last Year: Never true     Ran Out of Food in the Last Year: Never true   Transportation Needs: No Transportation Needs (8/23/2024)    PRAPARE - Transportation     Lack of Transportation (Medical): No     Lack of Transportation (Non-Medical): No   Physical Activity: Insufficiently Active (12/7/2022)    Exercise Vital Sign     Days of Exercise per Week: 7 days     Minutes of Exercise per Session: 20 min   Stress: No Stress Concern Present (12/7/2022)    [employer] - Occupational Stress Questionnaire     Feeling of Stress : Not at all   Social Connections: Moderately Isolated (12/7/2022)    Social Connection and Isolation Panel [NHANES]     Frequency of Communication with Friends and Family: More than three times a week     Frequency of Social Gatherings with Friends and Family: More than three times a week     Attends [employer] Services: 1 to 4 times per year     Active Member of Clubs or Organizations: No      Attends Club or Organization Meetings: Never     Marital Status:    Intimate Partner Violence: Not on file   Housing Stability: Low Risk  (8/23/2024)    Housing Stability Vital Sign     Unable to Pay for Housing in the Last Year: No     Number of Times Moved in the Last Year: 1     Homeless in the Last Year: No     Allergies   Allergen Reactions    Bactrim [Sulfamethoxazole W-Trimethoprim] Hives    Pcn [Penicillins] Hives     About 70 years. Tolerated ceftriaxone before in 2021    Codeine Unspecified     Unknown reaction      Tramadol Unspecified     Urinary retention     Outpatient Encounter Medications as of 6/3/2025   Medication Sig Dispense Refill    atorvastatin (LIPITOR) 40 MG Tab Take 1 Tablet by mouth every evening. 100 Tablet 3    erythromycin 5 MG/GM Ointment Apply to stye every twice daily while awake until better. 3.5 g 2    losartan (COZAAR) 50 MG Tab Take 1 Tablet by mouth every day. 100 Tablet 3    carvedilol (COREG) 6.25 MG Tab TAKE 1 TABLET BY MOUTH TWICE  DAILY WITH MEALS 200 Tablet 1    amLODIPine (NORVASC) 5 MG Tab TAKE 1 TABLET BY MOUTH DAILY 100 Tablet 3    beclomethasone HFA (QVAR REDIHALER) 80 MCG/ACT inhaler Inhale 1 Puff 2 times a day. 10.6 g 5    gabapentin (NEURONTIN) 400 MG Cap Take 1 Capsule by mouth 3 times a day. 300 Capsule 3    albuterol 108 (90 Base) MCG/ACT Aero Soln inhalation aerosol Inhale 2 Puffs as needed for Shortness of Breath. 8.5 g 0    fluticasone (FLONASE) 50 MCG/ACT nasal spray USE 1 SPRAY IN BOTH NOSTRILS  ONCE DAILY 16 g 2    montelukast (SINGULAIR) 10 MG Tab TAKE 1 TABLET BY MOUTH EVERY  EVENING 100 Tablet 2    omeprazole (PRILOSEC) 20 MG delayed-release capsule TAKE 1 CAPSULE BY MOUTH TWICE  DAILY 200 Capsule 2    Zinc 50 MG Tab Take  by mouth.      BIOTIN PO Take  by mouth.      Multiple Vitamins-Minerals (HAIR SKIN & NAILS PO) Take  by mouth.      Metamucil Fiber Chew Tab Chew.      Probiotic Product (PROBIOTIC GUMMIES PO) Take  by mouth.      isosorbide  "mononitrate SR (IMDUR) 60 MG TABLET SR 24 HR Take 1 Tablet by mouth every evening. 100 Tablet 3    aspirin 81 MG EC tablet Chew 81 mg every day.      CRANBERRY PO Take 3 Tablets by mouth every day.      Multiple Vitamins-Minerals (OCUVITE-LUTEIN) Tab Take 1 Tablet by mouth every day.      Saline (OCEAN NASAL SPRAY NA) Administer 1 Spray into affected nostril(S) every day.      Acetaminophen 500 MG Cap Take 1 Capsule by mouth 2 times a day. Morning & Afternoon, sometimes takes 3 times a day      Melatonin 10 MG Tab Take 10 mg by mouth at bedtime.      guaifenesin LA (MUCINEX) 600 MG TABLET SR 12 HR Take 600 mg by mouth every morning.      Ascorbic Acid (VITAMIN C) 1000 MG Tab Take 1 Tablet by mouth every morning.      VITAMIN E PO Take 1 Capsule by mouth every morning.      Cholecalciferol (VITAMIN D) 50 MCG (2000 UT) Cap Take 2,000 Units by mouth every morning.      [DISCONTINUED] atorvastatin (LIPITOR) 40 MG Tab Take 1 Tablet by mouth every evening. 100 Tablet 3    [DISCONTINUED] atorvastatin (LIPITOR) 80 MG tablet TAKE ONE TABLET BY MOUTH EVERY  EVENING 100 Tablet 2     No facility-administered encounter medications on file as of 6/3/2025.     Review of Systems   All other systems reviewed and are negative.             Objective     BP (!) 140/56 (BP Location: Left arm, Patient Position: Sitting)   Pulse 68   Resp 16   Ht 1.575 m (5' 2\")   Wt 54.9 kg (121 lb)   LMP  (LMP Unknown)   SpO2 96%   BMI 22.13 kg/m²     Physical Exam  Vitals and nursing note reviewed.   Constitutional:       General: She is not in acute distress.     Appearance: Normal appearance.   HENT:      Head: Normocephalic and atraumatic.      Right Ear: External ear normal.      Left Ear: External ear normal.      Nose: Nose normal.   Eyes:      Conjunctiva/sclera: Conjunctivae normal.   Cardiovascular:      Rate and Rhythm: Normal rate and regular rhythm.      Pulses: Normal pulses.      Heart sounds: No murmur heard.  Pulmonary:      " Effort: Pulmonary effort is normal. No respiratory distress.      Breath sounds: Normal breath sounds.   Abdominal:      General: There is no distension.      Palpations: Abdomen is soft.   Musculoskeletal:      Cervical back: No rigidity or tenderness.      Right lower leg: No edema.      Left lower leg: No edema.   Skin:     General: Skin is warm and dry.      Capillary Refill: Capillary refill takes 2 to 3 seconds.   Neurological:      General: No focal deficit present.      Mental Status: She is alert and oriented to person, place, and time.   Psychiatric:         Mood and Affect: Mood normal.         Behavior: Behavior normal.         Thought Content: Thought content normal.       LABS:  Lab Results   Component Value Date/Time    CHOLSTRLTOT 121 10/01/2024 10:01 AM    LDL 18 10/01/2024 10:01 AM    HDL 49 10/01/2024 10:01 AM    TRIGLYCERIDE 270 (H) 10/01/2024 10:01 AM       Lab Results   Component Value Date/Time    WBC 7.2 05/07/2025 10:38 AM    RBC 4.04 (L) 05/07/2025 10:38 AM    HEMOGLOBIN 13.3 05/07/2025 10:38 AM    HEMATOCRIT 39.0 05/07/2025 10:38 AM    MCV 96.5 05/07/2025 10:38 AM    NEUTSPOLYS 92.60 (H) 05/01/2025 09:15 AM    LYMPHOCYTES 2.30 (L) 05/01/2025 09:15 AM    MONOCYTES 3.90 05/01/2025 09:15 AM    EOSINOPHILS 0.30 05/01/2025 09:15 AM    BASOPHILS 0.60 05/01/2025 09:15 AM     Lab Results   Component Value Date/Time    SODIUM 136 05/07/2025 10:38 AM    POTASSIUM 3.7 05/07/2025 10:38 AM    CHLORIDE 104 05/07/2025 10:38 AM    CO2 23 05/07/2025 10:38 AM    GLUCOSE 112 (H) 05/07/2025 10:38 AM    BUN 12 05/07/2025 10:38 AM    CREATININE 0.52 05/07/2025 10:38 AM     Lab Results   Component Value Date    HBA1C 5.9 (A) 11/14/2024      Lab Results   Component Value Date/Time    ALKPHOSPHAT 51 05/01/2025 09:00 AM    ASTSGOT 35 05/01/2025 09:00 AM    ALTSGPT 26 05/01/2025 09:00 AM    TBILIRUBIN 0.8 05/01/2025 09:00 AM      Lab Results   Component Value Date/Time    BNPBTYPENAT 193 (H) 09/10/2016 04:00 PM     "  No results found for: \"TSH\"  Lab Results   Component Value Date/Time    PROTHROMBTM 12.5 10/16/2023 12:22 PM    INR 0.92 10/16/2023 12:22 PM                 Assessment & Plan     1. Essential hypertension        2. Coronary artery disease due to calcified coronary lesion        3. PVD (peripheral vascular disease) (HCC)  atorvastatin (LIPITOR) 40 MG Tab    DISCONTINUED: atorvastatin (LIPITOR) 40 MG Tab      4. Post concussion syndrome  atorvastatin (LIPITOR) 40 MG Tab    DISCONTINUED: atorvastatin (LIPITOR) 40 MG Tab      5. S/P bilateral cataract extraction  atorvastatin (LIPITOR) 40 MG Tab    DISCONTINUED: atorvastatin (LIPITOR) 40 MG Tab          Medical Decision Making: Today's Assessment/Status/Plan:          Doing well no active cardiovascular issues.  Her statin dose has become overly effective suppressing her LDL below 20 likely due to a combination of aging and worsening p.o. intake due to her GI issues.  For the time being decreasing to 40 mg atorvastatin from 80 and we discussed this.  No other changes to her medical regimen.  Offered increased beta-blocker treatment with her Coreg for intermittent palpitations but they are not bothersome enough for her to want to change her medicines and this is appropriate.    "

## 2025-06-06 ENCOUNTER — PATIENT OUTREACH (OUTPATIENT)
Dept: HEALTH INFORMATION MANAGEMENT | Facility: OTHER | Age: OVER 89
End: 2025-06-06
Payer: MEDICARE

## 2025-06-06 DIAGNOSIS — E78.5 DYSLIPIDEMIA: ICD-10-CM

## 2025-06-06 DIAGNOSIS — I25.119 CORONARY ARTERY DISEASE INVOLVING NATIVE CORONARY ARTERY OF NATIVE HEART WITH ANGINA PECTORIS (HCC): ICD-10-CM

## 2025-06-06 DIAGNOSIS — I10 ESSENTIAL HYPERTENSION: Primary | ICD-10-CM

## 2025-06-06 NOTE — PROGRESS NOTES
Reason for Follow-Up:    I spoke to the patient this afternoon by phone in order to complete her PCM monthly follow up.     Current Health Status:    The patient is followed by PCM based on diagnoses of CAD and HTN. The patient reports not regularly taking blood pressures at home but they have been in the 120s systolic her last two clinic visits. The patient denies any acute cardiac symptoms or concerns. She reports a shift in her cholesterol medication because her cholesterol was too low.     Medical Updates: Since our last conversation the patient has followed up with her PCP, Cardiology, and Urgent care, had labs taken, and communicated with her medical team by Tanisha.     Medication Updates:  The patient has been decreased to a 40 milligram dosage of Lipitor.     Symptoms:   The patient continues to have impaired voiding that responds, for the most part, to Miralax.     Plan of Care Goals and Progress:    Goal 1. Over the next month the patient will take concrete and consistent measures to optimize nutrition for GI and cardiac health.      Progress:  The patient is using Miralax, which she states is usually at least partially effective.        Barriers:  The patient has chronic pain which is medication managed and is relatively sedentary.      Interventions:  The patient will continue with Miralax therapy and continue to make an effort to get up and walk around regularly. The patient will pay close attention to hydration.      Education:  Fiber intake, Miralax, and non pharmacological measures for bowel management discussed.     Goal 2. Over the next month the patient will take concrete and consistent measures to optimize cardiac health.      Progress:  The patient is working with her providers to properly manage her cholesterol. The patient reports being free from cardiac symptoms on her current plan of care.       Barriers:  The patient has chronic cardiac concerns that are going to require ongoing monitoring  and treatment.      Interventions:  The patient will be mindful of DASH principles and  trans/saturated fat content of foods.      Education:  Education provided on heart healthy lifestyle elements.       Patient's Concerns and Feedback (Self Management of Care):     The patient's primary concern today is her GI health. The patient is managing with Miralax and following up with GI specialists. The patient reports that her stools are not hard, sometimes there is just stasis. The patient reports frustration with feeling like her GI provider doesn't remember what he is treating her for and is encouraged to be straight forward. The patient states understanding of principles like fiber and water intake on this issue. She denies any community health needs at this time.     Next Steps:     Follow-Up Plan:  We will touch base on the patient's cardiovascular and GI goals in approximately 4 weeks.       Appointments:  6/12/25(1:25 pm, Ginny BOSS1), 7/10/25(2:25 arrival, Monique), 10/14/25 (10:45 am, Cardiology)     Contact Information:  Call [phone] with any questions or concerns.

## 2025-06-06 NOTE — CARE PLAN
Problem: Knowledge of factors contributing to hypertension - Long Term  Goal: Demonstrate factors that can contribute to high blood pressure  Note: Patient aware of role of sodium and healthy weight on cardiac health.   Intervention: Educate patient on role of sodium in diet  Intervention: Educate patient on role of weight management     Problem: Risk of Falls - Long Term  Goal: Reduce the Risk of Falls and Fall Related Injuries  Outcome: Progressing  Intervention: Encourage regular vision and hearing examinations  Note: Patient reports regular vision and hearing care. The patient reports no falls or injuries since our last conversation.   Intervention: Ensure the patient uses prescribed glasses or hearing aids

## 2025-06-11 DIAGNOSIS — J30.9 ALLERGIC RHINITIS, UNSPECIFIED SEASONALITY, UNSPECIFIED TRIGGER: ICD-10-CM

## 2025-06-11 RX ORDER — MONTELUKAST SODIUM 10 MG/1
10 TABLET ORAL EVERY EVENING
Qty: 100 TABLET | Refills: 3 | Status: SHIPPED | OUTPATIENT
Start: 2025-06-11

## 2025-06-11 RX ORDER — FLUTICASONE PROPIONATE 50 MCG
SPRAY, SUSPENSION (ML) NASAL
Qty: 16 G | Refills: 1 | Status: SHIPPED | OUTPATIENT
Start: 2025-06-11

## 2025-06-11 NOTE — TELEPHONE ENCOUNTER
Received request via: Pharmacy    Was the patient seen in the last year in this department? Yes    Does the patient have an active prescription (recently filled or refills available) for medication(s) requested? No    [facility]    Does the patient have alf Plus and need 100-day supply? (This applies to ALL medications) Yes, quantity updated to 100 days

## 2025-06-12 ENCOUNTER — HOSPITAL ENCOUNTER (OUTPATIENT)
Dept: RADIOLOGY | Facility: MEDICAL CENTER | Age: OVER 89
End: 2025-06-12
Attending: STUDENT IN AN ORGANIZED HEALTH CARE EDUCATION/TRAINING PROGRAM
Payer: MEDICARE

## 2025-06-12 DIAGNOSIS — K59.09 CHRONIC CONSTIPATION: ICD-10-CM

## 2025-06-12 DIAGNOSIS — R10.9 CHRONIC ABDOMINAL PAIN: ICD-10-CM

## 2025-06-12 DIAGNOSIS — G89.29 CHRONIC ABDOMINAL PAIN: ICD-10-CM

## 2025-06-12 PROCEDURE — 74177 CT ABD & PELVIS W/CONTRAST: CPT

## 2025-06-12 PROCEDURE — 700117 HCHG RX CONTRAST REV CODE 255: Performed by: STUDENT IN AN ORGANIZED HEALTH CARE EDUCATION/TRAINING PROGRAM

## 2025-06-12 RX ADMIN — IOHEXOL 100 ML: 350 INJECTION, SOLUTION INTRAVENOUS at 13:42

## 2025-06-13 ENCOUNTER — RESULTS FOLLOW-UP (OUTPATIENT)
Dept: MEDICAL GROUP | Facility: PHYSICIAN GROUP | Age: OVER 89
End: 2025-06-13
Payer: MEDICARE

## 2025-07-10 ENCOUNTER — OFFICE VISIT (OUTPATIENT)
Dept: MEDICAL GROUP | Facility: PHYSICIAN GROUP | Age: OVER 89
End: 2025-07-10
Payer: MEDICARE

## 2025-07-10 VITALS
BODY MASS INDEX: 22.45 KG/M2 | OXYGEN SATURATION: 90 % | TEMPERATURE: 99.9 F | WEIGHT: 122 LBS | SYSTOLIC BLOOD PRESSURE: 132 MMHG | HEART RATE: 74 BPM | DIASTOLIC BLOOD PRESSURE: 68 MMHG | HEIGHT: 62 IN

## 2025-07-10 DIAGNOSIS — K59.09 CHRONIC CONSTIPATION: ICD-10-CM

## 2025-07-10 DIAGNOSIS — I10 ESSENTIAL HYPERTENSION: Chronic | ICD-10-CM

## 2025-07-10 PROCEDURE — 3078F DIAST BP <80 MM HG: CPT | Performed by: STUDENT IN AN ORGANIZED HEALTH CARE EDUCATION/TRAINING PROGRAM

## 2025-07-10 PROCEDURE — 3075F SYST BP GE 130 - 139MM HG: CPT | Performed by: STUDENT IN AN ORGANIZED HEALTH CARE EDUCATION/TRAINING PROGRAM

## 2025-07-10 PROCEDURE — 99214 OFFICE O/P EST MOD 30 MIN: CPT | Performed by: STUDENT IN AN ORGANIZED HEALTH CARE EDUCATION/TRAINING PROGRAM

## 2025-07-10 ASSESSMENT — ENCOUNTER SYMPTOMS
DIZZINESS: 0
CHILLS: 0
FEVER: 0
HEADACHES: 0
SHORTNESS OF BREATH: 0

## 2025-07-10 ASSESSMENT — FIBROSIS 4 INDEX: FIB4 SCORE: 3.23

## 2025-07-11 NOTE — PROGRESS NOTES
"Verbal consent was acquired by the patient to use [employer] ambient listening note generation during this visit.    Subjective:     HPI:   History of Present Illness  The patient is here today for a follow-up visit.    Chronic Constipation  She reports improvement in her chronic constipation, a condition she has been dealing with for an extended period. Her current regimen includes fiber supplements and MiraLAX as needed. However, she often experiences diarrhea as a side effect of the laxatives. Over the past few weeks, her symptoms have significantly improved, with regular bowel movements occurring daily without any complications.     Hypertension  Blood pressure is well-controlled.  She is currently on a regimen of losartan 50 mg daily, carvedilol 6.25 mg twice daily and amlodipine 5 mg daily for management of hypertension          Past medical, surgical, family, and social history were reviewed and updated.     Medications:  Current Medications[1]    Allergies:  Bactrim [sulfamethoxazole w-trimethoprim], Pcn [penicillins], Codeine, and Tramadol      Health Maintenance: Completed    ROS:  Review of Systems   Constitutional:  Negative for chills and fever.   Respiratory:  Negative for shortness of breath.    Cardiovascular:  Negative for chest pain.   Neurological:  Negative for dizziness and headaches.       Objective:     Exam:  /68 (BP Location: Left arm, Patient Position: Sitting, BP Cuff Size: Adult)   Pulse 74   Temp 37.7 °C (99.9 °F) (Temporal)   Ht 1.575 m (5' 2\")   Wt 55.3 kg (122 lb)   LMP  (LMP Unknown)   SpO2 90%   BMI 22.31 kg/m²  Body mass index is 22.31 kg/m².    Physical Exam  Vitals reviewed.   Constitutional:       General: She is not in acute distress.  Eyes:      Extraocular Movements: Extraocular movements intact.   Cardiovascular:      Rate and Rhythm: Normal rate and regular rhythm.      Heart sounds: No murmur heard.     No friction rub. No gallop.   Pulmonary:      Effort: " Pulmonary effort is normal.      Breath sounds: No wheezing, rhonchi or rales.   Musculoskeletal:      Cervical back: Neck supple.   Skin:     General: Skin is warm and dry.   Neurological:      Mental Status: She is alert.   Psychiatric:         Mood and Affect: Mood normal.         Results      Assessment & Plan:     1. Chronic constipation        2. Essential hypertension            Assessment & Plan  ** Constipation: Chronic. This is currently well controlled.   - She is advised to maintain adequate fluid intake and continue adequate fiber intake, aiming for around 25 g of fiber per day from diet or a fiber supplement.   - Discussed common dietary triggers    ** Hypertension: Chronic, stable condition.  - Continue current medication regimen      Follow-up: 3 months              Please note that this dictation was created using voice recognition software. I have made every reasonable attempt to correct obvious errors, but I expect that there are errors of grammar and possibly content that I did not discover before finalizing the note.             [1]   Current Outpatient Medications:     montelukast (SINGULAIR) 10 MG Tab, Take 1 Tablet by mouth every evening., Disp: 100 Tablet, Rfl: 3    fluticasone (FLONASE) 50 MCG/ACT nasal spray, USE 1 SPRAY IN BOTH NOSTRILS  ONCE DAILY, Disp: 16 g, Rfl: 1    atorvastatin (LIPITOR) 40 MG Tab, Take 1 Tablet by mouth every evening., Disp: 100 Tablet, Rfl: 3    erythromycin 5 MG/GM Ointment, Apply to stye every twice daily while awake until better., Disp: 3.5 g, Rfl: 2    losartan (COZAAR) 50 MG Tab, Take 1 Tablet by mouth every day., Disp: 100 Tablet, Rfl: 3    carvedilol (COREG) 6.25 MG Tab, TAKE 1 TABLET BY MOUTH TWICE  DAILY WITH MEALS, Disp: 200 Tablet, Rfl: 1    amLODIPine (NORVASC) 5 MG Tab, TAKE 1 TABLET BY MOUTH DAILY, Disp: 100 Tablet, Rfl: 3    beclomethasone HFA (QVAR REDIHALER) 80 MCG/ACT inhaler, Inhale 1 Puff 2 times a day., Disp: 10.6 g, Rfl: 5    gabapentin  (NEURONTIN) 400 MG Cap, Take 1 Capsule by mouth 3 times a day., Disp: 300 Capsule, Rfl: 3    albuterol 108 (90 Base) MCG/ACT Aero Soln inhalation aerosol, Inhale 2 Puffs as needed for Shortness of Breath., Disp: 8.5 g, Rfl: 0    omeprazole (PRILOSEC) 20 MG delayed-release capsule, TAKE 1 CAPSULE BY MOUTH TWICE  DAILY, Disp: 200 Capsule, Rfl: 2    Zinc 50 MG Tab, Take  by mouth., Disp: , Rfl:     BIOTIN PO, Take  by mouth., Disp: , Rfl:     Multiple Vitamins-Minerals (HAIR SKIN & NAILS PO), Take  by mouth., Disp: , Rfl:     Probiotic Product (PROBIOTIC GUMMIES PO), Take  by mouth., Disp: , Rfl:     isosorbide mononitrate SR (IMDUR) 60 MG TABLET SR 24 HR, Take 1 Tablet by mouth every evening., Disp: 100 Tablet, Rfl: 3    aspirin 81 MG EC tablet, Chew 81 mg every day., Disp: , Rfl:     CRANBERRY PO, Take 3 Tablets by mouth every day., Disp: , Rfl:     Multiple Vitamins-Minerals (OCUVITE-LUTEIN) Tab, Take 1 Tablet by mouth every day., Disp: , Rfl:     Saline (OCEAN NASAL SPRAY NA), Administer 1 Spray into affected nostril(S) every day., Disp: , Rfl:     Acetaminophen 500 MG Cap, Take 1 Capsule by mouth 2 times a day. Morning & Afternoon, sometimes takes 3 times a day, Disp: , Rfl:     Melatonin 10 MG Tab, Take 10 mg by mouth at bedtime., Disp: , Rfl:     guaifenesin LA (MUCINEX) 600 MG TABLET SR 12 HR, Take 600 mg by mouth every morning., Disp: , Rfl:     Ascorbic Acid (VITAMIN C) 1000 MG Tab, Take 1 Tablet by mouth every morning., Disp: , Rfl:     VITAMIN E PO, Take 1 Capsule by mouth every morning., Disp: , Rfl:     Cholecalciferol (VITAMIN D) 50 MCG (2000 UT) Cap, Take 2,000 Units by mouth every morning., Disp: , Rfl:

## 2025-07-18 ENCOUNTER — PATIENT OUTREACH (OUTPATIENT)
Dept: HEALTH INFORMATION MANAGEMENT | Facility: OTHER | Age: OVER 89
End: 2025-07-18
Payer: MEDICARE

## 2025-07-18 DIAGNOSIS — I25.119 CORONARY ARTERY DISEASE INVOLVING NATIVE CORONARY ARTERY OF NATIVE HEART WITH ANGINA PECTORIS (HCC): ICD-10-CM

## 2025-07-18 DIAGNOSIS — I10 ESSENTIAL HYPERTENSION: Primary | ICD-10-CM

## 2025-07-18 DIAGNOSIS — I20.89 STABLE ANGINA PECTORIS (HCC): ICD-10-CM

## 2025-07-18 DIAGNOSIS — E78.5 DYSLIPIDEMIA: ICD-10-CM

## 2025-07-18 DIAGNOSIS — J30.9 ALLERGIC RHINITIS, UNSPECIFIED SEASONALITY, UNSPECIFIED TRIGGER: ICD-10-CM

## 2025-07-18 DIAGNOSIS — F41.9 ANXIETY: ICD-10-CM

## 2025-07-18 RX ORDER — MONTELUKAST SODIUM 10 MG/1
10 TABLET ORAL EVERY EVENING
Qty: 100 TABLET | Refills: 3 | Status: SHIPPED | OUTPATIENT
Start: 2025-07-18

## 2025-07-18 RX ORDER — ISOSORBIDE MONONITRATE 60 MG/1
60 TABLET, EXTENDED RELEASE ORAL EVERY EVENING
Qty: 100 TABLET | Refills: 0 | Status: SHIPPED | OUTPATIENT
Start: 2025-07-18

## 2025-07-18 NOTE — TELEPHONE ENCOUNTER
Received request via: Pharmacy    Was the patient seen in the last year in this department? Yes    Does the patient have an active prescription (recently filled or refills available) for medication(s) requested? No    [facility]    Does the patient have [facility] and need 100-day supply? (This applies to ALL medications) Yes, quantity updated to 100 days

## 2025-07-18 NOTE — PROGRESS NOTES
"Reason for Follow-Up:    I spoke to the patient this afternoon by phone in order to complete her monthly PCM follow up.     Current Health Status:    The patient is followed by PCM based on diagnoses of CAD, HTN, and Depression/Anxiety. The patient is up to date on testing regarding cholesterol. Her blood pressure is reported as elevated today (she is not feeling well) but is 120s-130s systolic over her last clinic appointments. She reports no attendant symptoms. The patient is frustrated by not feeling well today but reports no novel or worsening depression or anxiety at this time. She reports understanding of her current plan of care.     Medical Updates:  Since our last conversation the patient has had a CT, requested medication refills, and followed up with her PCP.     Medication Updates:  The patient denies any updates to her medication list today. She reports taking medications as prescribed.     Symptoms:  \"sick to stomach\" without vomiting, \"a lot of\" bowel movements. Feeling \"wiped out.\"     Plan of Care Goals and Progress:    Goal 1. Over the next month the patient will take concrete and consistent measures to optimize cardiovascular health     Progress:  The patient's blood pressures are generally well controlled on her current plan of care. She reports taking medications as prescribed.       Barriers:  The patient has chronic cardiovascular concerns that will require ongoing management.      Interventions:  The patient will continue walking with her dog. The patient is advised to be mindful of sodium, cholesterol, and trans/saturated fat intake.      Education:  Heart and GI healthy nutrition reinforced.     Goal 2. Over the next month the patient will take concrete and consistent measures to optimize mental health.      Progress:  The patient reports coping in healthy ways.       Barriers:  The patient has ongoing challenges that will require ongoing support.      Interventions:  The patient will " "continue participating in meals and activities in her senior living facility. The patient will utilize her family support well.      Education:  The patient is advised to continue developing a bank of activities she enjoys to keep her active and her spirits up.       Patient's Concerns and Feedback (Self Management of Care):     The patient's primary concern today is \"feeling sick to my stomach\" and having \"A lot of bowel movements.\" She feels \"wiped out.\" The patient will follow up with bland diet to allow things to calm down and seek urgent or emergency care should symptoms worsen. She is agreeable to me checking in on her on Monday. She is aware of her future appointment dates and times and plans to attend as scheduled.     Next Steps:     Follow-Up Plan:  The patient and I will speak regarding her cardiovascular, acute GI, and goals regarding anxiety/depression in August 2025.       Appointments:  10/14/25 (10:45, Paco), 10/17/25 (9:45 arrival, Monique)     Contact Information:  Call [phone] with any questions or concerns.                                  "

## 2025-07-18 NOTE — CARE PLAN
Problem: Knowledge of hypertension - Long Term  Goal: Demonstrate Knowledge of Hypertension  Outcome: Progressing     Problem: Knowledge of factors contributing to hypertension - Long Term  Goal: Demonstrate factors that can contribute to high blood pressure  Note: Patient has verbalized understanding of factors contributing to high blood pressure and remains a non smoker.   Intervention: Educate patient on the role of smoking     Problem: Knowledge of self-monitoring blood pressure  Goal: Demonstrate the elements of self-monitoring blood pressure  Outcome: Progressing  Note: The patient monitors her blood pressure when she is not feeling well or feels that it may not be where we want it to be.     Intervention: Educate on proper technique of self-monitoring blood pressure     Problem: Patient expresses feelings of loneliness, losses associated with aging and/or depression or anxiety  Goal: Patient experiences a reduction in feelings of loneliness, grief, and sadness  Note: The patient is well groomed when in office. She reports having a routine.   Intervention: Encourage establishing daily routine including hygiene tasks.

## 2025-07-21 ENCOUNTER — TELEPHONE (OUTPATIENT)
Dept: HEALTH INFORMATION MANAGEMENT | Facility: OTHER | Age: OVER 89
End: 2025-07-21
Payer: MEDICARE

## 2025-07-21 NOTE — TELEPHONE ENCOUNTER
I called to check and see if the patient's symptoms have improved or resolved today as agreed. No answer. Message left on voicemail with contact information and request for call back if needed.

## 2025-08-05 ENCOUNTER — PATIENT OUTREACH (OUTPATIENT)
Dept: HEALTH INFORMATION MANAGEMENT | Facility: OTHER | Age: OVER 89
End: 2025-08-05
Payer: MEDICARE

## 2025-08-05 DIAGNOSIS — F41.9 ANXIETY: ICD-10-CM

## 2025-08-05 DIAGNOSIS — I10 ESSENTIAL HYPERTENSION: Primary | ICD-10-CM

## 2025-08-05 DIAGNOSIS — E78.5 DYSLIPIDEMIA: ICD-10-CM

## 2025-08-05 DIAGNOSIS — I25.119 CORONARY ARTERY DISEASE INVOLVING NATIVE CORONARY ARTERY OF NATIVE HEART WITH ANGINA PECTORIS (HCC): ICD-10-CM

## 2025-08-06 DIAGNOSIS — J30.9 ALLERGIC RHINITIS, UNSPECIFIED SEASONALITY, UNSPECIFIED TRIGGER: ICD-10-CM

## 2025-08-06 RX ORDER — FLUTICASONE PROPIONATE 50 MCG
SPRAY, SUSPENSION (ML) NASAL
Qty: 16 G | Refills: 1 | Status: SHIPPED | OUTPATIENT
Start: 2025-08-06

## 2025-08-11 ENCOUNTER — OFFICE VISIT (OUTPATIENT)
Dept: URGENT CARE | Facility: CLINIC | Age: OVER 89
End: 2025-08-11
Payer: MEDICARE

## 2025-08-11 ENCOUNTER — APPOINTMENT (OUTPATIENT)
Dept: RADIOLOGY | Facility: IMAGING CENTER | Age: OVER 89
End: 2025-08-11
Payer: MEDICARE

## 2025-08-11 ENCOUNTER — HOSPITAL ENCOUNTER (OUTPATIENT)
Dept: RADIOLOGY | Facility: MEDICAL CENTER | Age: OVER 89
End: 2025-08-11
Payer: MEDICARE

## 2025-08-11 VITALS
DIASTOLIC BLOOD PRESSURE: 78 MMHG | WEIGHT: 122 LBS | HEART RATE: 75 BPM | HEIGHT: 62 IN | OXYGEN SATURATION: 95 % | BODY MASS INDEX: 22.45 KG/M2 | RESPIRATION RATE: 14 BRPM | TEMPERATURE: 97.8 F | SYSTOLIC BLOOD PRESSURE: 116 MMHG

## 2025-08-11 DIAGNOSIS — M79.662 PAIN AND SWELLING OF LEFT LOWER LEG: ICD-10-CM

## 2025-08-11 DIAGNOSIS — M79.89 PAIN AND SWELLING OF LEFT LOWER LEG: ICD-10-CM

## 2025-08-11 DIAGNOSIS — S89.92XA INJURY OF LEFT LOWER LEG, INITIAL ENCOUNTER: Primary | ICD-10-CM

## 2025-08-11 DIAGNOSIS — S89.92XA INJURY OF LEFT LOWER LEG, INITIAL ENCOUNTER: ICD-10-CM

## 2025-08-11 DIAGNOSIS — R07.89 CHEST DISCOMFORT: ICD-10-CM

## 2025-08-11 PROCEDURE — 93971 EXTREMITY STUDY: CPT | Mod: LT

## 2025-08-11 PROCEDURE — 3074F SYST BP LT 130 MM HG: CPT

## 2025-08-11 PROCEDURE — 99214 OFFICE O/P EST MOD 30 MIN: CPT

## 2025-08-11 PROCEDURE — 93000 ELECTROCARDIOGRAM COMPLETE: CPT

## 2025-08-11 PROCEDURE — 73590 X-RAY EXAM OF LOWER LEG: CPT | Mod: TC,LT | Performed by: RADIOLOGY

## 2025-08-11 PROCEDURE — 3078F DIAST BP <80 MM HG: CPT

## 2025-08-11 ASSESSMENT — ENCOUNTER SYMPTOMS
STRIDOR: 0
DOUBLE VISION: 0
FOCAL WEAKNESS: 0
FEVER: 0
PALPITATIONS: 0
DIZZINESS: 0
ABDOMINAL PAIN: 0
SHORTNESS OF BREATH: 0
SEIZURES: 0
WHEEZING: 0
COUGH: 0
NECK PAIN: 0
SPEECH CHANGE: 0
BACK PAIN: 0
TINGLING: 1
CHILLS: 0
WEAKNESS: 0
LOSS OF CONSCIOUSNESS: 0
HEADACHES: 0
NAUSEA: 0
SENSORY CHANGE: 0
VOMITING: 0
PHOTOPHOBIA: 0
SPUTUM PRODUCTION: 0
FALLS: 0
EYE PAIN: 0
MYALGIAS: 0

## 2025-08-11 ASSESSMENT — FIBROSIS 4 INDEX: FIB4 SCORE: 3.23

## 2025-08-11 ASSESSMENT — VISUAL ACUITY: OU: 1

## 2025-08-12 ENCOUNTER — TELEPHONE (OUTPATIENT)
Dept: SCHEDULING | Facility: IMAGING CENTER | Age: OVER 89
End: 2025-08-12
Payer: MEDICARE

## 2025-08-13 DIAGNOSIS — I10 ESSENTIAL HYPERTENSION: Chronic | ICD-10-CM

## 2025-08-13 RX ORDER — LOSARTAN POTASSIUM 50 MG/1
50 TABLET ORAL DAILY
Qty: 100 TABLET | Refills: 3 | Status: SHIPPED | OUTPATIENT
Start: 2025-08-13 | End: 2026-09-17

## 2025-08-14 ENCOUNTER — PATIENT OUTREACH (OUTPATIENT)
Dept: HEALTH INFORMATION MANAGEMENT | Facility: OTHER | Age: OVER 89
End: 2025-08-14
Payer: MEDICARE

## 2025-08-14 DIAGNOSIS — E78.5 DYSLIPIDEMIA: ICD-10-CM

## 2025-08-14 DIAGNOSIS — I10 ESSENTIAL HYPERTENSION: Primary | ICD-10-CM

## 2025-08-14 DIAGNOSIS — I25.119 CORONARY ARTERY DISEASE INVOLVING NATIVE CORONARY ARTERY OF NATIVE HEART WITH ANGINA PECTORIS (HCC): ICD-10-CM

## 2025-08-21 ENCOUNTER — OFFICE VISIT (OUTPATIENT)
Dept: URGENT CARE | Facility: CLINIC | Age: OVER 89
End: 2025-08-21
Payer: MEDICARE

## 2025-08-21 VITALS
HEART RATE: 67 BPM | WEIGHT: 122 LBS | DIASTOLIC BLOOD PRESSURE: 70 MMHG | SYSTOLIC BLOOD PRESSURE: 112 MMHG | BODY MASS INDEX: 22.45 KG/M2 | TEMPERATURE: 98.7 F | OXYGEN SATURATION: 97 % | HEIGHT: 62 IN | RESPIRATION RATE: 16 BRPM

## 2025-08-21 DIAGNOSIS — H01.001 BLEPHARITIS OF RIGHT UPPER EYELID, UNSPECIFIED TYPE: Primary | ICD-10-CM

## 2025-08-21 DIAGNOSIS — H00.011 HORDEOLUM EXTERNUM OF RIGHT UPPER EYELID: ICD-10-CM

## 2025-08-21 PROCEDURE — 99214 OFFICE O/P EST MOD 30 MIN: CPT

## 2025-08-21 PROCEDURE — 3074F SYST BP LT 130 MM HG: CPT

## 2025-08-21 PROCEDURE — 3078F DIAST BP <80 MM HG: CPT

## 2025-08-21 RX ORDER — AZITHROMYCIN 500 MG/1
TABLET, FILM COATED ORAL
Qty: 5 TABLET | Refills: 0 | Status: SHIPPED | OUTPATIENT
Start: 2025-08-21 | End: 2025-08-30

## 2025-08-21 ASSESSMENT — VISUAL ACUITY: OU: 1

## 2025-08-21 ASSESSMENT — FIBROSIS 4 INDEX: FIB4 SCORE: 3.23

## 2025-08-26 ENCOUNTER — HOSPITAL ENCOUNTER (EMERGENCY)
Facility: MEDICAL CENTER | Age: OVER 89
End: 2025-08-26
Attending: STUDENT IN AN ORGANIZED HEALTH CARE EDUCATION/TRAINING PROGRAM
Payer: MEDICARE

## 2025-08-26 ENCOUNTER — APPOINTMENT (OUTPATIENT)
Dept: RADIOLOGY | Facility: MEDICAL CENTER | Age: OVER 89
End: 2025-08-26
Attending: STUDENT IN AN ORGANIZED HEALTH CARE EDUCATION/TRAINING PROGRAM
Payer: MEDICARE

## 2025-08-26 VITALS
TEMPERATURE: 97.7 F | RESPIRATION RATE: 16 BRPM | WEIGHT: 120 LBS | SYSTOLIC BLOOD PRESSURE: 166 MMHG | OXYGEN SATURATION: 93 % | DIASTOLIC BLOOD PRESSURE: 78 MMHG | BODY MASS INDEX: 22.08 KG/M2 | HEIGHT: 62 IN | HEART RATE: 78 BPM

## 2025-08-26 DIAGNOSIS — J40 BRONCHITIS: ICD-10-CM

## 2025-08-26 DIAGNOSIS — J45.901 MODERATE ASTHMA WITH ACUTE EXACERBATION, UNSPECIFIED WHETHER PERSISTENT: ICD-10-CM

## 2025-08-26 DIAGNOSIS — R19.7 DIARRHEA, UNSPECIFIED TYPE: Primary | ICD-10-CM

## 2025-08-26 DIAGNOSIS — J06.9 UPPER RESPIRATORY TRACT INFECTION, UNSPECIFIED TYPE: ICD-10-CM

## 2025-08-26 LAB
ALBUMIN SERPL BCP-MCNC: 4.3 G/DL (ref 3.2–4.9)
ALBUMIN/GLOB SERPL: 1.5 G/DL
ALP SERPL-CCNC: 64 U/L (ref 30–99)
ALT SERPL-CCNC: 26 U/L (ref 2–50)
ANION GAP SERPL CALC-SCNC: 11 MMOL/L (ref 7–16)
APPEARANCE UR: CLEAR
AST SERPL-CCNC: 32 U/L (ref 12–45)
BASOPHILS # BLD AUTO: 1.1 % (ref 0–1.8)
BASOPHILS # BLD: 0.05 K/UL (ref 0–0.12)
BILIRUB SERPL-MCNC: 0.2 MG/DL (ref 0.1–1.5)
BILIRUB UR QL STRIP.AUTO: NEGATIVE
BUN SERPL-MCNC: 14 MG/DL (ref 8–22)
CALCIUM ALBUM COR SERPL-MCNC: 9.2 MG/DL (ref 8.5–10.5)
CALCIUM SERPL-MCNC: 9.4 MG/DL (ref 8.5–10.5)
CHLORIDE SERPL-SCNC: 106 MMOL/L (ref 96–112)
CO2 SERPL-SCNC: 21 MMOL/L (ref 20–33)
COLOR UR: YELLOW
CREAT SERPL-MCNC: 0.54 MG/DL (ref 0.5–1.4)
EKG IMPRESSION: NORMAL
EOSINOPHIL # BLD AUTO: 0.05 K/UL (ref 0–0.51)
EOSINOPHIL NFR BLD: 1.1 % (ref 0–6.9)
ERYTHROCYTE [DISTWIDTH] IN BLOOD BY AUTOMATED COUNT: 48.4 FL (ref 35.9–50)
GFR SERPLBLD CREATININE-BSD FMLA CKD-EPI: 84 ML/MIN/1.73 M 2
GLOBULIN SER CALC-MCNC: 2.8 G/DL (ref 1.9–3.5)
GLUCOSE SERPL-MCNC: 115 MG/DL (ref 65–99)
GLUCOSE UR STRIP.AUTO-MCNC: NEGATIVE MG/DL
HCT VFR BLD AUTO: 40.1 % (ref 37–47)
HGB BLD-MCNC: 12.8 G/DL (ref 12–16)
IMM GRANULOCYTES # BLD AUTO: 0.01 K/UL (ref 0–0.11)
IMM GRANULOCYTES NFR BLD AUTO: 0.2 % (ref 0–0.9)
KETONES UR STRIP.AUTO-MCNC: NEGATIVE MG/DL
LEUKOCYTE ESTERASE UR QL STRIP.AUTO: NEGATIVE
LIPASE SERPL-CCNC: 58 U/L (ref 11–82)
LYMPHOCYTES # BLD AUTO: 1.33 K/UL (ref 1–4.8)
LYMPHOCYTES NFR BLD: 28.4 % (ref 22–41)
MCH RBC QN AUTO: 32.2 PG (ref 27–33)
MCHC RBC AUTO-ENTMCNC: 31.9 G/DL (ref 32.2–35.5)
MCV RBC AUTO: 101 FL (ref 81.4–97.8)
MICRO URNS: NORMAL
MONOCYTES # BLD AUTO: 0.71 K/UL (ref 0–0.85)
MONOCYTES NFR BLD AUTO: 15.1 % (ref 0–13.4)
NEUTROPHILS # BLD AUTO: 2.54 K/UL (ref 1.82–7.42)
NEUTROPHILS NFR BLD: 54.1 % (ref 44–72)
NITRITE UR QL STRIP.AUTO: NEGATIVE
NRBC # BLD AUTO: 0 K/UL
NRBC BLD-RTO: 0 /100 WBC (ref 0–0.2)
PH UR STRIP.AUTO: 6 [PH] (ref 5–8)
PLATELET # BLD AUTO: 150 K/UL (ref 164–446)
PMV BLD AUTO: 10.3 FL (ref 9–12.9)
POTASSIUM SERPL-SCNC: 3.9 MMOL/L (ref 3.6–5.5)
PROT SERPL-MCNC: 7.1 G/DL (ref 6–8.2)
PROT UR QL STRIP: NEGATIVE MG/DL
RBC # BLD AUTO: 3.97 M/UL (ref 4.2–5.4)
RBC UR QL AUTO: NEGATIVE
SODIUM SERPL-SCNC: 138 MMOL/L (ref 135–145)
SP GR UR STRIP.AUTO: 1
UROBILINOGEN UR STRIP.AUTO-MCNC: 0.2 EU/DL
WBC # BLD AUTO: 4.7 K/UL (ref 4.8–10.8)

## 2025-08-26 PROCEDURE — 36415 COLL VENOUS BLD VENIPUNCTURE: CPT

## 2025-08-26 PROCEDURE — 80053 COMPREHEN METABOLIC PANEL: CPT

## 2025-08-26 PROCEDURE — 85025 COMPLETE CBC W/AUTO DIFF WBC: CPT

## 2025-08-26 PROCEDURE — 93005 ELECTROCARDIOGRAM TRACING: CPT | Mod: TC

## 2025-08-26 PROCEDURE — 99285 EMERGENCY DEPT VISIT HI MDM: CPT

## 2025-08-26 PROCEDURE — 700111 HCHG RX REV CODE 636 W/ 250 OVERRIDE (IP): Mod: JZ | Performed by: STUDENT IN AN ORGANIZED HEALTH CARE EDUCATION/TRAINING PROGRAM

## 2025-08-26 PROCEDURE — 93005 ELECTROCARDIOGRAM TRACING: CPT | Mod: TC | Performed by: STUDENT IN AN ORGANIZED HEALTH CARE EDUCATION/TRAINING PROGRAM

## 2025-08-26 PROCEDURE — 94640 AIRWAY INHALATION TREATMENT: CPT

## 2025-08-26 PROCEDURE — 83690 ASSAY OF LIPASE: CPT

## 2025-08-26 PROCEDURE — 71045 X-RAY EXAM CHEST 1 VIEW: CPT

## 2025-08-26 PROCEDURE — 81003 URINALYSIS AUTO W/O SCOPE: CPT

## 2025-08-26 PROCEDURE — 700101 HCHG RX REV CODE 250: Performed by: STUDENT IN AN ORGANIZED HEALTH CARE EDUCATION/TRAINING PROGRAM

## 2025-08-26 PROCEDURE — 96372 THER/PROPH/DIAG INJ SC/IM: CPT

## 2025-08-26 RX ORDER — PREDNISONE 10 MG/1
50 TABLET ORAL DAILY
Qty: 20 TABLET | Refills: 0 | Status: SHIPPED | OUTPATIENT
Start: 2025-08-26 | End: 2025-08-30

## 2025-08-26 RX ORDER — ALBUTEROL SULFATE 90 UG/1
2 INHALANT RESPIRATORY (INHALATION) EVERY 6 HOURS PRN
Qty: 8.5 G | Refills: 0 | Status: SHIPPED | OUTPATIENT
Start: 2025-08-26

## 2025-08-26 RX ORDER — IPRATROPIUM BROMIDE AND ALBUTEROL SULFATE 2.5; .5 MG/3ML; MG/3ML
3 SOLUTION RESPIRATORY (INHALATION)
Status: DISCONTINUED | OUTPATIENT
Start: 2025-08-26 | End: 2025-08-26 | Stop reason: HOSPADM

## 2025-08-26 RX ORDER — DICYCLOMINE HYDROCHLORIDE 10 MG/ML
20 INJECTION INTRAMUSCULAR ONCE
Status: COMPLETED | OUTPATIENT
Start: 2025-08-26 | End: 2025-08-26

## 2025-08-26 RX ORDER — DICYCLOMINE HYDROCHLORIDE 10 MG/1
10 CAPSULE ORAL
Qty: 120 CAPSULE | Refills: 0 | Status: SHIPPED | OUTPATIENT
Start: 2025-08-26

## 2025-08-26 RX ADMIN — IPRATROPIUM BROMIDE AND ALBUTEROL SULFATE 3 ML: .5; 2.5 SOLUTION RESPIRATORY (INHALATION) at 15:16

## 2025-08-26 RX ADMIN — PREDNISONE 50 MG: 10 TABLET ORAL at 15:08

## 2025-08-26 RX ADMIN — DICYCLOMINE HYDROCHLORIDE 20 MG: 10 INJECTION INTRAMUSCULAR at 15:07

## 2025-08-26 ASSESSMENT — FIBROSIS 4 INDEX: FIB4 SCORE: 3.23

## 2025-08-26 ASSESSMENT — PAIN DESCRIPTION - PAIN TYPE: TYPE: ACUTE PAIN

## 2025-08-29 ENCOUNTER — OFFICE VISIT (OUTPATIENT)
Dept: MEDICAL GROUP | Facility: PHYSICIAN GROUP | Age: OVER 89
End: 2025-08-29
Payer: MEDICARE

## 2025-08-29 VITALS
SYSTOLIC BLOOD PRESSURE: 148 MMHG | DIASTOLIC BLOOD PRESSURE: 90 MMHG | BODY MASS INDEX: 23.11 KG/M2 | OXYGEN SATURATION: 94 % | TEMPERATURE: 99.1 F | HEART RATE: 75 BPM | WEIGHT: 125.6 LBS | HEIGHT: 62 IN

## 2025-08-29 DIAGNOSIS — R05.1 ACUTE COUGH: Primary | ICD-10-CM

## 2025-08-29 DIAGNOSIS — I10 ESSENTIAL HYPERTENSION: ICD-10-CM

## 2025-08-29 DIAGNOSIS — R10.9 CHRONIC ABDOMINAL PAIN: ICD-10-CM

## 2025-08-29 DIAGNOSIS — J45.21 MILD INTERMITTENT ASTHMA WITH ACUTE EXACERBATION: ICD-10-CM

## 2025-08-29 DIAGNOSIS — G89.29 CHRONIC ABDOMINAL PAIN: ICD-10-CM

## 2025-08-29 DIAGNOSIS — R19.7 DIARRHEA OF PRESUMED INFECTIOUS ORIGIN: ICD-10-CM

## 2025-08-29 ASSESSMENT — FIBROSIS 4 INDEX: FIB4 SCORE: 4.02
